# Patient Record
Sex: FEMALE | Race: WHITE | Employment: OTHER | URBAN - METROPOLITAN AREA
[De-identification: names, ages, dates, MRNs, and addresses within clinical notes are randomized per-mention and may not be internally consistent; named-entity substitution may affect disease eponyms.]

---

## 2017-01-24 ENCOUNTER — TELEPHONE (OUTPATIENT)
Dept: INTERNAL MEDICINE | Age: 77
End: 2017-01-24

## 2017-01-24 RX ORDER — GUAIFENESIN AND CODEINE PHOSPHATE 100; 10 MG/5ML; MG/5ML
5 SOLUTION ORAL 4 TIMES DAILY PRN
Qty: 240 ML | Refills: 1 | OUTPATIENT
Start: 2017-01-24 | End: 2017-01-31

## 2017-01-30 DIAGNOSIS — I10 ESSENTIAL HYPERTENSION, BENIGN: ICD-10-CM

## 2017-01-30 RX ORDER — IRBESARTAN 300 MG/1
300 TABLET ORAL DAILY
Qty: 90 TABLET | Refills: 3 | Status: SHIPPED | OUTPATIENT
Start: 2017-01-30 | End: 2017-02-21 | Stop reason: SDUPTHER

## 2017-01-30 RX ORDER — GLIPIZIDE 10 MG/1
TABLET ORAL
Qty: 90 TABLET | Refills: 3 | Status: SHIPPED | OUTPATIENT
Start: 2017-01-30 | End: 2018-01-04 | Stop reason: SDUPTHER

## 2017-01-30 RX ORDER — AMLODIPINE BESYLATE 10 MG/1
10 TABLET ORAL DAILY
Qty: 90 TABLET | Refills: 3 | Status: SHIPPED | OUTPATIENT
Start: 2017-01-30 | End: 2018-01-04 | Stop reason: SDUPTHER

## 2017-01-31 ENCOUNTER — OFFICE VISIT (OUTPATIENT)
Dept: INTERNAL MEDICINE | Age: 77
End: 2017-01-31

## 2017-01-31 VITALS
TEMPERATURE: 97.3 F | DIASTOLIC BLOOD PRESSURE: 58 MMHG | WEIGHT: 192 LBS | SYSTOLIC BLOOD PRESSURE: 150 MMHG | BODY MASS INDEX: 35.33 KG/M2 | RESPIRATION RATE: 16 BRPM | HEART RATE: 84 BPM

## 2017-01-31 DIAGNOSIS — J20.9 ACUTE BRONCHITIS WITH BRONCHOSPASM: Primary | ICD-10-CM

## 2017-01-31 PROCEDURE — 99214 OFFICE O/P EST MOD 30 MIN: CPT | Performed by: INTERNAL MEDICINE

## 2017-01-31 RX ORDER — LEVOFLOXACIN 500 MG/1
500 TABLET, FILM COATED ORAL DAILY
Qty: 10 TABLET | Refills: 0 | Status: SHIPPED | OUTPATIENT
Start: 2017-01-31 | End: 2017-02-01

## 2017-01-31 ASSESSMENT — ENCOUNTER SYMPTOMS
ANAL BLEEDING: 0
VOMITING: 0
COUGH: 1
SINUS PRESSURE: 1
DIARRHEA: 1
ABDOMINAL PAIN: 0
CONSTIPATION: 0
BLOOD IN STOOL: 0
ABDOMINAL DISTENTION: 0
NAUSEA: 0

## 2017-02-01 ENCOUNTER — OFFICE VISIT (OUTPATIENT)
Dept: INTERNAL MEDICINE | Age: 77
End: 2017-02-01

## 2017-02-01 ENCOUNTER — TELEPHONE (OUTPATIENT)
Dept: INTERNAL MEDICINE | Age: 77
End: 2017-02-01

## 2017-02-01 VITALS
HEART RATE: 80 BPM | SYSTOLIC BLOOD PRESSURE: 138 MMHG | WEIGHT: 193 LBS | BODY MASS INDEX: 35.52 KG/M2 | DIASTOLIC BLOOD PRESSURE: 60 MMHG | TEMPERATURE: 97.2 F | RESPIRATION RATE: 16 BRPM

## 2017-02-01 DIAGNOSIS — B02.9 HERPES ZOSTER WITHOUT COMPLICATION: Primary | ICD-10-CM

## 2017-02-01 PROCEDURE — 99214 OFFICE O/P EST MOD 30 MIN: CPT | Performed by: INTERNAL MEDICINE

## 2017-02-01 RX ORDER — VALACYCLOVIR HYDROCHLORIDE 1 G/1
1000 TABLET, FILM COATED ORAL 3 TIMES DAILY
Qty: 21 TABLET | Refills: 0 | Status: SHIPPED | OUTPATIENT
Start: 2017-02-01 | End: 2017-02-08

## 2017-02-01 RX ORDER — OMEGA-3/DHA/EPA/FISH OIL 1000 MG
1 CAPSULE ORAL DAILY
COMMUNITY
End: 2021-02-23 | Stop reason: SDUPTHER

## 2017-02-01 ASSESSMENT — ENCOUNTER SYMPTOMS
BLOOD IN STOOL: 0
NAUSEA: 0
VOMITING: 0
CONSTIPATION: 0
ABDOMINAL DISTENTION: 0
ABDOMINAL PAIN: 0
DIARRHEA: 1
ANAL BLEEDING: 0
COUGH: 1

## 2017-02-03 ENCOUNTER — TELEPHONE (OUTPATIENT)
Dept: INTERNAL MEDICINE | Age: 77
End: 2017-02-03

## 2017-02-09 ENCOUNTER — OFFICE VISIT (OUTPATIENT)
Dept: INTERNAL MEDICINE | Age: 77
End: 2017-02-09

## 2017-02-09 VITALS
HEART RATE: 76 BPM | RESPIRATION RATE: 16 BRPM | SYSTOLIC BLOOD PRESSURE: 138 MMHG | DIASTOLIC BLOOD PRESSURE: 66 MMHG | TEMPERATURE: 97 F | BODY MASS INDEX: 34.96 KG/M2 | WEIGHT: 190 LBS

## 2017-02-09 DIAGNOSIS — H91.92 HEARING LOSS, LEFT: ICD-10-CM

## 2017-02-09 DIAGNOSIS — B02.30 HERPES ZOSTER WITH OPHTHALMIC COMPLICATION, UNSPECIFIED HERPES ZOSTER EYE DISEASE: Primary | ICD-10-CM

## 2017-02-09 PROCEDURE — 99214 OFFICE O/P EST MOD 30 MIN: CPT | Performed by: INTERNAL MEDICINE

## 2017-02-09 RX ORDER — PREDNISONE 20 MG/1
20 TABLET ORAL DAILY
Qty: 30 TABLET | Refills: 0 | Status: SHIPPED | OUTPATIENT
Start: 2017-02-09 | End: 2017-03-15 | Stop reason: ALTCHOICE

## 2017-02-09 ASSESSMENT — ENCOUNTER SYMPTOMS
VOMITING: 0
ANAL BLEEDING: 0
BLOOD IN STOOL: 0
DIARRHEA: 1
ABDOMINAL PAIN: 0
NAUSEA: 0
ABDOMINAL DISTENTION: 0
CONSTIPATION: 0
COUGH: 1

## 2017-02-10 ENCOUNTER — TELEPHONE (OUTPATIENT)
Dept: INTERNAL MEDICINE | Age: 77
End: 2017-02-10

## 2017-02-21 ENCOUNTER — OFFICE VISIT (OUTPATIENT)
Dept: INTERNAL MEDICINE | Age: 77
End: 2017-02-21

## 2017-02-21 VITALS
DIASTOLIC BLOOD PRESSURE: 62 MMHG | SYSTOLIC BLOOD PRESSURE: 144 MMHG | BODY MASS INDEX: 35.33 KG/M2 | WEIGHT: 192 LBS | RESPIRATION RATE: 16 BRPM | HEART RATE: 64 BPM

## 2017-02-21 DIAGNOSIS — B02.30 HERPES ZOSTER WITH OPHTHALMIC COMPLICATION, UNSPECIFIED HERPES ZOSTER EYE DISEASE: Primary | ICD-10-CM

## 2017-02-21 DIAGNOSIS — I10 ESSENTIAL HYPERTENSION, BENIGN: ICD-10-CM

## 2017-02-21 DIAGNOSIS — H91.92 HEARING LOSS, LEFT: ICD-10-CM

## 2017-02-21 PROBLEM — H91.90 HEARING LOSS: Status: ACTIVE | Noted: 2017-02-21

## 2017-02-21 PROCEDURE — 99214 OFFICE O/P EST MOD 30 MIN: CPT | Performed by: INTERNAL MEDICINE

## 2017-02-21 RX ORDER — IRBESARTAN 300 MG/1
300 TABLET ORAL DAILY
Qty: 90 TABLET | Refills: 3 | Status: SHIPPED | OUTPATIENT
Start: 2017-02-21 | End: 2018-04-23 | Stop reason: SDUPTHER

## 2017-02-21 ASSESSMENT — ENCOUNTER SYMPTOMS
BLOOD IN STOOL: 0
ANAL BLEEDING: 0
ABDOMINAL DISTENTION: 0
COUGH: 1

## 2017-02-22 ENCOUNTER — OFFICE VISIT (OUTPATIENT)
Dept: ENT CLINIC | Age: 77
End: 2017-02-22

## 2017-02-22 VITALS
TEMPERATURE: 97.4 F | SYSTOLIC BLOOD PRESSURE: 138 MMHG | WEIGHT: 192 LBS | BODY MASS INDEX: 35.33 KG/M2 | HEIGHT: 62 IN | DIASTOLIC BLOOD PRESSURE: 78 MMHG

## 2017-02-22 DIAGNOSIS — H91.92 HEARING LOSS IN LEFT EAR: Primary | ICD-10-CM

## 2017-02-22 PROCEDURE — 99203 OFFICE O/P NEW LOW 30 MIN: CPT | Performed by: OTOLARYNGOLOGY

## 2017-03-15 ENCOUNTER — OFFICE VISIT (OUTPATIENT)
Dept: INTERNAL MEDICINE | Age: 77
End: 2017-03-15

## 2017-03-15 VITALS
BODY MASS INDEX: 34.93 KG/M2 | DIASTOLIC BLOOD PRESSURE: 60 MMHG | RESPIRATION RATE: 16 BRPM | HEART RATE: 76 BPM | WEIGHT: 191 LBS | SYSTOLIC BLOOD PRESSURE: 140 MMHG

## 2017-03-15 DIAGNOSIS — B02.30 HERPES ZOSTER WITH OPHTHALMIC COMPLICATION, UNSPECIFIED HERPES ZOSTER EYE DISEASE: Primary | ICD-10-CM

## 2017-03-15 DIAGNOSIS — I10 ESSENTIAL HYPERTENSION, BENIGN: ICD-10-CM

## 2017-03-15 DIAGNOSIS — E11.9 TYPE 2 DIABETES MELLITUS WITHOUT COMPLICATION, WITHOUT LONG-TERM CURRENT USE OF INSULIN (HCC): ICD-10-CM

## 2017-03-15 PROCEDURE — 99214 OFFICE O/P EST MOD 30 MIN: CPT | Performed by: INTERNAL MEDICINE

## 2017-03-15 ASSESSMENT — ENCOUNTER SYMPTOMS
ABDOMINAL DISTENTION: 0
ANAL BLEEDING: 0
BLOOD IN STOOL: 0
EYE PAIN: 1

## 2017-04-10 ENCOUNTER — OFFICE VISIT (OUTPATIENT)
Dept: INTERNAL MEDICINE | Age: 77
End: 2017-04-10

## 2017-04-10 VITALS
BODY MASS INDEX: 35.12 KG/M2 | RESPIRATION RATE: 16 BRPM | DIASTOLIC BLOOD PRESSURE: 70 MMHG | HEART RATE: 76 BPM | WEIGHT: 192 LBS | SYSTOLIC BLOOD PRESSURE: 138 MMHG

## 2017-04-10 DIAGNOSIS — L24.9 IRRITANT CONTACT DERMATITIS, UNSPECIFIED TRIGGER: Primary | ICD-10-CM

## 2017-04-10 DIAGNOSIS — S39.012A BACK STRAIN, INITIAL ENCOUNTER: ICD-10-CM

## 2017-04-10 PROCEDURE — 99213 OFFICE O/P EST LOW 20 MIN: CPT | Performed by: INTERNAL MEDICINE

## 2017-04-10 RX ORDER — MELOXICAM 15 MG/1
15 TABLET ORAL DAILY
Qty: 30 TABLET | Refills: 3 | Status: SHIPPED | OUTPATIENT
Start: 2017-04-10 | End: 2017-11-29 | Stop reason: SDUPTHER

## 2017-04-11 ASSESSMENT — ENCOUNTER SYMPTOMS
ABDOMINAL DISTENTION: 0
COUGH: 1
BACK PAIN: 1
EYE PAIN: 1
BLOOD IN STOOL: 0
ANAL BLEEDING: 0

## 2017-04-17 RX ORDER — ATORVASTATIN CALCIUM 80 MG/1
TABLET, FILM COATED ORAL
Qty: 30 TABLET | Refills: 0 | Status: SHIPPED | OUTPATIENT
Start: 2017-04-17 | End: 2017-05-10 | Stop reason: SDUPTHER

## 2017-06-14 ENCOUNTER — OFFICE VISIT (OUTPATIENT)
Dept: INTERNAL MEDICINE | Age: 77
End: 2017-06-14

## 2017-06-14 VITALS
HEART RATE: 72 BPM | SYSTOLIC BLOOD PRESSURE: 128 MMHG | DIASTOLIC BLOOD PRESSURE: 60 MMHG | WEIGHT: 188 LBS | BODY MASS INDEX: 34.39 KG/M2 | RESPIRATION RATE: 16 BRPM

## 2017-06-14 DIAGNOSIS — E11.9 TYPE 2 DIABETES MELLITUS WITHOUT COMPLICATION, WITHOUT LONG-TERM CURRENT USE OF INSULIN (HCC): Primary | ICD-10-CM

## 2017-06-14 DIAGNOSIS — I10 ESSENTIAL HYPERTENSION, BENIGN: ICD-10-CM

## 2017-06-14 DIAGNOSIS — E78.00 PURE HYPERCHOLESTEROLEMIA: ICD-10-CM

## 2017-06-14 LAB
A/G RATIO: 1.7 (ref 1.1–2.2)
ALBUMIN SERPL-MCNC: 4.7 G/DL (ref 3.4–5)
ALP BLD-CCNC: 85 U/L (ref 40–129)
ALT SERPL-CCNC: 17 U/L (ref 10–40)
ANION GAP SERPL CALCULATED.3IONS-SCNC: 19 MMOL/L (ref 3–16)
AST SERPL-CCNC: 18 U/L (ref 15–37)
BILIRUB SERPL-MCNC: 0.6 MG/DL (ref 0–1)
BUN BLDV-MCNC: 18 MG/DL (ref 7–20)
CALCIUM SERPL-MCNC: 10.6 MG/DL (ref 8.3–10.6)
CHLORIDE BLD-SCNC: 105 MMOL/L (ref 99–110)
CHOLESTEROL, TOTAL: 193 MG/DL (ref 0–199)
CO2: 19 MMOL/L (ref 21–32)
CREAT SERPL-MCNC: 0.7 MG/DL (ref 0.6–1.2)
GFR AFRICAN AMERICAN: >60
GFR NON-AFRICAN AMERICAN: >60
GLOBULIN: 2.7 G/DL
GLUCOSE BLD-MCNC: 71 MG/DL (ref 70–99)
HDLC SERPL-MCNC: 64 MG/DL (ref 40–60)
LDL CHOLESTEROL CALCULATED: 91 MG/DL
POTASSIUM SERPL-SCNC: 4.4 MMOL/L (ref 3.5–5.1)
SODIUM BLD-SCNC: 143 MMOL/L (ref 136–145)
TOTAL PROTEIN: 7.4 G/DL (ref 6.4–8.2)
TRIGL SERPL-MCNC: 189 MG/DL (ref 0–150)
VLDLC SERPL CALC-MCNC: 38 MG/DL

## 2017-06-14 PROCEDURE — 36415 COLL VENOUS BLD VENIPUNCTURE: CPT | Performed by: INTERNAL MEDICINE

## 2017-06-14 PROCEDURE — 99214 OFFICE O/P EST MOD 30 MIN: CPT | Performed by: INTERNAL MEDICINE

## 2017-06-14 ASSESSMENT — ENCOUNTER SYMPTOMS
ABDOMINAL DISTENTION: 0
ANAL BLEEDING: 0
GASTROINTESTINAL NEGATIVE: 1
RESPIRATORY NEGATIVE: 1
BLOOD IN STOOL: 0
COUGH: 1

## 2017-06-15 LAB
ESTIMATED AVERAGE GLUCOSE: 128.4 MG/DL
HBA1C MFR BLD: 6.1 %

## 2017-06-30 ENCOUNTER — TELEPHONE (OUTPATIENT)
Dept: INTERNAL MEDICINE | Age: 77
End: 2017-06-30

## 2017-07-21 RX ORDER — ATORVASTATIN CALCIUM 80 MG/1
TABLET, FILM COATED ORAL
Qty: 90 TABLET | Refills: 0 | Status: SHIPPED | OUTPATIENT
Start: 2017-07-21 | End: 2018-04-26 | Stop reason: SDUPTHER

## 2017-08-01 ENCOUNTER — TELEPHONE (OUTPATIENT)
Dept: INTERNAL MEDICINE | Age: 77
End: 2017-08-01

## 2017-08-01 RX ORDER — NITROFURANTOIN 25; 75 MG/1; MG/1
100 CAPSULE ORAL 2 TIMES DAILY
Qty: 14 CAPSULE | Refills: 0 | Status: SHIPPED | OUTPATIENT
Start: 2017-08-01 | End: 2017-08-08

## 2017-09-28 ENCOUNTER — OFFICE VISIT (OUTPATIENT)
Dept: INTERNAL MEDICINE | Age: 77
End: 2017-09-28

## 2017-09-28 VITALS
OXYGEN SATURATION: 97 % | HEART RATE: 70 BPM | BODY MASS INDEX: 35.7 KG/M2 | WEIGHT: 194 LBS | HEIGHT: 62 IN | DIASTOLIC BLOOD PRESSURE: 78 MMHG | TEMPERATURE: 98.6 F | SYSTOLIC BLOOD PRESSURE: 152 MMHG

## 2017-09-28 DIAGNOSIS — N30.00 ACUTE CYSTITIS WITHOUT HEMATURIA: Primary | ICD-10-CM

## 2017-09-28 LAB
BILIRUBIN, POC: NORMAL
BLOOD URINE, POC: NORMAL
CLARITY, POC: CLEAR
COLOR, POC: YELLOW
GLUCOSE URINE, POC: NORMAL
KETONES, POC: NORMAL
LEUKOCYTE EST, POC: NORMAL
NITRITE, POC: NORMAL
PH, POC: 5
PROTEIN, POC: NORMAL
SPECIFIC GRAVITY, POC: 1.02
UROBILINOGEN, POC: NORMAL

## 2017-09-28 PROCEDURE — 81002 URINALYSIS NONAUTO W/O SCOPE: CPT | Performed by: NURSE PRACTITIONER

## 2017-09-28 PROCEDURE — 99213 OFFICE O/P EST LOW 20 MIN: CPT | Performed by: NURSE PRACTITIONER

## 2017-09-28 RX ORDER — SULFAMETHOXAZOLE AND TRIMETHOPRIM 800; 160 MG/1; MG/1
1 TABLET ORAL 2 TIMES DAILY
Qty: 20 TABLET | Refills: 0 | Status: SHIPPED | OUTPATIENT
Start: 2017-09-28 | End: 2017-10-08

## 2017-09-28 ASSESSMENT — ENCOUNTER SYMPTOMS
VOMITING: 0
COUGH: 0
SHORTNESS OF BREATH: 0
ABDOMINAL DISTENTION: 0
CONSTIPATION: 0
ABDOMINAL PAIN: 1
WHEEZING: 0
DIARRHEA: 0
NAUSEA: 0

## 2017-10-05 ENCOUNTER — OFFICE VISIT (OUTPATIENT)
Dept: INTERNAL MEDICINE | Age: 77
End: 2017-10-05

## 2017-10-05 VITALS
SYSTOLIC BLOOD PRESSURE: 130 MMHG | HEIGHT: 62 IN | HEART RATE: 78 BPM | DIASTOLIC BLOOD PRESSURE: 72 MMHG | WEIGHT: 194 LBS | TEMPERATURE: 98.5 F | OXYGEN SATURATION: 96 % | BODY MASS INDEX: 35.7 KG/M2

## 2017-10-05 DIAGNOSIS — Z00.00 ROUTINE GENERAL MEDICAL EXAMINATION AT A HEALTH CARE FACILITY: Primary | ICD-10-CM

## 2017-10-05 DIAGNOSIS — R53.83 FATIGUE, UNSPECIFIED TYPE: ICD-10-CM

## 2017-10-05 DIAGNOSIS — E66.09 NON MORBID OBESITY DUE TO EXCESS CALORIES: ICD-10-CM

## 2017-10-05 DIAGNOSIS — I10 ESSENTIAL HYPERTENSION, BENIGN: ICD-10-CM

## 2017-10-05 DIAGNOSIS — E78.00 PURE HYPERCHOLESTEROLEMIA: ICD-10-CM

## 2017-10-05 DIAGNOSIS — E11.9 TYPE 2 DIABETES MELLITUS WITHOUT COMPLICATION, WITHOUT LONG-TERM CURRENT USE OF INSULIN (HCC): ICD-10-CM

## 2017-10-05 LAB
FOLATE: 11.29 NG/ML (ref 4.78–24.2)
HCT VFR BLD CALC: 34.8 % (ref 36–48)
HEMOGLOBIN: 11.7 G/DL (ref 12–16)
MCH RBC QN AUTO: 30.7 PG (ref 26–34)
MCHC RBC AUTO-ENTMCNC: 33.5 G/DL (ref 31–36)
MCV RBC AUTO: 91.7 FL (ref 80–100)
PDW BLD-RTO: 14.2 % (ref 12.4–15.4)
PLATELET # BLD: 185 K/UL (ref 135–450)
PMV BLD AUTO: 8.6 FL (ref 5–10.5)
RBC # BLD: 3.79 M/UL (ref 4–5.2)
T4 FREE: 1 NG/DL (ref 0.9–1.8)
TSH REFLEX: 1.82 UIU/ML (ref 0.27–4.2)
VITAMIN B-12: 487 PG/ML (ref 211–911)
WBC # BLD: 6.4 K/UL (ref 4–11)

## 2017-10-05 PROCEDURE — G0439 PPPS, SUBSEQ VISIT: HCPCS | Performed by: NURSE PRACTITIONER

## 2017-10-05 ASSESSMENT — ANXIETY QUESTIONNAIRES: GAD7 TOTAL SCORE: 0

## 2017-10-05 ASSESSMENT — ENCOUNTER SYMPTOMS
COLOR CHANGE: 0
BLOOD IN STOOL: 0
SINUS PRESSURE: 0
BACK PAIN: 0
VOMITING: 0
SORE THROAT: 0
EYE ITCHING: 0
EYE REDNESS: 0
DIARRHEA: 0
SHORTNESS OF BREATH: 0
NAUSEA: 0
RHINORRHEA: 0
ABDOMINAL PAIN: 0
CONSTIPATION: 0
COUGH: 0
WHEEZING: 0
CHEST TIGHTNESS: 0

## 2017-10-05 ASSESSMENT — LIFESTYLE VARIABLES: HOW OFTEN DO YOU HAVE A DRINK CONTAINING ALCOHOL: 0

## 2017-10-05 ASSESSMENT — PATIENT HEALTH QUESTIONNAIRE - PHQ9: SUM OF ALL RESPONSES TO PHQ QUESTIONS 1-9: 0

## 2017-10-05 NOTE — PATIENT INSTRUCTIONS
Personalized Preventive Plan for Shayy Mathews - 10/5/2017  Medicare offers a range of preventive health benefits. Some of the tests and screenings are paid in full while other may be subject to a deductible, co-insurance, and/or copay. Some of these benefits include a comprehensive review of your medical history including lifestyle, illnesses that may run in your family, and various assessments and screenings as appropriate. After reviewing your medical record and screening and assessments performed today your provider may have ordered immunizations, labs, imaging, and/or referrals for you. A list of these orders (if applicable) as well as your Preventive Care list are included within your After Visit Summary for your review. Other Preventive Recommendations:    · A preventive eye exam performed by an eye specialist is recommended every 1-2 years to screen for glaucoma; cataracts, macular degeneration, and other eye disorders. · A preventive dental visit is recommended every 6 months. · Try to get at least 150 minutes of exercise per week or 10,000 steps per day on a pedometer . · Order or download the FREE \"Exercise & Physical Activity: Your Everyday Guide\" from [employer]. Call 5-747.746.6194 or search [employer] online. · You need 6307-0196 mg of calcium and 9970-4910 IU of vitamin D per day. It is possible to meet your calcium requirement with diet alone, but a vitamin D supplement is usually necessary to meet this goal.  · When exposed to the sun, use a sunscreen that protects against both UVA and UVB radiation with an SPF of 30 or greater. Reapply every 2 to 3 hours or after sweating, drying off with a towel, or swimming. · Always wear a seat belt when traveling in a car. Always wear a helmet when riding a bicycle or motorcycle. Heart-Healthy Diet   Sodium, Fat, and Cholesterol Controlled Diet       What Is a Heart Healthy Diet?    A heart-healthy cholesterol, this type of cholesterol actually carries cholesterol away from your arteries and may, therefore, help lower your risk of having a heart attack. You want this level to be high (ideally greater than 60). It is a risk to have a level less than 40. You can raise this good cholesterol by eating olive oil, canola oil, avocados, or nuts. Exercise raises this level, too. Fat    Fat is calorie dense and packs a lot of calories into a small amount of food. Even though fats should be limited due to their high calorie content, not all fats are bad. In fact, some fats are quite healthful. Fat can be broken down into four main types. The good-for-you fats are:   Monounsaturated fat  found in oils such as olive and canola, avocados, and nuts and natural nut butters; can decrease cholesterol levels, while keeping levels of HDL cholesterol high   Polyunsaturated fat  found in oils such as safflower, sunflower, soybean, corn, and sesame; can decrease total cholesterol and LDL cholesterol   Omega-3 fatty acids  particularly those found in fatty fish (such as salmon, trout, tuna, mackerel, herring, and sardines); can decrease risk of arrhythmias, decrease triglyceride levels, and slightly lower blood pressure   The fats that you want to limit are:   Saturated fat  found in animal products, many fast foods, and a few vegetables; increases total blood cholesterol, including LDL levels   Animal fats that are saturated include: butter, lard, whole-milk dairy products, meat fat, and poultry skin   Vegetable fats that are saturated include: hydrogenated shortening, palm oil, coconut oil, cocoa butter   Hydrogenated or trans fat  found in margarine and vegetable shortening, most shelf stable snack foods, and fried foods; increases LDL and decreases HDL     It is generally recommended that you limit your total fat for the day to less than 30% of your total calories.  If you follow an 1800-calorie heart healthy diet, for example, this would mean 60 grams of fat or less per day. Saturated fat and trans fat in your diet raises your blood cholesterol the most, much more than dietary cholesterol does. For this reason, on a heart-healthy diet, less than 7% of your calories should come from saturated fat and ideally 0% from trans fat. On an 1800-calorie diet, this translates into less than 14 grams of saturated fat per day, leaving 46 grams of fat to come from mono- and polyunsaturated fats.    Food Choices on a Heart Healthy Diet   Food Category   Foods Recommended   Foods to Avoid   Grains   Breads and rolls without salted tops Most dry and cooked cereals Unsalted crackers and breadsticks Low-sodium or homemade breadcrumbs or stuffing All rice and pastas   Breads, rolls, and crackers with salted tops High-fat baked goods (eg, muffins, donuts, pastries) Quick breads, self-rising flour, and biscuit mixes Regular bread crumbs Instant hot cereals Commercially prepared rice, pasta, or stuffing mixes   Vegetables   Most fresh, frozen, and low-sodium canned vegetables Low-sodium and salt-free vegetable juices Canned vegetables if unsalted or rinsed   Regular canned vegetables and juices, including sauerkraut and pickled vegetables Frozen vegetables with sauces Commercially prepared potato and vegetable mixes   Fruits   Most fresh, frozen, and canned fruits All fruit juices   Fruits processed with salt or sodium   Milk   Nonfat or low-fat (1%) milk Nonfat or low-fat yogurt Cottage cheese, low-fat ricotta, cheeses labeled as low-fat and low-sodium   Whole milk Reduced-fat (2%) milk Malted and chocolate milk Full fat yogurt Most cheeses (unless low-fat and low salt) Buttermilk (no more than 1 cup per week)   Meats and Beans   Lean cuts of fresh or frozen beef, veal, lamb, or pork (look for the word loin) Fresh or frozen poultry without the skin Fresh or frozen fish and some shellfish Egg whites and egg substitutes (Limit whole eggs to three per week) Tofu Nuts or seeds (unsalted, dry-roasted), low-sodium peanut butter Dried peas, beans, and lentils   Any smoked, cured, salted, or canned meat, fish, or poultry (including reyna, chipped beef, cold cuts, hot dogs, sausages, sardines, and anchovies) Poultry skins Breaded and/or fried fish or meats Canned peas, beans, and lentils Salted nuts   Fats and Oils   Olive oil and canola oil Low-sodium, low-fat salad dressings and mayonnaise   Butter, margarine, coconut and palm oils, reyna fat   Snacks, Sweets, and Condiments   Low-sodium or unsalted versions of broths, soups, soy sauce, and condiments Pepper, herbs, and spices; vinegar, lemon, or lime juice Low-fat frozen desserts (yogurt, sherbet, fruit bars) Sugar, cocoa powder, honey, syrup, jam, and preserves Low-fat, trans-fat free cookies, cakes, and pies Fran and animal crackers, fig bars, nita snaps   High-fat desserts Broth, soups, gravies, and sauces, made from instant mixes or other high-sodium ingredients Salted snack foods Canned olives Meat tenderizers, seasoning salt, and most flavored vinegars   Beverages   Low-sodium carbonated beverages Tea and coffee in moderation Soy milk   Commercially softened water   Suggestions   Make whole grains, fruits, and vegetables the base of your diet. Choose heart-healthy fats such as canola, olive, and flaxseed oil, and foods high in heart-healthy fats, such as nuts, seeds, soybeans, tofu, and fish. Eat fish at least twice per week; the fish highest in omega-3 fatty acids and lowest in mercury include salmon, herring, mackerel, sardines, and canned chunk light tuna. If you eat fish less than twice per week or have high triglycerides, talk to your doctor about taking fish oil supplements. Read food labels.    For products low in fat and cholesterol, look for fat free, low-fat, cholesterol free, saturated fat free, and trans fat freeAlso scan the Nutrition Facts Label, which lists saturated fat, trans fat, and cholesterol amounts. For products low in sodium, look for sodium free, very low sodium, low sodium, no added salt, and unsalted   Skip the salt when cooking or at the table; if food needs more flavor, get creative and try out different herbs and spices. Garlic and onion also add substantial flavor to foods. Trim any visible fat off meat and poultry before cooking, and drain the fat off after arciniega. Use cooking methods that require little or no added fat, such as grilling, boiling, baking, poaching, broiling, roasting, steaming, stir-frying, and sauting. Avoid fast food and convenience food. They tend to be high in saturated and trans fat and have a lot of added salt. Talk to a registered dietitian for individualized diet advice. Last Reviewed: March 2011 Javier Lemos MS, MPH, RD   Updated: 3/29/2011   ·   Patient information: Weight loss treatments    INTRODUCTION  Obesity is a major international problem, and Americans are among the heaviest people in the world. The percentage of obese people in the [place] has risen steadily. Many people find that although they initially lose weight by dieting, they quickly regain the weight after the diet ends. Because it so hard to keep weight off over time, it is important to have as much information and support as possible before starting a diet. You are most likely to be successful in losing weight and keeping it off when you believe that your body weight can be controlled. STARTING A WEIGHT LOSS PROGRAM  Some people like to talk to their doctor or nurse to get help choosing the best plan, monitoring progress, and getting advice and support along the way. To know what treatment (or combination of treatments) will work best, determine your body mass index (BMI) and waist circumference (measurement). The BMI is calculated from your height and weight.   A person with a BMI between 25 and 29.9 is considered overweight   A person with a BMI of 30 or greater is considered to be obese  A waist circumference greater than 35 inches (88 cm) in women and 40 inches (102 cm) in men increases the risk of obesity-related complications, such as heart disease and diabetes. People who are obese and who have a larger waist size may need more aggressive weight loss treatment than others. Talk to your doctor or nurse for advice. Types of treatment  Based on your measurements and your medical history, your doctor or nurse can determine what combination of weight loss treatments would work best for you. Treatments may include changes in lifestyle, exercise, dieting, and, in some cases, weight loss medicines or weight loss surgery. Weight loss surgery, also called bariatric surgery, is reserved for people with severe obesity who have not responded to other weight loss treatments. SETTING A WEIGHT LOSS GOAL  It is important to set a realistic weight loss goal. Your first goal should be to avoid gaining more weight and staying at your current weight (or within 5 percent). Many people have a \"dream\" weight that is difficult or impossible to achieve. People at high risk of developing diabetes who are able to lose 5 percent of their body weight and maintain this weight will reduce their risk of developing diabetes by about 50 percent and reduce their blood pressure. This is a success. Losing more than 15 percent of your body weight and staying at this weight is an extremely good result, even if you never reach your \"dream\" or \"ideal\" weight. LIFESTYLE CHANGES  Programs that help you to change your lifestyle are usually run by psychologists or other professionals. The goals of lifestyle changes are to help you change your eating habits, become more active, and be more aware of how much you eat and exercise, helping you to make healthier choices. This type of treatment can be broken down into three steps:   The triggers that make you want to eat   Eating   What happens after you eat  Triggers to eat  Determining what triggers you to eat involves figuring out what foods you eat and where and when you eat. To figure out what triggers you to eat, keep a record for a few days of everything you eat, the places where you eat, how often you eat, and the emotions you were feeling when you ate. For some people, the trigger is related to a certain time of day or night. For others, the trigger is related to a certain place, like sitting at a desk working. Eating  You can change your eating habits by breaking the chain of events between the trigger for eating and eating itself. There are many ways to do this. For instance, you can:  Limit where you eat to a few places (eg, dining room)   Restrict the number of utensils (eg, only a fork) used for eating   Drink a sip of water between each bite   Chew your food a certain number of times   Get up and stop eating every few minutes  What happens after you eat  Rewarding yourself for good eating behaviors can help you to develop better habits. This is not a reward for weight loss; instead, it is a reward for changing unhealthy behaviors. Do not use food as a reward. Some people find money, clothing, or personal care (eg, a hair cut, manicure, or massage) to be effective rewards. Treat yourself immediately after making better eating choices to reinforce the value of the good behavior. You need to have clear behavior goals, and you must have a time frame for reaching your goals. Reward small changes along the way to your final goal.  Other factors that contribute to successful weight loss  Changing your behavior involves more than just changing unhealthy eating habits; it also involves finding people around you to support your weight loss, reducing stress, and learning to be strong when tempted by food. Establish a \"krystin\" system  Having a friend or family member available to provide support and reinforce good behavior is very helpful.  The vegetables, and grains (including breads, rice, pasta, and cereal), alcoholic beverages, and in dairy products. Meat and fish do not contain carbohydrates. Side effects of very-low-carbohydrate diets can include constipation, headache, bad breath, muscle cramps, diarrhea, and weakness. Mediterranean diet  The term \"Mediterranean diet\" refers to a way of eating that is common in [place]-growing regions around the [facility]. Although there is some variation in Mediterranean diets, there are some similarities. Most Mediterranean diets include:  A high level of monounsaturated fats (from olive or canola oil, walnuts, pecans, almonds) and a low level of saturated fats (from butter)   A high amount of vegetables, fruits, legumes, and grains (7 to 10 servings of fruits and vegetables per day)   A moderate amount of milk and dairy products, mostly in the form of cheese. Use low-fat dairy products (skim milk, fat-free yogurt, low-fat cheese). A relatively low amount of red meat and meat products. Substitute fish or poultry for red meat. For those who drink alcohol, a modest amount (mainly as red wine) may help to protect against cardiovascular disease. A modest amount is up to one (4 ounce) glass per day for women and up to two glasses per day for men. Which diet is best?  Studies have compared different diets, including:  Very-low-carbohydrate (Atkins)   Macronutrient balance controlling glycemic load (Zone®)   Reduced-calorie (Weight Watchers®)   Very-low-fat (Ornish)  No one diet is \"best\" for weight loss. Any diet will help you to lose weight if you stick with the diet. Therefore, it is important to choose a diet that includes foods you like. Fad diets  Fad diets often promise quick weight loss (more than 1 to 2 pounds per week) and may claim that you do not need to exercise or give up favorite foods. Some fad diets cost a lot of money, because you have to pay for seminars or pills.  Fad diets generally lack any scientific evidence that they are safe and effective, but instead rely on \"before\" and \"after\" photos or testimonials. Diets that sound too good to be true usually are. These plans are a waste of time and money and are not recommended. A doctor, nurse, or nutritionist can help you find a safe and effective way to lose weight and keep it off. WEIGHT LOSS [place] a weight loss medicine may be helpful when used in combination with diet, exercise, and lifestyle changes. However, it is important to understand the risks and benefits of these medicines. It is also important to be realistic about your goal weight using a weight loss medicine; you may not reach your \"dream\" weight, but you may be able to reduce your risk of diabetes or heart disease. Weight loss medicines may be recommended for people who have not been able to lose weight with diet and exercise who have a:  BMI of 30 or more    BMI between 27 and 29.9 and have other medical problems, such as diabetes, high cholesterol, or high blood pressure  Two weight loss medicines are approved in the [place] for long-term use. These are sibutramine and orlistat. Other weight loss medicines (phentermine, diethylpropion) are available but are only approved for short-term use (up to 12 weeks). Sibutramine  Sibutramine (Meridia®, Reductil®) is a medicine that reduces your appetite. In people who take the medicine for one year, the average weight loss is 10 percent of the initial body weight (5 percent more than those who took a placebo treatment). Side effects of sibutramine include insomnia, dry mouth, and constipation. Increases in blood pressure can occur. Therefore, blood pressure is usually monitored during treatment. There is no evidence that sibutramine causes heart or lung problems (like dexfenfluramine and fenfluramine (Phen/Fen)).  However, experts agree that sibutramine should not used by people with coronary heart disease, heart Although some studies have shown that ephedra helps with weight loss, there can be serious side effects (psychiatric symptoms, palpitations, and stomach upset), including death. There are not enough data about the safety and efficacy of chromium, ginseng, glucomannan, green tea, hydroxycitric acid, L carnitine, psyllium, pyruvate supplements, Love wort, and conjugated linoleic acid. Two supplements from [facility], [address] [place] (also known as the Ryanjuan Uriostegui 15 pill) and Herbathin dietary supplement, have been shown to contain prescription drugs. Hoodia gordonii is a dietary supplement derived from a plant in [place]. It is not recommended because there is no proof that it is safe or effective. Bitter orange (Citrus aurantium) can increase your heart rate and blood pressure and is not recommended. SHOULD I HAVE SURGERY TO LOSE WEIGHT?  Weight loss surgery is recommended ONLY for people with one of the following:  Severe obesity (body mass index above 40) (calculator 1 and calculator 2) who have not responded to diet, exercise, or weight loss medicines   Body mass index between 35 and 40, along with a serious medical problem (including diabetes, severe joint pain, or sleep apnea) that would improve with weight loss  You should be sure that you understand the potential risks and benefits of weight loss surgery. You must be motivated and willing to make lifelong changes in how you eat to reach and maintain a healthier weight after surgery. You must also be realistic about weight loss after surgery (see 'Effectiveness of weight loss surgery' below). PREPARING FOR WEIGHT LOSS SURGERY  Most people who have weight loss surgery will meet with several specialists before surgery is scheduled. This often includes a dietitian, mental health counselor, a doctor who specializes in care of obese people, and a surgeon who performs weight loss surgery (bariatric surgeon).  You may need to work with these providers for several weeks or months before surgery. The nutritionist will explain what and how much you will be able to eat after surgery. You may also need to lose a small amount of weight before surgery. The mental health specialist will help you to cope with stress and other factors that can make it harder to lose weight or trigger you to eat   The medical doctor will determine whether you need other tests, counseling, or treatment before surgery. He or she might also help you begin a medical weight loss program so that you can lose some weight before surgery. The bariatric surgeon will meet with you to discuss the surgeries available to treat obesity. He or she will also make sure you are a good candidate for surgery. TYPES OF WEIGHT LOSS SURGERY  There are several types of weight loss surgeries, the most common being lap banding, gastric bypass, and gastric sleeve. Lap banding  Laparoscopic adjustable gastric banding (LAGB), or lap banding, is a surgery that uses an adjustable band around the opening to the stomach (figure 1). This reduces the amount of food that you can eat at one time. Lap banding is done through small incisions, with a laparoscope. The band can be adjusted after surgery, allowing you to eat more or less food. Adjustments to the size and tightness of the band are made by using a needle to add or remove fluid from a port (a small container under the skin that is connected to the band). Adding fluid to the band makes it tighter which restricts the amount of food you can eat and may help you to lose more weight. Lap banding is a popular choice because it is relatively simple to perform, can be adjusted or removed, and has a low risk of serious complications immediately after surgery. However, weight loss with the lap band depends on your ability to follow the program closely. You will need to prepare nutritious meals that [facility] with\" the band, not against it.  For example, the lap band will not work well if you eat or drink a large amount of liquid calories (like ice cream). The band will not help you to feel full when you eat/drink liquid calories. Weight loss ranges from 45 to 75 percent after two years. As an example, a person who is 120 pounds overweight could expect to lose approximately 54 to 90 pounds in the two years after lap banding. Gastric bypass  Audrey-en-Y gastric bypass, also called gastric bypass, helps you to lose weight by reducing the amount of food you can eat and reducing the number of calories and nutrients you absorb from the food you eat. To perform gastric bypass, a surgeon creates a small stomach pouch by dividing the stomach and attaching it to the small intestine. This helps you to lose weight in two ways: The smaller stomach can hold less food than before surgery. This causes you to feel full after eating a very small amount of food or liquid. Over time, the pouch might stretch, allowing you to eat more food. The body absorbs fewer calories, since food bypasses most of the stomach as well as the upper small intestine. This new arrangement seems to decrease your appetite and change how you break down foods by changing the release of various hormones. Gastric bypass can be performed as open surgery (through an incision on the abdomen) or laparoscopically, which uses smaller incisions and smaller instruments. Both the laparoscopic and open techniques have risks and benefits. You and your surgeon should work together to decide which surgery, if any, is right for you. Gastric bypass has a high success rate, and people lose an average of 62 to 68 percent of their excess body weight in the first year. Weight loss typically levels off after one to two years, with an overall excess weight loss between 50 and 75 percent. For a person who is 120 pounds overweight, an average of 60 to 90 pounds of weight loss would be expected.   Gastric sleeve  Gastric sleeve, also known as sleeve gastrectomy, is a surgery that reduces the size of the stomach and makes it into a narrow tube (figure 3). The new stomach is much smaller and produces less of the hormone (ghrelin) that causes hunger, helping you feel satisfied with less food. Sleeve gastrectomy is safer than gastric bypass because the intestines are not rearranged, and there is less chance of malnutrition. It also appears to control hunger better than lap banding. It might be safer than the lap banding because no foreign materials are used. The gastric sleeve has a good success rate, and people lose an average of 33 percent of their excess body weight in the first year. For a person who is 120 pounds overweight, this would mean losing about 40 pounds in the first year. WEIGHT LOSS SURGERY COMPLICATIONS  A variety of complications can occur with weight loss surgery. The risks of surgery depend upon which surgery you have and any medical problems you had before surgery. Some of the more common early surgical complications (one to six weeks after surgery) include:  Bleeding   Infection   Blockage or tear in the bowels   Need for further surgery  Important medical complications after surgery can include blood clots in the legs or lungs, heart attack, pneumonia, and urinary tract infection. Complications are less likely when surgery is performed in centers that are experienced in weight loss surgery. In general, centers with experience in weight loss surgery have:  Board-certified doctors and surgeons   A team of support staff (dietitians, counselors, nurses)   Long-term follow-up after surgery   Hospital staff experienced with the care of weight loss patients. This includes nurses who are trained in the care of patients immediately after surgery and anesthesiologists who are experienced in caring for the morbidly obese.   EFFECTIVENESS OF WEIGHT LOSS SURGERY  The goal of weight loss surgery is to reduce the risk of illness or death associated with obesity. Weight loss surgery can also help you to feel and look better, reduce the amount of money you spend on medicines, and cut down on sick days. As an example, weight loss surgery can improve health problems related to obesity (diabetes, high blood pressure, high cholesterol, sleep apnea) to the point that you need less or no medicine. Finally, weight loss surgery might reduce your risk of developing heart disease, cancer, and certain infections. AFTER WEIGHT LOSS SURGERY  You will need to stay in the hospital until your team feels that it is safe for you to leave (on average, one to three days). Do not drive if you are taking prescription pain medicine. Begin exercising as soon as possible once you have healed; most weight loss centers will design an exercise program for you. Once you are home, it is important to eat and drink exactly what your doctor and dietitian recommend. You will see your doctor, nurse, and dietitian on a regular basis after surgery to monitor your health, diet, and weight loss. You will be able to slowly increase how much you eat over time, although it will always be important to:  Eat small, frequent meals and not skip meals   Chew your food slowly and completely   Avoid eating while \"distracted\" (such as eating while watching TV)   Stop eating when you feel full   Drink liquids at least 30 minutes before or after eating   Avoid foods high in fat or sugar   Take vitamin supplements, as recommended  It can take several months to learn to listen to your body so that you know when you are hungry and when you are full. You may dislike foods you previously loved, and you may begin to prefer new foods. This can be a frustrating process for some people, so talk to your dietitian if you are having trouble. It usually takes between one and two years to lose weight after surgery.  After reaching their goal weight, some people have plastic surgery (called \"body contouring\")

## 2017-10-05 NOTE — MR AVS SNAPSHOT
[employer] online. · You need 8032-6763 mg of calcium and 5915-2348 IU of vitamin D per day. It is possible to meet your calcium requirement with diet alone, but a vitamin D supplement is usually necessary to meet this goal.  · When exposed to the sun, use a sunscreen that protects against both UVA and UVB radiation with an SPF of 30 or greater. Reapply every 2 to 3 hours or after sweating, drying off with a towel, or swimming. · Always wear a seat belt when traveling in a car. Always wear a helmet when riding a bicycle or motorcycle. Heart-Healthy Diet   Sodium, Fat, and Cholesterol Controlled Diet       What Is a Heart Healthy Diet? A heart-healthy diet is one that limits sodium , certain types of fat , and cholesterol . This type of diet is recommended for:   People with any form of cardiovascular disease (eg, coronary heart disease , peripheral vascular disease , previous heart attack , previous stroke )   People with risk factors for cardiovascular disease, such as high blood pressure , high cholesterol , or diabetes   Anyone who wants to lower their risk of developing cardiovascular disease   Sodium    Sodium is a mineral found in many foods. In general, most people consume much more sodium than they need. Diets high in sodium can increase blood pressure and lead to edema (water retention). On a heart-healthy diet, you should consume no more than 2,300 mg (milligrams) of sodium per dayabout the amount in one teaspoon of table salt. The foods highest in sodium include table salt (about 50% sodium), processed foods, convenience foods, and preserved foods. Cholesterol    Cholesterol is a fat-like, waxy substance in your blood. Our bodies make some cholesterol. It is also found in animal products, with the highest amounts in fatty meat, egg yolks, whole milk, cheese, shellfish, and organ meats.  On a heart-healthy diet, you should limit your cholesterol intake The fats that you want to limit are:   Saturated fat  found in animal products, many fast foods, and a few vegetables; increases total blood cholesterol, including LDL levels   Animal fats that are saturated include: butter, lard, whole-milk dairy products, meat fat, and poultry skin   Vegetable fats that are saturated include: hydrogenated shortening, palm oil, coconut oil, cocoa butter   Hydrogenated or trans fat  found in margarine and vegetable shortening, most shelf stable snack foods, and fried foods; increases LDL and decreases HDL     It is generally recommended that you limit your total fat for the day to less than 30% of your total calories. If you follow an 1800-calorie heart healthy diet, for example, this would mean 60 grams of fat or less per day. Saturated fat and trans fat in your diet raises your blood cholesterol the most, much more than dietary cholesterol does. For this reason, on a heart-healthy diet, less than 7% of your calories should come from saturated fat and ideally 0% from trans fat. On an 1800-calorie diet, this translates into less than 14 grams of saturated fat per day, leaving 46 grams of fat to come from mono- and polyunsaturated fats.    Food Choices on a Heart Healthy Diet   Food Category   Foods Recommended   Foods to Avoid   Grains   Breads and rolls without salted tops Most dry and cooked cereals Unsalted crackers and breadsticks Low-sodium or homemade breadcrumbs or stuffing All rice and pastas   Breads, rolls, and crackers with salted tops High-fat baked goods (eg, muffins, donuts, pastries) Quick breads, self-rising flour, and biscuit mixes Regular bread crumbs Instant hot cereals Commercially prepared rice, pasta, or stuffing mixes   Vegetables   Most fresh, frozen, and low-sodium canned vegetables Low-sodium and salt-free vegetable juices Canned vegetables if unsalted or rinsed   Regular canned vegetables and juices, including sauerkraut and pickled quickly regain the weight after the diet ends. Because it so hard to keep weight off over time, it is important to have as much information and support as possible before starting a diet. You are most likely to be successful in losing weight and keeping it off when you believe that your body weight can be controlled. STARTING A WEIGHT LOSS PROGRAM  Some people like to talk to their doctor or nurse to get help choosing the best plan, monitoring progress, and getting advice and support along the way. To know what treatment (or combination of treatments) will work best, determine your body mass index (BMI) and waist circumference (measurement). The BMI is calculated from your height and weight. A person with a BMI between 25 and 29.9 is considered overweight   A person with a BMI of 30 or greater is considered to be obese  A waist circumference greater than 35 inches (88 cm) in women and 40 inches (102 cm) in men increases the risk of obesity-related complications, such as heart disease and diabetes. People who are obese and who have a larger waist size may need more aggressive weight loss treatment than others. Talk to your doctor or nurse for advice. Types of treatment  Based on your measurements and your medical history, your doctor or nurse can determine what combination of weight loss treatments would work best for you. Treatments may include changes in lifestyle, exercise, dieting, and, in some cases, weight loss medicines or weight loss surgery. Weight loss surgery, also called bariatric surgery, is reserved for people with severe obesity who have not responded to other weight loss treatments. SETTING A WEIGHT LOSS GOAL  It is important to set a realistic weight loss goal. Your first goal should be to avoid gaining more weight and staying at your current weight (or within 5 percent). Many people have a \"dream\" weight that is difficult or impossible to achieve. People at high risk of developing diabetes who are able to lose 5 percent of their body weight and maintain this weight will reduce their risk of developing diabetes by about 50 percent and reduce their blood pressure. This is a success. Losing more than 15 percent of your body weight and staying at this weight is an extremely good result, even if you never reach your \"dream\" or \"ideal\" weight. LIFESTYLE CHANGES  Programs that help you to change your lifestyle are usually run by psychologists or other professionals. The goals of lifestyle changes are to help you change your eating habits, become more active, and be more aware of how much you eat and exercise, helping you to make healthier choices. This type of treatment can be broken down into three steps: The triggers that make you want to eat   Eating   What happens after you eat  Triggers to eat  Determining what triggers you to eat involves figuring out what foods you eat and where and when you eat. To figure out what triggers you to eat, keep a record for a few days of everything you eat, the places where you eat, how often you eat, and the emotions you were feeling when you ate. For some people, the trigger is related to a certain time of day or night. For others, the trigger is related to a certain place, like sitting at a desk working. Eating  You can change your eating habits by breaking the chain of events between the trigger for eating and eating itself. There are many ways to do this. For instance, you can:  Limit where you eat to a few places (eg, dining room)   Restrict the number of utensils (eg, only a fork) used for eating   Drink a sip of water between each bite   Chew your food a certain number of times   Get up and stop eating every few minutes  What happens after you eat  Rewarding yourself for good eating behaviors can help you to develop better habits.  This is not a reward for weight loss; instead, it is a reward for changing unhealthy behaviors. Do not use food as a reward. Some people find money, clothing, or personal care (eg, a hair cut, manicure, or massage) to be effective rewards. Treat yourself immediately after making better eating choices to reinforce the value of the good behavior. You need to have clear behavior goals, and you must have a time frame for reaching your goals. Reward small changes along the way to your final goal.  Other factors that contribute to successful weight loss  Changing your behavior involves more than just changing unhealthy eating habits; it also involves finding people around you to support your weight loss, reducing stress, and learning to be strong when tempted by food. Establish a \"krystin\" system  Having a friend or family member available to provide support and reinforce good behavior is very helpful. The support person needs to understand your goals. Learn to be strong  Learning to be strong when tempted by food is an important part of losing weight. As an example, you will need to learn how to say \"no\" and continue to say no when urged to eat at parties and social gatherings. Develop strategies for events before you go, such as eating before you go or taking low-calorie snacks and drinks with you. Develop a support system  Having a support system is helpful when losing weight. This is why many commercial groups are successful. Family support is also essential; if your family does not support your efforts to lose weight, this can slow your progress or even keep you from losing weight. Positive thinking  People often have conversations with themselves in their head; these conversations can be positive or negative. If you eat a piece of cake that was not planned, you may respond by thinking, \"Oh, you stupid idiot, you've blown your diet! \" and as a result, you may eat more cake. A positive thought for the same event could be, \"Well, I ate cake when it was not on my plan. Now I should do something to get back on track. \" A positive approach is much more likely to be successful than a negative one. Reduce stress  Although stress is a part of everyday life, it can trigger uncontrolled eating in some people. It is important to find a way to get through these difficult times without eating or by eating low-calorie food, like raw vegetables. It may be helpful to imagine a relaxing place that allows you to temporarily escape from stress. With deep breaths and closed eyes, you can imagine this relaxing place for a few minutes. Self-help programs  Self-help programs like [employer] Watchers®, Overeaters Anonymous®, and Take Off All (.com)© work for some people. As with all weight loss programs, you are most likely to be successful with these plans if you make long-term changes in how you eat. CHOOSING A DIET  A calorie is a unit of energy found in food. Your body needs calories to function. The goal of any diet is to burn up more calories than you eat. How quickly you lose weight depends upon several factors, such as your age, gender, and starting weight. Older people have a slower metabolism than young people, so they lose weight more slowly. Men lose more weight than women of similar height and weight when dieting because they use more energy. People who are extremely overweight lose weight more quickly than those who are only mildly overweight. Try not to drink alcohol or drinks with added sugar, and most sweets (candy, cakes, cookies), since they rarely contain important nutrients. Portion-controlled diets  One simple way to diet is to buy packaged foods, like frozen low-calorie meals or meal-replacement canned drinks.  A typical meal plan for one day may include:  A meal-replacement drink or breakfast bar for breakfast A meal-replacement drink or a frozen low-calorie (250 to 350 calories) meal for lunch   A frozen low-calorie meal or other prepackaged, calorie-controlled meal, along with extra vegetables for dinner  This would give you 1000 to 1500 calories per day. Low-fat diet  To reduce the amount of fat in your diet, you can:  Eat low-fat foods. Low-fat foods are those that contain less than 30 percent of calories from fat. Fat is listed on the food facts label (figure 1). Count fat grams. For a 1500 calorie diet, this would mean about 45 g or fewer of fat per day. Low-carbohydrate diet  Low- and very-low-carbohydrate diets (eg, Atkins diet, [facility]) have become popular ways to lose weight quickly. With a very-low-carbohydrate diet, you eat between 0 and 60 grams of carbohydrates per day (a standard diet contains 200 to 300 grams of carbohydrates)   With a low-carbohydrate diet, you eat between 60 and 130 grams of carbohydrates per day  Carbohydrates are found in fruits, vegetables, and grains (including breads, rice, pasta, and cereal), alcoholic beverages, and in dairy products. Meat and fish do not contain carbohydrates. Side effects of very-low-carbohydrate diets can include constipation, headache, bad breath, muscle cramps, diarrhea, and weakness. Mediterranean diet  The term \"Mediterranean diet\" refers to a way of eating that is common in [place]growing regions around the [facility]. Although there is some variation in Mediterranean diets, there are some similarities. Most Mediterranean diets include:  A high level of monounsaturated fats (from olive or canola oil, walnuts, pecans, almonds) and a low level of saturated fats (from butter)   A high amount of vegetables, fruits, legumes, and grains (7 to 10 servings of fruits and vegetables per day)   A moderate amount of milk and dairy products, mostly in the form of cheese. Use low-fat dairy products (skim milk, fat-free yogurt, low-fat cheese). BMI between 27 and 29.9 and have other medical problems, such as diabetes, high cholesterol, or high blood pressure  Two weight loss medicines are approved in the [place] for long-term use. These are sibutramine and orlistat. Other weight loss medicines (phentermine, diethylpropion) are available but are only approved for short-term use (up to 12 weeks). Sibutramine  Sibutramine (Meridia®, Reductil®) is a medicine that reduces your appetite. In people who take the medicine for one year, the average weight loss is 10 percent of the initial body weight (5 percent more than those who took a placebo treatment). Side effects of sibutramine include insomnia, dry mouth, and constipation. Increases in blood pressure can occur. Therefore, blood pressure is usually monitored during treatment. There is no evidence that sibutramine causes heart or lung problems (like dexfenfluramine and fenfluramine (Phen/Fen)). However, experts agree that sibutramine should not used by people with coronary heart disease, heart failure, uncontrolled hypertension, stroke, irregular heart rhythms, or peripheral vascular disease (poor circulation in the legs). Orlistat  Orlistat (Xenical® 120 mg capsules) is a medicine that reduces the amount of fat your body absorbs from the foods you eat. A lower-dose version is now available without a prescription (Flo® 60 mg capsules) in many countries, including the [place]. The medicine is recommended three times per day, taken with a meal; you can skip a dose if you skip a meal or if the meal contains no fat. After one year of treatment with orlistat, the average weight loss is approximately 8 to 10 percent of initial body weight (4 percent more than in those who took a placebo). Cholesterol levels often improve, and blood pressure sometimes falls. In people with diabetes, orlistat may help control blood sugar levels. SHOULD I HAVE SURGERY TO LOSE WEIGHT?  Weight loss surgery is recommended ONLY for people with one of the following:  Severe obesity (body mass index above 40) (calculator 1 and calculator 2) who have not responded to diet, exercise, or weight loss medicines   Body mass index between 35 and 40, along with a serious medical problem (including diabetes, severe joint pain, or sleep apnea) that would improve with weight loss  You should be sure that you understand the potential risks and benefits of weight loss surgery. You must be motivated and willing to make lifelong changes in how you eat to reach and maintain a healthier weight after surgery. You must also be realistic about weight loss after surgery (see 'Effectiveness of weight loss surgery' below). PREPARING FOR WEIGHT LOSS SURGERY  Most people who have weight loss surgery will meet with several specialists before surgery is scheduled. This often includes a dietitian, mental health counselor, a doctor who specializes in care of obese people, and a surgeon who performs weight loss surgery (bariatric surgeon). You may need to work with these providers for several weeks or months before surgery. The nutritionist will explain what and how much you will be able to eat after surgery. You may also need to lose a small amount of weight before surgery. The mental health specialist will help you to cope with stress and other factors that can make it harder to lose weight or trigger you to eat   The medical doctor will determine whether you need other tests, counseling, or treatment before surgery. He or she might also help you begin a medical weight loss program so that you can lose some weight before surgery. The bariatric surgeon will meet with you to discuss the surgeries available to treat obesity. He or she will also make sure you are a good candidate for surgery.    TYPES OF WEIGHT LOSS SURGERY  There are several types of weight loss surgeries, the most common being lap banding, gastric bypass, and gastric sleeve. Lap banding  Laparoscopic adjustable gastric banding (LAGB), or lap banding, is a surgery that uses an adjustable band around the opening to the stomach (figure 1). This reduces the amount of food that you can eat at one time. Lap banding is done through small incisions, with a laparoscope. The band can be adjusted after surgery, allowing you to eat more or less food. Adjustments to the size and tightness of the band are made by using a needle to add or remove fluid from a port (a small container under the skin that is connected to the band). Adding fluid to the band makes it tighter which restricts the amount of food you can eat and may help you to lose more weight. Lap banding is a popular choice because it is relatively simple to perform, can be adjusted or removed, and has a low risk of serious complications immediately after surgery. However, weight loss with the lap band depends on your ability to follow the program closely. You will need to prepare nutritious meals that [facility] with\" the band, not against it. For example, the lap band will not work well if you eat or drink a large amount of liquid calories (like ice cream). The band will not help you to feel full when you eat/drink liquid calories. Weight loss ranges from 45 to 75 percent after two years. As an example, a person who is 120 pounds overweight could expect to lose approximately 54 to 90 pounds in the two years after lap banding. Gastric bypass  Audrey-en-Y gastric bypass, also called gastric bypass, helps you to lose weight by reducing the amount of food you can eat and reducing the number of calories and nutrients you absorb from the food you eat. To perform gastric bypass, a surgeon creates a small stomach pouch by dividing the stomach and attaching it to the small intestine.  This helps you to lose weight in two ways: WEIGHT LOSS SURGERY COMPLICATIONS  A variety of complications can occur with weight loss surgery. The risks of surgery depend upon which surgery you have and any medical problems you had before surgery. Some of the more common early surgical complications (one to six weeks after surgery) include:  Bleeding   Infection   Blockage or tear in the bowels   Need for further surgery  Important medical complications after surgery can include blood clots in the legs or lungs, heart attack, pneumonia, and urinary tract infection. Complications are less likely when surgery is performed in centers that are experienced in weight loss surgery. In general, centers with experience in weight loss surgery have:  Board-certified doctors and surgeons   A team of support staff (dietitians, counselors, nurses)   Long-term follow-up after surgery   Hospital staff experienced with the care of weight loss patients. This includes nurses who are trained in the care of patients immediately after surgery and anesthesiologists who are experienced in caring for the morbidly obese. EFFECTIVENESS OF WEIGHT LOSS SURGERY  The goal of weight loss surgery is to reduce the risk of illness or death associated with obesity. Weight loss surgery can also help you to feel and look better, reduce the amount of money you spend on medicines, and cut down on sick days. As an example, weight loss surgery can improve health problems related to obesity (diabetes, high blood pressure, high cholesterol, sleep apnea) to the point that you need less or no medicine. Finally, weight loss surgery might reduce your risk of developing heart disease, cancer, and certain infections. AFTER WEIGHT LOSS SURGERY  You will need to stay in the hospital until your team feels that it is safe for you to leave (on average, one to three days). Do not drive if you are taking prescription pain medicine.  Begin exercising as soon as possible once you have healed; most weight loss source of information for questions and concerns related to your medical problem. This article will be updated as needed every four months on our Web site ([email or web address])  ·             Medications and Orders      Your Current Medications Are              Lactobacillus (PROBIOTIC ACIDOPHILUS PO) Take 1 tablet by mouth daily    sulfamethoxazole-trimethoprim (BACTRIM DS) 800-160 MG per tablet Take 1 tablet by mouth 2 times daily for 10 days    metFORMIN (GLUCOPHAGE) 500 MG tablet TAKE 1 TABLET BY MOUTH IN THE MORNING and then take 2 tablets nightly at dinner    atorvastatin (LIPITOR) 80 MG tablet TAKE 1 TABLET BY MOUTH ONE TIME A DAY    meloxicam (MOBIC) 15 MG tablet Take 1 tablet by mouth daily    ONETOUCH DELICA LANCETS 92V MISC USE ONE LANCET TO TEST TWICE A DAY    ONE TOUCH ULTRA TEST strip TEST TWO TIMES A DAY AS DIRECTED    SITagliptin (JANUVIA) 100 MG tablet Take 1 tablet by mouth daily    irbesartan (AVAPRO) 300 MG tablet Take 1 tablet by mouth daily    Probiotic Product (PROBIOTIC ACIDOPHILUS) CAPS Take 1 capsule by mouth daily    amLODIPine (NORVASC) 10 MG tablet Take 1 tablet by mouth daily    glipiZIDE (GLUCOTROL) 10 MG tablet TAKE 1 TABLET ORAL DAILY    VITAMIN D, CHOLECALCIFEROL, PO Take 1 tablet by mouth daily    Glucose Blood (MELINDA BREEZE 2 TEST) DISK TEST TWICE DAILY AS DIRECTED. Blood Glucose Monitoring Suppl (MELINDA BREEZE 2 SYSTEM) W/DEVICE KIT One machine one time    Omeprazole 20 MG TBEC Take 1 tablet by mouth daily as needed. vitamin E 1000 UNITS capsule Take 1,000 Units by mouth daily. Blood Glucose Monitoring Suppl ([employer] MONITOR) KIT by Does not apply route See Admin Instructions. Test strips. Test twice daily.       Allergies              Percocet [Oxycodone-acetaminophen] Other (See Comments)    ELEVATED BLOOD SUGAR      We Ordered/Performed the following           Diabetic Education-[facility]     Scheduling Instructions: Classes and individual appointments available at various locations in the [facility], and [facility]. Scheduling Phone Number: [phone]  Scheduling Fax Number: [phone]    Comments:    I attest this patient requires the ordered diabetes education in addition to what has been provided in the office. The patient can be scheduled with any member of the group, including the provider with the first available appointments.  DIABETES FOOT EXAM     [facility] [address] [facility]     Scheduling Instructions:    Please call the number below to schedule and appointment. [address] [facility]   [address] [place] [address] 231  [phone]    Comments: The patient can be scheduled with any member of the group, including the provider with the first available appointments. Additional Information        Basic Information     Date Of Birth Sex Race Ethnicity Preferred Language    1940 Female White Non-/Non  English      Problem List as of 10/5/2017  Date Reviewed: 6/14/2017                Localized osteoarthrosis, lower leg    Vitamin D deficiency    Hearing loss    Herpes zoster with ophthalmic complication    Fecal incontinence    Primary insomnia    Situational anxiety    SOB (shortness of breath) on exertion      Your Goals as of 10/5/2017 at 2:31 PM                 Lifestyle    Control Diabetes     Notes    Patient Self-Management Goal for Chronic Condition  Goal: I will exercise for 30 minutes, 3-5 days per week. Barriers to success: lack of motivation  Plan for overcoming my barriers: being more motivated  Confidence: 7  Date goal set: 08/12/2014  Date goal attained:   Patient given educational materials on Exercise    I have instructed Jacki to complete a self tracking handout on Daily Exercise and to bring it with them to her next appointment.

## 2017-10-05 NOTE — PROGRESS NOTES
Left Ear: External ear normal.   Eyes: Conjunctivae and EOM are normal. Pupils are equal, round, and reactive to light. Neck: Normal range of motion. Neck supple. No JVD present. Cardiovascular: Normal rate and regular rhythm. Exam reveals no gallop and no friction rub. No murmur heard. Pulmonary/Chest: Effort normal and breath sounds normal. No respiratory distress. She has no wheezes. Abdominal: Soft. Bowel sounds are normal. She exhibits no distension and no mass. There is no tenderness. There is no rebound and no guarding. Musculoskeletal: Normal range of motion. She exhibits no tenderness. Neurological: She is alert and oriented to person, place, and time. She has normal reflexes. No calluses, sores to bilateral feet  No decreased sensation to monofilament      Skin: Skin is warm and dry. Psychiatric: She has a normal mood and affect. Her behavior is normal.       Assessment:     1. Routine general medical examination at a health care facility    2. Type 2 diabetes mellitus without complication, without long-term current use of insulin ([address]    3. Essential hypertension, benign    4. Pure hypercholesterolemia    5. Non morbid obesity due to excess calories    6. Fatigue, unspecified type      Plan:   1. Routine general medical examination at a health care facility  - blood work reviewed  - health maintenance updated    2. Type 2 diabetes mellitus without complication, without long-term current use of insulin ([address]  - blood work reviewed  - stable, controlled  -  DIABETES FOOT EXAM  - Diabetic Education-[facility]    3. Essential hypertension, benign  - stable, controlled  - continue treatment plan     4. Pure hypercholesterolemia  - stable controlled  - blood work reviwed    5. Non morbid obesity due to excess calories  - [facility]    6.  Fatigue, unspecified type  - check blood work      Discussed use, benefit, and side effects Jean Pierre Sullivan - with mesh       Family History   Problem Relation Age of Onset    Heart Disease Mother     Arthritis Mother     Heart Disease Father        CareTeam (Including outside providers/suppliers regularly involved in providing care):   Patient Care Team:  Gee Wright MD as PCP - [facility] Attributed Provider  Zelalem Angel MD as Consulting Physician (Otolaryngology)    Wt Readings from Last 3 Encounters:   10/05/17 194 lb (88 kg)   09/28/17 194 lb (88 kg)   06/14/17 188 lb (85.3 kg)     Vitals:    10/05/17 1409 10/05/17 1429   BP: (!) 146/72 130/72   Site: Left Arm    Position: Sitting    Cuff Size: Medium Adult    Pulse: 78    Temp: 98.5 °F (36.9 °C)    SpO2: 96%    Weight: 194 lb (88 kg)    Height: 5' 2\" (1.575 m)            The following problems were reviewed today and where indicated follow up appointments were made and/or referrals ordered. Risk Factor Screenings with Interventions:   Fall Risk:  Timed Up and Go Test > 12 seconds?: no  2 or more falls in past year?: no  Fall with injury in past year?: no  Fall Risk Interventions:    · None indicated    Depression:  PHQ-2 Score: 0  Depression Interventions:  · None indicated    Anxiety:  Anxiety Score: 0  Anxiety Interventions:  · None indicated    Cognitive:   Words recalled: 3  Cognitive Impairment Interventions:  · None indicated    Substance Abuse:  Social History     Social History Main Topics    Smoking status: Never Smoker    Smokeless tobacco: Never Used    Alcohol use 0.0 oz/week     0 Standard drinks or equivalent per week      Comment: two drink per day    Drug use: No    Sexual activity: No     Audit Questionnaire: Screen for Alcohol Misuse  How often do you have a drink containing alcohol?: Never  Substance Abuse Interventions:  · None indicated    Health Risk Assessment:   General  In general, how would you say your health is?: Good  In the past 7 days, have you experienced any of the following?: (!) New or Increased Fatigue  Do you get the social and emotional support that you need?: Yes  Do you have a Living Will?: Yes  General Health Risk Interventions:  · Fatigue: labs ordered- cbc, b12, tsh    Health Habits/Nutrition  Do you exercise for at least 20 minutes 2-3 times per week?: (!) No  Have you lost any weight without trying in the past 3 months?: No  Do you eat fewer than 2 meals per day?: No  Have you seen a dentist within the past year?: Yes  Body mass index is 35.48 kg/(m^2).   Health Habits/Nutrition Interventions:  · None indicated    Hearing/Vision  Do you or your family notice any trouble with your hearing?: (!) Yes  Do you have difficulty driving, watching TV, or doing any of your daily activities because of your eyesight?: No  Have you had an eye exam within the past year?: Yes  Hearing/Vision Interventions:  · None indicated    Safety  Do you have working smoke detectors?: Yes  Have all throw rugs been removed or fastened?: Yes  Do you have non-slip mats in all bathtubs?: Yes  Do all of your stairways have a railing or banister?: Yes  Are your doorways, halls and stairs free of clutter?: Yes  Do you always fasten your seatbelt when you are in a car?: Yes  Safety Interventions:  · None indicated    ADLs  In the past 7 days, did you need help from others to perform any of the following everyday activities?: None  In the past 7 days, did you need help from others to take care of any of the following?: None  ADL Interventions:  · None indicated    Personalized Preventive Plan   Current Health Maintenance Status  Immunization History   Administered Date(s) Administered    Influenza Vaccine, unspecified formulation 10/15/2016    Influenza Virus Vaccine 10/02/2009, 09/28/2010, 11/03/2011, 10/05/2012, 10/04/2013, 09/30/2014, 10/05/2015    Pneumococcal 13-valent Conjugate ([id number]) 05/20/2015    Pneumococcal Polysaccharide ([id number]) 01/01/2002, 02/28/2012    Td 11/10/2009    Tdap (Boostrix, Adacel) 12/31/2016        Health Maintenance   Topic Date Due    Zostavax vaccine  05/27/2002    Diabetic foot exam  10/08/2014    Flu vaccine (1) 10/05/2018 (Originally 9/1/2017)    Diabetic retinal exam  11/15/2017    Diabetic hemoglobin A1C test  06/14/2018    Lipid screen  06/14/2018    Colon cancer screen colonoscopy  11/28/2026    DTaP/Tdap/Td vaccine (2 - Td) 12/31/2026    DEXA (modify frequency per FRAX score)  Addressed    Pneumococcal low/med risk  Completed     Recommendations for Preventive Services Due: see orders.   Recommended screening schedule for the next 5-10 years is provided to the patient in written form: see Patient Instructions/AVS.

## 2017-10-06 ENCOUNTER — TELEPHONE (OUTPATIENT)
Dept: BARIATRICS/WEIGHT MGMT | Age: 77
End: 2017-10-06

## 2017-10-17 ENCOUNTER — TELEPHONE (OUTPATIENT)
Dept: INTERNAL MEDICINE | Age: 77
End: 2017-10-17

## 2017-10-17 ENCOUNTER — OFFICE VISIT (OUTPATIENT)
Dept: INTERNAL MEDICINE | Age: 77
End: 2017-10-17

## 2017-10-17 VITALS
HEART RATE: 76 BPM | SYSTOLIC BLOOD PRESSURE: 126 MMHG | WEIGHT: 196 LBS | DIASTOLIC BLOOD PRESSURE: 76 MMHG | OXYGEN SATURATION: 95 % | BODY MASS INDEX: 35.85 KG/M2

## 2017-10-17 DIAGNOSIS — R35.0 FREQUENCY OF URINATION: Primary | ICD-10-CM

## 2017-10-17 DIAGNOSIS — N30.00 ACUTE CYSTITIS WITHOUT HEMATURIA: ICD-10-CM

## 2017-10-17 LAB
BILIRUBIN, POC: NORMAL
BLOOD URINE, POC: NORMAL
CLARITY, POC: NORMAL
COLOR, POC: NORMAL
GLUCOSE URINE, POC: NORMAL
KETONES, POC: NORMAL
LEUKOCYTE EST, POC: NORMAL
NITRITE, POC: NORMAL
PH, POC: 5
PROTEIN, POC: NORMAL
SPECIFIC GRAVITY, POC: 1
UROBILINOGEN, POC: 0.2

## 2017-10-17 PROCEDURE — 99213 OFFICE O/P EST LOW 20 MIN: CPT | Performed by: NURSE PRACTITIONER

## 2017-10-17 PROCEDURE — 81002 URINALYSIS NONAUTO W/O SCOPE: CPT | Performed by: NURSE PRACTITIONER

## 2017-10-17 RX ORDER — NITROFURANTOIN 25; 75 MG/1; MG/1
100 CAPSULE ORAL 2 TIMES DAILY
Qty: 20 CAPSULE | Refills: 0 | Status: SHIPPED | OUTPATIENT
Start: 2017-10-17 | End: 2017-10-27

## 2017-10-17 NOTE — PROGRESS NOTES
No     Other Topics Concern    Not on file     Social History Narrative    No narrative on file       Review of Systems   Constitutional: Negative for fever. Genitourinary: Positive for difficulty urinating, dysuria, frequency and urgency. Negative for vaginal bleeding and vaginal discharge. Objective:     Vitals:    10/17/17 1411   BP: 126/76   Site: Left Arm   Position: Sitting   Cuff Size: Large Adult   Pulse: 76   SpO2: 95%   Weight: 196 lb (88.9 kg)     Physical Exam   Constitutional: She appears well-developed and well-nourished. Cardiovascular: Normal rate, regular rhythm and normal heart sounds. Pulmonary/Chest: Effort normal and breath sounds normal.   Abdominal: Soft.  Bowel sounds are normal.     Current Outpatient Prescriptions   Medication Sig Dispense Refill    nitrofurantoin, macrocrystal-monohydrate, (MACROBID) 100 MG capsule Take 1 capsule by mouth 2 times daily for 10 days 20 capsule 0    Lactobacillus (PROBIOTIC ACIDOPHILUS PO) Take 1 tablet by mouth daily      metFORMIN (GLUCOPHAGE) 500 MG tablet TAKE 1 TABLET BY MOUTH IN THE MORNING and then take 2 tablets nightly at dinner 90 tablet 0    atorvastatin (LIPITOR) 80 MG tablet TAKE 1 TABLET BY MOUTH ONE TIME A DAY 90 tablet 0    meloxicam (MOBIC) 15 MG tablet Take 1 tablet by mouth daily 30 tablet 3    ONETOUCH DELICA LANCETS 42D MISC USE ONE LANCET TO TEST TWICE A  each 5    ONE TOUCH ULTRA TEST strip TEST TWO TIMES A DAY AS DIRECTED 100 strip 5    SITagliptin (JANUVIA) 100 MG tablet Take 1 tablet by mouth daily 90 tablet 3    irbesartan (AVAPRO) 300 MG tablet Take 1 tablet by mouth daily 90 tablet 3    Probiotic Product (PROBIOTIC ACIDOPHILUS) CAPS Take 1 capsule by mouth daily      amLODIPine (NORVASC) 10 MG tablet Take 1 tablet by mouth daily 90 tablet 3    glipiZIDE (GLUCOTROL) 10 MG tablet TAKE 1 TABLET ORAL DAILY 90 tablet 3    VITAMIN D, CHOLECALCIFEROL, PO Take 1 tablet by mouth daily      Glucose Blood (MELINDA BREEZE 2 TEST) DISK TEST TWICE DAILY AS DIRECTED. 100 each 0    Blood Glucose Monitoring Suppl (MELINDA BREEZE 2 SYSTEM) W/DEVICE KIT One machine one time 1 kit 0    Omeprazole 20 MG TBEC Take 1 tablet by mouth daily as needed.  vitamin E 1000 UNITS capsule Take 1,000 Units by mouth daily.  Blood Glucose Monitoring Suppl (Blend SystemsEZE MONITOR) KIT by Does not apply route See Admin Instructions. Test strips. Test twice daily. 60 kit 6     No current facility-administered medications for this visit. Assessment:     1. Frequency of urination    2. Acute cystitis without hematuria      Plan:   1. Frequency of urination    - POCT Urinalysis no Micro    2. Acute cystitis without hematuria    - nitrofurantoin, macrocrystal-monohydrate, (MACROBID) 100 MG capsule; Take 1 capsule by mouth 2 times daily for 10 days  Dispense: 20 capsule; Refill: 0  - Urine Culture        Discussed use, benefit, and side effects of all prescribed medications. Barriers to medication compliance addressed. All patient questions answered. Pt voiced understanding. All patient questions answered. Pt voiced understanding.

## 2017-10-17 NOTE — TELEPHONE ENCOUNTER
Patient was treated for A UTI   Patient feels she still has a infection   Patient feels its not cleared all the way up

## 2017-10-19 LAB
ORGANISM: ABNORMAL
URINE CULTURE, ROUTINE: ABNORMAL
URINE CULTURE, ROUTINE: ABNORMAL

## 2017-10-23 ENCOUNTER — TELEPHONE (OUTPATIENT)
Dept: INTERNAL MEDICINE | Age: 77
End: 2017-10-23

## 2017-10-23 NOTE — TELEPHONE ENCOUNTER
Patient wants to know how long she is going to have to take Iron supplements. She says it is messing with her stomach.

## 2017-10-30 ENCOUNTER — HOSPITAL ENCOUNTER (OUTPATIENT)
Dept: OTHER | Age: 77
Discharge: HOME | End: 2017-10-31
Attending: NURSE PRACTITIONER | Admitting: NURSE PRACTITIONER

## 2017-10-30 ENCOUNTER — HOSPITAL ENCOUNTER (OUTPATIENT)
Dept: DIABETES SERVICES | Age: 77
Discharge: HOME | End: 2017-10-30

## 2017-10-30 DIAGNOSIS — E11.9 TYPE 2 DIABETES MELLITUS WITHOUT COMPLICATIONS (HCC): ICD-10-CM

## 2017-10-30 ASSESSMENT — PATIENT HEALTH QUESTIONNAIRE - PHQ9
2. FEELING DOWN, DEPRESSED OR HOPELESS: 0
1. LITTLE INTEREST OR PLEASURE IN DOING THINGS: 0
SUM OF ALL RESPONSES TO PHQ9 QUESTIONS 1 & 2: 0
SUM OF ALL RESPONSES TO PHQ QUESTIONS 1-9: 0

## 2017-10-30 NOTE — PROGRESS NOTES
Individual Comprehensive DSMT Assessment:  Health Literacy:   [x] adequate   [] limited  Pre-Test Score: 6/8  Sleep Screen: Negative for symptoms of Obstructive Sleep Apnea  PHQ9: 0      Initial instruction included:  [x] carb counting  [x] activity/exercise  [x] label reading  [x] monitoring   [] medications  [] hypoglycemia prevention/treatment  [] insulin management  [] other:    Education and Support Plan: Return as needed      Referring Provider: Lashaun Anthony

## 2017-10-30 NOTE — LETTER
Diabetes Education  [facility])    TO: Andrez Gonzalez    RE: Makayla Krause YOB: 1940    An initial assessment to determine diabetes education needs was completed on 10/30/2017. Education included:      carb counting    label reading     plate guide for consistent carb intake    meter start      monitoring frequency      hypoglycemia prevention/treatment    activity/exercise      PHQ2 Depression Screen; patient scored 0. Recommend further evaluation if       score >3   other: An ongoing plan was created to include:    group classes                    follow up:   As needed     declined further education    Thank you for the opportunity to provide Diabetes Self Management Education to your patient.     Yannick Laird RDN, LD, CDE  Diabetes Educator

## 2017-11-01 ENCOUNTER — HOSPITAL ENCOUNTER (OUTPATIENT)
Dept: OTHER | Age: 77
Discharge: HOME | End: 2017-11-30
Attending: NURSE PRACTITIONER | Admitting: NURSE PRACTITIONER

## 2017-11-06 ENCOUNTER — TELEPHONE (OUTPATIENT)
Dept: INTERNAL MEDICINE | Age: 77
End: 2017-11-06

## 2017-11-06 NOTE — TELEPHONE ENCOUNTER
Pt needs refill on: Metformin 500mg  3 tablets a day    [facility]  [phone] This note was copied from the mother's chart.  Patient resting at this time; requested she call lactation for assistance with breastfeeding;

## 2017-11-09 ENCOUNTER — OFFICE VISIT (OUTPATIENT)
Dept: BARIATRICS/WEIGHT MGMT | Age: 77
End: 2017-11-09

## 2017-11-09 VITALS
RESPIRATION RATE: 16 BRPM | BODY MASS INDEX: 36.63 KG/M2 | WEIGHT: 194 LBS | SYSTOLIC BLOOD PRESSURE: 120 MMHG | DIASTOLIC BLOOD PRESSURE: 74 MMHG | HEART RATE: 76 BPM | HEIGHT: 61 IN

## 2017-11-09 DIAGNOSIS — E11.69 DIABETES MELLITUS TYPE 2 IN OBESE (HCC): ICD-10-CM

## 2017-11-09 DIAGNOSIS — G47.9 SLEEP DIFFICULTIES: ICD-10-CM

## 2017-11-09 DIAGNOSIS — E66.9 CLASS 2 OBESITY: Primary | ICD-10-CM

## 2017-11-09 DIAGNOSIS — Z71.3 DIETARY COUNSELING AND SURVEILLANCE: ICD-10-CM

## 2017-11-09 DIAGNOSIS — E66.9 DIABETES MELLITUS TYPE 2 IN OBESE (HCC): ICD-10-CM

## 2017-11-09 DIAGNOSIS — I10 ESSENTIAL HYPERTENSION: ICD-10-CM

## 2017-11-09 PROCEDURE — 99204 OFFICE O/P NEW MOD 45 MIN: CPT | Performed by: FAMILY MEDICINE

## 2017-11-09 ASSESSMENT — ENCOUNTER SYMPTOMS
EYES NEGATIVE: 1
GASTROINTESTINAL NEGATIVE: 1
RESPIRATORY NEGATIVE: 1

## 2017-11-09 NOTE — PROGRESS NOTES
Arnav Galarza is a 78 y.o. female with a date of birth of 1940. Vitals:    11/09/17 1230   BP: 120/74   Pulse: 76   Resp: 16   Weight: 194 lb (88 kg)   Height: 5' 1\" (1.549 m)    BMI: Body mass index is 36.66 kg/m². Obesity Classification: Class II    Weight History: Wt Readings from Last 3 Encounters:   11/09/17 194 lb (88 kg)   10/17/17 196 lb (88.9 kg)   10/05/17 194 lb (88 kg)       Patient's lowest adult weight was 125 lb in her 21's. Patient's highest adult weight was 193 lb at age 78. Patient has participated in the following weight loss programs: [employer], [employer], calorie restriction and grapefruit. Patient has not participated in meal replacement/liquid diets. Patient has not participated in weight loss medications. Patient is not lactose intolerant. Patient does not have Sabianist/cultural food concerns. Patient does not have food allergies. Patient dines out to a sit down restaurant 2 times per month. Patient dines out to a fast food restaurant 2 times per week. 24 hour recall/food frequency chart:  Breakfast: yes. 730-830 - cereal OR yogurt & pc of bread & coffee w/ a little cream OR 1 egg, pc of reyna & 1 pc toast  Snack: no.   Lunch: yes. [employer] small cheeseburger w/ fries OR 1/2 turkey OR ham sandwich w/ handful of chips (flavored rain drink - no calories)  Snack: fun size snickers OR mixed nuts OR pc of fruit (occasional)  Dinner: yes. 4 oz Meat, veggie & 3/4 cup or 1 med potato starch (potato, mac n' cheese or noodles)  Snack: no.     Pt reports she doesn't sleep well. Drinks throughout the day: water & zero rain at lunch sometimes - coffee w/ hazelnut creamer    Do you drink alcohol? Yes. How often/how much alcohol do you drink: Martini over ice per day. Patient does not meet the criteria for binge eating disorder. Patient does not have grazing. Patient does not have night eating.  Patient does have a history of emotional eating or eating out of

## 2017-11-09 NOTE — PATIENT INSTRUCTIONS
Patient Education        Learning About Obesity  What is obesity? Obesity means having so much body fat that your health is in danger. Having too much body fat can lead to type 2 diabetes, heart disease, high blood pressure, arthritis, sleep apnea, and stroke. Even if you don't feel bad now, think about these health risks. Do they seem like a good reason to start on a new path toward a healthier weight? Or do you have another personal, powerful reason for wanting to lose weight? Whatever it is, keep it in mind. It can be hard to change eating habits and exercise habits. But with your own reason and plan, you can do it. How do you know if your weight is in the obesity range? To know if your weight is in the obesity range, your doctor looks at your body mass index (BMI) and waist size. Your BMI is a number that is calculated from your weight and your height. To figure your BMI for yourself, get a BMI table from your doctor or use an online tool, such as http://www.wilkes.com/ on the [employer]. What causes obesity? When you take in more calories than you burn off, you gain weight. How you eat, how active you are, and other things affect how your body uses calories and whether you gain weight. If you have family members who have too much body fat, you may have inherited a tendency to gain weight. And your family also helps form your eating and lifestyle habits, which can lead to obesity. Also, our busy lives make it harder to plan and cook healthy meals. For many of us, it's easier to reach for prepared foods, go out to eat, or go to the drive-through. But these foods are often high in saturated fat and calories. Portions are often too large. What can you do to reach a healthy weight? Focus on health, not diets. Diets are hard to stay on and don't work in the long run.  It is very hard to stay with a diet that includes lots of big changes in your eating habits. Instead of a diet, focus on lifestyle changes that will improve your health and achieve the right balance of energy and calories. To lose weight, you need to burn more calories than you take in. You can do it by eating healthy foods in reasonable amounts and becoming more active, even a little bit every day. Making small changes over time can add up to a lot. Make a plan for change. Many people have found that naming their reasons for change and staying focused on their plan can make a big difference. Work with your doctor to create a plan that is right for you. · Ask yourself: Justyna Delaney are my personal, most powerful reasons for wanting this change? What will my life look like when I've made the change? \"  · Set your long-term goal. Make it specific, such as \"I will lose x pounds. \"  · Break your long-term goal into smaller, short-term goals. Make these small steps specific and within your reach, things you know you can do. These steps are what keep you going from day to day. How can you stay on your plan for change? Be ready. Choose to start during a time when there are few events that might trigger slip-ups, like holidays, social events, and high-stress periods. Decide on your first few steps. Most people have more success when they make small changes, one step at a time. For example, you might switch a daily candy bar to a piece of fruit, walk 10 minutes more, or add more vegetables to a meal.  Line up your support people. Make sure you're not going to be alone as you make this change. Connect with people who understand how important it is to you. Ask family members and friends for help in keeping with your plan. And think about who could make it harder for you, and how to handle them. Try tracking. People who keep track of what they eat, feel, and do are better at losing weight. Try writing down things like:  · What and how much you eat.   · How you feel before and after each meal.  · Details about each meal (like eating out or at home, eating alone, or with friends or family). · What you do to be active. Look and plan. As you track, look for patterns that you may want to change. Take note of:  · When you eat and whether you skip meals. · How often you eat out. · How many fruits and vegetables you eat. · When you eat beyond feeling full. · When and why you eat for reasons other than being hungry. When you stray from your plan, don't get upset. Figure out what made you slip up and how you can fix it. Can you take medicines or have surgery to lose weight? Before your doctor will prescribe medicines or surgery, he or she will probably want you to be more active and follow your healthy eating plan for a period of time. These habits are key lifelong changes for managing your weight, with or without other medical treatment. And these changes can help you avoid weight-related health problems. Follow-up care is a key part of your treatment and safety. Be sure to make and go to all appointments, and call your doctor if you are having problems. It's also a good idea to know your test results and keep a list of the medicines you take. Where can you learn more? Go to https://[employer]. org and sign in to your [employer] account. Enter N111 in the Herzio box to learn more about \"Learning About Obesity. \"     If you do not have an account, please click on the \"Sign Up Now\" link. Current as of: October 13, 2016  Content Version: 11.3  © 5408-1731 [employer]. Care instructions adapted under license by [facility] ([facility]). If you have questions about a medical condition or this instruction, always ask your healthcare professional. [address] any warranty or liability for your use of this information.

## 2017-11-09 NOTE — PROGRESS NOTES
Patient: Melvin Ch                      Encounter Date: 11/9/2017    YOB: 1940               Age: 78 y.o.    /74   Pulse 76   Resp 16   Ht 5' 1\" (1.549 m)   Wt 194 lb (88 kg)   BMI 36.66 kg/m²                                          Body mass index is 36.66 kg/m². Chief Complaint   Patient presents with    Bariatric, Initial Visit     [facility]       HPI:    78 y.o. female presents to establish care and join our medical weight loss program. The patient's medical history is significant for multiple medical conditions including obesity, type 2 diabetes, hypertension, hyperlipidemia, GERD, and osteoarthritis. The patient has a long-standing history of obesity which started gradually. The problem is moderate. The patient has been gaining weight. Risk factors include annual weight gain of >2 lbs (1 kg)/ year and sedentary lifestyle. Aggravating factors include poor diet and lack of physical activity. The patient has tried various diet/exercise plans which have been ineffective in the long-run. she gets about 4-5 hours of sleep each night. She complains of ongoing sleeping difficulties. When did you become overweight? [] Childhood   [] Teens   [x] Adulthood   [] Pregnancy   [] Menopause    Highest adult weight: ~200 pounds at current age     Weight history:     Vitals in Chronological Order 4/10/2017 6/14/2017 9/28/2017 9/28/2017   Weight 192 lb 188 lb 194 lb      Vitals in Chronological Order 10/5/2017 10/5/2017 10/17/2017 11/9/2017   Weight 194 lb  196 lb 194 lb       Triggers for weight gain? No specific triggers     Food triggers:   [] Stress   [x] Boredom   [x] Fast food   [x] Eating out   [] Seeking reward   [x] Social     Have you ever taken medications to help you lose weight? [] Yes  [x] No    The patient denies a history thyroid disease. Dietitian's assessment reviewed and addressed with patient.        Reviewed:  [x] Nutrition and the importance of regular

## 2017-11-29 RX ORDER — MELOXICAM 15 MG/1
15 TABLET ORAL DAILY
Qty: 30 TABLET | Refills: 1 | Status: SHIPPED | OUTPATIENT
Start: 2017-11-29 | End: 2018-02-26 | Stop reason: SDUPTHER

## 2017-12-01 ENCOUNTER — HOSPITAL ENCOUNTER (OUTPATIENT)
Dept: OTHER | Age: 77
Discharge: HOME | End: 2017-12-31
Attending: NURSE PRACTITIONER | Admitting: NURSE PRACTITIONER

## 2018-01-04 DIAGNOSIS — I10 ESSENTIAL HYPERTENSION, BENIGN: ICD-10-CM

## 2018-01-05 DIAGNOSIS — I10 ESSENTIAL HYPERTENSION, BENIGN: ICD-10-CM

## 2018-01-05 RX ORDER — AMLODIPINE BESYLATE 10 MG/1
10 TABLET ORAL DAILY
Qty: 90 TABLET | Refills: 3 | Status: SHIPPED | OUTPATIENT
Start: 2018-01-05 | End: 2019-01-24 | Stop reason: SDUPTHER

## 2018-01-05 RX ORDER — AMLODIPINE BESYLATE 10 MG/1
10 TABLET ORAL DAILY
Qty: 90 TABLET | Refills: 3 | Status: SHIPPED | OUTPATIENT
Start: 2018-01-05 | End: 2018-01-05 | Stop reason: SDUPTHER

## 2018-01-05 RX ORDER — GLIPIZIDE 10 MG/1
TABLET ORAL
Qty: 90 TABLET | Refills: 3 | Status: SHIPPED | OUTPATIENT
Start: 2018-01-05 | End: 2018-06-29 | Stop reason: SDUPTHER

## 2018-01-05 RX ORDER — GLIPIZIDE 10 MG/1
TABLET ORAL
Qty: 90 TABLET | Refills: 3 | Status: SHIPPED | OUTPATIENT
Start: 2018-01-05 | End: 2018-01-05 | Stop reason: SDUPTHER

## 2018-02-08 ENCOUNTER — OFFICE VISIT (OUTPATIENT)
Dept: INTERNAL MEDICINE | Age: 78
End: 2018-02-08

## 2018-02-08 VITALS
HEART RATE: 94 BPM | WEIGHT: 190 LBS | OXYGEN SATURATION: 96 % | BODY MASS INDEX: 35.9 KG/M2 | SYSTOLIC BLOOD PRESSURE: 132 MMHG | DIASTOLIC BLOOD PRESSURE: 72 MMHG

## 2018-02-08 DIAGNOSIS — E11.9 TYPE 2 DIABETES MELLITUS WITHOUT COMPLICATION, WITHOUT LONG-TERM CURRENT USE OF INSULIN (HCC): Primary | ICD-10-CM

## 2018-02-08 DIAGNOSIS — J01.00 ACUTE NON-RECURRENT MAXILLARY SINUSITIS: ICD-10-CM

## 2018-02-08 DIAGNOSIS — D64.9 ANEMIA, UNSPECIFIED TYPE: ICD-10-CM

## 2018-02-08 DIAGNOSIS — I10 ESSENTIAL HYPERTENSION, BENIGN: ICD-10-CM

## 2018-02-08 DIAGNOSIS — F51.01 PRIMARY INSOMNIA: ICD-10-CM

## 2018-02-08 DIAGNOSIS — D22.9 NEVUS: ICD-10-CM

## 2018-02-08 DIAGNOSIS — E78.00 PURE HYPERCHOLESTEROLEMIA: ICD-10-CM

## 2018-02-08 PROBLEM — H91.90 HEARING LOSS: Status: RESOLVED | Noted: 2017-02-21 | Resolved: 2018-02-08

## 2018-02-08 PROBLEM — B02.30 HERPES ZOSTER WITH OPHTHALMIC COMPLICATION: Status: RESOLVED | Noted: 2017-02-09 | Resolved: 2018-02-08

## 2018-02-08 PROCEDURE — 99214 OFFICE O/P EST MOD 30 MIN: CPT | Performed by: INTERNAL MEDICINE

## 2018-02-08 RX ORDER — AMOXICILLIN 875 MG/1
875 TABLET, COATED ORAL 2 TIMES DAILY
Qty: 20 TABLET | Refills: 0 | Status: SHIPPED | OUTPATIENT
Start: 2018-02-08 | End: 2018-02-18

## 2018-02-08 ASSESSMENT — ENCOUNTER SYMPTOMS
SINUS PRESSURE: 1
SHORTNESS OF BREATH: 0
CHEST TIGHTNESS: 0
SINUS PAIN: 1
WHEEZING: 0
GASTROINTESTINAL NEGATIVE: 1
SORE THROAT: 0
RESPIRATORY NEGATIVE: 1

## 2018-02-08 NOTE — PROGRESS NOTES
Subjective:      Patient ID: Yves Benitez is a 78 y.o. y.o. female. HPI    Patient is here for a new patient visit    Past Medical History:   Diagnosis Date    Diabetes mellitus ([address]     Essential hypertension, benign     Hyperlipidemia     Osteoarthrosis, unspecified whether generalized or localized, unspecified site     Shingles        Past Surgical History:   Procedure Laterality Date    ANKLE SURGERY Right 4/1/13    COLONOSCOPY  10/11/2010    Dr. Juliet Laureano , polyps and diverticulosis    COLONOSCOPY  11/11/2002    Dr. Renuka Herron, Diverticulosis    COLONOSCOPY  04/20/2015    Dr. Juliet Laureano- diverticulosis, sessile polyp, 5 years     COLONOSCOPY  4/13/16    Dr Kingsley White; Diverticulosis    COLONOSCOPY  11/28/2016    Dr Juliet Laureano,    [facility] SHOULDER SURGERY      UPPER GASTROINTESTINAL ENDOSCOPY  8/17/2011    Dr. Bruno Benitez - moderate gastritis , hiatal hernia     UPPER GASTROINTESTINAL ENDOSCOPY  4/20/15    Dr. Bruno Benitez - polyps removed, diverticulosis, follow up in 5 years   [address] [facility]  8/16/2012    DR. Ramos - with mesh       Social History     Social History    Marital status:      Spouse name: Chriss Cooper Number of children: 3    Years of education: 15     Occupational History    Retired      Social History Main Topics    Smoking status: Never Smoker    Smokeless tobacco: Never Used    Alcohol use 0.0 oz/week      Comment: 1 drink daily    Drug use: No    Sexual activity: No     Other Topics Concern    Not on file     Social History Narrative    No narrative on file       Family History   Problem Relation Age of Onset    Heart Disease Mother     Arthritis Mother     Heart Disease Father        Vitals:    02/08/18 0905   BP: 132/72   Pulse: 94   SpO2: 96%       Wt Readings from Last 3 Encounters:   02/08/18 190 lb (86.2 kg)   11/09/17 194 lb (88 kg)   10/17/17 196 lb (88.9 kg)     She is intermittently checking her blood sugar.   She is not sure how her sugar is present treatment  Check labs      Pure hypercholesterolemia  Stable with present treatment  Check fasting lipids

## 2018-02-21 RX ORDER — LANCETS 33 GAUGE
EACH MISCELLANEOUS
Qty: 100 EACH | Refills: 5 | Status: SHIPPED | OUTPATIENT
Start: 2018-02-21 | End: 2018-03-05 | Stop reason: SDUPTHER

## 2018-02-22 DIAGNOSIS — R82.90 ABNORMAL URINE: Primary | ICD-10-CM

## 2018-02-22 DIAGNOSIS — I10 ESSENTIAL HYPERTENSION, BENIGN: ICD-10-CM

## 2018-02-22 DIAGNOSIS — D64.9 ANEMIA, UNSPECIFIED TYPE: ICD-10-CM

## 2018-02-22 DIAGNOSIS — E11.9 TYPE 2 DIABETES MELLITUS WITHOUT COMPLICATION, WITHOUT LONG-TERM CURRENT USE OF INSULIN (HCC): ICD-10-CM

## 2018-02-22 DIAGNOSIS — E78.00 PURE HYPERCHOLESTEROLEMIA: ICD-10-CM

## 2018-02-22 LAB
BACTERIA: ABNORMAL /HPF
BASOPHILS ABSOLUTE: 0 K/UL (ref 0–0.2)
BASOPHILS RELATIVE PERCENT: 0.9 %
BILIRUBIN URINE: NEGATIVE
BLOOD, URINE: NEGATIVE
CHOLESTEROL, TOTAL: 173 MG/DL (ref 0–199)
CLARITY: CLEAR
COLOR: YELLOW
CREATININE URINE: 177.9 MG/DL (ref 28–259)
EOSINOPHILS ABSOLUTE: 0.2 K/UL (ref 0–0.6)
EOSINOPHILS RELATIVE PERCENT: 5.2 %
EPITHELIAL CELLS, UA: 5 /HPF (ref 0–5)
FERRITIN: 19.4 NG/ML (ref 15–150)
FOLATE: >20 NG/ML (ref 4.78–24.2)
GLUCOSE URINE: NEGATIVE MG/DL
HCT VFR BLD CALC: 38.7 % (ref 36–48)
HDLC SERPL-MCNC: 63 MG/DL (ref 40–60)
HEMOGLOBIN: 13.1 G/DL (ref 12–16)
HYALINE CASTS: 5 /LPF (ref 0–8)
IRON SATURATION: 16 % (ref 15–50)
IRON: 57 UG/DL (ref 37–145)
KETONES, URINE: NEGATIVE MG/DL
LDL CHOLESTEROL CALCULATED: 71 MG/DL
LEUKOCYTE ESTERASE, URINE: ABNORMAL
LYMPHOCYTES ABSOLUTE: 0.9 K/UL (ref 1–5.1)
LYMPHOCYTES RELATIVE PERCENT: 18.7 %
MCH RBC QN AUTO: 31.7 PG (ref 26–34)
MCHC RBC AUTO-ENTMCNC: 33.8 G/DL (ref 31–36)
MCV RBC AUTO: 93.8 FL (ref 80–100)
MICROALBUMIN UR-MCNC: 6.8 MG/DL
MICROALBUMIN/CREAT UR-RTO: 38.2 MG/G (ref 0–30)
MICROSCOPIC EXAMINATION: YES
MONOCYTES ABSOLUTE: 0.3 K/UL (ref 0–1.3)
MONOCYTES RELATIVE PERCENT: 7.2 %
NEUTROPHILS ABSOLUTE: 3.2 K/UL (ref 1.7–7.7)
NEUTROPHILS RELATIVE PERCENT: 68 %
NITRITE, URINE: NEGATIVE
PDW BLD-RTO: 13.6 % (ref 12.4–15.4)
PH UA: 5.5
PLATELET # BLD: 167 K/UL (ref 135–450)
PMV BLD AUTO: 8.5 FL (ref 5–10.5)
PROTEIN UA: ABNORMAL MG/DL
RBC # BLD: 4.13 M/UL (ref 4–5.2)
RBC UA: 3 /HPF (ref 0–4)
SPECIFIC GRAVITY UA: 1.02
TOTAL IRON BINDING CAPACITY: 364 UG/DL (ref 260–445)
TRIGL SERPL-MCNC: 197 MG/DL (ref 0–150)
TSH SERPL DL<=0.05 MIU/L-ACNC: 2.07 UIU/ML (ref 0.27–4.2)
UROBILINOGEN, URINE: 0.2 E.U./DL
VITAMIN B-12: 1684 PG/ML (ref 211–911)
VLDLC SERPL CALC-MCNC: 39 MG/DL
WBC # BLD: 4.7 K/UL (ref 4–11)
WBC UA: 23 /HPF (ref 0–5)

## 2018-02-23 LAB
ESTIMATED AVERAGE GLUCOSE: 139.9 MG/DL
HBA1C MFR BLD: 6.5 %

## 2018-02-24 LAB — VITAMIN D 1,25-DIHYDROXY: 62.8 PG/ML (ref 19.9–79.3)

## 2018-02-26 ENCOUNTER — TELEPHONE (OUTPATIENT)
Dept: INTERNAL MEDICINE | Age: 78
End: 2018-02-26

## 2018-02-26 LAB
BACTERIA: ABNORMAL /HPF
BILIRUBIN URINE: NEGATIVE
BLOOD, URINE: NEGATIVE
CLARITY: ABNORMAL
COLOR: YELLOW
GLUCOSE URINE: NEGATIVE MG/DL
HEMOCCULT SP2 STL QL: NORMAL
HEMOCCULT SP3 STL QL: NORMAL
KETONES, URINE: NEGATIVE MG/DL
LEUKOCYTE ESTERASE, URINE: ABNORMAL
MICROSCOPIC EXAMINATION: YES
NITRITE, URINE: NEGATIVE
OCCULT BLOOD SCREENING: NORMAL
PH UA: 6
PROTEIN UA: NEGATIVE MG/DL
RBC UA: ABNORMAL /HPF (ref 0–2)
RENAL EPITHELIAL, UA: ABNORMAL /HPF
SPECIFIC GRAVITY UA: <=1.005
URINE TYPE: ABNORMAL
UROBILINOGEN, URINE: 0.2 E.U./DL
WBC UA: ABNORMAL /HPF (ref 0–5)

## 2018-02-26 RX ORDER — MELOXICAM 15 MG/1
15 TABLET ORAL DAILY
Qty: 30 TABLET | Refills: 1 | Status: SHIPPED | OUTPATIENT
Start: 2018-02-26 | End: 2018-09-11 | Stop reason: SDUPTHER

## 2018-02-28 LAB
ORGANISM: ABNORMAL
URINE CULTURE, ROUTINE: ABNORMAL
URINE CULTURE, ROUTINE: ABNORMAL

## 2018-03-01 ENCOUNTER — OFFICE VISIT (OUTPATIENT)
Dept: DERMATOLOGY | Age: 78
End: 2018-03-01

## 2018-03-01 DIAGNOSIS — Z78.9 NON-TOBACCO USER: ICD-10-CM

## 2018-03-01 DIAGNOSIS — L98.8 RHYTIDES: ICD-10-CM

## 2018-03-01 DIAGNOSIS — N39.0 URINARY TRACT INFECTION WITHOUT HEMATURIA, SITE UNSPECIFIED: Primary | ICD-10-CM

## 2018-03-01 DIAGNOSIS — L73.8 SEBACEOUS HYPERPLASIA: ICD-10-CM

## 2018-03-01 DIAGNOSIS — L70.0 OPEN COMEDONE: ICD-10-CM

## 2018-03-01 DIAGNOSIS — L57.8 PHOTOAGING OF SKIN: ICD-10-CM

## 2018-03-01 DIAGNOSIS — N30.00 ACUTE CYSTITIS WITHOUT HEMATURIA: Primary | ICD-10-CM

## 2018-03-01 DIAGNOSIS — L82.1 SEBORRHEIC KERATOSIS: Primary | ICD-10-CM

## 2018-03-01 PROCEDURE — 99202 OFFICE O/P NEW SF 15 MIN: CPT | Performed by: DERMATOLOGY

## 2018-03-01 RX ORDER — CIPROFLOXACIN 250 MG/1
250 TABLET, FILM COATED ORAL 2 TIMES DAILY
Qty: 14 TABLET | Refills: 0 | Status: SHIPPED | OUTPATIENT
Start: 2018-03-01 | End: 2018-03-08

## 2018-03-01 NOTE — PROGRESS NOTES
[address] [place] [address]       Aleksandra Tejada  1940    78 y.o. female     Date of Visit: 3/1/2018    Chief Complaint:   Chief Complaint   Patient presents with    New Patient     white spots on both cheeks, and spots under eyes. No hx or fam hx of skin cancer        I was asked to see this patient by Dr. Denia Winslow. History of Present Illness:  Aleksandra Tejada is a 78 y.o. female who presents with the chief complaint of establish care and for the followin. Complains of multiple asymptomatic white bumps located to her cheeks, forehead that have gradually appeared over several years. She cosmetically does not like the appearance of these bumps and wants to discuss removal. She is not bothered by white spots underneath eyes. 2. Complains of a small rough brown spot to her right upper eyelid present for several years. she denies associated burning, bleeding, pain, or itch. Denies changes in size, shape, or color. 3. States over many years she has experienced increased wrinkles to face diffusely and cosmetically she would like to decrease the wrinkles. Has noticed rough bumpy texture to her cheeks over many years as well as many dark spots. Admits to sun exposure in youth and adulthood without wearing sunscreen, hats, or protective clothing. Currently she does not spend much time outdoors. 4. Multiple \"blackheads\" to nose and cheeks, does not like cosmetic appearance. She does see an  for treatment intermittently. Review of Systems:  Constitutional: Reports general sense of well-being   Skin: No new or changing moles, no history of keloids or hypertrophic scars. Heme: No abnormal bruising or bleeding. Past Medical History, Family History, Surgical History, Medications and Allergies reviewed. Past Skin Hx:   Patient denies past history of melanoma, NMSC, dysplastic nevi, or chronic skin rashes.     PFHx: Denies hx of MM or morning and 2 tablets nightly with dinner 90 tablet 0    SITagliptin (JANUVIA) 100 MG tablet Take 1 tablet by mouth daily 90 tablet 1    glipiZIDE (GLUCOTROL) 10 MG tablet TAKE 1 TABLET ORAL DAILY 90 tablet 3    amLODIPine (NORVASC) 10 MG tablet Take 1 tablet by mouth daily 90 tablet 3    atorvastatin (LIPITOR) 80 MG tablet TAKE 1 TABLET BY MOUTH ONE TIME A DAY 90 tablet 0    irbesartan (AVAPRO) 300 MG tablet Take 1 tablet by mouth daily 90 tablet 3    Probiotic Product (PROBIOTIC ACIDOPHILUS) CAPS Take 1 capsule by mouth daily      VITAMIN D, CHOLECALCIFEROL, PO Take 1 tablet by mouth daily      Blood Glucose Monitoring Suppl ([employer] BREEZE 2 SYSTEM) W/DEVICE KIT One machine one time 1 kit 0    Omeprazole 20 MG TBEC Take 1 tablet by mouth daily as needed.  vitamin E 1000 UNITS capsule Take 1,000 Units by mouth daily.  Blood Glucose Monitoring Suppl (R[employer]10EZE MONITOR) KIT by Does not apply route See Admin Instructions. Test strips. Test twice daily. 60 kit 6       Social History:   Social History     Social History    Marital status:      Spouse name: Manuel Hunter Number of children: 3    Years of education: 15     Occupational History    Retired      Social History Main Topics    Smoking status: Never Smoker    Smokeless tobacco: Never Used    Alcohol use 0.0 oz/week      Comment: 1 drink daily    Drug use: No    Sexual activity: No     Other Topics Concern    Not on file     Social History Narrative    No narrative on file       Physical Examination     The following were examined and determined to be normal: Psych/Neuro, Conjunctivae/eyelids, Gums/teeth/lips and Neck. The following were examined and determined to be abnormal: Head/face. -General: NAD, well-nourished, well-developed. Areas of skin examined as listed above:   1. stuck-on appearing tan-brown verrucous papule located to right upper eyelid  2.  Face- mutiple discrete well circumscribed yellowish to skin colored 2-3mm papules with central depression  3. Several scattered lentigines and vascular changes and cobblestoning of skin/solar elastosis related to years of chronic sun exposure  4. Moderate to severe static and dynamic rhytides to forehead, glabella, periocular and perioral regions, cheeks, chin   5. Few scattered keratin filled open pores on nose and medial cheeks    Assessment and Plan     1. Seborrheic keratosis    2. Sebaceous hyperplasia    3. Photoaging of skin    4. Rhytides    5. Open comedone    6. Non-tobacco user        1. Seborrheic keratosis  Patient educated that seborrheic keratoses have no malignant potential.    -Reassurance provided to the patient regarding their chronic and benign nature. No treatment performed  -recommend patient seek cosmetic removal by her ophthamologist    2. Sebaceous hyperplasia  -Reassurance, re: benign nature. No treatment is necessary  -Discussed options for cosmetic removal via electrodessication and associated out of pocket fee. Patient is to call office and schedule cosmetic visit if desired prn.    -$150 for up to 14, then $50 for up to 10 at same time of visit    3.  Photoaging of skin  -Patient's skin showing evidence of chronic sun exposure leading to diffuse photodamage and photo-aging  -Educated patient that chronic sun exposure leads to increased risk for skin cancers and to have at least annual skin exams (earlier if indicated) in the office.   -The patient was counseled regarding sun protective practices such as sunscreen use (water resistant, broad spectrum sunscreen with SPF rating of at least 30) and need for reapplication at least every 2 hours, sun protective clothing (including hat and sunglasses), avoidance of sun during the peak hours of the day, and avoidance of tanning beds.    -topical tretinoin cream can help to reduce dyspigmentation and soften rhytides when used consistently over many years. -Discussed Common side effects include:

## 2018-03-05 ENCOUNTER — TELEPHONE (OUTPATIENT)
Dept: INTERNAL MEDICINE | Age: 78
End: 2018-03-05

## 2018-03-05 RX ORDER — LANCETS 33 GAUGE
EACH MISCELLANEOUS
Qty: 100 EACH | Refills: 5 | Status: SHIPPED | OUTPATIENT
Start: 2018-03-05 | End: 2021-02-23

## 2018-03-05 NOTE — TELEPHONE ENCOUNTER
Pt saw Dr Mason Arriola, dermatologist last week and on the AVS is a notation that 2 medication are the same as other prescribed medications.   Pt said she is taking 3 blood pressure medications: Amlodipine and Atorvastatin and Irbesartan 300mg   Pt needs clarification on what medications she is to take

## 2018-04-23 RX ORDER — IRBESARTAN 300 MG/1
300 TABLET ORAL DAILY
Qty: 90 TABLET | Refills: 3 | Status: SHIPPED | OUTPATIENT
Start: 2018-04-23 | End: 2019-04-29 | Stop reason: SDUPTHER

## 2018-04-26 ENCOUNTER — TELEPHONE (OUTPATIENT)
Dept: INTERNAL MEDICINE | Age: 78
End: 2018-04-26

## 2018-04-26 RX ORDER — ATORVASTATIN CALCIUM 80 MG/1
TABLET, FILM COATED ORAL
Qty: 90 TABLET | Refills: 0 | Status: SHIPPED | OUTPATIENT
Start: 2018-04-26 | End: 2018-07-02 | Stop reason: SDUPTHER

## 2018-05-09 ENCOUNTER — TELEPHONE (OUTPATIENT)
Dept: INTERNAL MEDICINE | Age: 78
End: 2018-05-09

## 2018-06-08 ENCOUNTER — OFFICE VISIT (OUTPATIENT)
Dept: INTERNAL MEDICINE | Age: 78
End: 2018-06-08

## 2018-06-08 VITALS
BODY MASS INDEX: 35.33 KG/M2 | HEART RATE: 74 BPM | SYSTOLIC BLOOD PRESSURE: 126 MMHG | WEIGHT: 187 LBS | DIASTOLIC BLOOD PRESSURE: 76 MMHG | OXYGEN SATURATION: 96 %

## 2018-06-08 DIAGNOSIS — E08.21 DIABETES MELLITUS DUE TO UNDERLYING CONDITION WITH DIABETIC NEPHROPATHY, WITHOUT LONG-TERM CURRENT USE OF INSULIN (HCC): Primary | ICD-10-CM

## 2018-06-08 DIAGNOSIS — K21.9 GASTROESOPHAGEAL REFLUX DISEASE WITHOUT ESOPHAGITIS: ICD-10-CM

## 2018-06-08 DIAGNOSIS — E78.00 PURE HYPERCHOLESTEROLEMIA: ICD-10-CM

## 2018-06-08 DIAGNOSIS — I10 ESSENTIAL HYPERTENSION, BENIGN: ICD-10-CM

## 2018-06-08 DIAGNOSIS — R06.09 DOE (DYSPNEA ON EXERTION): ICD-10-CM

## 2018-06-08 PROBLEM — D22.9 NEVUS: Status: RESOLVED | Noted: 2018-02-08 | Resolved: 2018-06-08

## 2018-06-08 PROBLEM — J01.00 ACUTE NON-RECURRENT MAXILLARY SINUSITIS: Status: RESOLVED | Noted: 2018-02-08 | Resolved: 2018-06-08

## 2018-06-08 LAB — HBA1C MFR BLD: 6.5 %

## 2018-06-08 PROCEDURE — 83036 HEMOGLOBIN GLYCOSYLATED A1C: CPT | Performed by: INTERNAL MEDICINE

## 2018-06-08 PROCEDURE — 93000 ELECTROCARDIOGRAM COMPLETE: CPT | Performed by: INTERNAL MEDICINE

## 2018-06-08 PROCEDURE — 99214 OFFICE O/P EST MOD 30 MIN: CPT | Performed by: INTERNAL MEDICINE

## 2018-06-08 ASSESSMENT — ENCOUNTER SYMPTOMS
GASTROINTESTINAL NEGATIVE: 1
CHEST TIGHTNESS: 0
SHORTNESS OF BREATH: 1
WHEEZING: 0

## 2018-06-29 RX ORDER — GLIPIZIDE 10 MG/1
TABLET ORAL
Qty: 90 TABLET | Refills: 3 | Status: SHIPPED | OUTPATIENT
Start: 2018-06-29 | End: 2019-01-30 | Stop reason: SDUPTHER

## 2018-07-02 RX ORDER — ATORVASTATIN CALCIUM 80 MG/1
TABLET, FILM COATED ORAL
Qty: 90 TABLET | Refills: 0 | Status: SHIPPED | OUTPATIENT
Start: 2018-07-02 | End: 2018-07-13 | Stop reason: SDUPTHER

## 2018-07-13 RX ORDER — ATORVASTATIN CALCIUM 80 MG/1
TABLET, FILM COATED ORAL
Qty: 90 TABLET | Refills: 0 | Status: SHIPPED | OUTPATIENT
Start: 2018-07-13 | End: 2018-12-28 | Stop reason: SDUPTHER

## 2018-08-27 ENCOUNTER — TELEPHONE (OUTPATIENT)
Dept: INTERNAL MEDICINE | Age: 78
End: 2018-08-27

## 2018-08-27 NOTE — TELEPHONE ENCOUNTER
Pt called and advised   Patient was planning on having a small little meal before hand and taking her medications

## 2018-08-28 ENCOUNTER — HOSPITAL ENCOUNTER (OUTPATIENT)
Dept: NUCLEAR MEDICINE | Age: 78
Discharge: HOME | End: 2018-08-28
Payer: MEDICARE

## 2018-08-28 ENCOUNTER — HOSPITAL ENCOUNTER (OUTPATIENT)
Dept: NON INVASIVE DIAGNOSTICS | Age: 78
Discharge: HOME | End: 2018-08-28
Payer: MEDICARE

## 2018-08-28 DIAGNOSIS — R06.09 DOE (DYSPNEA ON EXERTION): ICD-10-CM

## 2018-08-28 DIAGNOSIS — E08.21 DIABETES MELLITUS DUE TO UNDERLYING CONDITION WITH DIABETIC NEPHROPATHY, WITHOUT LONG-TERM CURRENT USE OF INSULIN (HCC): ICD-10-CM

## 2018-08-28 LAB
LV EF: 70 %
LVEF MODALITY: NORMAL

## 2018-08-28 PROCEDURE — 3430000000 HC RX DIAGNOSTIC RADIOPHARMACEUTICAL: Performed by: NURSE PRACTITIONER

## 2018-08-28 PROCEDURE — 2580000003 HC RX 258: Performed by: INTERNAL MEDICINE

## 2018-08-28 PROCEDURE — 78452 HT MUSCLE IMAGE SPECT MULT: CPT

## 2018-08-28 PROCEDURE — 93017 CV STRESS TEST TRACING ONLY: CPT

## 2018-08-28 PROCEDURE — A9502 TC99M TETROFOSMIN: HCPCS | Performed by: INTERNAL MEDICINE

## 2018-08-28 PROCEDURE — A9502 TC99M TETROFOSMIN: HCPCS | Performed by: NURSE PRACTITIONER

## 2018-08-28 PROCEDURE — 3430000000 HC RX DIAGNOSTIC RADIOPHARMACEUTICAL: Performed by: INTERNAL MEDICINE

## 2018-08-28 RX ORDER — 0.9 % SODIUM CHLORIDE 0.9 %
10 VIAL (ML) INJECTION PRN
Status: DISCONTINUED | OUTPATIENT
Start: 2018-08-28 | End: 2018-08-29 | Stop reason: HOSPADM

## 2018-08-28 RX ADMIN — TETROFOSMIN 30 MILLICURIE: 1.38 INJECTION, POWDER, LYOPHILIZED, FOR SOLUTION INTRAVENOUS at 10:29

## 2018-08-28 RX ADMIN — SODIUM CHLORIDE 10 ML: 9 INJECTION, SOLUTION INTRAMUSCULAR; INTRAVENOUS; SUBCUTANEOUS at 10:29

## 2018-08-28 RX ADMIN — TETROFOSMIN 10 MILLICURIE: 1.38 INJECTION, POWDER, LYOPHILIZED, FOR SOLUTION INTRAVENOUS at 09:17

## 2018-08-28 RX ADMIN — SODIUM CHLORIDE 10 ML: 9 INJECTION, SOLUTION INTRAMUSCULAR; INTRAVENOUS; SUBCUTANEOUS at 09:17

## 2018-09-05 ENCOUNTER — TELEPHONE (OUTPATIENT)
Dept: INTERNAL MEDICINE | Age: 78
End: 2018-09-05

## 2018-09-07 ENCOUNTER — OFFICE VISIT (OUTPATIENT)
Dept: DERMATOLOGY | Age: 78
End: 2018-09-07

## 2018-09-07 DIAGNOSIS — L57.8 SOLAR ELASTOSIS: ICD-10-CM

## 2018-09-07 DIAGNOSIS — L72.0 MILIA: ICD-10-CM

## 2018-09-07 DIAGNOSIS — L08.9 PUSTULE: ICD-10-CM

## 2018-09-07 DIAGNOSIS — L70.0 OPEN COMEDONE: ICD-10-CM

## 2018-09-07 DIAGNOSIS — D48.9 NEOPLASM OF UNCERTAIN BEHAVIOR: Primary | ICD-10-CM

## 2018-09-07 PROCEDURE — 11101 PR BIOPSY, EACH ADDED LESION: CPT | Performed by: DERMATOLOGY

## 2018-09-07 PROCEDURE — 11100 PR BIOPSY OF SKIN LESION: CPT | Performed by: DERMATOLOGY

## 2018-09-07 PROCEDURE — 99213 OFFICE O/P EST LOW 20 MIN: CPT | Performed by: DERMATOLOGY

## 2018-09-07 NOTE — PROGRESS NOTES
[facility]) Dermatology  [place], , [address]       Yuli Hemphill  1940    70 y.o. female     Date of Visit: 2018    Chief Complaint:   Chief Complaint   Patient presents with    Follow-up    Other     pt states they are somewhat improved - using retin a        I was asked to see this patient by Dr. Sevilla ref. provider found. History of Present Illness:  Yuli Hemphill is a 70 y.o. female who presents with the chief complaint of follow up for the followin. 6 month f/u for open comedones located to nose and medial cheeks and rhytides and dyspigmentation related to photoaging. Patient has been applying tretinoin 0.025% cream 3 days per week on alternating days and tolerating well with minimal dryness/irritation. She moisturizes regularly. She continues to notice texture changes in her skin especially to her cheeks and sees \"whitish thickened bumps\" to cheeks. Does admit to history of keloids of chronic sun exposure in her youth. Proper sun protection. 2.  Has a few \"blackheads\" to her right cheek that has been present for several months. She is unsure if the tretinoin cream is helping with these blackheads. Patient has an upcoming facial and will like to see if the facial improves the blackheads. 3.  Has a whitish bump to her left cheek that she attempts to squeeze with her fingernails but it does not resolve the lesion. 4.  Complains of a irritated feeling bump to her right lower back that has been present for at least a few months. Unsure if it has changed in size/shape/color due to location and unable to visualize area  5. Has a brown bump to her left upper back that has become irritated and pruritic. Unsure if it has changed in size, shape, color due to location.     Review of Systems:  Constitutional: Reports general sense of well-being   Skin: No new or changing moles, no history of keloids or hypertrophic scars, no interval of severe sunburns  Heme: No abnormal -General: NAD, well-nourished, well-developed. Areas of skin examined as listed above:  1a) Left lateral mid back-0.9 x 0.5 cm erythematous asymmetrical pink to light brown papule      1b) right medial inferior back-0.7 x 0.6 cm erythematous pearly pink papule  with subtle whitish/yellow hue       2. Left malar cheek- yellowish-white 2mm well circumscribed papule  3. Right malar cheek and right inferior cheek near jawline-3 skin colored papules with central dilated pore and keratin plugs  4. Several scattered lentigines and vascular changes and cobblestoning of skin/solar elastosis and rhytides related to years of chronic sun exposure  5. Right temple- 0.1cm erythematous follicular pustule  Assessment and Plan     1. Neoplasm of uncertain behavior    2. Milia    3. Open comedone    4. Solar elastosis    5. Pustule        1. Neoplasm of uncertain behavior  DDx:  1a) Inflamed cyst v. Inflamed dermal nevus rule out BCC  1b) inflamed SK v. Inflamed compound nevus rule out atypia    -Discussed possible diagnosis. Patient agreeable to biopsy. Verbal consent obtained after risks (infection, bleeding, scar), benefits and alternatives explained. -Area(s) to be biopsied were marked with a surgical pen. Site(s) were cleansed with alcohol. Local anesthesia achieved with 1% lidocaine with epinephrine/sodium bicarbonate. Shave biopsy performed with a razor blade. Hemostasis was achieved with aluminum chloride and electrodessication. The wound(s) were dressed with petrolatum and covered with a bandage. Specimen(s) sent to pathology. Pt educated re: risk of bleeding, infection, scar and wound care instructions.    - NH BIOPSY OF SKIN LESION  - NH BIOPSY, EACH ADDED LESION    2. Milia  Patient prefers to undergo a facial outside this office to see if that will improve/resolve her milia. If not, patient informed to call the office for milia extraction.   $50 for the first milia/open comedone and $50 for each additional

## 2018-09-07 NOTE — PATIENT INSTRUCTIONS
Try to apply tretinoin cream more than 3 nights per week if you can tolerate it    Biopsy Wound Care Instructions   Cleanse the wound with mild soapy water daily.  Gently dry the area.  Apply Vaseline or petroleum jelly to the wound using a cotton tipped applicator or Qtip.  Cover with a clean bandage.  Repeat this process until the biopsy site is healed.  If you had stitches placed, continue treating the site until the stitches are removed. Remember to make an appointment to return to have your stitches removed by our staff.  You may shower and bathe as usual.   ** Biopsy results generally take around 7 business days to come back. If you have not heard from us by then, please call the office at [phone] between 8AM and 4PM Monday through Friday. Sun Protection Tips    · Apply broad spectrum water resistant sunscreen with an SPF of at least 30 to exposed areas of the skin. Dont forget the ears and lips! Remember to reapply sunscreen about every 2 hours and after swimming or sweating. · Wear sun protective clothing. Swim shirts (aka. rash guards) are a great idea and negates the need to reapply sunscreen in those areas.      · Seek the shade whenever possible especially between the hours of 10am and 4pm when the suns rays are the strongest.     · Avoid tanning beds

## 2018-09-11 ENCOUNTER — TELEPHONE (OUTPATIENT)
Dept: INTERNAL MEDICINE | Age: 78
End: 2018-09-11

## 2018-09-11 LAB — DERMATOLOGY PATHOLOGY REPORT: NORMAL

## 2018-09-11 RX ORDER — MELOXICAM 15 MG/1
15 TABLET ORAL DAILY
Qty: 30 TABLET | Refills: 1 | Status: SHIPPED | OUTPATIENT
Start: 2018-09-11 | End: 2018-10-02

## 2018-09-11 NOTE — TELEPHONE ENCOUNTER
Patient requesting a medication refill.   Medication meloxicam (MOBIC)  Dosage 15 MG tablet   FrequencyTake 1 tablet by mouth daily  Last filled on 02/26/18  [facility] [address] [place] [id number] [facility]

## 2018-10-02 ENCOUNTER — HOSPITAL ENCOUNTER (OUTPATIENT)
Dept: GENERAL RADIOLOGY | Age: 78
Discharge: HOME | End: 2018-10-02
Payer: MEDICARE

## 2018-10-02 ENCOUNTER — OFFICE VISIT (OUTPATIENT)
Dept: INTERNAL MEDICINE CLINIC | Age: 78
End: 2018-10-02
Payer: MEDICARE

## 2018-10-02 ENCOUNTER — HOSPITAL ENCOUNTER (OUTPATIENT)
Age: 78
End: 2018-10-02
Payer: MEDICARE

## 2018-10-02 VITALS
SYSTOLIC BLOOD PRESSURE: 132 MMHG | OXYGEN SATURATION: 94 % | BODY MASS INDEX: 35.3 KG/M2 | HEART RATE: 76 BPM | WEIGHT: 187 LBS | DIASTOLIC BLOOD PRESSURE: 70 MMHG | HEIGHT: 61 IN

## 2018-10-02 DIAGNOSIS — S39.012A STRAIN OF LUMBAR REGION, INITIAL ENCOUNTER: ICD-10-CM

## 2018-10-02 DIAGNOSIS — S39.012A STRAIN OF LUMBAR REGION, INITIAL ENCOUNTER: Primary | ICD-10-CM

## 2018-10-02 DIAGNOSIS — M48.062 SPINAL STENOSIS OF LUMBAR REGION WITH NEUROGENIC CLAUDICATION: ICD-10-CM

## 2018-10-02 PROCEDURE — 99213 OFFICE O/P EST LOW 20 MIN: CPT | Performed by: INTERNAL MEDICINE

## 2018-10-02 PROCEDURE — G8510 SCR DEP NEG, NO PLAN REQD: HCPCS | Performed by: INTERNAL MEDICINE

## 2018-10-02 PROCEDURE — 3288F FALL RISK ASSESSMENT DOCD: CPT | Performed by: INTERNAL MEDICINE

## 2018-10-02 PROCEDURE — 72100 X-RAY EXAM L-S SPINE 2/3 VWS: CPT

## 2018-10-02 RX ORDER — METHYLPREDNISOLONE 4 MG/1
TABLET ORAL
Qty: 1 KIT | Refills: 0 | Status: SHIPPED | OUTPATIENT
Start: 2018-10-02 | End: 2018-10-08

## 2018-10-02 RX ORDER — METHYLPREDNISOLONE 4 MG/1
TABLET ORAL
Qty: 1 KIT | Refills: 0 | Status: SHIPPED | OUTPATIENT
Start: 2018-10-02 | End: 2018-10-02 | Stop reason: SDUPTHER

## 2018-10-02 ASSESSMENT — PATIENT HEALTH QUESTIONNAIRE - PHQ9
SUM OF ALL RESPONSES TO PHQ QUESTIONS 1-9: 0
SUM OF ALL RESPONSES TO PHQ QUESTIONS 1-9: 0
SUM OF ALL RESPONSES TO PHQ9 QUESTIONS 1 & 2: 0
2. FEELING DOWN, DEPRESSED OR HOPELESS: 0
1. LITTLE INTEREST OR PLEASURE IN DOING THINGS: 0

## 2018-10-02 ASSESSMENT — ENCOUNTER SYMPTOMS: BACK PAIN: 1

## 2018-10-15 RX ORDER — SITAGLIPTIN 100 MG/1
TABLET, FILM COATED ORAL
Qty: 90 TABLET | Refills: 0 | Status: SHIPPED | OUTPATIENT
Start: 2018-10-15 | End: 2019-01-30 | Stop reason: SDUPTHER

## 2018-10-17 ENCOUNTER — TELEPHONE (OUTPATIENT)
Dept: INTERNAL MEDICINE CLINIC | Age: 78
End: 2018-10-17

## 2018-10-17 NOTE — TELEPHONE ENCOUNTER
Pt wants to know if there is something that her Januvia can be replaced with   Cost 460.31 for 90 day supply  Cost 153.36 for 30 day supply  Medication too pricey for pt  Pt only has 5 pills left  Please call pt to advise

## 2018-10-22 ENCOUNTER — TELEPHONE (OUTPATIENT)
Dept: DERMATOLOGY | Age: 78
End: 2018-10-22

## 2018-11-19 LAB — DIABETIC RETINOPATHY: NEGATIVE

## 2018-12-11 ENCOUNTER — OFFICE VISIT (OUTPATIENT)
Dept: INTERNAL MEDICINE CLINIC | Age: 78
End: 2018-12-11
Payer: MEDICARE

## 2018-12-11 VITALS
BODY MASS INDEX: 34.74 KG/M2 | OXYGEN SATURATION: 92 % | DIASTOLIC BLOOD PRESSURE: 78 MMHG | WEIGHT: 184 LBS | HEIGHT: 61 IN | HEART RATE: 88 BPM | SYSTOLIC BLOOD PRESSURE: 138 MMHG

## 2018-12-11 DIAGNOSIS — E78.00 PURE HYPERCHOLESTEROLEMIA: ICD-10-CM

## 2018-12-11 DIAGNOSIS — I10 ESSENTIAL HYPERTENSION, BENIGN: ICD-10-CM

## 2018-12-11 DIAGNOSIS — E08.21 DIABETES MELLITUS DUE TO UNDERLYING CONDITION WITH DIABETIC NEPHROPATHY, WITHOUT LONG-TERM CURRENT USE OF INSULIN (HCC): Primary | ICD-10-CM

## 2018-12-11 DIAGNOSIS — F41.9 ANXIETY: ICD-10-CM

## 2018-12-11 LAB — HBA1C MFR BLD: 6.7 %

## 2018-12-11 PROCEDURE — 99214 OFFICE O/P EST MOD 30 MIN: CPT | Performed by: INTERNAL MEDICINE

## 2018-12-11 PROCEDURE — G8510 SCR DEP NEG, NO PLAN REQD: HCPCS | Performed by: INTERNAL MEDICINE

## 2018-12-11 PROCEDURE — 3288F FALL RISK ASSESSMENT DOCD: CPT | Performed by: INTERNAL MEDICINE

## 2018-12-11 PROCEDURE — 83036 HEMOGLOBIN GLYCOSYLATED A1C: CPT | Performed by: INTERNAL MEDICINE

## 2018-12-11 RX ORDER — ESCITALOPRAM OXALATE 10 MG/1
10 TABLET ORAL DAILY
Qty: 30 TABLET | Refills: 1 | Status: SHIPPED | OUTPATIENT
Start: 2018-12-11 | End: 2019-01-30 | Stop reason: SDUPTHER

## 2018-12-11 RX ORDER — HYDROXYZINE HYDROCHLORIDE 25 MG/1
25 TABLET, FILM COATED ORAL EVERY 8 HOURS PRN
Qty: 50 TABLET | Refills: 0 | Status: SHIPPED | OUTPATIENT
Start: 2018-12-11 | End: 2019-01-10

## 2018-12-11 ASSESSMENT — PATIENT HEALTH QUESTIONNAIRE - PHQ9
2. FEELING DOWN, DEPRESSED OR HOPELESS: 0
SUM OF ALL RESPONSES TO PHQ QUESTIONS 1-9: 0
SUM OF ALL RESPONSES TO PHQ QUESTIONS 1-9: 0
1. LITTLE INTEREST OR PLEASURE IN DOING THINGS: 0
SUM OF ALL RESPONSES TO PHQ9 QUESTIONS 1 & 2: 0

## 2018-12-11 ASSESSMENT — ENCOUNTER SYMPTOMS
SHORTNESS OF BREATH: 0
GASTROINTESTINAL NEGATIVE: 1
CHEST TIGHTNESS: 0
RESPIRATORY NEGATIVE: 1
WHEEZING: 0

## 2018-12-11 NOTE — PROGRESS NOTES
Assessment:      See Problem List assessment and plan       Plan:     DM (diabetes mellitus) ([address]  Sugar great  Continue present treatment  Check labs      Anxiety  Discussed issues at length  She agrees to see Dr Hasmukh Clinton 10 mg daily  Atarax tid prn anxiety  Recheck 3 weeks    Essential hypertension, benign  BP stable  Continue present treatment      Pure hypercholesterolemia  Cholesterol stable   Continue treatment      Patient engaged in shared decision making. Information given to evaluateoptions of treatment, understand what is needed and discuss importance of following plan.

## 2018-12-21 ENCOUNTER — TELEPHONE (OUTPATIENT)
Dept: INTERNAL MEDICINE CLINIC | Age: 78
End: 2018-12-21

## 2018-12-21 RX ORDER — HYDROXYZINE HYDROCHLORIDE 25 MG/1
25 TABLET, FILM COATED ORAL EVERY 8 HOURS PRN
Qty: 50 TABLET | Refills: 0 | Status: CANCELLED | OUTPATIENT
Start: 2018-12-21 | End: 2019-01-20

## 2018-12-21 RX ORDER — ESCITALOPRAM OXALATE 10 MG/1
10 TABLET ORAL DAILY
Qty: 30 TABLET | Refills: 1 | Status: CANCELLED | OUTPATIENT
Start: 2018-12-21

## 2018-12-27 DIAGNOSIS — E78.00 PURE HYPERCHOLESTEROLEMIA: ICD-10-CM

## 2018-12-27 DIAGNOSIS — I10 ESSENTIAL HYPERTENSION, BENIGN: ICD-10-CM

## 2018-12-27 DIAGNOSIS — E08.21 DIABETES MELLITUS DUE TO UNDERLYING CONDITION WITH DIABETIC NEPHROPATHY, WITHOUT LONG-TERM CURRENT USE OF INSULIN (HCC): ICD-10-CM

## 2018-12-27 LAB
A/G RATIO: 1.8 (ref 1.1–2.2)
ALBUMIN SERPL-MCNC: 4.5 G/DL (ref 3.4–5)
ALP BLD-CCNC: 84 U/L (ref 40–129)
ALT SERPL-CCNC: 17 U/L (ref 10–40)
ANION GAP SERPL CALCULATED.3IONS-SCNC: 12 MMOL/L (ref 3–16)
AST SERPL-CCNC: 17 U/L (ref 15–37)
BACTERIA: ABNORMAL /HPF
BASOPHILS ABSOLUTE: 0.1 K/UL (ref 0–0.2)
BASOPHILS RELATIVE PERCENT: 0.9 %
BILIRUB SERPL-MCNC: 0.5 MG/DL (ref 0–1)
BILIRUBIN URINE: NEGATIVE
BLOOD, URINE: NEGATIVE
BUN BLDV-MCNC: 18 MG/DL (ref 7–20)
CALCIUM SERPL-MCNC: 10.5 MG/DL (ref 8.3–10.6)
CHLORIDE BLD-SCNC: 107 MMOL/L (ref 99–110)
CHOLESTEROL, TOTAL: 234 MG/DL (ref 0–199)
CLARITY: ABNORMAL
CO2: 23 MMOL/L (ref 21–32)
COLOR: YELLOW
CREAT SERPL-MCNC: 0.7 MG/DL (ref 0.6–1.2)
CREATININE URINE: 99.2 MG/DL (ref 28–259)
EOSINOPHILS ABSOLUTE: 0.3 K/UL (ref 0–0.6)
EOSINOPHILS RELATIVE PERCENT: 4.7 %
EPITHELIAL CELLS, UA: 1 /HPF (ref 0–5)
GFR AFRICAN AMERICAN: >60
GFR NON-AFRICAN AMERICAN: >60
GLOBULIN: 2.5 G/DL
GLUCOSE BLD-MCNC: 160 MG/DL (ref 70–99)
GLUCOSE URINE: NEGATIVE MG/DL
HCT VFR BLD CALC: 38.4 % (ref 36–48)
HDLC SERPL-MCNC: 62 MG/DL (ref 40–60)
HEMOGLOBIN: 13 G/DL (ref 12–16)
HYALINE CASTS: 0 /LPF (ref 0–8)
KETONES, URINE: NEGATIVE MG/DL
LDL CHOLESTEROL CALCULATED: 143 MG/DL
LEUKOCYTE ESTERASE, URINE: ABNORMAL
LYMPHOCYTES ABSOLUTE: 1.1 K/UL (ref 1–5.1)
LYMPHOCYTES RELATIVE PERCENT: 19.2 %
MCH RBC QN AUTO: 31.6 PG (ref 26–34)
MCHC RBC AUTO-ENTMCNC: 33.8 G/DL (ref 31–36)
MCV RBC AUTO: 93.6 FL (ref 80–100)
MICROALBUMIN UR-MCNC: 1.8 MG/DL
MICROALBUMIN/CREAT UR-RTO: 18.1 MG/G (ref 0–30)
MICROSCOPIC EXAMINATION: YES
MONOCYTES ABSOLUTE: 0.3 K/UL (ref 0–1.3)
MONOCYTES RELATIVE PERCENT: 6.2 %
NEUTROPHILS ABSOLUTE: 3.9 K/UL (ref 1.7–7.7)
NEUTROPHILS RELATIVE PERCENT: 69 %
NITRITE, URINE: NEGATIVE
PDW BLD-RTO: 13.6 % (ref 12.4–15.4)
PH UA: 6
PLATELET # BLD: 184 K/UL (ref 135–450)
PMV BLD AUTO: 8.3 FL (ref 5–10.5)
POTASSIUM SERPL-SCNC: 4.7 MMOL/L (ref 3.5–5.1)
PROTEIN UA: NEGATIVE MG/DL
RBC # BLD: 4.1 M/UL (ref 4–5.2)
RBC UA: 3 /HPF (ref 0–4)
SODIUM BLD-SCNC: 142 MMOL/L (ref 136–145)
SPECIFIC GRAVITY UA: 1.02
TOTAL PROTEIN: 7 G/DL (ref 6.4–8.2)
TRIGL SERPL-MCNC: 145 MG/DL (ref 0–150)
UROBILINOGEN, URINE: 0.2 E.U./DL
VLDLC SERPL CALC-MCNC: 29 MG/DL
WBC # BLD: 5.6 K/UL (ref 4–11)
WBC UA: 5 /HPF (ref 0–5)

## 2018-12-28 RX ORDER — ATORVASTATIN CALCIUM 80 MG/1
TABLET, FILM COATED ORAL
Qty: 90 TABLET | Refills: 0 | Status: SHIPPED | OUTPATIENT
Start: 2018-12-28 | End: 2019-01-15

## 2019-01-04 ENCOUNTER — OFFICE VISIT (OUTPATIENT)
Dept: PSYCHOLOGY | Age: 79
End: 2019-01-04
Payer: MEDICARE

## 2019-01-04 DIAGNOSIS — F41.1 GENERALIZED ANXIETY DISORDER: Primary | ICD-10-CM

## 2019-01-04 PROCEDURE — 90791 PSYCH DIAGNOSTIC EVALUATION: CPT | Performed by: PSYCHOLOGIST

## 2019-01-04 ASSESSMENT — PATIENT HEALTH QUESTIONNAIRE - PHQ9
7. TROUBLE CONCENTRATING ON THINGS, SUCH AS READING THE NEWSPAPER OR WATCHING TELEVISION: 0
SUM OF ALL RESPONSES TO PHQ QUESTIONS 1-9: 5
4. FEELING TIRED OR HAVING LITTLE ENERGY: 1
1. LITTLE INTEREST OR PLEASURE IN DOING THINGS: 0
2. FEELING DOWN, DEPRESSED OR HOPELESS: 0
8. MOVING OR SPEAKING SO SLOWLY THAT OTHER PEOPLE COULD HAVE NOTICED. OR THE OPPOSITE, BEING SO FIGETY OR RESTLESS THAT YOU HAVE BEEN MOVING AROUND A LOT MORE THAN USUAL: 0
SUM OF ALL RESPONSES TO PHQ QUESTIONS 1-9: 5
3. TROUBLE FALLING OR STAYING ASLEEP: 3
5. POOR APPETITE OR OVEREATING: 1
9. THOUGHTS THAT YOU WOULD BE BETTER OFF DEAD, OR OF HURTING YOURSELF: 0
6. FEELING BAD ABOUT YOURSELF - OR THAT YOU ARE A FAILURE OR HAVE LET YOURSELF OR YOUR FAMILY DOWN: 0
SUM OF ALL RESPONSES TO PHQ9 QUESTIONS 1 & 2: 0

## 2019-01-04 ASSESSMENT — ANXIETY QUESTIONNAIRES
4. TROUBLE RELAXING: 1-SEVERAL DAYS
2. NOT BEING ABLE TO STOP OR CONTROL WORRYING: 1-SEVERAL DAYS
5. BEING SO RESTLESS THAT IT IS HARD TO SIT STILL: 0-NOT AT ALL SURE
1. FEELING NERVOUS, ANXIOUS, OR ON EDGE: 1-SEVERAL DAYS
6. BECOMING EASILY ANNOYED OR IRRITABLE: 0-NOT AT ALL SURE
7. FEELING AFRAID AS IF SOMETHING AWFUL MIGHT HAPPEN: 0-NOT AT ALL SURE
3. WORRYING TOO MUCH ABOUT DIFFERENT THINGS: 1-SEVERAL DAYS
GAD7 TOTAL SCORE: 4

## 2019-01-07 ENCOUNTER — TELEPHONE (OUTPATIENT)
Dept: INTERNAL MEDICINE CLINIC | Age: 79
End: 2019-01-07

## 2019-01-15 ENCOUNTER — TELEPHONE (OUTPATIENT)
Dept: INTERNAL MEDICINE CLINIC | Age: 79
End: 2019-01-15

## 2019-01-15 DIAGNOSIS — E78.00 PURE HYPERCHOLESTEROLEMIA: Primary | ICD-10-CM

## 2019-01-15 RX ORDER — ROSUVASTATIN CALCIUM 40 MG/1
40 TABLET, COATED ORAL DAILY
Qty: 90 TABLET | Refills: 0 | Status: SHIPPED | OUTPATIENT
Start: 2019-01-15 | End: 2019-04-12 | Stop reason: SDUPTHER

## 2019-01-24 ENCOUNTER — TELEPHONE (OUTPATIENT)
Dept: INTERNAL MEDICINE CLINIC | Age: 79
End: 2019-01-24

## 2019-01-24 DIAGNOSIS — I10 ESSENTIAL HYPERTENSION, BENIGN: ICD-10-CM

## 2019-01-24 RX ORDER — AMLODIPINE BESYLATE 10 MG/1
10 TABLET ORAL DAILY
Qty: 90 TABLET | Refills: 1 | Status: SHIPPED | OUTPATIENT
Start: 2019-01-24 | End: 2019-01-30 | Stop reason: SDUPTHER

## 2019-01-28 ENCOUNTER — TELEPHONE (OUTPATIENT)
Dept: INTERNAL MEDICINE CLINIC | Age: 79
End: 2019-01-28

## 2019-01-30 ENCOUNTER — OFFICE VISIT (OUTPATIENT)
Dept: INTERNAL MEDICINE CLINIC | Age: 79
End: 2019-01-30
Payer: MEDICARE

## 2019-01-30 VITALS
HEART RATE: 61 BPM | BODY MASS INDEX: 34.74 KG/M2 | DIASTOLIC BLOOD PRESSURE: 72 MMHG | HEIGHT: 61 IN | SYSTOLIC BLOOD PRESSURE: 132 MMHG | OXYGEN SATURATION: 96 % | WEIGHT: 184 LBS

## 2019-01-30 DIAGNOSIS — K52.9 COLITIS: ICD-10-CM

## 2019-01-30 DIAGNOSIS — I10 ESSENTIAL HYPERTENSION, BENIGN: ICD-10-CM

## 2019-01-30 DIAGNOSIS — R91.1 RIGHT LOWER LOBE PULMONARY NODULE: Primary | ICD-10-CM

## 2019-01-30 PROCEDURE — 99214 OFFICE O/P EST MOD 30 MIN: CPT | Performed by: NURSE PRACTITIONER

## 2019-01-30 PROCEDURE — G8510 SCR DEP NEG, NO PLAN REQD: HCPCS | Performed by: NURSE PRACTITIONER

## 2019-01-30 RX ORDER — ESCITALOPRAM OXALATE 10 MG/1
10 TABLET ORAL DAILY
Qty: 90 TABLET | Refills: 0 | Status: SHIPPED | OUTPATIENT
Start: 2019-01-30 | End: 2019-09-03 | Stop reason: SDUPTHER

## 2019-01-30 RX ORDER — TRAZODONE HYDROCHLORIDE 100 MG/1
100 TABLET ORAL NIGHTLY PRN
Qty: 90 TABLET | Refills: 1 | Status: SHIPPED | OUTPATIENT
Start: 2019-01-30 | End: 2020-03-13 | Stop reason: SDUPTHER

## 2019-01-30 RX ORDER — GLIPIZIDE 10 MG/1
TABLET ORAL
Qty: 90 TABLET | Refills: 0 | Status: SHIPPED | OUTPATIENT
Start: 2019-01-30 | End: 2019-04-29 | Stop reason: SDUPTHER

## 2019-01-30 RX ORDER — CIPROFLOXACIN 500 MG/1
500 TABLET, FILM COATED ORAL 2 TIMES DAILY
COMMUNITY
End: 2019-04-16

## 2019-01-30 RX ORDER — METRONIDAZOLE 500 MG/1
500 TABLET ORAL 3 TIMES DAILY
COMMUNITY
End: 2019-04-16

## 2019-01-30 RX ORDER — AMLODIPINE BESYLATE 10 MG/1
10 TABLET ORAL DAILY
Qty: 90 TABLET | Refills: 1 | Status: SHIPPED | OUTPATIENT
Start: 2019-01-30 | End: 2020-02-13 | Stop reason: SDUPTHER

## 2019-01-30 ASSESSMENT — ENCOUNTER SYMPTOMS
CHEST TIGHTNESS: 0
SORE THROAT: 0
VOMITING: 0
RHINORRHEA: 0
COUGH: 0
CONSTIPATION: 0
WHEEZING: 0
COLOR CHANGE: 0
SHORTNESS OF BREATH: 0
EYE REDNESS: 0
ABDOMINAL PAIN: 0
BLOOD IN STOOL: 0
EYE ITCHING: 0
DIARRHEA: 0
BACK PAIN: 0
NAUSEA: 0
SINUS PRESSURE: 0

## 2019-01-30 ASSESSMENT — PATIENT HEALTH QUESTIONNAIRE - PHQ9
SUM OF ALL RESPONSES TO PHQ9 QUESTIONS 1 & 2: 0
1. LITTLE INTEREST OR PLEASURE IN DOING THINGS: 0
SUM OF ALL RESPONSES TO PHQ QUESTIONS 1-9: 0
SUM OF ALL RESPONSES TO PHQ QUESTIONS 1-9: 0
2. FEELING DOWN, DEPRESSED OR HOPELESS: 0

## 2019-02-04 DIAGNOSIS — E78.00 PURE HYPERCHOLESTEROLEMIA: ICD-10-CM

## 2019-02-04 LAB
A/G RATIO: 1.8 (ref 1.1–2.2)
ALBUMIN SERPL-MCNC: 4.2 G/DL (ref 3.4–5)
ALP BLD-CCNC: 78 U/L (ref 40–129)
ALT SERPL-CCNC: 41 U/L (ref 10–40)
ANION GAP SERPL CALCULATED.3IONS-SCNC: 12 MMOL/L (ref 3–16)
AST SERPL-CCNC: 33 U/L (ref 15–37)
BILIRUB SERPL-MCNC: 0.4 MG/DL (ref 0–1)
BUN BLDV-MCNC: 12 MG/DL (ref 7–20)
CALCIUM SERPL-MCNC: 10.5 MG/DL (ref 8.3–10.6)
CHLORIDE BLD-SCNC: 111 MMOL/L (ref 99–110)
CHOLESTEROL, FASTING: 101 MG/DL (ref 0–199)
CO2: 22 MMOL/L (ref 21–32)
CREAT SERPL-MCNC: 0.8 MG/DL (ref 0.6–1.2)
GFR AFRICAN AMERICAN: >60
GFR NON-AFRICAN AMERICAN: >60
GLOBULIN: 2.3 G/DL
GLUCOSE FASTING: 167 MG/DL (ref 70–99)
HDLC SERPL-MCNC: 44 MG/DL (ref 40–60)
LDL CHOLESTEROL CALCULATED: 32 MG/DL
POTASSIUM SERPL-SCNC: 4 MMOL/L (ref 3.5–5.1)
SODIUM BLD-SCNC: 145 MMOL/L (ref 136–145)
TOTAL PROTEIN: 6.5 G/DL (ref 6.4–8.2)
TRIGLYCERIDE, FASTING: 125 MG/DL (ref 0–150)
VLDLC SERPL CALC-MCNC: 25 MG/DL

## 2019-02-07 ENCOUNTER — HOSPITAL ENCOUNTER (OUTPATIENT)
Dept: CT IMAGING | Age: 79
Discharge: HOME | End: 2019-02-07
Payer: MEDICARE

## 2019-02-07 DIAGNOSIS — R91.8 PULMONARY NODULES: Primary | ICD-10-CM

## 2019-02-07 DIAGNOSIS — R91.1 RIGHT LOWER LOBE PULMONARY NODULE: ICD-10-CM

## 2019-02-07 PROCEDURE — 71260 CT THORAX DX C+: CPT

## 2019-02-07 PROCEDURE — 6360000004 HC RX CONTRAST MEDICATION: Performed by: NURSE PRACTITIONER

## 2019-02-07 RX ADMIN — IOPAMIDOL 80 ML: 755 INJECTION, SOLUTION INTRAVENOUS at 13:54

## 2019-02-11 ENCOUNTER — TELEPHONE (OUTPATIENT)
Dept: INTERNAL MEDICINE CLINIC | Age: 79
End: 2019-02-11

## 2019-02-26 ENCOUNTER — OFFICE VISIT (OUTPATIENT)
Dept: INTERNAL MEDICINE CLINIC | Age: 79
End: 2019-02-26
Payer: MEDICARE

## 2019-02-26 VITALS
SYSTOLIC BLOOD PRESSURE: 128 MMHG | HEIGHT: 61 IN | OXYGEN SATURATION: 97 % | WEIGHT: 180 LBS | BODY MASS INDEX: 33.99 KG/M2 | DIASTOLIC BLOOD PRESSURE: 62 MMHG | HEART RATE: 75 BPM

## 2019-02-26 DIAGNOSIS — J06.9 VIRAL UPPER RESPIRATORY TRACT INFECTION: ICD-10-CM

## 2019-02-26 PROCEDURE — G8510 SCR DEP NEG, NO PLAN REQD: HCPCS | Performed by: NURSE PRACTITIONER

## 2019-02-26 PROCEDURE — 99213 OFFICE O/P EST LOW 20 MIN: CPT | Performed by: NURSE PRACTITIONER

## 2019-02-26 RX ORDER — GUAIFENESIN 600 MG/1
600 TABLET, EXTENDED RELEASE ORAL 2 TIMES DAILY
Qty: 30 TABLET | Refills: 0 | Status: SHIPPED | OUTPATIENT
Start: 2019-02-26 | End: 2019-03-13

## 2019-02-26 RX ORDER — BENZONATATE 100 MG/1
100 CAPSULE ORAL 3 TIMES DAILY PRN
Qty: 30 CAPSULE | Refills: 0 | Status: SHIPPED | OUTPATIENT
Start: 2019-02-26 | End: 2019-03-05

## 2019-02-26 ASSESSMENT — PATIENT HEALTH QUESTIONNAIRE - PHQ9
2. FEELING DOWN, DEPRESSED OR HOPELESS: 0
SUM OF ALL RESPONSES TO PHQ QUESTIONS 1-9: 0
SUM OF ALL RESPONSES TO PHQ9 QUESTIONS 1 & 2: 0
SUM OF ALL RESPONSES TO PHQ QUESTIONS 1-9: 0
1. LITTLE INTEREST OR PLEASURE IN DOING THINGS: 0

## 2019-02-26 ASSESSMENT — ENCOUNTER SYMPTOMS
COUGH: 1
NAUSEA: 0
WHEEZING: 0
SINUS PRESSURE: 1
SINUS PAIN: 0
CONSTIPATION: 0
SHORTNESS OF BREATH: 0
RHINORRHEA: 0
DIARRHEA: 0
SORE THROAT: 0

## 2019-02-27 ENCOUNTER — TELEPHONE (OUTPATIENT)
Dept: CASE MANAGEMENT | Age: 79
End: 2019-02-27

## 2019-03-15 ENCOUNTER — OFFICE VISIT (OUTPATIENT)
Dept: DERMATOLOGY | Age: 79
End: 2019-03-15
Payer: MEDICARE

## 2019-03-15 ENCOUNTER — TELEPHONE (OUTPATIENT)
Dept: INTERNAL MEDICINE CLINIC | Age: 79
End: 2019-03-15

## 2019-03-15 ENCOUNTER — OFFICE VISIT (OUTPATIENT)
Dept: INTERNAL MEDICINE CLINIC | Age: 79
End: 2019-03-15
Payer: MEDICARE

## 2019-03-15 VITALS
BODY MASS INDEX: 34.36 KG/M2 | SYSTOLIC BLOOD PRESSURE: 120 MMHG | WEIGHT: 182 LBS | HEIGHT: 61 IN | HEART RATE: 76 BPM | OXYGEN SATURATION: 93 % | DIASTOLIC BLOOD PRESSURE: 80 MMHG

## 2019-03-15 DIAGNOSIS — L57.0 ACTINIC KERATOSIS: Primary | ICD-10-CM

## 2019-03-15 DIAGNOSIS — L57.8 PHOTOAGING OF SKIN: ICD-10-CM

## 2019-03-15 DIAGNOSIS — Z86.018 HISTORY OF DYSPLASTIC NEVUS: ICD-10-CM

## 2019-03-15 DIAGNOSIS — D22.9 MULTIPLE BENIGN MELANOCYTIC NEVI: ICD-10-CM

## 2019-03-15 DIAGNOSIS — L30.9 ECZEMA, UNSPECIFIED TYPE: ICD-10-CM

## 2019-03-15 DIAGNOSIS — M48.062 SPINAL STENOSIS OF LUMBAR REGION WITH NEUROGENIC CLAUDICATION: ICD-10-CM

## 2019-03-15 DIAGNOSIS — D23.39: ICD-10-CM

## 2019-03-15 PROCEDURE — 17000 DESTRUCT PREMALG LESION: CPT | Performed by: DERMATOLOGY

## 2019-03-15 PROCEDURE — 96372 THER/PROPH/DIAG INJ SC/IM: CPT | Performed by: INTERNAL MEDICINE

## 2019-03-15 PROCEDURE — 99213 OFFICE O/P EST LOW 20 MIN: CPT | Performed by: INTERNAL MEDICINE

## 2019-03-15 PROCEDURE — 99214 OFFICE O/P EST MOD 30 MIN: CPT | Performed by: DERMATOLOGY

## 2019-03-15 RX ORDER — METHYLPREDNISOLONE ACETATE 80 MG/ML
80 INJECTION, SUSPENSION INTRA-ARTICULAR; INTRALESIONAL; INTRAMUSCULAR; SOFT TISSUE ONCE
Status: COMPLETED | OUTPATIENT
Start: 2019-03-15 | End: 2019-03-15

## 2019-03-15 RX ORDER — PREDNISONE 10 MG/1
TABLET ORAL
Qty: 30 TABLET | Refills: 0 | Status: SHIPPED | OUTPATIENT
Start: 2019-03-16 | End: 2019-03-25

## 2019-03-15 RX ADMIN — METHYLPREDNISOLONE ACETATE 80 MG: 80 INJECTION, SUSPENSION INTRA-ARTICULAR; INTRALESIONAL; INTRAMUSCULAR; SOFT TISSUE at 12:51

## 2019-03-15 ASSESSMENT — ENCOUNTER SYMPTOMS
ABDOMINAL PAIN: 0
BACK PAIN: 1
RHINORRHEA: 0
COUGH: 1
SORE THROAT: 0
VOMITING: 0
NAUSEA: 0
SINUS PRESSURE: 0
SHORTNESS OF BREATH: 0
DIARRHEA: 0
CONSTIPATION: 0
BLOOD IN STOOL: 0
WHEEZING: 0

## 2019-03-18 ENCOUNTER — TELEPHONE (OUTPATIENT)
Dept: INTERNAL MEDICINE CLINIC | Age: 79
End: 2019-03-18

## 2019-04-15 RX ORDER — ROSUVASTATIN CALCIUM 40 MG/1
TABLET, COATED ORAL
Qty: 90 TABLET | Refills: 0 | Status: SHIPPED | OUTPATIENT
Start: 2019-04-15 | End: 2019-07-15 | Stop reason: SDUPTHER

## 2019-04-16 ENCOUNTER — OFFICE VISIT (OUTPATIENT)
Dept: INTERNAL MEDICINE CLINIC | Age: 79
End: 2019-04-16
Payer: MEDICARE

## 2019-04-16 VITALS
DIASTOLIC BLOOD PRESSURE: 82 MMHG | SYSTOLIC BLOOD PRESSURE: 134 MMHG | BODY MASS INDEX: 33.64 KG/M2 | HEART RATE: 86 BPM | OXYGEN SATURATION: 91 % | HEIGHT: 61 IN | WEIGHT: 178.2 LBS

## 2019-04-16 DIAGNOSIS — F41.9 ANXIETY: ICD-10-CM

## 2019-04-16 DIAGNOSIS — R91.1 RIGHT LOWER LOBE PULMONARY NODULE: ICD-10-CM

## 2019-04-16 DIAGNOSIS — I10 ESSENTIAL HYPERTENSION, BENIGN: ICD-10-CM

## 2019-04-16 DIAGNOSIS — E08.21 DIABETES MELLITUS DUE TO UNDERLYING CONDITION WITH DIABETIC NEPHROPATHY, WITHOUT LONG-TERM CURRENT USE OF INSULIN (HCC): ICD-10-CM

## 2019-04-16 DIAGNOSIS — M48.062 SPINAL STENOSIS OF LUMBAR REGION WITH NEUROGENIC CLAUDICATION: ICD-10-CM

## 2019-04-16 DIAGNOSIS — E78.00 PURE HYPERCHOLESTEROLEMIA: ICD-10-CM

## 2019-04-16 PROBLEM — J06.9 VIRAL UPPER RESPIRATORY TRACT INFECTION: Status: RESOLVED | Noted: 2019-02-26 | Resolved: 2019-04-16

## 2019-04-16 PROCEDURE — 99214 OFFICE O/P EST MOD 30 MIN: CPT | Performed by: INTERNAL MEDICINE

## 2019-04-16 PROCEDURE — 3288F FALL RISK ASSESSMENT DOCD: CPT | Performed by: INTERNAL MEDICINE

## 2019-04-16 PROCEDURE — G8510 SCR DEP NEG, NO PLAN REQD: HCPCS | Performed by: INTERNAL MEDICINE

## 2019-04-16 RX ORDER — MELOXICAM 15 MG/1
15 TABLET ORAL
Qty: 30 TABLET | Refills: 3 | Status: ON HOLD | OUTPATIENT
Start: 2019-04-16 | End: 2019-10-16 | Stop reason: HOSPADM

## 2019-04-16 ASSESSMENT — ENCOUNTER SYMPTOMS
CHEST TIGHTNESS: 0
RESPIRATORY NEGATIVE: 1
SHORTNESS OF BREATH: 0
GASTROINTESTINAL NEGATIVE: 1
WHEEZING: 0

## 2019-04-16 ASSESSMENT — PATIENT HEALTH QUESTIONNAIRE - PHQ9
SUM OF ALL RESPONSES TO PHQ QUESTIONS 1-9: 0
1. LITTLE INTEREST OR PLEASURE IN DOING THINGS: 0
2. FEELING DOWN, DEPRESSED OR HOPELESS: 0
SUM OF ALL RESPONSES TO PHQ QUESTIONS 1-9: 0
SUM OF ALL RESPONSES TO PHQ9 QUESTIONS 1 & 2: 0

## 2019-04-16 NOTE — PATIENT INSTRUCTIONS
Patient Self-Management Goal for Chronic Condition  Goal: I will take all medications as prescribed by my doctor, and I will call the office if I am having any medication problems. Barriers to success: none  Plan for overcoming my barriers: N/A     Confidence: 10/10  Date goal set: 04/16/2019  Date goal attained:     Patient Self-Management Goal for Chronic Condition  Goal: I will take all medications as prescribed by my doctor, and I will call the office if I am having any medication problems. Barriers to success: none  Plan for overcoming my barriers: N/A     Confidence: 10/10  Date goal set: 04/16/2019  Date goal attained:     BP Readings from Last 2 Encounters:   10/02/18 132/70   06/08/18 126/76     Hemoglobin A1C (%)   Date Value   06/08/2018 6.5     Microscopic Examination (no units)   Date Value   02/26/2018 YES     LDL Calculated (mg/dL)   Date Value   02/22/2018 71              Tobacco use:  Patient  reports that she has never smoked. She has never used smokeless tobacco.    If Smoker - Cessation materials given? No    Is Daily aspirin on medication list?:  Yes    Diabetic retinal exam done? Yes   If yes, document in Health Maintenance. Monofilament placed on counter? Yes    Shoes and socks removed? Yes    BP taken with correct size cuff? Yes   Repeated if > 130/80 Yes     Microalbumin performed if applicable?   Yes

## 2019-04-16 NOTE — PROGRESS NOTES
Subjective:      Patient ID: Jacquie Pearson is a 70 y.o. y.o. female. Chief Complaint   Patient presents with    Hyperlipidemia    Hypertension    Diabetes       HPI    Patient is here for a recheck    Vitals:    04/16/19 0911   BP: 134/82   Pulse: 86   SpO2: 91%       Wt Readings from Last 3 Encounters:   04/16/19 178 lb 3.2 oz (80.8 kg)   03/15/19 182 lb (82.6 kg)   02/26/19 180 lb (81.6 kg)     Her sugar has been good. She is watching her diet. She is taking Januvia and Metformin. Patient is taking blood pressure medication without problem  Patient is taking their cholesterol medication and watching diet without problem. Discussed use, benefit, and side effects of prescribed medications. Barriers to medication compliance addressed. All patient questions answered. Pt voiced understanding. Review of Systems   Constitutional: Negative. HENT: Negative. Respiratory: Negative. Negative for chest tightness, shortness of breath and wheezing. Cardiovascular: Negative. Negative for chest pain, palpitations and leg swelling. Gastrointestinal: Negative. Genitourinary: Negative. Musculoskeletal: Negative for arthralgias and myalgias. Neurological: Negative. Objective:   Physical Exam   Constitutional: She appears well-developed and well-nourished. HENT:   Head: Normocephalic and atraumatic. Neck: Carotid bruit is not present. No thyromegaly present. Cardiovascular: Normal rate, regular rhythm, S1 normal, S2 normal and normal heart sounds. Pulmonary/Chest: Effort normal and breath sounds normal. She has no wheezes. She has no rhonchi. She has no rales. Musculoskeletal: She exhibits no edema.        Assessment:      See Problem List assessment and plan       Plan:     Right lower lobe pulmonary nodule  Repeat CT next month  Order placed    DM (diabetes mellitus) ([facility] [address]  Sugar stable  Continue medication  Lab Results   Component Value Date    LABA1C 6.7 12/11/2018 Lab Results   Component Value Date    .9 02/22/2018         Essential hypertension, benign  BP stable  Continue present treatment      Anxiety  Mood stable  Continue present treatment      Pure hypercholesterolemia  Continue treatment  Doing well    Spinal stenosis of lumbar region with neurogenic claudication  Trial Mobic  Take with food  Continue home exercises      Patient engaged in shared decision making. Information given to evaluateoptions of treatment, understand what is needed and discuss importance of following plan.

## 2019-04-17 ENCOUNTER — TELEPHONE (OUTPATIENT)
Dept: INTERNAL MEDICINE CLINIC | Age: 79
End: 2019-04-17

## 2019-04-17 DIAGNOSIS — I10 ESSENTIAL HYPERTENSION, BENIGN: Primary | ICD-10-CM

## 2019-04-22 ENCOUNTER — TELEPHONE (OUTPATIENT)
Dept: INTERNAL MEDICINE CLINIC | Age: 79
End: 2019-04-22

## 2019-04-22 NOTE — TELEPHONE ENCOUNTER
Patient is calling requesting a refill of glucose blood VI test strips (ONE TOUCH ULTRA TEST) strips  Please advise  Jacki [phone] (home)

## 2019-04-25 ENCOUNTER — TELEPHONE (OUTPATIENT)
Dept: INTERNAL MEDICINE CLINIC | Age: 79
End: 2019-04-25

## 2019-04-25 RX ORDER — LANCETS 30 GAUGE
EACH MISCELLANEOUS
Qty: 200 EACH | Refills: 0 | Status: SHIPPED | OUTPATIENT
Start: 2019-04-25 | End: 2019-07-24

## 2019-04-29 DIAGNOSIS — I10 ESSENTIAL HYPERTENSION, BENIGN: ICD-10-CM

## 2019-04-29 LAB
A/G RATIO: 1.6 (ref 1.1–2.2)
ALBUMIN SERPL-MCNC: 4.1 G/DL (ref 3.4–5)
ALP BLD-CCNC: 105 U/L (ref 40–129)
ALT SERPL-CCNC: 15 U/L (ref 10–40)
ANION GAP SERPL CALCULATED.3IONS-SCNC: 13 MMOL/L (ref 3–16)
AST SERPL-CCNC: 15 U/L (ref 15–37)
BILIRUB SERPL-MCNC: 0.6 MG/DL (ref 0–1)
BUN BLDV-MCNC: 19 MG/DL (ref 7–20)
CALCIUM SERPL-MCNC: 10.4 MG/DL (ref 8.3–10.6)
CHLORIDE BLD-SCNC: 108 MMOL/L (ref 99–110)
CO2: 23 MMOL/L (ref 21–32)
CREAT SERPL-MCNC: 0.9 MG/DL (ref 0.6–1.2)
GFR AFRICAN AMERICAN: >60
GFR NON-AFRICAN AMERICAN: >60
GLOBULIN: 2.5 G/DL
GLUCOSE BLD-MCNC: 158 MG/DL (ref 70–99)
POTASSIUM SERPL-SCNC: 4.2 MMOL/L (ref 3.5–5.1)
SODIUM BLD-SCNC: 144 MMOL/L (ref 136–145)
TOTAL PROTEIN: 6.6 G/DL (ref 6.4–8.2)

## 2019-04-29 RX ORDER — GLIPIZIDE 10 MG/1
TABLET ORAL
Qty: 90 TABLET | Refills: 0 | Status: SHIPPED | OUTPATIENT
Start: 2019-04-29 | End: 2019-07-27 | Stop reason: SDUPTHER

## 2019-04-29 RX ORDER — IRBESARTAN 300 MG/1
TABLET ORAL
Qty: 90 TABLET | Refills: 2 | Status: ON HOLD | OUTPATIENT
Start: 2019-04-29 | End: 2020-07-07

## 2019-04-30 ENCOUNTER — TELEPHONE (OUTPATIENT)
Dept: INTERNAL MEDICINE CLINIC | Age: 79
End: 2019-04-30

## 2019-04-30 NOTE — TELEPHONE ENCOUNTER
John Garcia, APRN - CNP  [id number]             Please let Osiel Clark know that her blood work came back and was stable, her glucose was elevated to 158   Monitor sugar and carbs.

## 2019-05-02 ENCOUNTER — HOSPITAL ENCOUNTER (OUTPATIENT)
Dept: CT IMAGING | Age: 79
Discharge: HOME | End: 2019-05-02
Payer: MEDICARE

## 2019-05-02 DIAGNOSIS — R91.8 PULMONARY NODULES: ICD-10-CM

## 2019-05-02 PROCEDURE — 71260 CT THORAX DX C+: CPT

## 2019-05-02 PROCEDURE — 6360000004 HC RX CONTRAST MEDICATION: Performed by: NURSE PRACTITIONER

## 2019-05-02 RX ADMIN — IOPAMIDOL 80 ML: 755 INJECTION, SOLUTION INTRAVENOUS at 10:43

## 2019-05-03 ENCOUNTER — OFFICE VISIT (OUTPATIENT)
Dept: INTERNAL MEDICINE CLINIC | Age: 79
End: 2019-05-03
Payer: MEDICARE

## 2019-05-03 VITALS
SYSTOLIC BLOOD PRESSURE: 136 MMHG | WEIGHT: 176 LBS | DIASTOLIC BLOOD PRESSURE: 64 MMHG | OXYGEN SATURATION: 96 % | HEART RATE: 64 BPM | BODY MASS INDEX: 33.25 KG/M2

## 2019-05-03 DIAGNOSIS — R91.1 RIGHT LOWER LOBE PULMONARY NODULE: ICD-10-CM

## 2019-05-03 DIAGNOSIS — J18.9 PNEUMONIA OF BOTH LUNGS DUE TO INFECTIOUS ORGANISM, UNSPECIFIED PART OF LUNG: Primary | ICD-10-CM

## 2019-05-03 PROCEDURE — 99214 OFFICE O/P EST MOD 30 MIN: CPT | Performed by: NURSE PRACTITIONER

## 2019-05-03 RX ORDER — AZITHROMYCIN 250 MG/1
250 TABLET, FILM COATED ORAL SEE ADMIN INSTRUCTIONS
Qty: 6 TABLET | Refills: 0 | Status: SHIPPED | OUTPATIENT
Start: 2019-05-03 | End: 2019-05-08

## 2019-05-06 PROBLEM — J18.9 PNEUMONIA OF BOTH LUNGS DUE TO INFECTIOUS ORGANISM: Status: ACTIVE | Noted: 2019-05-06

## 2019-05-06 ASSESSMENT — ENCOUNTER SYMPTOMS
VOMITING: 0
BLOOD IN STOOL: 0
EYE ITCHING: 0
CONSTIPATION: 0
NAUSEA: 0
DIARRHEA: 0
SORE THROAT: 0
WHEEZING: 1
BACK PAIN: 0
COUGH: 1
EYE REDNESS: 0
RHINORRHEA: 0
CHEST TIGHTNESS: 0
SINUS PRESSURE: 0
ABDOMINAL PAIN: 0
SHORTNESS OF BREATH: 0
COLOR CHANGE: 0

## 2019-05-06 NOTE — PROGRESS NOTES
Subjective:      Patient ID: Ghanshyam Patel is a 70 y.o. female. Chief Complaint   Patient presents with    Pneumonia     found yesterday on a CT scan       HPI  Jose Morris is in the office to follow up on PNA found on CT screening  She states that she has been coughing, feeling more short of breath, more winded while exerting herself. She feels that she is wheezing. She has not been taking anything for her symptoms. Review of Systems   Constitutional: Positive for fatigue. Negative for chills and fever. HENT: Negative for congestion, ear pain, postnasal drip, rhinorrhea, sinus pressure, sneezing and sore throat. Eyes: Negative for redness and itching. Respiratory: Positive for cough and wheezing. Negative for chest tightness and shortness of breath. Cardiovascular: Negative for chest pain and palpitations. Gastrointestinal: Negative for abdominal pain, blood in stool, constipation, diarrhea, nausea and vomiting. Endocrine: Negative for cold intolerance and heat intolerance. Genitourinary: Negative for difficulty urinating, dysuria, flank pain, frequency, hematuria and urgency. Musculoskeletal: Negative for arthralgias, back pain, joint swelling and myalgias. Skin: Negative for color change, pallor, rash and wound. Allergic/Immunologic: Negative for environmental allergies and food allergies. Neurological: Negative for dizziness, seizures, syncope, weakness, light-headedness, numbness and headaches. Hematological: Negative for adenopathy. Does not bruise/bleed easily. Psychiatric/Behavioral: Negative for confusion, sleep disturbance and suicidal ideas. The patient is not nervous/anxious and is not hyperactive. Objective:   Physical Exam   Constitutional: She is oriented to person, place, and time. HENT:   Head: Normocephalic and atraumatic.    Right Ear: External ear normal.   Left Ear: External ear normal.   Nose: Nose normal.   Mouth/Throat: Oropharynx is clear and moist.   Neck: Normal range of motion. Neck supple. No JVD present. Cardiovascular: Normal rate, regular rhythm and normal heart sounds. Exam reveals no gallop and no friction rub. No murmur heard. Pulmonary/Chest: Effort normal. No respiratory distress. She has decreased breath sounds in the right middle field, the left middle field and the left lower field. She has wheezes in the right middle field, the right lower field and the left middle field. She exhibits no tenderness. Abdominal: Soft. Bowel sounds are normal. She exhibits no distension and no mass. There is no tenderness. There is no rebound and no guarding. Musculoskeletal: Normal range of motion. She exhibits no edema, tenderness or deformity. Neurological: She is alert and oriented to person, place, and time. Skin: Skin is warm and dry. No rash noted. No erythema. Psychiatric: Judgment normal.       Assessment:      See Problem List assessment and plan       Plan:       Right lower lobe pulmonary nodule  CT reviewed this is stable. Pneumonia of both lungs due to infectious organism  Treat for pna given symptoms and CT   Will repeat xray in one week to follow up. Call if no better               Patient engaged in shared decision making. Information given to evaluate options of treatment, understand what is needed and discuss importance of following plan.

## 2019-05-08 ENCOUNTER — TELEPHONE (OUTPATIENT)
Dept: INTERNAL MEDICINE CLINIC | Age: 79
End: 2019-05-08

## 2019-05-08 DIAGNOSIS — I10 ESSENTIAL HYPERTENSION, BENIGN: ICD-10-CM

## 2019-05-08 RX ORDER — AZITHROMYCIN 250 MG/1
250 TABLET, FILM COATED ORAL SEE ADMIN INSTRUCTIONS
Qty: 6 TABLET | Refills: 0 | Status: CANCELLED | OUTPATIENT
Start: 2019-05-08 | End: 2019-05-13

## 2019-05-08 NOTE — TELEPHONE ENCOUNTER
Cannot refill abx, would recommend different abx  If she is not breathing well, would like to add blood work  Is she wheezing?

## 2019-05-08 NOTE — TELEPHONE ENCOUNTER
Patient called. No answer. LMOM for the patient and advised. She should call back and let us know about the Wheezing.

## 2019-05-13 ENCOUNTER — HOSPITAL ENCOUNTER (OUTPATIENT)
Age: 79
Discharge: HOME | End: 2019-05-13
Payer: MEDICARE

## 2019-05-13 ENCOUNTER — HOSPITAL ENCOUNTER (OUTPATIENT)
Dept: GENERAL RADIOLOGY | Age: 79
Discharge: HOME | End: 2019-05-13
Payer: MEDICARE

## 2019-05-13 DIAGNOSIS — J18.9 PNEUMONIA OF BOTH LUNGS DUE TO INFECTIOUS ORGANISM, UNSPECIFIED PART OF LUNG: ICD-10-CM

## 2019-05-13 PROCEDURE — 71046 X-RAY EXAM CHEST 2 VIEWS: CPT

## 2019-05-16 ENCOUNTER — TELEPHONE (OUTPATIENT)
Dept: INTERNAL MEDICINE CLINIC | Age: 79
End: 2019-05-16

## 2019-05-16 DIAGNOSIS — R06.02 SOB (SHORTNESS OF BREATH): Primary | ICD-10-CM

## 2019-05-16 NOTE — TELEPHONE ENCOUNTER
I believe Jennifer Holcomb was ordering an ECHO  I would also like her to have PFTs with brochodilator and and ABG

## 2019-05-16 NOTE — TELEPHONE ENCOUNTER
Pt called. She is willing to do whatever is needed for testing. She said she has a heavy cough right now, not sure if that has anything to do with it.

## 2019-05-30 ENCOUNTER — TELEPHONE (OUTPATIENT)
Dept: INTERNAL MEDICINE CLINIC | Age: 79
End: 2019-05-30

## 2019-05-30 NOTE — TELEPHONE ENCOUNTER
The pt scheduled her 2 cardiac tests for 6/11  Should she wait until closer to that date to get her blood tests done?

## 2019-06-04 ENCOUNTER — HOSPITAL ENCOUNTER (OUTPATIENT)
Age: 79
Discharge: HOME | End: 2019-06-04
Payer: MEDICARE

## 2019-06-04 VITALS — OXYGEN SATURATION: 92 % | RESPIRATION RATE: 19 BRPM

## 2019-06-04 LAB
BASE EXCESS ARTERIAL: -3.7 MMOL/L (ref -3–3)
CARBOXYHEMOGLOBIN ARTERIAL: 1.2 % (ref 0–1.5)
HCO3 ARTERIAL: 20 MMOL/L (ref 21–29)
HEMOGLOBIN, ART, EXTENDED: 11.2 G/DL
METHEMOGLOBIN ARTERIAL: 0.8 % (ref 0–1.4)
O2 SAT, ARTERIAL: 95 % (ref 93–100)
PCO2 ARTERIAL: 34 MMHG (ref 35–45)
PH ARTERIAL: 7.39 (ref 7.35–7.45)
PO2 ARTERIAL: 84 MMHG (ref 75–108)
TCO2 ARTERIAL: 21 MMOL/L

## 2019-06-04 PROCEDURE — 82803 BLOOD GASES ANY COMBINATION: CPT

## 2019-06-04 PROCEDURE — 36600 WITHDRAWAL OF ARTERIAL BLOOD: CPT

## 2019-06-04 PROCEDURE — 94761 N-INVAS EAR/PLS OXIMETRY MLT: CPT

## 2019-06-04 PROCEDURE — 36415 COLL VENOUS BLD VENIPUNCTURE: CPT

## 2019-06-11 ENCOUNTER — HOSPITAL ENCOUNTER (OUTPATIENT)
Dept: NON INVASIVE DIAGNOSTICS | Age: 79
Discharge: HOME | End: 2019-06-11
Payer: MEDICARE

## 2019-06-11 ENCOUNTER — HOSPITAL ENCOUNTER (OUTPATIENT)
Dept: PULMONOLOGY | Age: 79
Discharge: HOME | End: 2019-06-11
Payer: MEDICARE

## 2019-06-11 DIAGNOSIS — R06.02 SOB (SHORTNESS OF BREATH): ICD-10-CM

## 2019-06-11 LAB
LV EF: 55 %
LVEF MODALITY: NORMAL

## 2019-06-11 PROCEDURE — 6360000002 HC RX W HCPCS: Performed by: INTERNAL MEDICINE

## 2019-06-11 PROCEDURE — 94760 N-INVAS EAR/PLS OXIMETRY 1: CPT

## 2019-06-11 PROCEDURE — 94060 EVALUATION OF WHEEZING: CPT

## 2019-06-11 PROCEDURE — 94664 DEMO&/EVAL PT USE INHALER: CPT

## 2019-06-11 PROCEDURE — 94726 PLETHYSMOGRAPHY LUNG VOLUMES: CPT

## 2019-06-11 PROCEDURE — 94640 AIRWAY INHALATION TREATMENT: CPT

## 2019-06-11 PROCEDURE — 93306 TTE W/DOPPLER COMPLETE: CPT

## 2019-06-11 PROCEDURE — 94729 DIFFUSING CAPACITY: CPT

## 2019-06-11 RX ORDER — ALBUTEROL SULFATE 2.5 MG/3ML
2.5 SOLUTION RESPIRATORY (INHALATION) ONCE
Status: COMPLETED | OUTPATIENT
Start: 2019-06-11 | End: 2019-06-11

## 2019-06-11 RX ADMIN — ALBUTEROL SULFATE 2.5 MG: 2.5 SOLUTION RESPIRATORY (INHALATION) at 14:37

## 2019-06-14 NOTE — PROCEDURES
[address], [address]                               PULMONARY FUNCTION    PATIENT NAME: Ebony Schulte                      :        1940  MED REC NO:   [id number]                          ROOM:  ACCOUNT NO:   [de-identified]                           ADMIT DATE: 2019  PROVIDER:     Yoandy Boateng MD    DATE OF PROCEDURE:  2019    INDICATION:  Shortness of breath. BMI:  31.6. TOBACCO HISTORY:  Never smoked. Spirometry data is acceptable and reproducible. Lung volumes performed via plethysmography. Room air oxygen saturation is 92%. SPIROMETRY:  FEV1 to FVC ratio is 80%. Prebronchodilator FEV1 is 1.12,  which is 58% of predicted. Postbronchodilator FEV1 is 1.36 which is 71%  of predicted for a 21% increase. Prebronchodilator FVC is 1.4, which is  54% of predicted. Postbronchodilator FVC is 1.84 which is 71% of  predicted for 31% increase. Lung volumes showed total lung capacity of 3.04 which is 62% of  predicted. Residual volume is 1.46 which is 64% of predicted. Diffusion capacity is 9.61, which is 45% of predicted. This is not  adjusted for hemoglobin. IMPRESSION:  1. No obstructive defect. 2.  There is a significant response to bronchodilators in small and  large airways. 3.  Moderate restrictive defect is present. 4.  Moderate decrease in diffusion capacity.         Chantelle Szymanski MD    D: 2019 13:30:02       T: 2019 17:28:42     [email or web address]  Job#: [id number]     Doc#: [id number]    CC:

## 2019-06-17 ENCOUNTER — TELEPHONE (OUTPATIENT)
Dept: INTERNAL MEDICINE CLINIC | Age: 79
End: 2019-06-17

## 2019-06-17 NOTE — TELEPHONE ENCOUNTER
Pt had cardiac and pulmonary testing done last week at [facility]..... Can you please call her with results?

## 2019-06-19 ENCOUNTER — TELEPHONE (OUTPATIENT)
Dept: INTERNAL MEDICINE CLINIC | Age: 79
End: 2019-06-19

## 2019-06-19 RX ORDER — ALBUTEROL SULFATE 90 UG/1
2 AEROSOL, METERED RESPIRATORY (INHALATION) 4 TIMES DAILY PRN
Qty: 1 INHALER | Refills: 5 | Status: SHIPPED | OUTPATIENT
Start: 2019-06-19 | End: 2020-07-09

## 2019-06-19 NOTE — TELEPHONE ENCOUNTER
Pt tried to schedule appt with pulmonary specialist, Dr Arsalan Perez. .. Cannot get in until 7/29? Would you prefer she see someone else sooner or wait for Dr Katrina Bernabe?

## 2019-06-19 NOTE — TELEPHONE ENCOUNTER
She can see someone sooner if she would like or can use the prescribed inhaler and see how that helps prior to her appointment

## 2019-06-19 NOTE — TELEPHONE ENCOUNTER
Pt had cardiac/pulmonary testing at [facility] please call her with results when they are in.

## 2019-06-25 ENCOUNTER — TELEPHONE (OUTPATIENT)
Dept: INTERNAL MEDICINE CLINIC | Age: 79
End: 2019-06-25

## 2019-07-01 ENCOUNTER — OFFICE VISIT (OUTPATIENT)
Dept: PULMONOLOGY | Age: 79
End: 2019-07-01
Payer: MEDICARE

## 2019-07-01 VITALS
WEIGHT: 177 LBS | HEIGHT: 62 IN | HEART RATE: 64 BPM | SYSTOLIC BLOOD PRESSURE: 102 MMHG | RESPIRATION RATE: 16 BRPM | OXYGEN SATURATION: 94 % | DIASTOLIC BLOOD PRESSURE: 60 MMHG | BODY MASS INDEX: 32.57 KG/M2

## 2019-07-01 DIAGNOSIS — T78.40XA ALLERGIC STATE, INITIAL ENCOUNTER: ICD-10-CM

## 2019-07-01 DIAGNOSIS — J84.9 ILD (INTERSTITIAL LUNG DISEASE) (HCC): Primary | ICD-10-CM

## 2019-07-01 PROCEDURE — 99205 OFFICE O/P NEW HI 60 MIN: CPT | Performed by: INTERNAL MEDICINE

## 2019-07-01 ASSESSMENT — ENCOUNTER SYMPTOMS
COUGH: 1
SHORTNESS OF BREATH: 1
CHEST TIGHTNESS: 0
WHEEZING: 0
BACK PAIN: 1
EYE REDNESS: 0

## 2019-07-01 NOTE — PROGRESS NOTES
[facility] Pulmonary and Critical Care    Outpatient Note    Subjective:   CHIEF COMPLAINT:   Chief Complaint   Patient presents with    New Patient     ref by      Shortness of Breath     some sob with exertion        HPI:  On 7/1/19   The patient is 70 y.o. female who presents today for a new patient visit for SOB. This is not entirely new, but apparently it is slowly getting worse. In the mornings feels out of breath while doing her usual ADL but this gets better as she sit to have breakfast.    She gets SOB on brisk walking and going up hills. She was diagnosed with walking pneumonia in May. At that time she was tired and out of breath. She has chronic cough with occasional sputum production, which is white in color. There is some wt loss ~ 10 lbsafter getting pneumonia but gained back two lbs     No nasal congestion but has throat congestion. No post nasal drip. Has GERD for about two years. On omeprazole once daily   She has dry eyes, no dryness in the mouth  No hx of joint pain, warmth or stiffness. No hx of rash.       Past Medical History:    Past Medical History:   Diagnosis Date    Diabetes mellitus ([address]     Essential hypertension, benign     Hyperlipidemia     Osteoarthrosis, unspecified whether generalized or localized, unspecified site     Shingles        Social History:    Social History     Tobacco Use   Smoking Status Never Smoker   Smokeless Tobacco Never Used       Family History:  Family History   Problem Relation Age of Onset    Heart Disease Mother     Arthritis Mother     Heart Disease Father        Current Medications:  Current Outpatient Medications on File Prior to Visit   Medication Sig Dispense Refill    metFORMIN (GLUCOPHAGE) 500 MG tablet TAKE 1 TABLET BY MOUTH IN THE MORNING AND 2 TABS NIGHTLY WITH DINNER 270 tablet 0    albuterol sulfate  (90 Base) MCG/ACT inhaler Inhale 2 puffs into the lungs 4 times daily as needed for Shortness of Breath 1 Inhaler 5    SITagliptin (JANUVIA) 100 MG tablet TAKE ONE TABLET BY MOUTH DAILY 90 tablet 0    irbesartan (AVAPRO) 300 MG tablet TAKE ONE TABLET BY MOUTH DAILY 90 tablet 2    glipiZIDE (GLUCOTROL) 10 MG tablet TAKE ONE TABLET BY MOUTH DAILY 90 tablet 0    Lancets MISC Ultra 2 One Touch 200 each 0    blood glucose test strips (ONE TOUCH ULTRA TEST) strip TEST TWO TIMES A DAY AS DIRECTED per E11.9 100 strip 5    meloxicam (MOBIC) 15 MG tablet Take 1 tablet by mouth daily (with breakfast) 30 tablet 3    rosuvastatin (CRESTOR) 40 MG tablet TAKE ONE TABLET BY MOUTH DAILY 90 tablet 0    hydrocortisone 2.5 % cream Apply thin layer to affected area once daily to forehead for up to one week then stop for one week, may use sparingly prn flares 20 g 0    amLODIPine (NORVASC) 10 MG tablet Take 1 tablet by mouth daily 90 tablet 1    traZODone (DESYREL) 100 MG tablet Take 1 tablet by mouth nightly as needed for Sleep 90 tablet 1    ONETOUCH DELICA LANCETS 71Q MISC USE ONE LANCET TO TEST TWICE A DAY per E11.9 100 each 5    Cyanocobalamin (B-12 PO) Take by mouth      IRON PO Take by mouth      tretinoin (RETIN-A) 0.025 % cream Apply pea sized amount to face every third night, then increase to every night as tolerated. 45 g 2    Probiotic Product (PROBIOTIC ACIDOPHILUS) CAPS Take 1 capsule by mouth daily      VITAMIN D, CHOLECALCIFEROL, PO Take 1 tablet by mouth daily      Blood Glucose Monitoring Suppl (MELINDA BREEZE 2 SYSTEM) W/DEVICE KIT One machine one time 1 kit 0    Omeprazole 20 MG TBEC Take 1 tablet by mouth daily as needed.  vitamin E 1000 UNITS capsule Take 1,000 Units by mouth daily.  Blood Glucose Monitoring Suppl (SignalDemandEZE MONITOR) KIT by Does not apply route See Admin Instructions. Test strips. Test twice daily.  60 kit 6    escitalopram (LEXAPRO) 10 MG tablet Take 1 tablet by mouth daily 90 tablet 0     No current facility-administered medications on file prior to visit. REVIEW OF SYSTEMS:    Review of Systems   Constitutional: Negative for chills and fever. HENT: Negative for postnasal drip. Eyes: Negative for redness. Dry eyes   Respiratory: Positive for cough and shortness of breath (on exertion). Negative for chest tightness and wheezing. Cardiovascular: Negative for chest pain, palpitations and leg swelling. Gastrointestinal:        GERD   Musculoskeletal: Positive for back pain. Negative for arthralgias and joint swelling. Skin: Negative for rash. Neurological: Negative for weakness. Psychiatric/Behavioral: The patient is not nervous/anxious. All other systems reviewed and are negative. Objective:   PHYSICAL EXAM:        VITALS:  /60 (Site: Right Upper Arm, Position: Sitting, Cuff Size: Large Adult)   Pulse 64   Resp 16   Ht 5' 2\" (1.575 m)   Wt 177 lb (80.3 kg)   SpO2 94%   Breastfeeding? No   BMI 32.37 kg/m²     Physical Exam   Constitutional: She is oriented to person, place, and time. She appears well-developed and well-nourished. HENT:   Head: Normocephalic and atraumatic. Eyes: Pupils are equal, round, and reactive to light. EOM are normal.   Neck: Neck supple. No JVD present. Cardiovascular: Normal rate and regular rhythm. Exam reveals no gallop and no friction rub. No murmur heard. Pulmonary/Chest: Effort normal. No stridor. No respiratory distress. She has no wheezes. She has rales (bilateral). Abdominal: Soft. Bowel sounds are normal.   Musculoskeletal: She exhibits no edema or deformity. Neurological: She is alert and oriented to person, place, and time. Skin: Skin is warm and dry. Psychiatric: She has a normal mood and affect. Her behavior is normal.   Vitals reviewed.       DATA:    CT chest with contrast       HISTORY: Pulmonary nodules.       COMPARISON: February 7, 2019.       Individualized dose optimization technique was used in order to meet ALARA standards for radiation 31.6.     TOBACCO HISTORY:  Never smoked.     Spirometry data is acceptable and reproducible.     Lung volumes performed via plethysmography.     Room air oxygen saturation is 92%.     SPIROMETRY:  FEV1 to FVC ratio is 80%. Prebronchodilator FEV1 is 1.12,  which is 58% of predicted. Postbronchodilator FEV1 is 1.36 which is 71%  of predicted for a 21% increase. Prebronchodilator FVC is 1.4, which is  54% of predicted. Postbronchodilator FVC is 1.84 which is 71% of  predicted for 31% increase.     Lung volumes showed total lung capacity of 3.04 which is 62% of  predicted. Residual volume is 1.46 which is 64% of predicted.     Diffusion capacity is 9.61, which is 45% of predicted. This is not  adjusted for hemoglobin.     IMPRESSION:  1. No obstructive defect. 2.  There is a significant response to bronchodilators in small and  large airways. 3.  Moderate restrictive defect is present. 4.  Moderate decrease in diffusion capacity       Assessment:      Diagnosis Orders   1. ILD (interstitial lung disease) (HCC)  HYPERSENSITIVITY PNEUMONITIS EXTENDED PANEL    GIUSEPPE    Rheumatoid Factor    Sedimentation Rate    External Referral To Rheumatology    Anais 2 - anti SSA & anti SSB   2. Allergic state, initial encounter   HYPERSENSITIVITY PNEUMONITIS EXTENDED PANEL       Plan:   70years old female is here for evaluation of shortness of breath on exertion, apparently slowly getting worse associated with chronic cough. Pulse ox at rest was 97%, however while walking to drop down to 87% on minute 3 and recovered back with 2 L of oxygen  CT scans of chest on 2/7 and 5/2/2019 reviewed. There is significant significant ILD, however it was stable over 3 months. This is consistent with the PFT findings of moderate restriction and moderate reduction in diffusion capacity  CT scan abdomen on 3/16/16 was also reviewed, there was mild interstitial changes at that time.       There is no specific exposure that she or her family can remember  She is not on chronic medication that is known to cause ILD  She does not have pets  Has not been raised or lived in a farm, and no hx suggestive of HP  She is retired. Used to do office work  There is no hx of joints pain or stiffness. No rash or red eyes. However she has hx of dry eyes. 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   Consider prednisone if BAL is negative   Discussed with the Son Dr Nereyda Khan

## 2019-07-02 PROBLEM — J84.9 ILD (INTERSTITIAL LUNG DISEASE) (HCC): Status: ACTIVE | Noted: 2019-07-02

## 2019-07-05 DIAGNOSIS — T78.40XA ALLERGIC STATE, INITIAL ENCOUNTER: ICD-10-CM

## 2019-07-05 DIAGNOSIS — J84.9 ILD (INTERSTITIAL LUNG DISEASE) (HCC): ICD-10-CM

## 2019-07-05 LAB
RHEUMATOID FACTOR: <10 IU/ML
SEDIMENTATION RATE, ERYTHROCYTE: 31 MM/HR (ref 0–30)

## 2019-07-06 LAB — ANTI-NUCLEAR ANTIBODY (ANA): NEGATIVE

## 2019-07-08 ENCOUNTER — TELEPHONE (OUTPATIENT)
Dept: PULMONOLOGY | Age: 79
End: 2019-07-08

## 2019-07-08 NOTE — TELEPHONE ENCOUNTER
Chacha Zhu states she was just here to see you on July 1, she c/o  a deep cough since then and can not get phlegm up. She denies any other symptoms such as chest pain, wheezing, fever, vomiting, or diarrhea. States she had always had SOB. She has not taken any of the counters. She uses [facility] at [employer].   Please Advise

## 2019-07-10 LAB
ALLERGEN BEEF: <0.1 KU/L
ALLERGEN PHOMA BETAE: <0.1 KU/L
ALLERGEN PORK: <0.1 KU/L
ALLERGEN SEE NOTE: NORMAL
ALLERGEN, FEATHER MIX: NEGATIVE KU/L
ASPERGILLUS FLAVUS ANTIBODIES: NORMAL
ASPERGILLUS FUMIGATUS #1: NORMAL
ASPERGILLUS FUMIGATUS #2: NORMAL
ASPERGILLUS FUMIGATUS #3: NORMAL
ASPERGILLUS FUMIGATUS #6: NORMAL
AUREOBASIDIUM PULLULANS: NORMAL
MICROPOLYSPORA FAENI: NORMAL
PIGEON SERUM ABS: NORMAL
SACCHAROMONOSPORA VIRIDIS: NORMAL
THERMOACTINOMYCES CANDIDUS AB: NORMAL
THERMOACTINOMYCES VULGARIS #1: NORMAL

## 2019-07-11 ENCOUNTER — TELEPHONE (OUTPATIENT)
Dept: INTERNAL MEDICINE CLINIC | Age: 79
End: 2019-07-11

## 2019-07-11 ENCOUNTER — OFFICE VISIT (OUTPATIENT)
Dept: INTERNAL MEDICINE CLINIC | Age: 79
End: 2019-07-11
Payer: MEDICARE

## 2019-07-11 VITALS
OXYGEN SATURATION: 95 % | HEART RATE: 67 BPM | SYSTOLIC BLOOD PRESSURE: 122 MMHG | TEMPERATURE: 98.6 F | DIASTOLIC BLOOD PRESSURE: 60 MMHG

## 2019-07-11 DIAGNOSIS — J40 BRONCHITIS: ICD-10-CM

## 2019-07-11 PROCEDURE — 99213 OFFICE O/P EST LOW 20 MIN: CPT | Performed by: NURSE PRACTITIONER

## 2019-07-11 RX ORDER — METHYLPREDNISOLONE 4 MG/1
TABLET ORAL
Qty: 1 KIT | Refills: 0 | Status: SHIPPED | OUTPATIENT
Start: 2019-07-11 | End: 2019-07-17

## 2019-07-11 RX ORDER — BENZONATATE 200 MG/1
200 CAPSULE ORAL 3 TIMES DAILY PRN
Qty: 30 CAPSULE | Refills: 0 | Status: SHIPPED | OUTPATIENT
Start: 2019-07-11 | End: 2019-07-18

## 2019-07-11 RX ORDER — DEXTROMETHORPHAN HYDROBROMIDE AND PROMETHAZINE HYDROCHLORIDE 15; 6.25 MG/5ML; MG/5ML
5 SYRUP ORAL 4 TIMES DAILY PRN
Qty: 180 ML | Refills: 0 | Status: SHIPPED | OUTPATIENT
Start: 2019-07-11 | End: 2019-07-18

## 2019-07-11 ASSESSMENT — ENCOUNTER SYMPTOMS
ABDOMINAL PAIN: 0
CONSTIPATION: 0
DIARRHEA: 0
WHEEZING: 1
RHINORRHEA: 0
SINUS PAIN: 0
COUGH: 1
COLOR CHANGE: 0
SHORTNESS OF BREATH: 1
BACK PAIN: 0
SINUS PRESSURE: 0

## 2019-07-15 RX ORDER — ROSUVASTATIN CALCIUM 40 MG/1
TABLET, COATED ORAL
Qty: 90 TABLET | Refills: 0 | Status: SHIPPED | OUTPATIENT
Start: 2019-07-15 | End: 2019-10-06 | Stop reason: SDUPTHER

## 2019-07-24 ENCOUNTER — TELEPHONE (OUTPATIENT)
Dept: INTERNAL MEDICINE CLINIC | Age: 79
End: 2019-07-24

## 2019-07-24 ENCOUNTER — OFFICE VISIT (OUTPATIENT)
Dept: RHEUMATOLOGY | Age: 79
End: 2019-07-24
Payer: MEDICARE

## 2019-07-24 VITALS
BODY MASS INDEX: 32.02 KG/M2 | HEIGHT: 62 IN | DIASTOLIC BLOOD PRESSURE: 76 MMHG | WEIGHT: 174 LBS | SYSTOLIC BLOOD PRESSURE: 120 MMHG

## 2019-07-24 DIAGNOSIS — Z13.89 SCREENING FOR RHEUMATIC DISORDER: Primary | ICD-10-CM

## 2019-07-24 DIAGNOSIS — H04.123 DRY EYES: ICD-10-CM

## 2019-07-24 DIAGNOSIS — J84.9 ILD (INTERSTITIAL LUNG DISEASE) (HCC): ICD-10-CM

## 2019-07-24 PROCEDURE — 99205 OFFICE O/P NEW HI 60 MIN: CPT | Performed by: INTERNAL MEDICINE

## 2019-07-24 RX ORDER — LANCETS 30 GAUGE
1 EACH MISCELLANEOUS 2 TIMES DAILY
Qty: 100 EACH | Refills: 5 | Status: SHIPPED | OUTPATIENT
Start: 2019-07-24 | End: 2021-02-23

## 2019-07-24 RX ORDER — GLUCOSAMINE HCL/CHONDROITIN SU 500-400 MG
CAPSULE ORAL
Qty: 100 STRIP | Refills: 2 | Status: SHIPPED | OUTPATIENT
Start: 2019-07-24 | End: 2021-11-30 | Stop reason: ALTCHOICE

## 2019-07-24 NOTE — TELEPHONE ENCOUNTER
Pt got a new One Touch Verio instead of the One Touch Ultra  She needs test strips and lancets for it

## 2019-07-24 NOTE — PROGRESS NOTES
understanding and agrees with the management plan. I reviewed patient's history, referral documents and electronic medical records. Copy of consult note is being routed electronically/faxed to referring physician. #######################################################################    REASON FOR CONSULTATION:  Patient is being seen at the request of  Rohit Rust MD / Leroy Andino MD for evaluation of rheumatic disease-patient has interstitial lung disease. HISTORY OF PRESENT ILLNESS:  70 y.o. female has been experiencing long-standing exertional shortness of breath-began 5 or 6 years ago, progressively getting worse over time, associated with dry cough, work-up showed interstitial lung disease-fibrotic ILD, developed groundglass opacities when she had lower respiratory tract infection and worsening respiratory symptoms in May 2019. She is also experiencing dry eyes for last 6 months, controlled with artificial teardrops. She is referred here for evaluation of autoimmune diseases. other than intercurrent mild arthralgias in the lower back, hips and knees, she does not have daily joint symptoms. No pain, swelling or stiffness. Denies any rashes. No focal muscle weakness. Denies weight loss, objective fever, skin rashes or skin thickening, photosensitivity Raynauds, focal alopecia, recurrent ocular congestion, dry mouth, or mucosal ulcers, tinnitus. Denies chest pain, palpitations, cough, pleurisy, dysphagia, or features of inflammatory bowel diseases. No h/o blood clots or bleeding disorders. No renal or genitourinary problems. No focal weakness or persistent paresthesia. No exposures. All other ROS are negative.     Past Medical History:   Diagnosis Date    [address] mellitus [address] [address]     Essential hypertension, benign     Hyperlipidemia     Osteoarthrosis, unspecified whether generalized or localized, unspecified site     Shingles      Past Surgical History:   Procedure

## 2019-07-25 ENCOUNTER — OFFICE VISIT (OUTPATIENT)
Dept: PULMONOLOGY | Age: 79
End: 2019-07-25
Payer: MEDICARE

## 2019-07-25 VITALS
HEIGHT: 62 IN | SYSTOLIC BLOOD PRESSURE: 112 MMHG | HEART RATE: 71 BPM | WEIGHT: 176 LBS | BODY MASS INDEX: 32.39 KG/M2 | OXYGEN SATURATION: 97 % | RESPIRATION RATE: 16 BRPM | DIASTOLIC BLOOD PRESSURE: 60 MMHG

## 2019-07-25 DIAGNOSIS — J84.9 ILD (INTERSTITIAL LUNG DISEASE) (HCC): Primary | ICD-10-CM

## 2019-07-25 PROCEDURE — 99214 OFFICE O/P EST MOD 30 MIN: CPT | Performed by: INTERNAL MEDICINE

## 2019-07-25 ASSESSMENT — ENCOUNTER SYMPTOMS
BACK PAIN: 1
WHEEZING: 0
COUGH: 1
EYE REDNESS: 0
CHEST TIGHTNESS: 0
SHORTNESS OF BREATH: 1

## 2019-07-25 NOTE — PROGRESS NOTES
Dispense Refill    blood glucose monitor strips Test 2 times a day & as needed for symptoms of irregular blood glucose. One Touch Verio strips 100 strip 2    Lancets MISC 1 each by Does not apply route 2 times daily 100 each 5    rosuvastatin (CRESTOR) 40 MG tablet TAKE ONE TABLET BY MOUTH DAILY 90 tablet 0    metFORMIN (GLUCOPHAGE) 500 MG tablet TAKE 1 TABLET BY MOUTH IN THE MORNING AND 2 TABS NIGHTLY WITH DINNER 270 tablet 0    albuterol sulfate  (90 Base) MCG/ACT inhaler Inhale 2 puffs into the lungs 4 times daily as needed for Shortness of Breath 1 Inhaler 5    SITagliptin (JANUVIA) 100 MG tablet TAKE ONE TABLET BY MOUTH DAILY 90 tablet 0    irbesartan (AVAPRO) 300 MG tablet TAKE ONE TABLET BY MOUTH DAILY 90 tablet 2    glipiZIDE (GLUCOTROL) 10 MG tablet TAKE ONE TABLET BY MOUTH DAILY 90 tablet 0    meloxicam (MOBIC) 15 MG tablet Take 1 tablet by mouth daily (with breakfast) 30 tablet 3    hydrocortisone 2.5 % cream Apply thin layer to affected area once daily to forehead for up to one week then stop for one week, may use sparingly prn flares 20 g 0    amLODIPine (NORVASC) 10 MG tablet Take 1 tablet by mouth daily 90 tablet 1    traZODone (DESYREL) 100 MG tablet Take 1 tablet by mouth nightly as needed for Sleep 90 tablet 1    ONETOUCH DELICA LANCETS 23U MISC USE ONE LANCET TO TEST TWICE A DAY per E11.9 100 each 5    Cyanocobalamin (B-12 PO) Take by mouth      IRON PO Take by mouth      tretinoin (RETIN-A) 0.025 % cream Apply pea sized amount to face every third night, then increase to every night as tolerated. 45 g 2    Probiotic Product (PROBIOTIC ACIDOPHILUS) CAPS Take 1 capsule by mouth daily      VITAMIN D, CHOLECALCIFEROL, PO Take 1 tablet by mouth daily      Blood Glucose Monitoring Suppl (Soulstice EndeavorsE 2 SYSTEM) W/DEVICE KIT One machine one time 1 kit 0    Omeprazole 20 MG TBEC Take 1 tablet by mouth daily as needed.         vitamin E 1000 UNITS capsule Take 1,000 Units by mouth rate are negative. Appreciate rheumatology input. There is no evidence of active rheumatic disease. She does have dry mouth. Anti SSA, anti SSB and CK are pending. 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   If BAL is negative I will start her on prednisone and closely monitor spirometry and 6MWT  May need O2 with exertion

## 2019-07-26 DIAGNOSIS — J84.9 ILD (INTERSTITIAL LUNG DISEASE) (HCC): ICD-10-CM

## 2019-07-26 DIAGNOSIS — H04.123 DRY EYES: ICD-10-CM

## 2019-07-26 LAB — TOTAL CK: 79 U/L (ref 26–192)

## 2019-07-27 LAB
ANTI-SS-A IGG: <0.2 AI (ref 0–0.9)
ANTI-SS-B IGG: <0.2 AI (ref 0–0.9)

## 2019-07-29 RX ORDER — GLIPIZIDE 10 MG/1
TABLET ORAL
Qty: 90 TABLET | Refills: 0 | Status: SHIPPED | OUTPATIENT
Start: 2019-07-29 | End: 2019-07-31

## 2019-07-31 RX ORDER — GLIPIZIDE 10 MG/1
TABLET ORAL
Qty: 90 TABLET | Refills: 0 | Status: SHIPPED | OUTPATIENT
Start: 2019-07-31 | End: 2020-02-13 | Stop reason: SDUPTHER

## 2019-08-01 ENCOUNTER — APPOINTMENT (OUTPATIENT)
Dept: GENERAL RADIOLOGY | Age: 79
End: 2019-08-01
Attending: INTERNAL MEDICINE
Payer: MEDICARE

## 2019-08-01 ENCOUNTER — HOSPITAL ENCOUNTER (OUTPATIENT)
Age: 79
Setting detail: OUTPATIENT SURGERY
Discharge: HOME | End: 2019-08-01
Attending: INTERNAL MEDICINE | Admitting: INTERNAL MEDICINE
Payer: MEDICARE

## 2019-08-01 VITALS
RESPIRATION RATE: 20 BRPM | OXYGEN SATURATION: 94 % | BODY MASS INDEX: 31.28 KG/M2 | HEART RATE: 65 BPM | DIASTOLIC BLOOD PRESSURE: 51 MMHG | TEMPERATURE: 98.5 F | SYSTOLIC BLOOD PRESSURE: 127 MMHG | WEIGHT: 170 LBS | HEIGHT: 62 IN

## 2019-08-01 LAB
APPEARANCE BAL (LAVAGE): CLEAR
CLOT EVALUATION BAL: ABNORMAL
COLOR LAVAGE: COLORLESS
LYMPHOCYTES, BAL: 2 % (ref 5–10)
MACROPHAGES, BAL: 36 % (ref 90–95)
MONOCYTES, BAL: 7 %
NUMBER OF CELLS COUNTED BAL (LAVAGE): 200
RBC, BAL: 239 /CUMM
SEGMENTED NEUTROPHILS, BAL: 55 % (ref 5–10)
WBC/EPI CELLS BAL: 33 /CUMM

## 2019-08-01 PROCEDURE — 87116 MYCOBACTERIA CULTURE: CPT

## 2019-08-01 PROCEDURE — 87015 SPECIMEN INFECT AGNT CONCNTJ: CPT

## 2019-08-01 PROCEDURE — 87305 ASPERGILLUS AG IA: CPT

## 2019-08-01 PROCEDURE — 71045 X-RAY EXAM CHEST 1 VIEW: CPT

## 2019-08-01 PROCEDURE — 88305 TISSUE EXAM BY PATHOLOGIST: CPT

## 2019-08-01 PROCEDURE — 7100000010 HC PHASE II RECOVERY - FIRST 15 MIN: Performed by: INTERNAL MEDICINE

## 2019-08-01 PROCEDURE — 7100000011 HC PHASE II RECOVERY - ADDTL 15 MIN: Performed by: INTERNAL MEDICINE

## 2019-08-01 PROCEDURE — 87206 SMEAR FLUORESCENT/ACID STAI: CPT

## 2019-08-01 PROCEDURE — 87633 RESP VIRUS 12-25 TARGETS: CPT

## 2019-08-01 PROCEDURE — 87252 VIRUS INOCULATION TISSUE: CPT

## 2019-08-01 PROCEDURE — 87641 MR-STAPH DNA AMP PROBE: CPT

## 2019-08-01 PROCEDURE — 87102 FUNGUS ISOLATION CULTURE: CPT

## 2019-08-01 PROCEDURE — 87486 CHLMYD PNEUM DNA AMP PROBE: CPT

## 2019-08-01 PROCEDURE — 87205 SMEAR GRAM STAIN: CPT

## 2019-08-01 PROCEDURE — 6360000002 HC RX W HCPCS: Performed by: INTERNAL MEDICINE

## 2019-08-01 PROCEDURE — 87541 LEGION PNEUMO DNA AMP PROB: CPT

## 2019-08-01 PROCEDURE — 31624 DX BRONCHOSCOPE/LAVAGE: CPT | Performed by: INTERNAL MEDICINE

## 2019-08-01 PROCEDURE — 87581 M.PNEUMON DNA AMP PROBE: CPT

## 2019-08-01 PROCEDURE — 89051 BODY FLUID CELL COUNT: CPT

## 2019-08-01 PROCEDURE — 99153 MOD SED SAME PHYS/QHP EA: CPT | Performed by: INTERNAL MEDICINE

## 2019-08-01 PROCEDURE — 87070 CULTURE OTHR SPECIMN AEROBIC: CPT

## 2019-08-01 PROCEDURE — 87798 DETECT AGENT NOS DNA AMP: CPT

## 2019-08-01 PROCEDURE — 87254 VIRUS INOCULATION SHELL VIA: CPT

## 2019-08-01 PROCEDURE — 88112 CYTOPATH CELL ENHANCE TECH: CPT

## 2019-08-01 PROCEDURE — 3209999900 FLUORO FOR SURGICAL PROCEDURES

## 2019-08-01 PROCEDURE — 31628 BRONCHOSCOPY/LUNG BX EACH: CPT | Performed by: INTERNAL MEDICINE

## 2019-08-01 PROCEDURE — 87651 STREP A DNA AMP PROBE: CPT

## 2019-08-01 PROCEDURE — 87253 VIRUS INOCULATE TISSUE ADDL: CPT

## 2019-08-01 PROCEDURE — 2709999900 HC NON-CHARGEABLE SUPPLY: Performed by: INTERNAL MEDICINE

## 2019-08-01 PROCEDURE — 3609010800 HC BRONCHOSCOPY ALVEOLAR LAVAGE: Performed by: INTERNAL MEDICINE

## 2019-08-01 PROCEDURE — 99152 MOD SED SAME PHYS/QHP 5/>YRS: CPT | Performed by: INTERNAL MEDICINE

## 2019-08-01 RX ORDER — FENTANYL CITRATE 50 UG/ML
INJECTION, SOLUTION INTRAMUSCULAR; INTRAVENOUS PRN
Status: DISCONTINUED | OUTPATIENT
Start: 2019-08-01 | End: 2019-08-01 | Stop reason: ALTCHOICE

## 2019-08-01 RX ORDER — MIDAZOLAM HYDROCHLORIDE 1 MG/ML
INJECTION INTRAMUSCULAR; INTRAVENOUS PRN
Status: DISCONTINUED | OUTPATIENT
Start: 2019-08-01 | End: 2019-08-01 | Stop reason: ALTCHOICE

## 2019-08-01 ASSESSMENT — PAIN - FUNCTIONAL ASSESSMENT
PAIN_FUNCTIONAL_ASSESSMENT: 0-10
PAIN_FUNCTIONAL_ASSESSMENT: FACES
PAIN_FUNCTIONAL_ASSESSMENT: 0-10
PAIN_FUNCTIONAL_ASSESSMENT: FACES

## 2019-08-01 ASSESSMENT — PAIN SCALES - GENERAL
PAINLEVEL_OUTOF10: 0

## 2019-08-01 ASSESSMENT — ENCOUNTER SYMPTOMS
COUGH: 1
SHORTNESS OF BREATH: 1

## 2019-08-03 LAB
CULTURE, RESPIRATORY: NORMAL
GRAM STAIN RESULT: NORMAL

## 2019-08-04 LAB
ASPERGILLUS GALACTO AG: NEGATIVE
ASPERGILLUS GALACTO INDEX: 0.1
FINAL REPORT: NORMAL
PRELIMINARY: NORMAL

## 2019-08-05 ENCOUNTER — TELEPHONE (OUTPATIENT)
Dept: PULMONOLOGY | Age: 79
End: 2019-08-05

## 2019-08-05 LAB
FINAL REPORT: NORMAL
PRELIMINARY: NORMAL

## 2019-08-05 RX ORDER — PREDNISONE 20 MG/1
20 TABLET ORAL DAILY
Qty: 30 TABLET | Refills: 1 | Status: SHIPPED | OUTPATIENT
Start: 2019-08-05 | End: 2019-09-10 | Stop reason: SDUPTHER

## 2019-08-06 LAB — MISCELLANEOUS LAB TEST ORDER: NORMAL

## 2019-08-07 LAB — REPORT: NORMAL

## 2019-08-13 ENCOUNTER — TELEPHONE (OUTPATIENT)
Dept: INTERNAL MEDICINE CLINIC | Age: 79
End: 2019-08-13

## 2019-08-13 RX ORDER — SITAGLIPTIN 100 MG/1
TABLET, FILM COATED ORAL
Qty: 30 TABLET | Refills: 0 | Status: SHIPPED | OUTPATIENT
Start: 2019-08-13 | End: 2019-11-07 | Stop reason: SDUPTHER

## 2019-08-13 NOTE — TELEPHONE ENCOUNTER
Natasha with [employer] calling to ask if you can please change Januvia to a 90 day supply instead of 30. [employer] phone is [phone].

## 2019-08-14 LAB
FINAL REPORT: NORMAL
PRELIMINARY: NORMAL

## 2019-09-02 LAB
FUNGUS (MYCOLOGY) CULTURE: NORMAL
FUNGUS STAIN: NORMAL

## 2019-09-03 ENCOUNTER — OFFICE VISIT (OUTPATIENT)
Dept: INTERNAL MEDICINE CLINIC | Age: 79
End: 2019-09-03
Payer: MEDICARE

## 2019-09-03 VITALS
DIASTOLIC BLOOD PRESSURE: 74 MMHG | HEART RATE: 80 BPM | BODY MASS INDEX: 29.49 KG/M2 | HEIGHT: 65 IN | WEIGHT: 177 LBS | SYSTOLIC BLOOD PRESSURE: 130 MMHG | OXYGEN SATURATION: 96 %

## 2019-09-03 DIAGNOSIS — I10 ESSENTIAL HYPERTENSION, BENIGN: ICD-10-CM

## 2019-09-03 DIAGNOSIS — E08.21 DIABETES MELLITUS DUE TO UNDERLYING CONDITION WITH DIABETIC NEPHROPATHY, WITHOUT LONG-TERM CURRENT USE OF INSULIN (HCC): ICD-10-CM

## 2019-09-03 DIAGNOSIS — Z00.00 ROUTINE GENERAL MEDICAL EXAMINATION AT A HEALTH CARE FACILITY: Primary | ICD-10-CM

## 2019-09-03 DIAGNOSIS — J84.9 ILD (INTERSTITIAL LUNG DISEASE) (HCC): ICD-10-CM

## 2019-09-03 PROBLEM — J40 BRONCHITIS: Status: RESOLVED | Noted: 2019-07-11 | Resolved: 2019-09-03

## 2019-09-03 PROBLEM — J18.9 PNEUMONIA OF BOTH LUNGS DUE TO INFECTIOUS ORGANISM: Status: RESOLVED | Noted: 2019-05-06 | Resolved: 2019-09-03

## 2019-09-03 PROCEDURE — G0439 PPPS, SUBSEQ VISIT: HCPCS | Performed by: INTERNAL MEDICINE

## 2019-09-03 PROCEDURE — 93000 ELECTROCARDIOGRAM COMPLETE: CPT | Performed by: INTERNAL MEDICINE

## 2019-09-03 RX ORDER — ESCITALOPRAM OXALATE 10 MG/1
10 TABLET ORAL DAILY
Qty: 90 TABLET | Refills: 0 | Status: SHIPPED | OUTPATIENT
Start: 2019-09-03 | End: 2020-02-24 | Stop reason: SDUPTHER

## 2019-09-03 ASSESSMENT — ENCOUNTER SYMPTOMS
DIARRHEA: 0
RECTAL PAIN: 0
CONSTIPATION: 0
BLOOD IN STOOL: 0
SORE THROAT: 0
VOMITING: 0
ANAL BLEEDING: 0
WHEEZING: 0
PHOTOPHOBIA: 0
ABDOMINAL PAIN: 0
COUGH: 0
STRIDOR: 0
EYE PAIN: 0
CHOKING: 0
EYE REDNESS: 0
EYE ITCHING: 0
NAUSEA: 0
VOICE CHANGE: 0
SINUS PRESSURE: 0
CHEST TIGHTNESS: 0
BACK PAIN: 0
ABDOMINAL DISTENTION: 0
RHINORRHEA: 0
SHORTNESS OF BREATH: 0
FACIAL SWELLING: 0
TROUBLE SWALLOWING: 0
APNEA: 0
COLOR CHANGE: 0
EYE DISCHARGE: 0

## 2019-09-03 ASSESSMENT — LIFESTYLE VARIABLES
AUDIT-C TOTAL SCORE: 1
HOW OFTEN DO YOU HAVE A DRINK CONTAINING ALCOHOL: 1
HOW MANY STANDARD DRINKS CONTAINING ALCOHOL DO YOU HAVE ON A TYPICAL DAY: 0
AUDIT TOTAL SCORE: 1
HOW OFTEN DURING THE LAST YEAR HAVE YOU BEEN UNABLE TO REMEMBER WHAT HAPPENED THE NIGHT BEFORE BECAUSE YOU HAD BEEN DRINKING: 0
HOW OFTEN DO YOU HAVE SIX OR MORE DRINKS ON ONE OCCASION: 0
HOW OFTEN DURING THE LAST YEAR HAVE YOU NEEDED AN ALCOHOLIC DRINK FIRST THING IN THE MORNING TO GET YOURSELF GOING AFTER A NIGHT OF HEAVY DRINKING: 0
HAVE YOU OR SOMEONE ELSE BEEN INJURED AS A RESULT OF YOUR DRINKING: 0
HOW OFTEN DURING THE LAST YEAR HAVE YOU FAILED TO DO WHAT WAS NORMALLY EXPECTED FROM YOU BECAUSE OF DRINKING: 0
HOW OFTEN DURING THE LAST YEAR HAVE YOU HAD A FEELING OF GUILT OR REMORSE AFTER DRINKING: 0
HAS A RELATIVE, FRIEND, DOCTOR, OR ANOTHER HEALTH PROFESSIONAL EXPRESSED CONCERN ABOUT YOUR DRINKING OR SUGGESTED YOU CUT DOWN: 0
HOW OFTEN DURING THE LAST YEAR HAVE YOU FOUND THAT YOU WERE NOT ABLE TO STOP DRINKING ONCE YOU HAD STARTED: 0

## 2019-09-03 ASSESSMENT — PATIENT HEALTH QUESTIONNAIRE - PHQ9
SUM OF ALL RESPONSES TO PHQ QUESTIONS 1-9: 0
SUM OF ALL RESPONSES TO PHQ QUESTIONS 1-9: 0

## 2019-09-03 NOTE — PROGRESS NOTES
Subjective:      Patient ID: Marilou Pickard is a 70 y.o. female. Chief Complaint   Patient presents with    Medicare AWV       HPI  Patient is here for AWV. Review of Systems   Constitutional: Negative for activity change, appetite change, chills, diaphoresis, fatigue, fever and unexpected weight change. HENT: Negative for congestion, dental problem, drooling, ear discharge, ear pain, facial swelling, hearing loss, mouth sores, nosebleeds, postnasal drip, rhinorrhea, sinus pressure, sneezing, sore throat, tinnitus, trouble swallowing and voice change. Eyes: Negative for photophobia, pain, discharge, redness, itching and visual disturbance. Respiratory: Negative for apnea, cough, choking, chest tightness, shortness of breath, wheezing and stridor. Cardiovascular: Negative for chest pain, palpitations and leg swelling. Gastrointestinal: Negative for abdominal distention, abdominal pain, anal bleeding, blood in stool, constipation, diarrhea, nausea, rectal pain and vomiting. Genitourinary: Negative for decreased urine volume, difficulty urinating, dyspareunia, dysuria, enuresis, flank pain, frequency, genital sores, hematuria, menstrual problem, pelvic pain, urgency, vaginal bleeding, vaginal discharge and vaginal pain. Musculoskeletal: Negative for arthralgias, back pain, gait problem, joint swelling, myalgias, neck pain and neck stiffness. Skin: Negative for color change, pallor, rash and wound. Neurological: Negative for dizziness, tremors, seizures, syncope, facial asymmetry, speech difficulty, weakness, light-headedness, numbness and headaches. Hematological: Negative for adenopathy. Does not bruise/bleed easily. Objective:   Physical Exam   Constitutional: She is oriented to person, place, and time. She appears well-developed and well-nourished. HENT:   Head: Normocephalic and atraumatic.    Right Ear: Tympanic membrane and ear canal normal.   Left Ear: Tympanic membrane and Non-/Non    : 1940 Race: Vern Jacinto is here for Medicare AWV    Screenings for behavioral, psychosocial and functional/safety risks, and cognitive dysfunction are all negative except as indicated below. These results, as well as other patient data from the [address] form, are documented in Flowsheets linked to this Encounter. Allergies   Allergen Reactions    Percocet [Oxycodone-Acetaminophen] Other (See Comments)     ELEVATED BLOOD SUGAR     Prior to Visit Medications    Medication Sig Taking? Authorizing Provider   JANUVIA 100 MG tablet TAKE ONE TABLET BY MOUTH DAILY Yes Luis Daniel Rodriguez MD   predniSONE (DELTASONE) 20 MG tablet Take 1 tablet by mouth daily Yes Sharmaine Calero MD   glipiZIDE (GLUCOTROL) 10 MG tablet TAKE ONE TABLET BY MOUTH DAILY Yes Luis Daniel Rodriguez MD   blood glucose monitor strips Test 2 times a day & as needed for symptoms of irregular blood glucose.  One Touch Verio strips Yes Luis Daniel Rodriguez MD   Lancets MISC 1 each by Does not apply route 2 times daily Yes Luis Daniel Rodriguez MD   rosuvastatin (CRESTOR) 40 MG tablet TAKE ONE TABLET BY MOUTH DAILY Yes Luis Daniel Rodriguez MD   metFORMIN (GLUCOPHAGE) 500 MG tablet TAKE 1 TABLET BY MOUTH IN THE MORNING AND 2 TABS NIGHTLY WITH DINNER Yes Luis Daniel Rodriguez MD   albuterol sulfate  (90 Base) MCG/ACT inhaler Inhale 2 puffs into the lungs 4 times daily as needed for Shortness of Breath Yes Luis Daniel Rodriguez MD   irbesartan (AVAPRO) 300 MG tablet TAKE ONE TABLET BY MOUTH DAILY Yes Luis Daniel Rodriguez MD   meloxicam (MOBIC) 15 MG tablet Take 1 tablet by mouth daily (with breakfast) Yes Luis Daniel Rodriguez MD   hydrocortisone 2.5 % cream Apply thin layer to affected area once daily to forehead for up to one week then stop for one week, may use sparingly prn flares Yes Karen Escobedo DO   amLODIPine (NORVASC) 10 MG tablet Take 1 tablet by mouth daily Yes Alexandre Julio

## 2019-09-03 NOTE — PATIENT INSTRUCTIONS
yourself for good eating behaviors can help you to develop better habits. This is not a reward for weight loss; instead, it is a reward for changing unhealthy behaviors. Do not use food as a reward. Some people find money, clothing, or personal care (eg, a hair cut, manicure, or massage) to be effective rewards. Treat yourself immediately after making better eating choices to reinforce the value of the good behavior. You need to have clear behavior goals, and you must have a time frame for reaching your goals. Reward small changes along the way to your final goal.  Other factors that contribute to successful weight loss -- Changing your behavior involves more than just changing unhealthy eating habits; it also involves finding people around you to support your weight loss, reducing stress, and learning to be strong when tempted by food. Establish a \"krystin\" system -- Having a friend or family member available to provide support and reinforce good behavior is very helpful. The support person needs to understand your goals. Learn to be strong -- Learning to be strong when tempted by food is an important part of losing weight. As an example, you will need to learn how to say \"no\" and continue to say no when urged to eat at parties and social gatherings. Develop strategies for events before you go, such as eating before you go or taking low-calorie snacks and drinks with you. Develop a support system -- Having a support system is helpful when losing weight. This is why many [employer] groups are successful. Family support is also essential; if your family does not support your efforts to lose weight, this can slow your progress or even keep you from losing weight. Positive thinking -- People often have conversations with themselves in their head; these conversations can be positive or negative.  If you eat a piece of cake that was not planned, you may respond by thinking, \"Oh, you stupid idiot, you've blown your diet!\" and as a result, you may eat more cake. A positive thought for the same event could be, \"Well, I ate cake when it was not on my plan. Now I should do something to get back on track. \" A positive approach is much more likely to be successful than a negative one. Reduce stress -- Although stress is a part of everyday life, it can trigger uncontrolled eating in some people. It is important to find a way to get through these difficult times without eating or by eating low-calorie food, like raw vegetables. It may be helpful to imagine a relaxing place that allows you to temporarily escape from stress. With deep breaths and closed eyes, you can imagine this relaxing place for a few minutes. Self-help programs -- Self-help programs like [employer] Watchers®, Overeaters Anonymous®, and Take Off [place] (TOPS)© work for some people. As with all weight loss programs, you are most likely to be successful with these plans if you make long-term changes in how you eat. CHOOSING A DIET -- A calorie is a unit of energy found in food. Your body needs calories to function. The goal of any diet is to burn up more calories than you eat. How quickly you lose weight depends upon several factors, such as your age, gender, and starting weight. Older people have a slower metabolism than young people, so they lose weight more slowly. Men lose more weight than women of similar height and weight when dieting because they use more energy. People who are extremely overweight lose weight more quickly than those who are only mildly overweight. Try not to drink alcohol or drinks with added sugar, and most sweets (candy, cakes, cookies), since they rarely contain important nutrients. Portion-controlled diets -- One simple way to diet is to buy packaged foods, like frozen low-calorie meals or meal-replacement canned drinks.  A typical meal plan for one day may include:  A meal-replacement drink or breakfast bar for breakfast cheese). A relatively low amount of red meat and meat products. Substitute fish or poultry for red meat. For those who drink alcohol, a modest amount (mainly as red wine) may help to protect against cardiovascular disease. A modest amount is up to one (4 ounce) glass per day for women and up to two glasses per day for men. Which diet is best? -- Studies have compared different diets, including:  Very-low-carbohydrate (Atkins)   Macronutrient balance controlling glycemic load (Zone®)   Reduced-calorie (Weight Watchers®)   Very-low-fat (Ornish)  No one diet is \"best\" for weight loss. Any diet will help you to lose weight if you stick with the diet. Therefore, it is important to choose a diet that includes foods you like. Fad diets -- Fad diets often promise quick weight loss (more than 1 to 2 pounds per week) and may claim that you do not need to exercise or give up favorite foods. Some fad diets cost a lot of money, because you have to pay for seminars or pills. Fad diets generally lack any scientific evidence that they are safe and effective, but instead rely on \"before\" and \"after\" photos or testimonials. Diets that sound too good to be true usually are. These plans are a waste of time and money and are not recommended. A doctor, nurse, or nutritionist can help you find a safe and effective way to lose weight and keep it off. WEIGHT LOSS MEDICINES -- Taking a weight loss medicine may be helpful when used in combination with diet, exercise, and lifestyle changes. However, it is important to understand the risks and benefits of these medicines. It is also important to be realistic about your goal weight using a weight loss medicine; you may not reach your \"dream\" weight, but you may be able to reduce your risk of diabetes or heart disease.    Weight loss medicines may be recommended for people who have not been able to lose weight with diet and exercise who have a:  BMI of 30 or more    BMI between 27 and stretch, allowing you to eat more food. The body absorbs fewer calories, since food bypasses most of the stomach as well as the upper small intestine. This new arrangement seems to decrease your appetite and change how you break down foods by changing the release of various hormones. Gastric bypass can be performed as open surgery (through an incision on the abdomen) or laparoscopically, which uses smaller incisions and smaller instruments. Both the laparoscopic and open techniques have risks and benefits. You and your surgeon should work together to decide which surgery, if any, is right for you. Gastric bypass has a high success rate, and people lose an average of 62 to 68 percent of their excess body weight in the first year. Weight loss typically levels off after one to two years, with an overall excess weight loss between 50 and 75 percent. For a person who is 120 pounds overweight, an average of 60 to 90 pounds of weight loss would be expected. Gastric sleeve -- Gastric sleeve, also known as sleeve gastrectomy, is a surgery that reduces the size of the stomach and makes it into a narrow tube (figure 3). The new stomach is much smaller and produces less of the hormone (ghrelin) that causes hunger, helping you feel satisfied with less food. Sleeve gastrectomy is safer than gastric bypass because the intestines are not rearranged, and there is less chance of malnutrition. It also appears to control hunger better than lap banding. It might be safer than the lap banding because no foreign materials are used. The gastric sleeve has a good success rate, and people lose an average of 33 percent of their excess body weight in the first year. For a person who is 120 pounds overweight, this would mean losing about 40 pounds in the first year. WEIGHT LOSS SURGERY COMPLICATIONS -- A variety of complications can occur with weight loss surgery.  The risks of surgery depend upon which surgery you have and any medical

## 2019-09-05 DIAGNOSIS — I10 ESSENTIAL HYPERTENSION, BENIGN: ICD-10-CM

## 2019-09-05 DIAGNOSIS — J84.9 ILD (INTERSTITIAL LUNG DISEASE) (HCC): ICD-10-CM

## 2019-09-05 DIAGNOSIS — E08.21 DIABETES MELLITUS DUE TO UNDERLYING CONDITION WITH DIABETIC NEPHROPATHY, WITHOUT LONG-TERM CURRENT USE OF INSULIN (HCC): ICD-10-CM

## 2019-09-05 LAB
BACTERIA: ABNORMAL /HPF
BASOPHILS ABSOLUTE: 0 K/UL (ref 0–0.2)
BASOPHILS RELATIVE PERCENT: 0.6 %
BILIRUBIN URINE: NEGATIVE
BLOOD, URINE: ABNORMAL
CHOLESTEROL, TOTAL: 189 MG/DL (ref 0–199)
CLARITY: ABNORMAL
COLOR: YELLOW
CREATININE URINE: 78.8 MG/DL (ref 28–259)
EOSINOPHILS ABSOLUTE: 0.2 K/UL (ref 0–0.6)
EOSINOPHILS RELATIVE PERCENT: 2.7 %
EPITHELIAL CELLS, UA: 4 /HPF (ref 0–5)
GLUCOSE URINE: NEGATIVE MG/DL
HCT VFR BLD CALC: 33.6 % (ref 36–48)
HDLC SERPL-MCNC: 107 MG/DL (ref 40–60)
HEMOGLOBIN: 10.9 G/DL (ref 12–16)
HYALINE CASTS: 1 /LPF (ref 0–8)
KETONES, URINE: NEGATIVE MG/DL
LDL CHOLESTEROL CALCULATED: 61 MG/DL
LEUKOCYTE ESTERASE, URINE: ABNORMAL
LYMPHOCYTES ABSOLUTE: 1.9 K/UL (ref 1–5.1)
LYMPHOCYTES RELATIVE PERCENT: 30.2 %
MCH RBC QN AUTO: 28.7 PG (ref 26–34)
MCHC RBC AUTO-ENTMCNC: 32.3 G/DL (ref 31–36)
MCV RBC AUTO: 89 FL (ref 80–100)
MICROALBUMIN UR-MCNC: 14.4 MG/DL
MICROALBUMIN/CREAT UR-RTO: 182.7 MG/G (ref 0–30)
MICROSCOPIC EXAMINATION: YES
MONOCYTES ABSOLUTE: 0.5 K/UL (ref 0–1.3)
MONOCYTES RELATIVE PERCENT: 7.5 %
NEUTROPHILS ABSOLUTE: 3.7 K/UL (ref 1.7–7.7)
NEUTROPHILS RELATIVE PERCENT: 59 %
NITRITE, URINE: POSITIVE
PDW BLD-RTO: 15.8 % (ref 12.4–15.4)
PH UA: 6 (ref 5–8)
PLATELET # BLD: 182 K/UL (ref 135–450)
PMV BLD AUTO: 7.9 FL (ref 5–10.5)
PROTEIN UA: 100 MG/DL
RBC # BLD: 3.78 M/UL (ref 4–5.2)
RBC UA: 1 /HPF (ref 0–4)
SPECIFIC GRAVITY UA: 1.02 (ref 1–1.03)
TRIGL SERPL-MCNC: 107 MG/DL (ref 0–150)
TSH SERPL DL<=0.05 MIU/L-ACNC: 2.55 UIU/ML (ref 0.27–4.2)
UROBILINOGEN, URINE: 0.2 E.U./DL
VLDLC SERPL CALC-MCNC: 21 MG/DL
WBC # BLD: 6.3 K/UL (ref 4–11)
WBC UA: 18 /HPF (ref 0–5)

## 2019-09-06 LAB
ESTIMATED AVERAGE GLUCOSE: 168.6 MG/DL
HBA1C MFR BLD: 7.5 %

## 2019-09-10 ENCOUNTER — OFFICE VISIT (OUTPATIENT)
Dept: PULMONOLOGY | Age: 79
End: 2019-09-10
Payer: MEDICARE

## 2019-09-10 VITALS
DIASTOLIC BLOOD PRESSURE: 68 MMHG | SYSTOLIC BLOOD PRESSURE: 116 MMHG | HEIGHT: 65 IN | BODY MASS INDEX: 29.82 KG/M2 | OXYGEN SATURATION: 96 % | WEIGHT: 179 LBS | HEART RATE: 64 BPM

## 2019-09-10 DIAGNOSIS — J84.89 NSIP (NONSPECIFIC INTERSTITIAL PNEUMONIA) (HCC): Primary | ICD-10-CM

## 2019-09-10 PROCEDURE — 99214 OFFICE O/P EST MOD 30 MIN: CPT | Performed by: INTERNAL MEDICINE

## 2019-09-10 RX ORDER — PREDNISONE 20 MG/1
20 TABLET ORAL DAILY
Qty: 30 TABLET | Refills: 1 | Status: SHIPPED | OUTPATIENT
Start: 2019-09-10 | End: 2019-10-10

## 2019-09-11 ASSESSMENT — ENCOUNTER SYMPTOMS
EYE REDNESS: 0
BACK PAIN: 1
SHORTNESS OF BREATH: 1
CHEST TIGHTNESS: 0
COUGH: 1
WHEEZING: 0

## 2019-09-11 NOTE — PROGRESS NOTES
rhythm. Exam reveals no gallop and no friction rub. No murmur heard. Pulmonary/Chest: Effort normal. No stridor. No respiratory distress. She has no wheezes. She has rales (bilateral). Abdominal: Soft. Bowel sounds are normal.   Musculoskeletal: She exhibits no edema or deformity. Neurological: She is alert and oriented to person, place, and time. Skin: Skin is warm and dry. Psychiatric: She has a normal mood and affect. Her behavior is normal.   Vitals reviewed. DATA:    CT chest with contrast       HISTORY: Pulmonary nodules.       COMPARISON: February 7, 2019.       Individualized dose optimization technique was used in order to meet ALARA standards for radiation dose reduction.  In addition to vendor specific dose reduction algorithms, the dose reduction techniques vary based on the specific scanner utilized but    frequently include automated exposure control, adjustment of the mA and/or kV according to patient size, and use of iterative reconstruction technique.           FINDINGS:       Basilar predominant groundglass opacity with areas of traction bronchiectasis are similar to the prior examination. There is no definite honeycombing identified.       7 mm nodule identified in the right upper lobe (image 38, series 200) is unchanged since prior exam.       5 mm nodule in the left upper lobe (image 27, series 2) unchanged. No new or enlarging pulmonary nodules.       A small pericardial effusion is identified. Mildly enlarged mediastinal lymph nodes are stable since the prior examination.       Hiatal hernia is present.       There is no destructive bone lesion.           Impression   1. Stable pulmonary nodules. Continued follow-up is indicated. 2. Stable appearance of interstitial lung disease as described above. There is no definite honeycombing. The favored differential diagnosis is nonspecific interstitial pneumonia. This would be an atypical appearance for usual interstitial pneumonia. 3. Mediastinal adenopathy, stable. 4. Hiatal hernia. Echo on 6/11/19  Normal left ventricle size. There is mild concentric left ventricular   hypertrophy. Overall left ventricular systolic function appears normal with   an ejection fraction visually estimated at 55%. No regional wall motion   abnormalities are noted. Diastolic filling parameters suggest grade II   diastolic dysfunction.   Trace mitral regurgitation is present.   Trivial tricuspid regurgitation. Estimated pulmonary artery systolic   pressure is elevated at 44 mmHg assuming a right atrial pressure of 3 mmHg. PFT  DATE OF PROCEDURE:  06/11/2019     INDICATION:  Shortness of breath.     BMI:  31.6.     TOBACCO HISTORY:  Never smoked.     Spirometry data is acceptable and reproducible.     Lung volumes performed via plethysmography.     Room air oxygen saturation is 92%.     SPIROMETRY:  FEV1 to FVC ratio is 80%. Prebronchodilator FEV1 is 1.12,  which is 58% of predicted. Postbronchodilator FEV1 is 1.36 which is 71%  of predicted for a 21% increase. Prebronchodilator FVC is 1.4, which is  54% of predicted. Postbronchodilator FVC is 1.84 which is 71% of  predicted for 31% increase.     Lung volumes showed total lung capacity of 3.04 which is 62% of  predicted. Residual volume is 1.46 which is 64% of predicted.     Diffusion capacity is 9.61, which is 45% of predicted. This is not  adjusted for hemoglobin.     IMPRESSION:  1. No obstructive defect. 2.  There is a significant response to bronchodilators in small and  large airways. 3.  Moderate restrictive defect is present. 4.  Moderate decrease in diffusion capacity       Assessment:      Diagnosis Orders   1. NSIP (nonspecific interstitial pneumonia) (HCC)  CT Chest High Resolution    Full PFT Study Without Bronchdilator    Carbon Monoxide Diffusing Capacity       Plan:   70years old female with ILD, pattern on CT scan is consistent with NSIP.     Main complain is cough, which is

## 2019-09-17 LAB
AFB CULTURE (MYCOBACTERIA): NORMAL
AFB SMEAR: NORMAL

## 2019-09-27 DIAGNOSIS — D64.9 ANEMIA, UNSPECIFIED TYPE: Primary | ICD-10-CM

## 2019-10-03 DIAGNOSIS — D64.9 ANEMIA, UNSPECIFIED TYPE: ICD-10-CM

## 2019-10-03 LAB
FERRITIN: 23.9 NG/ML (ref 15–150)
FOLATE: 17.33 NG/ML (ref 4.78–24.2)
IRON SATURATION: 27 % (ref 15–50)
IRON: 98 UG/DL (ref 37–145)
TOTAL IRON BINDING CAPACITY: 359 UG/DL (ref 260–445)
VITAMIN B-12: 510 PG/ML (ref 211–911)

## 2019-10-04 ENCOUNTER — TELEPHONE (OUTPATIENT)
Dept: INTERNAL MEDICINE CLINIC | Age: 79
End: 2019-10-04

## 2019-10-07 RX ORDER — ROSUVASTATIN CALCIUM 40 MG/1
TABLET, COATED ORAL
Qty: 90 TABLET | Refills: 0 | Status: SHIPPED | OUTPATIENT
Start: 2019-10-07 | End: 2020-01-14 | Stop reason: SDUPTHER

## 2019-10-08 ENCOUNTER — HOSPITAL ENCOUNTER (OUTPATIENT)
Dept: CT IMAGING | Age: 79
Discharge: HOME | End: 2019-10-08
Payer: MEDICARE

## 2019-10-08 ENCOUNTER — HOSPITAL ENCOUNTER (OUTPATIENT)
Dept: PULMONOLOGY | Age: 79
Discharge: HOME | End: 2019-10-08
Payer: MEDICARE

## 2019-10-08 DIAGNOSIS — J84.89 NSIP (NONSPECIFIC INTERSTITIAL PNEUMONIA) (HCC): ICD-10-CM

## 2019-10-08 DIAGNOSIS — D64.9 ANEMIA, UNSPECIFIED TYPE: ICD-10-CM

## 2019-10-08 LAB
DISTANCE WALKED: NORMAL FT
HEMOCCULT SP2 STL QL: NORMAL
HEMOCCULT SP3 STL QL: NORMAL
OCCULT BLOOD SCREENING: NORMAL
SPO2: NORMAL %

## 2019-10-08 PROCEDURE — 94618 PULMONARY STRESS TESTING: CPT

## 2019-10-08 PROCEDURE — 94640 AIRWAY INHALATION TREATMENT: CPT

## 2019-10-08 PROCEDURE — 94760 N-INVAS EAR/PLS OXIMETRY 1: CPT

## 2019-10-08 PROCEDURE — 71250 CT THORAX DX C-: CPT

## 2019-10-08 PROCEDURE — 94726 PLETHYSMOGRAPHY LUNG VOLUMES: CPT

## 2019-10-08 PROCEDURE — 94060 EVALUATION OF WHEEZING: CPT

## 2019-10-08 PROCEDURE — 6360000002 HC RX W HCPCS: Performed by: INTERNAL MEDICINE

## 2019-10-08 PROCEDURE — 94729 DIFFUSING CAPACITY: CPT

## 2019-10-08 PROCEDURE — 94375 RESPIRATORY FLOW VOLUME LOOP: CPT

## 2019-10-08 PROCEDURE — 94664 DEMO&/EVAL PT USE INHALER: CPT

## 2019-10-08 RX ORDER — ALBUTEROL SULFATE 2.5 MG/3ML
2.5 SOLUTION RESPIRATORY (INHALATION) ONCE
Status: COMPLETED | OUTPATIENT
Start: 2019-10-08 | End: 2019-10-08

## 2019-10-08 RX ADMIN — ALBUTEROL SULFATE 2.5 MG: 2.5 SOLUTION RESPIRATORY (INHALATION) at 10:22

## 2019-10-10 ENCOUNTER — TELEPHONE (OUTPATIENT)
Dept: PULMONOLOGY | Age: 79
End: 2019-10-10

## 2019-10-11 DIAGNOSIS — I10 ESSENTIAL HYPERTENSION, BENIGN: Primary | ICD-10-CM

## 2019-10-13 ENCOUNTER — APPOINTMENT (OUTPATIENT)
Dept: GENERAL RADIOLOGY | Age: 79
DRG: 193 | End: 2019-10-13
Payer: MEDICARE

## 2019-10-13 ENCOUNTER — HOSPITAL ENCOUNTER (INPATIENT)
Age: 79
LOS: 3 days | Discharge: HOME HEALTH | DRG: 193 | End: 2019-10-16
Attending: EMERGENCY MEDICINE | Admitting: INTERNAL MEDICINE
Payer: MEDICARE

## 2019-10-13 DIAGNOSIS — R09.02 HYPOXIA: Primary | ICD-10-CM

## 2019-10-13 DIAGNOSIS — J18.9 PNEUMONIA OF RIGHT MIDDLE LOBE DUE TO INFECTIOUS ORGANISM: ICD-10-CM

## 2019-10-13 PROBLEM — J96.01 ACUTE RESPIRATORY FAILURE WITH HYPOXIA (HCC): Status: ACTIVE | Noted: 2019-10-13

## 2019-10-13 PROBLEM — N17.9 AKI (ACUTE KIDNEY INJURY) (HCC): Status: ACTIVE | Noted: 2019-10-13

## 2019-10-13 LAB
ANION GAP SERPL CALCULATED.3IONS-SCNC: 11 MMOL/L (ref 3–16)
BASE EXCESS VENOUS: -5 (ref -3–3)
BASOPHILS ABSOLUTE: 0 K/UL (ref 0–0.2)
BASOPHILS RELATIVE PERCENT: 0.7 %
BUN BLDV-MCNC: 18 MG/DL (ref 7–20)
CALCIUM SERPL-MCNC: 10.3 MG/DL (ref 8.3–10.6)
CHLORIDE BLD-SCNC: 101 MMOL/L (ref 99–110)
CO2: 20 MMOL/L (ref 21–32)
CREAT SERPL-MCNC: 1.3 MG/DL (ref 0.6–1.2)
D DIMER: 744 NG/ML DDU (ref 0–229)
EOSINOPHILS ABSOLUTE: 0.1 K/UL (ref 0–0.6)
EOSINOPHILS RELATIVE PERCENT: 2.1 %
GFR AFRICAN AMERICAN: 48
GFR NON-AFRICAN AMERICAN: 40
GLUCOSE BLD-MCNC: 144 MG/DL (ref 70–99)
GLUCOSE BLD-MCNC: 192 MG/DL (ref 70–99)
GLUCOSE BLD-MCNC: 246 MG/DL (ref 70–99)
HCO3 VENOUS: 18.7 MMOL/L (ref 23–29)
HCT VFR BLD CALC: 29 % (ref 36–48)
HEMOGLOBIN: 9.7 G/DL (ref 12–16)
LACTATE: 2.73 MMOL/L (ref 0.4–2)
LACTIC ACID, SEPSIS: 1.4 MMOL/L (ref 0.4–1.9)
LACTIC ACID, SEPSIS: 1.5 MMOL/L (ref 0.4–1.9)
LYMPHOCYTES ABSOLUTE: 0.4 K/UL (ref 1–5.1)
LYMPHOCYTES RELATIVE PERCENT: 6.9 %
MCH RBC QN AUTO: 30 PG (ref 26–34)
MCHC RBC AUTO-ENTMCNC: 33.5 G/DL (ref 31–36)
MCV RBC AUTO: 89.8 FL (ref 80–100)
MONOCYTES ABSOLUTE: 0.6 K/UL (ref 0–1.3)
MONOCYTES RELATIVE PERCENT: 9.4 %
NEUTROPHILS ABSOLUTE: 5 K/UL (ref 1.7–7.7)
NEUTROPHILS RELATIVE PERCENT: 80.9 %
O2 SAT, VEN: 95 %
PCO2, VEN: 26.7 MM HG (ref 40–50)
PDW BLD-RTO: 15.8 % (ref 12.4–15.4)
PERFORMED ON: ABNORMAL
PH VENOUS: 7.45 (ref 7.35–7.45)
PLATELET # BLD: 154 K/UL (ref 135–450)
PMV BLD AUTO: 8 FL (ref 5–10.5)
PO2, VEN: 69 MM HG
POC SAMPLE TYPE: ABNORMAL
POTASSIUM SERPL-SCNC: 4.5 MMOL/L (ref 3.5–5.1)
PRO-BNP: 175 PG/ML (ref 0–449)
PROCALCITONIN: 0.21 NG/ML (ref 0–0.15)
RAPID INFLUENZA  B AGN: NEGATIVE
RAPID INFLUENZA A AGN: NEGATIVE
RBC # BLD: 3.23 M/UL (ref 4–5.2)
SODIUM BLD-SCNC: 132 MMOL/L (ref 136–145)
TCO2 CALC VENOUS: 20 MMOL/L
TROPONIN: <0.01 NG/ML
WBC # BLD: 6.2 K/UL (ref 4–11)

## 2019-10-13 PROCEDURE — 84484 ASSAY OF TROPONIN QUANT: CPT

## 2019-10-13 PROCEDURE — 83605 ASSAY OF LACTIC ACID: CPT

## 2019-10-13 PROCEDURE — 94799 UNLISTED PULMONARY SVC/PX: CPT

## 2019-10-13 PROCEDURE — 93005 ELECTROCARDIOGRAM TRACING: CPT | Performed by: EMERGENCY MEDICINE

## 2019-10-13 PROCEDURE — 1200000000 HC SEMI PRIVATE

## 2019-10-13 PROCEDURE — 96365 THER/PROPH/DIAG IV INF INIT: CPT

## 2019-10-13 PROCEDURE — 85025 COMPLETE CBC W/AUTO DIFF WBC: CPT

## 2019-10-13 PROCEDURE — 80048 BASIC METABOLIC PNL TOTAL CA: CPT

## 2019-10-13 PROCEDURE — 87804 INFLUENZA ASSAY W/OPTIC: CPT

## 2019-10-13 PROCEDURE — 6370000000 HC RX 637 (ALT 250 FOR IP): Performed by: INTERNAL MEDICINE

## 2019-10-13 PROCEDURE — 2580000003 HC RX 258: Performed by: INTERNAL MEDICINE

## 2019-10-13 PROCEDURE — 71046 X-RAY EXAM CHEST 2 VIEWS: CPT

## 2019-10-13 PROCEDURE — 94761 N-INVAS EAR/PLS OXIMETRY MLT: CPT

## 2019-10-13 PROCEDURE — 85379 FIBRIN DEGRADATION QUANT: CPT

## 2019-10-13 PROCEDURE — 2700000000 HC OXYGEN THERAPY PER DAY

## 2019-10-13 PROCEDURE — 6360000002 HC RX W HCPCS: Performed by: INTERNAL MEDICINE

## 2019-10-13 PROCEDURE — 2580000003 HC RX 258: Performed by: EMERGENCY MEDICINE

## 2019-10-13 PROCEDURE — 99285 EMERGENCY DEPT VISIT HI MDM: CPT

## 2019-10-13 PROCEDURE — 83880 ASSAY OF NATRIURETIC PEPTIDE: CPT

## 2019-10-13 PROCEDURE — 82803 BLOOD GASES ANY COMBINATION: CPT

## 2019-10-13 PROCEDURE — 87040 BLOOD CULTURE FOR BACTERIA: CPT

## 2019-10-13 PROCEDURE — 87449 NOS EACH ORGANISM AG IA: CPT

## 2019-10-13 PROCEDURE — 6360000002 HC RX W HCPCS: Performed by: EMERGENCY MEDICINE

## 2019-10-13 PROCEDURE — 84145 PROCALCITONIN (PCT): CPT

## 2019-10-13 PROCEDURE — 36415 COLL VENOUS BLD VENIPUNCTURE: CPT

## 2019-10-13 RX ORDER — SODIUM CHLORIDE 0.9 % (FLUSH) 0.9 %
10 SYRINGE (ML) INJECTION PRN
Status: DISCONTINUED | OUTPATIENT
Start: 2019-10-13 | End: 2019-10-16 | Stop reason: HOSPADM

## 2019-10-13 RX ORDER — PREDNISONE 20 MG/1
40 TABLET ORAL DAILY
Status: DISCONTINUED | OUTPATIENT
Start: 2019-10-13 | End: 2019-10-13 | Stop reason: SDUPTHER

## 2019-10-13 RX ORDER — DEXTROSE MONOHYDRATE 50 MG/ML
100 INJECTION, SOLUTION INTRAVENOUS PRN
Status: DISCONTINUED | OUTPATIENT
Start: 2019-10-13 | End: 2019-10-16 | Stop reason: HOSPADM

## 2019-10-13 RX ORDER — ROSUVASTATIN CALCIUM 20 MG/1
40 TABLET, COATED ORAL DAILY
Status: DISCONTINUED | OUTPATIENT
Start: 2019-10-14 | End: 2019-10-16 | Stop reason: HOSPADM

## 2019-10-13 RX ORDER — ESCITALOPRAM OXALATE 10 MG/1
10 TABLET ORAL DAILY
Status: DISCONTINUED | OUTPATIENT
Start: 2019-10-14 | End: 2019-10-16 | Stop reason: HOSPADM

## 2019-10-13 RX ORDER — ONDANSETRON 2 MG/ML
4 INJECTION INTRAMUSCULAR; INTRAVENOUS EVERY 6 HOURS PRN
Status: DISCONTINUED | OUTPATIENT
Start: 2019-10-13 | End: 2019-10-16 | Stop reason: HOSPADM

## 2019-10-13 RX ORDER — PREDNISONE 20 MG/1
40 TABLET ORAL DAILY
Status: DISCONTINUED | OUTPATIENT
Start: 2019-10-13 | End: 2019-10-15

## 2019-10-13 RX ORDER — AMLODIPINE BESYLATE 10 MG/1
10 TABLET ORAL DAILY
Status: DISCONTINUED | OUTPATIENT
Start: 2019-10-14 | End: 2019-10-16 | Stop reason: HOSPADM

## 2019-10-13 RX ORDER — DEXTROSE MONOHYDRATE 25 G/50ML
12.5 INJECTION, SOLUTION INTRAVENOUS PRN
Status: DISCONTINUED | OUTPATIENT
Start: 2019-10-13 | End: 2019-10-16 | Stop reason: HOSPADM

## 2019-10-13 RX ORDER — SODIUM CHLORIDE 0.9 % (FLUSH) 0.9 %
10 SYRINGE (ML) INJECTION EVERY 12 HOURS SCHEDULED
Status: DISCONTINUED | OUTPATIENT
Start: 2019-10-13 | End: 2019-10-16 | Stop reason: HOSPADM

## 2019-10-13 RX ORDER — 0.9 % SODIUM CHLORIDE 0.9 %
1000 INTRAVENOUS SOLUTION INTRAVENOUS ONCE
Status: COMPLETED | OUTPATIENT
Start: 2019-10-13 | End: 2019-10-13

## 2019-10-13 RX ORDER — ALBUTEROL SULFATE 2.5 MG/3ML
2.5 SOLUTION RESPIRATORY (INHALATION) EVERY 4 HOURS PRN
Status: DISCONTINUED | OUTPATIENT
Start: 2019-10-13 | End: 2019-10-16 | Stop reason: HOSPADM

## 2019-10-13 RX ORDER — NICOTINE POLACRILEX 4 MG
15 LOZENGE BUCCAL PRN
Status: DISCONTINUED | OUTPATIENT
Start: 2019-10-13 | End: 2019-10-16 | Stop reason: HOSPADM

## 2019-10-13 RX ORDER — LEVOFLOXACIN 5 MG/ML
750 INJECTION, SOLUTION INTRAVENOUS
Status: DISCONTINUED | OUTPATIENT
Start: 2019-10-13 | End: 2019-10-16

## 2019-10-13 RX ORDER — TRAZODONE HYDROCHLORIDE 100 MG/1
100 TABLET ORAL NIGHTLY PRN
Status: DISCONTINUED | OUTPATIENT
Start: 2019-10-13 | End: 2019-10-16 | Stop reason: HOSPADM

## 2019-10-13 RX ADMIN — SODIUM CHLORIDE 1000 ML: 9 INJECTION, SOLUTION INTRAVENOUS at 14:19

## 2019-10-13 RX ADMIN — INSULIN GLARGINE 5 UNITS: 100 INJECTION, SOLUTION SUBCUTANEOUS at 20:44

## 2019-10-13 RX ADMIN — TRAZODONE HYDROCHLORIDE 100 MG: 100 TABLET ORAL at 20:43

## 2019-10-13 RX ADMIN — PREDNISONE 40 MG: 20 TABLET ORAL at 16:02

## 2019-10-13 RX ADMIN — LEVOFLOXACIN 750 MG: 5 INJECTION, SOLUTION INTRAVENOUS at 18:05

## 2019-10-13 RX ADMIN — AZITHROMYCIN MONOHYDRATE 500 MG: 500 INJECTION, POWDER, LYOPHILIZED, FOR SOLUTION INTRAVENOUS at 15:01

## 2019-10-13 RX ADMIN — ENOXAPARIN SODIUM 40 MG: 40 INJECTION SUBCUTANEOUS at 20:43

## 2019-10-13 RX ADMIN — INSULIN LISPRO 1 UNITS: 100 INJECTION, SOLUTION INTRAVENOUS; SUBCUTANEOUS at 18:08

## 2019-10-13 RX ADMIN — CEFTRIAXONE 1 G: 1 INJECTION, POWDER, FOR SOLUTION INTRAMUSCULAR; INTRAVENOUS at 14:19

## 2019-10-13 RX ADMIN — Medication 10 ML: at 20:44

## 2019-10-13 RX ADMIN — INSULIN LISPRO 1 UNITS: 100 INJECTION, SOLUTION INTRAVENOUS; SUBCUTANEOUS at 20:43

## 2019-10-13 ASSESSMENT — PAIN SCALES - GENERAL
PAINLEVEL_OUTOF10: 0
PAINLEVEL_OUTOF10: 0

## 2019-10-14 LAB
ANION GAP SERPL CALCULATED.3IONS-SCNC: 14 MMOL/L (ref 3–16)
BACTERIA: ABNORMAL /HPF
BASOPHILS ABSOLUTE: 0 K/UL (ref 0–0.2)
BASOPHILS RELATIVE PERCENT: 0.2 %
BILIRUBIN URINE: NEGATIVE
BLOOD, URINE: ABNORMAL
BUN BLDV-MCNC: 19 MG/DL (ref 7–20)
CALCIUM SERPL-MCNC: 10.4 MG/DL (ref 8.3–10.6)
CHLORIDE BLD-SCNC: 104 MMOL/L (ref 99–110)
CLARITY: CLEAR
CO2: 20 MMOL/L (ref 21–32)
COLOR: YELLOW
CREAT SERPL-MCNC: 1.1 MG/DL (ref 0.6–1.2)
EKG ATRIAL RATE: 70 BPM
EKG DIAGNOSIS: NORMAL
EKG P AXIS: 27 DEGREES
EKG P-R INTERVAL: 118 MS
EKG Q-T INTERVAL: 368 MS
EKG QRS DURATION: 82 MS
EKG QTC CALCULATION (BAZETT): 397 MS
EKG R AXIS: -14 DEGREES
EKG T AXIS: 6 DEGREES
EKG VENTRICULAR RATE: 70 BPM
EOSINOPHILS ABSOLUTE: 0 K/UL (ref 0–0.6)
EOSINOPHILS RELATIVE PERCENT: 0.1 %
EPITHELIAL CELLS, UA: ABNORMAL /HPF
GFR AFRICAN AMERICAN: 58
GFR NON-AFRICAN AMERICAN: 48
GLUCOSE BLD-MCNC: 249 MG/DL (ref 70–99)
GLUCOSE BLD-MCNC: 256 MG/DL (ref 70–99)
GLUCOSE BLD-MCNC: 261 MG/DL (ref 70–99)
GLUCOSE BLD-MCNC: 348 MG/DL (ref 70–99)
GLUCOSE BLD-MCNC: 356 MG/DL (ref 70–99)
GLUCOSE URINE: 250 MG/DL
HCT VFR BLD CALC: 28.1 % (ref 36–48)
HEMOGLOBIN: 9.7 G/DL (ref 12–16)
KETONES, URINE: NEGATIVE MG/DL
L. PNEUMOPHILA SEROGP 1 UR AG: NORMAL
LEUKOCYTE ESTERASE, URINE: NEGATIVE
LYMPHOCYTES ABSOLUTE: 0.4 K/UL (ref 1–5.1)
LYMPHOCYTES RELATIVE PERCENT: 8.4 %
MCH RBC QN AUTO: 30.6 PG (ref 26–34)
MCHC RBC AUTO-ENTMCNC: 34.7 G/DL (ref 31–36)
MCV RBC AUTO: 88.4 FL (ref 80–100)
MICROSCOPIC EXAMINATION: YES
MONOCYTES ABSOLUTE: 0.4 K/UL (ref 0–1.3)
MONOCYTES RELATIVE PERCENT: 6.9 %
NEUTROPHILS ABSOLUTE: 4.5 K/UL (ref 1.7–7.7)
NEUTROPHILS RELATIVE PERCENT: 84.4 %
NITRITE, URINE: NEGATIVE
PDW BLD-RTO: 15.3 % (ref 12.4–15.4)
PERFORMED ON: ABNORMAL
PH UA: 6 (ref 5–8)
PLATELET # BLD: 164 K/UL (ref 135–450)
PMV BLD AUTO: 7.7 FL (ref 5–10.5)
POTASSIUM REFLEX MAGNESIUM: 4.2 MMOL/L (ref 3.5–5.1)
PROTEIN UA: 30 MG/DL
RBC # BLD: 3.17 M/UL (ref 4–5.2)
RBC UA: ABNORMAL /HPF (ref 0–2)
REPORT: NORMAL
RESPIRATORY PANEL PCR: NORMAL
SODIUM BLD-SCNC: 138 MMOL/L (ref 136–145)
SODIUM URINE: 46 MMOL/L
SPECIFIC GRAVITY UA: 1.02 (ref 1–1.03)
STREP PNEUMONIAE ANTIGEN, URINE: NORMAL
URINE TYPE: ABNORMAL
UROBILINOGEN, URINE: 0.2 E.U./DL
WBC # BLD: 5.3 K/UL (ref 4–11)
WBC UA: ABNORMAL /HPF (ref 0–5)

## 2019-10-14 PROCEDURE — 81001 URINALYSIS AUTO W/SCOPE: CPT

## 2019-10-14 PROCEDURE — 6370000000 HC RX 637 (ALT 250 FOR IP): Performed by: INTERNAL MEDICINE

## 2019-10-14 PROCEDURE — 94761 N-INVAS EAR/PLS OXIMETRY MLT: CPT

## 2019-10-14 PROCEDURE — 87633 RESP VIRUS 12-25 TARGETS: CPT

## 2019-10-14 PROCEDURE — 1200000000 HC SEMI PRIVATE

## 2019-10-14 PROCEDURE — 87798 DETECT AGENT NOS DNA AMP: CPT

## 2019-10-14 PROCEDURE — 89220 SPUTUM SPECIMEN COLLECTION: CPT

## 2019-10-14 PROCEDURE — 87486 CHLMYD PNEUM DNA AMP PROBE: CPT

## 2019-10-14 PROCEDURE — 2700000000 HC OXYGEN THERAPY PER DAY

## 2019-10-14 PROCEDURE — 87581 M.PNEUMON DNA AMP PROBE: CPT

## 2019-10-14 PROCEDURE — 36415 COLL VENOUS BLD VENIPUNCTURE: CPT

## 2019-10-14 PROCEDURE — 99223 1ST HOSP IP/OBS HIGH 75: CPT | Performed by: INTERNAL MEDICINE

## 2019-10-14 PROCEDURE — 6360000002 HC RX W HCPCS: Performed by: INTERNAL MEDICINE

## 2019-10-14 PROCEDURE — 2580000003 HC RX 258: Performed by: INTERNAL MEDICINE

## 2019-10-14 PROCEDURE — 84300 ASSAY OF URINE SODIUM: CPT

## 2019-10-14 PROCEDURE — 80048 BASIC METABOLIC PNL TOTAL CA: CPT

## 2019-10-14 PROCEDURE — 85025 COMPLETE CBC W/AUTO DIFF WBC: CPT

## 2019-10-14 PROCEDURE — 83036 HEMOGLOBIN GLYCOSYLATED A1C: CPT

## 2019-10-14 RX ADMIN — INSULIN LISPRO 3 UNITS: 100 INJECTION, SOLUTION INTRAVENOUS; SUBCUTANEOUS at 12:08

## 2019-10-14 RX ADMIN — ENOXAPARIN SODIUM 40 MG: 40 INJECTION SUBCUTANEOUS at 20:48

## 2019-10-14 RX ADMIN — TRAZODONE HYDROCHLORIDE 100 MG: 100 TABLET ORAL at 20:52

## 2019-10-14 RX ADMIN — AMLODIPINE BESYLATE 10 MG: 10 TABLET ORAL at 08:13

## 2019-10-14 RX ADMIN — ROSUVASTATIN CALCIUM 40 MG: 20 TABLET, FILM COATED ORAL at 08:13

## 2019-10-14 RX ADMIN — Medication 10 ML: at 08:13

## 2019-10-14 RX ADMIN — ESCITALOPRAM OXALATE 10 MG: 10 TABLET ORAL at 08:13

## 2019-10-14 RX ADMIN — PREDNISONE 40 MG: 20 TABLET ORAL at 08:13

## 2019-10-14 RX ADMIN — Medication 10 ML: at 20:57

## 2019-10-14 RX ADMIN — INSULIN LISPRO 4 UNITS: 100 INJECTION, SOLUTION INTRAVENOUS; SUBCUTANEOUS at 20:47

## 2019-10-14 RX ADMIN — INSULIN LISPRO 10 UNITS: 100 INJECTION, SOLUTION INTRAVENOUS; SUBCUTANEOUS at 17:34

## 2019-10-14 RX ADMIN — LINAGLIPTIN 5 MG: 5 TABLET, FILM COATED ORAL at 08:13

## 2019-10-14 RX ADMIN — INSULIN LISPRO 2 UNITS: 100 INJECTION, SOLUTION INTRAVENOUS; SUBCUTANEOUS at 08:14

## 2019-10-14 ASSESSMENT — PAIN SCALES - GENERAL
PAINLEVEL_OUTOF10: 0
PAINLEVEL_OUTOF10: 0

## 2019-10-15 LAB
ANION GAP SERPL CALCULATED.3IONS-SCNC: 10 MMOL/L (ref 3–16)
BASOPHILS ABSOLUTE: 0 K/UL (ref 0–0.2)
BASOPHILS RELATIVE PERCENT: 0.2 %
BUN BLDV-MCNC: 22 MG/DL (ref 7–20)
CALCIUM SERPL-MCNC: 10.8 MG/DL (ref 8.3–10.6)
CHLORIDE BLD-SCNC: 109 MMOL/L (ref 99–110)
CO2: 22 MMOL/L (ref 21–32)
CREAT SERPL-MCNC: 1.3 MG/DL (ref 0.6–1.2)
EOSINOPHILS ABSOLUTE: 0 K/UL (ref 0–0.6)
EOSINOPHILS RELATIVE PERCENT: 0.4 %
ESTIMATED AVERAGE GLUCOSE: 174.3 MG/DL
GFR AFRICAN AMERICAN: 48
GFR NON-AFRICAN AMERICAN: 40
GLUCOSE BLD-MCNC: 155 MG/DL (ref 70–99)
GLUCOSE BLD-MCNC: 168 MG/DL (ref 70–99)
GLUCOSE BLD-MCNC: 294 MG/DL (ref 70–99)
GLUCOSE BLD-MCNC: 400 MG/DL (ref 70–99)
GLUCOSE BLD-MCNC: 423 MG/DL (ref 70–99)
GLUCOSE BLD-MCNC: 433 MG/DL (ref 70–99)
HBA1C MFR BLD: 7.7 %
HCT VFR BLD CALC: 27.6 % (ref 36–48)
HEMOGLOBIN: 9.3 G/DL (ref 12–16)
LYMPHOCYTES ABSOLUTE: 0.7 K/UL (ref 1–5.1)
LYMPHOCYTES RELATIVE PERCENT: 12.3 %
MAGNESIUM: 1.9 MG/DL (ref 1.8–2.4)
MCH RBC QN AUTO: 30.5 PG (ref 26–34)
MCHC RBC AUTO-ENTMCNC: 33.8 G/DL (ref 31–36)
MCV RBC AUTO: 90.4 FL (ref 80–100)
MONOCYTES ABSOLUTE: 0.4 K/UL (ref 0–1.3)
MONOCYTES RELATIVE PERCENT: 7.6 %
NEUTROPHILS ABSOLUTE: 4.3 K/UL (ref 1.7–7.7)
NEUTROPHILS RELATIVE PERCENT: 79.5 %
PDW BLD-RTO: 14.9 % (ref 12.4–15.4)
PERFORMED ON: ABNORMAL
PLATELET # BLD: 161 K/UL (ref 135–450)
PMV BLD AUTO: 7.3 FL (ref 5–10.5)
POTASSIUM SERPL-SCNC: 4.1 MMOL/L (ref 3.5–5.1)
RBC # BLD: 3.05 M/UL (ref 4–5.2)
SODIUM BLD-SCNC: 141 MMOL/L (ref 136–145)
WBC # BLD: 5.4 K/UL (ref 4–11)

## 2019-10-15 PROCEDURE — 6360000002 HC RX W HCPCS: Performed by: INTERNAL MEDICINE

## 2019-10-15 PROCEDURE — 97530 THERAPEUTIC ACTIVITIES: CPT

## 2019-10-15 PROCEDURE — 87205 SMEAR GRAM STAIN: CPT

## 2019-10-15 PROCEDURE — 1200000000 HC SEMI PRIVATE

## 2019-10-15 PROCEDURE — 2580000003 HC RX 258: Performed by: INTERNAL MEDICINE

## 2019-10-15 PROCEDURE — 97535 SELF CARE MNGMENT TRAINING: CPT

## 2019-10-15 PROCEDURE — 83735 ASSAY OF MAGNESIUM: CPT

## 2019-10-15 PROCEDURE — 87252 VIRUS INOCULATION TISSUE: CPT

## 2019-10-15 PROCEDURE — 6370000000 HC RX 637 (ALT 250 FOR IP): Performed by: INTERNAL MEDICINE

## 2019-10-15 PROCEDURE — 97162 PT EVAL MOD COMPLEX 30 MIN: CPT

## 2019-10-15 PROCEDURE — 87070 CULTURE OTHR SPECIMN AEROBIC: CPT

## 2019-10-15 PROCEDURE — 99233 SBSQ HOSP IP/OBS HIGH 50: CPT | Performed by: INTERNAL MEDICINE

## 2019-10-15 PROCEDURE — 85025 COMPLETE CBC W/AUTO DIFF WBC: CPT

## 2019-10-15 PROCEDURE — 97166 OT EVAL MOD COMPLEX 45 MIN: CPT

## 2019-10-15 PROCEDURE — 36415 COLL VENOUS BLD VENIPUNCTURE: CPT

## 2019-10-15 PROCEDURE — 80048 BASIC METABOLIC PNL TOTAL CA: CPT

## 2019-10-15 PROCEDURE — 97116 GAIT TRAINING THERAPY: CPT

## 2019-10-15 RX ORDER — ACETAMINOPHEN 325 MG/1
650 TABLET ORAL EVERY 4 HOURS PRN
Status: DISCONTINUED | OUTPATIENT
Start: 2019-10-15 | End: 2019-10-16 | Stop reason: HOSPADM

## 2019-10-15 RX ORDER — PREDNISONE 20 MG/1
20 TABLET ORAL DAILY
Status: DISCONTINUED | OUTPATIENT
Start: 2019-10-16 | End: 2019-10-16 | Stop reason: HOSPADM

## 2019-10-15 RX ADMIN — Medication 10 ML: at 08:09

## 2019-10-15 RX ADMIN — ENOXAPARIN SODIUM 40 MG: 40 INJECTION SUBCUTANEOUS at 20:50

## 2019-10-15 RX ADMIN — INSULIN LISPRO 2 UNITS: 100 INJECTION, SOLUTION INTRAVENOUS; SUBCUTANEOUS at 08:11

## 2019-10-15 RX ADMIN — INSULIN LISPRO 12 UNITS: 100 INJECTION, SOLUTION INTRAVENOUS; SUBCUTANEOUS at 17:03

## 2019-10-15 RX ADMIN — ESCITALOPRAM OXALATE 10 MG: 10 TABLET ORAL at 08:09

## 2019-10-15 RX ADMIN — ACETAMINOPHEN 650 MG: 325 TABLET ORAL at 17:56

## 2019-10-15 RX ADMIN — Medication 10 ML: at 20:58

## 2019-10-15 RX ADMIN — ROSUVASTATIN CALCIUM 40 MG: 20 TABLET, FILM COATED ORAL at 08:09

## 2019-10-15 RX ADMIN — INSULIN LISPRO 4 UNITS: 100 INJECTION, SOLUTION INTRAVENOUS; SUBCUTANEOUS at 11:57

## 2019-10-15 RX ADMIN — PREDNISONE 40 MG: 20 TABLET ORAL at 08:09

## 2019-10-15 RX ADMIN — AMLODIPINE BESYLATE 10 MG: 10 TABLET ORAL at 08:09

## 2019-10-15 RX ADMIN — Medication 10 ML: at 08:11

## 2019-10-15 RX ADMIN — LINAGLIPTIN 5 MG: 5 TABLET, FILM COATED ORAL at 08:09

## 2019-10-15 RX ADMIN — INSULIN LISPRO 6 UNITS: 100 INJECTION, SOLUTION INTRAVENOUS; SUBCUTANEOUS at 20:49

## 2019-10-15 RX ADMIN — LEVOFLOXACIN 750 MG: 5 INJECTION, SOLUTION INTRAVENOUS at 17:56

## 2019-10-15 ASSESSMENT — PAIN DESCRIPTION - LOCATION: LOCATION: BACK

## 2019-10-15 ASSESSMENT — PAIN SCALES - GENERAL
PAINLEVEL_OUTOF10: 2
PAINLEVEL_OUTOF10: 5
PAINLEVEL_OUTOF10: 0

## 2019-10-15 ASSESSMENT — PAIN DESCRIPTION - ORIENTATION: ORIENTATION: RIGHT;LOWER

## 2019-10-15 ASSESSMENT — PAIN DESCRIPTION - PAIN TYPE: TYPE: ACUTE PAIN

## 2019-10-15 ASSESSMENT — PAIN DESCRIPTION - DESCRIPTORS: DESCRIPTORS: ACHING

## 2019-10-16 VITALS
SYSTOLIC BLOOD PRESSURE: 145 MMHG | HEIGHT: 62 IN | OXYGEN SATURATION: 97 % | DIASTOLIC BLOOD PRESSURE: 66 MMHG | BODY MASS INDEX: 32.5 KG/M2 | WEIGHT: 176.59 LBS | TEMPERATURE: 96.7 F | RESPIRATION RATE: 16 BRPM | HEART RATE: 60 BPM

## 2019-10-16 LAB
ANION GAP SERPL CALCULATED.3IONS-SCNC: 11 MMOL/L (ref 3–16)
BUN BLDV-MCNC: 26 MG/DL (ref 7–20)
CALCIUM SERPL-MCNC: 10.7 MG/DL (ref 8.3–10.6)
CHLORIDE BLD-SCNC: 106 MMOL/L (ref 99–110)
CO2: 24 MMOL/L (ref 21–32)
CREAT SERPL-MCNC: 1.2 MG/DL (ref 0.6–1.2)
GFR AFRICAN AMERICAN: 53
GFR NON-AFRICAN AMERICAN: 44
GLUCOSE BLD-MCNC: 129 MG/DL (ref 70–99)
GLUCOSE BLD-MCNC: 141 MG/DL (ref 70–99)
GLUCOSE BLD-MCNC: 254 MG/DL (ref 70–99)
HCT VFR BLD CALC: 26 % (ref 36–48)
HEMOGLOBIN: 8.9 G/DL (ref 12–16)
MCH RBC QN AUTO: 30.4 PG (ref 26–34)
MCHC RBC AUTO-ENTMCNC: 34.4 G/DL (ref 31–36)
MCV RBC AUTO: 88.5 FL (ref 80–100)
PDW BLD-RTO: 15.3 % (ref 12.4–15.4)
PERFORMED ON: ABNORMAL
PERFORMED ON: ABNORMAL
PLATELET # BLD: 168 K/UL (ref 135–450)
PMV BLD AUTO: 7.3 FL (ref 5–10.5)
POTASSIUM SERPL-SCNC: 4.4 MMOL/L (ref 3.5–5.1)
RBC # BLD: 2.93 M/UL (ref 4–5.2)
SODIUM BLD-SCNC: 141 MMOL/L (ref 136–145)
WBC # BLD: 5 K/UL (ref 4–11)

## 2019-10-16 PROCEDURE — 99233 SBSQ HOSP IP/OBS HIGH 50: CPT | Performed by: INTERNAL MEDICINE

## 2019-10-16 PROCEDURE — 97116 GAIT TRAINING THERAPY: CPT

## 2019-10-16 PROCEDURE — 6370000000 HC RX 637 (ALT 250 FOR IP): Performed by: INTERNAL MEDICINE

## 2019-10-16 PROCEDURE — 80048 BASIC METABOLIC PNL TOTAL CA: CPT

## 2019-10-16 PROCEDURE — 36415 COLL VENOUS BLD VENIPUNCTURE: CPT

## 2019-10-16 PROCEDURE — 94680 O2 UPTK RST&XERS DIR SIMPLE: CPT

## 2019-10-16 PROCEDURE — 97530 THERAPEUTIC ACTIVITIES: CPT

## 2019-10-16 PROCEDURE — 85027 COMPLETE CBC AUTOMATED: CPT

## 2019-10-16 PROCEDURE — 6370000000 HC RX 637 (ALT 250 FOR IP): Performed by: HOSPITALIST

## 2019-10-16 PROCEDURE — 2580000003 HC RX 258: Performed by: INTERNAL MEDICINE

## 2019-10-16 RX ORDER — LEVOFLOXACIN 250 MG/1
250 TABLET ORAL DAILY
Qty: 7 TABLET | Refills: 0 | Status: SHIPPED | OUTPATIENT
Start: 2019-10-17 | End: 2019-10-24

## 2019-10-16 RX ORDER — LEVOFLOXACIN 500 MG/1
250 TABLET, FILM COATED ORAL DAILY
Status: DISCONTINUED | OUTPATIENT
Start: 2019-10-16 | End: 2019-10-16 | Stop reason: HOSPADM

## 2019-10-16 RX ORDER — PREDNISONE 20 MG/1
20 TABLET ORAL DAILY
Qty: 10 TABLET | Refills: 0 | Status: SHIPPED | OUTPATIENT
Start: 2019-10-17 | End: 2019-10-27

## 2019-10-16 RX ORDER — DOCUSATE SODIUM 100 MG/1
100 CAPSULE, LIQUID FILLED ORAL 2 TIMES DAILY
Status: DISCONTINUED | OUTPATIENT
Start: 2019-10-16 | End: 2019-10-16 | Stop reason: HOSPADM

## 2019-10-16 RX ADMIN — ACETAMINOPHEN 650 MG: 325 TABLET ORAL at 13:37

## 2019-10-16 RX ADMIN — ESCITALOPRAM OXALATE 10 MG: 10 TABLET ORAL at 08:40

## 2019-10-16 RX ADMIN — INSULIN LISPRO 6 UNITS: 100 INJECTION, SOLUTION INTRAVENOUS; SUBCUTANEOUS at 13:10

## 2019-10-16 RX ADMIN — LEVOFLOXACIN 250 MG: 500 TABLET, FILM COATED ORAL at 13:37

## 2019-10-16 RX ADMIN — BISACODYL 10 MG: 5 TABLET, COATED ORAL at 13:37

## 2019-10-16 RX ADMIN — AMLODIPINE BESYLATE 10 MG: 10 TABLET ORAL at 08:40

## 2019-10-16 RX ADMIN — LINAGLIPTIN 5 MG: 5 TABLET, FILM COATED ORAL at 08:40

## 2019-10-16 RX ADMIN — Medication 10 ML: at 08:43

## 2019-10-16 RX ADMIN — PREDNISONE 20 MG: 20 TABLET ORAL at 08:40

## 2019-10-16 RX ADMIN — ACETAMINOPHEN 650 MG: 325 TABLET ORAL at 08:39

## 2019-10-16 RX ADMIN — ROSUVASTATIN CALCIUM 40 MG: 20 TABLET, FILM COATED ORAL at 08:40

## 2019-10-16 RX ADMIN — DOCUSATE SODIUM 100 MG: 100 CAPSULE, LIQUID FILLED ORAL at 13:37

## 2019-10-16 ASSESSMENT — PAIN SCALES - GENERAL
PAINLEVEL_OUTOF10: 0
PAINLEVEL_OUTOF10: 4
PAINLEVEL_OUTOF10: 3

## 2019-10-16 ASSESSMENT — ENCOUNTER SYMPTOMS
ABDOMINAL PAIN: 0
SHORTNESS OF BREATH: 1
CHEST TIGHTNESS: 0
COUGH: 0
NAUSEA: 0
VOMITING: 0

## 2019-10-16 ASSESSMENT — PAIN DESCRIPTION - PAIN TYPE: TYPE: ACUTE PAIN

## 2019-10-16 ASSESSMENT — PAIN DESCRIPTION - FREQUENCY: FREQUENCY: CONTINUOUS

## 2019-10-16 ASSESSMENT — PAIN DESCRIPTION - ORIENTATION: ORIENTATION: LOWER

## 2019-10-16 ASSESSMENT — PAIN DESCRIPTION - LOCATION: LOCATION: BACK

## 2019-10-16 ASSESSMENT — PAIN - FUNCTIONAL ASSESSMENT: PAIN_FUNCTIONAL_ASSESSMENT: ACTIVITIES ARE NOT PREVENTED

## 2019-10-16 ASSESSMENT — PAIN DESCRIPTION - ONSET: ONSET: ON-GOING

## 2019-10-16 ASSESSMENT — PAIN DESCRIPTION - DESCRIPTORS: DESCRIPTORS: ACHING

## 2019-10-17 ENCOUNTER — TELEPHONE (OUTPATIENT)
Dept: INTERNAL MEDICINE CLINIC | Age: 79
End: 2019-10-17

## 2019-10-18 ENCOUNTER — CARE COORDINATION (OUTPATIENT)
Dept: CASE MANAGEMENT | Age: 79
End: 2019-10-18

## 2019-10-18 ENCOUNTER — TELEPHONE (OUTPATIENT)
Dept: PHARMACY | Facility: CLINIC | Age: 79
End: 2019-10-18

## 2019-10-18 ENCOUNTER — TELEPHONE (OUTPATIENT)
Dept: INTERNAL MEDICINE CLINIC | Age: 79
End: 2019-10-18

## 2019-10-18 LAB
BLOOD CULTURE, ROUTINE: NORMAL
CULTURE, BLOOD 2: NORMAL
CULTURE, RESPIRATORY: NORMAL
GRAM STAIN RESULT: NORMAL

## 2019-10-22 ENCOUNTER — TELEPHONE (OUTPATIENT)
Dept: INTERNAL MEDICINE CLINIC | Age: 79
End: 2019-10-22

## 2019-10-24 ENCOUNTER — CARE COORDINATION (OUTPATIENT)
Dept: CASE MANAGEMENT | Age: 79
End: 2019-10-24

## 2019-10-24 ENCOUNTER — TELEPHONE (OUTPATIENT)
Dept: INTERNAL MEDICINE CLINIC | Age: 79
End: 2019-10-24

## 2019-10-24 DIAGNOSIS — R19.7 DIARRHEA OF PRESUMED INFECTIOUS ORIGIN: Primary | ICD-10-CM

## 2019-10-25 ENCOUNTER — TELEPHONE (OUTPATIENT)
Dept: INTERNAL MEDICINE CLINIC | Age: 79
End: 2019-10-25

## 2019-10-28 LAB
FINAL REPORT: NORMAL
PRELIMINARY: NORMAL

## 2019-10-29 ENCOUNTER — OFFICE VISIT (OUTPATIENT)
Dept: INTERNAL MEDICINE CLINIC | Age: 79
End: 2019-10-29
Payer: MEDICARE

## 2019-10-29 VITALS
HEART RATE: 80 BPM | HEIGHT: 66 IN | WEIGHT: 169 LBS | BODY MASS INDEX: 27.16 KG/M2 | OXYGEN SATURATION: 94 % | DIASTOLIC BLOOD PRESSURE: 74 MMHG | SYSTOLIC BLOOD PRESSURE: 124 MMHG

## 2019-10-29 DIAGNOSIS — J84.9 ILD (INTERSTITIAL LUNG DISEASE) (HCC): ICD-10-CM

## 2019-10-29 DIAGNOSIS — E08.21 DIABETES MELLITUS DUE TO UNDERLYING CONDITION WITH DIABETIC NEPHROPATHY, WITHOUT LONG-TERM CURRENT USE OF INSULIN (HCC): ICD-10-CM

## 2019-10-29 PROBLEM — J96.01 ACUTE RESPIRATORY FAILURE WITH HYPOXIA (HCC): Status: RESOLVED | Noted: 2019-10-13 | Resolved: 2019-10-29

## 2019-10-29 PROBLEM — N17.9 AKI (ACUTE KIDNEY INJURY) (HCC): Status: RESOLVED | Noted: 2019-10-13 | Resolved: 2019-10-29

## 2019-10-29 PROCEDURE — G8510 SCR DEP NEG, NO PLAN REQD: HCPCS | Performed by: INTERNAL MEDICINE

## 2019-10-29 PROCEDURE — 3288F FALL RISK ASSESSMENT DOCD: CPT | Performed by: INTERNAL MEDICINE

## 2019-10-29 PROCEDURE — 99213 OFFICE O/P EST LOW 20 MIN: CPT | Performed by: INTERNAL MEDICINE

## 2019-10-29 RX ORDER — PREDNISONE 1 MG/1
TABLET ORAL
Qty: 18 TABLET | Refills: 0 | Status: SHIPPED | OUTPATIENT
Start: 2019-10-29 | End: 2019-11-05

## 2019-10-29 ASSESSMENT — ENCOUNTER SYMPTOMS
GASTROINTESTINAL NEGATIVE: 1
WHEEZING: 0
RESPIRATORY NEGATIVE: 1
SHORTNESS OF BREATH: 0
CHEST TIGHTNESS: 0

## 2019-10-29 ASSESSMENT — PATIENT HEALTH QUESTIONNAIRE - PHQ9
1. LITTLE INTEREST OR PLEASURE IN DOING THINGS: 0
2. FEELING DOWN, DEPRESSED OR HOPELESS: 0
SUM OF ALL RESPONSES TO PHQ QUESTIONS 1-9: 0
DEPRESSION UNABLE TO ASSESS: FUNCTIONAL CAPACITY MOTIVATION LIMITS ACCURACY
SUM OF ALL RESPONSES TO PHQ9 QUESTIONS 1 & 2: 0
SUM OF ALL RESPONSES TO PHQ QUESTIONS 1-9: 0

## 2019-10-30 ENCOUNTER — TELEPHONE (OUTPATIENT)
Dept: INTERNAL MEDICINE CLINIC | Age: 79
End: 2019-10-30

## 2019-10-30 ENCOUNTER — CARE COORDINATION (OUTPATIENT)
Dept: CASE MANAGEMENT | Age: 79
End: 2019-10-30

## 2019-11-05 ENCOUNTER — OFFICE VISIT (OUTPATIENT)
Dept: PULMONOLOGY | Age: 79
End: 2019-11-05
Payer: MEDICARE

## 2019-11-05 VITALS
WEIGHT: 168 LBS | HEIGHT: 62 IN | DIASTOLIC BLOOD PRESSURE: 66 MMHG | BODY MASS INDEX: 30.91 KG/M2 | SYSTOLIC BLOOD PRESSURE: 116 MMHG | OXYGEN SATURATION: 95 % | HEART RATE: 75 BPM | RESPIRATION RATE: 16 BRPM

## 2019-11-05 DIAGNOSIS — J84.89 NSIP (NONSPECIFIC INTERSTITIAL PNEUMONIA) (HCC): Primary | ICD-10-CM

## 2019-11-05 DIAGNOSIS — Z23 NEED FOR 23-POLYVALENT PNEUMOCOCCAL POLYSACCHARIDE VACCINE: ICD-10-CM

## 2019-11-05 DIAGNOSIS — Z92.89 HISTORY OF RECENT HOSPITALIZATION: ICD-10-CM

## 2019-11-05 PROCEDURE — G0009 ADMIN PNEUMOCOCCAL VACCINE: HCPCS | Performed by: INTERNAL MEDICINE

## 2019-11-05 PROCEDURE — 99214 OFFICE O/P EST MOD 30 MIN: CPT | Performed by: INTERNAL MEDICINE

## 2019-11-05 PROCEDURE — 90732 PPSV23 VACC 2 YRS+ SUBQ/IM: CPT | Performed by: INTERNAL MEDICINE

## 2019-11-05 RX ORDER — PREDNISONE 20 MG/1
20 TABLET ORAL DAILY
Qty: 30 TABLET | Refills: 1 | Status: SHIPPED | OUTPATIENT
Start: 2019-11-05 | End: 2019-12-05

## 2019-11-06 ASSESSMENT — ENCOUNTER SYMPTOMS
SHORTNESS OF BREATH: 1
WHEEZING: 0
COUGH: 0
CHEST TIGHTNESS: 0
EYE REDNESS: 0

## 2019-11-07 RX ORDER — SITAGLIPTIN 100 MG/1
TABLET, FILM COATED ORAL
Qty: 90 TABLET | Refills: 0 | Status: SHIPPED
Start: 2019-11-07 | End: 2020-02-24

## 2019-11-26 ENCOUNTER — OFFICE VISIT (OUTPATIENT)
Dept: INTERNAL MEDICINE CLINIC | Age: 79
End: 2019-11-26
Payer: MEDICARE

## 2019-11-26 VITALS
WEIGHT: 164 LBS | SYSTOLIC BLOOD PRESSURE: 136 MMHG | HEART RATE: 68 BPM | BODY MASS INDEX: 30 KG/M2 | DIASTOLIC BLOOD PRESSURE: 52 MMHG | OXYGEN SATURATION: 96 %

## 2019-11-26 DIAGNOSIS — J06.9 VIRAL URI: ICD-10-CM

## 2019-11-26 DIAGNOSIS — E08.21 DIABETES MELLITUS DUE TO UNDERLYING CONDITION WITH DIABETIC NEPHROPATHY, WITHOUT LONG-TERM CURRENT USE OF INSULIN (HCC): Primary | ICD-10-CM

## 2019-11-26 DIAGNOSIS — J84.9 ILD (INTERSTITIAL LUNG DISEASE) (HCC): ICD-10-CM

## 2019-11-26 LAB — HBA1C MFR BLD: 7.3 %

## 2019-11-26 PROCEDURE — 83036 HEMOGLOBIN GLYCOSYLATED A1C: CPT | Performed by: NURSE PRACTITIONER

## 2019-11-26 PROCEDURE — 99214 OFFICE O/P EST MOD 30 MIN: CPT | Performed by: NURSE PRACTITIONER

## 2019-11-26 RX ORDER — BLOOD-GLUCOSE METER
1 KIT MISCELLANEOUS DAILY
Qty: 1 KIT | Refills: 0 | Status: SHIPPED | OUTPATIENT
Start: 2019-11-26 | End: 2020-07-27

## 2019-11-26 RX ORDER — LANCETS 28 GAUGE
1 EACH MISCELLANEOUS DAILY
Qty: 100 EACH | Refills: 3 | Status: SHIPPED | OUTPATIENT
Start: 2019-11-26 | End: 2021-02-23

## 2019-11-26 ASSESSMENT — ENCOUNTER SYMPTOMS
SHORTNESS OF BREATH: 0
CHEST TIGHTNESS: 0
COLOR CHANGE: 0
VOMITING: 0
WHEEZING: 0
BLOOD IN STOOL: 0
EYE REDNESS: 0
CONSTIPATION: 0
ABDOMINAL PAIN: 0
SHORTNESS OF BREATH: 1
ALLERGIC/IMMUNOLOGIC NEGATIVE: 1
COUGH: 0
RHINORRHEA: 0
SORE THROAT: 1
EYE ITCHING: 0
BACK PAIN: 0
DIARRHEA: 0
NAUSEA: 0
EYES NEGATIVE: 1
SINUS PRESSURE: 0

## 2019-12-09 ENCOUNTER — TELEPHONE (OUTPATIENT)
Dept: INTERNAL MEDICINE CLINIC | Age: 79
End: 2019-12-09

## 2019-12-10 ENCOUNTER — OFFICE VISIT (OUTPATIENT)
Dept: PULMONOLOGY | Age: 79
End: 2019-12-10
Payer: MEDICARE

## 2019-12-10 ENCOUNTER — OFFICE VISIT (OUTPATIENT)
Dept: INTERNAL MEDICINE CLINIC | Age: 79
End: 2019-12-10
Payer: MEDICARE

## 2019-12-10 VITALS
DIASTOLIC BLOOD PRESSURE: 70 MMHG | HEART RATE: 76 BPM | HEIGHT: 62 IN | SYSTOLIC BLOOD PRESSURE: 134 MMHG | BODY MASS INDEX: 30.18 KG/M2 | OXYGEN SATURATION: 96 % | WEIGHT: 164 LBS | RESPIRATION RATE: 18 BRPM

## 2019-12-10 VITALS
BODY MASS INDEX: 30 KG/M2 | HEART RATE: 78 BPM | OXYGEN SATURATION: 96 % | SYSTOLIC BLOOD PRESSURE: 122 MMHG | WEIGHT: 164 LBS | RESPIRATION RATE: 22 BRPM | DIASTOLIC BLOOD PRESSURE: 60 MMHG

## 2019-12-10 DIAGNOSIS — J84.9 ILD (INTERSTITIAL LUNG DISEASE) (HCC): ICD-10-CM

## 2019-12-10 DIAGNOSIS — J84.89 NSIP (NONSPECIFIC INTERSTITIAL PNEUMONIA) (HCC): Primary | ICD-10-CM

## 2019-12-10 DIAGNOSIS — F41.9 ANXIETY: ICD-10-CM

## 2019-12-10 DIAGNOSIS — Z71.89 ENCOUNTER FOR COUNSELING REGARDING ADVANCE DIRECTIVES: ICD-10-CM

## 2019-12-10 PROCEDURE — 99214 OFFICE O/P EST MOD 30 MIN: CPT | Performed by: INTERNAL MEDICINE

## 2019-12-10 PROCEDURE — 99214 OFFICE O/P EST MOD 30 MIN: CPT | Performed by: NURSE PRACTITIONER

## 2019-12-10 RX ORDER — PREDNISONE 1 MG/1
15 TABLET ORAL DAILY
Qty: 90 TABLET | Refills: 2 | Status: SHIPPED | OUTPATIENT
Start: 2019-12-10 | End: 2020-01-07 | Stop reason: SDUPTHER

## 2019-12-10 ASSESSMENT — ENCOUNTER SYMPTOMS
SINUS PRESSURE: 0
VOMITING: 0
EYE REDNESS: 0
WHEEZING: 0
SHORTNESS OF BREATH: 1
RHINORRHEA: 0
ABDOMINAL PAIN: 0
BLOOD IN STOOL: 0
DIARRHEA: 0
CONSTIPATION: 0
CHEST TIGHTNESS: 0
EYE ITCHING: 0
COUGH: 0
BACK PAIN: 0
SHORTNESS OF BREATH: 0
CHEST TIGHTNESS: 0
WHEEZING: 0
EYE REDNESS: 0
SORE THROAT: 0
COUGH: 0
COLOR CHANGE: 0
NAUSEA: 0

## 2019-12-10 ASSESSMENT — PATIENT HEALTH QUESTIONNAIRE - PHQ9
2. FEELING DOWN, DEPRESSED OR HOPELESS: 0
SUM OF ALL RESPONSES TO PHQ9 QUESTIONS 1 & 2: 0
SUM OF ALL RESPONSES TO PHQ QUESTIONS 1-9: 0
SUM OF ALL RESPONSES TO PHQ QUESTIONS 1-9: 0
1. LITTLE INTEREST OR PLEASURE IN DOING THINGS: 0

## 2019-12-13 ENCOUNTER — TELEPHONE (OUTPATIENT)
Dept: INTERNAL MEDICINE CLINIC | Age: 79
End: 2019-12-13

## 2019-12-16 ENCOUNTER — TELEPHONE (OUTPATIENT)
Dept: CASE MANAGEMENT | Age: 79
End: 2019-12-16

## 2020-01-02 ENCOUNTER — OFFICE VISIT (OUTPATIENT)
Dept: INTERNAL MEDICINE CLINIC | Age: 80
End: 2020-01-02
Payer: MEDICARE

## 2020-01-02 VITALS — SYSTOLIC BLOOD PRESSURE: 138 MMHG | BODY MASS INDEX: 30.25 KG/M2 | WEIGHT: 165.4 LBS | DIASTOLIC BLOOD PRESSURE: 82 MMHG

## 2020-01-02 PROBLEM — H00.015 HORDEOLUM EXTERNUM OF LEFT LOWER EYELID: Status: ACTIVE | Noted: 2020-01-02

## 2020-01-02 PROCEDURE — 99213 OFFICE O/P EST LOW 20 MIN: CPT | Performed by: NURSE PRACTITIONER

## 2020-01-02 RX ORDER — BACITRACIN 500 [USP'U]/G
OINTMENT OPHTHALMIC 3 TIMES DAILY
Qty: 3.5 G | Refills: 0 | Status: SHIPPED | OUTPATIENT
Start: 2020-01-02 | End: 2020-01-12

## 2020-01-02 ASSESSMENT — ENCOUNTER SYMPTOMS
EYE ITCHING: 0
SORE THROAT: 0
NAUSEA: 0
BACK PAIN: 0
ABDOMINAL PAIN: 0
RHINORRHEA: 0
BLOOD IN STOOL: 0
COLOR CHANGE: 0
CONSTIPATION: 0
CHEST TIGHTNESS: 0
VOMITING: 0
DIARRHEA: 0
SINUS PRESSURE: 0
SHORTNESS OF BREATH: 0
WHEEZING: 0
COUGH: 0
EYE REDNESS: 1

## 2020-01-02 NOTE — PATIENT INSTRUCTIONS
Baby shampoo washes three times daily   Antibiotics 3 times daily in the left lower lid  Warm compresses

## 2020-01-02 NOTE — PROGRESS NOTES
or frontal sinus tenderness. Mouth/Throat:      Pharynx: No oropharyngeal exudate or posterior oropharyngeal erythema. Tonsils: No tonsillar abscesses. Eyes:      General:         Left eye: Hordeolum present. Cardiovascular:      Rate and Rhythm: Normal rate and regular rhythm. Heart sounds: Normal heart sounds. Pulmonary:      Effort: Pulmonary effort is normal.      Breath sounds: Normal breath sounds. Lymphadenopathy:      Head:      Right side of head: No submental, submandibular, tonsillar, preauricular, posterior auricular or occipital adenopathy. Left side of head: No submental, submandibular, tonsillar, preauricular, posterior auricular or occipital adenopathy. Cervical: No cervical adenopathy. Skin:     General: Skin is warm and dry. Assessment:      See Problem List assessment and plan       Plan:       Hordeolum externum of left lower eyelid  Warm compresses  Bacitracin  Baby shampoo  Call if no better               Patient engaged in shared decision making. Information given to evaluate options of treatment, understand what is needed and discuss importance of following plan.

## 2020-01-07 ENCOUNTER — OFFICE VISIT (OUTPATIENT)
Dept: PULMONOLOGY | Age: 80
End: 2020-01-07
Payer: MEDICARE

## 2020-01-07 VITALS
RESPIRATION RATE: 16 BRPM | OXYGEN SATURATION: 92 % | WEIGHT: 164 LBS | HEART RATE: 87 BPM | SYSTOLIC BLOOD PRESSURE: 122 MMHG | DIASTOLIC BLOOD PRESSURE: 68 MMHG | HEIGHT: 62 IN | BODY MASS INDEX: 30.18 KG/M2

## 2020-01-07 PROCEDURE — 99214 OFFICE O/P EST MOD 30 MIN: CPT | Performed by: INTERNAL MEDICINE

## 2020-01-07 RX ORDER — PREDNISONE 1 MG/1
15 TABLET ORAL DAILY
Qty: 90 TABLET | Refills: 2 | Status: SHIPPED | OUTPATIENT
Start: 2020-01-07 | End: 2020-02-03 | Stop reason: SDUPTHER

## 2020-01-07 ASSESSMENT — ENCOUNTER SYMPTOMS
WHEEZING: 0
CHEST TIGHTNESS: 0
EYE REDNESS: 0
COUGH: 0
SHORTNESS OF BREATH: 1

## 2020-01-07 NOTE — PROGRESS NOTES
[facility] Pulmonary and Critical Care    Outpatient Note    Subjective:   CHIEF COMPLAINT:   Chief Complaint   Patient presents with    1 Month Follow-Up     Interval history on 1/7/20  Patient is here today for regular f/u. Overall she feels she is getting stronger,. She is not using O2 supplement when getting outside as the portable concentrator bothers her due to its weight. She remains on prednisone 15mg daily     Interval history on 12/10/19  She feels better and denies have SOB at rest however she does have SOB on exertion for which she has to stop. She doesn't like using O2. Pulse ox at rest is 97%, however it goes down within few minutes of exertion    Interval history 11/5/2019  Patient is here today for post hospital follow-up. She was admitted to the hospital on 10/13/2019 and discharged on 10/16/2019. She was admitted with acute hypoxic respiratory failure and pneumonia. She was treated with IV antibiotics and steroids and was discharged on tapering dose prednisone. She was discharged on oxygen. At the time of evaluation she was on room air and she was feeling better. She denied increasing cough or sputum production. There is no fever or chills    Interval history on 9/10/19  Overall she feels better. Coughing is less, and she is able to walk longer distances. Pulse ox is in the mid 90s. There is no fever or chills    Interval history on 7/26/19  Continue to have cough, though she was given taper dose steroids and felt better on it. There is no fever or chills. Sputum is clear. She was seen by rheumatology, there is no evidence of active rheumatological illness. Anti SSA, SSB, CK was ordered. HPI:  On 7/1/19   The patient is 70 y.o. female who presents today for a new patient visit for SOB. This is not entirely new, but apparently it is slowly getting worse.       In the mornings feels out of breath while doing her usual ADL but this gets better as she sit to have breakfast.    She gets SOB on brisk walking and going up hills. She was diagnosed with walking pneumonia in May. At that time she was tired and out of breath. She has chronic cough with occasional sputum production, which is white in color. There is some wt loss ~ 10 lbs after getting pneumonia but gained back two lbs     No nasal congestion but has throat congestion. No post nasal drip. Has GERD for about two years. On omeprazole once daily   She has dry eyes, no dryness in the mouth  No hx of joint pain, warmth or stiffness. No hx of rash.       Past Medical History:    Past Medical History:   Diagnosis Date    Diabetes mellitus ([facility] [address]     Essential hypertension, benign     GERD (gastroesophageal reflux disease)     Hyperlipidemia     Interstitial lung disease (HCC)     Osteoarthrosis, unspecified whether generalized or localized, unspecified site     osteoarthritis lower leg    Osteopenia     Shingles        Social History:    Social History     Tobacco Use   Smoking Status Never Smoker   Smokeless Tobacco Never Used       Family History:  Family History   Problem Relation Age of Onset    Heart Disease Mother     Arthritis Mother     Heart Disease Father        Current Medications:  Current Outpatient Medications on File Prior to Visit   Medication Sig Dispense Refill    bacitracin 500 UNIT/GM ophthalmic ointment Place into the left eye 3 times daily for 10 days 3.5 g 0    Blood Glucose Monitoring Suppl (FREESTYLE FREEDOM LITE) w/Device KIT 1 kit by Does not apply route daily 1 kit 0    FREESTYLE LANCETS MISC 1 each by Does not apply route daily 100 each 3    blood glucose test strips (FREESTYLE LITE) strip 1 each by In Vitro route daily Testing 1-2 times daily per e11.9 100 each 3    JANUVIA 100 MG tablet TAKE ONE TABLET BY MOUTH DAILY 90 tablet 0    rosuvastatin (CRESTOR) 40 MG tablet TAKE ONE TABLET BY MOUTH DAILY 90 tablet 0    metFORMIN (GLUCOPHAGE) 500 MG Negative for chest pain, palpitations and leg swelling. Gastrointestinal:        GERD   Musculoskeletal: Negative for arthralgias and joint swelling. Skin: Negative for rash. Neurological: Negative for weakness. Psychiatric/Behavioral: The patient is not nervous/anxious. All other systems reviewed and are negative. Objective:   PHYSICAL EXAM:        VITALS:  /68 (Site: Right Upper Arm, Position: Sitting, Cuff Size: Large Adult)   Pulse 87   Resp 16   Ht 5' 2\" (1.575 m)   Wt 164 lb (74.4 kg)   SpO2 92%   BMI 30.00 kg/m²     Physical Exam  Vitals signs reviewed. Constitutional:       Appearance: She is well-developed. HENT:      Head: Normocephalic and atraumatic. Eyes:      Pupils: Pupils are equal, round, and reactive to light. Neck:      Musculoskeletal: Neck supple. Vascular: No JVD. Cardiovascular:      Rate and Rhythm: Normal rate and regular rhythm. Heart sounds: No murmur. No friction rub. No gallop. Pulmonary:      Effort: Pulmonary effort is normal. No respiratory distress. Breath sounds: No stridor. Rales (bilateral) present. No wheezing. Abdominal:      General: Bowel sounds are normal.      Palpations: Abdomen is soft. Musculoskeletal:         General: No deformity. Skin:     General: Skin is warm and dry. Neurological:      Mental Status: She is alert and oriented to person, place, and time.    Psychiatric:         Behavior: Behavior normal.         DATA:    CT chest with contrast       HISTORY: Pulmonary nodules.       COMPARISON: February 7, 2019.       Individualized dose optimization technique was used in order to meet ALARA standards for radiation dose reduction.  In addition to vendor specific dose reduction algorithms, the dose reduction techniques vary based on the specific scanner utilized but    frequently include automated exposure control, adjustment of the mA and/or kV according to patient size, and use of iterative Postbronchodilator FEV1 is 1.36 which is 71%  of predicted for a 21% increase. Prebronchodilator FVC is 1.4, which is  54% of predicted. Postbronchodilator FVC is 1.84 which is 71% of  predicted for 31% increase.     Lung volumes showed total lung capacity of 3.04 which is 62% of  predicted. Residual volume is 1.46 which is 64% of predicted.     Diffusion capacity is 9.61, which is 45% of predicted. This is not  adjusted for hemoglobin.     IMPRESSION:  1. No obstructive defect. 2.  There is a significant response to bronchodilators in small and  large airways. 3.  Moderate restrictive defect is present. 4.  Moderate decrease in diffusion capacity    CT scan on 10/8/19  1. Stable findings compared prior study. Nonspecific interstitial pneumonia is favored in the absence of lower lobe predominance and lack of honeycomb changes. 2. Stable right upper lobe pulmonary nodule. 3. Annual surveillance with high-resolution CT is recommended for. Large hiatal hernia. 5. Coronary artery calcification        Assessment:      Diagnosis Orders   1. ILD (interstitial lung disease) ([facility] [address]  Full PFT Study Without Bronchdilator    Carbon Monoxide Diffusing Capacity    6 Minute Walk Test       Plan:   70years old female with NSIP. Diagnosis is based on clinical presentation, CT findings and respond to steroids. She was hospitalized in Oct and treated for pneumonia  CT scan from this hospitalization does not show new airspace disease. Oxygen desaturation testing in the office show drip of pulse ox to 88% on minute 2. Spirometry show FEV1/FVC of 79.8 with FEV1 of 1.46 and FVC of 1.83  FEV1/FVC ratio is better than last month however FEV1 is slightly lower down from 1.46    (There is no specific exposure that she or her family can remember  She is not on chronic medication that is known to cause ILD  She does not have pets  Hypersensitivity panel is negative   Rheumatoid factor, GIUSEPPE and sed rate are negative. SSA and SSB are negative  She was seen by rheumatology. There is no evidence of active rheumatic disease. BAL is negative for infectious etiology)     Had bronch on 8/1/19  Started on prednisone @ 20mg on 8/5/19/2019     Plan  Prednisone decrease to 15 mg in Dec.    Will continue current dose of prednisone for at least additional two months. Given FEV1 ratio is better but FEV1 is slightly worse I will get full PFT with DLCO before next visit in 4 weeks. Will also get standard 6MWT   If she relapse steroid sparing agent can be considered.

## 2020-01-14 ENCOUNTER — OFFICE VISIT (OUTPATIENT)
Dept: INTERNAL MEDICINE CLINIC | Age: 80
End: 2020-01-14
Payer: MEDICARE

## 2020-01-14 VITALS
OXYGEN SATURATION: 91 % | BODY MASS INDEX: 30.4 KG/M2 | SYSTOLIC BLOOD PRESSURE: 132 MMHG | DIASTOLIC BLOOD PRESSURE: 72 MMHG | HEIGHT: 62 IN | HEART RATE: 80 BPM | WEIGHT: 165.2 LBS

## 2020-01-14 PROBLEM — J84.89 NSIP (NONSPECIFIC INTERSTITIAL PNEUMONIA) (HCC): Status: RESOLVED | Noted: 2019-12-10 | Resolved: 2020-01-14

## 2020-01-14 PROBLEM — J06.9 VIRAL URI: Status: RESOLVED | Noted: 2019-02-26 | Resolved: 2020-01-14

## 2020-01-14 PROBLEM — J18.9 PNEUMONIA OF RIGHT MIDDLE LOBE DUE TO INFECTIOUS ORGANISM: Status: RESOLVED | Noted: 2019-05-06 | Resolved: 2020-01-14

## 2020-01-14 PROBLEM — R06.09 DOE (DYSPNEA ON EXERTION): Status: RESOLVED | Noted: 2018-06-08 | Resolved: 2020-01-14

## 2020-01-14 PROCEDURE — 99214 OFFICE O/P EST MOD 30 MIN: CPT | Performed by: INTERNAL MEDICINE

## 2020-01-14 PROCEDURE — 3288F FALL RISK ASSESSMENT DOCD: CPT | Performed by: INTERNAL MEDICINE

## 2020-01-14 PROCEDURE — G8510 SCR DEP NEG, NO PLAN REQD: HCPCS | Performed by: INTERNAL MEDICINE

## 2020-01-14 RX ORDER — ROSUVASTATIN CALCIUM 40 MG/1
TABLET, COATED ORAL
Qty: 90 TABLET | Refills: 1 | Status: SHIPPED | OUTPATIENT
Start: 2020-01-14 | End: 2020-03-13 | Stop reason: SDUPTHER

## 2020-01-14 ASSESSMENT — ENCOUNTER SYMPTOMS
CHEST TIGHTNESS: 0
RESPIRATORY NEGATIVE: 1
WHEEZING: 0
SHORTNESS OF BREATH: 0
GASTROINTESTINAL NEGATIVE: 1

## 2020-01-14 ASSESSMENT — PATIENT HEALTH QUESTIONNAIRE - PHQ9
1. LITTLE INTEREST OR PLEASURE IN DOING THINGS: 0
SUM OF ALL RESPONSES TO PHQ9 QUESTIONS 1 & 2: 0
2. FEELING DOWN, DEPRESSED OR HOPELESS: 0
DEPRESSION UNABLE TO ASSESS: FUNCTIONAL CAPACITY MOTIVATION LIMITS ACCURACY
SUM OF ALL RESPONSES TO PHQ QUESTIONS 1-9: 0
SUM OF ALL RESPONSES TO PHQ QUESTIONS 1-9: 0

## 2020-01-14 NOTE — PROGRESS NOTES
Subjective:      Patient ID: Trey Simon is a 70 y.o. y.o. female. Chief Complaint   Patient presents with    Hyperlipidemia    Hypertension    Diabetes       HPI    Patient is here for a recheck    Her sugars have been good. She is trying to watch her diet. It is mostly running in the low 100s. Patient is taking blood pressure medication without problem  Patient is taking their cholesterol medication and watching diet without problem. She is breathing well. Her oxygen level is staying good. She is not needing Albuterol. She is on 15 mg of Prednisone daily. She states she is breathing better. Vitals:    01/14/20 1302   BP: 132/72   Pulse: 80   SpO2: 91%       Wt Readings from Last 3 Encounters:   01/14/20 165 lb 3.2 oz (74.9 kg)   01/07/20 164 lb (74.4 kg)   01/02/20 165 lb 6.4 oz (75 kg)         Discussed use, benefit, and side effects of prescribed medications. Barriers to medication compliance addressed. All patient questions answered. Pt voiced understanding. Review of Systems   Constitutional: Negative. HENT: Negative. Respiratory: Negative. Negative for chest tightness, shortness of breath and wheezing. Cardiovascular: Negative. Negative for chest pain, palpitations and leg swelling. Gastrointestinal: Negative. Genitourinary: Negative. Musculoskeletal: Negative for arthralgias and myalgias. Neurological: Negative. Objective:   Physical Exam  Constitutional:       Appearance: She is well-developed. Eyes:      Conjunctiva/sclera: Conjunctivae normal.   Neck:      Thyroid: No thyroid mass or thyromegaly. Vascular: No carotid bruit or JVD. Cardiovascular:      Rate and Rhythm: Normal rate and regular rhythm. Heart sounds: Normal heart sounds. No murmur. Pulmonary:      Effort: Pulmonary effort is normal.      Breath sounds: Normal breath sounds. No wheezing.          Assessment:      See Problem List assessment and plan       Plan:
adl's walks daily 5 blocks

## 2020-01-20 ENCOUNTER — TELEPHONE (OUTPATIENT)
Dept: INTERNAL MEDICINE CLINIC | Age: 80
End: 2020-01-20

## 2020-01-20 ENCOUNTER — HOSPITAL ENCOUNTER (OUTPATIENT)
Dept: PULMONOLOGY | Age: 80
Discharge: HOME | End: 2020-01-20
Payer: MEDICARE

## 2020-01-20 VITALS — OXYGEN SATURATION: 97 %

## 2020-01-20 PROCEDURE — 94618 PULMONARY STRESS TESTING: CPT

## 2020-01-20 PROCEDURE — 94729 DIFFUSING CAPACITY: CPT

## 2020-01-20 PROCEDURE — 94726 PLETHYSMOGRAPHY LUNG VOLUMES: CPT

## 2020-01-20 PROCEDURE — 94010 BREATHING CAPACITY TEST: CPT

## 2020-01-20 PROCEDURE — 94664 DEMO&/EVAL PT USE INHALER: CPT

## 2020-01-20 PROCEDURE — 94760 N-INVAS EAR/PLS OXIMETRY 1: CPT

## 2020-01-22 NOTE — PROCEDURES
oxygen  flow rate is deemed to be 2 liters.         Luci Razo MD    D: 01/22/2020 8:09:14       T: 01/22/2020 9:44:40     [email or web address]  Job#: [id number]     Doc#: [id number]    CC:

## 2020-02-03 ENCOUNTER — OFFICE VISIT (OUTPATIENT)
Dept: PULMONOLOGY | Age: 80
End: 2020-02-03
Payer: MEDICARE

## 2020-02-03 VITALS
HEART RATE: 64 BPM | OXYGEN SATURATION: 96 % | WEIGHT: 163 LBS | BODY MASS INDEX: 29.81 KG/M2 | RESPIRATION RATE: 16 BRPM | SYSTOLIC BLOOD PRESSURE: 136 MMHG | DIASTOLIC BLOOD PRESSURE: 82 MMHG

## 2020-02-03 PROBLEM — J84.9 ILD (INTERSTITIAL LUNG DISEASE) (HCC): Status: RESOLVED | Noted: 2019-07-02 | Resolved: 2020-02-03

## 2020-02-03 PROCEDURE — 99214 OFFICE O/P EST MOD 30 MIN: CPT | Performed by: INTERNAL MEDICINE

## 2020-02-03 RX ORDER — PREDNISONE 1 MG/1
15 TABLET ORAL DAILY
Qty: 90 TABLET | Refills: 2 | Status: SHIPPED | OUTPATIENT
Start: 2020-02-03 | End: 2020-05-03

## 2020-02-03 ASSESSMENT — ENCOUNTER SYMPTOMS
SHORTNESS OF BREATH: 1
WHEEZING: 0
COUGH: 0
CHEST TIGHTNESS: 0
EYE REDNESS: 0

## 2020-02-03 NOTE — PROGRESS NOTES
tablet TAKE ONE TABLET BY MOUTH DAILY 90 tablet 0    metFORMIN (GLUCOPHAGE) 500 MG tablet take 1 tablet by mouth in the morning, and take 2 tablets nightly with dinner  270 tablet 0    escitalopram (LEXAPRO) 10 MG tablet Take 1 tablet by mouth daily 90 tablet 0    glipiZIDE (GLUCOTROL) 10 MG tablet TAKE ONE TABLET BY MOUTH DAILY 90 tablet 0    blood glucose monitor strips Test 2 times a day & as needed for symptoms of irregular blood glucose. One Touch Verio strips 100 strip 2    Lancets MISC 1 each by Does not apply route 2 times daily 100 each 5    albuterol sulfate  (90 Base) MCG/ACT inhaler Inhale 2 puffs into the lungs 4 times daily as needed for Shortness of Breath 1 Inhaler 5    irbesartan (AVAPRO) 300 MG tablet TAKE ONE TABLET BY MOUTH DAILY 90 tablet 2    amLODIPine (NORVASC) 10 MG tablet Take 1 tablet by mouth daily 90 tablet 1    traZODone (DESYREL) 100 MG tablet Take 1 tablet by mouth nightly as needed for Sleep 90 tablet 1    ONETOUCH DELICA LANCETS 15R MISC USE ONE LANCET TO TEST TWICE A DAY per E11.9 100 each 5    Cyanocobalamin (B-12 PO) Take by mouth      Probiotic Product (PROBIOTIC ACIDOPHILUS) CAPS Take 1 capsule by mouth daily      VITAMIN D, CHOLECALCIFEROL, PO Take 1 tablet by mouth daily      Blood Glucose Monitoring Suppl (DatadecisionEZE 2 SYSTEM) W/DEVICE KIT One machine one time 1 kit 0    Omeprazole 20 MG TBEC Take 1 tablet by mouth daily as needed.  vitamin E 1000 UNITS capsule Take 1,000 Units by mouth daily.  Blood Glucose Monitoring Suppl (ChicisimoEZE MONITOR) KIT by Does not apply route See Admin Instructions. Test strips. Test twice daily. 60 kit 6     No current facility-administered medications on file prior to visit. REVIEW OF SYSTEMS:    Review of Systems   Constitutional: Negative for chills and fever. HENT: Negative for postnasal drip. Eyes: Negative for redness.         Dry eyes   Respiratory: Positive for shortness of breath adjustment of the mA and/or kV according to patient size, and use of iterative reconstruction technique.           FINDINGS:       Basilar predominant groundglass opacity with areas of traction bronchiectasis are similar to the prior examination. There is no definite honeycombing identified.       7 mm nodule identified in the right upper lobe (image 38, series 200) is unchanged since prior exam.       5 mm nodule in the left upper lobe (image 27, series 2) unchanged. No new or enlarging pulmonary nodules.       A small pericardial effusion is identified. Mildly enlarged mediastinal lymph nodes are stable since the prior examination.       Hiatal hernia is present.       There is no destructive bone lesion.           Impression   1. Stable pulmonary nodules. Continued follow-up is indicated. 2. Stable appearance of interstitial lung disease as described above. There is no definite honeycombing. The favored differential diagnosis is nonspecific interstitial pneumonia. This would be an atypical appearance for usual interstitial pneumonia. 3. Mediastinal adenopathy, stable. 4. Hiatal hernia. Echo on 6/11/19  Normal left ventricle size. There is mild concentric left ventricular   hypertrophy. Overall left ventricular systolic function appears normal with   an ejection fraction visually estimated at 55%. No regional wall motion   abnormalities are noted. Diastolic filling parameters suggest grade II   diastolic dysfunction.   Trace mitral regurgitation is present.   Trivial tricuspid regurgitation. Estimated pulmonary artery systolic   pressure is elevated at 44 mmHg assuming a right atrial pressure of 3 mmHg.     PFT  DATE OF PROCEDURE:  06/11/2019     INDICATION:  Shortness of breath.     BMI:  31.6.     TOBACCO HISTORY:  Never smoked.     Spirometry data is acceptable and reproducible.     Lung volumes performed via plethysmography.     Room air oxygen saturation is 92%.     SPIROMETRY:  FEV1 to FVC ratio is FVC, FEV1, and total lung capacity. Diffusion capacity is substantially higher, almost double making me  wonder his diffusion capacity in 10/2019 was real.     Six-minute walk was initially performed on room air. Of note, the  patient is on home oxygen 2 liters. At rest on room air, the patient's  oxygen saturation was 98%. Within 2 minutes, it dropped to 85%  necessitating pause and walk to place 2 liters of oxygen on. With 2  liters of oxygen, her oxygen saturation maintained 93 to 97%. The  patient walked a total of 660 feet.     The patient has significant oxygen desaturation with submaximal  exercise. She does qualify for home oxygen with exertion and oxygen  flow rate is deemed to be 2 liters. Assessment:      Diagnosis Orders   1. NSIP (nonspecific interstitial pneumonia) ([facility] [address]         Plan:   70years old female with NSIP. Diagnosis is based on clinical presentation, CT findings and response to steroids. She was hospitalized in Oct and treated for pneumonia  CT scan from this hospitalization does not show new airspace disease. 6MWT on 1/20/20 show significant desaturation within 2 minutes - down to 85%, requiring 2 liters of O2. However she does not use O2 unless she feels she needs needs it. Repeat PFT on 1/20/20 show FEV1 similar to prior office spirometry tests, however it is slightly lower than PFT in October 2019. There is significant improvement in DLCO. (There is no specific exposure that she or her family can remember  She is not on chronic medication that is known to cause ILD  She does not have pets  Hypersensitivity panel is negative   Rheumatoid factor, GIUSEPPE and sed rate are negative. SSA and SSB are negative  She was seen by rheumatology. There is no evidence of active rheumatic disease.    BAL is negative for infectious etiology)     Had bronch on 8/1/19  Started on prednisone @ 20mg on 8/5/19    Plan  Prednisone decrease to 15 mg in Dec - plan to continue on 15mg

## 2020-02-05 ENCOUNTER — TELEPHONE (OUTPATIENT)
Dept: INTERNAL MEDICINE CLINIC | Age: 80
End: 2020-02-05

## 2020-02-05 ENCOUNTER — OFFICE VISIT (OUTPATIENT)
Dept: INTERNAL MEDICINE CLINIC | Age: 80
End: 2020-02-05
Payer: MEDICARE

## 2020-02-05 VITALS
BODY MASS INDEX: 30 KG/M2 | HEART RATE: 70 BPM | OXYGEN SATURATION: 94 % | DIASTOLIC BLOOD PRESSURE: 78 MMHG | SYSTOLIC BLOOD PRESSURE: 134 MMHG | WEIGHT: 164 LBS

## 2020-02-05 PROBLEM — N39.0 ACUTE UTI: Status: ACTIVE | Noted: 2020-02-05

## 2020-02-05 LAB
BILIRUBIN, POC: NORMAL
BLOOD URINE, POC: NORMAL
CLARITY, POC: CLEAR
COLOR, POC: YELLOW
GLUCOSE URINE, POC: NORMAL
KETONES, POC: NORMAL
LEUKOCYTE EST, POC: NORMAL
NITRITE, POC: NORMAL
PH, POC: 5
PROTEIN, POC: NORMAL
SPECIFIC GRAVITY, POC: 1.01
UROBILINOGEN, POC: 0.2

## 2020-02-05 PROCEDURE — 99213 OFFICE O/P EST LOW 20 MIN: CPT | Performed by: NURSE PRACTITIONER

## 2020-02-05 PROCEDURE — 81002 URINALYSIS NONAUTO W/O SCOPE: CPT | Performed by: NURSE PRACTITIONER

## 2020-02-05 RX ORDER — MELOXICAM 15 MG/1
15 TABLET ORAL DAILY
Qty: 30 TABLET | Refills: 3 | Status: SHIPPED | OUTPATIENT
Start: 2020-02-05 | End: 2020-02-24 | Stop reason: SDUPTHER

## 2020-02-05 RX ORDER — NITROFURANTOIN 25; 75 MG/1; MG/1
100 CAPSULE ORAL 2 TIMES DAILY
Qty: 14 CAPSULE | Refills: 0 | Status: SHIPPED | OUTPATIENT
Start: 2020-02-05 | End: 2020-02-12

## 2020-02-05 ASSESSMENT — ENCOUNTER SYMPTOMS
COUGH: 0
ABDOMINAL PAIN: 0
SHORTNESS OF BREATH: 0
EYE REDNESS: 0
RHINORRHEA: 0
BACK PAIN: 1
CHEST TIGHTNESS: 0
NAUSEA: 0
WHEEZING: 0
SINUS PRESSURE: 0
DIARRHEA: 0
COLOR CHANGE: 0
CONSTIPATION: 0
BLOOD IN STOOL: 0
SORE THROAT: 0
EYE ITCHING: 0
VOMITING: 0

## 2020-02-05 NOTE — PROGRESS NOTES
Subjective:      Patient ID: Conni Baumgarten is a 70 y.o. female. Chief Complaint   Patient presents with    Back Pain     lower x 5 days       HPI  Allen Ospina is in the office today with lower back pain for the past 5 days that is wrapping around to her flank. She has not been taking anything for her symptoms. She states that she is not having any urinary symptoms. Review of Systems   Constitutional: Negative for chills, fatigue and fever. HENT: Negative for congestion, ear pain, postnasal drip, rhinorrhea, sinus pressure, sneezing and sore throat. Eyes: Negative for redness and itching. Respiratory: Negative for cough, chest tightness, shortness of breath and wheezing. Cardiovascular: Negative for chest pain and palpitations. Gastrointestinal: Negative for abdominal pain, blood in stool, constipation, diarrhea, nausea and vomiting. Endocrine: Negative for cold intolerance and heat intolerance. Genitourinary: Negative for difficulty urinating, dysuria, flank pain, frequency, hematuria and urgency. Musculoskeletal: Positive for back pain (lower ). Negative for arthralgias, joint swelling and myalgias. Skin: Negative for color change, pallor, rash and wound. Allergic/Immunologic: Negative for environmental allergies and food allergies. Neurological: Negative for dizziness, seizures, syncope, weakness, light-headedness, numbness and headaches. Hematological: Negative for adenopathy. Does not bruise/bleed easily. Psychiatric/Behavioral: Negative for confusion, sleep disturbance and suicidal ideas. The patient is not nervous/anxious and is not hyperactive. Objective:   Physical Exam  Constitutional:       Appearance: She is well-developed. HENT:      Right Ear: Hearing, tympanic membrane, ear canal and external ear normal.      Left Ear: Hearing, tympanic membrane, ear canal and external ear normal.      Nose: No mucosal edema or rhinorrhea.       Right Sinus: No maxillary sinus tenderness or frontal sinus tenderness. Left Sinus: No maxillary sinus tenderness or frontal sinus tenderness. Mouth/Throat:      Pharynx: No oropharyngeal exudate or posterior oropharyngeal erythema. Tonsils: No tonsillar abscesses. Cardiovascular:      Rate and Rhythm: Normal rate and regular rhythm. Heart sounds: Normal heart sounds. Pulmonary:      Effort: Pulmonary effort is normal.      Breath sounds: Normal breath sounds. Lymphadenopathy:      Head:      Right side of head: No submental, submandibular, tonsillar, preauricular, posterior auricular or occipital adenopathy. Left side of head: No submental, submandibular, tonsillar, preauricular, posterior auricular or occipital adenopathy. Cervical: No cervical adenopathy. Skin:     General: Skin is warm and dry. Assessment:      See Problem List assessment and plan       Plan:       Acute UTI  Send culture  macrobid   Call if no better                 Patient engaged in shared decision making. Information given to evaluate options of treatment, understand what is needed and discuss importance of following plan.

## 2020-02-07 LAB
ORGANISM: ABNORMAL
URINE CULTURE, ROUTINE: ABNORMAL

## 2020-02-11 ENCOUNTER — TELEPHONE (OUTPATIENT)
Dept: INTERNAL MEDICINE CLINIC | Age: 80
End: 2020-02-11

## 2020-02-11 LAB
BILIRUBIN URINE: NEGATIVE
BLOOD, URINE: NEGATIVE
CLARITY: CLEAR
COLOR: YELLOW
EPITHELIAL CELLS, UA: 3 /HPF (ref 0–5)
GLUCOSE URINE: NEGATIVE MG/DL
HYALINE CASTS: 1 /LPF (ref 0–8)
KETONES, URINE: NEGATIVE MG/DL
LEUKOCYTE ESTERASE, URINE: ABNORMAL
MICROSCOPIC EXAMINATION: YES
NITRITE, URINE: NEGATIVE
PH UA: 6 (ref 5–8)
PROTEIN UA: ABNORMAL MG/DL
RBC UA: 2 /HPF (ref 0–4)
SPECIFIC GRAVITY UA: 1.01 (ref 1–1.03)
URINE TYPE: ABNORMAL
UROBILINOGEN, URINE: 0.2 E.U./DL
WBC UA: 5 /HPF (ref 0–5)

## 2020-02-12 LAB — URINE CULTURE, ROUTINE: NORMAL

## 2020-02-13 ENCOUNTER — TELEPHONE (OUTPATIENT)
Dept: INTERNAL MEDICINE CLINIC | Age: 80
End: 2020-02-13

## 2020-02-13 RX ORDER — AMLODIPINE BESYLATE 10 MG/1
10 TABLET ORAL DAILY
Qty: 90 TABLET | Refills: 1 | Status: SHIPPED | OUTPATIENT
Start: 2020-02-13 | End: 2020-03-13 | Stop reason: SDUPTHER

## 2020-02-13 RX ORDER — GLIPIZIDE 10 MG/1
10 TABLET ORAL DAILY
Qty: 90 TABLET | Refills: 0 | Status: SHIPPED | OUTPATIENT
Start: 2020-02-13 | End: 2020-03-13 | Stop reason: SDUPTHER

## 2020-02-13 NOTE — TELEPHONE ENCOUNTER
Pt's side pain hasn't gotten any better  She has tried Advil but nothing has helped  Fariba Bustillos tried to send a cream but it needed a PA  Has anything happened with that? What else can she do for pain?

## 2020-02-13 NOTE — TELEPHONE ENCOUNTER
Patient requesting a medication refill. Medication glipiZIDE (GLUCOTROL)  Dosage  10 MG tablet   FrequencyTAKE ONE TABLET BY MOUTH DAILY  Last [facility] on 7/31/19  [facility] [place] [address] [place], [place] [address] [phone]     Patient requesting a medication refill.   Medication amLODIPine (NORVASC)  Dosage 10 MG tablet   FrequencyTake 1 tablet by mouth daily  Last filled on 1/30/19  Pharmacy  [address] [place] [address], [address] [place] [address] -  [phone]

## 2020-02-14 ENCOUNTER — HOSPITAL ENCOUNTER (OUTPATIENT)
Dept: ULTRASOUND IMAGING | Age: 80
Discharge: HOME | End: 2020-02-14
Payer: MEDICARE

## 2020-02-14 PROCEDURE — 76770 US EXAM ABDO BACK WALL COMP: CPT

## 2020-02-18 ENCOUNTER — TELEPHONE (OUTPATIENT)
Dept: INTERNAL MEDICINE CLINIC | Age: 80
End: 2020-02-18

## 2020-02-24 ENCOUNTER — HOSPITAL ENCOUNTER (OUTPATIENT)
Dept: GENERAL RADIOLOGY | Age: 80
Discharge: HOME | End: 2020-02-24
Payer: MEDICARE

## 2020-02-24 ENCOUNTER — HOSPITAL ENCOUNTER (OUTPATIENT)
Age: 80
Discharge: HOME | End: 2020-02-24
Payer: MEDICARE

## 2020-02-24 ENCOUNTER — OFFICE VISIT (OUTPATIENT)
Dept: INTERNAL MEDICINE CLINIC | Age: 80
End: 2020-02-24
Payer: MEDICARE

## 2020-02-24 VITALS
DIASTOLIC BLOOD PRESSURE: 60 MMHG | OXYGEN SATURATION: 91 % | WEIGHT: 157 LBS | SYSTOLIC BLOOD PRESSURE: 150 MMHG | BODY MASS INDEX: 28.72 KG/M2 | HEART RATE: 80 BPM

## 2020-02-24 PROBLEM — N39.0 ACUTE UTI: Status: RESOLVED | Noted: 2020-02-05 | Resolved: 2020-02-24

## 2020-02-24 PROBLEM — M25.551 RIGHT HIP PAIN: Status: ACTIVE | Noted: 2020-02-24

## 2020-02-24 PROBLEM — M25.552 LEFT HIP PAIN: Status: ACTIVE | Noted: 2020-02-24

## 2020-02-24 PROBLEM — H00.015 HORDEOLUM EXTERNUM OF LEFT LOWER EYELID: Status: RESOLVED | Noted: 2020-01-02 | Resolved: 2020-02-24

## 2020-02-24 PROBLEM — R91.1 RIGHT LOWER LOBE PULMONARY NODULE: Status: RESOLVED | Noted: 2019-01-30 | Resolved: 2020-02-24

## 2020-02-24 LAB — HBA1C MFR BLD: 7.4 %

## 2020-02-24 PROCEDURE — 96372 THER/PROPH/DIAG INJ SC/IM: CPT | Performed by: NURSE PRACTITIONER

## 2020-02-24 PROCEDURE — 73502 X-RAY EXAM HIP UNI 2-3 VIEWS: CPT

## 2020-02-24 PROCEDURE — 99214 OFFICE O/P EST MOD 30 MIN: CPT | Performed by: NURSE PRACTITIONER

## 2020-02-24 PROCEDURE — 83036 HEMOGLOBIN GLYCOSYLATED A1C: CPT | Performed by: NURSE PRACTITIONER

## 2020-02-24 PROCEDURE — 72220 X-RAY EXAM SACRUM TAILBONE: CPT

## 2020-02-24 RX ORDER — ESCITALOPRAM OXALATE 20 MG/1
20 TABLET ORAL DAILY
Qty: 90 TABLET | Refills: 0 | Status: SHIPPED | OUTPATIENT
Start: 2020-02-24 | End: 2020-03-13 | Stop reason: SDUPTHER

## 2020-02-24 RX ORDER — MELOXICAM 15 MG/1
15 TABLET ORAL DAILY
Qty: 30 TABLET | Refills: 3 | Status: SHIPPED | OUTPATIENT
Start: 2020-02-24 | End: 2020-12-07

## 2020-02-24 RX ORDER — HYDROXYZINE HYDROCHLORIDE 25 MG/1
25 TABLET, FILM COATED ORAL 4 TIMES DAILY PRN
Qty: 120 TABLET | Refills: 0 | Status: SHIPPED | OUTPATIENT
Start: 2020-02-24 | End: 2020-03-05

## 2020-02-24 RX ORDER — METHYLPREDNISOLONE ACETATE 80 MG/ML
80 INJECTION, SUSPENSION INTRA-ARTICULAR; INTRALESIONAL; INTRAMUSCULAR; SOFT TISSUE ONCE
Status: COMPLETED | OUTPATIENT
Start: 2020-02-24 | End: 2020-02-24

## 2020-02-24 RX ADMIN — METHYLPREDNISOLONE ACETATE 80 MG: 80 INJECTION, SUSPENSION INTRA-ARTICULAR; INTRALESIONAL; INTRAMUSCULAR; SOFT TISSUE at 10:36

## 2020-02-24 ASSESSMENT — ENCOUNTER SYMPTOMS
EYE ITCHING: 0
COUGH: 0
COLOR CHANGE: 0
RHINORRHEA: 0
ABDOMINAL PAIN: 0
NAUSEA: 0
EYE REDNESS: 0
VOMITING: 0
SORE THROAT: 0
SINUS PRESSURE: 0
BLOOD IN STOOL: 0
CONSTIPATION: 0
SHORTNESS OF BREATH: 0
DIARRHEA: 0
CHEST TIGHTNESS: 0
BACK PAIN: 0
WHEEZING: 0

## 2020-02-24 ASSESSMENT — PATIENT HEALTH QUESTIONNAIRE - PHQ9
1. LITTLE INTEREST OR PLEASURE IN DOING THINGS: 1
2. FEELING DOWN, DEPRESSED OR HOPELESS: 1
SUM OF ALL RESPONSES TO PHQ QUESTIONS 1-9: 2
SUM OF ALL RESPONSES TO PHQ9 QUESTIONS 1 & 2: 2
SUM OF ALL RESPONSES TO PHQ QUESTIONS 1-9: 2

## 2020-02-24 NOTE — ASSESSMENT & PLAN NOTE
Pain increased with both abduction and adduction  Pain with palpation to left hip and lower back   Proceed with imaging at this time   Follow up per imaging   Depo given in office.

## 2020-02-24 NOTE — PROGRESS NOTES
Subjective:      Patient ID: John Jimenez is a 70 y.o. female. Chief Complaint   Patient presents with    Hip Pain     Left       HPI  Hina Santamaria is in the office today with left hip pain that is getting progressively worse. She states that nothing is helping the pain  Pain starts in the morning and gets worse as the day progresses. She states that advil and mobic are not helping  She denies any trauma  She has never had imaging done. She also has been more anxious due to the passing of her . She is taking lexapro daily at 10 mg and has been taking that dose for a while now. She states that there are moments when she needs help calming down. She also is asking for new option for [facility] as it is too expensive  a1c if 7.4 in office today. Review of Systems   Constitutional: Negative for chills, fatigue and fever. HENT: Negative for congestion, ear pain, postnasal drip, rhinorrhea, sinus pressure, sneezing and sore throat. Eyes: Negative for redness and itching. Respiratory: Negative for cough, chest tightness, shortness of breath and wheezing. Cardiovascular: Negative for chest pain and palpitations. Gastrointestinal: Negative for abdominal pain, blood in stool, constipation, diarrhea, nausea and vomiting. Endocrine: Negative for cold intolerance and heat intolerance. Genitourinary: Negative for difficulty urinating, dysuria, flank pain, frequency, hematuria and urgency. Musculoskeletal: Negative for arthralgias, back pain, joint swelling and myalgias. Skin: Negative for color change, pallor, rash and wound. Allergic/Immunologic: Negative for environmental allergies and food allergies. Neurological: Negative for dizziness, seizures, syncope, weakness, light-headedness, numbness and headaches. Hematological: Negative for adenopathy. Does not bruise/bleed easily. Psychiatric/Behavioral: Negative for confusion, sleep disturbance and suicidal ideas.  The patient is not nervous/anxious and is not hyperactive. Objective:   Physical Exam  Constitutional:       Appearance: She is well-developed. HENT:      Head: Normocephalic. Right Ear: External ear normal.      Left Ear: External ear normal.   Eyes:      Conjunctiva/sclera: Conjunctivae normal.      Pupils: Pupils are equal, round, and reactive to light. Neck:      Musculoskeletal: Normal range of motion and neck supple. Vascular: No JVD. Cardiovascular:      Rate and Rhythm: Normal rate and regular rhythm. Heart sounds: No murmur. No friction rub. No gallop. Pulmonary:      Effort: Pulmonary effort is normal. No respiratory distress. Breath sounds: Normal breath sounds. No wheezing. Abdominal:      General: Bowel sounds are normal. There is no distension. Palpations: Abdomen is soft. There is no mass. Tenderness: There is no abdominal tenderness. There is no guarding or rebound. Musculoskeletal: Normal range of motion. General: No tenderness. Skin:     General: Skin is warm and dry. Neurological:      Mental Status: She is alert and oriented to person, place, and time. Deep Tendon Reflexes: Reflexes are normal and symmetric. Psychiatric:         Behavior: Behavior normal.         Assessment:      See Problem List assessment and plan       Plan:       Anxiety  At this time, will increase her to 20 mg daily   Will also add atarax as needed   Follow up in 1 month       DM (diabetes mellitus) ([address]  At this time continue what is left of januvia, then switch to tradjenta due to cost  Follow up with how doing on medication   a1c controlled at this time     Left hip pain  Pain increased with both abduction and adduction  Pain with palpation to left hip and lower back   Proceed with imaging at this time   Follow up per imaging   Depo given in office. Patient engaged in shared decision making.  Information given to evaluate options of treatment, understand what is needed and discuss importance of following plan.

## 2020-03-04 ENCOUNTER — OFFICE VISIT (OUTPATIENT)
Dept: ORTHOPEDIC SURGERY | Age: 80
End: 2020-03-04
Payer: MEDICARE

## 2020-03-04 VITALS
SYSTOLIC BLOOD PRESSURE: 156 MMHG | HEART RATE: 62 BPM | WEIGHT: 155 LBS | BODY MASS INDEX: 28.52 KG/M2 | DIASTOLIC BLOOD PRESSURE: 74 MMHG | HEIGHT: 62 IN

## 2020-03-04 PROCEDURE — 99204 OFFICE O/P NEW MOD 45 MIN: CPT | Performed by: ORTHOPAEDIC SURGERY

## 2020-03-04 NOTE — PROGRESS NOTES
Date:  3/4/2020    Name:  Cindy Carr  Address:  [place] [address]    :  1940      Age:   70 y.o.    SSN:  [id number]      Medical Record Number:  [id number]    Reason for Visit:    Chief Complaint    Hip Pain (left hip) and Lower Back Pain (left side)      DOS:      HPI: Gavin Fuller is a 70 y.o. female here today for low back pain . Bilateral lumbar pain. Hip xrays with mild osteoarthritis. Pain over the past several months coinciding with recent passing of . She does not remmebr any obvious trauma or fall. Very limited with emotional descriptions of 's passing. Does feel that hte pain started at hte time of his demise. No bowel bladder issues. No gross radiation of pain . No groin pain. Review of Systems:  Review of Systems   Constitutional: Negative for chills and fever. HENT: Negative for nosebleeds. Eyes: Negative for double vision. Cardiovascular: Negative for chest pain. Gastrointestinal: Negative for abdominal pain. Musculoskeletal: Positive for joint pain and myalgias. Skin: Negative for rash. Neurological: Negative for seizures. Psychiatric/Behavioral: Negative for hallucinations.         Past History:  Past Medical History:   Diagnosis Date    Diabetes mellitus ([address]     Essential hypertension, benign     GERD (gastroesophageal reflux disease)     Hyperlipidemia     Interstitial lung disease (HCC)     Osteoarthrosis, unspecified whether generalized or localized, unspecified site     osteoarthritis lower leg    Osteopenia     Shingles      Past Surgical History:   Procedure Laterality Date    ANKLE SURGERY Right 13    BRONCHOSCOPY N/A 2019    BRONCHOSCOPY WITH BAL AND TRANSBRONCHIAL BIOPSIES performed by Salud Santoyo MD at [facility]  10/11/2010    Dr. Soren Parks , polyps and diverticulosis    COLONOSCOPY  2002    Dr. Watts Primes polyps, Diverticulosis    Vitro route daily Testing 1-2 times daily per e11.9 100 each 3    metFORMIN (GLUCOPHAGE) 500 MG tablet take 1 tablet by mouth in the morning, and take 2 tablets nightly with dinner  270 tablet 0    blood glucose monitor strips Test 2 times a day & as needed for symptoms of irregular blood glucose. One Touch Verio strips 100 strip 2    Lancets MISC 1 each by Does not apply route 2 times daily 100 each 5    albuterol sulfate  (90 Base) MCG/ACT inhaler Inhale 2 puffs into the lungs 4 times daily as needed for Shortness of Breath 1 Inhaler 5    irbesartan (AVAPRO) 300 MG tablet TAKE ONE TABLET BY MOUTH DAILY 90 tablet 2    traZODone (DESYREL) 100 MG tablet Take 1 tablet by mouth nightly as needed for Sleep 90 tablet 1    ONETOUCH DELICA LANCETS 24G MISC USE ONE LANCET TO TEST TWICE A DAY per E11.9 100 each 5    Cyanocobalamin (B-12 PO) Take by mouth      Probiotic Product (PROBIOTIC ACIDOPHILUS) CAPS Take 1 capsule by mouth daily      VITAMIN D, CHOLECALCIFEROL, PO Take 1 tablet by mouth daily      Blood Glucose Monitoring Suppl (Reachpod - Inovaktif BilisimEZE 2 SYSTEM) W/DEVICE KIT One machine one time 1 kit 0    Omeprazole 20 MG TBEC Take 1 tablet by mouth daily as needed.  vitamin E 1000 UNITS capsule Take 1,000 Units by mouth daily.  Blood Glucose Monitoring Suppl (Audience PartnersEZE MONITOR) KIT by Does not apply route See Admin Instructions. Test strips. Test twice daily. 60 kit 6     No current facility-administered medications for this visit. Allergies: Allergies   Allergen Reactions    Percocet [Oxycodone-Acetaminophen] Other (See Comments)     ELEVATED BLOOD SUGAR       Physical Exam:  Vitals:    03/04/20 1503   BP: (!) 156/74   Pulse: 62       Physical Exam   Constitutional: Patient is oriented to person, place, and time and well-developed, well-nourished, and in no distress. HENT:   Head: Normocephalic and atraumatic. Eyes: Pupils are equal, round, and reactive to light.    Neck: No tracheal deviation present. No thyromegaly present. Pulmonary/Chest: Effort normal.   Abdominal: Soft. There is no guarding. Musculoskeletal: Patient exhibits tenderness and pain. Neurological: Patient is alert and oriented to person, place, and time. Skin: Skin is warm. Psychiatric: Affect normal.     General: Mylene Wakefield is a healthy and well appearing 70y.o. year old female who is sitting comfortably in our office in acute distress. Alert and oriented. Physical Exam:  RUE:    No gross deformities noted. Sensation is intact to light touch throughout the median, ulnar and radial nerve distribution. Able to wiggle fingers, gives thumbs up, A-okay and cross index and middle fingers. Full range of motion of the hand, wrist, elbow and shoulder. LUE:   No gross deformities noted. Sensation is intact to light touch throughout the median, ulnar and radial nerve distribution. Able to wiggle fingers, gives thumbs up, A-okay and cross index and middle fingers. Full range of motion of the hand wrist elbow and shoulder. RLE:   No gross deformities noted. Sensation is intact to light touch throughout the lower extremity. Able to wiggle toes and plantar and dorsiflex foot. Full range of motion at the ankle, knee and hip    LLE:   No gross deformities noted. Sensation is intact to light touch throughout the lower extremity. Able to wiggle toes and plantar and dorsiflex foot. Full range of motion at the ankle, knee and hip    Back with localized pain in the upper sacrum and paraspinal pain with restricted rom of the lumbar spine. No scoliosis. decrase in lumbar lordosis. Neuro stable to testing for gross motor sensaory and reflex examination. Facet maneuvers with pain bilaterally   Lateral tilt with noted irritation but not severe pain. Pain marked in the sacroliliac zones and the iliolumbar fascia. Lateral trochanteric pain.          Diagnostics:  Xray   Have reviewed the xrays above from 03/04/20   and my impression is: lumbar xrays 4 view with ap /lateral and flexion and extension with noted sacralization of the L5 and large transverse processes extending to the iliac crest.     1. Chronic bilateral low back pain without sciatica     - XR LUMBAR SPINE (MIN 4 VIEWS)      Assessment:  Pain in the sacrum and degenerative changes with sacralization of the L5 vertebra noted on xrays and considtent with exxmination. Possible sacral fracture that is improving or lumbar osteoarthritis flare. Limited activites at this point. Plan:  Generalized exercise plan suggested with continued waiting for improvement. No obvious areas of fracture noted not any additional pathologic areas of the spine noted. Have discussed MRI as well but would wait to see if pains resolved because of potential conversion disorder, sacral fracture healing or resolutation of flare up of lumbar degenerative changes. If pain worsens could consider MRI. Physical therapy at this point was something that she did nto want ot pursue nor did she want ot consider additional pain medications other then Tylenol. Have discussed that she is taking prednisone for lung issues as well as meloxicam and occasional ibuprofen or aleve. Have discussed discontinuation of the Nsaids for ulcer and kidney issues. Follow up in 2 months or earlier as needed.        [unfilled]     [facility]    Date:    3/4/2020

## 2020-03-05 ENCOUNTER — OFFICE VISIT (OUTPATIENT)
Dept: PULMONOLOGY | Age: 80
End: 2020-03-05
Payer: MEDICARE

## 2020-03-05 VITALS
HEIGHT: 62 IN | BODY MASS INDEX: 28.71 KG/M2 | WEIGHT: 156 LBS | RESPIRATION RATE: 16 BRPM | OXYGEN SATURATION: 94 % | DIASTOLIC BLOOD PRESSURE: 78 MMHG | HEART RATE: 70 BPM | SYSTOLIC BLOOD PRESSURE: 124 MMHG

## 2020-03-05 PROCEDURE — 99214 OFFICE O/P EST MOD 30 MIN: CPT | Performed by: INTERNAL MEDICINE

## 2020-03-05 ASSESSMENT — ENCOUNTER SYMPTOMS
CHEST TIGHTNESS: 0
COUGH: 0
SHORTNESS OF BREATH: 1
EYE REDNESS: 0
WHEEZING: 0

## 2020-03-05 NOTE — PROGRESS NOTES
[facility] Pulmonary and Critical Care    Outpatient Note    Subjective:   CHIEF COMPLAINT:   No chief complaint on file. Interval history on 3/5/20  Lost her  last month. She came accompanied by a family member who is staying with her these days. There is no SOB at rest.  She is using O2 at home with exertion. Overall she feels her breathing is better. She is still on prednisone 15mg daily. Interval history on 2/3/20  No major events since I saw her last.  She doesn't use O2 unless she feels she needs it. She feels better. Main complain today is left hip pain. Interval history on 1/7/20  Patient is here today for regular f/u. Overall she feels she is getting stronger,. She is not using O2 supplement when getting outside as the portable concentrator bothers her due to its weight. She remains on prednisone 15mg daily     Interval history on 12/10/19  She feels better and denies have SOB at rest however she does have SOB on exertion for which she has to stop. She doesn't like using O2. Pulse ox at rest is 97%, however it goes down within few minutes of exertion    Interval history 11/5/2019  Patient is here today for post hospital follow-up. She was admitted to the hospital on 10/13/2019 and discharged on 10/16/2019. She was admitted with acute hypoxic respiratory failure and pneumonia. She was treated with IV antibiotics and steroids and was discharged on tapering dose prednisone. She was discharged on oxygen. At the time of evaluation she was on room air and she was feeling better. She denied increasing cough or sputum production. There is no fever or chills    Interval history on 9/10/19  Overall she feels better. Coughing is less, and she is able to walk longer distances. Pulse ox is in the mid 90s. There is no fever or chills    Interval history on 7/26/19  Continue to have cough, though she was given taper dose steroids and felt better on it.     There by mouth daily as needed.  vitamin E 1000 UNITS capsule Take 1,000 Units by mouth daily.  Blood Glucose Monitoring Suppl (ASCENSIA BREEZE MONITOR) KIT by Does not apply route See Admin Instructions. Test strips. Test twice daily. 60 kit 6    meloxicam (MOBIC) 15 MG tablet Take 1 tablet by mouth daily (Patient not taking: Reported on 3/5/2020) 30 tablet 3     No current facility-administered medications on file prior to visit. REVIEW OF SYSTEMS:    Review of Systems   Constitutional: Negative for chills and fever. HENT: Negative for postnasal drip. Eyes: Negative for redness. Dry eyes   Respiratory: Positive for shortness of breath (on exertion). Negative for cough, chest tightness and wheezing. Cardiovascular: Negative for chest pain, palpitations and leg swelling. Gastrointestinal:        GERD   Musculoskeletal: Negative for arthralgias and joint swelling. Skin: Negative for rash. Neurological: Negative for weakness. Psychiatric/Behavioral: The patient is not nervous/anxious. All other systems reviewed and are negative. Objective:   PHYSICAL EXAM:        VITALS:  /78 (Site: Right Upper Arm, Position: Sitting, Cuff Size: Medium Adult)   Pulse 70   Resp 16   Ht 5' 2\" (1.575 m)   Wt 156 lb (70.8 kg)   SpO2 94%   Breastfeeding No   BMI 28.53 kg/m²     Physical Exam  Vitals signs reviewed. Constitutional:       Appearance: She is well-developed. HENT:      Head: Normocephalic and atraumatic. Eyes:      Pupils: Pupils are equal, round, and reactive to light. Neck:      Musculoskeletal: Neck supple. Vascular: No JVD. Cardiovascular:      Rate and Rhythm: Normal rate and regular rhythm. Heart sounds: No murmur. No friction rub. No gallop. Pulmonary:      Effort: Pulmonary effort is normal. No respiratory distress. Breath sounds: No stridor. Rales (bilateral) present. No wheezing.    Abdominal:      General: Bowel sounds are function appears normal with   an ejection fraction visually estimated at 55%. No regional wall motion   abnormalities are noted. Diastolic filling parameters suggest grade II   diastolic dysfunction.   Trace mitral regurgitation is present.   Trivial tricuspid regurgitation. Estimated pulmonary artery systolic   pressure is elevated at 44 mmHg assuming a right atrial pressure of 3 mmHg. PFT  DATE OF PROCEDURE:  06/11/2019     INDICATION:  Shortness of breath.     BMI:  31.6.     TOBACCO HISTORY:  Never smoked.     Spirometry data is acceptable and reproducible.     Lung volumes performed via plethysmography.     Room air oxygen saturation is 92%.     SPIROMETRY:  FEV1 to FVC ratio is 80%. Prebronchodilator FEV1 is 1.12,  which is 58% of predicted. Postbronchodilator FEV1 is 1.36 which is 71%  of predicted for a 21% increase. Prebronchodilator FVC is 1.4, which is  54% of predicted. Postbronchodilator FVC is 1.84 which is 71% of  predicted for 31% increase.     Lung volumes showed total lung capacity of 3.04 which is 62% of  predicted. Residual volume is 1.46 which is 64% of predicted.     Diffusion capacity is 9.61, which is 45% of predicted. This is not  adjusted for hemoglobin.     IMPRESSION:  1. No obstructive defect. 2.  There is a significant response to bronchodilators in small and  large airways. 3.  Moderate restrictive defect is present. 4.  Moderate decrease in diffusion capacity    CT scan on 10/8/19  1. Stable findings compared prior study. Nonspecific interstitial pneumonia is favored in the absence of lower lobe predominance and lack of honeycomb changes. 2. Stable right upper lobe pulmonary nodule. 3. Annual surveillance with high-resolution CT is recommended for. Large hiatal hernia.    5. Coronary artery calcification     01/20/2020   PULMONARY FUNCTION TEST     INDICATIONS:  Interstitial lung disease.     BMI:  31.5.     TOBACCO HISTORY:  Never smoked.     Spirometry data is acceptable and reproducible.     Lung volumes performed via plethysmography.     Room air oxygen saturation is 97%.     SPIROMETRY:  FEV1 to FVC ratio is 86%. Prebronchodilator FEV1 is 1.57,  which is 91% of predicted. Prebronchodilator FVC is 1.83, which is 78%  of predicted.     Lung volumes show total lung capacity of 2.88, which is 63% of  predicted. Residual volume is 0.92, which is 42% of predicted.     Diffusion capacity is 16.02, which is 80% of predicted.     IMPRESSION:  1. No obstructive defect. 2.  There is a mild restrictive defect. 3.  Diffusion capacity is normal.  4.  When compared to previous pulmonary function tests dated 10/08/2019,  there is a mild-to-moderate drop in FVC, FEV1, and total lung capacity. Diffusion capacity is substantially higher, almost double making me  wonder his diffusion capacity in 10/2019 was real.     Six-minute walk was initially performed on room air. Of note, the  patient is on home oxygen 2 liters. At rest on room air, the patient's  oxygen saturation was 98%. Within 2 minutes, it dropped to 85%  necessitating pause and walk to place 2 liters of oxygen on. With 2  liters of oxygen, her oxygen saturation maintained 93 to 97%. The  patient walked a total of 660 feet.     The patient has significant oxygen desaturation with submaximal  exercise. She does qualify for home oxygen with exertion and oxygen  flow rate is deemed to be 2 liters. Assessment:      Diagnosis Orders   1. NSIP (nonspecific interstitial pneumonia) ([address]  Full PFT Study Without Bronchdilator    Carbon Monoxide Diffusing Capacity    6 Minute Walk Test       Plan:   70years old female with NSIP. Diagnosis is based on clinical presentation, CT findings and response to steroids. 6MWT on 1/20/20 show significant desaturation within 2 minutes - down to 85%, requiring 2 liters of O2.         She use O2 with exertion    Spirometry today with FEV1 of 1.27, this is lower than her baseline

## 2020-03-13 ENCOUNTER — OFFICE VISIT (OUTPATIENT)
Dept: INTERNAL MEDICINE CLINIC | Age: 80
End: 2020-03-13
Payer: MEDICARE

## 2020-03-13 VITALS
HEIGHT: 62 IN | SYSTOLIC BLOOD PRESSURE: 138 MMHG | DIASTOLIC BLOOD PRESSURE: 78 MMHG | WEIGHT: 151 LBS | OXYGEN SATURATION: 96 % | BODY MASS INDEX: 27.79 KG/M2 | HEART RATE: 71 BPM

## 2020-03-13 PROCEDURE — 93000 ELECTROCARDIOGRAM COMPLETE: CPT | Performed by: NURSE PRACTITIONER

## 2020-03-13 PROCEDURE — 99214 OFFICE O/P EST MOD 30 MIN: CPT | Performed by: NURSE PRACTITIONER

## 2020-03-13 RX ORDER — AMLODIPINE BESYLATE 10 MG/1
10 TABLET ORAL DAILY
Qty: 90 TABLET | Refills: 1 | Status: SHIPPED | OUTPATIENT
Start: 2020-03-13 | End: 2021-03-22

## 2020-03-13 RX ORDER — GLIPIZIDE 10 MG/1
10 TABLET ORAL DAILY
Qty: 90 TABLET | Refills: 0 | Status: SHIPPED | OUTPATIENT
Start: 2020-03-13 | End: 2020-07-09

## 2020-03-13 RX ORDER — PREDNISONE 1 MG/1
5 TABLET ORAL DAILY
Qty: 10 TABLET | Refills: 0 | Status: SHIPPED | OUTPATIENT
Start: 2020-03-13 | End: 2020-03-23

## 2020-03-13 RX ORDER — TRAZODONE HYDROCHLORIDE 100 MG/1
100 TABLET ORAL NIGHTLY PRN
Qty: 90 TABLET | Refills: 1 | Status: SHIPPED | OUTPATIENT
Start: 2020-03-13 | End: 2020-10-23 | Stop reason: SDUPTHER

## 2020-03-13 RX ORDER — ESCITALOPRAM OXALATE 20 MG/1
20 TABLET ORAL DAILY
Qty: 90 TABLET | Refills: 0 | Status: SHIPPED | OUTPATIENT
Start: 2020-03-13 | End: 2020-05-26 | Stop reason: SDUPTHER

## 2020-03-13 RX ORDER — ROSUVASTATIN CALCIUM 40 MG/1
TABLET, COATED ORAL
Qty: 90 TABLET | Refills: 1 | Status: SHIPPED | OUTPATIENT
Start: 2020-03-13 | End: 2020-05-06 | Stop reason: SDUPTHER

## 2020-03-13 ASSESSMENT — ENCOUNTER SYMPTOMS
EYE ITCHING: 0
RHINORRHEA: 0
COUGH: 0
BLOOD IN STOOL: 0
ABDOMINAL PAIN: 0
COLOR CHANGE: 0
WHEEZING: 0
SINUS PRESSURE: 0
VOMITING: 0
NAUSEA: 0
DIARRHEA: 0
CONSTIPATION: 0
SORE THROAT: 0
CHEST TIGHTNESS: 0
SHORTNESS OF BREATH: 0
BACK PAIN: 0
EYE REDNESS: 0

## 2020-03-13 ASSESSMENT — PATIENT HEALTH QUESTIONNAIRE - PHQ9
SUM OF ALL RESPONSES TO PHQ QUESTIONS 1-9: 0
SUM OF ALL RESPONSES TO PHQ QUESTIONS 1-9: 0

## 2020-03-13 ASSESSMENT — LIFESTYLE VARIABLES: HOW OFTEN DO YOU HAVE A DRINK CONTAINING ALCOHOL: 0

## 2020-03-13 NOTE — PROGRESS NOTES
Subjective:      Patient ID: Ashia Montero is a 70 y.o. y.o. female. Chief Complaint   Patient presents with    Medicare AWV    Forms       HPI    Patient is here for a recheck  She does not need physical today   She is doing well  She needs an appointment to have paperwork filled out for benefits through the [facility]  Vitals:    03/13/20 1200   BP: 138/78   Pulse:    SpO2:        Wt Readings from Last 3 Encounters:   03/13/20 151 lb (68.5 kg)   03/05/20 156 lb (70.8 kg)   03/04/20 155 lb (70.3 kg)       [unfilled]    Review of Systems   Constitutional: Negative for chills, fatigue and fever. HENT: Negative for congestion, ear pain, postnasal drip, rhinorrhea, sinus pressure, sneezing and sore throat. Eyes: Negative for redness and itching. Respiratory: Negative for cough, chest tightness, shortness of breath and wheezing. Cardiovascular: Negative for chest pain and palpitations. Gastrointestinal: Negative for abdominal pain, blood in stool, constipation, diarrhea, nausea and vomiting. Endocrine: Negative for cold intolerance and heat intolerance. Genitourinary: Negative for difficulty urinating, dysuria, flank pain, frequency, hematuria and urgency. Musculoskeletal: Negative for arthralgias, back pain, joint swelling and myalgias. Skin: Negative for color change, pallor, rash and wound. Allergic/Immunologic: Negative for environmental allergies and food allergies. Neurological: Negative for dizziness, seizures, syncope, weakness, light-headedness, numbness and headaches. Hematological: Negative for adenopathy. Does not bruise/bleed easily. Psychiatric/Behavioral: Negative for confusion, sleep disturbance and suicidal ideas. The patient is not nervous/anxious and is not hyperactive. Objective:   Physical Exam  Constitutional:       Appearance: She is well-developed. HENT:      Head: Normocephalic.       Right Ear: External ear normal.      Left Ear: External ear normal.   Eyes: Conjunctiva/sclera: Conjunctivae normal.      Pupils: Pupils are equal, round, and reactive to light. Neck:      Musculoskeletal: Normal range of motion and neck supple. Vascular: No JVD. Cardiovascular:      Rate and Rhythm: Normal rate and regular rhythm. Heart sounds: No murmur. No friction rub. No gallop. Pulmonary:      Effort: Pulmonary effort is normal. No respiratory distress. Breath sounds: Normal breath sounds. No wheezing. Abdominal:      General: Bowel sounds are normal. There is no distension. Palpations: Abdomen is soft. There is no mass. Tenderness: There is no abdominal tenderness. There is no guarding or rebound. Musculoskeletal: Normal range of motion. General: No tenderness. Skin:     General: Skin is warm and dry. Neurological:      Mental Status: She is alert and oriented to person, place, and time. Deep Tendon Reflexes: Reflexes are normal and symmetric. Psychiatric:         Behavior: Behavior normal.         Assessment:      See Problem List assessment and plan       Plan:     DM (diabetes mellitus) ([address]  Stable, controlled  No changes  Continue current treatment plan        Essential hypertension, benign  Stable, controlled  No changes  Continue current treatment plan       Pure hypercholesterolemia  Stable, controlled  No changes  Continue current treatment plan         Patient engaged in shared decision making. Information given to evaluateoptions of treatment, understand what is needed and discuss importance of following plan.

## 2020-03-31 ENCOUNTER — TELEPHONE (OUTPATIENT)
Dept: INTERNAL MEDICINE CLINIC | Age: 80
End: 2020-03-31

## 2020-03-31 PROBLEM — K57.92 DIVERTICULITIS: Status: ACTIVE | Noted: 2020-03-31

## 2020-03-31 NOTE — TELEPHONE ENCOUNTER
Pt has pain in her lower left abdomen   Diarrhea last night   Low grade fever  She thinks it may be from her diverticulitis

## 2020-04-13 ENCOUNTER — TELEPHONE (OUTPATIENT)
Dept: PULMONOLOGY | Age: 80
End: 2020-04-13

## 2020-04-13 NOTE — TELEPHONE ENCOUNTER
Pt of Dr. Franko Pulido called today to ask if she can cut prednisone from 15mg to 10 mg.   Looks like Dr Brittney Danielson wanted her to have PFT & 6min walk before deciding to do this. These test  Were cancelled due to Covid precautions. She states she is doing well but prednisone makes her nervous, shaky.

## 2020-04-24 ENCOUNTER — TELEPHONE (OUTPATIENT)
Dept: ORTHOPEDIC SURGERY | Age: 80
End: 2020-04-24

## 2020-04-24 ENCOUNTER — TELEPHONE (OUTPATIENT)
Dept: INTERNAL MEDICINE CLINIC | Age: 80
End: 2020-04-24

## 2020-04-24 DIAGNOSIS — G89.29 CHRONIC BILATERAL LOW BACK PAIN WITHOUT SCIATICA: Primary | ICD-10-CM

## 2020-04-24 DIAGNOSIS — M54.50 CHRONIC BILATERAL LOW BACK PAIN WITHOUT SCIATICA: Primary | ICD-10-CM

## 2020-04-24 NOTE — TELEPHONE ENCOUNTER
Pt fell two days ago and still has pain on L side - L hip and behind L knee. She has taken Tylenol and Advil but they give almost no relief. What else can she try to help pain? [facility], phone is [phone].

## 2020-04-25 RX ORDER — TRAMADOL HYDROCHLORIDE 50 MG/1
50 TABLET ORAL EVERY 6 HOURS PRN
Qty: 28 TABLET | Refills: 0 | Status: SHIPPED | OUTPATIENT
Start: 2020-04-25 | End: 2020-05-02

## 2020-04-28 RX ORDER — PREDNISONE 10 MG/1
10 TABLET ORAL DAILY
Qty: 10 TABLET | Refills: 0 | Status: SHIPPED | OUTPATIENT
Start: 2020-04-28 | End: 2020-05-08

## 2020-04-30 ENCOUNTER — TELEPHONE (OUTPATIENT)
Dept: ORTHOPEDIC SURGERY | Age: 80
End: 2020-04-30

## 2020-04-30 ENCOUNTER — TELEPHONE (OUTPATIENT)
Dept: INTERNAL MEDICINE CLINIC | Age: 80
End: 2020-04-30

## 2020-04-30 DIAGNOSIS — M54.50 CHRONIC BILATERAL LOW BACK PAIN WITHOUT SCIATICA: Primary | ICD-10-CM

## 2020-04-30 DIAGNOSIS — G89.29 CHRONIC BILATERAL LOW BACK PAIN WITHOUT SCIATICA: Primary | ICD-10-CM

## 2020-04-30 RX ORDER — HYDROCODONE BITARTRATE AND ACETAMINOPHEN 5; 325 MG/1; MG/1
1 TABLET ORAL EVERY 6 HOURS PRN
Qty: 28 TABLET | Refills: 0 | Status: SHIPPED | OUTPATIENT
Start: 2020-04-30 | End: 2020-05-07

## 2020-04-30 NOTE — TELEPHONE ENCOUNTER
Angelica Fuentes           4/30/20 10:50 AM   Note      I spoke with the patient and made her an appt for next week. She knows that we are sending in a different pain medication for her.

## 2020-04-30 NOTE — TELEPHONE ENCOUNTER
There is a message in ortho chart that they are giving her different medication and scheduling her for an injection

## 2020-05-05 ENCOUNTER — OFFICE VISIT (OUTPATIENT)
Dept: ORTHOPEDIC SURGERY | Age: 80
End: 2020-05-05
Payer: MEDICARE

## 2020-05-05 VITALS
SYSTOLIC BLOOD PRESSURE: 134 MMHG | BODY MASS INDEX: 27.79 KG/M2 | TEMPERATURE: 98.4 F | HEART RATE: 68 BPM | WEIGHT: 151.01 LBS | DIASTOLIC BLOOD PRESSURE: 58 MMHG | HEIGHT: 62 IN

## 2020-05-05 PROBLEM — M54.50 CHRONIC BILATERAL LOW BACK PAIN WITHOUT SCIATICA: Status: ACTIVE | Noted: 2020-05-05

## 2020-05-05 PROBLEM — G89.29 CHRONIC BILATERAL LOW BACK PAIN WITHOUT SCIATICA: Status: ACTIVE | Noted: 2020-05-05

## 2020-05-05 PROCEDURE — 20610 DRAIN/INJ JOINT/BURSA W/O US: CPT | Performed by: ORTHOPAEDIC SURGERY

## 2020-05-05 PROCEDURE — 99214 OFFICE O/P EST MOD 30 MIN: CPT | Performed by: ORTHOPAEDIC SURGERY

## 2020-05-05 RX ORDER — TIZANIDINE 4 MG/1
4 TABLET ORAL 4 TIMES DAILY PRN
Qty: 40 TABLET | Refills: 0 | Status: SHIPPED
Start: 2020-05-05 | End: 2020-05-26 | Stop reason: SINTOL

## 2020-05-05 RX ORDER — LIDOCAINE 50 MG/G
1 PATCH TOPICAL EVERY 12 HOURS
Qty: 30 PATCH | Refills: 0 | Status: SHIPPED | OUTPATIENT
Start: 2020-05-05 | End: 2021-04-22 | Stop reason: SDUPTHER

## 2020-05-05 RX ORDER — LIDOCAINE HYDROCHLORIDE 10 MG/ML
20 INJECTION, SOLUTION INFILTRATION; PERINEURAL ONCE
Status: COMPLETED | OUTPATIENT
Start: 2020-05-05 | End: 2020-05-05

## 2020-05-05 RX ORDER — ROPIVACAINE HYDROCHLORIDE 5 MG/ML
30 INJECTION, SOLUTION EPIDURAL; INFILTRATION; PERINEURAL ONCE
Status: COMPLETED | OUTPATIENT
Start: 2020-05-05 | End: 2020-05-05

## 2020-05-05 RX ORDER — TRIAMCINOLONE ACETONIDE 40 MG/ML
40 INJECTION, SUSPENSION INTRA-ARTICULAR; INTRAMUSCULAR ONCE
Status: COMPLETED | OUTPATIENT
Start: 2020-05-05 | End: 2020-05-05

## 2020-05-05 RX ADMIN — ROPIVACAINE HYDROCHLORIDE 30 ML: 5 INJECTION, SOLUTION EPIDURAL; INFILTRATION; PERINEURAL at 15:39

## 2020-05-05 RX ADMIN — TRIAMCINOLONE ACETONIDE 40 MG: 40 INJECTION, SUSPENSION INTRA-ARTICULAR; INTRAMUSCULAR at 15:39

## 2020-05-05 RX ADMIN — LIDOCAINE HYDROCHLORIDE 20 ML: 10 INJECTION, SOLUTION INFILTRATION; PERINEURAL at 15:40

## 2020-05-05 NOTE — PROGRESS NOTES
Date:  2020    Name:  Valdemar Chambers  Address:  [facility] [address]    :  1940      Age:   70 y.o.    SSN:  [id number]      Medical Record Number:  [id number]    Reason for Visit:    Chief Complaint    Back Pain (LUMBAR SPINE)    Polo Heys a week ago  with two falls first in the garage and second in the grass. More pain in the left side with radaition down to the knee. Apparently both falls are related to a new puppy at home which she is now had in the care of her other family    She is not having any radiating pains down the right leg but some now on the left side that extend just past the knee    She additionally states that the left knee is starting to become more swollen after the most recent fall with irritability anteriorly and laterally  DOS:      HPI: Jane Love is a 70 y.o. female here today for low back pain . Bilateral lumbar pain. Hip xrays with mild osteoarthritis. Pain over the past several months coinciding with recent passing of . She does not remmebr any obvious trauma or fall. Very limited with emotional descriptions of 's passing. Does feel that hte pain started at hte time of his demise. No bowel bladder issues. No gross radiation of pain . No groin pain. Review of Systems:  Review of Systems   Constitutional: Negative for chills and fever. HENT: Negative for nosebleeds. Eyes: Negative for double vision. Cardiovascular: Negative for chest pain. Gastrointestinal: Negative for abdominal pain. Musculoskeletal: Positive for joint pain and myalgias. Skin: Negative for rash. Neurological: Negative for seizures. Psychiatric/Behavioral: Negative for hallucinations.         Past History:  Past Medical History:   Diagnosis Date    Diabetes mellitus [address]     Essential hypertension, benign     GERD (gastroesophageal reflux disease)     Hyperlipidemia     Interstitial lung disease (HCC)     Osteoarthrosis, 1000 UNITS capsule Take 1,000 Units by mouth daily.  Blood Glucose Monitoring Suppl (ASCENSIA BREEZE MONITOR) KIT by Does not apply route See Admin Instructions. Test strips. Test twice daily. 60 kit 6     No current facility-administered medications for this visit. Allergies: Allergies   Allergen Reactions    Percocet [Oxycodone-Acetaminophen] Other (See Comments)     ELEVATED BLOOD SUGAR       Physical Exam:  Vitals:    05/05/20 1041   BP: (!) 134/58   Pulse: 68   Temp: 98.4 °F (36.9 °C)       Physical Exam   Constitutional: Patient is oriented to person, place, and time and well-developed, well-nourished, and in no distress. HENT:   Head: Normocephalic and atraumatic. Eyes: Pupils are equal, round, and reactive to light. Neck: No tracheal deviation present. No thyromegaly present. Pulmonary/Chest: Effort normal.   Abdominal: Soft. There is no guarding. Musculoskeletal: Patient exhibits tenderness and pain. Neurological: Patient is alert and oriented to person, place, and time. Skin: Skin is warm. Psychiatric: Affect normal.     General: Mylene Wakefield is a healthy and well appearing 70y.o. year old female who is sitting comfortably in our office in acute distress. Alert and oriented. Physical Exam:  RUE:    No gross deformities noted. Sensation is intact to light touch throughout the median, ulnar and radial nerve distribution. Able to wiggle fingers, gives thumbs up, A-okay and cross index and middle fingers. Full range of motion of the hand, wrist, elbow and shoulder. LUE:   No gross deformities noted. Sensation is intact to light touch throughout the median, ulnar and radial nerve distribution. Able to wiggle fingers, gives thumbs up, A-okay and cross index and middle fingers. Full range of motion of the hand wrist elbow and shoulder. RLE:   No gross deformities noted. Sensation is intact to light touch throughout the lower extremity.   Able to wiggle toes and Limited activites at this point. Plan:  Generalized exercise plan suggested with continued waiting for improvement. No obvious areas of fracture noted not any additional pathologic areas of the spine noted. Have discussed MRI as well but would wait to see if pains resolved because of potential     fracture healing or resolutation of flare up of lumbar degenerative changes. If pain worsens could consider MRI. Physical therapy at this point was something that she did nto want ot pursue nor did she want ot consider additional pain medications other then Tylenol. Have discussed that she is taking prednisone which she may start to wean down she feels that this may be related to some of her weakness. Left knee demonstrates mild osteoarthritis she has medial osteoarthritis in the right knee from 2015 films no obvious lesions in the left knee from that point. New x-rays were not obtained. No obvious meniscal signs knee effusion with a tendency towards falling. We recommended proceeding with left knee injection and use of a brace which she had brought in from home      Injection Procedure Note  Procedure Details     Verbal consent was obtained for the procedure. The left knee(s) was prepped with iodine and the skin was anesthetized. Using a 22 gauge needle the left knee(s) joint is injected with 2 ml 1% lidocaine and 2 ml of triamcinolone (KENALOG) 40mg/ml under the lateral aspect of the knee. The injection site was cleansed with topical isopropyl alcohol and a dressing was applied. Complications:  None; patient tolerated the procedure well. Follow up in 2 months or earlier as needed. Have seen and examined the patient. Approxiaately 30 minutes minutes for discussion and counseling.          [unfilled]     [place] Sukhjinder    Date:    5/5/2020

## 2020-05-06 ENCOUNTER — TELEPHONE (OUTPATIENT)
Dept: INTERNAL MEDICINE CLINIC | Age: 80
End: 2020-05-06

## 2020-05-06 ENCOUNTER — TELEPHONE (OUTPATIENT)
Dept: ORTHOPEDIC SURGERY | Age: 80
End: 2020-05-06

## 2020-05-06 RX ORDER — ROSUVASTATIN CALCIUM 40 MG/1
TABLET, COATED ORAL
Qty: 90 TABLET | Refills: 1 | Status: SHIPPED | OUTPATIENT
Start: 2020-05-06 | End: 2020-11-23 | Stop reason: CLARIF

## 2020-05-08 ENCOUNTER — TELEPHONE (OUTPATIENT)
Dept: ORTHOPEDIC SURGERY | Age: 80
End: 2020-05-08

## 2020-05-18 ENCOUNTER — TELEPHONE (OUTPATIENT)
Dept: ORTHOPEDIC SURGERY | Age: 80
End: 2020-05-18

## 2020-05-20 ENCOUNTER — TELEPHONE (OUTPATIENT)
Dept: INTERNAL MEDICINE CLINIC | Age: 80
End: 2020-05-20

## 2020-05-22 ENCOUNTER — HOSPITAL ENCOUNTER (OUTPATIENT)
Dept: MRI IMAGING | Age: 80
Discharge: HOME | End: 2020-05-22
Payer: MEDICARE

## 2020-05-22 ENCOUNTER — TELEPHONE (OUTPATIENT)
Dept: ORTHOPEDIC SURGERY | Age: 80
End: 2020-05-22

## 2020-05-22 DIAGNOSIS — M54.50 CHRONIC BILATERAL LOW BACK PAIN WITHOUT SCIATICA: Primary | ICD-10-CM

## 2020-05-22 DIAGNOSIS — G89.29 CHRONIC BILATERAL LOW BACK PAIN WITHOUT SCIATICA: Primary | ICD-10-CM

## 2020-05-22 PROCEDURE — 72148 MRI LUMBAR SPINE W/O DYE: CPT

## 2020-05-26 ENCOUNTER — TELEPHONE (OUTPATIENT)
Dept: INTERNAL MEDICINE CLINIC | Age: 80
End: 2020-05-26

## 2020-05-26 ENCOUNTER — OFFICE VISIT (OUTPATIENT)
Dept: PRIMARY CARE CLINIC | Age: 80
End: 2020-05-26

## 2020-05-26 ENCOUNTER — TELEPHONE (OUTPATIENT)
Dept: ORTHOPEDIC SURGERY | Age: 80
End: 2020-05-26

## 2020-05-26 ENCOUNTER — VIRTUAL VISIT (OUTPATIENT)
Dept: INTERNAL MEDICINE CLINIC | Age: 80
End: 2020-05-26
Payer: MEDICARE

## 2020-05-26 VITALS — BODY MASS INDEX: 26.15 KG/M2 | WEIGHT: 143 LBS

## 2020-05-26 PROBLEM — K57.92 DIVERTICULITIS: Status: RESOLVED | Noted: 2020-03-31 | Resolved: 2020-05-26

## 2020-05-26 PROCEDURE — 99443 PR PHYS/QHP TELEPHONE EVALUATION 21-30 MIN: CPT | Performed by: INTERNAL MEDICINE

## 2020-05-26 RX ORDER — ESCITALOPRAM OXALATE 20 MG/1
20 TABLET ORAL DAILY
Qty: 90 TABLET | Refills: 0 | Status: SHIPPED | OUTPATIENT
Start: 2020-05-26 | End: 2020-09-14

## 2020-05-26 RX ORDER — HYDROCODONE BITARTRATE AND ACETAMINOPHEN 5; 325 MG/1; MG/1
1 TABLET ORAL EVERY 6 HOURS PRN
Qty: 28 TABLET | Refills: 0 | Status: SHIPPED | OUTPATIENT
Start: 2020-05-26 | End: 2020-06-02

## 2020-05-26 ASSESSMENT — PATIENT HEALTH QUESTIONNAIRE - PHQ9
SUM OF ALL RESPONSES TO PHQ QUESTIONS 1-9: 0
SUM OF ALL RESPONSES TO PHQ9 QUESTIONS 1 & 2: 0
SUM OF ALL RESPONSES TO PHQ QUESTIONS 1-9: 0
DEPRESSION UNABLE TO ASSESS: FUNCTIONAL CAPACITY MOTIVATION LIMITS ACCURACY
2. FEELING DOWN, DEPRESSED OR HOPELESS: 0
1. LITTLE INTEREST OR PLEASURE IN DOING THINGS: 0

## 2020-05-26 NOTE — TELEPHONE ENCOUNTER
PT CALLED IN TO MAKE AN APPT WITH DR LAI REF BY DR Viktoriya Rothman. AFTER COVID 19 QUESTIONS SHE STATES SHE DID RUN A FEVER OF 99 AND WAS TESTED TODAY FOR COVID. I ADVISED PT TO CALL BACK AFTER HER RESULTS.

## 2020-05-26 NOTE — PROGRESS NOTES
Kylie Torres is a 70 y.o. female evaluated via telephone on 5/26/2020. Consent:  She and/or health care decision maker is aware that that she may receive a bill for this telephone service, depending on her insurance coverage, and has provided verbal consent to proceed: Yes    Prior to Visit Medications    Medication Sig Taking? Authorizing Provider   escitalopram (LEXAPRO) 20 MG tablet Take 1 tablet by mouth daily Yes Adam Henley MD   rosuvastatin (CRESTOR) 40 MG tablet TAKE ONE TABLET BY MOUTH DAILY Yes Adam Henley MD   lidocaine (LIDODERM) 5 % Place 1 patch onto the skin every 12 hours 12 hours on, 12 hours off. Yes Kaur Castillo MD   metFORMIN (GLUCOPHAGE) 500 MG tablet TAKE 1 TABLET BY MOUTH IN THE MORNING, AND THEN TAKE 2 TABLETS AT NIGHT WITH DINNER.   Yes Adam Henley MD   glipiZIDE (GLUCOTROL) 10 MG tablet Take 1 tablet by mouth daily Yes AMEE Stout CNP   traZODone (DESYREL) 100 MG tablet Take 1 tablet by mouth nightly as needed for Sleep Yes AMEE Stout CNP   amLODIPine (NORVASC) 10 MG tablet Take 1 tablet by mouth daily Yes AMEE Stout CNP   meloxicam (MOBIC) 15 MG tablet Take 1 tablet by mouth daily Yes AMEE Caldera CNP   linagliptin (TRADJENTA) 5 MG tablet Take 1 tablet by mouth daily Yes AMEE Caldera CNP   diclofenac sodium 1 % GEL Apply 2 g topically 4 times daily as needed for Pain (for lower back pain) Yes AMEE Caldera CNP   Blood Glucose Monitoring Suppl (FREESTYLE FREEDOM LITE) w/Device KIT 1 kit by Does not apply route daily Yes AMEE Stout CNP   FREESTYLE LANCETS MISC 1 each by Does not apply route daily Yes AMEE Caldera CNP   blood glucose test strips (FREESTYLE LITE) strip 1 each by In Vitro route daily Testing 1-2 times daily per e11.9 Yes AMEE Stout CNP   blood glucose monitor strips Test 2 times a day

## 2020-05-26 NOTE — TELEPHONE ENCOUNTER
Pt had Covid19 test done today. She was told to quarantine until at least June 2. She would like to know Dr Charlene Gunn' opinion of this - she has plans for her birthday tomorrow.   Also needs refill - Glipizide, pharmacy is [facility], phone [phone]

## 2020-05-28 LAB
SARS-COV-2: NOT DETECTED
SOURCE: NORMAL

## 2020-06-02 ENCOUNTER — HOSPITAL ENCOUNTER (OUTPATIENT)
Dept: PULMONOLOGY | Age: 80
Discharge: HOME | End: 2020-06-02
Payer: MEDICARE

## 2020-06-02 VITALS — OXYGEN SATURATION: 95 %

## 2020-06-02 PROCEDURE — 94729 DIFFUSING CAPACITY: CPT

## 2020-06-02 PROCEDURE — 94010 BREATHING CAPACITY TEST: CPT

## 2020-06-02 PROCEDURE — 94760 N-INVAS EAR/PLS OXIMETRY 1: CPT

## 2020-06-02 PROCEDURE — 94618 PULMONARY STRESS TESTING: CPT

## 2020-06-02 PROCEDURE — 94726 PLETHYSMOGRAPHY LUNG VOLUMES: CPT

## 2020-06-02 PROCEDURE — 94664 DEMO&/EVAL PT USE INHALER: CPT

## 2020-06-03 ENCOUNTER — APPOINTMENT (OUTPATIENT)
Dept: GENERAL RADIOLOGY | Age: 80
End: 2020-06-03
Payer: MEDICARE

## 2020-06-03 ENCOUNTER — APPOINTMENT (OUTPATIENT)
Dept: CT IMAGING | Age: 80
End: 2020-06-03
Payer: MEDICARE

## 2020-06-03 ENCOUNTER — HOSPITAL ENCOUNTER (EMERGENCY)
Age: 80
Discharge: HOME | End: 2020-06-03
Attending: EMERGENCY MEDICINE
Payer: MEDICARE

## 2020-06-03 VITALS
DIASTOLIC BLOOD PRESSURE: 67 MMHG | SYSTOLIC BLOOD PRESSURE: 135 MMHG | HEIGHT: 62 IN | TEMPERATURE: 98.1 F | HEART RATE: 59 BPM | WEIGHT: 163 LBS | RESPIRATION RATE: 16 BRPM | BODY MASS INDEX: 30 KG/M2 | OXYGEN SATURATION: 96 %

## 2020-06-03 LAB
A/G RATIO: 1.9 (ref 1.1–2.2)
ALBUMIN SERPL-MCNC: 3.7 G/DL (ref 3.4–5)
ALP BLD-CCNC: 70 U/L (ref 40–129)
ALT SERPL-CCNC: 12 U/L (ref 10–40)
ANION GAP SERPL CALCULATED.3IONS-SCNC: 13 MMOL/L (ref 3–16)
AST SERPL-CCNC: 16 U/L (ref 15–37)
BACTERIA: ABNORMAL /HPF
BASOPHILS ABSOLUTE: 0 K/UL (ref 0–0.2)
BASOPHILS RELATIVE PERCENT: 1 %
BILIRUB SERPL-MCNC: 0.3 MG/DL (ref 0–1)
BILIRUBIN URINE: NEGATIVE
BLOOD, URINE: ABNORMAL
BUN BLDV-MCNC: 16 MG/DL (ref 7–20)
CALCIUM SERPL-MCNC: 10.4 MG/DL (ref 8.3–10.6)
CHLORIDE BLD-SCNC: 110 MMOL/L (ref 99–110)
CLARITY: CLEAR
CO2: 19 MMOL/L (ref 21–32)
COLOR: YELLOW
CREAT SERPL-MCNC: 1.2 MG/DL (ref 0.6–1.2)
EOSINOPHILS ABSOLUTE: 0.3 K/UL (ref 0–0.6)
EOSINOPHILS RELATIVE PERCENT: 6.3 %
GFR AFRICAN AMERICAN: 53
GFR NON-AFRICAN AMERICAN: 43
GLOBULIN: 1.9 G/DL
GLUCOSE BLD-MCNC: 109 MG/DL (ref 70–99)
GLUCOSE URINE: NEGATIVE MG/DL
HCT VFR BLD CALC: 27 % (ref 36–48)
HEMOGLOBIN: 9 G/DL (ref 12–16)
KETONES, URINE: NEGATIVE MG/DL
LEUKOCYTE ESTERASE, URINE: ABNORMAL
LYMPHOCYTES ABSOLUTE: 1.5 K/UL (ref 1–5.1)
LYMPHOCYTES RELATIVE PERCENT: 30.4 %
MCH RBC QN AUTO: 29.3 PG (ref 26–34)
MCHC RBC AUTO-ENTMCNC: 33.3 G/DL (ref 31–36)
MCV RBC AUTO: 87.8 FL (ref 80–100)
MICROSCOPIC EXAMINATION: YES
MONOCYTES ABSOLUTE: 0.6 K/UL (ref 0–1.3)
MONOCYTES RELATIVE PERCENT: 11.7 %
NEUTROPHILS ABSOLUTE: 2.4 K/UL (ref 1.7–7.7)
NEUTROPHILS RELATIVE PERCENT: 50.6 %
NITRITE, URINE: POSITIVE
PDW BLD-RTO: 17.4 % (ref 12.4–15.4)
PH UA: 6 (ref 5–8)
PLATELET # BLD: 214 K/UL (ref 135–450)
PMV BLD AUTO: 7.1 FL (ref 5–10.5)
POTASSIUM REFLEX MAGNESIUM: 3.7 MMOL/L (ref 3.5–5.1)
PROTEIN UA: NEGATIVE MG/DL
RBC # BLD: 3.08 M/UL (ref 4–5.2)
RBC UA: ABNORMAL /HPF (ref 0–4)
SODIUM BLD-SCNC: 142 MMOL/L (ref 136–145)
SPECIFIC GRAVITY UA: 1.01 (ref 1–1.03)
TOTAL PROTEIN: 5.6 G/DL (ref 6.4–8.2)
TROPONIN: <0.01 NG/ML
URINE REFLEX TO CULTURE: ABNORMAL
URINE TYPE: ABNORMAL
UROBILINOGEN, URINE: 0.2 E.U./DL
WBC # BLD: 4.8 K/UL (ref 4–11)
WBC UA: ABNORMAL /HPF (ref 0–5)

## 2020-06-03 PROCEDURE — 85025 COMPLETE CBC W/AUTO DIFF WBC: CPT

## 2020-06-03 PROCEDURE — 80053 COMPREHEN METABOLIC PANEL: CPT

## 2020-06-03 PROCEDURE — 93005 ELECTROCARDIOGRAM TRACING: CPT | Performed by: EMERGENCY MEDICINE

## 2020-06-03 PROCEDURE — 6360000002 HC RX W HCPCS: Performed by: EMERGENCY MEDICINE

## 2020-06-03 PROCEDURE — 99285 EMERGENCY DEPT VISIT HI MDM: CPT

## 2020-06-03 PROCEDURE — 71046 X-RAY EXAM CHEST 2 VIEWS: CPT

## 2020-06-03 PROCEDURE — 96374 THER/PROPH/DIAG INJ IV PUSH: CPT

## 2020-06-03 PROCEDURE — 84484 ASSAY OF TROPONIN QUANT: CPT

## 2020-06-03 PROCEDURE — 2580000003 HC RX 258: Performed by: EMERGENCY MEDICINE

## 2020-06-03 PROCEDURE — 6370000000 HC RX 637 (ALT 250 FOR IP): Performed by: EMERGENCY MEDICINE

## 2020-06-03 PROCEDURE — 36415 COLL VENOUS BLD VENIPUNCTURE: CPT

## 2020-06-03 PROCEDURE — 81001 URINALYSIS AUTO W/SCOPE: CPT

## 2020-06-03 PROCEDURE — 70450 CT HEAD/BRAIN W/O DYE: CPT

## 2020-06-03 RX ORDER — ACETAMINOPHEN 325 MG/1
650 TABLET ORAL ONCE
Status: COMPLETED | OUTPATIENT
Start: 2020-06-03 | End: 2020-06-03

## 2020-06-03 RX ORDER — ONDANSETRON 2 MG/ML
4 INJECTION INTRAMUSCULAR; INTRAVENOUS ONCE
Status: COMPLETED | OUTPATIENT
Start: 2020-06-03 | End: 2020-06-03

## 2020-06-03 RX ORDER — 0.9 % SODIUM CHLORIDE 0.9 %
500 INTRAVENOUS SOLUTION INTRAVENOUS ONCE
Status: COMPLETED | OUTPATIENT
Start: 2020-06-03 | End: 2020-06-03

## 2020-06-03 RX ORDER — CEFUROXIME AXETIL 250 MG/1
250 TABLET ORAL 2 TIMES DAILY
Qty: 14 TABLET | Refills: 0 | Status: SHIPPED | OUTPATIENT
Start: 2020-06-03 | End: 2020-06-10

## 2020-06-03 RX ADMIN — ONDANSETRON 4 MG: 2 INJECTION INTRAMUSCULAR; INTRAVENOUS at 21:01

## 2020-06-03 RX ADMIN — SODIUM CHLORIDE 500 ML: 9 INJECTION, SOLUTION INTRAVENOUS at 21:01

## 2020-06-03 RX ADMIN — ACETAMINOPHEN 650 MG: 325 TABLET ORAL at 21:26

## 2020-06-03 ASSESSMENT — PAIN DESCRIPTION - LOCATION: LOCATION: BACK

## 2020-06-03 ASSESSMENT — ENCOUNTER SYMPTOMS
SHORTNESS OF BREATH: 0
ABDOMINAL PAIN: 0
VOMITING: 0
NAUSEA: 0
DIARRHEA: 0
COUGH: 0

## 2020-06-03 ASSESSMENT — PAIN DESCRIPTION - PROGRESSION: CLINICAL_PROGRESSION: NOT CHANGED

## 2020-06-03 ASSESSMENT — PAIN DESCRIPTION - DESCRIPTORS: DESCRIPTORS: CONSTANT

## 2020-06-03 ASSESSMENT — PAIN SCALES - GENERAL: PAINLEVEL_OUTOF10: 7

## 2020-06-03 ASSESSMENT — PAIN DESCRIPTION - PAIN TYPE: TYPE: CHRONIC PAIN

## 2020-06-03 ASSESSMENT — PAIN DESCRIPTION - FREQUENCY: FREQUENCY: CONTINUOUS

## 2020-06-03 ASSESSMENT — PAIN DESCRIPTION - ONSET: ONSET: ON-GOING

## 2020-06-03 NOTE — PROCEDURES
Pulmonary Function Test:     Indication: NSIP    Test comment:     Spirometry data is acceptable and reproducible. Maximum effort given during the test.    Pulse ox is 95% on room air    Estimated body mass index is 26.15 kg/m² as calculated from the following:    Height as of 5/5/20: 5' 2.01\" (1.575 m). Weight as of 5/26/20: 143 lb (64.9 kg). Spirometry data:    FEV1/FVC: 87. Predicted ratio 74    FEV1 1.59L, which is 87% predicted    FVC is 1.82L, which is 74% predicted    Lung Volumes:    TLC (by Plethysmography) is 2.63L, which is 55% predicted    RV is 0.67L which is 29% predicted    Diffusion Capacity:    DLCO is 5.43 which is 26% predicted    Impression:    1. There is no obstruction present    2. There is restriction. Based on FEV1 it is considered mild however based on TLC it is considered severe    3. There is severe reduction in diffusion capacity    Comment:   PFT was compared to the one on 1/20/20 - FEV1 and FVC are stable. SIX-MINUTE WALK:    A six-minute walk was started on room air. Patient was placed on 2 liters of O2 at minute 4   The patientwalked a total of 510 feet. She did stop or pause during the walk to place O2 and due to back pain. Baseline  oxygen saturation 95%. The lowest oxygen saturation during the walk was 82% on minute 3 for which she was placed on 2 liters. O2 recovered to 95-98% during the remaining time of testing.     6MWT was compared to the one we have on 10/8/19:  Carmencita Gonsalves walked decreased from 660ft to 510ft  At that time O2 sats dropped to 86% on minute 4 and placed on O2 on minute 5    Comment: 6MWT is slightly worse than how it was last year on 10/8/19

## 2020-06-04 ENCOUNTER — CARE COORDINATION (OUTPATIENT)
Dept: CARE COORDINATION | Age: 80
End: 2020-06-04

## 2020-06-04 LAB
EKG ATRIAL RATE: 60 BPM
EKG DIAGNOSIS: NORMAL
EKG P AXIS: 49 DEGREES
EKG P-R INTERVAL: 196 MS
EKG Q-T INTERVAL: 422 MS
EKG QRS DURATION: 88 MS
EKG QTC CALCULATION (BAZETT): 422 MS
EKG R AXIS: -3 DEGREES
EKG T AXIS: 29 DEGREES
EKG VENTRICULAR RATE: 60 BPM

## 2020-06-04 NOTE — CARE COORDINATION
Out reach to patient, stated her son was on his way to pick her up and that she could only talk for a few minutes. Refused numbers for COVID 19 hotline and [address] . Refused [facility]. Patient contacted regarding Kanika Rebolledo. Discussed COVID-19 related testing which was available at this time. Test results were negative. Patient informed of results, if available? Yes    Care Transition Nurse/ Ambulatory Care Manager contacted the patient by telephone to perform post discharge assessment. Verified name and  with patient as identifiers. Provided introduction to self, and explanation of the CTN/ACM role, and reason for call due to risk factors for infection and/or exposure to COVID-19. Symptoms reviewed with patient who verbalized the following symptoms: fatigue and pain or aching joints. Due to no new or worsening symptoms encounter was not routed to provider for escalation. Discussed follow-up appointments. If no appointment was previously scheduled, appointment scheduling offered: N/A  [facility] follow up appointment(s):   Future Appointments   Date Time Provider Romina Shipman   2020  1:30 PM Anny Vega MD [facility]   2020 11:00 AM Sharmaine Helton MD [facility]   2020 12:00 PM Luis Shah MD [address] follow up appointment(s): With optometrist      Patient has following risk factors of: diabetes. CTN/ACM reviewed discharge instructions, medical action plan and red flags such as increased shortness of breath, increasing fever and signs of decompensation with patient who verbalized understanding. Discussed exposure protocols and quarantine with CDC Guidelines What to do if you are sick with coronavirus disease .  Patient was given an opportunity for questions and concerns.  The patient agrees to contact the [facility] exposure line [phone], local health department patient refused numbers and PCP office for questions related to their

## 2020-06-04 NOTE — ED NOTES
Bed: B19-19  Expected date:   Expected time:   Means of arrival:   Comments:  OVIDIO Barnes  06/03/20 2008

## 2020-06-04 NOTE — ED PROVIDER NOTES
[address] [facility] [address] ENCOUNTER          ATTENDING PHYSICIAN NOTE       Date of evaluation: 6/3/2020    Chief Complaint     Fatigue      History of Present Illness     Jacki Vu is a 68 y.o. female with a history of diabetes and hypertension, and hyperlipidemia who presents with complaints of weakness and fatigue. The patient states that she was in her kitchen tonight and felt weakness and fatigue come over her as she try to get back to the living room but could not make it back. She did not fall or hurt herself. She had a pushed her life call button and EMS responded and brought her to the hospital.  She denies any pain currently. She has no headache, nausea, vomiting, diarrhea, chest pain, shortness of breath, abdominal pain or dysuria. She denies any fever or other infectious symptoms. She states that she has been weak for a while and that different medications including a muscle relaxant and prednisone of been thought to contribute to the weakness and those have been changed or stopped. Review of Systems     Review of Systems   Constitutional: Positive for fatigue. Negative for chills and fever. Respiratory: Negative for cough and shortness of breath. Cardiovascular: Negative for chest pain. Gastrointestinal: Negative for abdominal pain, diarrhea, nausea and vomiting. Genitourinary: Negative for difficulty urinating and dysuria. Neurological: Positive for weakness. Negative for numbness and headaches. All other systems reviewed and are negative. Past Medical, Surgical, Family, and Social History     She has a past medical history of Diabetes mellitus ([facility] [address] Essential hypertension, benign, GERD (gastroesophageal reflux disease), Hyperlipidemia, Interstitial lung disease ([address] Osteoarthrosis, unspecified whether generalized or localized, unspecified site, Osteopenia, and Shingles. She has a past surgical history that includes Colonoscopy (10/11/2010);  Upper

## 2020-06-09 ENCOUNTER — OFFICE VISIT (OUTPATIENT)
Dept: ORTHOPEDIC SURGERY | Age: 80
End: 2020-06-09
Payer: MEDICARE

## 2020-06-09 VITALS — RESPIRATION RATE: 14 BRPM | HEIGHT: 62 IN | BODY MASS INDEX: 29.98 KG/M2 | TEMPERATURE: 97.3 F | WEIGHT: 162.92 LBS

## 2020-06-09 PROCEDURE — 20611 DRAIN/INJ JOINT/BURSA W/US: CPT | Performed by: PHYSICAL MEDICINE & REHABILITATION

## 2020-06-09 PROCEDURE — 99204 OFFICE O/P NEW MOD 45 MIN: CPT | Performed by: PHYSICAL MEDICINE & REHABILITATION

## 2020-06-09 RX ORDER — TRIAMCINOLONE ACETONIDE 40 MG/ML
40 INJECTION, SUSPENSION INTRA-ARTICULAR; INTRAMUSCULAR ONCE
Status: COMPLETED | OUTPATIENT
Start: 2020-06-09 | End: 2020-06-09

## 2020-06-09 RX ADMIN — TRIAMCINOLONE ACETONIDE 40 MG: 40 INJECTION, SUSPENSION INTRA-ARTICULAR; INTRAMUSCULAR at 12:20

## 2020-06-09 NOTE — PROGRESS NOTES
images of the AP and lateral of the lumbar spine from 5/22/2020 showing 5 type lumbar vetebrae with advanced DDD    MRI or CT:  MR Lumbar spine which I personally reviewed from 5/22/2020 shows   T12-L1: Sagittal images demonstrate no spinal stenosis.       L1-2: Diffuse disc bulge and facet hypertrophy resulting in mild-moderate central canal stenosis and moderate right, mild left foraminal stenosis.       L2-3: Diffuse disc bulge and facet/ligamentum flavum hypertrophy resulting in moderate central canal stenosis and moderate bilateral foraminal stenosis.       L3-4: Disc osteophyte complex and facet/ligament flavum hypertrophy resulting in severe central canal stenosis and moderate-severe right, severe left foraminal stenosis.       L4-5: Diffuse disc bulge and facet/ligamentum flavum hypertrophy resulting in severe central canal stenosis and moderate right, severe left foraminal stenosis.       L5-S1: Facet hypertrophy without significant central canal or foraminal stenosis.           EMG:  None  Results for orders placed or performed during the hospital encounter of 06/03/20   Comprehensive Metabolic Panel w/ Reflex to MG   Result Value Ref Range    Sodium 142 136 - 145 mmol/L    Potassium reflex Magnesium 3.7 3.5 - 5.1 mmol/L    Chloride 110 99 - 110 mmol/L    CO2 19 (L) 21 - 32 mmol/L    Anion Gap 13 3 - 16    Glucose 109 (H) 70 - 99 mg/dL    BUN 16 7 - 20 mg/dL    CREATININE 1.2 0.6 - 1.2 mg/dL    GFR Non- 43 (A) >60    GFR  53 (A) >60    Calcium 10.4 8.3 - 10.6 mg/dL    Total Protein 5.6 (L) 6.4 - 8.2 g/dL    Alb 3.7 3.4 - 5.0 g/dL    Albumin/Globulin Ratio 1.9 1.1 - 2.2    Total Bilirubin 0.3 0.0 - 1.0 mg/dL    Alkaline Phosphatase 70 40 - 129 U/L    ALT 12 10 - 40 U/L    AST 16 15 - 37 U/L    Globulin 1.9 g/dL   CBC Auto Differential   Result Value Ref Range    WBC 4.8 4.0 - 11.0 K/uL    RBC 3.08 (L) 4.00 - 5.20 M/uL    Hemoglobin 9.0 (L) 12.0 - 16.0 g/dL    Hematocrit 27.0 (L) 36.0 - 48.0 %    MCV 87.8 80.0 - 100.0 fL    MCH 29.3 26.0 - 34.0 pg    MCHC 33.3 31.0 - 36.0 g/dL    RDW 17.4 (H) 12.4 - 15.4 %    Platelets 503 173 - 691 K/uL    MPV 7.1 5.0 - 10.5 fL    Neutrophils % 50.6 %    Lymphocytes % 30.4 %    Monocytes % 11.7 %    Eosinophils % 6.3 %    Basophils % 1.0 %    Neutrophils Absolute 2.4 1.7 - 7.7 K/uL    Lymphocytes Absolute 1.5 1.0 - 5.1 K/uL    Monocytes Absolute 0.6 0.0 - 1.3 K/uL    Eosinophils Absolute 0.3 0.0 - 0.6 K/uL    Basophils Absolute 0.0 0.0 - 0.2 K/uL   Urinalysis Reflex to Culture   Result Value Ref Range    Color, UA Yellow Straw/Yellow    Clarity, UA Clear Clear    Glucose, Ur Negative Negative mg/dL    Bilirubin Urine Negative Negative    Ketones, Urine Negative Negative mg/dL    Specific Gravity, UA 1.010 1.005 - 1.030    Blood, Urine TRACE-INTACT (A) Negative    pH, UA 6.0 5.0 - 8.0    Protein, UA Negative Negative mg/dL    Urobilinogen, Urine 0.2 <2.0 E.U./dL    Nitrite, Urine POSITIVE (A) Negative    Leukocyte Esterase, Urine TRACE (A) Negative    Microscopic Examination YES     Urine Type NotGiven     Urine Reflex to Culture Not Indicated    Troponin (lab)   Result Value Ref Range    Troponin <0.01 <0.01 ng/mL   Microscopic Urinalysis   Result Value Ref Range    WBC, UA 3-5 0 - 5 /HPF    RBC, UA 0-2 0 - 4 /HPF    Bacteria, UA 3+ (A) None Seen /HPF   EKG 12 Lead   Result Value Ref Range    Ventricular Rate 60 BPM    Atrial Rate 60 BPM    P-R Interval 196 ms    QRS Duration 88 ms    Q-T Interval 422 ms    QTc Calculation (Bazett) 422 ms    P Axis 49 degrees    R Axis -3 degrees    T Axis 29 degrees    Diagnosis       EKG performed in ER and to be interpreted by ER physician. Confirmed by MD, ER ([id number]),  Tamie Staton ([id number]) on 6/4/2020 6:10:00 AM       Impression (Medical Decision Making):       1. Lumbar radiculopathy    2. Lumbar stenosis with neurogenic claudication    3. Spondylosis without myelopathy or radiculopathy, lumbar region    4. Greater trochanteric bursitis of both hips        Plan (Medical Decision Making):    I discussed the diagnosis and the treatment options with Abby Rizo today. In Summary:  The various treatment options were outlined and discussed with Jacki Cifuentes including:  Conservative care options: physical therapy, ice, medications, bracing, and activity modification. The indications for therapeutic injections. The indications for additional imaging/laboratory studies. The indications for (possible future) interventions. After considering the various options discussed, Abby Rizo elected to pursue a course of treatment that includes the followin. Medications: No further recommendations for new medications. 2. PT:  I will start the patient on a trial of PT to work on a lumbar stabilization program to focus on core strengthening, core stabilizing, lumbar stretches, hamstring flexibility, modalities as indicated for 6-8 visits over the next 4-6 weeks. 3. Further studies: No further studies. 4. Interventional:  We discussed pursuing a bilateral L4 Transforaminal  epidural steroid injection to address the pain. Radiologic imaging and symptoms confirm the pain etiology. Risks, benefits and alternatives of interventional options were discussed. These include and are not limited to bleeding, infection, spinal headache, nerve injury, increased pain and lack of pain relief. The patient verbalized understanding and would like to proceed. The patient will be scheduled accordingly. Series of 2 LESIs    After risks benefits alternatives discussed a right greater trochanter bursa injection was undertaken the bedside. The skin overlying the right hip was prepped with ChloraPrep ×3. Ultrasound guidance was utilized with a curvilinear array transducer and in-plane technique.  A mixture of 40 mg of Kenalog with 3 ml of 1% lidocaine was drawn up and instilled over the most tender point of the right greater trochanter with a 22-gauge needle using a Sonosite curvilinear transducer using an in plane technique. The needle was removed after the mixture was instilled and a Band-Aid was applied. The identical procedure was repeated on the left side. 5. Healthy Lifestyle Measures:  Patient education material reviewing the following was distributed to [facility]  Anatomic drawings  Healthy lifestyle education  Osteoporosis prevention,   Back and neck pain educational information   Advanced imaging preparedness    Posture education   Proper lifting and carrying techniques,   Weight management  Quitting smoking and   Minor ways to treat back pain  For further information regarding the spine conditions and to review interventional treatments the patient was directed to [employer].    6.  Follow up:  4-6 weeks    David Pickens was instructed to call the office if her symptoms worsen or if new symptoms appear prior to the next scheduled visit. She is specifically instructed to contact the office between now & her scheduled appointment if she has concerns related to her condition or if she needs assistance in scheduling the above tests. She is welcome to call for an appointment sooner if she has any additional concerns or questions. Nicole Mcclain ATC, am scribing for and in the presence of Dr. Ryan Centeno. 06/09/20 3:34 PM Jonna Grigsby ATC    The physical examination was performed between the patient and Dr. Ryan Centeno. All counseling during the appointment was performed between the patient and provider. I, Dr. Yvette Valles. Betty, personally performed the services described in this documentation as scribed by CHUY Quinones in my presence and it is both accurate and complete. Thea Rosa MD, VLAD, [facility]  Board Certified in [address] [place] [address] Dr Certified and Fellowship Trained in [facility])

## 2020-06-09 NOTE — LETTER
Please schedule the following with:     Date:   1. 20 at 830     2. 20 at 730    Account: [de-identified]  Patient: Anila Ferraro    : 1940  [address]  [address] [facility] [place] [address]    Phone (H):  [phone] (home)      ----------------------------------------------------------------------------------------------  Diagnosis:     ICD-10-CM    1. Lumbar radiculopathy M54.16    2. Lumbar stenosis with neurogenic claudication M48.062    3. Spondylosis without myelopathy or radiculopathy, lumbar region M47.816          Levels:bilateral L4 Tranforaminal RAMÓN x2  CPT Codes 35031    ----------------------------------------------------------------------------------------------  Injection # 1 + 2    [email or web address]    Attending Physician       Odalis Becker.  Tootie Lanier MD.    ----------------------------------------------------------------------------------------------  Injection Scheduled For:    At:    1st Insurance [employer] MCR ADVANTAGE  Pre-Cert#    2nd Insurance      Pre-Cert#    Comments or Special instructions:    · Infection control  · Tested positive for MRSA in past 12 months:  no  · Tested positive for MSSA \"staph infection\" in past 12 months: no  · Tested positive for VRE (Vancomycin Resistant Enterococci) in past 12 months:   no  · Currently on any antibiotics for an infection: yes - ceftin for 2 more days  · Anticoagulants:  · On a blood thinner:  no   · Any history of bleeding disorder: no   · Advanced Liver disease: no   · Advanced Renal disease: no   · Glaucoma: no   · Diabetes: yes     Sedation:  No  -----------------------------------------------------------------------------------------------  Allergies   Allergen Reactions    Percocet [Oxycodone-Acetaminophen] Other (See Comments)     ELEVATED BLOOD SUGAR

## 2020-06-11 RX ORDER — LINAGLIPTIN 5 MG/1
TABLET, FILM COATED ORAL
Qty: 90 TABLET | Refills: 0 | Status: SHIPPED | OUTPATIENT
Start: 2020-06-11 | End: 2020-11-23 | Stop reason: CLARIF

## 2020-06-15 ENCOUNTER — TELEPHONE (OUTPATIENT)
Dept: INTERNAL MEDICINE CLINIC | Age: 80
End: 2020-06-15

## 2020-06-16 ENCOUNTER — TELEPHONE (OUTPATIENT)
Dept: ORTHOPEDIC SURGERY | Age: 80
End: 2020-06-16

## 2020-06-17 ENCOUNTER — OFFICE VISIT (OUTPATIENT)
Dept: PRIMARY CARE CLINIC | Age: 80
End: 2020-06-17
Payer: MEDICARE

## 2020-06-17 PROCEDURE — 99213 OFFICE O/P EST LOW 20 MIN: CPT | Performed by: INTERNAL MEDICINE

## 2020-06-17 NOTE — PROGRESS NOTES
Other    PLAN:    [x] Discharge home with written instructions for:  [] Flu management  [] Strep throat management  [] Possible COVID-19 exposure with self-quarantine   [x] Possible COVID-19 with isolation instructions and management of symptoms  [] Follow-up with primary care physician or emergency department if worsens  [] Referred to emergency department for evaluation    [] Evaluation per physician/nurse practitioner in clinic      An  electronic signature was used to authenticate this note.      --Abena Farfan MA on 6/17/2020 at 10:24 AM

## 2020-06-17 NOTE — PATIENT INSTRUCTIONS
Advance Care Planning  People with COVID-19 may have no symptoms, mild symptoms, such as fever, cough, and shortness of breath or they may have more severe illness, developing severe and fatal pneumonia. As a result, Advance Care Planning with attention to naming a health care decision maker (someone you trust to make healthcare decisions for you if you could not speak for yourself) and sharing other health care preferences is important BEFORE a possible health crisis. Please contact your Primary Care Provider to discuss Advance Care Planning. Preventing the Spread of Coronavirus Disease 2019 in Homes and Residential Communities  For the most recent information go to [email or web address]    Prevention steps for People with confirmed or suspected COVID-19 (including persons under investigation) who do not need to be hospitalized  and   People with confirmed COVID-19 who were hospitalized and determined to be medically stable to go home    Your healthcare provider and public health staff will evaluate whether you can be cared for at home. If it is determined that you do not need to be hospitalized and can be isolated at home, you will be monitored by staff from your local or state health department. You should follow the prevention steps below until a healthcare provider or local or state health department says you can return to your normal activities. Stay home except to get medical care  People who are mildly ill with COVID-19 are able to isolate at home during their illness. You should restrict activities outside your home, except for getting medical care. Do not go to work, school, or public areas. Avoid using public transportation, ride-sharing, or taxis. Separate yourself from other people and animals in your home  People: As much as possible, you should stay in a specific room and away from other people in your home.  Also, you should use a separate conducted a detailed discussion... I had a detailed discussion with the patient and/or guardian regarding the historical points, exam findings, and any diagnostic results supporting the admit diagnosis.

## 2020-06-18 ENCOUNTER — TELEPHONE (OUTPATIENT)
Dept: ORTHOPEDIC SURGERY | Age: 80
End: 2020-06-18

## 2020-06-19 ENCOUNTER — TELEPHONE (OUTPATIENT)
Dept: ORTHOPEDIC SURGERY | Age: 80
End: 2020-06-19

## 2020-06-19 LAB
SARS-COV-2: NOT DETECTED
SOURCE: NORMAL

## 2020-06-22 ENCOUNTER — TELEPHONE (OUTPATIENT)
Dept: INTERNAL MEDICINE CLINIC | Age: 80
End: 2020-06-22

## 2020-06-22 ENCOUNTER — CARE COORDINATION (OUTPATIENT)
Dept: CARE COORDINATION | Age: 80
End: 2020-06-22

## 2020-06-23 ENCOUNTER — HOSPITAL ENCOUNTER (OUTPATIENT)
Age: 80
Setting detail: OUTPATIENT SURGERY
Discharge: HOME | End: 2020-06-23
Attending: PHYSICAL MEDICINE & REHABILITATION | Admitting: PHYSICAL MEDICINE & REHABILITATION
Payer: MEDICARE

## 2020-06-23 VITALS
BODY MASS INDEX: 26.68 KG/M2 | DIASTOLIC BLOOD PRESSURE: 70 MMHG | WEIGHT: 145 LBS | RESPIRATION RATE: 16 BRPM | TEMPERATURE: 97.7 F | SYSTOLIC BLOOD PRESSURE: 159 MMHG | HEIGHT: 62 IN | OXYGEN SATURATION: 98 % | HEART RATE: 61 BPM

## 2020-06-23 PROCEDURE — 6360000004 HC RX CONTRAST MEDICATION: Performed by: PHYSICAL MEDICINE & REHABILITATION

## 2020-06-23 PROCEDURE — 2709999900 HC NON-CHARGEABLE SUPPLY: Performed by: PHYSICAL MEDICINE & REHABILITATION

## 2020-06-23 PROCEDURE — 7100000010 HC PHASE II RECOVERY - FIRST 15 MIN: Performed by: PHYSICAL MEDICINE & REHABILITATION

## 2020-06-23 PROCEDURE — 6360000002 HC RX W HCPCS: Performed by: PHYSICAL MEDICINE & REHABILITATION

## 2020-06-23 PROCEDURE — 2500000003 HC RX 250 WO HCPCS: Performed by: PHYSICAL MEDICINE & REHABILITATION

## 2020-06-23 PROCEDURE — 3600000002 HC SURGERY LEVEL 2 BASE: Performed by: PHYSICAL MEDICINE & REHABILITATION

## 2020-06-23 RX ORDER — LIDOCAINE HYDROCHLORIDE 10 MG/ML
INJECTION, SOLUTION EPIDURAL; INFILTRATION; INTRACAUDAL; PERINEURAL PRN
Status: DISCONTINUED | OUTPATIENT
Start: 2020-06-23 | End: 2020-06-23 | Stop reason: ALTCHOICE

## 2020-06-23 RX ORDER — BETAMETHASONE SODIUM PHOSPHATE AND BETAMETHASONE ACETATE 3; 3 MG/ML; MG/ML
INJECTION, SUSPENSION INTRA-ARTICULAR; INTRALESIONAL; INTRAMUSCULAR; SOFT TISSUE
Status: DISCONTINUED
Start: 2020-06-23 | End: 2020-06-23 | Stop reason: HOSPADM

## 2020-06-23 RX ORDER — BUPIVACAINE HYDROCHLORIDE 5 MG/ML
INJECTION, SOLUTION EPIDURAL; INTRACAUDAL
Status: DISCONTINUED
Start: 2020-06-23 | End: 2020-06-23 | Stop reason: HOSPADM

## 2020-06-23 ASSESSMENT — PAIN - FUNCTIONAL ASSESSMENT
PAIN_FUNCTIONAL_ASSESSMENT: PREVENTS OR INTERFERES WITH MANY ACTIVE NOT PASSIVE ACTIVITIES
PAIN_FUNCTIONAL_ASSESSMENT: 0-10

## 2020-06-23 ASSESSMENT — PAIN DESCRIPTION - DESCRIPTORS: DESCRIPTORS: SHOOTING;CONSTANT

## 2020-06-23 NOTE — H&P
Touch Verio strips 7/24/19   Katlyn Saravia MD   Lancets MISC 1 each by Does not apply route 2 times daily 7/24/19   Katlyn Saravia MD   albuterol sulfate  (90 Base) MCG/ACT inhaler Inhale 2 puffs into the lungs 4 times daily as needed for Shortness of Breath 6/19/19   Katlyn Saravia MD   irbesartan (AVAPRO) 300 MG tablet TAKE ONE TABLET BY MOUTH DAILY 4/29/19   MD Jaylin Escobedo Mayen LANCETS 59F MISC USE ONE LANCET TO TEST TWICE A DAY per E11.9 3/5/18   Katlyn Saravia MD   Cyanocobalamin (B-12 PO) Take by mouth    Historical Provider, MD   Probiotic Product (PROBIOTIC ACIDOPHILUS) CAPS Take 1 capsule by mouth daily    Historical Provider, MD   Blood Glucose Monitoring Suppl ([employer]ZE 2 SYSTEM) W/DEVICE KIT One machine one time 1/19/15   Randy Delong MD   vitamin E 1000 UNITS capsule Take 1,000 Units by mouth daily. Historical Provider, MD   Blood Glucose Monitoring Suppl ([employer] MONITOR) KIT by Does not apply route See Admin Instructions. Test strips. Test twice daily. 8/16/10   Randy Delong MD     Allergies:  Percocet [oxycodone-acetaminophen]  Social History:    reports that she has never smoked. She has never used smokeless tobacco. She reports current alcohol use of about 1.0 standard drinks of alcohol per week. She reports that she does not use drugs. Family History:   Family History   Problem Relation Age of Onset    Heart Disease Mother     Arthritis Mother     Heart Disease Father        Vitals: Blood pressure (!) 154/58, pulse 69, temperature 97.7 °F (36.5 °C), temperature source Temporal, resp. rate 16, height 5' 2\" (1.575 m), weight 145 lb (65.8 kg), SpO2 97 %, not currently breastfeeding. PHYSICAL EXAM:  HENT: Airway patent and reviewed  Cardiovascular: Normal rate, regular rhythm, normal heart sounds. Pulmonary/Chest: No wheezes. No rhonchi. No rales. Abdominal: Soft. Bowel sounds are normal. No distension.   Extremities: Moves all extremities equally  Lumbar Spine: Painful range of motion, no midline tenderness       Diagnosis:Lumbar radiculopathy    Plan: Proceed with planned procedure    The patient was counseled at length about the risks of claudia Covid-19 in the talita-operative and post-operative states including the recovery window of their procedure. The patient was made aware that claudia Covid-19 after a surgical procedure may worsen their prognosis for recovering from the virus and lend to a higher morbidity and or mortality risk. The patient was given the options of postponing their procedure. All of the risks, benefits, and alternatives were discussed. The patient does wish to proceed with the procedure. ASA CLASS:         []   I. Normal, healthy adult           [x]   II.  Mild systemic disease            []   III. Severe systemic disease      Mallampati: Mallampati Class II - (soft palate, fauces & uvula are visible)      Sedation plan:   [x]  Local              []  Minimal                  []  General anesthesia    Patient's condition acceptable for planned procedure/sedation. Post Procedure Plan   Return to same level of care   ______________________     The risks and benefits as well as alternatives to the procedure have been discussed with the patient and or family. The patient and or next of kin understands and agrees to proceed.     Markell Demarco M.D.

## 2020-06-24 ENCOUNTER — TELEPHONE (OUTPATIENT)
Dept: ORTHOPEDIC SURGERY | Age: 80
End: 2020-06-24

## 2020-06-25 ENCOUNTER — VIRTUAL VISIT (OUTPATIENT)
Dept: PULMONOLOGY | Age: 80
End: 2020-06-25
Payer: MEDICARE

## 2020-06-25 PROCEDURE — 99212 OFFICE O/P EST SF 10 MIN: CPT | Performed by: INTERNAL MEDICINE

## 2020-06-25 ASSESSMENT — ENCOUNTER SYMPTOMS
WHEEZING: 0
COUGH: 0
CHEST TIGHTNESS: 0
SHORTNESS OF BREATH: 1
EYE REDNESS: 0

## 2020-06-25 NOTE — PROGRESS NOTES
Pulmonology Telephone Visit    Pursuant to the emergency declaration under the [facility], [address]5 waiver authority and the eshtery and [employer] General Act this Telephone Visit was insisted, with patient's consent, to reduce the patient's risk of exposure to COVID-19 and provide continuity of care for an established patient. The patient was at home, while the provider was at the clinic. Services were provided through a synchronous discussion through a Telephone Call to substitute for in-person clinic visit, and coded as such. Total time spent with patient was 8 minutes. [facility]    Outpatient Note    Subjective:   CHIEF COMPLAINT:   Chief Complaint   Patient presents with    Shortness of Breath     Interval history on 6/25/20  Overall she feels fine. She is on 2 liters of O2.  sats at rest are 99%. She is following social distancing   She stopped using prednisone 3 weeks ago. However she is getting epidural steroids due to weakness. It was tapered off. Interval history on 3/5/20  Lost her  last month. She came accompanied by a family member who is staying with her these days. There is no SOB at rest.  She is using O2 at home with exertion. Overall she feels her breathing is better. She is still on prednisone 15mg daily. Interval history on 2/3/20  No major events since I saw her last.  She doesn't use O2 unless she feels she needs it. She feels better. Main complain today is left hip pain. Interval history on 1/7/20  Patient is here today for regular f/u. Overall she feels she is getting stronger,. She is not using O2 supplement when getting outside as the portable concentrator bothers her due to its weight.     She remains on prednisone 15mg daily     Interval history on 12/10/19  She feels better and denies have SOB at rest however she does have SOB on warmth or stiffness. No hx of rash. Past Medical History:    Past Medical History:   Diagnosis Date    [facility] mellitus ([facility] [address]     Essential hypertension, benign     GERD (gastroesophageal reflux disease)     Hyperlipidemia     Interstitial lung disease ([facility] [address]     Medical history reviewed with no changes     Osteoarthrosis, unspecified whether generalized or localized, unspecified site     osteoarthritis lower leg    Osteopenia     Shingles        Social History:    Social History     Tobacco Use   Smoking Status Never Smoker   Smokeless Tobacco Never Used       Family History:  Family History   Problem Relation Age of Onset    Heart Disease Mother     Arthritis Mother     Heart Disease Father        Current Medications:  Current Outpatient Medications on File Prior to Visit   Medication Sig Dispense Refill    metFORMIN (GLUCOPHAGE) 500 MG tablet TAKE 1 TABLET BY MOUTH IN THE MORNING, AND THEN TAKE 2 TABLETS AT NIGHT WITH DINNER. 270 tablet 0    TRADJENTA 5 MG tablet TAKE ONE TABLET BY MOUTH DAILY 90 tablet 0    escitalopram (LEXAPRO) 20 MG tablet Take 1 tablet by mouth daily 90 tablet 0    rosuvastatin (CRESTOR) 40 MG tablet TAKE ONE TABLET BY MOUTH DAILY 90 tablet 1    lidocaine (LIDODERM) 5 % Place 1 patch onto the skin every 12 hours 12 hours on, 12 hours off.  30 patch 0    glipiZIDE (GLUCOTROL) 10 MG tablet Take 1 tablet by mouth daily 90 tablet 0    traZODone (DESYREL) 100 MG tablet Take 1 tablet by mouth nightly as needed for Sleep 90 tablet 1    amLODIPine (NORVASC) 10 MG tablet Take 1 tablet by mouth daily 90 tablet 1    meloxicam (MOBIC) 15 MG tablet Take 1 tablet by mouth daily 30 tablet 3    diclofenac sodium 1 % GEL Apply 2 g topically 4 times daily as needed for Pain (for lower back pain) 2 Tube 1    Blood Glucose Monitoring Suppl (FREESTYLE FREEDOM LITE) w/Device KIT 1 kit by Does not apply route daily 1 kit 0    FREESTYLE LANCETS MISC 1 each by Does not apply route daily 100 each 3    blood glucose test strips (FREESTYLE LITE) strip 1 each by In Vitro route daily Testing 1-2 times daily per e11.9 100 each 3    blood glucose monitor strips Test 2 times a day & as needed for symptoms of irregular blood glucose. One Touch Verio strips 100 strip 2    Lancets MISC 1 each by Does not apply route 2 times daily 100 each 5    albuterol sulfate  (90 Base) MCG/ACT inhaler Inhale 2 puffs into the lungs 4 times daily as needed for Shortness of Breath 1 Inhaler 5    irbesartan (AVAPRO) 300 MG tablet TAKE ONE TABLET BY MOUTH DAILY 90 tablet 2    ONETOUCH DELICA LANCETS 45T MISC USE ONE LANCET TO TEST TWICE A DAY per E11.9 100 each 5    Cyanocobalamin (B-12 PO) Take by mouth      Probiotic Product (PROBIOTIC ACIDOPHILUS) CAPS Take 1 capsule by mouth daily      VITAMIN D, CHOLECALCIFEROL, PO Take 1 tablet by mouth daily      Blood Glucose Monitoring Suppl ([employer] BREEZE 2 SYSTEM) W/DEVICE KIT One machine one time 1 kit 0    Omeprazole 20 MG TBEC Take 1 tablet by mouth daily as needed.  vitamin E 1000 UNITS capsule Take 1,000 Units by mouth daily.  Blood Glucose Monitoring Suppl ([employer] BREEZE MONITOR) KIT by Does not apply route See Admin Instructions. Test strips. Test twice daily. 60 kit 6     No current facility-administered medications on file prior to visit. REVIEW OF SYSTEMS:    Review of Systems   Constitutional: Negative for chills and fever. HENT: Negative for postnasal drip. Eyes: Negative for redness. Dry eyes   Respiratory: Positive for shortness of breath (on exertion). Negative for cough, chest tightness and wheezing. Cardiovascular: Negative for chest pain, palpitations and leg swelling. Gastrointestinal:        GERD   Musculoskeletal: Negative for arthralgias and joint swelling. Skin: Negative for rash. Neurological: Negative for weakness. Psychiatric/Behavioral: The patient is not nervous/anxious.     All other systems predicted    Diffusion Capacity:    DLCO is 5.43 which is 26% predicted    Impression:    1. There is no obstruction present    2. There is restriction.  Based on FEV1 it is considered mild   however based on TLC it is considered severe    3. There is severe reduction in diffusion capacity    Comment:   PFT was compared to the one on 1/20/20 - FEV1 and FVC are stable.      SIX-MINUTE WALK:    A six-minute walk was started on room air.  Patient was placed on   2 liters of O2 at minute 4   The patientwalked a total of 510 feet.  She did stop or pause   during the walk to place O2 and due to back pain.  Baseline  oxygen saturation 95%.  The lowest oxygen saturation during the   walk was 82% on minute 3 for which she was placed on 2 liters.    O2 recovered to 95-98% during the remaining time of testing. 6MWT was compared to the one we have on 10/8/19:  Moises Lands walked decreased from 660ft to 510ft  At that time O2 sats dropped to 86% on minute 4 and placed on O2   on minute 5    Comment: 6MWT is slightly worse than how it was last year on   10/8/19     Assessment:      Diagnosis Orders   1. NSIP (nonspecific interstitial pneumonia) ([facility] [address]         Plan:   70years old female with NSIP. Diagnosis is based on clinical presentation, CT findings and response to steroids. She also has large hiatal hernia      PFT and 6MWT on 6/2/20 are essentially stable. Prednisone was tapered off ~ 3 weeks ago due to weakness and back issues. She is currently taking epidural injections. Continue to use O2 at 2 liters. (There is no specific exposure that she or her family can remember  She is not on chronic medication that is known to cause ILD  She does not have pets  Hypersensitivity panel is negative   Rheumatoid factor, GIUSEPPE and sed rate are negative. SSA and SSB are negative  She was seen by rheumatology. There is no evidence of active rheumatic disease.    BAL is negative for infectious etiology)     Had bronch

## 2020-06-26 LAB
A/G RATIO: 1.9 (ref 1.1–2.2)
ALBUMIN SERPL-MCNC: 4.2 G/DL (ref 3.4–5)
ALP BLD-CCNC: 71 U/L (ref 40–129)
ALT SERPL-CCNC: 20 U/L (ref 10–40)
ANION GAP SERPL CALCULATED.3IONS-SCNC: 16 MMOL/L (ref 3–16)
AST SERPL-CCNC: 16 U/L (ref 15–37)
BACTERIA: ABNORMAL /HPF
BASOPHILS ABSOLUTE: 0 K/UL (ref 0–0.2)
BASOPHILS RELATIVE PERCENT: 0.3 %
BILIRUB SERPL-MCNC: 0.4 MG/DL (ref 0–1)
BILIRUBIN URINE: NEGATIVE
BLOOD, URINE: NEGATIVE
BUN BLDV-MCNC: 27 MG/DL (ref 7–20)
CALCIUM SERPL-MCNC: 10.8 MG/DL (ref 8.3–10.6)
CHLORIDE BLD-SCNC: 104 MMOL/L (ref 99–110)
CHOLESTEROL, TOTAL: 125 MG/DL (ref 0–199)
CLARITY: ABNORMAL
CO2: 20 MMOL/L (ref 21–32)
COLOR: YELLOW
CREAT SERPL-MCNC: 0.8 MG/DL (ref 0.6–1.2)
CREATININE URINE: 94 MG/DL (ref 28–259)
CRYSTALS, UA: ABNORMAL /HPF
EOSINOPHILS ABSOLUTE: 0 K/UL (ref 0–0.6)
EOSINOPHILS RELATIVE PERCENT: 0.2 %
EPITHELIAL CELLS, UA: 7 /HPF (ref 0–5)
GFR AFRICAN AMERICAN: >60
GFR NON-AFRICAN AMERICAN: >60
GLOBULIN: 2.2 G/DL
GLUCOSE BLD-MCNC: 58 MG/DL (ref 70–99)
GLUCOSE URINE: NEGATIVE MG/DL
HCT VFR BLD CALC: 32.7 % (ref 36–48)
HDLC SERPL-MCNC: 76 MG/DL (ref 40–60)
HEMOGLOBIN: 10.5 G/DL (ref 12–16)
HYALINE CASTS: 1 /LPF (ref 0–8)
KETONES, URINE: NEGATIVE MG/DL
LDL CHOLESTEROL CALCULATED: 23 MG/DL
LEUKOCYTE ESTERASE, URINE: ABNORMAL
LYMPHOCYTES ABSOLUTE: 1.4 K/UL (ref 1–5.1)
LYMPHOCYTES RELATIVE PERCENT: 14.8 %
MCH RBC QN AUTO: 29.2 PG (ref 26–34)
MCHC RBC AUTO-ENTMCNC: 32 G/DL (ref 31–36)
MCV RBC AUTO: 91.4 FL (ref 80–100)
MICROALBUMIN UR-MCNC: 2.7 MG/DL
MICROALBUMIN/CREAT UR-RTO: 28.7 MG/G (ref 0–30)
MICROSCOPIC EXAMINATION: YES
MONOCYTES ABSOLUTE: 0.8 K/UL (ref 0–1.3)
MONOCYTES RELATIVE PERCENT: 8.2 %
NEUTROPHILS ABSOLUTE: 7.2 K/UL (ref 1.7–7.7)
NEUTROPHILS RELATIVE PERCENT: 76.5 %
NITRITE, URINE: POSITIVE
PDW BLD-RTO: 16.8 % (ref 12.4–15.4)
PH UA: 6 (ref 5–8)
PLATELET # BLD: 241 K/UL (ref 135–450)
PMV BLD AUTO: 8.2 FL (ref 5–10.5)
POTASSIUM SERPL-SCNC: 3.9 MMOL/L (ref 3.5–5.1)
PROTEIN UA: ABNORMAL MG/DL
RBC # BLD: 3.58 M/UL (ref 4–5.2)
RBC UA: ABNORMAL /HPF (ref 0–4)
SODIUM BLD-SCNC: 140 MMOL/L (ref 136–145)
SPECIFIC GRAVITY UA: 1.02 (ref 1–1.03)
TOTAL PROTEIN: 6.4 G/DL (ref 6.4–8.2)
TRIGL SERPL-MCNC: 129 MG/DL (ref 0–150)
TSH SERPL DL<=0.05 MIU/L-ACNC: 1.74 UIU/ML (ref 0.27–4.2)
URINE TYPE: ABNORMAL
UROBILINOGEN, URINE: 0.2 E.U./DL
VITAMIN D 25-HYDROXY: 70.5 NG/ML
VLDLC SERPL CALC-MCNC: 26 MG/DL
WBC # BLD: 9.5 K/UL (ref 4–11)
WBC UA: 16 /HPF (ref 0–5)

## 2020-06-27 LAB
ESTIMATED AVERAGE GLUCOSE: 134.1 MG/DL
HBA1C MFR BLD: 6.3 %

## 2020-06-29 ENCOUNTER — TELEPHONE (OUTPATIENT)
Dept: ORTHOPEDIC SURGERY | Age: 80
End: 2020-06-29

## 2020-06-29 DIAGNOSIS — M54.16 LUMBAR RADICULOPATHY: Primary | ICD-10-CM

## 2020-06-30 ENCOUNTER — TELEPHONE (OUTPATIENT)
Dept: ORTHOPEDIC SURGERY | Age: 80
End: 2020-06-30

## 2020-06-30 RX ORDER — HYDROCODONE BITARTRATE AND ACETAMINOPHEN 5; 325 MG/1; MG/1
1 TABLET ORAL EVERY 6 HOURS PRN
Qty: 28 TABLET | Refills: 0 | Status: SHIPPED | OUTPATIENT
Start: 2020-06-30 | End: 2020-07-07

## 2020-07-01 ENCOUNTER — OFFICE VISIT (OUTPATIENT)
Dept: PRIMARY CARE CLINIC | Age: 80
End: 2020-07-01
Payer: MEDICARE

## 2020-07-01 PROCEDURE — 99211 OFF/OP EST MAY X REQ PHY/QHP: CPT | Performed by: INTERNAL MEDICINE

## 2020-07-01 NOTE — PROGRESS NOTES
Jacki Cifuentes received a viral test for COVID-19. They were educated on isolation and quarantine as appropriate. For any symptoms, they were directed to seek care from their PCP, given contact information to establish with a doctor, directed to an urgent care or the emergency room.

## 2020-07-01 NOTE — PATIENT INSTRUCTIONS
bathroom, if available. Animals: You should restrict contact with pets and other animals while you are sick with COVID-19, just like you would around other people. Although there have not been reports of pets or other animals becoming sick with COVID-19, it is still recommended that people sick with COVID-19 limit contact with animals until more information is known about the virus. When possible, have another member of your household care for your animals while you are sick. If you are sick with COVID-19, avoid contact with your pet, including petting, snuggling, being kissed or licked, and sharing food. If you must care for your pet or be around animals while you are sick, wash your hands before and after you interact with pets and wear a facemask. Call ahead before visiting your doctor  If you have a medical appointment, call the healthcare provider and tell them that you have or may have COVID-19. This will help the healthcare providers office take steps to keep other people from getting infected or exposed. Wear a facemask  You should wear a facemask when you are around other people (e.g., sharing a room or vehicle) or pets and before you enter a healthcare providers office. If you are not able to wear a facemask (for example, because it causes trouble breathing), then people who live with you should not stay in the same room with you, or they should wear a facemask if they enter your room. Cover your coughs and sneezes  Cover your mouth and nose with a tissue when you cough or sneeze. Throw used tissues in a lined trash can. Immediately wash your hands with soap and water for at least 20 seconds or, if soap and water are not available, clean your hands with an alcohol-based hand  that contains at least 60% alcohol.   Clean your hands often  Wash your hands often with soap and water for at least 20 seconds, especially after blowing your nose, coughing, or sneezing; going to the bathroom; and before eating or preparing food. If soap and water are not readily available, use an alcohol-based hand  with at least 60% alcohol, covering all surfaces of your hands and rubbing them together until they feel dry. Soap and water are the best option if hands are visibly dirty. Avoid touching your eyes, nose, and mouth with unwashed hands. Avoid sharing personal household items  You should not share dishes, drinking glasses, cups, eating utensils, towels, or bedding with other people or pets in your home. After using these items, they should be washed thoroughly with soap and water. Clean all high-touch surfaces everyday  High touch surfaces include counters, tabletops, doorknobs, bathroom fixtures, toilets, phones, keyboards, tablets, and bedside tables. Also, clean any surfaces that may have blood, stool, or body fluids on them. Use a household cleaning spray or wipe, according to the label instructions. Labels contain instructions for safe and effective use of the cleaning product including precautions you should take when applying the product, such as wearing gloves and making sure you have good ventilation during use of the product. Monitor your symptoms  Seek prompt medical attention if your illness is worsening (e.g., difficulty breathing). Before seeking care, call your healthcare provider and tell them that you have, or are being evaluated for, COVID-19. Put on a facemask before you enter the facility. These steps will help the healthcare providers office to keep other people in the office or waiting room from getting infected or exposed. Ask your healthcare provider to call the local or state health department. Persons who are placed under active monitoring or facilitated self-monitoring should follow instructions provided by their local health department or occupational health professionals, as appropriate. When working with your local health department check their available hours.   If you have a medical emergency and need to call 911, notify the dispatch personnel that you have, or are being evaluated for COVID-19. If possible, put on a facemask before emergency medical services arrive. Discontinuing home isolation  Patients with confirmed COVID-19 should remain under home isolation precautions until the risk of secondary transmission to others is thought to be low. The decision to discontinue home isolation precautions should be made on a case-by-case basis, in consultation with healthcare providers and state and local health departments.

## 2020-07-05 LAB
SARS-COV-2: NOT DETECTED
SOURCE: NORMAL

## 2020-07-07 ENCOUNTER — HOSPITAL ENCOUNTER (OUTPATIENT)
Age: 80
Setting detail: OUTPATIENT SURGERY
Discharge: HOME | End: 2020-07-07
Attending: PHYSICAL MEDICINE & REHABILITATION | Admitting: PHYSICAL MEDICINE & REHABILITATION
Payer: MEDICARE

## 2020-07-07 VITALS
DIASTOLIC BLOOD PRESSURE: 67 MMHG | BODY MASS INDEX: 26.68 KG/M2 | WEIGHT: 145 LBS | SYSTOLIC BLOOD PRESSURE: 150 MMHG | HEART RATE: 67 BPM | OXYGEN SATURATION: 98 % | HEIGHT: 62 IN | RESPIRATION RATE: 16 BRPM | TEMPERATURE: 97 F

## 2020-07-07 LAB
GLUCOSE BLD-MCNC: 142 MG/DL (ref 70–99)
PERFORMED ON: ABNORMAL

## 2020-07-07 PROCEDURE — 2709999900 HC NON-CHARGEABLE SUPPLY: Performed by: PHYSICAL MEDICINE & REHABILITATION

## 2020-07-07 PROCEDURE — 6360000002 HC RX W HCPCS: Performed by: PHYSICAL MEDICINE & REHABILITATION

## 2020-07-07 PROCEDURE — 3600000002 HC SURGERY LEVEL 2 BASE: Performed by: PHYSICAL MEDICINE & REHABILITATION

## 2020-07-07 PROCEDURE — 6360000004 HC RX CONTRAST MEDICATION: Performed by: PHYSICAL MEDICINE & REHABILITATION

## 2020-07-07 PROCEDURE — 7100000011 HC PHASE II RECOVERY - ADDTL 15 MIN: Performed by: PHYSICAL MEDICINE & REHABILITATION

## 2020-07-07 PROCEDURE — 2500000003 HC RX 250 WO HCPCS: Performed by: PHYSICAL MEDICINE & REHABILITATION

## 2020-07-07 PROCEDURE — 7100000010 HC PHASE II RECOVERY - FIRST 15 MIN: Performed by: PHYSICAL MEDICINE & REHABILITATION

## 2020-07-07 RX ORDER — LIDOCAINE HYDROCHLORIDE 10 MG/ML
INJECTION, SOLUTION EPIDURAL; INFILTRATION; INTRACAUDAL; PERINEURAL PRN
Status: DISCONTINUED | OUTPATIENT
Start: 2020-07-07 | End: 2020-07-07 | Stop reason: ALTCHOICE

## 2020-07-07 RX ORDER — BUPIVACAINE HYDROCHLORIDE 5 MG/ML
INJECTION, SOLUTION EPIDURAL; INTRACAUDAL
Status: DISCONTINUED
Start: 2020-07-07 | End: 2020-07-07 | Stop reason: HOSPADM

## 2020-07-07 RX ORDER — BETAMETHASONE SODIUM PHOSPHATE AND BETAMETHASONE ACETATE 3; 3 MG/ML; MG/ML
INJECTION, SUSPENSION INTRA-ARTICULAR; INTRALESIONAL; INTRAMUSCULAR; SOFT TISSUE
Status: DISCONTINUED
Start: 2020-07-07 | End: 2020-07-07 | Stop reason: HOSPADM

## 2020-07-07 ASSESSMENT — PAIN - FUNCTIONAL ASSESSMENT
PAIN_FUNCTIONAL_ASSESSMENT: 0-10
PAIN_FUNCTIONAL_ASSESSMENT: PREVENTS OR INTERFERES WITH MANY ACTIVE NOT PASSIVE ACTIVITIES

## 2020-07-07 ASSESSMENT — PAIN DESCRIPTION - DESCRIPTORS: DESCRIPTORS: SHARP;ACHING;CONSTANT

## 2020-07-07 NOTE — OP NOTE
Patient:  Anais Munoz  YOB: 1940  Medical Record #:  [id number]   Place: [address], [place]  Date:  7/7/2020   Physician:  Sandy Moulton MD, VLAD    Procedure: 1. Transforaminal Lumbar Epidural Steroid Injection -  right L4  CPT 48532          2. Transforaminal Lumbar Epidural Steroid Injection -  left L4  CPT Mod 50    Pre-Procedure Diagnosis: Lumbar radiculopathy      Post-Procedure Diagnosis: Same    Sedation: Local with 1% Lidocaine 3 ml and no IV sedation     EBL: None    Complications: None    Procedure Summary:        The patient was brought to the procedure suite and placed in the prone position. The skin overlying the lumbar spine was prepped and draped in the usual sterile fashion. Using fluoroscopic guidance, the right L4 foramen was identified. Through anesthetized skin, a 22 gauge 3.5 inch curved tip spinal needle was advanced into the foramen. Isovue M 300 was instilled showing an epidurogram/nerve root outline pattern without evidence of vascular or intrathecal spread. Following which, 7.5 mg of Celestone mixed with 1 ml of 0.5% Marcaine was instilled. The needle was removed. Using fluoroscopic guidance, the left L4 foramen was identified. Through anesthetized skin, a 22 gauge 3.5 inch curved tip spinal needle was advanced into the foramen. Isovue M 300 was instilled showing an epidurogram/nerve root outline pattern without evidence of vascular or intrathecal spread. Following which, 7.5 mg of Celestone mixed with 1 ml of 0.5% Marcaine was instilled. The needle was removed and band-aids were applied. The patient was transferred to the post-operative area in stable condition.

## 2020-07-07 NOTE — H&P
diverticulosis, follow up in 5 years   [address]  8/16/2012    DR. Ramos - with mesh     Current Medications:   Prior to Admission medications    Medication Sig Start Date End Date Taking? Authorizing Provider   HYDROcodone-acetaminophen (LORCET) 5-325 MG per tablet Take 1 tablet by mouth every 6 hours as needed for Pain for up to 7 days. Intended supply: 7 days. Take lowest dose possible to manage pain 6/30/20 7/7/20 Yes NATALIE Hargrove   metFORMIN (GLUCOPHAGE) 500 MG tablet TAKE 1 TABLET BY MOUTH IN THE MORNING, AND THEN TAKE 2 TABLETS AT NIGHT WITH DINNER. 6/15/20  Yes Rj Sevilla MD   TRADJENTA 5 MG tablet TAKE ONE TABLET BY MOUTH DAILY 6/11/20  Yes Rj Sevilla MD   escitalopram (LEXAPRO) 20 MG tablet Take 1 tablet by mouth daily 5/26/20 11/17/20 Yes Rj Sevilla MD   rosuvastatin (CRESTOR) 40 MG tablet TAKE ONE TABLET BY MOUTH DAILY 5/6/20  Yes Rj Sevilla MD   lidocaine (LIDODERM) 5 % Place 1 patch onto the skin every 12 hours 12 hours on, 12 hours off. 5/5/20  Yes Daniela Berry MD   glipiZIDE (GLUCOTROL) 10 MG tablet Take 1 tablet by mouth daily 3/13/20  Yes AMEE Hill CNP   traZODone (DESYREL) 100 MG tablet Take 1 tablet by mouth nightly as needed for Sleep 3/13/20  Yes AMEE Hill CNP   amLODIPine (NORVASC) 10 MG tablet Take 1 tablet by mouth daily 3/13/20  Yes AMEE Hill CNP   meloxicam (MOBIC) 15 MG tablet Take 1 tablet by mouth daily 2/24/20  Yes AMEE Hill CNP   Cyanocobalamin (B-12 PO) Take by mouth   Yes Historical Provider, MD   Probiotic Product (PROBIOTIC ACIDOPHILUS) CAPS Take 1 capsule by mouth daily   Yes Historical Provider, MD   VITAMIN D, CHOLECALCIFEROL, PO Take 1 tablet by mouth daily   Yes Historical Provider, MD   Omeprazole 20 MG TBEC Take 1 tablet by mouth daily as needed.      Yes Historical Provider, MD   vitamin E 1000 UNITS capsule Take 1,000 Units by resp. rate 18, height 5' 2\" (1.575 m), weight 145 lb (65.8 kg), SpO2 98 %, not currently breastfeeding. PHYSICAL EXAM:  HENT: Airway patent and reviewed  Cardiovascular: Normal rate, regular rhythm, normal heart sounds. Pulmonary/Chest: No wheezes. No rhonchi. No rales. Abdominal: Soft. Bowel sounds are normal. No distension. Extremities: Moves all extremities equally  Lumbar Spine: Painful range of motion, no midline tenderness       Diagnosis:Lumbar radiculopathy    Plan: Proceed with planned procedure    The patient was counseled at length about the risks of claudia Covid-19 in the talita-operative and post-operative states including the recovery window of their procedure. The patient was made aware that claudia Covid-19 after a surgical procedure may worsen their prognosis for recovering from the virus and lend to a higher morbidity and or mortality risk. The patient was given the options of postponing their procedure. All of the risks, benefits, and alternatives were discussed. The patient does wish to proceed with the procedure. ASA CLASS:         []   I. Normal, healthy adult           [x]   II.  Mild systemic disease            []   III. Severe systemic disease      Mallampati: Mallampati Class II - (soft palate, fauces & uvula are visible)      Sedation plan:   [x]  Local              []  Minimal                  []  General anesthesia    Patient's condition acceptable for planned procedure/sedation. Post Procedure Plan   Return to same level of care   ______________________     The risks and benefits as well as alternatives to the procedure have been discussed with the patient and or family. The patient and or next of kin understands and agrees to proceed.     Dick Brandon M.D.

## 2020-07-07 NOTE — PROGRESS NOTES
Pt ready for d/c home, instructions given and reviewed with pt, she denied questions, pt taken by wheelchair to private auto for d/c home in stable condition.

## 2020-07-07 NOTE — PROGRESS NOTES
Kory Grew    Age 68 y.o.    female    1940    N [id number]    7/15/2020  Arrival Time_____________  OR Time____________[facility]     Procedure(s):  PHACOEMULSIFICATION OF CATARACT RIGHT EYE WITH INTRAOCULAR LENS IMPLANT                      Monitor Anesthesia Care    Surgeon(s):  Elvia Morgan, MD       Phone [phone] ([place])     [address] [facility] [address]  Cell Work  _________________________________________________________________  _________________________________________________________________  _________________________________________________________________  _________________________________________________________________  _________________________________________________________________      PCP _____________________________ Phone_________________       H&P__________________Bringing    Chart            Epic  DOS     Called_______  EKG__________________Bringing    Chart            Epic  DOS     Called_______  LAB__________________ Bringing    Chart            Epic  DOS     Called_______  Cardiac Clearance_______Bringing    Chart            Epic      DOS       Called_______    Cardiologist________________________ Phone___________________________      ? Jewish concerns / Waiver on Chart            PAT Communications_____________  ? Pre-op Instructions Given [place]          ______________________________  ? Directions to Surgery Center                          ______________________________  ? Transportation Home_______________      _______________________________  ?  Crutches/Walker__________________        _______________________________      ________Pre-op Orders   _______Transcribed    _______Wt.  ________Pharmacy          _______SCD  ______VTE     ______Beta Blocker  ________Consent             ________TED Nati Lory

## 2020-07-09 ENCOUNTER — OFFICE VISIT (OUTPATIENT)
Dept: PRIMARY CARE CLINIC | Age: 80
End: 2020-07-09
Payer: MEDICARE

## 2020-07-09 ENCOUNTER — OFFICE VISIT (OUTPATIENT)
Dept: INTERNAL MEDICINE CLINIC | Age: 80
End: 2020-07-09
Payer: MEDICARE

## 2020-07-09 VITALS
HEART RATE: 74 BPM | OXYGEN SATURATION: 97 % | SYSTOLIC BLOOD PRESSURE: 122 MMHG | BODY MASS INDEX: 26.89 KG/M2 | DIASTOLIC BLOOD PRESSURE: 66 MMHG | WEIGHT: 147 LBS | TEMPERATURE: 98.1 F

## 2020-07-09 PROBLEM — H25.89 OTHER AGE-RELATED CATARACT: Status: ACTIVE | Noted: 2020-07-09

## 2020-07-09 LAB — HBA1C MFR BLD: 6.2 %

## 2020-07-09 PROCEDURE — 83036 HEMOGLOBIN GLYCOSYLATED A1C: CPT | Performed by: INTERNAL MEDICINE

## 2020-07-09 PROCEDURE — 99211 OFF/OP EST MAY X REQ PHY/QHP: CPT | Performed by: FAMILY MEDICINE

## 2020-07-09 PROCEDURE — 99214 OFFICE O/P EST MOD 30 MIN: CPT | Performed by: INTERNAL MEDICINE

## 2020-07-09 ASSESSMENT — ENCOUNTER SYMPTOMS
RESPIRATORY NEGATIVE: 1
WHEEZING: 0
CHEST TIGHTNESS: 0
SHORTNESS OF BREATH: 0
GASTROINTESTINAL NEGATIVE: 1

## 2020-07-09 NOTE — ASSESSMENT & PLAN NOTE
Patient will do well with planned procedure  Patient is advised no ASA, NSAID 7 days prior to procedure.   Hold diabetes medications until she gets home from procedure as she will be NPO  Monitor blood sugar perioperatively  Take BP medications as she normally does  Further orders per surgeon  EKG looks good

## 2020-07-09 NOTE — PROGRESS NOTES
Obstructive Sleep Apnea (MALVIN) Screening     Patient:  Paul Marquis    YOB: 1940      Medical Record #:  [id number]                     Date:  7/9/2020     1. Are you a loud and/or regular snorer? []  Yes       [x] No    2. Have you been observed to gasp or stop breathing during sleep? []  Yes       [x] No    3. Do you feel tired or groggy upon awakening or do you awaken with a headache?           []  Yes       [] No    4. Are you often tired or fatigued during the wake time hours? []  Yes       [] No    5. Do you fall asleep sitting, reading, watching TV or driving? []  Yes       [] No    6. Do you often have problems with memory or concentration? []  Yes       [] No    **If patient's score is ? 3 they are considered high risk for MALVIN. An Anesthesia provider will evaluate the patient and develop a plan of care the day of surgery. Note:  If the patient's BMI is more than 35 kg m¯² , has neck circumference > 40 cm, and/or high blood pressure the risk is greater (© American [employer]nea [employer], 2006).

## 2020-07-09 NOTE — PROGRESS NOTES
Subjective:      Patient ID: Ike Dunn is a 68 y.o. female. Chief Complaint   Patient presents with    Cataract       HPI   Chief Complaint:     Ike Dunn is a 68 y.o. female who presents for a preoperative physical examination. She is scheduled to have cataract surgery done by Dr. Cari Farley on July 15, 2020. History of Present Illness:      Patient has cataracts. She is scheduled for right cataract surgery. She is feeling well. She has no chest pain. She is no SOB. She has no cough or phlegm. She has no fever or chills.     Past Medical History:   Diagnosis Date    Diabetes mellitus ([facility] [address]     Essential hypertension, benign     GERD (gastroesophageal reflux disease)     Hyperlipidemia     Interstitial lung disease (HCC)     Osteoarthrosis, unspecified whether generalized or localized, unspecified site     osteoarthritis lower leg    Osteopenia     Shingles         Review of patient's past surgical history indicates:     Past Surgical History:   Procedure Laterality Date    ANKLE SURGERY Right 4/1/13    BRONCHOSCOPY N/A 8/1/2019    BRONCHOSCOPY WITH BAL AND TRANSBRONCHIAL BIOPSIES performed by Susy Prince MD at [address] [facility]  10/11/2010    Dr. Lili Babb , polyps and diverticulosis    COLONOSCOPY  11/11/2002    Dr. Ann Medeiros, Diverticulosis    COLONOSCOPY  04/20/2015    Dr. Lili Babb- diverticulosis, sessile polyp, 5 years     COLONOSCOPY  4/13/16    Dr Colten Billy; Diverticulosis    COLONOSCOPY  11/28/2016    Dr Lili Babb,     PAIN MANAGEMENT PROCEDURE Bilateral 6/23/2020    BILATERAL LUMBAR FOUR TRANSFORAMINAL EPIDURAL STEROID INJECTION SITE CONFIRMED BY FLUOROSCOPY performed by Molly Peñaloza MD at [address] [facility] Bilateral 7/7/2020    BILATERAL LUMBAR FOUR TRANSFORAMINAL EPIDURAL STEROID INJECTION SITE CONFIRMED BY FLUOROSCOPY performed by Molly Peñaloza MD at [address] [facility] CHOLECALCIFEROL, PO Take 1 tablet by mouth daily      Blood Glucose Monitoring Suppl (MELINDA BREEZE 2 SYSTEM) W/DEVICE KIT One machine one time 1 kit 0    vitamin E 1000 UNITS capsule Take 1,000 Units by mouth daily.  Blood Glucose Monitoring Suppl ([employer]EZE MONITOR) KIT by Does not apply route See Admin Instructions. Test strips. Test twice daily. 60 kit 6    escitalopram (LEXAPRO) 20 MG tablet Take 1 tablet by mouth daily 90 tablet 0     No current facility-administered medications for this visit. Allergies   Allergen Reactions    Percocet [Oxycodone-Acetaminophen] Other (See Comments)     Dizzy       Social History     Tobacco Use    Smoking status: Never Smoker    Smokeless tobacco: Never Used   Substance Use Topics    Alcohol use: Yes     Alcohol/week: 1.0 standard drinks     Types: 1 Glasses of wine per week     Comment: one vodka tonic nightly    Drug use: No        Family History   Problem Relation Age of Onset    Heart Disease Mother     Rheum Arthritis Mother     Heart Disease Father             Review of Systems   Constitutional: Negative. HENT: Negative. Respiratory: Negative. Negative for chest tightness, shortness of breath and wheezing. Cardiovascular: Negative. Negative for chest pain, palpitations and leg swelling. Gastrointestinal: Negative. Genitourinary: Negative. Musculoskeletal: Negative for arthralgias and myalgias. Neurological: Negative. Objective:   Physical Exam  Constitutional:       Appearance: She is well-developed. HENT:      Head: Normocephalic and atraumatic. Neck:      Thyroid: No thyromegaly. Vascular: No carotid bruit. Cardiovascular:      Rate and Rhythm: Normal rate and regular rhythm. Heart sounds: Normal heart sounds, S1 normal and S2 normal.   Pulmonary:      Effort: Pulmonary effort is normal.      Breath sounds: Normal breath sounds. No wheezing, rhonchi or rales.    Abdominal:      General: Abdomen is flat. Bowel sounds are normal.      Palpations: Abdomen is soft. Neurological:      General: No focal deficit present. Mental Status: She is alert and oriented to person, place, and time. Cranial Nerves: Cranial nerves are intact. Sensory: Sensation is intact. Motor: Motor function is intact. Deep Tendon Reflexes: Reflexes are normal and symmetric. Assessment:      See Problem List assessment and plan       Plan:       Other age-related cataract  Patient will do well with planned procedure  Patient is advised no ASA, NSAID 7 days prior to procedure. Hold diabetes medications until she gets home from procedure as she will be NPO  Monitor blood sugar perioperatively  Take BP medications as she normally does  Further orders per surgeon  EKG looks good      Essential hypertension, benign  BP stable  Continue present treatment      DM (diabetes mellitus) ([facility] [address]  Sugar good  Hold diabetes medications morning of procedure  Check HgbA1c                Patient engaged in shared decision making. Information given to evaluate options of treatment, understand what is needed and discuss importance of following plan.

## 2020-07-11 LAB
BILIRUBIN URINE: NEGATIVE
BLOOD, URINE: ABNORMAL
CLARITY: CLEAR
COLOR: YELLOW
EPITHELIAL CELLS, UA: 2 /HPF (ref 0–5)
GLUCOSE URINE: NEGATIVE MG/DL
HYALINE CASTS: 1 /LPF (ref 0–8)
KETONES, URINE: NEGATIVE MG/DL
LEUKOCYTE ESTERASE, URINE: ABNORMAL
MICROSCOPIC EXAMINATION: YES
NITRITE, URINE: NEGATIVE
PH UA: 6.5 (ref 5–8)
PROTEIN UA: ABNORMAL MG/DL
RBC UA: 1 /HPF (ref 0–4)
SPECIFIC GRAVITY UA: 1 (ref 1–1.03)
URINE TYPE: ABNORMAL
UROBILINOGEN, URINE: 0.2 E.U./DL
WBC UA: 8 /HPF (ref 0–5)

## 2020-07-13 ENCOUNTER — TELEPHONE (OUTPATIENT)
Dept: INTERNAL MEDICINE CLINIC | Age: 80
End: 2020-07-13

## 2020-07-13 LAB
ORGANISM: ABNORMAL
SARS-COV-2: NOT DETECTED
SOURCE: NORMAL
URINE CULTURE, ROUTINE: ABNORMAL

## 2020-07-15 ENCOUNTER — ANESTHESIA EVENT (OUTPATIENT)
Dept: OPERATING ROOM | Age: 80
End: 2020-07-15
Payer: MEDICARE

## 2020-07-15 ENCOUNTER — HOSPITAL ENCOUNTER (OUTPATIENT)
Age: 80
Setting detail: OUTPATIENT SURGERY
Discharge: HOME | End: 2020-07-15
Attending: OPHTHALMOLOGY | Admitting: OPHTHALMOLOGY
Payer: MEDICARE

## 2020-07-15 ENCOUNTER — ANESTHESIA (OUTPATIENT)
Dept: OPERATING ROOM | Age: 80
End: 2020-07-15
Payer: MEDICARE

## 2020-07-15 VITALS
BODY MASS INDEX: 27.05 KG/M2 | WEIGHT: 147 LBS | HEIGHT: 62 IN | TEMPERATURE: 97.3 F | SYSTOLIC BLOOD PRESSURE: 154 MMHG | RESPIRATION RATE: 16 BRPM | HEART RATE: 60 BPM | OXYGEN SATURATION: 96 % | DIASTOLIC BLOOD PRESSURE: 58 MMHG

## 2020-07-15 VITALS — DIASTOLIC BLOOD PRESSURE: 74 MMHG | SYSTOLIC BLOOD PRESSURE: 148 MMHG | OXYGEN SATURATION: 100 %

## 2020-07-15 LAB
GLUCOSE BLD-MCNC: 135 MG/DL (ref 70–99)
PERFORMED ON: ABNORMAL

## 2020-07-15 PROCEDURE — 6370000000 HC RX 637 (ALT 250 FOR IP): Performed by: OPHTHALMOLOGY

## 2020-07-15 PROCEDURE — 6360000002 HC RX W HCPCS: Performed by: OPHTHALMOLOGY

## 2020-07-15 PROCEDURE — 3700000000 HC ANESTHESIA ATTENDED CARE: Performed by: OPHTHALMOLOGY

## 2020-07-15 PROCEDURE — 7100000010 HC PHASE II RECOVERY - FIRST 15 MIN: Performed by: OPHTHALMOLOGY

## 2020-07-15 PROCEDURE — V2632 POST CHMBR INTRAOCULAR LENS: HCPCS | Performed by: OPHTHALMOLOGY

## 2020-07-15 PROCEDURE — 2580000003 HC RX 258: Performed by: ANESTHESIOLOGY

## 2020-07-15 PROCEDURE — 3600000003 HC SURGERY LEVEL 3 BASE: Performed by: OPHTHALMOLOGY

## 2020-07-15 PROCEDURE — 2500000003 HC RX 250 WO HCPCS: Performed by: NURSE ANESTHETIST, CERTIFIED REGISTERED

## 2020-07-15 PROCEDURE — 3700000001 HC ADD 15 MINUTES (ANESTHESIA): Performed by: OPHTHALMOLOGY

## 2020-07-15 PROCEDURE — 6360000002 HC RX W HCPCS: Performed by: NURSE ANESTHETIST, CERTIFIED REGISTERED

## 2020-07-15 PROCEDURE — 2709999900 HC NON-CHARGEABLE SUPPLY: Performed by: OPHTHALMOLOGY

## 2020-07-15 PROCEDURE — 2580000003 HC RX 258: Performed by: OPHTHALMOLOGY

## 2020-07-15 PROCEDURE — 7100000011 HC PHASE II RECOVERY - ADDTL 15 MIN: Performed by: OPHTHALMOLOGY

## 2020-07-15 PROCEDURE — 3600000013 HC SURGERY LEVEL 3 ADDTL 15MIN: Performed by: OPHTHALMOLOGY

## 2020-07-15 PROCEDURE — 2500000003 HC RX 250 WO HCPCS: Performed by: OPHTHALMOLOGY

## 2020-07-15 DEVICE — ACRYSOF(R) IQ ASPHERIC IOL SP ACRYLIC FOLDABLELENS WULTRASERT(TM) DELIVERY SYSTEM UV WBLUE LIGHT FILTER. 13.0MM LENGTH 6.0MM ANTERIORASYMMETRIC BICONVEX OPTIC PLANAR HAPTICS.
Type: IMPLANTABLE DEVICE | Site: EYE | Status: FUNCTIONAL
Brand: ACRYSOF ULTRASERT

## 2020-07-15 RX ORDER — MOXIFLOXACIN 5 MG/ML
SOLUTION/ DROPS OPHTHALMIC PRN
Status: DISCONTINUED | OUTPATIENT
Start: 2020-07-15 | End: 2020-07-15 | Stop reason: ALTCHOICE

## 2020-07-15 RX ORDER — PROPOFOL 10 MG/ML
INJECTION, EMULSION INTRAVENOUS PRN
Status: DISCONTINUED | OUTPATIENT
Start: 2020-07-15 | End: 2020-07-15 | Stop reason: SDUPTHER

## 2020-07-15 RX ORDER — SODIUM CHLORIDE 0.9 % (FLUSH) 0.9 %
10 SYRINGE (ML) INJECTION EVERY 12 HOURS SCHEDULED
Status: DISCONTINUED | OUTPATIENT
Start: 2020-07-15 | End: 2020-07-15 | Stop reason: HOSPADM

## 2020-07-15 RX ORDER — TIMOLOL MALEATE 5 MG/ML
SOLUTION/ DROPS OPHTHALMIC PRN
Status: DISCONTINUED | OUTPATIENT
Start: 2020-07-15 | End: 2020-07-15 | Stop reason: ALTCHOICE

## 2020-07-15 RX ORDER — LIDOCAINE HYDROCHLORIDE 20 MG/ML
INJECTION, SOLUTION INFILTRATION; PERINEURAL PRN
Status: DISCONTINUED | OUTPATIENT
Start: 2020-07-15 | End: 2020-07-15 | Stop reason: SDUPTHER

## 2020-07-15 RX ORDER — TETRACAINE HYDROCHLORIDE 5 MG/ML
SOLUTION OPHTHALMIC PRN
Status: DISCONTINUED | OUTPATIENT
Start: 2020-07-15 | End: 2020-07-15 | Stop reason: ALTCHOICE

## 2020-07-15 RX ORDER — SODIUM CHLORIDE, SODIUM LACTATE, POTASSIUM CHLORIDE, CALCIUM CHLORIDE 600; 310; 30; 20 MG/100ML; MG/100ML; MG/100ML; MG/100ML
INJECTION, SOLUTION INTRAVENOUS CONTINUOUS
Status: DISCONTINUED | OUTPATIENT
Start: 2020-07-15 | End: 2020-07-15 | Stop reason: HOSPADM

## 2020-07-15 RX ORDER — SODIUM CHLORIDE 0.9 % (FLUSH) 0.9 %
10 SYRINGE (ML) INJECTION PRN
Status: DISCONTINUED | OUTPATIENT
Start: 2020-07-15 | End: 2020-07-15 | Stop reason: HOSPADM

## 2020-07-15 RX ORDER — MAGNESIUM HYDROXIDE 1200 MG/15ML
LIQUID ORAL PRN
Status: DISCONTINUED | OUTPATIENT
Start: 2020-07-15 | End: 2020-07-15 | Stop reason: ALTCHOICE

## 2020-07-15 RX ORDER — LIDOCAINE HYDROCHLORIDE 10 MG/ML
0.3 INJECTION, SOLUTION EPIDURAL; INFILTRATION; INTRACAUDAL; PERINEURAL
Status: DISCONTINUED | OUTPATIENT
Start: 2020-07-15 | End: 2020-07-15 | Stop reason: HOSPADM

## 2020-07-15 RX ADMIN — SODIUM CHLORIDE, POTASSIUM CHLORIDE, SODIUM LACTATE AND CALCIUM CHLORIDE: 600; 310; 30; 20 INJECTION, SOLUTION INTRAVENOUS at 09:35

## 2020-07-15 RX ADMIN — Medication 0.3 ML: at 09:40

## 2020-07-15 RX ADMIN — Medication 0.3 ML: at 09:20

## 2020-07-15 RX ADMIN — PROPOFOL 80 MG: 10 INJECTION, EMULSION INTRAVENOUS at 09:54

## 2020-07-15 RX ADMIN — Medication 0.3 ML: at 09:30

## 2020-07-15 RX ADMIN — LIDOCAINE HYDROCHLORIDE 40 MG: 20 INJECTION, SOLUTION INFILTRATION; PERINEURAL at 09:54

## 2020-07-15 ASSESSMENT — PULMONARY FUNCTION TESTS
PIF_VALUE: 0

## 2020-07-15 ASSESSMENT — LIFESTYLE VARIABLES: SMOKING_STATUS: 0

## 2020-07-15 ASSESSMENT — PAIN SCALES - GENERAL: PAINLEVEL_OUTOF10: 0

## 2020-07-15 NOTE — ANESTHESIA POSTPROCEDURE EVALUATION
Department of Anesthesiology  Postprocedure Note    Patient: Clark Muse  MRN: [id number]  YOB: 1940  Date of evaluation: 7/15/2020  Time:  11:19 AM     Procedure Summary     Date:  07/15/20 Room / Location:  [address] [place] [address]    Anesthesia Start:  0349 Anesthesia Stop:  8020    Procedure:  PHACOEMULSIFICATION OF CATARACT RIGHT EYE WITH INTRAOCULAR LENS IMPLANT (Right Eye) Diagnosis:       Age-related nuclear cataract of right eye      (Age-related nuclear cataract of right eye [H25.11])    Surgeon:  Tony Cox MD Responsible Provider:  Sierra Diane DO    Anesthesia Type:  MAC ASA Status:  2          Anesthesia Type: MAC    Virgie Phase I: Virgie Score: 10    Virgie Phase II: Virgie Score: 10    Last vitals: Reviewed and per EMR flowsheets.        Anesthesia Post Evaluation    Patient location during evaluation: PACU  Patient participation: complete - patient participated  Level of consciousness: awake and alert  Pain score: 0  Airway patency: patent  Nausea & Vomiting: no nausea and no vomiting  Complications: no  Cardiovascular status: hemodynamically stable  Respiratory status: acceptable  Hydration status: stable

## 2020-07-15 NOTE — BRIEF OP NOTE
Brief Postoperative Note      Patient: Grier Meigs  YOB: 1940  MRN: [id number]    Date of Procedure: 7/15/2020    Pre-Op Diagnosis: Age-related nuclear cataract of right eye [H25.11]    Post-Op Diagnosis: Same       Procedure(s):  PHACOEMULSIFICATION OF CATARACT RIGHT EYE WITH INTRAOCULAR LENS IMPLANT    Surgeon(s):  Jose Alfredo Finnegan MD    Assistant:  * No surgical staff found *    Anesthesia: Monitor Anesthesia Care    Estimated Blood Loss (mL): Minimal    Complications: None    Specimens:   * No specimens in log *    Implants:  Implant Name Type Inv.  Item Serial No.  Lot No. LRB No. Used Action   LENS IOL Halina Bureau - [address] [place] [id number]  Right 1 Implanted         Drains: * No LDAs found *    Findings: none    Electronically signed by Kelby Oconnell MD on 7/15/2020 at 10:32 AM

## 2020-07-15 NOTE — H&P
I have examined the patient and reviewed the history and physical and find no relevant changes. I have reviewed with the patient and/or family the risks, benefits, and alternatives to the procedure and they have agreed to proceed.     Guido Potrillo.

## 2020-07-15 NOTE — ANESTHESIA PRE PROCEDURE
Department of Anesthesiology  Preprocedure Note       Name:  Madisyn Haney   Age:  68 y.o.  :  1940                                          MRN:  [id number]         Date:  7/15/2020      Surgeon: Arianna Priest):  Mario Alberto Agosot MD    Procedure: Procedure(s):  PHACOEMULSIFICATION OF CATARACT RIGHT EYE WITH INTRAOCULAR LENS IMPLANT    Medications prior to admission:   Prior to Admission medications    Medication Sig Start Date End Date Taking? Authorizing Provider   metFORMIN (GLUCOPHAGE) 500 MG tablet TAKE 1 TABLET BY MOUTH IN THE MORNING, AND THEN TAKE 2 TABLETS AT NIGHT WITH DINNER. 6/15/20  Yes Dolly Rouse MD   TRADJENTA 5 MG tablet TAKE ONE TABLET BY MOUTH DAILY 20  Yes Dolly Rouse MD   escitalopram (LEXAPRO) 20 MG tablet Take 1 tablet by mouth daily 20 Yes Dolly Rouse MD   rosuvastatin (CRESTOR) 40 MG tablet TAKE ONE TABLET BY MOUTH DAILY 20  Yes Dolly Rouse MD   lidocaine (LIDODERM) 5 % Place 1 patch onto the skin every 12 hours 12 hours on, 12 hours off. 20  Yes Nils Emerson MD   traZODone (DESYREL) 100 MG tablet Take 1 tablet by mouth nightly as needed for Sleep 3/13/20  Yes AMEE Martinez CNP   amLODIPine (NORVASC) 10 MG tablet Take 1 tablet by mouth daily 3/13/20  Yes AMEE Martinez CNP   meloxicam (MOBIC) 15 MG tablet Take 1 tablet by mouth daily 20  Yes AMEE Martinez CNP   Cyanocobalamin (B-12 PO) Take by mouth daily    Yes Historical Provider, MD   Probiotic Product (PROBIOTIC ACIDOPHILUS) CAPS Take 1 capsule by mouth daily   Yes Historical Provider, MD   VITAMIN D, CHOLECALCIFEROL, PO Take 1 tablet by mouth daily   Yes Historical Provider, MD   vitamin E 1000 UNITS capsule Take 1,000 Units by mouth daily.      Yes Historical Provider, MD   Blood Glucose Monitoring Suppl (FREESTYLE FREEDOM LITE) w/Device KIT 1 kit by Does not apply route daily 19   AMEE Martinez CNP   FREESTYLE LANCETS MISC 1 each by Does not apply route daily 11/26/19   Patrici Memory, APRN - CNP   blood glucose test strips (FREESTYLE LITE) strip 1 each by In Vitro route daily Testing 1-2 times daily per e11.9 11/26/19   Patrici Memory, APRN - CNP   blood glucose monitor strips Test 2 times a day & as needed for symptoms of irregular blood glucose. One Touch Verio strips 7/24/19   Tanner Marie MD   Lancets MISC 1 each by Does not apply route 2 times daily 7/24/19   Tanner Marie MD   [facility] LANCETS 54P MISC USE ONE LANCET TO TEST TWICE A DAY per E11.9 3/5/18   Tanner Marie MD   Blood Glucose Monitoring Suppl (MELINDA BREEZE 2 SYSTEM) W/DEVICE KIT One machine one time 1/19/15   Erica Sotelo MD   Blood Glucose Monitoring Suppl ([employer] BREEZE MONITOR) KIT by Does not apply route See Admin Instructions. Test strips. Test twice daily. 8/16/10   Erica Sotelo MD       Current medications:    Current Facility-Administered Medications   Medication Dose Route Frequency Provider Last Rate Last Dose    bupivacaine 0.75%, phenylephrine 10%, tropicamide 1%, cyclopentolate 1%, moxifloxacin 0.5%, ketorolac 0.5% in lidocaine 2% jelly ophthalmic solution  0.3 mL Right Eye Q10 Min PRN Mary Thayer MD   0.3 mL at 07/15/20 0930    lactated ringers infusion   Intravenous Continuous Lisa Finney  mL/hr at 07/15/20 0935      sodium chloride flush 0.9 % injection 10 mL  10 mL Intravenous 2 times per day Lisa Finney MD        sodium chloride flush 0.9 % injection 10 mL  10 mL Intravenous PRN Lisa Finney MD        lidocaine PF 1 % injection 0.3 mL  0.3 mL Intradermal Once PRN Lisa Finney MD        bupivacaine 0.75% and lidocaine 2% in hylenex injection (10.5 mL)   Intraocular Once Mary Thayer MD        bupivacaine 0.75% and lidocaine 2% in hylenex injection (4.5 mL)   Intraocular Once Mary Thayer MD           Allergies:     Allergies   Allergen Reactions    Percocet [Oxycodone-Acetaminophen] Other (See Comments)     Dizzy       Problem List:    Patient Active Problem List   Diagnosis Code    DM (diabetes mellitus) ([facility] [address] E11.9    Essential hypertension, benign I10    Pure hypercholesterolemia E78.00    Esophageal reflux K21.9    Spinal stenosis of lumbar region with neurogenic claudication M48.062    Fatty liver K76.0    Osteopenia M85.80    Localized osteoarthrosis, lower leg M17.10    Primary insomnia F51.01    Lumbar strain S39.012A    Anxiety F41.9    Colitis K52.9    NSIP (nonspecific interstitial pneumonia) ([facility] [address] J84.89    Left hip pain M25.552    Chronic bilateral low back pain without sciatica M54.5, G89.29    Other age-related cataract H25.89       Past Medical History:        Diagnosis Date    Diabetes mellitus ([facility] [address]     Essential hypertension, benign     GERD (gastroesophageal reflux disease)     Hyperlipidemia     Interstitial lung disease ([facility] [address]     Osteoarthrosis, unspecified whether generalized or localized, unspecified site     osteoarthritis lower leg    Osteopenia     Shingles        Past Surgical History:        Procedure Laterality Date    ANKLE SURGERY Right 04/01/2013    torn tendon    BRONCHOSCOPY N/A 8/1/2019    BRONCHOSCOPY WITH BAL AND TRANSBRONCHIAL BIOPSIES performed by Cynthia Diallo MD at [address] [facility]  10/11/2010    Dr. Irina Cuadra , polyps and diverticulosis    COLONOSCOPY  11/11/2002    Dr. Johnathan Cedeño, Diverticulosis    COLONOSCOPY  04/20/2015    Dr. Irina Cuadra- diverticulosis, sessile polyp, 5 years     COLONOSCOPY  4/13/16    Dr Ayala Conroy; Diverticulosis    COLONOSCOPY  11/28/2016    Dr Irina Cuadra,     PAIN MANAGEMENT PROCEDURE Bilateral 6/23/2020    BILATERAL LUMBAR FOUR TRANSFORAMINAL EPIDURAL STEROID INJECTION SITE CONFIRMED BY FLUOROSCOPY performed by Abhi Anguiano MD at [facility] Bilateral 7/7/2020    [address] [place] [address] TRANSFORAMINAL EPIDURAL STEROID INJECTION SITE CONFIRMED BY FLUOROSCOPY performed by Randi Williamson MD at [address] [facility] Right     UPPER GASTROINTESTINAL ENDOSCOPY  8/17/2011    Dr. Trae Castano - moderate gastritis , hiatal hernia     UPPER GASTROINTESTINAL ENDOSCOPY  4/20/15    Dr. Trae Castano - polyps removed, diverticulosis, follow up in 5 years   [address]   8/16/2012    DR. Ramos - with mesh       Social History:    Social History     Tobacco Use    Smoking status: Never Smoker    Smokeless tobacco: Never Used   Substance Use Topics    Alcohol use: Yes     Alcohol/week: 0.0 standard drinks     Comment: one vodka tonic nightly                                Counseling given: Not Answered      Vital Signs (Current):   Vitals:    07/09/20 1329   Weight: 147 lb (66.7 kg)   Height: 5' 2\" (1.575 m)                                              BP Readings from Last 3 Encounters:   07/09/20 122/66   07/07/20 (!) 150/67   06/23/20 (!) 159/70       NPO Status: Time of last liquid consumption: 2000                        Time of last solid consumption: 1830                        Date of last liquid consumption: 07/14/20                        Date of last solid food consumption: 07/14/20    BMI:   Wt Readings from Last 3 Encounters:   07/09/20 147 lb (66.7 kg)   07/09/20 147 lb (66.7 kg)   07/07/20 145 lb (65.8 kg)     Body mass index is 26.89 kg/m².     CBC:   Lab Results   Component Value Date    WBC 9.5 06/26/2020    RBC 3.58 06/26/2020    HGB 10.5 06/26/2020    HCT 32.7 06/26/2020    MCV 91.4 06/26/2020    RDW 16.8 06/26/2020     06/26/2020       CMP:   Lab Results   Component Value Date     06/26/2020    K 3.9 06/26/2020    K 3.7 06/03/2020     06/26/2020    CO2 20 06/26/2020    BUN 27 06/26/2020    CREATININE 0.8 06/26/2020    GFRAA >60 06/26/2020    GFRAA >60 02/25/2013    AGRATIO 1.9 06/26/2020    LABGLOM >60 06/26/2020    GLUCOSE 58 06/26/2020    PROT 6.4 06/26/2020    PROT 6.6 02/25/2013    CALCIUM 10.8 06/26/2020    BILITOT 0.4 06/26/2020    ALKPHOS 71 06/26/2020    AST 16 06/26/2020    ALT 20 06/26/2020       POC Tests: No results for input(s): POCGLU, POCNA, POCK, POCCL, POCBUN, POCHEMO, POCHCT in the last 72 hours. Coags: No results found for: PROTIME, INR, APTT    HCG (If Applicable): No results found for: PREGTESTUR, PREGSERUM, HCG, HCGQUANT     ABGs:   Lab Results   Component Value Date    PHART 7.390 06/04/2019    PO2ART 84.0 06/04/2019    ONR1FPP 34.0 06/04/2019    RKK7XHI 20 06/04/2019    BEART -3.7 06/04/2019    Y5INDJWM 95 06/04/2019        Type & Screen (If Applicable):  No results found for: LABABO, LABRH    Drug/Infectious Status (If Applicable):  No results found for: HIV, HEPCAB    COVID-19 Screening (If Applicable):   Lab Results   Component Value Date    COVID19 Not Detected 07/09/2020         Anesthesia Evaluation  Patient summary reviewed and Nursing notes reviewed no history of anesthetic complications:   Airway: Mallampati: II  TM distance: >3 FB   Neck ROM: full  Mouth opening: > = 3 FB Dental: normal exam         Pulmonary: breath sounds clear to auscultation      (-) not a current smoker (never)                           Cardiovascular:  Exercise tolerance: good (>4 METS),   (+) hypertension: moderate,     (-) past MI    NYHA Classification: II  ECG reviewed  Rhythm: regular  Rate: normal           Beta Blocker:  Not on Beta Blocker         Neuro/Psych:                ROS comment: Chronic back pain  On Lorcet GI/Hepatic/Renal:   (+) GERD: well controlled,           Endo/Other:    (+) Diabetes ()Type II DM, well controlled, , .                 Abdominal:           Vascular:                                        Anesthesia Plan      MAC     ASA 2       Induction: intravenous. MIPS: Prophylactic antiemetics administered. Anesthetic plan and risks discussed with patient. Plan discussed with CRNA.     Attending anesthesiologist reviewed and agrees with Pre Eval content              Guido Parker DO   7/15/2020

## 2020-07-15 NOTE — PROGRESS NOTES
This RN spoke with daughter, discharge instruction given, understanding verbalized. Daughter on her way to pick pt up.

## 2020-07-21 ENCOUNTER — OFFICE VISIT (OUTPATIENT)
Dept: ORTHOPEDIC SURGERY | Age: 80
End: 2020-07-21
Payer: MEDICARE

## 2020-07-21 VITALS — HEIGHT: 62 IN | TEMPERATURE: 97.1 F | RESPIRATION RATE: 12 BRPM | WEIGHT: 147 LBS | BODY MASS INDEX: 27.05 KG/M2

## 2020-07-21 PROCEDURE — 99213 OFFICE O/P EST LOW 20 MIN: CPT | Performed by: PHYSICIAN ASSISTANT

## 2020-07-21 NOTE — PROGRESS NOTES
Follow up: [address] [place] [address]  1940  [id number]         Chief Complaint   Patient presents with    Lower Back Pain     F/U B/L L4 TF 7/7         HISTORY OF PRESENT ILLNESS:  Ms. Lesley Cheatham is a 68 y.o. female returns for a follow up visit for multiple medical problems. Her current presenting problems are   1. Spondylosis without myelopathy or radiculopathy, lumbar region    2. Lumbar radiculopathy    3. Lumbar stenosis with neurogenic claudication    . As per information/history obtained from the PADT(patient assessment and documentation tool) - She complains of pain in the lower back with radiation to the upper leg Left She rates the pain 6/10 and describes it as dull, aching. Pain is made worse by: morning. She denies side effects from the current pain regimen. Patient reports that since the last follow up visit the physical functioning is better, family/social relationships are better, mood is better and sleep patterns are better, and that the overall functioning is better. Patient denies neurological bowel or bladder. The patient presents today in the office after bilateral L4 TF RAMÓN's were completed on 6/23/2020 and again on 7/7/2020. The patient describes that at this time following the epidural she is 50% improved with the bilateral leg pain. The patient is not having any side effects and she does not have any new symptoms. The patient describes the majority of her pain is in her lower back and is worse first thing in the morning. She describes that that time her pain is an 8/10 in severity. She will walk around her house and the pain will slowly improve which brings the pain down to a 5-6/10 in severity. The patient describes that by the later afternoon and into evening her symptoms will begin to worsen again. The patient describes that she did have relief following the bilateral greater trochanteric bursa injections completed on 6/9/2020 in the bilateral hips specifically.   The patient has had 1 cataract surgery completed and is having another surgery next week for the other eye. Associated signs and symptoms:   Neurogenic bowel or bladder symptoms:  no   Perceived weakness:  yes   Difficulty walking:  no            Past medical, surgical, social and family history reviewed with the patient.      Past Medical History:   Past Medical History:   Diagnosis Date    Diabetes mellitus ([address]     Essential hypertension, benign     GERD (gastroesophageal reflux disease)     Hyperlipidemia     Interstitial lung disease (HCC)     Osteoarthrosis, unspecified whether generalized or localized, unspecified site     osteoarthritis lower leg    Osteopenia     Shingles       Past Surgical History:     Past Surgical History:   Procedure Laterality Date    ANKLE SURGERY Right 04/01/2013    torn tendon    BRONCHOSCOPY N/A 8/1/2019    BRONCHOSCOPY WITH BAL AND TRANSBRONCHIAL BIOPSIES performed by Gary De La Fuente MD at [address] [facility]  10/11/2010    Dr. Jamilah Gregory , polyps and diverticulosis    COLONOSCOPY  11/11/2002    Dr. Kiera Velarde, Diverticulosis    COLONOSCOPY  04/20/2015    Dr. Jamilah Gregory- diverticulosis, sessile polyp, 5 years     COLONOSCOPY  4/13/16    Dr Dahiana Cabello; Diverticulosis    COLONOSCOPY  11/28/2016    Dr Jamilah Gregory,     INTRACAPSULAR CATARACT EXTRACTION Right 7/15/2020    PHACOEMULSIFICATION OF CATARACT RIGHT EYE WITH INTRAOCULAR LENS IMPLANT performed by Ritchie Forbes MD at [facility] Bilateral 6/23/2020    BILATERAL LUMBAR FOUR TRANSFORAMINAL EPIDURAL STEROID INJECTION SITE CONFIRMED BY FLUOROSCOPY performed by Douglas Graves MD at [facility] Bilateral 7/7/2020    BILATERAL LUMBAR FOUR TRANSFORAMINAL EPIDURAL STEROID INJECTION SITE CONFIRMED BY FLUOROSCOPY performed by Douglas Graves MD at [facility] Right     UPPER GASTROINTESTINAL ENDOSCOPY  8/17/2011    Dr. Riana Austin ACIDOPHILUS) CAPS, Take 1 capsule by mouth daily, Disp: , Rfl:     VITAMIN D, CHOLECALCIFEROL, PO, Take 1 tablet by mouth daily, Disp: , Rfl:     Blood Glucose Monitoring Suppl ([employer] BREEZE 2 SYSTEM) W/DEVICE KIT, One machine one time, Disp: 1 kit, Rfl: 0    vitamin E 1000 UNITS capsule, Take 1,000 Units by mouth daily. , Disp: , Rfl:     Blood Glucose Monitoring Suppl ([employer] BREEZE MONITOR) KIT, by Does not apply route See Admin Instructions. Test strips. Test twice daily. , Disp: 60 kit, Rfl: 6  Allergies:  Percocet [oxycodone-acetaminophen]  Social History:    reports that she has never smoked. She has never used smokeless tobacco. She reports current alcohol use. She reports that she does not use drugs. Family History:   Family History   Problem Relation Age of Onset    Heart Disease Mother     Rheum Arthritis Mother     Heart Disease Father        REVIEW OF SYSTEMS:   CONSTITUTIONAL: Denies unexplained weight loss, fevers, chills or fatigue  NEUROLOGICAL: Denies unsteady gait or progressive weakness  MUSCULOSKELETAL: Denies joint swelling or redness  GI: Denies nausea, vomiting, diarrhea   : Denies bowel or bladder issues       PHYSICAL EXAM:    Vitals: Temperature 97.1 °F (36.2 °C), resp. rate 12, height 5' 2\" (1.575 m), weight 147 lb (66.7 kg), not currently breastfeeding. GENERAL EXAM:  · General Apparence: Patient is adequately groomed with no evidence of malnutrition. · Psychiatric: Orientation: The patient is oriented to time, place and person. The patient's mood and affect are appropriate   · Vascular: Examination reveals no swelling and palpation reveals no tenderness in upper or lower extremities. Good capillary refill.    · The lymphatic examination of the neck, axillae and groin reveals all areas to be without enlargement or induration  · Sensation is intact without deficit in the upper and lower extremities to light touch and pinprick  · Coordination of the upper and lower extremities are normal.  · RIGHT UPPER EXTREMITY:  Inspection/examination of the right upper extremity does not show any tenderness, deformity or injury. Range of motion is unremarkable and pain-free. There is no gross instability. There are no rashes, ulcerations or lesions. Strength and tone are normal. No atrophy or abnormal movements are noted. · LEFT UPPER EXTREMITY: Inspection/examination of the left upper extremity does not show any tenderness, deformity or injury. Range of motion is unremarkable and pain-free. There is no gross instability. There are no rashes, ulcerations or lesions. Strength and tone are normal. No atrophy or abnormal movements are noted. LUMBAR/SACRAL EXAMINATION:  · Inspection: Local inspection shows no step-off or bruising. Lumbar alignment is normal. No instability is noted. · Palpation:   No evidence of tenderness at the midline. Lumbar paraspinal tenderness: Mild L4/5 and L5/S1 tenderness  Bursal tenderness No tenderness bilaterally  There is no paraspinal spasm. · Range of Motion: limited by 50% in all planes due to pain specifically with extension and facet loading bilaterally. · Strength:   Strength testing is 5/5 in all muscle groups tested. · Special Tests:   Straight leg raise and crossed SLR negative. Reginaldo's testing is negative bilaterally. FADIR's testing is negative bilaterally. Bowstring test negative. Slump test negative. · Skin: There are no rashes, ulcerations or lesions. · Reflexes: Reflexes are symmetrically 2+ at the patellar and ankle tendons. Clonus absent bilaterally at the feet. · Gait & station: uses a single point cane to ambulate and no ataxia  · Additional Examinations:  · RIGHT LOWER EXTREMITY: Inspection/examination of the right lower extremity does not show any tenderness, deformity or injury. Range of motion is normal and pain-free. There is no gross instability. There are no rashes, ulcerations or lesions.  Strength and tone are myelopathy or radiculopathy, lumbar region    2. Lumbar radiculopathy    3. Lumbar stenosis with neurogenic claudication        Plan:  Clinical Course: Above diagnoses are improving 2-3; diagnosis 1 is worsening. I discussed the diagnosis and the treatment options with Ora Javier today. In Summary:  The various treatment options were outlined and discussed with Jacki Cifuentes including:  Conservative care options: physical therapy, ice, medications, bracing, and activity modification. The indications for therapeutic injections. The indications for additional imaging/laboratory studies. The indications for (possible future) interventions. After considering the various options discussed, Ora Herrera elected to pursue a course of treatment that includes the followin. Medications:  No further recommendations for new medications. 2. PT:  Encouraged to continue with Home exercise program.    3. Further studies: No further studies. 4. Interventional:  We discussed pursuing lumbar medial branch blocks for diagnostic purposes. Based on physical exam findings of increased pain with facet loading and radiologic imaging, we will pursue lumbar medial branch blocks at the Bilateral L3, L4, L5 DR levels. Risks, benefits and alternatives were discussed. These include but are not limited to bleeding, infection, increased pain, lack of pain relief and nerve injury. The patient verbalized understanding and would like to proceed. If there is significant pain relief and functional improvement, the patient may be a good candidate for Radiofrequency ablation. MBB #1. As such, I have confirmed the patient has met the general requirements including failure of conservative management including prescription strength analgesics, adjunctive medications were utilized , therapeutic exercise program, and no underlying addiction or behavioral disorders were identified to the ability of the provider.    Symptoms are

## 2020-07-21 NOTE — LETTER
Please schedule the following with:     Date:   20 @ 8:45    Account: [de-identified]  Patient: Clark Muse    : 1940  Address:  [place]     Phone (H):  [phone] (home)      ----------------------------------------------------------------------------------------------  Diagnosis:     ICD-10-CM    1. Spondylosis without myelopathy or radiculopathy, lumbar region  M47.816    2. Lumbar radiculopathy  M54.16    3. Lumbar stenosis with neurogenic claudication  M48.062      Procedure: Medial Branch Block Lumbar     Levels: L3, L4, L5 DR   Side: Bilateral   CPT Codes [id number], [id number]    ----------------------------------------------------------------------------------------------  Injection # 1  [email or web address]    Attending Physician       Mireya Stinson. Hood Mclaughlin MD.      ----------------------------------------------------------------------------------------------  Injection Scheduled For:    At:    1st Insurance [employer] MCR ADVANTAGE  Pre-Cert#    2nd Insurance     Pre-Cert#    Comments or Special instructions:     · Infection control  ? Tested positive for MRSA in past 12 months:  no  ? Tested positive for MSSA \"staph infection\" in past 12 months: no  ? Tested positive for VRE (Vancomycin Resistant Enterococci) in past 12 months:   no  ? Currently on any antibiotics for an infection:   · Anticoagulants:  ? On a blood thinner:  no   ?  Any history of bleeding disorder: no   · Advanced Liver disease: no   · Advanced Renal disease: no   · Glaucoma: no   · Diabetes: yes      Sedation:         No  -----------------------------------------------------------------------------------------------  Allergies   Allergen Reactions    Percocet [Oxycodone-Acetaminophen] Other (See Comments)     Dizzy

## 2020-07-22 RX ORDER — LEVOFLOXACIN 750 MG/1
750 TABLET ORAL DAILY
Qty: 5 TABLET | Refills: 0 | Status: SHIPPED | OUTPATIENT
Start: 2020-07-22 | End: 2020-07-27

## 2020-07-23 ENCOUNTER — TELEPHONE (OUTPATIENT)
Dept: INTERNAL MEDICINE CLINIC | Age: 80
End: 2020-07-23

## 2020-07-23 ENCOUNTER — OFFICE VISIT (OUTPATIENT)
Dept: PRIMARY CARE CLINIC | Age: 80
End: 2020-07-23
Payer: MEDICARE

## 2020-07-23 LAB — SARS-COV-2, PCR: NOT DETECTED

## 2020-07-23 PROCEDURE — 99211 OFF/OP EST MAY X REQ PHY/QHP: CPT | Performed by: NURSE PRACTITIONER

## 2020-07-23 NOTE — TELEPHONE ENCOUNTER
Pt calling because she was prescribed Levofloxacin and she is apprehensive about taking it because she is having eye surgery next Wednesday. .. she picked up the Levofloxacin yesterday and has not taken any yet. .. she would like to know Dr Clarence Araiza' thoughts on this. ..

## 2020-07-23 NOTE — PATIENT INSTRUCTIONS

## 2020-07-24 NOTE — RESULT ENCOUNTER NOTE
Your test for COVID-19, also known as novel coronavirus, came back negative. No virus was detected from the sample collected. Testing is not 100%. Until your symptoms are fully resolved, you may still be contagious. We recommend that you remain isolated for 7 days minimum or 72 hours after your symptoms have completely resolved, whichever is longer. If you were exposed to a known positive COVID-19 patient, then you must remain isolated for 14 days. If you were tested for a pre-op, then you remain in isolated until your procedure. Continually monitor symptoms. Contact a medical provider if symptoms are worsening. If you have any additional questions, contact your PCP.     For additional information, please visit the Centers for Disease Control and Prevention [email or web address]cy

## 2020-07-24 NOTE — OP NOTE
[facility]                                OPERATIVE REPORT    PATIENT NAME: Nano Powers                      :        1940  MED REC NO:   [id number]                          ROOM:  ACCOUNT NO:   [de-identified]                           ADMIT DATE: 07/15/2020  PROVIDER:     Demetrio Nicholson MD    DATE OF PROCEDURE:  07/15/20    PREOPERATIVE DIAGNOSIS:  Cataract, right eye. POSTOPERATIVE DIAGNOSIS:  Cataract, right eye. OPERATION:  Phacoemulsification of the cataract of the right eye with a  posterior chamber lens implant. ANESTHESIA:  General / Monitored Anesthesia Care / Retrobulbar. A 2 mL  retrobulbar block and 10 mL modified Grimes block using a 1:1 mixture  of 0.75% Marcaine, 4% Xylocaine with epinephrine, and hyaluronidase. SURGICAL INDICATIONS:  The patient has had a painless progressive loss  of vision due to the cataractous degeneration of the lens and for that  reason, surgery is indicated. The patient is having visual problems  with current lifestyle. A new eyeglasses prescription was unable to  adequately improve functional vision. DETAILS OF PROCEDURE:  The patient was taken to the operating room and  was prepped and draped in the usual manner after obtaining satisfactory  local anesthesia. Attention was turned towards the operative eye and a lid speculum was  inserted. A 2.5 mm keratome was used to make a limbal incision at 180  degrees. Viscoat was then placed in the eye to fill the anterior  chamber and a side port incision was made with a Superblade through the  clear cornea. The incision was located about 2 o'clock to the left of  the original incision. A bent needle was then used to make a cut in the  anterior capsule and start a capsulorrhexis tear. This was then grasped  with Utrata forceps and a 360 degree tear was completed.   Dent  dissection was then done with BSS to separate the capsule and the  cataract. The cataract was seen to be spinning easily within the  capsular bag and at this point, the phacoemulsification hand piece was  called for. Using a divide and conquer technique, the phacoemulsifier cut the lens  into four pieces approximately following the pattern of a cross. The  grooves were cut deeply and then the lens was cracked along these axes. The lens quarters were then rotated into the mid pupillary space and  phacoemulsified. The IA hand piece removed all the cortical material.   A Roxene Fragmin was used to polish the posterior capsule. Viscoat and  Provisc were used to fill the capsular bag. The incision was opened  slightly with a crescent knife and an Mumtaz acrylic foldable lens of the  appropriate power was called for. The lens was loaded into the injector  and injected into the eye. The lead haptic was injected into the  capsular bag with the trailing haptic left in the pupillary space. Using a Sinskey hook, the lens was gently nudged into the capsular bag  and rotated into position. After noting that the alignment and  centration was adequate, the IA hand piece was called for and used to  remove all the viscoelastic material.  Miostat was then injected through  the side port incision to bring the pupil down. The wound was checked  to make sure it was water tight. At this time, 2-3 drops of timolol and  2-3 drops of Vigamox were placed on the eye. The eye was taped closed,  patched and shielded. The patient went to the recovery room in good  condition. ADDENDUM:  The surgery was complex. 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 Trypan blue was used to stain the capsule. The phaco time was 18.28 CDE  and the near-clear, no-stitch surgery, axis 180. The patient tolerated  the procedure well, went to the recovery room in good condition.         Alan Crawford MD    D: 07/23/2020 13:16:25       T: 07/23/2020 18:09:03     [email or web address]  Job#: [id number]     Doc#: [id number]    CC:

## 2020-07-27 ENCOUNTER — TELEPHONE (OUTPATIENT)
Dept: INTERNAL MEDICINE CLINIC | Age: 80
End: 2020-07-27

## 2020-07-27 ENCOUNTER — NURSE ONLY (OUTPATIENT)
Dept: INTERNAL MEDICINE CLINIC | Age: 80
End: 2020-07-27

## 2020-07-27 LAB
CHP ED QC CHECK: NORMAL
GLUCOSE BLD-MCNC: 133 MG/DL

## 2020-07-27 PROCEDURE — 99999 POCT GLUCOSE: CPT | Performed by: NURSE PRACTITIONER

## 2020-07-27 NOTE — TELEPHONE ENCOUNTER
Pt calling because she states that since she started taking Levofloxacin, she has not had an appetite. Also, she cannot get a blood sugar reading - she has tried in 4 different fingers and cannot get it to work. She also mentions she is feeling shaky since starting this med. What should she do?

## 2020-07-27 NOTE — TELEPHONE ENCOUNTER
Call to the patient. Advised. She is going to see if her Son and get her and bring her in today for a Check.

## 2020-07-27 NOTE — TELEPHONE ENCOUNTER
Pt checking on her One Touch Ultra test strips  She thought they were ordered while she was in the office today

## 2020-07-28 ENCOUNTER — APPOINTMENT (OUTPATIENT)
Dept: CT IMAGING | Age: 80
End: 2020-07-28
Payer: MEDICARE

## 2020-07-28 ENCOUNTER — HOSPITAL ENCOUNTER (EMERGENCY)
Age: 80
Discharge: HOME | End: 2020-07-28
Attending: EMERGENCY MEDICINE
Payer: MEDICARE

## 2020-07-28 ENCOUNTER — TELEPHONE (OUTPATIENT)
Dept: INTERNAL MEDICINE CLINIC | Age: 80
End: 2020-07-28

## 2020-07-28 VITALS
HEIGHT: 62 IN | SYSTOLIC BLOOD PRESSURE: 143 MMHG | HEART RATE: 62 BPM | DIASTOLIC BLOOD PRESSURE: 66 MMHG | WEIGHT: 144 LBS | TEMPERATURE: 97.9 F | RESPIRATION RATE: 18 BRPM | OXYGEN SATURATION: 97 % | BODY MASS INDEX: 26.5 KG/M2

## 2020-07-28 LAB
A/G RATIO: 1.8 (ref 1.1–2.2)
ALBUMIN SERPL-MCNC: 4.3 G/DL (ref 3.4–5)
ALP BLD-CCNC: 56 U/L (ref 40–129)
ALT SERPL-CCNC: 9 U/L (ref 10–40)
ANION GAP SERPL CALCULATED.3IONS-SCNC: 11 MMOL/L (ref 3–16)
AST SERPL-CCNC: 17 U/L (ref 15–37)
BASOPHILS ABSOLUTE: 0 K/UL (ref 0–0.2)
BASOPHILS RELATIVE PERCENT: 0.8 %
BILIRUB SERPL-MCNC: 0.3 MG/DL (ref 0–1)
BILIRUBIN URINE: NEGATIVE
BLOOD, URINE: ABNORMAL
BUN BLDV-MCNC: 25 MG/DL (ref 7–20)
CALCIUM SERPL-MCNC: 10.7 MG/DL (ref 8.3–10.6)
CHLORIDE BLD-SCNC: 103 MMOL/L (ref 99–110)
CLARITY: CLEAR
CO2: 21 MMOL/L (ref 21–32)
COLOR: YELLOW
CREAT SERPL-MCNC: 1.3 MG/DL (ref 0.6–1.2)
EOSINOPHILS ABSOLUTE: 0.1 K/UL (ref 0–0.6)
EOSINOPHILS RELATIVE PERCENT: 2 %
EPITHELIAL CELLS, UA: NORMAL /HPF (ref 0–5)
GFR AFRICAN AMERICAN: 48
GFR NON-AFRICAN AMERICAN: 40
GLOBULIN: 2.4 G/DL
GLUCOSE BLD-MCNC: 117 MG/DL (ref 70–99)
GLUCOSE URINE: NEGATIVE MG/DL
HCT VFR BLD CALC: 29.1 % (ref 36–48)
HEMOGLOBIN: 9.8 G/DL (ref 12–16)
KETONES, URINE: NEGATIVE MG/DL
LEUKOCYTE ESTERASE, URINE: NEGATIVE
LIPASE: 22 U/L (ref 13–60)
LYMPHOCYTES ABSOLUTE: 0.9 K/UL (ref 1–5.1)
LYMPHOCYTES RELATIVE PERCENT: 17.3 %
MCH RBC QN AUTO: 29.5 PG (ref 26–34)
MCHC RBC AUTO-ENTMCNC: 33.7 G/DL (ref 31–36)
MCV RBC AUTO: 87.8 FL (ref 80–100)
MICROSCOPIC EXAMINATION: YES
MONOCYTES ABSOLUTE: 0.5 K/UL (ref 0–1.3)
MONOCYTES RELATIVE PERCENT: 9.1 %
NEUTROPHILS ABSOLUTE: 3.8 K/UL (ref 1.7–7.7)
NEUTROPHILS RELATIVE PERCENT: 70.8 %
NITRITE, URINE: NEGATIVE
PDW BLD-RTO: 13.9 % (ref 12.4–15.4)
PH UA: 6 (ref 5–8)
PLATELET # BLD: 171 K/UL (ref 135–450)
PMV BLD AUTO: 7.4 FL (ref 5–10.5)
POTASSIUM REFLEX MAGNESIUM: 4.2 MMOL/L (ref 3.5–5.1)
PROTEIN UA: 30 MG/DL
RBC # BLD: 3.32 M/UL (ref 4–5.2)
RBC UA: NORMAL /HPF (ref 0–4)
SODIUM BLD-SCNC: 135 MMOL/L (ref 136–145)
SPECIFIC GRAVITY UA: 1.02 (ref 1–1.03)
TOTAL PROTEIN: 6.7 G/DL (ref 6.4–8.2)
URINE REFLEX TO CULTURE: ABNORMAL
URINE TYPE: ABNORMAL
UROBILINOGEN, URINE: 0.2 E.U./DL
WBC # BLD: 5.4 K/UL (ref 4–11)
WBC UA: NORMAL /HPF (ref 0–5)

## 2020-07-28 PROCEDURE — 83690 ASSAY OF LIPASE: CPT

## 2020-07-28 PROCEDURE — 6360000004 HC RX CONTRAST MEDICATION: Performed by: PHYSICIAN ASSISTANT

## 2020-07-28 PROCEDURE — 2580000003 HC RX 258: Performed by: PHYSICIAN ASSISTANT

## 2020-07-28 PROCEDURE — 96374 THER/PROPH/DIAG INJ IV PUSH: CPT

## 2020-07-28 PROCEDURE — 81001 URINALYSIS AUTO W/SCOPE: CPT

## 2020-07-28 PROCEDURE — 6360000002 HC RX W HCPCS: Performed by: PHYSICIAN ASSISTANT

## 2020-07-28 PROCEDURE — 74177 CT ABD & PELVIS W/CONTRAST: CPT

## 2020-07-28 PROCEDURE — C9113 INJ PANTOPRAZOLE SODIUM, VIA: HCPCS | Performed by: PHYSICIAN ASSISTANT

## 2020-07-28 PROCEDURE — 96361 HYDRATE IV INFUSION ADD-ON: CPT

## 2020-07-28 PROCEDURE — 96375 TX/PRO/DX INJ NEW DRUG ADDON: CPT

## 2020-07-28 PROCEDURE — 85025 COMPLETE CBC W/AUTO DIFF WBC: CPT

## 2020-07-28 PROCEDURE — 99284 EMERGENCY DEPT VISIT MOD MDM: CPT

## 2020-07-28 PROCEDURE — 80053 COMPREHEN METABOLIC PANEL: CPT

## 2020-07-28 RX ORDER — SODIUM CHLORIDE, SODIUM LACTATE, POTASSIUM CHLORIDE, CALCIUM CHLORIDE 600; 310; 30; 20 MG/100ML; MG/100ML; MG/100ML; MG/100ML
1000 INJECTION, SOLUTION INTRAVENOUS ONCE
Status: COMPLETED | OUTPATIENT
Start: 2020-07-28 | End: 2020-07-28

## 2020-07-28 RX ORDER — PANTOPRAZOLE SODIUM 40 MG/10ML
40 INJECTION, POWDER, LYOPHILIZED, FOR SOLUTION INTRAVENOUS ONCE
Status: COMPLETED | OUTPATIENT
Start: 2020-07-28 | End: 2020-07-28

## 2020-07-28 RX ORDER — MORPHINE SULFATE 2 MG/ML
2 INJECTION, SOLUTION INTRAMUSCULAR; INTRAVENOUS ONCE
Status: COMPLETED | OUTPATIENT
Start: 2020-07-28 | End: 2020-07-28

## 2020-07-28 RX ORDER — ONDANSETRON 4 MG/1
4 TABLET, FILM COATED ORAL EVERY 8 HOURS PRN
Qty: 10 TABLET | Refills: 0 | Status: SHIPPED | OUTPATIENT
Start: 2020-07-28 | End: 2021-07-26 | Stop reason: ALTCHOICE

## 2020-07-28 RX ORDER — ONDANSETRON 2 MG/ML
4 INJECTION INTRAMUSCULAR; INTRAVENOUS EVERY 6 HOURS PRN
Status: DISCONTINUED | OUTPATIENT
Start: 2020-07-28 | End: 2020-07-28 | Stop reason: HOSPADM

## 2020-07-28 RX ADMIN — PANTOPRAZOLE SODIUM 40 MG: 40 INJECTION, POWDER, FOR SOLUTION INTRAVENOUS at 15:00

## 2020-07-28 RX ADMIN — MORPHINE SULFATE 2 MG: 2 INJECTION, SOLUTION INTRAMUSCULAR; INTRAVENOUS at 13:51

## 2020-07-28 RX ADMIN — IOPAMIDOL 80 ML: 755 INJECTION, SOLUTION INTRAVENOUS at 14:41

## 2020-07-28 RX ADMIN — ONDANSETRON 4 MG: 2 INJECTION INTRAMUSCULAR; INTRAVENOUS at 13:51

## 2020-07-28 RX ADMIN — SODIUM CHLORIDE, POTASSIUM CHLORIDE, SODIUM LACTATE AND CALCIUM CHLORIDE 1000 ML: 600; 310; 30; 20 INJECTION, SOLUTION INTRAVENOUS at 13:51

## 2020-07-28 ASSESSMENT — PAIN DESCRIPTION - LOCATION: LOCATION: ABDOMEN

## 2020-07-28 ASSESSMENT — PAIN DESCRIPTION - PAIN TYPE: TYPE: ACUTE PAIN

## 2020-07-28 ASSESSMENT — ENCOUNTER SYMPTOMS
VOMITING: 0
ABDOMINAL PAIN: 1
DIARRHEA: 0
SHORTNESS OF BREATH: 0
NAUSEA: 1
CONSTIPATION: 1

## 2020-07-28 ASSESSMENT — PAIN SCALES - GENERAL
PAINLEVEL_OUTOF10: 5
PAINLEVEL_OUTOF10: 6

## 2020-07-28 NOTE — ED TRIAGE NOTES
Dx with uti last week finished antibiotic yesterday, states she is still having abd pain and is now vomiting.  Pt states her appetite is decreasing and she feels weaker

## 2020-07-28 NOTE — TELEPHONE ENCOUNTER
Pt calling because she is concerned she is having reaction to med she just finished - Levofloxacin - she finished it yesterday and woke up in the night and threw up brown foam/phlegm. She still has nausea and fatigue this morning, no pain, no fever. She asks if this could be symptoms to a reaction to the above mentioned med? [place] [facility] ..and what she should do? Pt had lab visit here yesterday to check her blood sugar.

## 2020-07-28 NOTE — ED PROVIDER NOTES
[address] [facility] [address] ENCOUNTER          PHYSICIAN ASSISTANT NOTE       Date of evaluation: 7/28/2020    Chief Complaint     Abdominal Pain      History of Present Illness     HPI: Clark Muse is a 68 y.o. female with history of diabetes, hypertension, hyperlipidemia who presents to the emergency department with bilateral lower abdominal pain, fatigue and lack of appetite. This has progressively worsened since last week. She saw her primary care doctor last week and was placed on an antibiotic on Thursday, states that her lack of appetite started primarily over the weekend. Her pain is located in the lower abdomen and is associated with lack of bowel movements. She notes that she feels that she is not having a bowel movement since she has not been eating. She denies any dysuria or hematuria. With the exception of the above, there are no aggravating or alleviating factors. Review of Systems     Review of Systems   Constitutional: Positive for activity change and appetite change. Negative for chills and fever. Respiratory: Negative for shortness of breath. Cardiovascular: Negative for chest pain. Gastrointestinal: Positive for abdominal pain, constipation and nausea. Negative for diarrhea and vomiting. Genitourinary: Negative for dysuria and frequency. Past Medical, Surgical, Family, and Social History     She has a past medical history of Diabetes mellitus ([address] Essential hypertension, benign, GERD (gastroesophageal reflux disease), Hyperlipidemia, Interstitial lung disease ([address] Osteoarthrosis, unspecified whether generalized or localized, unspecified site, Osteopenia, and Shingles. She has a past surgical history that includes Colonoscopy (10/11/2010); Upper gastrointestinal endoscopy (8/17/2011); shoulder surgery (Right); Colonoscopy (11/11/2002); ventral hernia repair (8/16/2012); Ankle surgery (Right, 04/01/2013);  Upper gastrointestinal endoscopy (4/20/15); Colonoscopy (04/20/2015); Colonoscopy (4/13/16); Colonoscopy (11/28/2016); bronchoscopy (N/A, 8/1/2019); Pain management procedure (Bilateral, 6/23/2020); Pain management procedure (Bilateral, 7/7/2020); and Intracapsular cataract extraction (Right, 7/15/2020). Her family history includes Heart Disease in her father and mother; Rheum Arthritis in her mother. She reports that she has never smoked. She has never used smokeless tobacco. She reports current alcohol use. She reports that she does not use drugs. Medications     Previous Medications    AMLODIPINE (NORVASC) 10 MG TABLET    Take 1 tablet by mouth daily    BLOOD GLUCOSE MONITOR STRIPS    Test 2 times a day & as needed for symptoms of irregular blood glucose. One Touch Verio strips    BLOOD GLUCOSE TEST STRIPS (ASCENSIA AUTODISC VI;ONE TOUCH ULTRA TEST VI) STRIP    One Touch Ultra test Strips testing daily per E11.9    BLOOD GLUCOSE TEST STRIPS (FREESTYLE LITE) STRIP    1 each by In Vitro route daily Testing 1-2 times daily per e11.9    CYANOCOBALAMIN (B-12 PO)    Take by mouth daily     ESCITALOPRAM (LEXAPRO) 20 MG TABLET    Take 1 tablet by mouth daily    FREESTYLE LANCETS MISC    1 each by Does not apply route daily    LANCETS MISC    1 each by Does not apply route 2 times daily    LIDOCAINE (LIDODERM) 5 %    Place 1 patch onto the skin every 12 hours 12 hours on, 12 hours off. MELOXICAM (MOBIC) 15 MG TABLET    Take 1 tablet by mouth daily    METFORMIN (GLUCOPHAGE) 500 MG TABLET    TAKE 1 TABLET BY MOUTH IN THE MORNING, AND THEN TAKE 2 TABLETS AT NIGHT WITH DINNER.     ONETOUCH DELICA LANCETS 41U MISC    USE ONE LANCET TO TEST TWICE A DAY per E11.9    PROBIOTIC PRODUCT (PROBIOTIC ACIDOPHILUS) CAPS    Take 1 capsule by mouth daily    ROSUVASTATIN (CRESTOR) 40 MG TABLET    TAKE ONE TABLET BY MOUTH DAILY    TRADJENTA 5 MG TABLET    TAKE ONE TABLET BY MOUTH DAILY    TRAZODONE (DESYREL) 100 MG TABLET    Take 1 tablet by mouth nightly as needed for Sleep VITAMIN D, CHOLECALCIFEROL, PO    Take 1 tablet by mouth daily    VITAMIN E 1000 UNITS CAPSULE    Take 1,000 Units by mouth daily. Allergies     She is allergic to percocet [oxycodone-acetaminophen]. Physical Exam     INITIAL VITALS: BP: (!) 146/76, Temp: 97.9 °F (36.6 °C), Pulse: 72, Resp: 17, SpO2: 95 %  Physical Exam  Vitals signs reviewed. Constitutional:       General: She is not in acute distress. Appearance: Normal appearance. She is not ill-appearing, toxic-appearing or diaphoretic. HENT:      Head: Normocephalic and atraumatic. Nose: Nose normal.      Mouth/Throat:      Mouth: Mucous membranes are moist.      Pharynx: Oropharynx is clear. Eyes:      Extraocular Movements: Extraocular movements intact. Neck:      Musculoskeletal: Normal range of motion. Cardiovascular:      Rate and Rhythm: Normal rate. Pulses: Normal pulses. Pulmonary:      Effort: Pulmonary effort is normal. No respiratory distress. Abdominal:      General: Abdomen is flat. Palpations: Abdomen is soft. Comments: Tenderness to bilateral lower quadrants with voluntary guarding but no rebound. Abdomen overall soft without any distention. Musculoskeletal: Normal range of motion. Skin:     General: Skin is warm and dry. Neurological:      General: No focal deficit present. Mental Status: She is alert and oriented to person, place, and time. Psychiatric:         Mood and Affect: Mood normal.         Behavior: Behavior normal.       Diagnostic Results     RADIOLOGY:  CT ABDOMEN PELVIS W IV CONTRAST Additional Contrast? None   Final Result      Mild intra and extrahepatic ductal dilatation as well as pancreatic ductal dilatation without obvious etiology on this exam. MRCP may be helpful for evaluation.       Large hiatal hernia      Colonic diverticulosis        LABS:   Results for orders placed or performed during the hospital encounter of 07/28/20   CBC Auto Differential   Result Value Ref Range    WBC 5.4 4.0 - 11.0 K/uL    RBC 3.32 (L) 4.00 - 5.20 M/uL    Hemoglobin 9.8 (L) 12.0 - 16.0 g/dL    Hematocrit 29.1 (L) 36.0 - 48.0 %    MCV 87.8 80.0 - 100.0 fL    MCH 29.5 26.0 - 34.0 pg    MCHC 33.7 31.0 - 36.0 g/dL    RDW 13.9 12.4 - 15.4 %    Platelets 773 379 - 774 K/uL    MPV 7.4 5.0 - 10.5 fL    Neutrophils % 70.8 %    Lymphocytes % 17.3 %    Monocytes % 9.1 %    Eosinophils % 2.0 %    Basophils % 0.8 %    Neutrophils Absolute 3.8 1.7 - 7.7 K/uL    Lymphocytes Absolute 0.9 (L) 1.0 - 5.1 K/uL    Monocytes Absolute 0.5 0.0 - 1.3 K/uL    Eosinophils Absolute 0.1 0.0 - 0.6 K/uL    Basophils Absolute 0.0 0.0 - 0.2 K/uL   Comprehensive Metabolic Panel w/ Reflex to MG   Result Value Ref Range    Sodium 135 (L) 136 - 145 mmol/L    Potassium reflex Magnesium 4.2 3.5 - 5.1 mmol/L    Chloride 103 99 - 110 mmol/L    CO2 21 21 - 32 mmol/L    Anion Gap 11 3 - 16    Glucose 117 (H) 70 - 99 mg/dL    BUN 25 (H) 7 - 20 mg/dL    CREATININE 1.3 (H) 0.6 - 1.2 mg/dL    GFR Non- 40 (A) >60    GFR  48 (A) >60    Calcium 10.7 (H) 8.3 - 10.6 mg/dL    Total Protein 6.7 6.4 - 8.2 g/dL    Alb 4.3 3.4 - 5.0 g/dL    Albumin/Globulin Ratio 1.8 1.1 - 2.2    Total Bilirubin 0.3 0.0 - 1.0 mg/dL    Alkaline Phosphatase 56 40 - 129 U/L    ALT 9 (L) 10 - 40 U/L    AST 17 15 - 37 U/L    Globulin 2.4 g/dL   Lipase   Result Value Ref Range    Lipase 22.0 13.0 - 60.0 U/L   Urinalysis Reflex to Culture    Specimen: Urine, clean catch   Result Value Ref Range    Color, UA Yellow Straw/Yellow    Clarity, UA Clear Clear    Glucose, Ur Negative Negative mg/dL    Bilirubin Urine Negative Negative    Ketones, Urine Negative Negative mg/dL    Specific Gravity, UA 1.020 1.005 - 1.030    Blood, Urine SMALL (A) Negative    pH, UA 6.0 5.0 - 8.0    Protein, UA 30 (A) Negative mg/dL    Urobilinogen, Urine 0.2 <2.0 E.U./dL    Nitrite, Urine Negative Negative    Leukocyte Esterase, Urine Negative Negative Microscopic Examination YES     Urine Type NotGiven     Urine Reflex to Culture Not Indicated    Microscopic Urinalysis   Result Value Ref Range    WBC, UA 0-2 0 - 5 /HPF    RBC, UA 0-2 0 - 4 /HPF    Epithelial Cells, UA 0-1 0 - 5 /HPF       RECENT VITALS:  BP: (!) 143/66, Temp: 97.9 °F (36.6 °C), Pulse: 62, Resp: 18, SpO2: 97 %     Procedures     n/a    ED Course     Nursing Notes, Past Medical Hx,Past Surgical Hx, Social Hx, Allergies, and Family Hx were reviewed. The patient was given the following medications:  Orders Placed This Encounter   Medications    lactated ringers infusion 1,000 mL    morphine (PF) injection 2 mg    ondansetron (ZOFRAN) injection 4 mg    pantoprazole (PROTONIX) injection 40 mg    iopamidol (ISOVUE-370) 76 % injection 80 mL    ondansetron (ZOFRAN) 4 MG tablet     Sig: Take 1 tablet by mouth every 8 hours as needed for Nausea or Vomiting     Dispense:  10 tablet     Refill:  0       CONSULTS:  IP CONSULT TO GENERAL SURGERY  IP CONSULT TO PRIMARY CARE PROVIDER    MEDICAL DECISION MAKING / ASSESSMENT / PLAN       Vitals:    07/28/20 1400 07/28/20 1500 07/28/20 1530 07/28/20 1600   BP: (!) 141/54 (!) 130/59 (!) 137/59 (!) 143/66   Pulse:       Resp:       Temp:       TempSrc:       SpO2: 95% 97% 96% 97%   Weight:       Height:           Erich Wasserman is a 68 y.o. female who presents to the emergency department with bilateral lower abdominal pain associated with lack of appetite and nausea. This acutely worsened over the weekend. She has been treated for urinary tract infection by her primary care doctor which she started on Thursday the patient has remained hemodynamically stable throughout their stay in the emergency department, and appears well overall. Patient's abdomen is soft with focal tenderness to the right and left lower quadrants with voluntary guarding but no rebound, distention or rigidity.     Patient's lab work largely unremarkable aside from mild SADA with creatinine of 1.3 and BUN of 25 which is likely related to the patient's dehydration. Her other lab work is otherwise unremarkable with a chronically low hemoglobin of 9.8. CT scan was obtained as well that shows intra-and extrahepatic dilatation and pancreatic dilatation without acute cause. I discussed this with general surgery who says that it is been present since 2016 that they did come to evaluate the patient and did not feel that there is an acute surgical intervention at this time. They did recommend outpatient follow-up of this finding. Patient's urinalysis without any further signs of infection though she did complete her antibiotic course. I called the patient's primary care doctor to ensure that she has close follow-up later this week to ensure that she starts to feel better. COVID testing was withheld since she had a negative COVID test less than 1 week ago after since she is supposed to have cataract surgery tomorrow. I discussed the plan for discharge with the patient and her son who are both agreeable to this. She was given strict return precautions, and encouraged to follow-up with her primary care doctor and call them tomorrow to facilitate an appointment. I do not believe that this patient requires any further work-up or inpatient management for their complaints, and are appropriate for outpatient follow-up. I discussed the findings of my physical exam and work-up with the patient, and they were given the opportunity to ask questions. The patient is agreeable with the plan and is ready to be discharged home. The patient was discharged home with prescriptions for Zofran. Patient instructed to follow-up with PCP and general surgery as soon as possible, and given strict return precautions. The patient was evaluated by myself and the ED Attending Physician, Dr. Albin Deutsch. All management and disposition plans were discussed and agreed upon. Clinical Impression     1.  Abdominal pain, epigastric        Disposition     PATIENT REFERRED TO:  Ninfa Greenwood MD  [address] [facility] [address]  [address] [facility], [address]  [address]  [address] [facility] [place]  [phone]    Schedule an appointment as soon as possible for a visit         DISCHARGE MEDICATIONS:  New Prescriptions    ONDANSETRON (ZOFRAN) 4 MG TABLET    Take 1 tablet by mouth every 8 hours as needed for Nausea or Vomiting       DISPOSITION Decision To Discharge 07/28/2020 04:46:55 PM     Claudia Palumbo, [address]  07/28/20 3317

## 2020-07-28 NOTE — CONSULTS
General Surgery   Resident Consult Note    Reason for Consult: Abdominal pain    History of Present Illness:   Christiano James is a 68 y.o. female with Hx of urinary tract infection recently treated with levoquin, diabetes, HTN, and diverticulosis presenting to [facility] ED for bilateral upper quadrant abdominal pain. Patient states she started having this pain 5 days ago after starting her levoquin for her UTI. She endorses nausea, vomiting, anorexia, and fatigue. She states the pain does not radiate and describes it as dull. The patient denies changes in bowels and had a colonoscopy 5 years ago with no acute findings. She has no history of cancer in the family or in herself. The patient denies ever having an EGD. In the ED the patients lab work was unremarkable and a Ct scan of the abdomen shower dilated intrahepatic ducts, extra hepatic ducts, and pancreatic ducts. There were no cholelithiasis or choledocholithiasis.       Past Medical History:        Diagnosis Date    Diabetes mellitus ([address]     Essential hypertension, benign     GERD (gastroesophageal reflux disease)     Hyperlipidemia     Interstitial lung disease (HCC)     Osteoarthrosis, unspecified whether generalized or localized, unspecified site     osteoarthritis lower leg    Osteopenia     Shingles        Past Surgical History:        Procedure Laterality Date    ANKLE SURGERY Right 04/01/2013    torn tendon    BRONCHOSCOPY N/A 8/1/2019    BRONCHOSCOPY WITH BAL AND TRANSBRONCHIAL BIOPSIES performed by Hermann Ferrera MD at [facility]  10/11/2010    Dr. Court Diaz , polyps and diverticulosis    COLONOSCOPY  11/11/2002    Dr. Clint Landin, Diverticulosis    COLONOSCOPY  04/20/2015    Dr. Court Diaz- diverticulosis, sessile polyp, 5 years     COLONOSCOPY  4/13/16    Dr Sanjana Robles; Diverticulosis    COLONOSCOPY  11/28/2016    Dr Court Diaz,     INTRACAPSULAR CATARACT EXTRACTION Right 7/15/2020    PHACOEMULSIFICATION OF CATARACT RIGHT EYE WITH INTRAOCULAR LENS IMPLANT [facility] Humble [facility]erman [facility]  Bilateral 6/23/2020    BILATERAL LUMBAR FOUR TRANSFORAMINAL EPIDURAL STEROID INJECTION SITE CONFIRMED BY FLUOROSCOPY performed by Doroteo Orr MD at [address] [facility] Bilateral 7/7/2020    BILATERAL LUMBAR FOUR TRANSFORAMINAL EPIDURAL STEROID INJECTION SITE CONFIRMED BY FLUOROSCOPY performed by Doroteo Orr MD at [address] Right     UPPER GASTROINTESTINAL ENDOSCOPY  8/17/2011    Dr. Jones Spine - moderate gastritis , hiatal hernia     UPPER GASTROINTESTINAL ENDOSCOPY  4/20/15    Dr. Jones Spine - polyps removed, diverticulosis, follow up in 5 years   [address] [facility]  8/16/2012    DR. Ramos - with mesh       Allergies:  Percocet [oxycodone-acetaminophen]    Medications:   Home Meds  No current facility-administered medications on file prior to encounter. Current Outpatient Medications on File Prior to Encounter   Medication Sig Dispense Refill    blood glucose test strips (ASCENSIA AUTODISC VI;ONE TOUCH ULTRA TEST VI) strip One Touch Ultra test Strips testing daily per E11.9 100 each 3    metFORMIN (GLUCOPHAGE) 500 MG tablet TAKE 1 TABLET BY MOUTH IN THE MORNING, AND THEN TAKE 2 TABLETS AT NIGHT WITH DINNER. 270 tablet 0    TRADJENTA 5 MG tablet TAKE ONE TABLET BY MOUTH DAILY 90 tablet 0    escitalopram (LEXAPRO) 20 MG tablet Take 1 tablet by mouth daily 90 tablet 0    rosuvastatin (CRESTOR) 40 MG tablet TAKE ONE TABLET BY MOUTH DAILY 90 tablet 1    lidocaine (LIDODERM) 5 % Place 1 patch onto the skin every 12 hours 12 hours on, 12 hours off.  30 patch 0    traZODone (DESYREL) 100 MG tablet Take 1 tablet by mouth nightly as needed for Sleep 90 tablet 1    amLODIPine (NORVASC) 10 MG tablet Take 1 tablet by mouth daily 90 tablet 1    meloxicam (MOBIC) 15 MG tablet Take 1 tablet by mouth daily 30 tablet 3    FREESTYLE LANCETS MISC 1 each by Does not apply route daily 100 each 3    blood glucose test strips (FREESTYLE LITE) strip 1 each by In Vitro route daily Testing 1-2 times daily per e11.9 100 each 3    blood glucose monitor strips Test 2 times a day & as needed for symptoms of irregular blood glucose. One Touch Verio strips 100 strip 2    Lancets MISC 1 each by Does not apply route 2 times daily 100 each 5    ONETOUCH DELICA LANCETS 91R MISC USE ONE LANCET TO TEST TWICE A DAY per E11.9 100 each 5    Cyanocobalamin (B-12 PO) Take by mouth daily       Probiotic Product (PROBIOTIC ACIDOPHILUS) CAPS Take 1 capsule by mouth daily      VITAMIN D, CHOLECALCIFEROL, PO Take 1 tablet by mouth daily      vitamin E 1000 UNITS capsule Take 1,000 Units by mouth daily. Current Meds  ondansetron (ZOFRAN) injection 4 mg, Q6H PRN        Family History:   Family History   Problem Relation Age of Onset    Heart Disease Mother     Rheum Arthritis Mother     Heart Disease Father        Social History:   TOBACCO:   reports that she has never smoked. She has never used smokeless tobacco.  ETOH:   reports current alcohol use. DRUGS:   reports no history of drug use. Review of Systems:     Constitutional: Negative except for fatigue  HENT: Negative. Eyes: Negative. Respiratory: Negative. Cardiovascular: Negative. Gastrointestinal: Negative except for bilateral upper abdominal pain  Genitourinary: Negative. Musculoskeletal: Negative. Skin: Negative. Endocrine: Negative. Allergic/Immunologic: Negative. Neurological: Negative. Hematological: Negative. Psychiatric/Behavioral: Negative.     Physical exam:    Vitals:    07/28/20 1247 07/28/20 1340 07/28/20 1400 07/28/20 1500   BP: (!) 146/76 (!) 138/55 (!) 141/54 (!) 130/59   Pulse: 72 62     Resp: 17 18     Temp: 97.9 °F (36.6 °C)      TempSrc: Oral      SpO2: 95% 97% 95% 97%   Weight: 144 lb (65.3 kg)      Height: 5' 2\" (1.575 m)          General appearance: alert, elderly female, no acute distress, grooming appropriate  Eyes: PERRL, no scleral icterus  Neck: trachea midline, no JVD  Chest/Lungs: symmetrical chest rise, normal effort  Cardiovascular: RRR  Abdomen: soft, mildly tender in RUQ, non-distended, no guarding/rigidity  Skin: warm and dry, no rashes  Extremities: no edema, no cyanosis  Neuro: A&Ox3, no focal deficits, sensation intact    Labs:    CBC:   Recent Labs     07/28/20  1341   WBC 5.4   HGB 9.8*   HCT 29.1*   MCV 87.8        BMP:   Recent Labs     07/28/20  1341   *   K 4.2      CO2 21   BUN 25*   CREATININE 1.3*     PT/INR: No results for input(s): PROTIME, INR in the last 72 hours. APTT: No results for input(s): APTT in the last 72 hours. Liver Profile:   Lab Results   Component Value Date    AST 17 07/28/2020    ALT 9 07/28/2020    BILITOT 0.3 07/28/2020    ALKPHOS 56 07/28/2020     Lab Results   Component Value Date    CHOL 125 06/26/2020    HDL 76 06/26/2020    HDL 58 05/15/2012    TRIG 129 06/26/2020     UA:   Lab Results   Component Value Date    NITRITE + 02/05/2020    COLORU Yellow 07/28/2020    PHUR 6.0 07/28/2020    WBCUA 8 07/11/2020    RBCUA 1 07/11/2020    BACTERIA 4+ 06/26/2020    CLARITYU Clear 07/28/2020    SPECGRAV 1.020 07/28/2020    LEUKOCYTESUR Negative 07/28/2020    UROBILINOGEN 0.2 07/28/2020    BILIRUBINUR Negative 07/28/2020    BILIRUBINUR - 02/05/2020    BLOODU SMALL 07/28/2020    GLUCOSEU Negative 07/28/2020       Imaging:   CT ABDOMEN PELVIS W IV CONTRAST Additional Contrast? None   Final Result      Mild intra and extrahepatic ductal dilatation as well as pancreatic ductal dilatation without obvious etiology on this exam. MRCP may be helpful for evaluation. Large hiatal hernia      Colonic diverticulosis            Assessment/Plan:   This is a 68 y.o. female with Hx of f urinary tract infection recently treated with levoquin, diabetes, HTN, and diverticulosis presenting to [facility] ED for bilateral upper quadrant abdominal pain.     - Ductal dilation described on CT scan is unchanged from previous CT scan in 2016  - Normal LFTs and WBC  - Patient states this pain could be from using her walker more since her UTI  - No surgical intervention at this time  - Can follow up outpatient for MRCP or further evaluation    Patient was seen and examined with the senior resident and plan was discussed with Dr. Lundy Romberg, DO  07/28/20  3:43 PM

## 2020-07-28 NOTE — ED PROVIDER NOTES
ED Attending Attestation Note     Date of evaluation: 7/28/2020    This patient was seen by the advance practice provider. I have seen and examined the patient, agree with the workup, evaluation, management and diagnosis. The care plan has been discussed. My assessment reveals patient who presents with abdominal pain. In January 2019 she had a CT scan which showed colitis at outside hospital.  She has had some dark-colored emesis. Also with UTIs on antibiotic currently. 2 months ago had another UTI. On exam abdomen is mildly tender without any peritoneal signs. No rebound or guarding. No diarrhea. Given her history of colitis pain vomiting and dark emesis will obtain CT scan abdomen pelvis and obtain labs.      Yevgeniy Simon MD  07/28/20 [id number]

## 2020-07-29 ENCOUNTER — TELEPHONE (OUTPATIENT)
Dept: INTERNAL MEDICINE CLINIC | Age: 80
End: 2020-07-29

## 2020-07-29 NOTE — TELEPHONE ENCOUNTER
Pt went to the ER yesterday for abdominal pain and weakness  They want her to be seen Friday for an HFU  Dr Alyssa Jones doesn't have an opening

## 2020-07-31 ENCOUNTER — OFFICE VISIT (OUTPATIENT)
Dept: INTERNAL MEDICINE CLINIC | Age: 80
End: 2020-07-31
Payer: MEDICARE

## 2020-07-31 VITALS
WEIGHT: 139.9 LBS | SYSTOLIC BLOOD PRESSURE: 126 MMHG | BODY MASS INDEX: 25.59 KG/M2 | HEART RATE: 70 BPM | OXYGEN SATURATION: 98 % | TEMPERATURE: 97.7 F | DIASTOLIC BLOOD PRESSURE: 66 MMHG

## 2020-07-31 PROBLEM — B37.31 VAGINAL YEAST INFECTION: Status: ACTIVE | Noted: 2020-07-31

## 2020-07-31 PROBLEM — R10.32 LEFT LOWER QUADRANT ABDOMINAL PAIN: Status: ACTIVE | Noted: 2020-07-31

## 2020-07-31 PROCEDURE — 3288F FALL RISK ASSESSMENT DOCD: CPT | Performed by: NURSE PRACTITIONER

## 2020-07-31 PROCEDURE — 99214 OFFICE O/P EST MOD 30 MIN: CPT | Performed by: NURSE PRACTITIONER

## 2020-07-31 PROCEDURE — G8510 SCR DEP NEG, NO PLAN REQD: HCPCS | Performed by: NURSE PRACTITIONER

## 2020-07-31 RX ORDER — FLUCONAZOLE 150 MG/1
150 TABLET ORAL ONCE
Qty: 1 TABLET | Refills: 0 | Status: SHIPPED | OUTPATIENT
Start: 2020-07-31 | End: 2020-07-31

## 2020-07-31 ASSESSMENT — ENCOUNTER SYMPTOMS
ABDOMINAL PAIN: 0
SINUS PAIN: 0
DIARRHEA: 0
COUGH: 0
SHORTNESS OF BREATH: 0
CONSTIPATION: 0
NAUSEA: 0
WHEEZING: 0
COLOR CHANGE: 0
SINUS PRESSURE: 0
VOMITING: 0
BACK PAIN: 1

## 2020-07-31 ASSESSMENT — PATIENT HEALTH QUESTIONNAIRE - PHQ9
2. FEELING DOWN, DEPRESSED OR HOPELESS: 0
SUM OF ALL RESPONSES TO PHQ QUESTIONS 1-9: 0
SUM OF ALL RESPONSES TO PHQ9 QUESTIONS 1 & 2: 0
DEPRESSION UNABLE TO ASSESS: FUNCTIONAL CAPACITY MOTIVATION LIMITS ACCURACY
1. LITTLE INTEREST OR PLEASURE IN DOING THINGS: 0
SUM OF ALL RESPONSES TO PHQ QUESTIONS 1-9: 0

## 2020-07-31 NOTE — ASSESSMENT & PLAN NOTE
Stable, improving   Tolerating PO   Denies RUQ pain  Regular bowel movements   If pain returns, will follow up with surgery and possible MRCP   Call if symptoms worsen or fail to improve

## 2020-07-31 NOTE — PROGRESS NOTES
Subjective:      Patient ID: Jass Barton is a 68 y.o. female. Chief Complaint   Patient presents with    Follow-Up from [place] is here for ER follow up. She presented to ER a few days ago for nausea, vomiting, light headedness, and abdominal pain. She had been taking levaquin for a UTI and symptoms started after beginning that medication. She underwent CT of the abdomen which showed gallbladder duct dilation. This was seen previously on a CT from 2016. General surgery was consulted and did not feel it required urgent intervention. She was discharged with zofran. She has been feeling much better since discharge. Her appetite has improved and she is no longer feeling light headed. She denies nausea, did not require any zofran. Denies abdominal pain. Having regular bowel movements. Dysuria and urgency have resolved. She is having some slight vaginal itching and irritation. Review of Systems   Constitutional: Negative for chills, fatigue and fever. HENT: Negative for congestion, sinus pressure and sinus pain. Respiratory: Negative for cough, shortness of breath and wheezing. Cardiovascular: Negative for chest pain and palpitations. Gastrointestinal: Negative for abdominal pain, constipation, diarrhea, nausea and vomiting. Genitourinary: Negative for dysuria, urgency, vaginal discharge (itching) and vaginal pain. Musculoskeletal: Positive for back pain. Negative for arthralgias and myalgias. Skin: Negative for color change, pallor and rash. Neurological: Negative for dizziness, syncope, weakness, light-headedness and headaches. Psychiatric/Behavioral: Negative for behavioral problems, confusion and sleep disturbance. The patient is not nervous/anxious. Objective:   Physical Exam  Constitutional:       Appearance: She is well-developed. HENT:      Head: Normocephalic and atraumatic.    Eyes:      Conjunctiva/sclera: Conjunctivae normal.      Pupils: Pupils are

## 2020-08-03 ENCOUNTER — TELEPHONE (OUTPATIENT)
Dept: ORTHOPEDIC SURGERY | Age: 80
End: 2020-08-03

## 2020-08-04 ENCOUNTER — HOSPITAL ENCOUNTER (OUTPATIENT)
Age: 80
Setting detail: OUTPATIENT SURGERY
Discharge: HOME | End: 2020-08-04
Attending: PHYSICAL MEDICINE & REHABILITATION | Admitting: PHYSICAL MEDICINE & REHABILITATION
Payer: MEDICARE

## 2020-08-04 ENCOUNTER — TELEPHONE (OUTPATIENT)
Dept: ORTHOPEDIC SURGERY | Age: 80
End: 2020-08-04

## 2020-08-04 VITALS
RESPIRATION RATE: 16 BRPM | WEIGHT: 144 LBS | TEMPERATURE: 97 F | HEIGHT: 62 IN | SYSTOLIC BLOOD PRESSURE: 166 MMHG | BODY MASS INDEX: 26.5 KG/M2 | DIASTOLIC BLOOD PRESSURE: 77 MMHG | OXYGEN SATURATION: 97 % | HEART RATE: 71 BPM

## 2020-08-04 DIAGNOSIS — M47.816 SPONDYLOSIS WITHOUT MYELOPATHY OR RADICULOPATHY, LUMBAR REGION: Primary | ICD-10-CM

## 2020-08-04 LAB
GLUCOSE BLD-MCNC: 149 MG/DL (ref 70–99)
PERFORMED ON: ABNORMAL

## 2020-08-04 PROCEDURE — 3600000002 HC SURGERY LEVEL 2 BASE: Performed by: PHYSICAL MEDICINE & REHABILITATION

## 2020-08-04 PROCEDURE — 6360000004 HC RX CONTRAST MEDICATION: Performed by: PHYSICAL MEDICINE & REHABILITATION

## 2020-08-04 PROCEDURE — 7100000010 HC PHASE II RECOVERY - FIRST 15 MIN: Performed by: PHYSICAL MEDICINE & REHABILITATION

## 2020-08-04 PROCEDURE — 2709999900 HC NON-CHARGEABLE SUPPLY: Performed by: PHYSICAL MEDICINE & REHABILITATION

## 2020-08-04 PROCEDURE — 7100000011 HC PHASE II RECOVERY - ADDTL 15 MIN: Performed by: PHYSICAL MEDICINE & REHABILITATION

## 2020-08-04 PROCEDURE — 2500000003 HC RX 250 WO HCPCS: Performed by: PHYSICAL MEDICINE & REHABILITATION

## 2020-08-04 RX ORDER — IRBESARTAN 300 MG/1
300 TABLET ORAL NIGHTLY
COMMUNITY
End: 2021-02-23

## 2020-08-04 RX ORDER — BUPIVACAINE HYDROCHLORIDE 5 MG/ML
INJECTION, SOLUTION EPIDURAL; INTRACAUDAL
Status: DISCONTINUED
Start: 2020-08-04 | End: 2020-08-04 | Stop reason: HOSPADM

## 2020-08-04 RX ORDER — BUPIVACAINE HYDROCHLORIDE 5 MG/ML
INJECTION, SOLUTION EPIDURAL; INTRACAUDAL PRN
Status: DISCONTINUED | OUTPATIENT
Start: 2020-08-04 | End: 2020-08-04 | Stop reason: ALTCHOICE

## 2020-08-04 RX ORDER — LIDOCAINE HYDROCHLORIDE 10 MG/ML
INJECTION, SOLUTION EPIDURAL; INFILTRATION; INTRACAUDAL; PERINEURAL PRN
Status: DISCONTINUED | OUTPATIENT
Start: 2020-08-04 | End: 2020-08-04 | Stop reason: ALTCHOICE

## 2020-08-04 RX ORDER — OMEPRAZOLE 20 MG/1
20 CAPSULE, DELAYED RELEASE ORAL DAILY
COMMUNITY
End: 2021-02-23

## 2020-08-04 ASSESSMENT — PAIN DESCRIPTION - DESCRIPTORS: DESCRIPTORS: ACHING

## 2020-08-04 ASSESSMENT — PAIN - FUNCTIONAL ASSESSMENT
PAIN_FUNCTIONAL_ASSESSMENT: 0-10
PAIN_FUNCTIONAL_ASSESSMENT: PREVENTS OR INTERFERES SOME ACTIVE ACTIVITIES AND ADLS

## 2020-08-04 NOTE — H&P
HISTORY AND PHYSICAL/PRE-SEDATION ASSESSMENT    Patient:  Spring Velarde   :  1940  Medical Record No.:  [id number]   Date:  2020  Physician:  Claudia Salazar M.D. Facility: [facility]    HISTORY OF PRESENT ILLNESS:                 The patient is a 68 y.o. female whom presents with lower back pain. Review of the imaging and physical exam of the patient confirmed the pre-procedure diagnosis. After a thorough discussion of risks, benefits and alternatives informed consent was obtained.                 Past Medical History:   Past Medical History:   Diagnosis Date    Diabetes mellitus ([address]     Essential hypertension, benign     GERD (gastroesophageal reflux disease)     Hyperlipidemia     Interstitial lung disease (HCC)     Osteoarthrosis, unspecified whether generalized or localized, unspecified site     osteoarthritis lower leg    Osteopenia     Shingles       Past Surgical History:     Past Surgical History:   Procedure Laterality Date    ANKLE SURGERY Right 2013    torn tendon    BRONCHOSCOPY N/A 2019    BRONCHOSCOPY WITH BAL AND TRANSBRONCHIAL BIOPSIES performed by Alan Marr MD at [facility]  10/11/2010    Dr. Miguel Cronin , polyps and diverticulosis    COLONOSCOPY  2002    Dr. Aníbal Pineda, Diverticulosis    COLONOSCOPY  2015    Dr. Miguel Cronin- diverticulosis, sessile polyp, 5 years     COLONOSCOPY  16    Dr Ma Diss; Diverticulosis    COLONOSCOPY  2016    Dr Miguel Cronin,     INTRACAPSULAR CATARACT EXTRACTION Right 7/15/2020    PHACOEMULSIFICATION OF CATARACT RIGHT EYE WITH INTRAOCULAR LENS IMPLANT performed by Remi Alves MD at [facility] Bilateral 2020    BILATERAL LUMBAR FOUR TRANSFORAMINAL EPIDURAL STEROID INJECTION SITE CONFIRMED BY FLUOROSCOPY performed by Claudia Salazar MD at [address] [facility] Bilateral 2020    BILATERAL LUMBAR

## 2020-08-04 NOTE — OP NOTE
Patient:  Opal Sahni  YOB: 1940  Medical Record #:  [id number]   Place:   [address] [place], [place]  Date:  8/4/2020   Physician:  Jeremy Bowen MD, VLAD    Procedure: Lumbar Medial Branch Blocks - Bilateral L3, L4 and L5 Dorsal ramus    MBB #1    CPT (06735 Modifier 50 [id number] Modifier 50)      Pre-Procedure Diagnosis: Lumbar spondylosis without radiculopathy      Post-Procedure Diagnosis: Same      Sedation: Local with 1% Lidocaine 5 ml      EBL: None      Complications: None      Procedure Summary:      The patient was seen in the office for complaints of low back pain. Review of the imaging and physical exam of the patient confirmed the pre-procedure diagnosis. After a thorough discussion of risks, benefits and alternatives informed consent was obtained. The patient was brought to the procedure suite and placed in the prone position. The skin overlying the lumbar spine was prepped with chloraprep and draped in the usual sterile fashion. Using fluoroscopic guidance, the right L4, L5 and sacral ala levels were identified. Through anesthetized skin a 22 gauge 3.5 inch curved tip spinal needle was advanced to the juncture of the superior articular process and the transverse process at the right L4 level where the right L3 medial branch resides. .3 ml of Isovue M 300 was instilled showing a nerve root outline pattern, without evidence of vascular spread. A total of 0.5 ml of 0.5 % Marcaine was instilled at the right L-4 level corresponding to the right L3 medial branch. This was repeated for the right L5 and sacral ala levels corresponding to the right L4 medial branch and L5 Dorsal ramus. The identical procedure was repeated on the left side. The needles were removed and a band-aid applied. The patient was transferred to the post-operative area in stable condition.

## 2020-08-05 RX ORDER — HYDROCODONE BITARTRATE AND ACETAMINOPHEN 5; 325 MG/1; MG/1
1 TABLET ORAL EVERY 6 HOURS PRN
Qty: 28 TABLET | Refills: 0 | Status: SHIPPED | OUTPATIENT
Start: 2020-08-05 | End: 2020-08-12

## 2020-08-11 ENCOUNTER — OFFICE VISIT (OUTPATIENT)
Dept: ORTHOPEDIC SURGERY | Age: 80
End: 2020-08-11
Payer: MEDICARE

## 2020-08-11 VITALS — BODY MASS INDEX: 26.49 KG/M2 | TEMPERATURE: 97.4 F | HEIGHT: 62 IN | WEIGHT: 143.96 LBS | RESPIRATION RATE: 14 BRPM

## 2020-08-11 PROCEDURE — 99214 OFFICE O/P EST MOD 30 MIN: CPT | Performed by: PHYSICIAN ASSISTANT

## 2020-08-11 NOTE — LETTER
Please schedule the following with:     Date:   20 @  9:00    Account: [de-identified]  Patient: Landry Anne    : 1940  Address:  [place] [address]    Phone (H):  [phone] (home)      ----------------------------------------------------------------------------------------------  Diagnosis:     ICD-10-CM    1. Spondylosis without myelopathy or radiculopathy, lumbar region  M47.816    2. Lumbar stenosis with neurogenic claudication  M48.062    3. Greater trochanteric bursitis of both hips  M70.61     M70.62      Procedure: Lumbar Medial Branch Block     Levels: L3, L4, L5 DR  Side: Bilateral   CPT Codes [id number], [id number]    ----------------------------------------------------------------------------------------------  Injection # 2  [email or web address]    Attending Physician       Bre Rhodes. Alicia Hall MD.      ----------------------------------------------------------------------------------------------  Injection Scheduled For:    At:    [employer] [facility]   Pre-Cert#    [employer]     Pre-Cert#    Comments or Special instructions:     · Infection control  ? Tested positive for MRSA in past 12 months:  no  ? Tested positive for MSSA \"staph infection\" in past 12 months: no  ? Tested positive for VRE (Vancomycin Resistant Enterococci) in past 12 months:   no  ? Currently on any antibiotics for an infection:   · Anticoagulants:  ? On a blood thinner:  no   ?  Any history of bleeding disorder: no   · Advanced Liver disease: no   · Advanced Renal disease: no   · Glaucoma: no   · Diabetes: yes      Sedation:         No  -----------------------------------------------------------------------------------------------  Allergies   Allergen Reactions    Percocet [Oxycodone-Acetaminophen] Other (See Comments)     Dizzy

## 2020-08-11 NOTE — PROGRESS NOTES
Follow up: [address] [place] [address]  1940  [id number]         Chief Complaint   Patient presents with    Lower Back Pain     8/4: LMBB #1 B/L L3, L4 L5          HISTORY OF PRESENT ILLNESS:  Ms. Darshan Montes is a 68 y.o. female returns for a follow up visit for multiple medical problems. Her current presenting problems are   1. Spondylosis without myelopathy or radiculopathy, lumbar region    2. Lumbar stenosis with neurogenic claudication    3. Greater trochanteric bursitis of both hips    . As per information/history obtained from the PADT(patient assessment and documentation tool) - She complains of pain in the lower back with radiation to the lower back She rates the pain 4/10 and describes it as aching. Pain is made worse by: movement, in the morning. She denies side effects from the current pain regimen. Patient reports that since the last follow up visit the physical functioning is worse, family/social relationships are worse, mood is worse and sleep patterns are worse, and that the overall functioning is worse. Patient denies neurological bowel or bladder. The patient presents today in follow-up after a bilateral L3, L4, L5 DR MBB #1 completed on 8/4/2020. The patient describes that for the first 4 hours after procedure she was up to 40% relief, by 6 hours she was up to 60% relief, and by 8 hours she was up to 85% relief the patient had continued relief through into the second day at 90%. The patient describes that her pain is slowly worsening now at this time and describes her pain as an aching pain across the entirety of the lower back. Her pain is worse first thing in the morning and when she is getting in bed in the evening. She describes that ice and medication to help with her pain. Patient describes that she can sit for 1 hour, stand for 10 minutes in 1 place, and walk for about a half an hour with a cane.   The patient does ambulate today in the office with a cane and she is present today in the office with her son. She would like to proceed to the second medial branch block as her pain is starting to worsen. Associated signs and symptoms:   Neurogenic bowel or bladder symptoms:  no   Perceived weakness:  no   Difficulty walking:  yes            Past medical, surgical, social and family history reviewed with the patient.      Past Medical History:   Past Medical History:   Diagnosis Date    Diabetes mellitus ([address]     Essential hypertension, benign     GERD (gastroesophageal reflux disease)     Hyperlipidemia     Interstitial lung disease (HCC)     Osteoarthrosis, unspecified whether generalized or localized, unspecified site     osteoarthritis lower leg    Osteopenia     Shingles       Past Surgical History:     Past Surgical History:   Procedure Laterality Date    ANKLE SURGERY Right 04/01/2013    torn tendon    BRONCHOSCOPY N/A 8/1/2019    BRONCHOSCOPY WITH BAL AND TRANSBRONCHIAL BIOPSIES performed by Jolene Hampton MD at [facility]  10/11/2010    Dr. Charolotte Spurling , polyps and diverticulosis    COLONOSCOPY  11/11/2002    Dr. Mis Jane, Diverticulosis    COLONOSCOPY  04/20/2015    Dr. Charolotte Spurling- diverticulosis, sessile polyp, 5 years     COLONOSCOPY  4/13/16    Dr Philly Callahan; Diverticulosis    COLONOSCOPY  11/28/2016    Dr Charolotte Spurling,     INTRACAPSULAR CATARACT EXTRACTION Right 7/15/2020    PHACOEMULSIFICATION OF CATARACT RIGHT EYE WITH INTRAOCULAR LENS IMPLANT performed by Jj Hayden MD at [facility]  Bilateral 6/23/2020    BILATERAL LUMBAR FOUR TRANSFORAMINAL EPIDURAL STEROID INJECTION SITE CONFIRMED BY FLUOROSCOPY performed by Marianne Mendez MD at [facility] [facility] Bilateral 7/7/2020    BILATERAL LUMBAR FOUR TRANSFORAMINAL EPIDURAL STEROID INJECTION SITE CONFIRMED BY FLUOROSCOPY performed by Marianne Mendez MD at [facility] [facility] Bilateral 8/4/2020 BILATERAL LUMBAR THREE, LUMBAR FOUR, LUMBAR FIVE DORSAL RAMUS MEDIAL BRANCH BLOCK SITE CONFIRMED BY FLUOROSCOPY performed by Douglas Graves MD at [facility] Right     UPPER GASTROINTESTINAL ENDOSCOPY  8/17/2011    Dr. Bharat Perea - moderate gastritis , hiatal hernia     UPPER GASTROINTESTINAL ENDOSCOPY  4/20/15    Dr. Bharat Perea - polyps removed, diverticulosis, follow up in 5 years   [facility]  8/16/2012    DR. Ramos - with mesh     Current Medications:     Current Outpatient Medications:     HYDROcodone-acetaminophen (LORCET) 5-325 MG per tablet, Take 1 tablet by mouth every 6 hours as needed for Pain for up to 7 days. Intended supply: 7 days.  Take lowest dose possible to manage pain, Disp: 28 tablet, Rfl: 0    omeprazole (PRILOSEC) 20 MG delayed release capsule, Take 20 mg by mouth daily, Disp: , Rfl:     irbesartan (AVAPRO) 300 MG tablet, Take 300 mg by mouth nightly, Disp: , Rfl:     ondansetron (ZOFRAN) 4 MG tablet, Take 1 tablet by mouth every 8 hours as needed for Nausea or Vomiting, Disp: 10 tablet, Rfl: 0    blood glucose test strips (ASCENSIA AUTODISC VI;ONE TOUCH ULTRA TEST VI) strip, One Touch Ultra test Strips testing daily per E11.9, Disp: 100 each, Rfl: 3    metFORMIN (GLUCOPHAGE) 500 MG tablet, TAKE 1 TABLET BY MOUTH IN THE MORNING, AND THEN TAKE 2 TABLETS AT NIGHT WITH DINNER., Disp: 270 tablet, Rfl: 0    TRADJENTA 5 MG tablet, TAKE ONE TABLET BY MOUTH DAILY, Disp: 90 tablet, Rfl: 0    escitalopram (LEXAPRO) 20 MG tablet, Take 1 tablet by mouth daily, Disp: 90 tablet, Rfl: 0    rosuvastatin (CRESTOR) 40 MG tablet, TAKE ONE TABLET BY MOUTH DAILY, Disp: 90 tablet, Rfl: 1    lidocaine (LIDODERM) 5 %, Place 1 patch onto the skin every 12 hours 12 hours on, 12 hours off., Disp: 30 patch, Rfl: 0    traZODone (DESYREL) 100 MG tablet, Take 1 tablet by mouth nightly as needed for Sleep, Disp: 90 tablet, Rfl: 1    amLODIPine (NORVASC) 10 MG tablet, Take 1 tablet by mouth daily, Disp: 90 tablet, Rfl: 1    meloxicam (MOBIC) 15 MG tablet, Take 1 tablet by mouth daily, Disp: 30 tablet, Rfl: 3    FREESTYLE LANCETS MISC, 1 each by Does not apply route daily, Disp: 100 each, Rfl: 3    blood glucose test strips (FREESTYLE LITE) strip, 1 each by In Vitro route daily Testing 1-2 times daily per e11.9, Disp: 100 each, Rfl: 3    blood glucose monitor strips, Test 2 times a day & as needed for symptoms of irregular blood glucose. One Touch Verio strips, Disp: 100 strip, Rfl: 2    Lancets MISC, 1 each by Does not apply route 2 times daily, Disp: 100 each, Rfl: 5    ONETOUCH DELICA LANCETS 39F MISC, USE ONE LANCET TO TEST TWICE A DAY per E11.9, Disp: 100 each, Rfl: 5    Cyanocobalamin (B-12 PO), Take by mouth daily , Disp: , Rfl:     Probiotic Product (PROBIOTIC ACIDOPHILUS) CAPS, Take 1 capsule by mouth daily, Disp: , Rfl:     VITAMIN D, CHOLECALCIFEROL, PO, Take 1 tablet by mouth daily, Disp: , Rfl:     vitamin E 1000 UNITS capsule, Take 1,000 Units by mouth daily. , Disp: , Rfl:   Allergies:  Percocet [oxycodone-acetaminophen]  Social History:    reports that she has never smoked. She has never used smokeless tobacco. She reports current alcohol use. She reports that she does not use drugs. Family History:   Family History   Problem Relation Age of Onset    Heart Disease Mother     Rheum Arthritis Mother     Heart Disease Father        REVIEW OF SYSTEMS:   CONSTITUTIONAL: Denies unexplained weight loss, fevers, chills or fatigue  NEUROLOGICAL: Denies unsteady gait or progressive weakness  MUSCULOSKELETAL: Denies joint swelling or redness  GI: Denies nausea, vomiting, diarrhea   : Denies bowel or bladder issues       PHYSICAL EXAM:    Vitals: Temperature 97.4 °F (36.3 °C), resp. rate 14, height 5' 2.01\" (1.575 m), weight 143 lb 15.4 oz (65.3 kg), not currently breastfeeding.     GENERAL EXAM:  · General Apparence: Patient is adequately groomed with no evidence of malnutrition. · Psychiatric: Orientation: The patient is oriented to time, place and person. The patient's mood and affect are appropriate   · Vascular: Examination reveals no swelling and palpation reveals no tenderness in upper or lower extremities. Good capillary refill. · The lymphatic examination of the neck, axillae and groin reveals all areas to be without enlargement or induration  · Sensation is intact without deficit in the upper and lower extremities to light touch and pinprick  · Coordination of the upper and lower extremities are normal.  · RIGHT UPPER EXTREMITY:  Inspection/examination of the right upper extremity does not show any tenderness, deformity or injury. Range of motion is unremarkable and pain-free. There is no gross instability. There are no rashes, ulcerations or lesions. Strength and tone are normal. No atrophy or abnormal movements are noted. · LEFT UPPER EXTREMITY: Inspection/examination of the left upper extremity does not show any tenderness, deformity or injury. Range of motion is unremarkable and pain-free. There is no gross instability. There are no rashes, ulcerations or lesions. Strength and tone are normal. No atrophy or abnormal movements are noted. LUMBAR/SACRAL EXAMINATION:  · Inspection: Local inspection shows no step-off or bruising. Lumbar alignment is normal. No instability is noted. · Palpation:   No evidence of tenderness at the midline. Lumbar paraspinal tenderness: Mild L4/5 and L5/S1 tenderness  Bursal tenderness No tenderness bilaterally  There is no paraspinal spasm. · Range of Motion: limited by 50% in all planes due to pain specifically with extension and facet loading bilaterally. · Strength:   Strength testing is 5/5 in all muscle groups tested. · Special Tests:   Straight leg raise and crossed SLR negative. Reginaldo's testing is negative bilaterally. FADIR's testing is negative bilaterally. Bowstring test negative.  Slump test negative. · Skin: There are no rashes, ulcerations or lesions. · Reflexes: Reflexes are symmetrically 2+ at the patellar and ankle tendons. Clonus absent bilaterally at the feet. · Gait & station: uses a single point cane to ambulate and no ataxia  · Additional Examinations:  · RIGHT LOWER EXTREMITY: Inspection/examination of the right lower extremity does not show any tenderness, deformity or injury. Range of motion is normal and pain-free. There is no gross instability. There are no rashes, ulcerations or lesions. Strength and tone are normal. No atrophy or abnormal movements are noted. · LEFT LOWER EXTREMITY:  Inspection/examination of the left lower extremity does show left knee tenderness, with no deformity or injury. Range of motion is diminished with increased pain with flexion and extension of the knee. There is no gross instability. There are no rashes, ulcerations or lesions. Strength and tone are normal. No atrophy or abnormal movements are noted. Diagnostic Testing:    MR Lumbar spine shows from 5/22/20:  MRI without contrast is obtained. There is trace retrolisthesis of L5. There is mild scoliosis. There is diffuse disc desiccation at all levels. There is loss of height at all levels except L4-5.  Conus terminates normally at T12-L1.         T11-12: Diffuse disc bulge and small right paracentral disc herniation resulting in mild central canal stenosis.         T12-L1: Sagittal images demonstrate no spinal stenosis.         L1-2: Diffuse disc bulge and facet hypertrophy resulting in mild-moderate central canal stenosis and moderate right, mild left foraminal stenosis.         L2-3: Diffuse disc bulge and facet/ligamentum flavum hypertrophy resulting in moderate central canal stenosis and moderate bilateral foraminal stenosis.         L3-4: Disc osteophyte complex and facet/ligament flavum hypertrophy resulting in severe central canal stenosis and moderate-severe right, severe left foraminal stenosis.         L4-5: Diffuse disc bulge and facet/ligamentum flavum hypertrophy resulting in severe central canal stenosis and moderate right, severe left foraminal stenosis.         L5-S1: Facet hypertrophy without significant central canal or foraminal stenosis.           Impression         Extensive multilevel spinal stenosis most significant at L3-4 and L4-5. Results for orders placed or performed during the hospital encounter of 20   POCT Glucose   Result Value Ref Range    POC Glucose 149 (H) 70 - 99 mg/dl    Performed on ACCU-CHEK      Impression:       1. Spondylosis without myelopathy or radiculopathy, lumbar region    2. Lumbar stenosis with neurogenic claudication    3. Greater trochanteric bursitis of both hips        Plan:  Clinical Course: Above diagnoses are worsening    I discussed the diagnosis and the treatment options with Madisyn Haney today. In Summary:  The various treatment options were outlined and discussed with Jacki Cifuentes including:  Conservative care options: physical therapy, ice, medications, bracing, and activity modification. The indications for therapeutic injections. The indications for additional imaging/laboratory studies. The indications for (possible future) interventions. After considering the various options discussed, Madisyn Hnaey elected to pursue a course of treatment that includes the followin. Medications:  No further recommendations for new medications. 2. PT:  Encouraged to continue with Home exercise program.    3. Further studies: No further studies. 4. Interventional:  We discussed pursuing lumbar medial branch blocks for diagnostic purposes. Based on physical exam findings of increased pain with facet loading and radiologic imaging, we will pursue lumbar medial branch blocks at the Bilateral L3, L4, L5 DR levels. Risks, benefits and alternatives were discussed.  These include but are not limited to bleeding, infection, increased pain, lack of pain relief and nerve injury. The patient verbalized understanding and would like to proceed. If there is significant pain relief and functional improvement, the patient may be a good candidate for Radiofrequency ablation. MBB #2. As such, I have confirmed the patient has met the general requirements including failure of conservative management including prescription strength analgesics, adjunctive medications were utilized , therapeutic exercise program, and no underlying addiction or behavioral disorders were identified to the ability of the provider. Symptoms are impacting their ADLs or iADLs such as walking and significant pain was noted in the HPI. Imaging studies as noted in the diagnostic imaging section of the consultation were reviewed and correlated with clinical findings. Fluoroscopy is utilized for interventional procedures. For second Diagnostic MBB  A confirmatory injection is being requested due to a positive response of 80% relief of the primary index pain with onset and duration of relief being consistent with local anesthetic utilized. The patient will also contact Dr. Lashonda Billings office for the left knee pain, she has been seen by his office in the past.     5. Follow up:  2-3 weeks. Jose Garibay was instructed to call the office if her symptoms worsen or if new symptoms appear prior to the next scheduled visit. She is specifically instructed to contact the office between now & her scheduled appointment if she has concerns related to her condition or if she needs assistance in scheduling the above tests. She is welcome to call for an appointment sooner if she has any additional concerns or questions.            DEXTER Leahy PA-C  Board Certified by the [employer] on Certification of Physician Assistants  Maria Guadalupe Gupta 60  Partner of [facility]        This dictation was performed with a verbal recognition program [facility] [employer]) and it was checked for errors. It is possible that there are still dictated errors within this office note. If so, please bring any errors to my attention for an addendum. All efforts were made to ensure that this office note is accurate.

## 2020-08-13 ENCOUNTER — TELEPHONE (OUTPATIENT)
Dept: ORTHOPEDIC SURGERY | Age: 80
End: 2020-08-13

## 2020-08-18 ENCOUNTER — HOSPITAL ENCOUNTER (OUTPATIENT)
Age: 80
Setting detail: OUTPATIENT SURGERY
Discharge: HOME | End: 2020-08-18
Attending: PHYSICAL MEDICINE & REHABILITATION | Admitting: PHYSICAL MEDICINE & REHABILITATION
Payer: MEDICARE

## 2020-08-18 VITALS
BODY MASS INDEX: 26.5 KG/M2 | RESPIRATION RATE: 16 BRPM | HEART RATE: 78 BPM | HEIGHT: 62 IN | DIASTOLIC BLOOD PRESSURE: 72 MMHG | WEIGHT: 144 LBS | OXYGEN SATURATION: 98 % | TEMPERATURE: 97.3 F | SYSTOLIC BLOOD PRESSURE: 158 MMHG

## 2020-08-18 LAB
GLUCOSE BLD-MCNC: 170 MG/DL (ref 70–99)
PERFORMED ON: ABNORMAL

## 2020-08-18 PROCEDURE — 3600000002 HC SURGERY LEVEL 2 BASE: Performed by: PHYSICAL MEDICINE & REHABILITATION

## 2020-08-18 PROCEDURE — 2500000003 HC RX 250 WO HCPCS: Performed by: PHYSICAL MEDICINE & REHABILITATION

## 2020-08-18 PROCEDURE — 7100000010 HC PHASE II RECOVERY - FIRST 15 MIN: Performed by: PHYSICAL MEDICINE & REHABILITATION

## 2020-08-18 PROCEDURE — 6360000004 HC RX CONTRAST MEDICATION: Performed by: PHYSICAL MEDICINE & REHABILITATION

## 2020-08-18 PROCEDURE — 2709999900 HC NON-CHARGEABLE SUPPLY: Performed by: PHYSICAL MEDICINE & REHABILITATION

## 2020-08-18 RX ORDER — LIDOCAINE HYDROCHLORIDE 20 MG/ML
INJECTION, SOLUTION EPIDURAL; INFILTRATION; INTRACAUDAL; PERINEURAL
Status: DISCONTINUED
Start: 2020-08-18 | End: 2020-08-18 | Stop reason: HOSPADM

## 2020-08-18 RX ORDER — LIDOCAINE HYDROCHLORIDE 20 MG/ML
INJECTION, SOLUTION EPIDURAL; INFILTRATION; INTRACAUDAL; PERINEURAL PRN
Status: DISCONTINUED | OUTPATIENT
Start: 2020-08-18 | End: 2020-08-18 | Stop reason: ALTCHOICE

## 2020-08-18 RX ORDER — LIDOCAINE HYDROCHLORIDE 10 MG/ML
INJECTION, SOLUTION EPIDURAL; INFILTRATION; INTRACAUDAL; PERINEURAL PRN
Status: DISCONTINUED | OUTPATIENT
Start: 2020-08-18 | End: 2020-08-18 | Stop reason: ALTCHOICE

## 2020-08-18 ASSESSMENT — PAIN DESCRIPTION - DESCRIPTORS: DESCRIPTORS: ACHING

## 2020-08-18 NOTE — OP NOTE
Patient:  Spring Velarde  YOB: 1940  Medical Record #:  [id number]   Place:   [address] [place], [place]  Date:  8/18/2020   Physician:  Sammy Avendaño MD, VLAD    Procedure: Lumbar Medial Branch Blocks - Bilateral L3, L4 and L5 Dorsal ramus    MBB #2    CPT (22355 Modifier 50 [id number] Modifier 50)      Pre-Procedure Diagnosis: Lumbar spondylosis without radiculopathy      Post-Procedure Diagnosis: Same      Sedation: Local with 1% Lidocaine 5 ml      EBL: None      Complications: None      Procedure Summary:      The patient was seen in the office for complaints of low back pain. Review of the imaging and physical exam of the patient confirmed the pre-procedure diagnosis. After a thorough discussion of risks, benefits and alternatives informed consent was obtained. The patient was brought to the procedure suite and placed in the prone position. The skin overlying the lumbar spine was prepped with chloraprep and draped in the usual sterile fashion. Using fluoroscopic guidance, the right L4, L5 and sacral ala levels were identified. Through anesthetized skin a 22 gauge 3.5 inch curved tip spinal needle was advanced to the juncture of the superior articular process and the transverse process at the right L4 level where the right L3 medial branch resides. .3 ml of Isovue M 300 was instilled showing a nerve root outline pattern, without evidence of vascular spread. A total of 0.5 ml of 2% Lidocaine was instilled at the right L-4 level corresponding to the right L3 medial branch. This was repeated for the right L5 and sacral ala levels corresponding to the right L4 medial branch and L5 Dorsal ramus. The identical procedure was repeated on the left side. The needles were removed and a band-aid applied. The patient was transferred to the post-operative area in stable condition.

## 2020-08-18 NOTE — H&P
HISTORY AND PHYSICAL/PRE-SEDATION ASSESSMENT    Patient:  Saturnino Spencer   :  1940  Medical Record No.:  [id number]   Date:  2020  Physician:  Keaton Jorge M.D. Facility: [facility]    HISTORY OF PRESENT ILLNESS:                 The patient is a 68 y.o. female whom presents with lower back pain. Review of the imaging and physical exam of the patient confirmed the pre-procedure diagnosis. After a thorough discussion of risks, benefits and alternatives informed consent was obtained.                 Past Medical History:   Past Medical History:   Diagnosis Date    Diabetes mellitus ([address]     Essential hypertension, benign     GERD (gastroesophageal reflux disease)     Hyperlipidemia     Interstitial lung disease (HCC)     Osteoarthrosis, unspecified whether generalized or localized, unspecified site     osteoarthritis lower leg    Osteopenia     Shingles       Past Surgical History:     Past Surgical History:   Procedure Laterality Date    ANKLE SURGERY Right 2013    torn tendon    BRONCHOSCOPY N/A 2019    BRONCHOSCOPY WITH BAL AND TRANSBRONCHIAL BIOPSIES [facility]lla [facility]  10/11/2010    Dr. Karthikeyan Esteban , polyps and diverticulosis    COLONOSCOPY  2002    Dr. Lynne Rashid, Diverticulosis    COLONOSCOPY  2015    Dr. Karthikeyan Esteban- diverticulosis, sessile polyp, 5 years     COLONOSCOPY  16    Dr Rose Matthews; Diverticulosis    COLONOSCOPY  2016    Dr Karthikeyan Esteban,     INTRACAPSULAR CATARACT EXTRACTION Right 7/15/2020    PHACOEMULSIFICATION OF CATARACT RIGHT EYE WITH INTRAOCULAR LENS IMPLANT performed by Messi Romero MD at [facility]  Bilateral 2020    BILATERAL LUMBAR FOUR TRANSFORAMINAL EPIDURAL STEROID INJECTION SITE CONFIRMED BY FLUOROSCOPY performed by Keaton Jorge MD at [facility] [facility] Bilateral 2020    BILATERAL LUMBAR FOUR TRANSFORAMINAL EPIDURAL STEROID INJECTION SITE CONFIRMED BY FLUOROSCOPY performed by Adela Palmer MD at [address] Bilateral 8/4/2020    BILATERAL LUMBAR THREE, LUMBAR FOUR, LUMBAR FIVE DORSAL RAMUS MEDIAL BRANCH BLOCK SITE CONFIRMED BY FLUOROSCOPY performed by Adela Palmer MD at [facility] Right     UPPER GASTROINTESTINAL ENDOSCOPY  8/17/2011    Dr. Lindsey Ramirez - moderate gastritis , hiatal hernia     UPPER GASTROINTESTINAL ENDOSCOPY  4/20/15    Dr. Lindsey Ramirez - polyps removed, diverticulosis, follow up in 5 years   [address] [place] [address]  8/16/2012    DR. Ramos - with mesh     Current Medications:   Prior to Admission medications    Medication Sig Start Date End Date Taking?  Authorizing Provider   omeprazole (PRILOSEC) 20 MG delayed release capsule Take 20 mg by mouth daily   Yes Historical Provider, MD   irbesartan (AVAPRO) 300 MG tablet Take 300 mg by mouth nightly   Yes Historical Provider, MD   ondansetron (ZOFRAN) 4 MG tablet Take 1 tablet by mouth every 8 hours as needed for Nausea or Vomiting 7/28/20 7/28/21 Yes NATALIE Edwards   blood glucose test strips (ASCENSIA AUTODISC VI;ONE TOUCH ULTRA TEST VI) strip One Touch Ultra test Strips testing daily per E11.9 7/27/20  Yes AMEE Nichole - CNP   metFORMIN (GLUCOPHAGE) 500 MG tablet TAKE 1 TABLET BY MOUTH IN THE MORNING, AND THEN TAKE 2 TABLETS AT NIGHT WITH DINNER. 6/15/20  Yes Ignacio Pizarro MD   TRADJENTA 5 MG tablet TAKE ONE TABLET BY MOUTH DAILY 6/11/20  Yes Ignacio Pizarro MD   escitalopram (LEXAPRO) 20 MG tablet Take 1 tablet by mouth daily 5/26/20 11/17/20 Yes Ignacio Pizarro MD   rosuvastatin (CRESTOR) 40 MG tablet TAKE ONE TABLET BY MOUTH DAILY 5/6/20  Yes Ignacio Pizarro MD   lidocaine (LIDODERM) 5 % Place 1 patch onto the skin every 12 hours 12 hours on, 12 hours off. 5/5/20  Yes Alex Parmar MD   traZODone (DESYREL) 100 MG tablet Take 1 tablet by mouth nightly as needed for Sleep 3/13/20  Yes AMEE Pedraza CNP   amLODIPine (NORVASC) 10 MG tablet Take 1 tablet by mouth daily 3/13/20  Yes AMEE Dimas CNP   meloxicam (MOBIC) 15 MG tablet Take 1 tablet by mouth daily 2/24/20  Yes [facility] AMEE Urias CNP   blood glucose test strips (FREESTYLE LITE) strip 1 each by In Vitro route daily Testing 1-2 times daily per e11.9 11/26/19  Yes AMEE Pedraza CNP   blood glucose monitor strips Test 2 times a day & as needed for symptoms of irregular blood glucose. One Touch Verio strips 7/24/19  Yes Liza Esquivel MD   Probiotic Product (PROBIOTIC ACIDOPHILUS) CAPS Take 1 capsule by mouth daily   Yes Historical Provider, MD   VITAMIN D, CHOLECALCIFEROL, PO Take 1 tablet by mouth daily   Yes Historical Provider, MD   vitamin E 1000 UNITS capsule Take 1,000 Units by mouth daily. Yes Historical Provider, MD   FREESTYLE LANCETS MISC 1 each by Does not apply route daily 11/26/19   AMEE Pedraza CNP   Lancets MISC 1 each by Does not apply route 2 times daily 7/24/19   Liza Esquivel MD   [facility] LANCETS 41F MISC USE ONE LANCET TO TEST TWICE A DAY per E11.9 3/5/18   Liza Esquivel MD   Cyanocobalamin (B-12 PO) Take by mouth daily     Historical Provider, MD     Allergies:  Percocet [oxycodone-acetaminophen]  Social History:    reports that she has never smoked. She has never used smokeless tobacco. She reports current alcohol use. She reports that she does not use drugs. Family History:   Family History   Problem Relation Age of Onset    Heart Disease Mother     Rheum Arthritis Mother     Heart Disease Father        Vitals: Blood pressure (!) 166/70, pulse 63, temperature 97.3 °F (36.3 °C), temperature source Temporal, resp. rate 16, height 5' 2\" (1.575 m), weight 144 lb (65.3 kg), SpO2 99 %, not currently breastfeeding.       PHYSICAL EXAM:  HENT: Airway patent and reviewed  Cardiovascular: Normal rate, regular rhythm, normal heart sounds. Pulmonary/Chest: No wheezes. No rhonchi. No rales. Abdominal: Soft. Bowel sounds are normal. No distension. Extremities: Moves all extremities equally  Lumbar Spine: Painful range of motion, no midline tenderness       Diagnosis:Lumbar spondylosis without radiculopathy    Plan: Proceed with planned procedure    The patient was counseled at length about the risks of claudia Covid-19 in the talita-operative and post-operative states including the recovery window of their procedure. The patient was made aware that claudia Covid-19 after a surgical procedure may worsen their prognosis for recovering from the virus and lend to a higher morbidity and or mortality risk. The patient was given the options of postponing their procedure. All of the risks, benefits, and alternatives were discussed. The patient does wish to proceed with the procedure. ASA CLASS:         []   I. Normal, healthy adult           [x]   II.  Mild systemic disease            []   III. Severe systemic disease      Mallampati: Mallampati Class II - (soft palate, fauces & uvula are visible)      Sedation plan:   [x]  Local              []  Minimal                  []  General anesthesia    Patient's condition acceptable for planned procedure/sedation. Post Procedure Plan   Return to same level of care   ______________________     The risks and benefits as well as alternatives to the procedure have been discussed with the patient and or family. The patient and or next of kin understands and agrees to proceed.     Alberto Carey M.D.

## 2020-08-25 ENCOUNTER — TELEPHONE (OUTPATIENT)
Dept: ORTHOPEDIC SURGERY | Age: 80
End: 2020-08-25

## 2020-08-25 ENCOUNTER — OFFICE VISIT (OUTPATIENT)
Dept: ORTHOPEDIC SURGERY | Age: 80
End: 2020-08-25
Payer: MEDICARE

## 2020-08-25 VITALS — HEIGHT: 62 IN | RESPIRATION RATE: 12 BRPM | WEIGHT: 144 LBS | BODY MASS INDEX: 26.5 KG/M2 | TEMPERATURE: 96.3 F

## 2020-08-25 PROCEDURE — 99214 OFFICE O/P EST MOD 30 MIN: CPT | Performed by: PHYSICIAN ASSISTANT

## 2020-08-25 NOTE — TELEPHONE ENCOUNTER
Auth: # [id number]    Date: 8/25/2020 Thru 2/21/2021  Type of SX:  Outpatient Neurotomy  Location: [facility]  CPT: 21875, 83504   DX Code: M47.816, M48.062, M70.61, M70.62  SX area: Lumbar spine  Insurance: t Medicare    CPT: [address] [facility], [id number], [id number]

## 2020-08-25 NOTE — PROGRESS NOTES
Follow up: [address] [place] [address]  1940  [id number]         Chief Complaint   Patient presents with    Lower Back Pain     F/u AURELIO Bilateral L3, L4, L5  8/18 #2         HISTORY OF PRESENT ILLNESS:  Ms. Neville Scherer is a 68 y.o. female returns for a follow up visit for multiple medical problems. Her current presenting problems are   1. Spondylosis without myelopathy or radiculopathy, lumbar region    2. Lumbar stenosis with neurogenic claudication    3. Greater trochanteric bursitis of both hips    . As per information/history obtained from the PADT(patient assessment and documentation tool) - She complains of pain in the lower back with radiation to the lower back She rates the pain 6/10 and describes it as sharp. Pain is made worse by: getting in and out of bed. She denies side effects from the current pain regimen. Patient reports that since the last follow up visit the physical functioning is worse, family/social relationships are worse, mood is worse and sleep patterns are worse, and that the overall functioning is worse. Patient denies neurological bowel or bladder. The patient presents today in follow-up after a bilateral L3, L4, L5 DR MBB #2 was completed on 8/18/2020. The patient describes that by our 8 following the procedure the patient was 90% improved. She describes that by the next day she did not feel as much improvement but did receive great relief the day of the procedure. The patient describes that now her pain is worsening specifically on the right side but she still has pain bilaterally. Her pain can be described as a sharp pain. She describes that getting into and out of bed are the worst time specifically in the morning. She uses an ice pack and muscle relaxers at night to help with her pain.   She describes that she can sit for 1 hour but she has to use a pillow for her back, she can stand for 5 minutes, and walk for 10 to 15 minutes without a considerable amount of pain.      Associated signs and symptoms:   Neurogenic bowel or bladder symptoms:  no   Perceived weakness:  no   Difficulty walking:  no            Past medical, surgical, social and family history reviewed with the patient.      Past Medical History:   Past Medical History:   Diagnosis Date    Diabetes mellitus ([address]     Essential hypertension, benign     GERD (gastroesophageal reflux disease)     Hyperlipidemia     Interstitial lung disease (HCC)     Osteoarthrosis, unspecified whether generalized or localized, unspecified site     osteoarthritis lower leg    Osteopenia     Shingles       Past Surgical History:     Past Surgical History:   Procedure Laterality Date    ANKLE SURGERY Right 04/01/2013    torn tendon    BRONCHOSCOPY N/A 8/1/2019    BRONCHOSCOPY WITH BAL AND TRANSBRONCHIAL BIOPSIES performed by Lizette Sandra MD at [address]  10/11/2010    Dr. Cynthia Rowe , polyps and diverticulosis    COLONOSCOPY  11/11/2002    Dr. Morales Goins, Diverticulosis    COLONOSCOPY  04/20/2015    Dr. Cynthia Rowe- diverticulosis, sessile polyp, 5 years     COLONOSCOPY  4/13/16    Dr Lashawn Oconnor; Diverticulosis    COLONOSCOPY  11/28/2016    Dr Cynthia Rowe,     INTRACAPSULAR CATARACT EXTRACTION Right 7/15/2020    PHACOEMULSIFICATION OF CATARACT RIGHT EYE WITH INTRAOCULAR LENS IMPLANT performed by Ghulam Martínez MD at [facility]  Bilateral 6/23/2020    BILATERAL LUMBAR FOUR TRANSFORAMINAL EPIDURAL STEROID INJECTION SITE CONFIRMED BY FLUOROSCOPY performed by Dick Brandon MD at [address] Bilateral 7/7/2020    BILATERAL LUMBAR FOUR TRANSFORAMINAL EPIDURAL STEROID INJECTION SITE CONFIRMED BY FLUOROSCOPY performed by Dick Brandon MD at [facility] [facility] Bilateral 8/4/2020    BILATERAL LUMBAR THREE, LUMBAR FOUR, LUMBAR FIVE DORSAL RAMUS MEDIAL BRANCH BLOCK SITE CONFIRMED BY FLUOROSCOPY performed by Huntley Cranker, MD at [address] [facility] Bilateral 8/18/2020    BILATERAL LUMBAR THREE, LUMBAR FOUR, LUMBAR FIVE DORSAL RAMUS MEDIAL BRANCH BLOCK SITE CONFIR,ED BY FLUOROSCOPY performed by Huntley Cranker, MD at [facility] Right     UPPER GASTROINTESTINAL ENDOSCOPY  8/17/2011    Dr. Cara Allen - moderate gastritis , hiatal hernia     UPPER GASTROINTESTINAL ENDOSCOPY  4/20/15    Dr. Cara Allen - polyps removed, diverticulosis, follow up in 5 years   [address] [place] [address]  8/16/2012    DR. Ramos - with mesh     Current Medications:     Current Outpatient Medications:     omeprazole (PRILOSEC) 20 MG delayed release capsule, Take 20 mg by mouth daily, Disp: , Rfl:     irbesartan (AVAPRO) 300 MG tablet, Take 300 mg by mouth nightly, Disp: , Rfl:     ondansetron (ZOFRAN) 4 MG tablet, Take 1 tablet by mouth every 8 hours as needed for Nausea or Vomiting, Disp: 10 tablet, Rfl: 0    blood glucose test strips (ASCENSIA AUTODISC VI;ONE TOUCH ULTRA TEST VI) strip, One Touch Ultra test Strips testing daily per E11.9, Disp: 100 each, Rfl: 3    metFORMIN (GLUCOPHAGE) 500 MG tablet, TAKE 1 TABLET BY MOUTH IN THE MORNING, AND THEN TAKE 2 TABLETS AT NIGHT WITH DINNER., Disp: 270 tablet, Rfl: 0    TRADJENTA 5 MG tablet, TAKE ONE TABLET BY MOUTH DAILY, Disp: 90 tablet, Rfl: 0    escitalopram (LEXAPRO) 20 MG tablet, Take 1 tablet by mouth daily, Disp: 90 tablet, Rfl: 0    rosuvastatin (CRESTOR) 40 MG tablet, TAKE ONE TABLET BY MOUTH DAILY, Disp: 90 tablet, Rfl: 1    lidocaine (LIDODERM) 5 %, Place 1 patch onto the skin every 12 hours 12 hours on, 12 hours off., Disp: 30 patch, Rfl: 0    traZODone (DESYREL) 100 MG tablet, Take 1 tablet by mouth nightly as needed for Sleep, Disp: 90 tablet, Rfl: 1    amLODIPine (NORVASC) 10 MG tablet, Take 1 tablet by mouth daily, Disp: 90 tablet, Rfl: 1    meloxicam (MOBIC) 15 MG tablet, Take 1 tablet by mouth daily, Disp: 30 tablet, Rfl: 3    FREESTYLE LANCETS MISC, 1 each by Does not apply route daily, Disp: 100 each, Rfl: 3    blood glucose test strips (FREESTYLE LITE) strip, 1 each by In Vitro route daily Testing 1-2 times daily per e11.9, Disp: 100 each, Rfl: 3    blood glucose monitor strips, Test 2 times a day & as needed for symptoms of irregular blood glucose. One Touch Verio strips, Disp: 100 strip, Rfl: 2    Lancets MISC, 1 each by Does not apply route 2 times daily, Disp: 100 each, Rfl: 5    ONETOUCH DELICA LANCETS 70N MISC, USE ONE LANCET TO TEST TWICE A DAY per E11.9, Disp: 100 each, Rfl: 5    Cyanocobalamin (B-12 PO), Take by mouth daily , Disp: , Rfl:     Probiotic Product (PROBIOTIC ACIDOPHILUS) CAPS, Take 1 capsule by mouth daily, Disp: , Rfl:     VITAMIN D, CHOLECALCIFEROL, PO, Take 1 tablet by mouth daily, Disp: , Rfl:     vitamin E 1000 UNITS capsule, Take 1,000 Units by mouth daily. , Disp: , Rfl:   Allergies:  Percocet [oxycodone-acetaminophen]  Social History:    reports that she has never smoked. She has never used smokeless tobacco. She reports current alcohol use. She reports that she does not use drugs. Family History:   Family History   Problem Relation Age of Onset    Heart Disease Mother     Rheum Arthritis Mother     Heart Disease Father        REVIEW OF SYSTEMS:   CONSTITUTIONAL: Denies unexplained weight loss, fevers, chills or fatigue  NEUROLOGICAL: Denies unsteady gait or progressive weakness  MUSCULOSKELETAL: Denies joint swelling or redness  GI: Denies nausea, vomiting, diarrhea   : Denies bowel or bladder issues       PHYSICAL EXAM:    Vitals: Temperature 96.3 °F (35.7 °C), resp. rate 12, height 5' 2\" (1.575 m), weight 144 lb (65.3 kg), not currently breastfeeding. GENERAL EXAM:  · General Apparence: Patient is adequately groomed with no evidence of malnutrition. · Psychiatric: Orientation: The patient is oriented to time, place and person.  The patient's mood and affect are appropriate   · Vascular: Examination reveals no swelling and palpation reveals no tenderness in upper or lower extremities. Good capillary refill. · The lymphatic examination of the neck, axillae and groin reveals all areas to be without enlargement or induration  · Sensation is intact without deficit in the upper and lower extremities to light touch and pinprick  · Coordination of the upper and lower extremities are normal.  · RIGHT UPPER EXTREMITY:  Inspection/examination of the right upper extremity does not show any tenderness, deformity or injury. Range of motion is unremarkable and pain-free. There is no gross instability. There are no rashes, ulcerations or lesions. Strength and tone are normal. No atrophy or abnormal movements are noted. · LEFT UPPER EXTREMITY: Inspection/examination of the left upper extremity does not show any tenderness, deformity or injury. Range of motion is unremarkable and pain-free. There is no gross instability. There are no rashes, ulcerations or lesions. Strength and tone are normal. No atrophy or abnormal movements are noted. LUMBAR/SACRAL EXAMINATION:  · Inspection: Local inspection shows no step-off or bruising. Lumbar alignment is normal. No instability is noted. · Palpation:   No evidence of tenderness at the midline. Lumbar paraspinal tenderness: Mild L4/5 and L5/S1 tenderness  Bursal tenderness No tenderness bilaterally  There is no paraspinal spasm. · Range of Motion: limited by 50% in all planes due to pain specifically with extension and facet loading bilaterally. · Strength:   Strength testing is 5/5 in all muscle groups tested. · Special Tests:   Straight leg raise and crossed SLR negative. Reginaldo's testing is negative bilaterally. FADIR's testing is negative bilaterally. Bowstring test negative. Slump test negative. · Skin: There are no rashes, ulcerations or lesions. · Reflexes: Reflexes are symmetrically 2+ at the patellar and ankle tendons. hypertrophy without significant central canal or foraminal stenosis.           Impression         Extensive multilevel spinal stenosis most significant at L3-4 and L4-5. Results for orders placed or performed during the hospital encounter of 20   POCT Glucose   Result Value Ref Range    POC Glucose 170 (H) 70 - 99 mg/dl    Performed on ACCU-CHEK      Impression:       1. Spondylosis without myelopathy or radiculopathy, lumbar region    2. Lumbar stenosis with neurogenic claudication    3. Greater trochanteric bursitis of both hips        Plan:  Clinical Course: Above diagnoses are worsening    I discussed the diagnosis and the treatment options with Lucero Ayon today. In Summary:  The various treatment options were outlined and discussed with Jacki Cifuentes including:  Conservative care options: physical therapy, ice, medications, bracing, and activity modification. The indications for therapeutic injections. The indications for additional imaging/laboratory studies. The indications for (possible future) interventions. After considering the various options discussed, Rogershama Waltonselena elected to pursue a course of treatment that includes the followin. Medications:  No further recommendations for new medications. The patient will continue to take muscle relaxer's at night as needed. 2. PT:  Encouraged to continue with Home exercise program.    3. Further studies: No further studies. 4. Interventional:  We have discussed options such as radiofrequency ablation after undergoing 2 successful lumbar medial branch blocks. Risks include but limited to bleeding, infection, neuritis, increased pain, lack of pain relief, motor nerve injury. This does not include all possible risks. The patient verbalized understanding would like to proceed. Side effects and benefits were also discussed.      As such, I have confirmed the patient has met the general requirements including failure of conservative management including prescription strength analgesics, adjunctive medications were utilized , therapeutic exercise program, and no underlying addiction or behavioral disorders were identified to the ability of the provider. Symptoms are impacting their ADLs or iADLs such as walking, in the morning, getting into and out of bed and significant pain was noted in the HPI. Imaging studies as noted in the diagnostic imaging section of the consultation were reviewed and correlated with clinical findings. Fluoroscopy is utilized for interventional procedures. For first RFN  Dual diagnostic MBB injections produced 80% or greater pain relief of the primary index pain and the onset and minimum duration of relief consistent with the local anesthetic administered. 5. Follow up:  4-6 weeks      Ora Javier was instructed to call the office if her symptoms worsen or if new symptoms appear prior to the next scheduled visit. She is specifically instructed to contact the office between now & her scheduled appointment if she has concerns related to her condition or if she needs assistance in scheduling the above tests. She is welcome to call for an appointment sooner if she has any additional concerns or questions. DEXTER Mullen, PA-C  Board Certified by the [employer] on Certification of Physician Assistants  Maria Guadalupe Gupta 60  Partner of [facility] ([facility])           This dictation was performed with a verbal recognition program [facility]) and it was checked for errors. It is possible that there are still dictated errors within this office note. If so, please bring any errors to my attention for an addendum. All efforts were made to ensure that this office note is accurate.

## 2020-08-25 NOTE — LETTER
Please schedule the following with:     Date:   20 @ 7:30    Account: [de-identified]  Patient: Lucero Ayon    : 1940  Address:  [place] [address]    Phone (H):  [phone] (home)      ----------------------------------------------------------------------------------------------  Diagnosis:     ICD-10-CM    1. Spondylosis without myelopathy or radiculopathy, lumbar region  M47.816    2. Lumbar stenosis with neurogenic claudication  M48.062    3. Greater trochanteric bursitis of both hips  M70.61     M70.62      Procedure: Radiofrequency Ablation Lumbar     Levels: L3, L4, L5 DR  Side: Bilateral   CPT Codes [id number], [id number]    ----------------------------------------------------------------------------------------------  Injection # 1  [email or web address]    Attending Physician       Malu Mota. Jose Alfredo Muir MD.      ----------------------------------------------------------------------------------------------  Injection Scheduled For:    At:    1st Insurance [employer] Pre-Cert#    2nd Insurance     Pre-Cert#    Comments or Special instructions:     · Infection control  ? Tested positive for MRSA in past 12 months:  no  ? Tested positive for MSSA \"staph infection\" in past 12 months: no  ? Tested positive for VRE (Vancomycin Resistant Enterococci) in past 12 months:   no  ? Currently on any antibiotics for an infection:   · Anticoagulants:  ? On a blood thinner:  no   ?  Any history of bleeding disorder: no   · Advanced Liver disease: no   · Advanced Renal disease: no   · Glaucoma: no   · Diabetes: yes      Sedation:         Yes  -----------------------------------------------------------------------------------------------  Allergies   Allergen Reactions    Percocet [Oxycodone-Acetaminophen] Other (See Comments)     Dizzy

## 2020-08-26 ENCOUNTER — OFFICE VISIT (OUTPATIENT)
Dept: ORTHOPEDIC SURGERY | Age: 80
End: 2020-08-26
Payer: MEDICARE

## 2020-08-26 VITALS
BODY MASS INDEX: 26.5 KG/M2 | TEMPERATURE: 97.5 F | WEIGHT: 144 LBS | HEART RATE: 63 BPM | SYSTOLIC BLOOD PRESSURE: 140 MMHG | DIASTOLIC BLOOD PRESSURE: 63 MMHG | HEIGHT: 62 IN

## 2020-08-26 PROBLEM — M25.562 LEFT KNEE PAIN: Status: ACTIVE | Noted: 2020-08-26

## 2020-08-26 PROCEDURE — 99214 OFFICE O/P EST MOD 30 MIN: CPT | Performed by: PHYSICIAN ASSISTANT

## 2020-08-26 PROCEDURE — 20610 DRAIN/INJ JOINT/BURSA W/O US: CPT | Performed by: PHYSICIAN ASSISTANT

## 2020-08-26 RX ORDER — TRAMADOL HYDROCHLORIDE 50 MG/1
50 TABLET ORAL EVERY 4 HOURS PRN
Qty: 42 TABLET | Refills: 0 | Status: SHIPPED | OUTPATIENT
Start: 2020-08-26 | End: 2020-09-22

## 2020-08-26 RX ORDER — TRIAMCINOLONE ACETONIDE 40 MG/ML
40 INJECTION, SUSPENSION INTRA-ARTICULAR; INTRAMUSCULAR ONCE
Status: COMPLETED | OUTPATIENT
Start: 2020-08-26 | End: 2020-08-26

## 2020-08-26 RX ORDER — BUPIVACAINE HYDROCHLORIDE 2.5 MG/ML
30 INJECTION, SOLUTION INFILTRATION; PERINEURAL ONCE
Status: COMPLETED | OUTPATIENT
Start: 2020-08-26 | End: 2020-08-26

## 2020-08-26 RX ORDER — LIDOCAINE HYDROCHLORIDE 10 MG/ML
20 INJECTION, SOLUTION INFILTRATION; PERINEURAL ONCE
Status: COMPLETED | OUTPATIENT
Start: 2020-08-26 | End: 2020-08-26

## 2020-08-26 RX ADMIN — TRIAMCINOLONE ACETONIDE 40 MG: 40 INJECTION, SUSPENSION INTRA-ARTICULAR; INTRAMUSCULAR at 13:13

## 2020-08-26 RX ADMIN — BUPIVACAINE HYDROCHLORIDE 75 MG: 2.5 INJECTION, SOLUTION INFILTRATION; PERINEURAL at 13:12

## 2020-08-26 RX ADMIN — LIDOCAINE HYDROCHLORIDE 20 ML: 10 INJECTION, SOLUTION INFILTRATION; PERINEURAL at 13:13

## 2020-08-26 NOTE — PROGRESS NOTES
Date:  2020    Name:  Wagner Singleton  Address:  [place] [address]    :  1940      Age:   68 y.o.    SSN:  [id number]      Medical Record Number:  [id number]    Reason for Visit:    Chief Complaint    Knee Pain (Left knee)    Currently: 2020  Patient is here for follow-up regarding left knee pain and osteoarthritis. She notes that she is having increasing pain around the left knee mostly medial and posterior. She notes that she does have occasional buckling but denies any fall or full give way of the knee she notes that she does utilize a walker to help stabilize herself as well as to help manage and reduce the overall stress across her low back. She rates her overall pain in the knee as a 7 out of 10 she is not sure if it is fully related to just the knee itself or it is related to the back as well. However she notes that when she did get the epidural diagnostic injections the pain in the knee did not settle very much. The belief is that the pain is more originating from the knee versus the low back. She does have radicular pains that do travel down both legs. She has been utilizing the meloxicam as well as intermittent utilization of hydrocodone. She denies pursuance of the biomed SpeedGel due to cost issues and she notes that she was not offered a cash pay option. She does note that when she had a cortisone injection on the right knee it did provide significant relief and is still providing relief even months later. She is willing to pursue cortisone injection on that knee if possible. She is scheduled to have lumbar ablation performed next week with Dr. Marilyn Dahl as she had 2 very successful epidural diagnostic injections which relieved upwards of 80% of her overall pain in her low back and radiating to bilateral lower extremity pain. Previously: 2020  Bonifacio Darden a week ago  with two falls first in the garage and second in the grass.    More pain in the left side with radaition down to the knee. Apparently both falls are related to a new puppy at home which she is now had in the care of her other family    She is not having any radiating pains down the right leg but some now on the left side that extend just past the knee    She additionally states that the left knee is starting to become more swollen after the most recent fall with irritability anteriorly and laterally  DOS:      HPI: Lashaun Mohamud is a 68 y.o. female here today for low back pain . Bilateral lumbar pain. Hip xrays with mild osteoarthritis. Pain over the past several months coinciding with recent passing of . She does not remmebr any obvious trauma or fall. Very limited with emotional descriptions of 's passing. Does feel that hte pain started at hte time of his demise. No bowel bladder issues. No gross radiation of pain . No groin pain. Review of Systems:  Review of Systems   Constitutional: Negative for chills and fever. HENT: Negative for nosebleeds. Eyes: Negative for double vision. Cardiovascular: Negative for chest pain. Gastrointestinal: Negative for abdominal pain. Musculoskeletal: Positive for joint pain and myalgias. Skin: Negative for rash. Neurological: Negative for seizures. Psychiatric/Behavioral: Negative for hallucinations.         Past History:  Past Medical History:   Diagnosis Date    Diabetes mellitus ([address]     Essential hypertension, benign     GERD (gastroesophageal reflux disease)     Hyperlipidemia     Interstitial lung disease (HCC)     Osteoarthrosis, unspecified whether generalized or localized, unspecified site     osteoarthritis lower leg    Osteopenia     Shingles      Past Surgical History:   Procedure Laterality Date    ANKLE SURGERY Right 04/01/2013    torn tendon    BRONCHOSCOPY N/A 8/1/2019    BRONCHOSCOPY WITH BAL AND TRANSBRONCHIAL BIOPSIES performed by Mago Holliday [facility] at [facility] every 8 hours as needed for Nausea or Vomiting 10 tablet 0    blood glucose test strips (ASCENSIA AUTODISC VI;ONE TOUCH ULTRA TEST VI) strip One Touch Ultra test Strips testing daily per E11.9 100 each 3    metFORMIN (GLUCOPHAGE) 500 MG tablet TAKE 1 TABLET BY MOUTH IN THE MORNING, AND THEN TAKE 2 TABLETS AT NIGHT WITH DINNER. 270 tablet 0    TRADJENTA 5 MG tablet TAKE ONE TABLET BY MOUTH DAILY 90 tablet 0    escitalopram (LEXAPRO) 20 MG tablet Take 1 tablet by mouth daily 90 tablet 0    rosuvastatin (CRESTOR) 40 MG tablet TAKE ONE TABLET BY MOUTH DAILY 90 tablet 1    lidocaine (LIDODERM) 5 % Place 1 patch onto the skin every 12 hours 12 hours on, 12 hours off. 30 patch 0    traZODone (DESYREL) 100 MG tablet Take 1 tablet by mouth nightly as needed for Sleep 90 tablet 1    amLODIPine (NORVASC) 10 MG tablet Take 1 tablet by mouth daily 90 tablet 1    meloxicam (MOBIC) 15 MG tablet Take 1 tablet by mouth daily 30 tablet 3    FREESTYLE LANCETS MISC 1 each by Does not apply route daily 100 each 3    blood glucose test strips (FREESTYLE LITE) strip 1 each by In Vitro route daily Testing 1-2 times daily per e11.9 100 each 3    blood glucose monitor strips Test 2 times a day & as needed for symptoms of irregular blood glucose. One Touch Verio strips 100 strip 2    Lancets MISC 1 each by Does not apply route 2 times daily 100 each 5    ONETOUCH DELICA LANCETS 92V MISC USE ONE LANCET TO TEST TWICE A DAY per E11.9 100 each 5    Cyanocobalamin (B-12 PO) Take by mouth daily       Probiotic Product (PROBIOTIC ACIDOPHILUS) CAPS Take 1 capsule by mouth daily      VITAMIN D, CHOLECALCIFEROL, PO Take 1 tablet by mouth daily      vitamin E 1000 UNITS capsule Take 1,000 Units by mouth daily. No current facility-administered medications on file prior to visit. Social History     Socioeconomic History    Marital status:       Spouse name: Cari Richard Number of children: 3    Years of education: 15  Highest education level: Not on file   Occupational History    Occupation: Retired   Social Needs    Financial resource strain: Not on file    Food insecurity     Worry: Not on file     Inability: Not on file   [employer] needs     Medical: Not on file     Non-medical: Not on file   Tobacco Use    Smoking status: Never Smoker    Smokeless tobacco: Never Used   Substance and Sexual Activity    Alcohol use: Yes     Alcohol/week: 0.0 standard drinks     Comment: one vodka tonic nightly    Drug use: No    Sexual activity: Never     Partners: Male     Comment:     Lifestyle    Physical activity     Days per week: Not on file     Minutes per session: Not on file    Stress: Not on file   Relationships    Social connections     Talks on phone: Not on file     Gets together: Not on file     Attends Yazidi service: Not on file     Active member of club or organization: Not on file     Attends meetings of clubs or organizations: Not on file     Relationship status: Not on file    Intimate partner violence     Fear of current or ex partner: Not on file     Emotionally abused: Not on file     Physically abused: Not on file     Forced sexual activity: Not on file   Other Topics Concern    Not on file   Social History Narrative    Not on file     Family History   Problem Relation Age of Onset    Heart Disease Mother     Rheum Arthritis Mother     Heart Disease Father        Current Medications:    Current Outpatient Medications   Medication Sig Dispense Refill    traMADol (ULTRAM) 50 MG tablet Take 1 tablet by mouth every 4 hours as needed for Pain for up to 7 days. Intended supply: 7 days.  Take lowest dose possible to manage pain 42 tablet 0    Diclofenac Sodium POWD 2 g by Does not apply route 4 times daily Formula #8E Baclo 2% Diclo 3% DMSO 5% Evans 6% Lido 2% Prilo 2% 240 g 11    omeprazole (PRILOSEC) 20 MG delayed release capsule Take 20 mg by mouth daily      irbesartan (AVAPRO) 300 MG tablet Take 300 mg by mouth nightly      ondansetron (ZOFRAN) 4 MG tablet Take 1 tablet by mouth every 8 hours as needed for Nausea or Vomiting 10 tablet 0    blood glucose test strips (ASCENSIA AUTODISC VI;ONE TOUCH ULTRA TEST VI) strip One Touch Ultra test Strips testing daily per E11.9 100 each 3    metFORMIN (GLUCOPHAGE) 500 MG tablet TAKE 1 TABLET BY MOUTH IN THE MORNING, AND THEN TAKE 2 TABLETS AT NIGHT WITH DINNER. 270 tablet 0    TRADJENTA 5 MG tablet TAKE ONE TABLET BY MOUTH DAILY 90 tablet 0    escitalopram (LEXAPRO) 20 MG tablet Take 1 tablet by mouth daily 90 tablet 0    rosuvastatin (CRESTOR) 40 MG tablet TAKE ONE TABLET BY MOUTH DAILY 90 tablet 1    lidocaine (LIDODERM) 5 % Place 1 patch onto the skin every 12 hours 12 hours on, 12 hours off. 30 patch 0    traZODone (DESYREL) 100 MG tablet Take 1 tablet by mouth nightly as needed for Sleep 90 tablet 1    amLODIPine (NORVASC) 10 MG tablet Take 1 tablet by mouth daily 90 tablet 1    meloxicam (MOBIC) 15 MG tablet Take 1 tablet by mouth daily 30 tablet 3    FREESTYLE LANCETS MISC 1 each by Does not apply route daily 100 each 3    blood glucose test strips (FREESTYLE LITE) strip 1 each by In Vitro route daily Testing 1-2 times daily per e11.9 100 each 3    blood glucose monitor strips Test 2 times a day & as needed for symptoms of irregular blood glucose. One Touch Verio strips 100 strip 2    Lancets MISC 1 each by Does not apply route 2 times daily 100 each 5    ONETOUCH DELICA LANCETS 85Z MISC USE ONE LANCET TO TEST TWICE A DAY per E11.9 100 each 5    Cyanocobalamin (B-12 PO) Take by mouth daily       Probiotic Product (PROBIOTIC ACIDOPHILUS) CAPS Take 1 capsule by mouth daily      VITAMIN D, CHOLECALCIFEROL, PO Take 1 tablet by mouth daily      vitamin E 1000 UNITS capsule Take 1,000 Units by mouth daily.          Current Facility-Administered Medications   Medication Dose Route Frequency Provider Last Rate Last Dose    triamcinolone acetonide (KENALOG-40) injection 40 mg  40 mg Intramuscular Once Lequita Poplin, [place]        lidocaine 1 % injection 20 mL  20 mL Intradermal Once Lequita Poplin, [place]        bupivacaine (MARCAINE) 0.25 % injection 75 mg  30 mL Intradermal Once Lequita Poplin, [place]           Allergies: Allergies   Allergen Reactions    Percocet [Oxycodone-Acetaminophen] Other (See Comments)     Dizzy       Physical Exam:  Vitals:    08/26/20 1025   BP: (!) 140/63   Pulse: 63   Temp:        Physical Exam   Constitutional: Patient is oriented to person, place, and time and well-developed, well-nourished, and in no distress. HENT:   Head: Normocephalic and atraumatic. Eyes: Pupils are equal, round, and reactive to light. Neck: No tracheal deviation present. No thyromegaly present. Pulmonary/Chest: Effort normal.   Abdominal: Soft. There is no guarding. Musculoskeletal: Patient exhibits tenderness and pain. Neurological: Patient is alert and oriented to person, place, and time. Skin: Skin is warm. Psychiatric: Affect normal.     General: Mylene Wakefield is a healthy and well appearing 68y.o. year old female who is sitting comfortably in our office in acute distress. Alert and oriented. Physical Exam:  RUE:    No gross deformities noted. Sensation is intact to light touch throughout the median, ulnar and radial nerve distribution. Able to wiggle fingers, gives thumbs up, A-okay and cross index and middle fingers. Full range of motion of the hand, wrist, elbow and shoulder. LUE:   No gross deformities noted. Sensation is intact to light touch throughout the median, ulnar and radial nerve distribution. Able to wiggle fingers, gives thumbs up, A-okay and cross index and middle fingers. Full range of motion of the hand wrist elbow and shoulder. RLE:   No gross deformities noted. Sensation is intact to light touch throughout the lower extremity. Able to wiggle toes and plantar and dorsiflex foot.   Full range of motion at the ankle, knee and hip    LLE:   No gross deformities noted. Sensation is intact to light touch throughout the lower extremity. Able to wiggle toes and plantar and dorsiflex foot. Full range of motion at the ankle, knee and hip  Localized pain is noted at the medial and posterior joint of the knee with patellofemoral pain and positive crepitus. Moderate effusion is noted compared to mild to minimal effusion on the right. No obvious fracture is noted extensor mechanism is intact fairly diffuse medial irritation to palpation along musculature, bony tenderness and joint line/MCL tenderness noted as well. Tenderness noted to Baker's cyst and gastrocs musculature mostly medial    Back with localized pain in the upper sacrum and paraspinal pain with restricted rom of the lumbar spine. No scoliosis. decrase in lumbar lordosis. Neuro stable to testing for gross motor sensaory and reflex examination. Facet maneuvers with pain bilaterally   Lateral tilt with noted irritation but not severe pain. Pain marked in the sacroliliac zones and the iliolumbar fascia. Lateral trochanteric pain. Some irritability of the sciatic nerve on the left side. Increased pain and irritability on the left although right side appears to be more stable and appears to be the more dominant extremity. Localized pain in the lumbar spine approximately and the mid lumbar area of the spine left side greater than right with paraspinal spasm        Diagnostics:  Xray   Have reviewed the xrays above from 5/5/2020  and my impression is: lumbar xrays 4 view with ap /lateral and flexion and extension with noted sacralization of the L5 and large transverse processes extending to the iliac crest.  Additionally there appear to be some physical changes to the L3 and L2 vertebrae        Assessment:      1. Chronic bilateral low back pain without sciatica     2.   Left knee osteoarthritis and pain    Plan:   -Generalized exercise plan suggested with continued waiting for improvement. No obvious areas of fracture noted not any additional pathologic areas of the spine noted. -Advised patient to proceed with lumbar ablation procedure scheduled for next week to hopefully help reduce overall low back and radicular irritation and improve overall function. -Gave patient corticosteroid injection at today's visit to the left knee with no complaints or concerns arisen. -Gave patient prescription for tramadol 50 mg 1 every 4-6 hours as needed for pain quantity 42 0 refills  -Gave patient refill prescription for biomed SpeedGel for topical application as needed 3-4 times a day for utilization of the low back as well as knee and advised patient to contact the company directly and ask about the cash pay price. Left knee demonstrates mild osteoarthritis she has medial osteoarthritis in the right knee from 2015 films no obvious lesions in the left knee from that point. New x-rays were not obtained. No obvious meniscal signs knee effusion with a tendency towards falling. We recommended proceeding with left knee injection and use of a brace which she had brought in from home      Injection Procedure Note  Procedure Details     Verbal consent was obtained for the procedure. The left knee(s) was prepped with iodine and the skin was anesthetized. Using a 22 gauge needle the left knee(s) joint is injected with 2 ml 1% lidocaine and 2 ml of triamcinolone (KENALOG) 40mg/ml under the lateral aspect of the knee. The injection site was cleansed with topical isopropyl alcohol and a dressing was applied. Complications:  None; patient tolerated the procedure well. Follow up in 3 months or earlier as needed. Have seen and examined the patient. Approxiaately 30 minutes minutes for discussion and counseling.          [unfilled]     [facility]    Date:    8/26/2020

## 2020-08-28 ENCOUNTER — HOSPITAL ENCOUNTER (OUTPATIENT)
Dept: OPERATING ROOM | Age: 80
Setting detail: OUTPATIENT SURGERY
Discharge: HOME | End: 2020-08-28
Payer: MEDICARE

## 2020-08-28 VITALS — SYSTOLIC BLOOD PRESSURE: 147 MMHG | DIASTOLIC BLOOD PRESSURE: 53 MMHG | HEART RATE: 69 BPM

## 2020-08-28 PROCEDURE — 6370000000 HC RX 637 (ALT 250 FOR IP): Performed by: OPHTHALMOLOGY

## 2020-08-28 PROCEDURE — 2500000003 HC RX 250 WO HCPCS: Performed by: OPHTHALMOLOGY

## 2020-08-28 PROCEDURE — 67228 TREATMENT X10SV RETINOPATHY: CPT

## 2020-08-28 PROCEDURE — 6370000000 HC RX 637 (ALT 250 FOR IP)

## 2020-08-28 RX ORDER — PREDNISOLONE ACETATE 10 MG/ML
1 SUSPENSION/ DROPS OPHTHALMIC ONCE
Status: COMPLETED | OUTPATIENT
Start: 2020-08-28 | End: 2020-08-28

## 2020-08-28 RX ORDER — PROPARACAINE HYDROCHLORIDE 5 MG/ML
1 SOLUTION/ DROPS OPHTHALMIC ONCE
Status: COMPLETED | OUTPATIENT
Start: 2020-08-28 | End: 2020-08-28

## 2020-08-28 RX ORDER — PHENYLEPHRINE HYDROCHLORIDE 100 MG/ML
1 SOLUTION/ DROPS OPHTHALMIC
Status: COMPLETED | OUTPATIENT
Start: 2020-08-28 | End: 2020-08-28

## 2020-08-28 RX ORDER — IBUPROFEN 200 MG
600 TABLET ORAL ONCE
Status: DISCONTINUED | OUTPATIENT
Start: 2020-08-28 | End: 2020-08-29 | Stop reason: HOSPADM

## 2020-08-28 RX ORDER — CYCLOPENTOLATE HYDROCHLORIDE 10 MG/ML
1 SOLUTION/ DROPS OPHTHALMIC
Status: COMPLETED | OUTPATIENT
Start: 2020-08-28 | End: 2020-08-28

## 2020-08-28 RX ORDER — IBUPROFEN 200 MG
TABLET ORAL
Status: COMPLETED
Start: 2020-08-28 | End: 2020-08-28

## 2020-08-28 RX ADMIN — CYCLOPENTOLATE HYDROCHLORIDE 1 DROP: 10 SOLUTION/ DROPS OPHTHALMIC at 14:21

## 2020-08-28 RX ADMIN — CYCLOPENTOLATE HYDROCHLORIDE 1 DROP: 10 SOLUTION/ DROPS OPHTHALMIC at 14:09

## 2020-08-28 RX ADMIN — PHENYLEPHRINE HYDROCHLORIDE 1 DROP: 100 SOLUTION/ DROPS OPHTHALMIC at 14:22

## 2020-08-28 RX ADMIN — PREDNISOLONE ACETATE 1 DROP: 10 SUSPENSION/ DROPS OPHTHALMIC at 15:21

## 2020-08-28 RX ADMIN — PROPARACAINE HYDROCHLORIDE 1 DROP: 5 SOLUTION/ DROPS OPHTHALMIC at 15:21

## 2020-08-28 RX ADMIN — PHENYLEPHRINE HYDROCHLORIDE 1 DROP: 100 SOLUTION/ DROPS OPHTHALMIC at 14:16

## 2020-08-28 RX ADMIN — IBUPROFEN 200 MG: 200 TABLET, FILM COATED ORAL at 14:18

## 2020-08-28 RX ADMIN — PHENYLEPHRINE HYDROCHLORIDE 1 DROP: 100 SOLUTION/ DROPS OPHTHALMIC at 14:10

## 2020-08-28 RX ADMIN — CYCLOPENTOLATE HYDROCHLORIDE 1 DROP: 10 SOLUTION/ DROPS OPHTHALMIC at 14:16

## 2020-08-28 ASSESSMENT — PAIN SCALES - GENERAL: PAINLEVEL_OUTOF10: 0

## 2020-08-31 NOTE — OP NOTE
OPERATIVE NOTE      Surgery Date:   08/28/2020       Pre-operative Diagnosis:   Retinal Break    Procedure:   Argon Retinal Tear Repair  right eye    Anesthesia:  Topical    Complications:  None    The patient tolerated the procedure well and will follow up as an outpatient in my office as scheduled. Josefina Orantes M.D.   Operative Note      Patient: Nivia Miguel  YOB: 1940  MRN: [id number]    Date of Procedure: 8/28/2020    Pre-Op Diagnosis: * No pre-op diagnosis entered *    Post-Op Diagnosis: Same       * No procedures listed *    * No surgeons listed *    Assistant:   * No surgical staff found *    Anesthesia: * No anesthesia type entered *    Estimated Blood Loss (mL): less than 50     Complications: None    Specimens:   * No specimens in log *    Implants:  * No implants in log *      Drains: * No LDAs found *    Findings: na    Detailed Description of Procedure:   Argon laser retinal tear right eye    Electronically signed by Caren Castro MD on 8/31/2020 at 1:26 PM

## 2020-09-01 ENCOUNTER — HOSPITAL ENCOUNTER (OUTPATIENT)
Age: 80
Setting detail: OUTPATIENT SURGERY
Discharge: HOME | End: 2020-09-01
Attending: PHYSICAL MEDICINE & REHABILITATION | Admitting: PHYSICAL MEDICINE & REHABILITATION
Payer: MEDICARE

## 2020-09-01 VITALS
HEART RATE: 76 BPM | TEMPERATURE: 96.7 F | SYSTOLIC BLOOD PRESSURE: 172 MMHG | RESPIRATION RATE: 16 BRPM | BODY MASS INDEX: 26.5 KG/M2 | WEIGHT: 144 LBS | DIASTOLIC BLOOD PRESSURE: 78 MMHG | HEIGHT: 62 IN | OXYGEN SATURATION: 100 %

## 2020-09-01 LAB
GLUCOSE BLD-MCNC: 139 MG/DL (ref 70–99)
PERFORMED ON: ABNORMAL

## 2020-09-01 PROCEDURE — 2709999900 HC NON-CHARGEABLE SUPPLY: Performed by: PHYSICAL MEDICINE & REHABILITATION

## 2020-09-01 PROCEDURE — 7100000011 HC PHASE II RECOVERY - ADDTL 15 MIN: Performed by: PHYSICAL MEDICINE & REHABILITATION

## 2020-09-01 PROCEDURE — 3600000012 HC SURGERY LEVEL 2 ADDTL 15MIN: Performed by: PHYSICAL MEDICINE & REHABILITATION

## 2020-09-01 PROCEDURE — 3600000002 HC SURGERY LEVEL 2 BASE: Performed by: PHYSICAL MEDICINE & REHABILITATION

## 2020-09-01 PROCEDURE — 99152 MOD SED SAME PHYS/QHP 5/>YRS: CPT | Performed by: PHYSICAL MEDICINE & REHABILITATION

## 2020-09-01 PROCEDURE — 6360000002 HC RX W HCPCS: Performed by: PHYSICAL MEDICINE & REHABILITATION

## 2020-09-01 PROCEDURE — 2500000003 HC RX 250 WO HCPCS: Performed by: PHYSICAL MEDICINE & REHABILITATION

## 2020-09-01 PROCEDURE — 7100000010 HC PHASE II RECOVERY - FIRST 15 MIN: Performed by: PHYSICAL MEDICINE & REHABILITATION

## 2020-09-01 PROCEDURE — 2580000003 HC RX 258: Performed by: PHYSICAL MEDICINE & REHABILITATION

## 2020-09-01 RX ORDER — LIDOCAINE HYDROCHLORIDE 20 MG/ML
INJECTION, SOLUTION EPIDURAL; INFILTRATION; INTRACAUDAL; PERINEURAL
Status: DISCONTINUED
Start: 2020-09-01 | End: 2020-09-01 | Stop reason: HOSPADM

## 2020-09-01 RX ORDER — FENTANYL CITRATE 50 UG/ML
INJECTION, SOLUTION INTRAMUSCULAR; INTRAVENOUS PRN
Status: DISCONTINUED | OUTPATIENT
Start: 2020-09-01 | End: 2020-09-01 | Stop reason: ALTCHOICE

## 2020-09-01 RX ORDER — FENTANYL CITRATE 50 UG/ML
INJECTION, SOLUTION INTRAMUSCULAR; INTRAVENOUS
Status: DISCONTINUED
Start: 2020-09-01 | End: 2020-09-01 | Stop reason: HOSPADM

## 2020-09-01 RX ORDER — DEXAMETHASONE SODIUM PHOSPHATE 10 MG/ML
INJECTION, SOLUTION INTRAMUSCULAR; INTRAVENOUS
Status: DISCONTINUED
Start: 2020-09-01 | End: 2020-09-01 | Stop reason: HOSPADM

## 2020-09-01 RX ORDER — BUPIVACAINE HYDROCHLORIDE 5 MG/ML
INJECTION, SOLUTION EPIDURAL; INTRACAUDAL
Status: DISCONTINUED
Start: 2020-09-01 | End: 2020-09-01 | Stop reason: HOSPADM

## 2020-09-01 RX ORDER — MIDAZOLAM HYDROCHLORIDE 1 MG/ML
INJECTION INTRAMUSCULAR; INTRAVENOUS PRN
Status: DISCONTINUED | OUTPATIENT
Start: 2020-09-01 | End: 2020-09-01 | Stop reason: ALTCHOICE

## 2020-09-01 RX ORDER — SODIUM CHLORIDE, SODIUM LACTATE, POTASSIUM CHLORIDE, CALCIUM CHLORIDE 600; 310; 30; 20 MG/100ML; MG/100ML; MG/100ML; MG/100ML
INJECTION, SOLUTION INTRAVENOUS CONTINUOUS
Status: DISCONTINUED | OUTPATIENT
Start: 2020-09-01 | End: 2020-09-01 | Stop reason: HOSPADM

## 2020-09-01 RX ORDER — LIDOCAINE HYDROCHLORIDE 20 MG/ML
INJECTION, SOLUTION EPIDURAL; INFILTRATION; INTRACAUDAL; PERINEURAL PRN
Status: DISCONTINUED | OUTPATIENT
Start: 2020-09-01 | End: 2020-09-01 | Stop reason: ALTCHOICE

## 2020-09-01 RX ORDER — LIDOCAINE HYDROCHLORIDE 10 MG/ML
INJECTION, SOLUTION INFILTRATION; PERINEURAL PRN
Status: DISCONTINUED | OUTPATIENT
Start: 2020-09-01 | End: 2020-09-01 | Stop reason: ALTCHOICE

## 2020-09-01 RX ORDER — MIDAZOLAM HYDROCHLORIDE 1 MG/ML
INJECTION INTRAMUSCULAR; INTRAVENOUS
Status: DISCONTINUED
Start: 2020-09-01 | End: 2020-09-01 | Stop reason: HOSPADM

## 2020-09-01 RX ADMIN — SODIUM CHLORIDE, POTASSIUM CHLORIDE, SODIUM LACTATE AND CALCIUM CHLORIDE: 600; 310; 30; 20 INJECTION, SOLUTION INTRAVENOUS at 07:01

## 2020-09-01 ASSESSMENT — PAIN DESCRIPTION - DESCRIPTORS: DESCRIPTORS: ACHING

## 2020-09-01 NOTE — POST SEDATION
Sedation Post Procedure Note    Patient Name: Yevgeniy Pimentel   YOB: 1940  Room/Bed: EG OR/NONE  Medical Record Number: [id number]  Date: 9/1/2020   Time: 9:24 AM         Physicians/Assistants:  Pedro Luis Sepulveda MD    Procedure Performed:  Lumbar RFA    Post-Sedation Vital Signs:  Vitals:    09/01/20 0816   BP: (!) 172/78   Pulse: 76   Resp: 16   Temp:    SpO2: 100%      Vital signs were reviewed and were stable after the procedure (see flow sheet for vitals)            Post-Sedation Exam: Lungs: clear           Complications: none    Electronically signed by Pedro Luis Sepulveda MD on 9/1/2020 at 9:24 AM

## 2020-09-01 NOTE — OP NOTE
Patient:  Opal Sahni  YOB: 1940  Medical Record #:  [id number]   Place:   [address] [place], [place]  Date:  9/1/2020   Physician:  Jeremy Bowen MD, VLAD    Procedure: Radiofrequency ablation of the Bilateral L3, L4 Medial branches and L5 Dorsal ramus    CPT 37847 Modifier 50 and 50172 Modifier 50     Pre-Procedure Diagnosis: Lumbar spondylosis without radiculopathy     Post-Procedure Diagnosis: Same     Sedation: Local with 1% Lidocaine 5 ml and 1 mg of IV Versed and 25 mcg of IV Fentanyl     EBL: None     Complications: None     Procedure Summary:     The patient was seen in the office for complaints of low back pain. Review of the imaging and physical exam of the patient confirmed the pre-procedure diagnosis. After a thorough discussion of risks, benefits and alternatives informed consent was obtained. The patient was brought to the procedure suite and placed in the prone position. The skin overlying the lumbar spine was prepped with chloraprep and draped in the usual sterile fashion. Using fluoroscopic guidance, the right L4, L5 and sacral ala levels were identified. Through anesthetized skin, a 20 gauge 100 mm RFL needle with a 10 mm active tip was advanced to the juncture of the superior articular process and the transverse process at the right L4 level where the right L3 medial branch resides. After the probe was instilled, motor testing took place up to 2 V without any paresthesia into the right leg. Then ablation took place at 80 C for 90 seconds. This was repeated for the right L5 and sacral ala levels corresponding to the right L4 medial branches and L5 Dorsal ramus. The identical procedure was repeated on the left side. Following the ablation, 10 mg of dexamethasone mixed with 0.5% Marcaine was instilled in 1/6th aliquots at each level. The needles were removed and a band-aid was applied at each level.      The patient was transferred to the post-operative area in stable

## 2020-09-01 NOTE — H&P
HISTORY AND PHYSICAL/PRE-SEDATION ASSESSMENT    Patient:  Aspen Taylor   :  1940  Medical Record No.:  [id number]   Date:  2020  Physician:  iWn Harris M.D. Facility: [facility]    HISTORY OF PRESENT ILLNESS:                 The patient is a 68 y.o. female whom presents with lower back pain. Review of the imaging and physical exam of the patient confirmed the pre-procedure diagnosis. After a thorough discussion of risks, benefits and alternatives informed consent was obtained.                 Past Medical History:   Past Medical History:   Diagnosis Date    Diabetes mellitus ([address]     Essential hypertension, benign     GERD (gastroesophageal reflux disease)     Hyperlipidemia     Interstitial lung disease (HCC)     Osteoarthrosis, unspecified whether generalized or localized, unspecified site     osteoarthritis lower leg    Osteopenia     Shingles       Past Surgical History:     Past Surgical History:   Procedure Laterality Date    ANKLE SURGERY Right 2013    torn tendon    BRONCHOSCOPY N/A 2019    BRONCHOSCOPY WITH BAL AND TRANSBRONCHIAL BIOPSIES performed by Tri Sarkar MD at [facility]  10/11/2010    Dr. Shantell Klein , polyps and diverticulosis    COLONOSCOPY  2002    Dr. Jonnathan Mcintyre, Diverticulosis    COLONOSCOPY  2015    Dr. Shantell Klein- diverticulosis, sessile polyp, 5 years     COLONOSCOPY  16    Dr Anshu Maloney; Diverticulosis    COLONOSCOPY  2016    Dr Shantell Klein,     INTRACAPSULAR CATARACT EXTRACTION Right 7/15/2020    PHACOEMULSIFICATION OF CATARACT RIGHT EYE WITH INTRAOCULAR LENS IMPLANT performed by Mat Lehman MD at [facility] Bilateral 2020    BILATERAL LUMBAR FOUR TRANSFORAMINAL EPIDURAL STEROID INJECTION SITE CONFIRMED BY FLUOROSCOPY performed by Win Harris MD at [facility] Bilateral 2020    BILATERAL LUMBAR mouth daily 5/26/20 11/17/20 Yes Adan Rg MD   rosuvastatin (CRESTOR) 40 MG tablet TAKE ONE TABLET BY MOUTH DAILY 5/6/20  Yes Adan Rg MD   lidocaine (LIDODERM) 5 % Place 1 patch onto the skin every 12 hours 12 hours on, 12 hours off. 5/5/20  Yes Farshad Kong MD   traZODone (DESYREL) 100 MG tablet Take 1 tablet by mouth nightly as needed for Sleep 3/13/20  Yes AMEE Marion CNP   amLODIPine (NORVASC) 10 MG tablet Take 1 tablet by mouth daily 3/13/20  Yes AMEE Marion CNP   meloxicam (MOBIC) 15 MG tablet Take 1 tablet by mouth daily 2/24/20  Yes AMEE Marion CNP   Cyanocobalamin (B-12 PO) Take by mouth daily    Yes Historical Provider, MD   VITAMIN D, CHOLECALCIFEROL, PO Take 1 tablet by mouth daily   Yes Historical Provider, MD   vitamin E 1000 UNITS capsule Take 1,000 Units by mouth daily. Yes Historical Provider, MD   blood glucose test strips (ASCENSIA AUTODISC VI;ONE TOUCH ULTRA TEST VI) strip One Touch Ultra test Strips testing daily per E11.9 7/27/20   AMEE Marion CNP   TRADJENTA 5 MG tablet TAKE ONE TABLET BY MOUTH DAILY 6/11/20   Adan Rg MD   FREESTYLE LANCETS MISC 1 each by Does not apply route daily 11/26/19   AMEE Marion CNP   blood glucose test strips (FREESTYLE LITE) strip 1 each by In Vitro route daily Testing 1-2 times daily per e11.9 11/26/19   AMEE Marion CNP   blood glucose monitor strips Test 2 times a day & as needed for symptoms of irregular blood glucose.  One Touch Verio strips 7/24/19   Adan Rg MD   Lancets MISC 1 each by Does not apply route 2 times daily 7/24/19   Adan Rg MD   [facility] LANCETS 33O MISC USE ONE LANCET TO TEST TWICE A DAY per E11.9 3/5/18   Adan Rg MD   Probiotic Product (PROBIOTIC ACIDOPHILUS) CAPS Take 1 capsule by mouth daily    Historical Provider, MD     Allergies:  Percocet [oxycodone-acetaminophen]  Social History:    reports that she has never smoked. She has never used smokeless tobacco. She reports current alcohol use. She reports that she does not use drugs. Family History:   Family History   Problem Relation Age of Onset    Heart Disease Mother     Rheum Arthritis Mother     Heart Disease Father        Vitals: Blood pressure (!) 173/58, pulse 64, temperature 96.7 °F (35.9 °C), temperature source Temporal, resp. rate 16, height 5' 2\" (1.575 m), weight 144 lb (65.3 kg), SpO2 94 %, not currently breastfeeding. PHYSICAL EXAM:  HENT: Airway patent and reviewed  Cardiovascular: Normal rate, regular rhythm, normal heart sounds. Pulmonary/Chest: No wheezes. No rhonchi. No rales. Abdominal: Soft. Bowel sounds are normal. No distension. Extremities: Moves all extremities equally  Lumbar Spine: Painful range of motion, no midline tenderness       Diagnosis:Lumbar spondylosis without radiculopathy    Plan: Proceed with planned procedure    The patient was counseled at length about the risks of claudia Covid-19 in the talita-operative and post-operative states including the recovery window of their procedure. The patient was made aware that claudia Covid-19 after a surgical procedure may worsen their prognosis for recovering from the virus and lend to a higher morbidity and or mortality risk. The patient was given the options of postponing their procedure. All of the risks, benefits, and alternatives were discussed. The patient does wish to proceed with the procedure. ASA CLASS:         []   I. Normal, healthy adult           [x]   II.  Mild systemic disease            []   III. Severe systemic disease      Mallampati: Mallampati Class II - (soft palate, fauces & uvula are visible)      Sedation plan:   [x]  Local              []  Minimal                  []  General anesthesia    Patient's condition acceptable for planned procedure/sedation.    Post Procedure Plan   Return to same level of care   ______________________     The risks and benefits as well as alternatives to the procedure have been discussed with the patient and or family. The patient and or next of kin understands and agrees to proceed.     Doroteo Orr M.D.

## 2020-09-14 RX ORDER — ESCITALOPRAM OXALATE 20 MG/1
TABLET ORAL
Qty: 90 TABLET | Refills: 0 | Status: SHIPPED | OUTPATIENT
Start: 2020-09-14 | End: 2020-12-17 | Stop reason: SDUPTHER

## 2020-09-22 RX ORDER — TRAMADOL HYDROCHLORIDE 50 MG/1
TABLET ORAL
Qty: 42 TABLET | Refills: 0 | Status: SHIPPED | OUTPATIENT
Start: 2020-09-22 | End: 2020-10-20

## 2020-09-29 ENCOUNTER — OFFICE VISIT (OUTPATIENT)
Dept: ORTHOPEDIC SURGERY | Age: 80
End: 2020-09-29
Payer: MEDICARE

## 2020-09-29 VITALS — BODY MASS INDEX: 26.49 KG/M2 | TEMPERATURE: 97 F | RESPIRATION RATE: 14 BRPM | HEIGHT: 62 IN | WEIGHT: 143.96 LBS

## 2020-09-29 PROCEDURE — 99213 OFFICE O/P EST LOW 20 MIN: CPT | Performed by: PHYSICIAN ASSISTANT

## 2020-09-29 NOTE — PROGRESS NOTES
Follow up: [address] [place] [address]  1940  [id number]         Chief Complaint   Patient presents with    Lower Back Pain     9/1: RFA B/L L3, L4 MB L5 DR   [facility] Discuss Medications     requesting something stronger than Tramadol for pain          HISTORY OF PRESENT ILLNESS:  Ms. Alvaro Albarado is a 68 y.o. female returns for a follow up visit for multiple medical problems. Her current presenting problems are   1. Spondylosis without myelopathy or radiculopathy, lumbar region    2. Lumbar stenosis with neurogenic claudication    3. Lumbar radiculopathy    . As per information/history obtained from the PADT(patient assessment and documentation tool) - She complains of pain in the lower back with radiation to the lower back She rates the pain 6/10 and describes it as aching. Pain is made worse by: lying down in bed, first thing in the morning. She denies side effects from the current pain regimen. Patient reports that since the last follow up visit the physical functioning is better, family/social relationships are better, mood is better and sleep patterns are better, and that the overall functioning is better. Patient denies neurological bowel or bladder. Patient returns following a radiofrequency ablation to bilateral L3, L4 medial branches and L5 dorsal ramus on 9/1/2020. She describes nearly 95% improvement of her symptoms on the left side. Her right side continues to bother her, but this is the worst in the morning. It does ease throughout the day. Patient reports that in the morning she uses ice over her low back, takes a pain pill that she received from her knee and rests, after that her pain will nearly subside. The patient does present today with a cane for ambulation. She rates her pain today at a 6/10, however she notes this is particularly on the right side,nothing on the left. She denies any trauma or additional injury to her low back since her last visit.   The patient can sit for about an hour and stand for approximately 5 minutes without a considerable amount of pain. Ms Adelfo Joya is requesting something stronger than Tramadol. She is on this currently from Dr. Balbir Vazquez office for her knee. This was just refilled on 9/22/2020 for a quantity of 42 tablets. Patient notes taking this only in the morning. The patient describes that she has plenty of medication left and she only takes this once per day. Associated signs and symptoms:   Neurogenic bowel or bladder symptoms:  no   Perceived weakness:  no   Difficulty walking:  no            Past medical, surgical, social and family history reviewed with the patient.      Past Medical History:   Past Medical History:   Diagnosis Date    Diabetes mellitus ([address]     Essential hypertension, benign     GERD (gastroesophageal reflux disease)     Hyperlipidemia     Interstitial lung disease (HCC)     Osteoarthrosis, unspecified whether generalized or localized, unspecified site     osteoarthritis lower leg    Osteopenia     Shingles       Past Surgical History:     Past Surgical History:   Procedure Laterality Date    ANKLE SURGERY Right 04/01/2013    torn tendon    BRONCHOSCOPY N/A 8/1/2019    BRONCHOSCOPY WITH BAL AND TRANSBRONCHIAL BIOPSIES performed by Davied Sacks, MD at [address] [place] [address]  10/11/2010    Dr. Epifanio Davis , polyps and diverticulosis    COLONOSCOPY  11/11/2002    Dr. María Elena Mansfield, Diverticulosis    COLONOSCOPY  04/20/2015    Dr. Epifanio Davis- diverticulosis, sessile polyp, 5 years     COLONOSCOPY  4/13/16    Dr Kevin Thompson; Diverticulosis    COLONOSCOPY  11/28/2016    Dr Epifanio Davis,     INTRACAPSULAR CATARACT EXTRACTION Right 7/15/2020    PHACOEMULSIFICATION OF CATARACT RIGHT EYE WITH INTRAOCULAR LENS IMPLANT performed by Flo Mortensen MD at [facility] Bilateral 9/1/2020    BILATERAL LUMBAR THREE, LUMBAR FOUR, LUMBAR FIVE RADIOFREQUENCY ABLATION SITE CONFIRMED BY FLUOROSCOPY performed by Randi Williamson MD at [facility] OR    PAIN MANAGEMENT PROCEDURE Bilateral 6/23/2020    BILATERAL LUMBAR FOUR TRANSFORAMINAL EPIDURAL STEROID INJECTION SITE CONFIRMED BY FLUOROSCOPY performed by Deon Eubanks MD at [facility] Bilateral 7/7/2020    BILATERAL LUMBAR FOUR TRANSFORAMINAL EPIDURAL STEROID INJECTION SITE CONFIRMED BY FLUOROSCOPY performed by Deon Eubanks MD at [facility] Bilateral 8/4/2020    BILATERAL LUMBAR THREE, LUMBAR FOUR, LUMBAR FIVE DORSAL RAMUS MEDIAL BRANCH BLOCK SITE CONFIRMED BY FLUOROSCOPY performed by Deon Eubanks MD at [address] [place] [address] Bilateral 8/18/2020    BILATERAL LUMBAR THREE, LUMBAR FOUR, LUMBAR FIVE DORSAL RAMUS MEDIAL BRANCH BLOCK SITE CONFIR,ED BY FLUOROSCOPY performed by Deon Eubanks MD at [facility] Right     UPPER GASTROINTESTINAL ENDOSCOPY  8/17/2011    Dr. Delma Su - moderate gastritis , hiatal hernia     UPPER GASTROINTESTINAL ENDOSCOPY  4/20/15    Dr. Delma Su - polyps removed, diverticulosis, follow up in 5 years   [address] [place] [address]  8/16/2012    DR. Ramos - with mesh     Current Medications:     Current Outpatient Medications:     traMADol (ULTRAM) 50 MG tablet, TAKE ONE TABLET BY MOUTH EVERY 4 HOURS AS NEEDED FOR PAIN FOR UP TO 7 DAYS, Disp: 42 tablet, Rfl: 0    metFORMIN (GLUCOPHAGE) 500 MG tablet, TAKE ONE TABLET BY MOUTH EVERY MORNING AND TAKE TWO TABLETS BY MOUTH AT NIGHT WITH DINNER, Disp: 270 tablet, Rfl: 0    escitalopram (LEXAPRO) 20 MG tablet, TAKE ONE TABLET BY MOUTH DAILY, Disp: 90 tablet, Rfl: 0    Diclofenac Sodium POWD, 2 g by Does not apply route 4 times daily Formula #8E Baclo 2% Diclo 3% DMSO 5% Evnas 6% Lido 2% Prilo 2%, Disp: 240 g, Rfl: 11    omeprazole (PRILOSEC) 20 MG delayed release capsule, Take 20 mg by mouth daily, Disp: , Rfl:     irbesartan (AVAPRO) 300 MG tablet, Take 300 mg by mouth nightly, Disp: , Rfl:     ondansetron (ZOFRAN) 4 MG tablet, Take 1 tablet by mouth every 8 hours as needed for Nausea or Vomiting, Disp: 10 tablet, Rfl: 0    blood glucose test strips (ASCENSIA AUTODISC VI;ONE TOUCH ULTRA TEST VI) strip, One Touch Ultra test Strips testing daily per E11.9, Disp: 100 each, Rfl: 3    TRADJENTA 5 MG tablet, TAKE ONE TABLET BY MOUTH DAILY, Disp: 90 tablet, Rfl: 0    rosuvastatin (CRESTOR) 40 MG tablet, TAKE ONE TABLET BY MOUTH DAILY, Disp: 90 tablet, Rfl: 1    lidocaine (LIDODERM) 5 %, Place 1 patch onto the skin every 12 hours 12 hours on, 12 hours off., Disp: 30 patch, Rfl: 0    traZODone (DESYREL) 100 MG tablet, Take 1 tablet by mouth nightly as needed for Sleep, Disp: 90 tablet, Rfl: 1    amLODIPine (NORVASC) 10 MG tablet, Take 1 tablet by mouth daily, Disp: 90 tablet, Rfl: 1    meloxicam (MOBIC) 15 MG tablet, Take 1 tablet by mouth daily, Disp: 30 tablet, Rfl: 3    FREESTYLE LANCETS MISC, 1 each by Does not apply route daily, Disp: 100 each, Rfl: 3    blood glucose test strips (FREESTYLE LITE) strip, 1 each by In Vitro route daily Testing 1-2 times daily per e11.9, Disp: 100 each, Rfl: 3    blood glucose monitor strips, Test 2 times a day & as needed for symptoms of irregular blood glucose. One Touch Verio strips, Disp: 100 strip, Rfl: 2    Lancets MISC, 1 each by Does not apply route 2 times daily, Disp: 100 each, Rfl: 5    ONETOUCH DELICA LANCETS 29W MISC, USE ONE LANCET TO TEST TWICE A DAY per E11.9, Disp: 100 each, Rfl: 5    Cyanocobalamin (B-12 PO), Take by mouth daily , Disp: , Rfl:     Probiotic Product (PROBIOTIC ACIDOPHILUS) CAPS, Take 1 capsule by mouth daily, Disp: , Rfl:     VITAMIN D, CHOLECALCIFEROL, PO, Take 1 tablet by mouth daily, Disp: , Rfl:     vitamin E 1000 UNITS capsule, Take 1,000 Units by mouth daily. , Disp: , Rfl:   Allergies:  Percocet [oxycodone-acetaminophen]  Social History:    reports that she has never smoked.  She has never used smokeless tobacco. abnormal movements are noted. LUMBAR/SACRAL EXAMINATION:  · Inspection: Local inspection shows no step-off or bruising. Lumbar alignment is normal. No instability is noted. · Palpation:   No evidence of tenderness at the midline. Lumbar paraspinal tenderness: No tenderness to palpation of the lumbar paraspinals  Bursal tenderness No tenderness bilaterally  There is no paraspinal spasm. · Range of Motion: pain-free ROM  · Strength:   Strength testing is 5/5 in all muscle groups tested. · Special Tests:   Straight leg raise and crossed SLR negative. Reginaldo's testing is negative bilaterally. FADIR's testing is negative bilaterally. Bowstring test negative. Slump test negative. · Skin: There are no rashes, ulcerations or lesions. · Reflexes: Reflexes are symmetrically 2+ at the patellar and ankle tendons. Clonus absent bilaterally at the feet. · Gait & station: uses a single point cane to ambulate and no ataxia  · Additional Examinations:  · RIGHT LOWER EXTREMITY: Inspection/examination of the right lower extremity does show knee tenderness, with no deformity or injury. Range of motion is normal with mild increase in pain with flexion and extension at the knee. There is no gross instability. There are no rashes, ulcerations or lesions. Strength and tone are normal. No atrophy or abnormal movements are noted. · LEFT LOWER EXTREMITY:  Inspection/examination of the left lower extremity does show knee tenderness, with no deformity or injury. Range of motion is normal with mild increase in pain with flexion and extension at the knee. There is no gross instability. There are no rashes, ulcerations or lesions. Strength and tone are normal. No atrophy or abnormal movements are noted. Diagnostic Testing:    MR Lumbar spine shows from 5/22/20:  Comments:         MRI without contrast is obtained. There is trace retrolisthesis of L5. There is mild scoliosis. There is diffuse disc desiccation at all levels. There is loss of height at all levels except L4-5. Conus terminates normally at T12-L1.         T11-12: Diffuse disc bulge and small right paracentral disc herniation resulting in mild central canal stenosis.         T12-L1: Sagittal images demonstrate no spinal stenosis.         L1-2: Diffuse disc bulge and facet hypertrophy resulting in mild-moderate central canal stenosis and moderate right, mild left foraminal stenosis.         L2-3: Diffuse disc bulge and facet/ligamentum flavum hypertrophy resulting in moderate central canal stenosis and moderate bilateral foraminal stenosis.         L3-4: Disc osteophyte complex and facet/ligament flavum hypertrophy resulting in severe central canal stenosis and moderate-severe right, severe left foraminal stenosis.         L4-5: Diffuse disc bulge and facet/ligamentum flavum hypertrophy resulting in severe central canal stenosis and moderate right, severe left foraminal stenosis.         L5-S1: Facet hypertrophy without significant central canal or foraminal stenosis.           Impression         Extensive multilevel spinal stenosis most significant at L3-4 and L4-5. Results for orders placed or performed during the hospital encounter of 09/01/20   POCT Glucose   Result Value Ref Range    POC Glucose 139 (H) 70 - 99 mg/dl    Performed on ACCU-CHEK      Impression:       1. Spondylosis without myelopathy or radiculopathy, lumbar region    2. Lumbar stenosis with neurogenic claudication    3. Lumbar radiculopathy        Plan:  Clinical Course: Above diagnoses are improving     I discussed the diagnosis and the treatment options with Melanie Riddle today. In Summary:  The various treatment options were outlined and discussed with Jacki Cifuentes including:  Conservative care options: physical therapy, ice, medications, bracing, and activity modification. The indications for therapeutic injections. The indications for additional imaging/laboratory studies.   The indications for (possible future) interventions. After considering the various options discussed, Babak Gray elected to pursue a course of treatment that includes the followin. Medications:  No further recommendations for new medications. The patient is currently taking Tramadol 50 mg 1 p.o. daily. If the patient needs a refill of this medication or if she is considering something stronger she will need to call the office for an additional appointment. 2. PT:  Encouraged to continue with Home exercise program.  The patient will begin in person physical therapy after she completes the cataract surgery. 3. Further studies: No further studies. 4. Interventional:  The patient is 95% relieved following a Lumbar RFA. 5. Follow up:  As needed. Babak Gray was instructed to call the office if her symptoms worsen or if new symptoms appear prior to the next scheduled visit. She is specifically instructed to contact the office between now & her scheduled appointment if she has concerns related to her condition or if she needs assistance in scheduling the above tests. She is welcome to call for an appointment sooner if she has any additional concerns or questions. Marilu Stuart ATC, am scribing for and in the presence of Liban Krueger PA-C.    20 10:31 AM Vincent Thao ATC    The physical examination was performed between the patient and Zeeshan Balderrama PA-C All counseling during the appointment was performed between the patient and provider. Dada Sanchez PA-C, personally performed the services described in this documentation as scribed by CHUY Keys in my presence and it is both accurate and complete.               DEXTER Rudd PA-C  Board Certified by the [employer] on Certification of Physician Assistants  [id number]  Partner of [facility] ([facility])               This dictation was performed with a verbal recognition program [facility] [employer]) and it was checked for errors. It is possible that there are still dictated errors within this office note. If so, please bring any errors to my attention for an addendum. All efforts were made to ensure that this office note is accurate.

## 2020-10-13 ENCOUNTER — OFFICE VISIT (OUTPATIENT)
Dept: INTERNAL MEDICINE CLINIC | Age: 80
End: 2020-10-13
Payer: MEDICARE

## 2020-10-13 VITALS
HEIGHT: 62 IN | TEMPERATURE: 97.1 F | OXYGEN SATURATION: 94 % | HEART RATE: 68 BPM | DIASTOLIC BLOOD PRESSURE: 64 MMHG | SYSTOLIC BLOOD PRESSURE: 128 MMHG | WEIGHT: 130 LBS | BODY MASS INDEX: 23.92 KG/M2

## 2020-10-13 DIAGNOSIS — E08.21 DIABETES MELLITUS DUE TO UNDERLYING CONDITION WITH DIABETIC NEPHROPATHY, WITHOUT LONG-TERM CURRENT USE OF INSULIN (HCC): ICD-10-CM

## 2020-10-13 PROBLEM — R63.0 DECREASED APPETITE: Status: ACTIVE | Noted: 2020-10-13

## 2020-10-13 LAB
A/G RATIO: 2 (ref 1.1–2.2)
ALBUMIN SERPL-MCNC: 4.8 G/DL (ref 3.4–5)
ALP BLD-CCNC: 87 U/L (ref 40–129)
ALT SERPL-CCNC: 10 U/L (ref 10–40)
ANION GAP SERPL CALCULATED.3IONS-SCNC: 15 MMOL/L (ref 3–16)
AST SERPL-CCNC: 12 U/L (ref 15–37)
BILIRUB SERPL-MCNC: 0.5 MG/DL (ref 0–1)
BUN BLDV-MCNC: 24 MG/DL (ref 7–20)
CALCIUM SERPL-MCNC: 11.4 MG/DL (ref 8.3–10.6)
CHLORIDE BLD-SCNC: 103 MMOL/L (ref 99–110)
CHOLESTEROL, FASTING: 136 MG/DL (ref 0–199)
CO2: 21 MMOL/L (ref 21–32)
CREAT SERPL-MCNC: 1.2 MG/DL (ref 0.6–1.2)
GFR AFRICAN AMERICAN: 53
GFR NON-AFRICAN AMERICAN: 43
GLOBULIN: 2.4 G/DL
GLUCOSE BLD-MCNC: 130 MG/DL (ref 70–99)
HCT VFR BLD CALC: 33.7 % (ref 36–48)
HDLC SERPL-MCNC: 76 MG/DL (ref 40–60)
HEMOGLOBIN: 11.3 G/DL (ref 12–16)
LDL CHOLESTEROL CALCULATED: 39 MG/DL
MCH RBC QN AUTO: 29.7 PG (ref 26–34)
MCHC RBC AUTO-ENTMCNC: 33.4 G/DL (ref 31–36)
MCV RBC AUTO: 88.7 FL (ref 80–100)
PDW BLD-RTO: 14.9 % (ref 12.4–15.4)
PLATELET # BLD: 219 K/UL (ref 135–450)
PMV BLD AUTO: 8.1 FL (ref 5–10.5)
POTASSIUM SERPL-SCNC: 4 MMOL/L (ref 3.5–5.1)
RBC # BLD: 3.8 M/UL (ref 4–5.2)
SODIUM BLD-SCNC: 139 MMOL/L (ref 136–145)
TOTAL PROTEIN: 7.2 G/DL (ref 6.4–8.2)
TRIGLYCERIDE, FASTING: 103 MG/DL (ref 0–150)
VLDLC SERPL CALC-MCNC: 21 MG/DL
WBC # BLD: 6.8 K/UL (ref 4–11)

## 2020-10-13 PROCEDURE — 99213 OFFICE O/P EST LOW 20 MIN: CPT | Performed by: NURSE PRACTITIONER

## 2020-10-13 ASSESSMENT — PATIENT HEALTH QUESTIONNAIRE - PHQ9
1. LITTLE INTEREST OR PLEASURE IN DOING THINGS: 0
SUM OF ALL RESPONSES TO PHQ9 QUESTIONS 1 & 2: 0
2. FEELING DOWN, DEPRESSED OR HOPELESS: 0
SUM OF ALL RESPONSES TO PHQ QUESTIONS 1-9: 0
SUM OF ALL RESPONSES TO PHQ QUESTIONS 1-9: 0
DEPRESSION UNABLE TO ASSESS: FUNCTIONAL CAPACITY MOTIVATION LIMITS ACCURACY

## 2020-10-13 ASSESSMENT — ENCOUNTER SYMPTOMS
CHEST TIGHTNESS: 0
SHORTNESS OF BREATH: 0
SINUS PRESSURE: 0
RHINORRHEA: 0
COLOR CHANGE: 0
CONSTIPATION: 0
WHEEZING: 0
EYE ITCHING: 0
EYE REDNESS: 0
COUGH: 0
VOMITING: 0
ABDOMINAL PAIN: 0
BLOOD IN STOOL: 0
DIARRHEA: 0
BACK PAIN: 0
SORE THROAT: 0
NAUSEA: 0

## 2020-10-13 NOTE — ASSESSMENT & PLAN NOTE
Will increase lexapro to 40 mg   Follow up in 1 months time  Long conversation about grief and what this looks like for her  Support provided

## 2020-10-14 ENCOUNTER — CARE COORDINATION (OUTPATIENT)
Dept: CARE COORDINATION | Age: 80
End: 2020-10-14

## 2020-10-14 LAB
ESTIMATED AVERAGE GLUCOSE: 131.2 MG/DL
HBA1C MFR BLD: 6.2 %

## 2020-10-14 SDOH — HEALTH STABILITY: MENTAL HEALTH: HOW MANY STANDARD DRINKS CONTAINING ALCOHOL DO YOU HAVE ON A TYPICAL DAY?: 1 OR 2

## 2020-10-14 SDOH — HEALTH STABILITY: PHYSICAL HEALTH: ON AVERAGE, HOW MANY MINUTES DO YOU ENGAGE IN EXERCISE AT THIS LEVEL?: 0 MIN

## 2020-10-14 SDOH — ECONOMIC STABILITY: TRANSPORTATION INSECURITY
IN THE PAST 12 MONTHS, HAS THE LACK OF TRANSPORTATION KEPT YOU FROM MEDICAL APPOINTMENTS OR FROM GETTING MEDICATIONS?: NO

## 2020-10-14 SDOH — SOCIAL STABILITY: SOCIAL NETWORK: HOW OFTEN DO YOU ATTENT MEETINGS OF THE CLUB OR ORGANIZATION YOU BELONG TO?: NEVER

## 2020-10-14 SDOH — HEALTH STABILITY: PHYSICAL HEALTH: ON AVERAGE, HOW MANY DAYS PER WEEK DO YOU ENGAGE IN MODERATE TO STRENUOUS EXERCISE (LIKE A BRISK WALK)?: 0 DAYS

## 2020-10-14 SDOH — ECONOMIC STABILITY: FOOD INSECURITY: WITHIN THE PAST 12 MONTHS, YOU WORRIED THAT YOUR FOOD WOULD RUN OUT BEFORE YOU GOT MONEY TO BUY MORE.: NEVER TRUE

## 2020-10-14 SDOH — SOCIAL STABILITY: SOCIAL NETWORK
DO YOU BELONG TO ANY CLUBS OR ORGANIZATIONS SUCH AS CHURCH GROUPS UNIONS, FRATERNAL OR ATHLETIC GROUPS, OR SCHOOL GROUPS?: NO

## 2020-10-14 SDOH — SOCIAL STABILITY: SOCIAL NETWORK: HOW OFTEN DO YOU ATTEND CHURCH OR RELIGIOUS SERVICES?: NEVER

## 2020-10-14 SDOH — SOCIAL STABILITY: SOCIAL NETWORK: HOW OFTEN DO YOU GET TOGETHER WITH FRIENDS OR RELATIVES?: ONCE A WEEK

## 2020-10-14 SDOH — SOCIAL STABILITY: SOCIAL NETWORK
IN A TYPICAL WEEK, HOW MANY TIMES DO YOU TALK ON THE PHONE WITH FAMILY, FRIENDS, OR NEIGHBORS?: MORE THAN THREE TIMES A WEEK

## 2020-10-14 SDOH — ECONOMIC STABILITY: INCOME INSECURITY: HOW HARD IS IT FOR YOU TO PAY FOR THE VERY BASICS LIKE FOOD, HOUSING, MEDICAL CARE, AND HEATING?: NOT VERY HARD

## 2020-10-14 SDOH — ECONOMIC STABILITY: TRANSPORTATION INSECURITY
IN THE PAST 12 MONTHS, HAS LACK OF TRANSPORTATION KEPT YOU FROM MEETINGS, WORK, OR FROM GETTING THINGS NEEDED FOR DAILY LIVING?: NO

## 2020-10-14 SDOH — ECONOMIC STABILITY: FOOD INSECURITY: WITHIN THE PAST 12 MONTHS, THE FOOD YOU BOUGHT JUST DIDN'T LAST AND YOU DIDN'T HAVE MONEY TO GET MORE.: NEVER TRUE

## 2020-10-14 SDOH — SOCIAL STABILITY: SOCIAL NETWORK: ARE YOU MARRIED, WIDOWED, DIVORCED, SEPARATED, NEVER MARRIED, OR LIVING WITH A PARTNER?: WIDOWED

## 2020-10-14 SDOH — HEALTH STABILITY: MENTAL HEALTH
STRESS IS WHEN SOMEONE FEELS TENSE, NERVOUS, ANXIOUS, OR CAN'T SLEEP AT NIGHT BECAUSE THEIR MIND IS TROUBLED. HOW STRESSED ARE YOU?: NOT AT ALL

## 2020-10-14 SDOH — HEALTH STABILITY: MENTAL HEALTH: HOW OFTEN DO YOU HAVE A DRINK CONTAINING ALCOHOL?: MONTHLY OR LESS

## 2020-10-14 ASSESSMENT — PATIENT HEALTH QUESTIONNAIRE - PHQ9
SUM OF ALL RESPONSES TO PHQ QUESTIONS 1-9: 0

## 2020-10-14 NOTE — CARE COORDINATION
Ambulatory Care Coordination Note  10/14/2020  CM Risk Score: 6  Charlson 10 Year Mortality Risk Score: 98%     ACC: Ena Pereyra, RN  Outreach per PCP referral for identified community-based support needs. Summary Note: c/o unplanned weight loss secondary to poor appetite. Note 13.5 lbs in just under 3 weeks. Denies belief poor appetite is r/t depression, though readily self offers she became a  2.17.20 but believes she has/is continuing to adjust to life lived alone. Describes supportive family. Her dtr in [address] [facility] [place] mother is relocating from [place], building a house a few doors down from pt's son & dtr in law. Pt shares 'I don't have the means to build a house though - so that's not an option for me.'    She shares that her son took her to examine [address] [place] [address], as an option to transition to senior community living. .  Lives alone since 's passing; limited family support at present d/t Covid. Family prefers to safely distance from her to protect her d/t pt risk factors. They leave groceries or other provisions in pt garage for pt to retrieve after they have left premises. No longer drives d/t vision, which is not new - just deterioriating (cataracts & generally declining vision) which she is following w/providers. Socialization is also limited, at present time, due to pt risk factors for susceptibility for Covid. Denies thoughts of self harm or SI  pt shares her son took her to look into [address] [place] [address], to transition to senior living. She quickly adds, \"but I am NOT ready for THAT place! \"  . Reviewed pt may be eligible for A homemaking support, as well as MOW and possibly medical transportation support - all of which pt identifies this call 'would be helpful' for her at this time. Pt wants to review added support of COA with her children before signing up for services. For this reason, pt prefers to contact COA directly herself, when she is ready.   Pt receptive to referral to RD to review diet as r/t poor appetite and unplanned weight loss. Reviewed pt education, resources which may prove helpful. Pt verbalized understanding of above and agreement with the following  Plan: referral to RD for nutritional counseling (as stated above)  Complete SDOH and ACP in future ACC outreaches as needed. Ambulatory Care Coordination Assessment    Care Coordination Protocol  Program Enrollment:  Complex Care  Referral from Primary Care Provider:  Yes  Week 1 - Initial Assessment     Do you have all of your prescriptions and are they filled?:  Yes  Barriers to medication adherence:  None  Are you able to afford your medications?:  Yes  How often do you have trouble taking your medications the way you have been told to take them?:  I always take them as prescribed. Do you have Home O2 Therapy?:  Yes   Oxygen Regimen:  PRN Flow - Enter rate/FIO2:  2   Method of Delivery:  Nasal Cannula   CPAP Use:  None      Ability to seek help/take action for Emergent Urgent situations i.e. fire, crime, inclement weather or health crisis.:  Needs Assistance  Ability to ambulate to restroom:  Independent  Ability handle personal hygeine needs (bathing/dressing/grooming): Independent  Ability to manage Medications: Independent  Ability to prepare Food Preparation:  Independent  Ability to maintain home (clean home, laundry):  Needs Assistance  Ability to drive and/or has transportation:  Dependent  Ability to do shopping:  Dependent  Ability to manage finances:   Independent  Is patient able to live independently?:  Yes     Current Housing:  Private Residence        Per the Fall Risk Screening, did the patient have 2 or more falls or 1 fall with injury in the past year?:  No     Frequent urination at night?:  No  Do you use rails/bars?:  Yes  Do you have a non-slip tub mat?:  Yes     Are you experiencing loss of meaning?:  No  Are you experiencing loss of hope and peace?:  No     Thinking about your patient's physical health needs, are there any symptoms or problems (risk indicators) you are unsure about that require further investigation?:  No identified areas of uncertainly or problems already being investigated   Are the patients physical health problems impacting on their mental well-being?:  No identified areas of concern   Are there any problems with your patients lifestyle behaviors (alcohol, drugs, diet, exercise) that are impacting on physical or mental well-being?:  No identified areas of concern   Do you have any other concerns about your patients mental well-being? How would you rate their severity and impact on the patient?:  No identified areas of concern   How would you rate their home environment in terms of safety and stability (including domestic violence, insecure housing, neighbor harassment)?:  Consistently safe, supportive, stable, no identified problems   How do daily activities impact on the patient's well-being? (include current or anticipated unemployment, work, caregiving, access to transportation or other):  No identified problems or perceived positive benefits   How would you rate their social network (family, work, friends)?:  Adequate participation with social networks   How would you rate their financial resources (including ability to afford all required medical care)?:  Financially secure, some resource challenges   How wells does the patient now understand their health and well-being (symptoms, signs or risk factors) and what they need to do to manage their health?:  Reasonable to good understanding and already engages in managing health or is willing to undertake better management   How well do you think your patient can engage in healthcare discussions? (Barriers include language, deafness, aphasia, alcohol or drug problems, learning difficulties, concentration):   Adequate communication, with or without minor barriers   Do other services need to be involved to help this patient?:  Other care/services not in place and required   Are current services involved with this patient well-coordinated? (Include coordination with other services you are now recommendation):  Required care/services in place with some coordination barriers   Suggested Interventions and Community Resources  Fall Risk Prevention:  Completed Meals on Wheels: In Process   Other Services or Interventions:  review of progress towards pt centered goals/barriers. Grief/ Bereavement Counselor:  Naseem   Registered Dietician:  Completed   Senior Services:  Completed   Zone Management Tools:  Completed         Schedule an appointment with the patient's PCP, Set up/Review an Education Plan, Set up/Review Goals              Prior to Admission medications    Medication Sig Start Date End Date Taking?  Authorizing Provider   metFORMIN (GLUCOPHAGE) 500 MG tablet TAKE ONE TABLET BY MOUTH EVERY MORNING AND TAKE TWO TABLETS BY MOUTH AT NIGHT WITH DINNER 9/22/20   Maxwell Kohler MD   escitalopram (LEXAPRO) 20 MG tablet TAKE ONE TABLET BY MOUTH DAILY 9/14/20   Maxwell Kohler MD   Diclofenac Sodium POWD 2 g by Does not apply route 4 times daily Formula #8E Baclo 2% Diclo 3% DMSO 5% Evans 6% Lido 2% Prilo 2% 8/26/20   NATALIE Sellers   omeprazole (PRILOSEC) 20 MG delayed release capsule Take 20 mg by mouth daily    Historical Provider, MD   irbesartan (AVAPRO) 300 MG tablet Take 300 mg by mouth nightly    Historical Provider, MD   ondansetron (ZOFRAN) 4 MG tablet Take 1 tablet by mouth every 8 hours as needed for Nausea or Vomiting 7/28/20 7/28/21  NATALIE Robins   blood glucose test strips (ASCENSIA AUTODISC VI;ONE TOUCH ULTRA TEST VI) strip One Touch Ultra test Strips testing daily per E11.9 7/27/20   AMEE Jackson - CNP   TRADJENTA 5 MG tablet TAKE ONE TABLET BY MOUTH DAILY 6/11/20   Maxwell Kohler MD   rosuvastatin (CRESTOR) 40 MG tablet TAKE ONE TABLET BY MOUTH DAILY 5/6/20   Maxwell Kohler MD

## 2020-10-15 ENCOUNTER — TELEPHONE (OUTPATIENT)
Dept: INTERNAL MEDICINE CLINIC | Age: 80
End: 2020-10-15

## 2020-10-19 ENCOUNTER — TELEPHONE (OUTPATIENT)
Dept: INTERNAL MEDICINE CLINIC | Age: 80
End: 2020-10-19

## 2020-10-19 NOTE — TELEPHONE ENCOUNTER
----- Message from Edson Anguiano sent at 10/19/2020 11:20 AM EDT -----  Subject: Results Request    QUESTIONS  Which lab or imaging result is the patient calling about? Looking for   testing from 10/13/2020  Which provider ordered the test? Selena Alan   [place] [facility]   Date the test was performed? 2020-10-13  Additional Information for Provider?   ---------------------------------------------------------------------------  --------------  [address]  What is the best way for the office to contact you? OK to leave message on   voicemail  Preferred Call Back Phone Number?  [phone]

## 2020-10-20 RX ORDER — TRAMADOL HYDROCHLORIDE 50 MG/1
TABLET ORAL
Qty: 42 TABLET | Refills: 0 | Status: SHIPPED | OUTPATIENT
Start: 2020-10-20 | End: 2020-10-27

## 2020-10-23 ENCOUNTER — TELEPHONE (OUTPATIENT)
Dept: INTERNAL MEDICINE CLINIC | Age: 80
End: 2020-10-23

## 2020-10-23 RX ORDER — TRAZODONE HYDROCHLORIDE 100 MG/1
100 TABLET ORAL NIGHTLY PRN
Qty: 90 TABLET | Refills: 1 | Status: SHIPPED | OUTPATIENT
Start: 2020-10-23 | End: 2021-04-22 | Stop reason: SDUPTHER

## 2020-10-23 NOTE — TELEPHONE ENCOUNTER
----- Message from Anel Schmid sent at 10/22/2020  1:25 PM EDT -----  Subject: Refill Request    QUESTIONS  Name of Medication? traZODone (DESYREL) 100 MG tablet  Patient-reported dosage and instructions? 100 mg once a day in the evening   for sleep  How many days do you have left? 4  Preferred Pharmacy? [address] [facility]  Pharmacy phone number (if available)? [phone]  Additional Information for Provider?   ---------------------------------------------------------------------------  --------------  CALL BACK INFO  What is the best way for the office to contact you? OK to leave message on   voicemail  Preferred Call Back Phone Number?  [phone]

## 2020-11-18 ENCOUNTER — CARE COORDINATION (OUTPATIENT)
Dept: CARE COORDINATION | Age: 80
End: 2020-11-18

## 2020-11-18 NOTE — CARE COORDINATION
Ambulatory Care Coordination Note  11/18/2020  CM Risk Score: 6  Charlson 10 Year Mortality Risk Score: 98%     ACC: Lex Nelson RN    Summary Note:   General Assessment    Do you have any symptoms that are causing concern?:  No     reviewed delay of RD outreach d/t ACM had inadvertently not yet entered referral. Offered apologies for this. Pt states she has 'been busy with all this paperwork from [address], anyway' and assures ACM that delay of RD outreach is understood. She still has poor appetite, which is not worse, not new. Reflects she has lost some weight evidenced by looser fitting clothes, but she does not find weight loss concerning. Pt further commented, \"I guess I could afford to lose some weight. \"  Other: pt requests if ACM could help her in collaboration w/COA to obtain replacement forms for paperwork recently sent. She states she did complete the forms re heat support, but 'need(s) all the other forms'. She has misplaced many of the forms COA was in need for her to complete. Call placed to COA. Call advanced through different depts to ascertain appropriate contact- outcome: reviewed above pt concern w/Tracie Contreras CM @ [facility] program. Tracie was uncertain of what paperwork pt may be referring to, but agrees to make outreach for this concern. Plan: advised RD of referral need for support r/t unplanned weight loss; poor appetite. Pt to anticipate outreach by Jimena Castillo CM @ [facility] to review pt support needs. Care Coordination Interventions    Program Enrollment:  Complex Care  Referral from Primary Care Provider:  Yes  Suggested Interventions and Community Resources  Fall Risk Prevention:  Completed (Comment: fall March 2020 'legs went out from under me while standing in my kitchen\"; pt states it was thought to be r/t UTI diagnosed soon thereafter)  Grief Counselor:  Declined (Comment: 10.14.20 declines need ( Feb 2020))  Meals on Wheels:   In Process (Comment: 10.14.20 pending pt to review w/[facility] Intake for eligibility)  Registered Dietician:  Completed (Comment: 10.14.20 referral to RD for pt concerns r/t unplanned weight loss; poor appetite)  Senior Services:  Completed (Comment: 10.14.20 referral to [facility]A for intake interview for eligibility for HHA homemaking, MOW, & med transportation support if eligible. Pt states preference to discuss w/her family first Amanda King, SHANA @ [facility] [phone])  Zone Management Tools:  Completed (Comment: DM)  Other Services or Interventions:  review of progress towards pt centered goals/barriers. Goals Addressed                 This Visit's Progress     avoid unplanned weight loss (pt-stated)   On track     Minimize/avoid further unplanned weight loss    Barriers: fear of failure and lack of education - uncertainty about healthy diet/meal planning to achieve goal to attain & maintain healthy weight  Plan for overcoming my barriers: engage in Care Coordination  Confidence: 9/10  Anticipated Goal Completion Date: 2.28.21      [address] Goal (pt-stated)   On track     I will complete referral to [facility] on Aging (Senior Services) for assistance. Barriers: impairment:  visual and physical: limited endurance secondary to poor appetite, unplanned weight loss and lack of support  Plan for overcoming my barriers: engage in Care Coordination  Confidence: 9/10  Anticipated Goal Completion Date: 2.28.21       Control Diabetes   On track     Patient Self-Management Goal for Chronic Condition  Goal: I will exercise for 30 minutes, 3-5 days per week. Barriers to success: lack of motivation  Plan for overcoming my barriers: being more motivated  Confidence: 7  Date goal set: 08/12/2014  Date goal attained:   Patient given educational materials on Exercise    I have instructed Jacki to complete a self tracking handout on Daily Exercise and to bring it with them to her next appointment.              Prior to Admission medications    Medication Sig Start Date End Date Taking?  Authorizing Provider   traZODone (DESYREL) 100 MG tablet Take 1 tablet by mouth nightly as needed for Sleep 10/23/20   AMEE Godoy CNP   metFORMIN (GLUCOPHAGE) 500 MG tablet TAKE ONE TABLET BY MOUTH EVERY MORNING AND TAKE TWO TABLETS BY MOUTH AT NIGHT WITH DINNER 9/22/20   Piper Munoz MD   escitalopram (LEXAPRO) 20 MG tablet TAKE ONE TABLET BY MOUTH DAILY 9/14/20   Piper Munoz MD   Diclofenac Sodium POWD 2 g by Does not apply route 4 times daily Formula #8E Baclo 2% Diclo 3% DMSO 5% Evans 6% Lido 2% Prilo 2% 8/26/20   NATALIE Nesbitt   omeprazole (PRILOSEC) 20 MG delayed release capsule Take 20 mg by mouth daily    Historical Provider, MD   irbesartan (AVAPRO) 300 MG tablet Take 300 mg by mouth nightly    Historical Provider, MD   ondansetron (ZOFRAN) 4 MG tablet Take 1 tablet by mouth every 8 hours as needed for Nausea or Vomiting 7/28/20 7/28/21  NATALIE Burgos   blood glucose test strips (ASCENSIA AUTODISC VI;ONE TOUCH ULTRA TEST VI) strip One Touch Ultra test Strips testing daily per E11.9 7/27/20   AMEE Godoy CNP   TRADJENTA 5 MG tablet TAKE ONE TABLET BY MOUTH DAILY 6/11/20   Piper Munoz MD   rosuvastatin (CRESTOR) 40 MG tablet TAKE ONE TABLET BY MOUTH DAILY 5/6/20   Piper Munoz MD   lidocaine (LIDODERM) 5 % Place 1 patch onto the skin every 12 hours 12 hours on, 12 hours off. 5/5/20   Fritz Ibarra MD   amLODIPine (NORVASC) 10 MG tablet Take 1 tablet by mouth daily 3/13/20   AMEE Godoy CNP   meloxicam (MOBIC) 15 MG tablet Take 1 tablet by mouth daily 2/24/20   AMEE Godoy CNP   FREESTYLE LANCETS MISC 1 each by Does not apply route daily 11/26/19   AMEE Godoy CNP   blood glucose test strips (FREESTYLE LITE) strip 1 each by In Vitro route daily Testing 1-2 times daily per e11.9 11/26/19   AMEE Godoy - CNP   blood glucose monitor strips Test 2 times a day & as needed for symptoms of irregular blood glucose. One Touch Verio strips 7/24/19   Maximino Doe MD   Lancets MISC 1 each by Does not apply route 2 times daily 7/24/19   Maximino Doe MD   [facility] LANCETS 99H MISC USE ONE LANCET TO TEST TWICE A DAY per E11.9 3/5/18   Maximino Doe MD   Cyanocobalamin (B-12 PO) Take by mouth daily     Historical Provider, MD   Probiotic Product (PROBIOTIC ACIDOPHILUS) CAPS Take 1 capsule by mouth daily    Historical Provider, MD   VITAMIN D, CHOLECALCIFEROL, PO Take 1 tablet by mouth daily    Historical Provider, MD   vitamin E 1000 UNITS capsule Take 1,000 Units by mouth daily.       Historical Provider, MD       Future Appointments   Date Time Provider Romina Shipman   11/23/2020 12:15 PM AMEE Cope - CNP [facility]   12/8/2020 10:00 AM Sharmaine Swanson MD [facility] P/CC [facility]

## 2020-11-19 ENCOUNTER — CARE COORDINATION (OUTPATIENT)
Dept: CARE COORDINATION | Age: 80
End: 2020-11-19

## 2020-11-19 NOTE — CARE COORDINATION
Terrell Severino  November 19, 2020    Initial Referral Reason: Weight loss concerns    Patient Care Team:  AMEE Silva CNP as PCP - General (Nurse Practitioner)  AMEE Silva CNP as PCP - [facility]  Berl Gowers, MD as Consulting Physician (Otolaryngology)  Melissa Zavala RD, LD as Diabetic Educator (Dietitian)  Marco Pizarro RN as Registered Nurse  Abe Clark MD as Consulting Physician (Pulmonology)  Alka Simmons RN as Ambulatory Care Manager  Waqar Tejeda MS, RD, LD as Ambulatory Care Manager (Dietitian)    Past Medical History and Medication list: Reviewed. Biochemical Data, Medical Tests and Procedures:    Lab Results   Component Value Date    LABA1C 6.2 10/13/2020     Anthropometric Measurements:    Height: 5'2\"  Weight: 130 lbs  BMI: 23  IBW: 110 lbs +/-10%  %IBW: 118%  Weight changes: 9/2020 - 143 lbs; 8/2020 - 144 lbs  9% wt change in 1 month    Physical Exam Findings:  Deferred    Nutrition Interview: Spoke with patient regarding concerns for weight loss. Patient believes she has lost some weight since  past away in February of this year. She states she has an ok appetite, but main concern is with her appearance and feeling of weight changes. She has been trying to maintain an adequate intake. Reviewed intake (below), patient seems to have varying intake throughout the week. Discussed that recently she was able to move her treadmill up from storage and she will start walking. Reviewed importance of adequate protein and nutrient as well as water intake with increasing physical activity especially after weight loss. Reviewed meal/snack ideas with increase protein. All nutrition related questions/concerns reviewed and addressed appropriately.      24-Hour Diet Recall  Breakfast  Consumed: Waffle or pancakes, eggs, fruit, will sometimes have reyna or 1/2 bagel as well    Lunch  Consumed: Usually skips or has MOW meal - today had potato skins and mixed vegetables    Dinner  Consumed: MOW meal or whatever patient may make at home    Beverages: water    Nutrition Diagnosis:  #1 Problem predicted suboptimal intake       Etiology related to decreased energy intake        Signs/Symptoms as evidenced by food recall and weight change of 9% wt loss in 1 month      Nutrition Intervention:    Estimated Needs  regular diet providing 3414-8171 kcals to promote wt gain/maintenance [facility]). Estimated daily CHO Needs: 145-180 g (based on 45-85% total calorie intake)  Estimated daily Protein Needs: 59 g (based on 1 g/kg)  Estimated daily Fluid Needs: 64 oz. #1 Nutrition Information: Provided patient with increasing protein and MyPlate diet guidelines handouts. For reinforcement of concepts discussed during nutrition interview. #2 Nutrition Counseling: Used open-ended questions to assess patients perceived susceptibility and severity of disease state. Discussed potential impact of health threat on patient's lifestyle. Used open-ended questions to assess patient's perception regarding benefits of and barriers to implementation of nutrition therapy. #3 Nutrition Education: Clearly defined the benefits of nutrition therapy. Summarized and affirmed positive aspects of current nutrition patterns. Provided education regarding value of adherence to regular diet. Discussed ways to establish applying concepts of alternatives and choices regarding implementation of diet. Explored ideas for small, incremental goals to initiate behavior change. Follow Up: Will follow up with patient in 2-3 weeks to ensure information was received and continue assisting with nutrition related concerns as appropriate.        Andie Prado MS, RDN, LD, [place]  [phone]

## 2020-11-23 ENCOUNTER — VIRTUAL VISIT (OUTPATIENT)
Dept: INTERNAL MEDICINE CLINIC | Age: 80
End: 2020-11-23
Payer: MEDICARE

## 2020-11-23 PROCEDURE — 99214 OFFICE O/P EST MOD 30 MIN: CPT | Performed by: NURSE PRACTITIONER

## 2020-11-23 RX ORDER — ROSUVASTATIN CALCIUM 40 MG/1
40 TABLET, COATED ORAL EVERY EVENING
COMMUNITY
End: 2021-02-10

## 2020-11-23 ASSESSMENT — ENCOUNTER SYMPTOMS
SORE THROAT: 0
CONSTIPATION: 0
EYE ITCHING: 0
COLOR CHANGE: 0
EYE REDNESS: 0
NAUSEA: 0
CHEST TIGHTNESS: 0
BLOOD IN STOOL: 0
COUGH: 0
VOMITING: 0
SINUS PRESSURE: 0
ABDOMINAL PAIN: 0
BACK PAIN: 0
SHORTNESS OF BREATH: 0
RHINORRHEA: 0
WHEEZING: 0
DIARRHEA: 0

## 2020-11-23 NOTE — PROGRESS NOTES
tablet Take 1 tablet by mouth daily Yes AMEE Logan CNP   FREESTYLE LANCETS MISC 1 each by Does not apply route daily Yes AMEE Yee CNP   blood glucose test strips (FREESTYLE LITE) strip 1 each by In Vitro route daily Testing 1-2 times daily per e11.9 Yes AMEE Logan CNP   blood glucose monitor strips Test 2 times a day & as needed for symptoms of irregular blood glucose. One Touch Verio strips Yes Wilamn Cartagena MD   Lancets MISC 1 each by Does not apply route 2 times daily Yes Wilman Cartagena MD   [facility] LANCETS 04D MISC USE ONE LANCET TO TEST TWICE A DAY per E11.9 Yes Wilman Cartagena MD   Probiotic Product (PROBIOTIC ACIDOPHILUS) CAPS Take 1 capsule by mouth daily Yes Historical Provider, MD   VITAMIN D, CHOLECALCIFEROL, PO Take 1 tablet by mouth daily Yes Historical Provider, MD   vitamin E 1000 UNITS capsule Take 1,000 Units by mouth daily.    Yes Historical Provider, MD   irbesartan (AVAPRO) 300 MG tablet Take 300 mg by mouth nightly  Historical Provider, MD   Cyanocobalamin (B-12 PO) Take by mouth daily   Historical Provider, MD       Past Medical History:   Diagnosis Date    Diabetes mellitus ([address]     Essential hypertension, benign     GERD (gastroesophageal reflux disease)     Hyperlipidemia     Interstitial lung disease ([address]     Osteoarthrosis, unspecified whether generalized or localized, unspecified site     osteoarthritis lower leg    Osteopenia     Shingles       Past Surgical History:   Procedure Laterality Date    ANKLE SURGERY Right 04/01/2013    torn tendon    BRONCHOSCOPY N/A 8/1/2019    BRONCHOSCOPY WITH BAL AND TRANSBRONCHIAL BIOPSIES performed by Oma Spatz, MD at [address] [place] [address]  10/11/2010    Dr. Geovany Robin , polyps and diverticulosis    COLONOSCOPY  11/11/2002    Dr. Gisela Hilario, Diverticulosis    COLONOSCOPY  04/20/2015    Dr. Geovany Robin- diverticulosis, sessile polyp, 5 years     COLONOSCOPY  4/13/16    Dr Rosa Ren; Diverticulosis    COLONOSCOPY  11/28/2016    Dr Kevin Bragg,     INTRACAPSULAR CATARACT EXTRACTION Right 7/15/2020    PHACOEMULSIFICATION OF CATARACT RIGHT EYE WITH INTRAOCULAR LENS IMPLANT performed by Theodore Ugalde MD at [facility] Bilateral 9/1/2020    BILATERAL LUMBAR THREE, LUMBAR FOUR, LUMBAR FIVE RADIOFREQUENCY ABLATION SITE CONFIRMED BY FLUOROSCOPY performed by Ac Villa MD at [facility] Bilateral 6/23/2020    BILATERAL LUMBAR FOUR TRANSFORAMINAL EPIDURAL STEROID INJECTION SITE CONFIRMED BY FLUOROSCOPY performed by Ac Villa MD at [facility] Bilateral 7/7/2020    BILATERAL LUMBAR FOUR TRANSFORAMINAL EPIDURAL STEROID INJECTION SITE CONFIRMED BY FLUOROSCOPY performed by Ac Villa MD at [facility] Bilateral 8/4/2020    BILATERAL LUMBAR THREE, LUMBAR FOUR, LUMBAR FIVE DORSAL RAMUS MEDIAL BRANCH BLOCK SITE CONFIRMED BY FLUOROSCOPY performed by Ac Villa MD at [facility] Bilateral 8/18/2020    BILATERAL LUMBAR THREE, LUMBAR FOUR, LUMBAR FIVE DORSAL RAMUS MEDIAL BRANCH BLOCK SITE CONFIR,ED BY FLUOROSCOPY performed by Ac Villa MD at [facility] Right     UPPER GASTROINTESTINAL ENDOSCOPY  8/17/2011    Dr. Tala Johnson - moderate gastritis , hiatal hernia     UPPER GASTROINTESTINAL ENDOSCOPY  4/20/15    Dr. Tala Johnson - polyps removed, diverticulosis, follow up in 5 years   [facility]  8/16/2012    DR. Ramos - with mesh      Relationships   Social connections    Talks on phone: More than three times a week    Gets together: Once a week    Attends [employer] service: Never    Active member of club or organization: No    Attends meetings of clubs or organizations: Never    Relationship status:       Family History   Problem Relation Age of Onset    Heart Disease Mother     Rheum Arthritis Mother     Heart Disease Father          No flowsheet data found. PHYSICAL EXAMINATION:  Physical Exam  Constitutional:       Appearance: Normal appearance. HENT:      Head: Normocephalic and atraumatic. Nose: Nose normal.   Eyes:      Extraocular Movements: Extraocular movements intact. Conjunctiva/sclera: Conjunctivae normal.      Pupils: Pupils are equal, round, and reactive to light. Neck:      Musculoskeletal: Normal range of motion. Pulmonary:      Effort: Pulmonary effort is normal.   Musculoskeletal: Normal range of motion. Skin:     Coloration: Skin is not jaundiced or pale. Findings: No bruising, erythema, lesion or rash. Neurological:      Mental Status: She is alert and oriented to person, place, and time. Mental status is at baseline. Psychiatric:         Mood and Affect: Mood normal.         Behavior: Behavior normal.         Thought Content: Thought content normal.         Judgment: Judgment normal.         ASSESSMENT/PLAN:  Problem List     DM (diabetes mellitus) (HCC)     Stable, controlled  Will stop tradjenta due to cost  Increased metformin to 1000 mg bid   Follow up 3 months           Relevant Medications    metFORMIN (GLUCOPHAGE) 500 MG tablet    Pure hypercholesterolemia     Stable, controlled  No changes  Continue current treatment plan            Relevant Medications    amLODIPine (NORVASC) 10 MG tablet    irbesartan (AVAPRO) 300 MG tablet    rosuvastatin (CRESTOR) 40 MG tablet    Anxiety     Stable, controlled  No changes  Continue current treatment plan            Relevant Medications    escitalopram (LEXAPRO) 20 MG tablet    traZODone (DESYREL) 100 MG tablet            No follow-ups on file. Washington Peñaloza is a 68 y.o. female being evaluated by a Virtual Visit (video visit) encounter to address concerns as mentioned above. A caregiver was present when appropriate.  Due to this being a TeleHealth

## 2020-12-03 ENCOUNTER — CARE COORDINATION (OUTPATIENT)
Dept: CARE COORDINATION | Age: 80
End: 2020-12-03

## 2020-12-03 NOTE — CARE COORDINATION
Ambulatory Care Coordination Note  12/3/2020  CM Risk Score: 6  Charlson 10 Year Mortality Risk Score: 98%     ACC: Ashley Nash, RN    Summary Note: several weeks s/p cataract surgery; she is now cleared for further follow up. She had surgery on her eyelids Monday. She is recovering with no problem or concern, for this as well. Appetite unchanged. Waxes/wanes. No better, no worse. Pt verbalizes understanding of pt education, available resources, and ability to outreach to [facility] for support as needed and agreement with the following  Plan: continue [facility] support for health coaching, care collaboration, support w/community resources, and help with any newly presenting concerns as needed. Pt understands frequency of routine outreach initiated by [facility] may be lesser, at present time, as [facility] continues to support Covid CT outreach as well. For this reason, pt agrees to initiate outreach to [facility] as needed as well. Care Coordination Interventions    Program Enrollment:  Complex Care  Referral from Primary Care Provider:  Yes  Suggested Interventions and Community Resources  Fall Risk Prevention:  Completed (Comment: fall March 2020 'legs went out from under me while standing in my kitchen\"; pt states it was thought to be r/t UTI diagnosed soon thereafter)  Grief Counselor:  Declined (Comment: 10.14.20 declines need ( Feb 2020))  Meals on Wheels: In Process (Comment: 10.14.20 pending pt to review w/[employer] Intake for eligibility)  Registered Dietician:  Completed (Comment: 10.14.20 referral to RD for pt concerns r/t unplanned weight loss; poor appetite)  Senior Services:  Completed (Comment: 10.14.20 referral to [employer] for intake interview for eligibility for HHA homemaking, MOW, & med transportation support if eligible.  Pt states preference to discuss w/her family first Esme Gentile, SHANA @ [facility] [phone])  [employer] Management Tools:  Completed (Comment: DM)  Other Services or Interventions:  review of progress towards pt escitalopram (LEXAPRO) 20 MG tablet TAKE ONE TABLET BY MOUTH DAILY 9/14/20   Wandy Bojorquez MD   Diclofenac Sodium POWD 2 g by Does not apply route 4 times daily Formula #8E Baclo 2% Diclo 3% DMSO 5% Evans 6% Lido 2% Prilo 2% 8/26/20   NATALIE Murphy   omeprazole (PRILOSEC) 20 MG delayed release capsule Take 20 mg by mouth daily    Historical Provider, MD   irbesartan (AVAPRO) 300 MG tablet Take 300 mg by mouth nightly    Historical Provider, MD   ondansetron (ZOFRAN) 4 MG tablet Take 1 tablet by mouth every 8 hours as needed for Nausea or Vomiting 7/28/20 7/28/21  NATALIE Adams   blood glucose test strips (ASCENSIA AUTODISC VI;ONE TOUCH ULTRA TEST VI) strip One Touch Ultra test Strips testing daily per E11.9 7/27/20   [facility]   lidocaine (LIDODERM) 5 % Place 1 patch onto the skin every 12 hours 12 hours on, 12 hours off. 5/5/20   Tato Chang MD   amLODIPine (NORVASC) 10 MG tablet Take 1 tablet by mouth daily 3/13/20   [facility], APRN [facility]   meloxicam (MOBIC) 15 MG tablet Take 1 tablet by mouth daily 2/24/20   [facility]   FREESTYLE LANCETS MISC 1 each by Does not apply route daily 11/26/19   [facility]   blood glucose test strips (FREESTYLE LITE) strip 1 each by In Vitro route daily Testing 1-2 times daily per e11.9 11/26/19   [facility]   blood glucose monitor strips Test 2 times a day & as needed for symptoms of irregular blood glucose.  One Touch Verio strips 7/24/19   Wandy Bojorquez MD   Lancets MISC 1 each by Does not apply route 2 times daily 7/24/19   Wandy Bojorquez MD   Early Poster DELICA LANCETS 38G MISC USE ONE LANCET TO TEST TWICE A DAY per E11.9 3/5/18   Wandy Bojorquez MD   Cyanocobalamin (B-12 PO) Take by mouth daily     Historical Provider, MD   Probiotic Product (PROBIOTIC ACIDOPHILUS) CAPS Take 1 capsule by mouth daily    Historical Provider, MD   VITAMIN D, CHOLECALCIFEROL, PO Take 1 tablet by mouth daily    Historical Provider, MD   vitamin E 1000 UNITS capsule Take 1,000 Units by mouth daily.       Historical Provider, MD       Future Appointments   Date Time Provider Romina Shipman   12/9/2020 10:20 AM MD Roger Fang P/CC [facility]   2/23/2021 10:00 AM [facility]ui [facility]

## 2020-12-06 ENCOUNTER — HOSPITAL ENCOUNTER (EMERGENCY)
Age: 80
Discharge: HOME | End: 2020-12-06
Attending: EMERGENCY MEDICINE
Payer: MEDICARE

## 2020-12-06 VITALS
HEART RATE: 72 BPM | TEMPERATURE: 97.5 F | DIASTOLIC BLOOD PRESSURE: 63 MMHG | RESPIRATION RATE: 16 BRPM | SYSTOLIC BLOOD PRESSURE: 145 MMHG | OXYGEN SATURATION: 99 %

## 2020-12-06 PROCEDURE — 99284 EMERGENCY DEPT VISIT MOD MDM: CPT

## 2020-12-06 PROCEDURE — 6370000000 HC RX 637 (ALT 250 FOR IP): Performed by: STUDENT IN AN ORGANIZED HEALTH CARE EDUCATION/TRAINING PROGRAM

## 2020-12-06 RX ADMIN — LIDOCAINE HYDROCHLORIDE: 20 SOLUTION ORAL; TOPICAL at 02:36

## 2020-12-06 ASSESSMENT — ENCOUNTER SYMPTOMS
TROUBLE SWALLOWING: 1
RHINORRHEA: 0
DIARRHEA: 0
NAUSEA: 0
ABDOMINAL PAIN: 0
VOMITING: 0
CHOKING: 0
SORE THROAT: 0
COUGH: 0
SHORTNESS OF BREATH: 0

## 2020-12-06 NOTE — ED PROVIDER NOTES
[facility]          EM RESIDENT NOTE       Date of evaluation: 12/6/2020    Chief Complaint     Dysphagia (Pt reports tonight felt like mucus may have caused something to get caught in her throat, denies any recent ingestion, attempted to drink water to flush it, however attempting to do so make her choke on the water. NIHS 0 at triage. )      History of Present Illness     Jacki Farley is a 68 y.o. female with a past medical history of diabetes, hypertension, GERD, and interstitial lung disease on home oxygen who presents with difficulty swallowing. The patient states she woke up around 11:30 PM tonight and attempted to drink a sip of water. She states she was unable to swallow it, and started coughing. She has never had anything like this before. She states she still feels like she has the sensation of the water stuck in her throat. Her son, who is in the room, states that EMS was able to get her sips of water after this event and prior to bring her to the hospital without issue. She denies any shortness of breath, coughing, fevers, chills, or difficulty breathing. She denies any numbness, weakness, tingling, or difficulties with walking, dizziness, or lightheadedness. Review of Systems     Review of Systems   Constitutional: Negative for chills and fever. HENT: Positive for trouble swallowing. Negative for congestion, dental problem, rhinorrhea and sore throat. Eyes: Negative for visual disturbance. Respiratory: Negative for cough, choking and shortness of breath. Cardiovascular: Negative for chest pain. Gastrointestinal: Negative for abdominal pain, diarrhea, nausea and vomiting. Genitourinary: Negative for flank pain and urgency. Musculoskeletal: Negative for gait problem and myalgias. Skin: Negative for rash and wound. Neurological: Negative for dizziness, syncope, speech difficulty, weakness, light-headedness, numbness and headaches. Past Medical, Surgical, Family, and Social History     She has a past medical history of Diabetes mellitus ([address] Essential hypertension, benign, GERD (gastroesophageal reflux disease), Hyperlipidemia, Interstitial lung disease ([address] Osteoarthrosis, unspecified whether generalized or localized, unspecified site, Osteopenia, and Shingles. She has a past surgical history that includes Colonoscopy (10/11/2010); Upper gastrointestinal endoscopy (8/17/2011); shoulder surgery (Right); Colonoscopy (11/11/2002); ventral hernia repair (8/16/2012); Ankle surgery (Right, 04/01/2013); Upper gastrointestinal endoscopy (4/20/15); Colonoscopy (04/20/2015); Colonoscopy (4/13/16); Colonoscopy (11/28/2016); bronchoscopy (N/A, 8/1/2019); Pain management procedure (Bilateral, 6/23/2020); Pain management procedure (Bilateral, 7/7/2020); Intracapsular cataract extraction (Right, 7/15/2020); Pain management procedure (Bilateral, 8/4/2020); Pain management procedure (Bilateral, 8/18/2020); and Nerve Surgery (Bilateral, 9/1/2020). Her family history includes Heart Disease in her father and mother; Rheum Arthritis in her mother. She reports that she has never smoked. She has never used smokeless tobacco. She reports current alcohol use of about 1.0 standard drinks of alcohol per week. She reports that she does not use drugs.     Medications     Discharge Medication List as of 12/6/2020  3:23 AM      CONTINUE these medications which have NOT CHANGED    Details   rosuvastatin (CRESTOR) 40 MG tablet Take 40 mg by mouth every eveningHistorical Med      traZODone (DESYREL) 100 MG tablet Take 1 tablet by mouth nightly as needed for Sleep, Disp-90 tablet,R-1Normal      metFORMIN (GLUCOPHAGE) 500 MG tablet TAKE ONE TABLET BY MOUTH EVERY MORNING AND TAKE TWO TABLETS BY MOUTH AT NIGHT WITH DINNER, Disp-270 tablet,R-0Normal      escitalopram (LEXAPRO) 20 MG tablet TAKE ONE TABLET BY MOUTH DAILY, Disp-90 tablet,R-0Normal      Diclofenac Sodium POWD 2 g by Does not apply route 4 times daily Formula #8E Baclo 2% Diclo 3% DMSO 5% Evans 6% Lido 2% Prilo 2%, Disp-240 g,R-11Normal      omeprazole (PRILOSEC) 20 MG delayed release capsule Take 20 mg by mouth dailyHistorical Med      irbesartan (AVAPRO) 300 MG tablet Take 300 mg by mouth nightlyHistorical Med      ondansetron (ZOFRAN) 4 MG tablet Take 1 tablet by mouth every 8 hours as needed for Nausea or Vomiting, Disp-10 tablet,R-0Print      !! blood glucose test strips (ASCENSIA AUTODISC VI;ONE TOUCH ULTRA TEST VI) strip Disp-100 each,R-3, NormalOne Touch Ultra test Strips testing daily per E11.9      lidocaine (LIDODERM) 5 % Place 1 patch onto the skin every 12 hours 12 hours on, 12 hours off., Disp-30 patch, R-0Normal      amLODIPine (NORVASC) 10 MG tablet Take 1 tablet by mouth daily, Disp-90 tablet, R-1Normal      meloxicam (MOBIC) 15 MG tablet Take 1 tablet by mouth daily, Disp-30 tablet, R-3Normal      !! FREESTYLE LANCETS MISC DAILY Starting Tue 11/26/2019, Disp-100 each, R-3, Normal      !! blood glucose test strips (FREESTYLE LITE) strip DAILY Starting Tue 11/26/2019, Disp-100 each, R-3, NormalTesting 1-2 times daily per e11.9      !! blood glucose monitor strips Test 2 times a day & as needed for symptoms of irregular blood glucose. One Touch Verio strips, Disp-100 strip, R-2, Normal      !! Lancets MISC 2 TIMES DAILY Starting Wed 7/24/2019, Disp-100 each, R-5, Normal      !! ONETOUCH DELICA LANCETS 90P MISC USE ONE LANCET TO TEST TWICE A DAY per E11.9, Disp-100 each, R-5Normal      Cyanocobalamin (B-12 PO) Take by mouth daily Historical Med      Probiotic Product (PROBIOTIC ACIDOPHILUS) CAPS Take 1 capsule by mouth daily      VITAMIN D, CHOLECALCIFEROL, PO Take 1 tablet by mouth daily      vitamin E 1000 UNITS capsule Take 1,000 Units by mouth daily. !! - Potential duplicate medications found. Please discuss with provider.           Allergies     She is allergic to percocet [oxycodone-acetaminophen]. Physical Exam     INITIAL VITALS: BP: (!) 163/79, Temp: 97.5 °F (36.4 °C), Pulse: 76, Resp: 16, SpO2: 97 %   Physical Exam  Constitutional:       Appearance: Normal appearance. HENT:      Head: Normocephalic and atraumatic. Nose: Nose normal. No congestion or rhinorrhea. Mouth/Throat:      Mouth: Mucous membranes are dry. Pharynx: No oropharyngeal exudate or posterior oropharyngeal erythema. Eyes:      General:         Right eye: No discharge. Left eye: No discharge. Extraocular Movements: Extraocular movements intact. Conjunctiva/sclera: Conjunctivae normal.      Comments: Aging ecchymoses around the eyes, recent cataract and eyelid lift surgery   Neck:      Musculoskeletal: Normal range of motion and neck supple. No neck rigidity or muscular tenderness. Cardiovascular:      Rate and Rhythm: Normal rate and regular rhythm. Pulses: Normal pulses. Heart sounds: Normal heart sounds. Pulmonary:      Effort: Pulmonary effort is normal. No respiratory distress. Breath sounds: Normal breath sounds. No wheezing, rhonchi or rales. Abdominal:      General: Abdomen is flat. Palpations: Abdomen is soft. Tenderness: There is no abdominal tenderness. There is no guarding or rebound. Musculoskeletal: Normal range of motion. General: No swelling, tenderness or deformity. Lymphadenopathy:      Cervical: No cervical adenopathy. Skin:     General: Skin is warm and dry. Capillary Refill: Capillary refill takes less than 2 seconds. Neurological:      General: No focal deficit present. Mental Status: She is alert. Cranial Nerves: No cranial nerve deficit. Sensory: No sensory deficit. Motor: No weakness. Coordination: Coordination normal.         DiagnosticResults     RADIOLOGY:  No orders to display       LABS:   No results found for this visit on 12/06/20.       RECENT VITALS:  BP: (!) 145/63, Temp: 97.5 °F (36.4 °C), Pulse: 72,Resp: 16, SpO2: 99 %     Procedures       ED Course     Nursing Notes, Past Medical Hx, Past Surgical Hx, Social Hx, Allergies, and Family Hx were reviewed. The patient was given the followingmedications:  Orders Placed This Encounter   Medications    aluminum & magnesium hydroxide-simethicone (MAALOX) 30 mL, lidocaine viscous hcl (XYLOCAINE) 5 mL (GI COCKTAIL)       CONSULTS:  None    MEDICAL DECISION MAKING / ASSESSMENT / Angel Luis Matt is a 68 y.o. female presenting with difficulty swallowing. Patient was overall well-appearing, and did not have any other focal neurologic deficits consistent with a stroke therefore head CT is not obtained. The patient was also observed drinking an entire glass of water per the attending physician without difficulty, choking, or difficulty breathing afterwards. However the patient still continued to complain of a globus sensation in her throat. Therefore a GI cocktail was provided with the patient able to fully finish this medication along with a cup of water. She did not have any choking episodes or difficulties breathing while drinking 2 glasses of water here in the emergency department. The patient still complained of globus sensation and dysphagia, however with her overall well appearance and ability to drink while here in the emergency department she is felt safe for discharge home with appropriate GI follow-up for dysphagia. This was discussed with the patient and her son, who are able to ask all questions and were agreeable to this plan. This patient was also evaluated by the attending physician. All care plans werediscussed and agreed upon. Clinical Impression     1.  Dysphagia, unspecified type        Disposition     PATIENT REFERRED TO:  Gastroenterology  [phone]  Schedule an appointment as soon as possible for a visit   to follow up on your difficulties swallowing      DISCHARGE MEDICATIONS:  Discharge Medication List as of 12/6/2020  3:23 AM          DISPOSITION       Karla Arevalo MD  12/06/20 1602

## 2020-12-07 ENCOUNTER — CARE COORDINATION (OUTPATIENT)
Dept: CARE COORDINATION | Age: 80
End: 2020-12-07

## 2020-12-07 RX ORDER — MELOXICAM 15 MG/1
TABLET ORAL
Qty: 90 TABLET | Refills: 2 | Status: SHIPPED | OUTPATIENT
Start: 2020-12-07 | End: 2021-02-23

## 2020-12-07 NOTE — ED PROVIDER NOTES
ED Attending Attestation Note     Date of evaluation: 12/6/2020    This patient was seen by the resident. I have seen and examined the patient, agree with the workup, evaluation, management and diagnosis. The care plan has been discussed. My assessment reveals a well appearing 68 y.o. F who presents with a sensation of dysphagia. Able to take liquids without difficulty here. Possibly pill esophagitis versus stricture. Also considered mass. Low suspicion for impaction. Seems more transport dysphagia rather than transfer and low suspicion for primary CNS cause. Will refer to PCP and GI for possible EGD.        David Peterson MD  12/07/20 [id number]

## 2020-12-10 ENCOUNTER — CARE COORDINATION (OUTPATIENT)
Dept: CARE COORDINATION | Age: 80
End: 2020-12-10

## 2020-12-17 ENCOUNTER — VIRTUAL VISIT (OUTPATIENT)
Dept: PULMONOLOGY | Age: 80
End: 2020-12-17
Payer: MEDICARE

## 2020-12-17 ENCOUNTER — TELEPHONE (OUTPATIENT)
Dept: INTERNAL MEDICINE CLINIC | Age: 80
End: 2020-12-17

## 2020-12-17 PROCEDURE — 99214 OFFICE O/P EST MOD 30 MIN: CPT | Performed by: INTERNAL MEDICINE

## 2020-12-17 RX ORDER — ESCITALOPRAM OXALATE 20 MG/1
TABLET ORAL
Qty: 90 TABLET | Refills: 0 | Status: SHIPPED | OUTPATIENT
Start: 2020-12-17 | End: 2021-03-22

## 2020-12-17 ASSESSMENT — ENCOUNTER SYMPTOMS
SHORTNESS OF BREATH: 1
CHEST TIGHTNESS: 0
EYE REDNESS: 0
WHEEZING: 0
COUGH: 0

## 2020-12-17 NOTE — PROGRESS NOTES
Pulmonology Video Visit    Pursuant to the emergency declaration under the [address] [facility], 1135 waiver authority and the Coronavirus Preparedness and [facility] Appropriations Act this Video Visit was insisted, with patient's consent, to reduce the patient's risk of exposure to COVID-19 and provide continuity of care for an established patient. The patient was at home, while the provider was at the clinic. Services were provided through a synchronous discussion through a Video Visit to substitute for in-person clinic visit, and coded as such. [facility] Pulmonary and Critical Care    Outpatient Note    Subjective:   CHIEF COMPLAINT:   Chief Complaint   Patient presents with    Other     4 mo f/u CT scan      Interval history on 12/17/20  Overall she feels her breathing is better. She gets SOB with exertion. sats goes down to 79% with exertion. Appetite is poor since  passed away. Interval history on 6/25/20  Overall she feels fine. She is on 2 liters of O2.  sats at rest are 99%. She is following social distancing   She stopped using prednisone 3 weeks ago. However she is getting epidural steroids due to weakness. It was tapered off. Interval history on 3/5/20  Lost her  last month. She came accompanied by a family member who is staying with her these days. There is no SOB at rest.  She is using O2 at home with exertion. Overall she feels her breathing is better. She is still on prednisone 15mg daily. Interval history on 2/3/20  No major events since I saw her last.  She doesn't use O2 unless she feels she needs it. She feels better. Main complain today is left hip pain. Interval history on 1/7/20  Patient is here today for regular f/u. Overall she feels she is getting stronger,. She is not using O2 supplement when getting outside as the portable concentrator bothers her due to its weight.     She remains on prednisone 15mg daily     Interval history on 12/10/19  She feels better and denies have SOB at rest however she does have SOB on exertion for which she has to stop. She doesn't like using O2. Pulse ox at rest is 97%, however it goes down within few minutes of exertion    Interval history 11/5/2019  Patient is here today for post hospital follow-up. She was admitted to the hospital on 10/13/2019 and discharged on 10/16/2019. She was admitted with acute hypoxic respiratory failure and pneumonia. She was treated with IV antibiotics and steroids and was discharged on tapering dose prednisone. She was discharged on oxygen. At the time of evaluation she was on room air and she was feeling better. She denied increasing cough or sputum production. There is no fever or chills    Interval history on 9/10/19  Overall she feels better. Coughing is less, and she is able to walk longer distances. Pulse ox is in the mid 90s. There is no fever or chills    Interval history on 7/26/19  Continue to have cough, though she was given taper dose steroids and felt better on it. There is no fever or chills. Sputum is clear. She was seen by rheumatology, there is no evidence of active rheumatological illness. Anti SSA, SSB, CK was ordered. HPI:  On 7/1/19   The patient is 68 y.o. female who presents today for a new patient visit for SOB. This is not entirely new, but apparently it is slowly getting worse. In the mornings feels out of breath while doing her usual ADL but this gets better as she sit to have breakfast.    She gets SOB on brisk walking and going up hills. She was diagnosed with walking pneumonia in May. At that time she was tired and out of breath. She has chronic cough with occasional sputum production, which is white in color. There is some wt loss ~ 10 lbs after getting pneumonia but gained back two lbs     No nasal congestion but has throat congestion.   No post nasal drip. Has GERD for about two years. On omeprazole once daily   She has dry eyes, no dryness in the mouth  No hx of joint pain, warmth or stiffness. No hx of rash.       Past Medical History:    Past Medical History:   Diagnosis Date    Diabetes mellitus ([address]     Essential hypertension, benign     GERD (gastroesophageal reflux disease)     Hyperlipidemia     Interstitial lung disease (HCC)     Osteoarthrosis, unspecified whether generalized or localized, unspecified site     osteoarthritis lower leg    Osteopenia     Shingles        Social History:    Social History     Tobacco Use   Smoking Status Never Smoker   Smokeless Tobacco Never Used       Family History:  Family History   Problem Relation Age of Onset    Heart Disease Mother     Rheum Arthritis Mother     Heart Disease Father        Current Medications:  Current Outpatient Medications on File Prior to Visit   Medication Sig Dispense Refill    meloxicam (MOBIC) 15 MG tablet TAKE ONE TABLET BY MOUTH DAILY 90 tablet 2    rosuvastatin (CRESTOR) 40 MG tablet Take 40 mg by mouth every evening      traZODone (DESYREL) 100 MG tablet Take 1 tablet by mouth nightly as needed for Sleep 90 tablet 1    metFORMIN (GLUCOPHAGE) 500 MG tablet TAKE ONE TABLET BY MOUTH EVERY MORNING AND TAKE TWO TABLETS BY MOUTH AT NIGHT WITH DINNER (Patient taking differently: 1,000 mg 2 times daily (with meals) ) 270 tablet 0    Diclofenac Sodium POWD 2 g by Does not apply route 4 times daily Formula #8E Baclo 2% Diclo 3% DMSO 5% Evans 6% Lido 2% Prilo 2% 240 g 11    omeprazole (PRILOSEC) 20 MG delayed release capsule Take 20 mg by mouth daily      irbesartan (AVAPRO) 300 MG tablet Take 300 mg by mouth nightly      ondansetron (ZOFRAN) 4 MG tablet Take 1 tablet by mouth every 8 hours as needed for Nausea or Vomiting 10 tablet 0    blood glucose test strips (ASCENSIA AUTODISC VI;ONE TOUCH ULTRA TEST VI) strip One Touch Ultra test Strips testing daily per E11.9 100 each 3    lidocaine (LIDODERM) 5 % Place 1 patch onto the skin every 12 hours 12 hours on, 12 hours off. 30 patch 0    amLODIPine (NORVASC) 10 MG tablet Take 1 tablet by mouth daily 90 tablet 1    FREESTYLE LANCETS MISC 1 each by Does not apply route daily 100 each 3    blood glucose test strips (FREESTYLE LITE) strip 1 each by In Vitro route daily Testing 1-2 times daily per e11.9 100 each 3    blood glucose monitor strips Test 2 times a day & as needed for symptoms of irregular blood glucose. One Touch Verio strips 100 strip 2    Lancets MISC 1 each by Does not apply route 2 times daily 100 each 5    ONETOUCH DELICA LANCETS 43T MISC USE ONE LANCET TO TEST TWICE A DAY per E11.9 100 each 5    Cyanocobalamin (B-12 PO) Take by mouth daily       Probiotic Product (PROBIOTIC ACIDOPHILUS) CAPS Take 1 capsule by mouth daily      VITAMIN D, CHOLECALCIFEROL, PO Take 1 tablet by mouth daily      vitamin E 1000 UNITS capsule Take 1,000 Units by mouth daily. No current facility-administered medications on file prior to visit. REVIEW OF SYSTEMS:    Review of Systems   Constitutional: Negative for chills and fever. HENT: Negative for postnasal drip. Eyes: Negative for redness. Dry eyes   Respiratory: Positive for shortness of breath (on exertion). Negative for cough, chest tightness and wheezing. Cardiovascular: Negative for chest pain, palpitations and leg swelling. Gastrointestinal:        GERD   Musculoskeletal: Negative for arthralgias and joint swelling. Skin: Negative for rash. Neurological: Negative for weakness. Psychiatric/Behavioral: The patient is not nervous/anxious. All other systems reviewed and are negative. Objective:   PHYSICAL EXAM:        VITALS:  There were no vitals taken for this visit.     Physical Exam    DATA:    CT chest with contrast       HISTORY: Pulmonary nodules.       COMPARISON: February 7, 2019.       Individualized dose optimization technique was used in order to meet ALARA standards for radiation dose reduction.  In addition to vendor specific dose reduction algorithms, the dose reduction techniques vary based on the specific scanner utilized but    frequently include automated exposure control, adjustment of the mA and/or kV according to patient size, and use of iterative reconstruction technique.           FINDINGS:       Basilar predominant groundglass opacity with areas of traction bronchiectasis are similar to the prior examination. There is no definite honeycombing identified.       7 mm nodule identified in the right upper lobe (image 38, series 200) is unchanged since prior exam.       5 mm nodule in the left upper lobe (image 27, series 2) unchanged. No new or enlarging pulmonary nodules.       A small pericardial effusion is identified. Mildly enlarged mediastinal lymph nodes are stable since the prior examination.       Hiatal hernia is present.       There is no destructive bone lesion.           Impression   1. Stable pulmonary nodules. Continued follow-up is indicated. 2. Stable appearance of interstitial lung disease as described above. There is no definite honeycombing. The favored differential diagnosis is nonspecific interstitial pneumonia. This would be an atypical appearance for usual interstitial pneumonia. 3. Mediastinal adenopathy, stable. 4. Hiatal hernia. Echo on 6/11/19  Normal left ventricle size. There is mild concentric left ventricular   hypertrophy. Overall left ventricular systolic function appears normal with   an ejection fraction visually estimated at 55%. No regional wall motion   abnormalities are noted. Diastolic filling parameters suggest grade II   diastolic dysfunction.   Trace mitral regurgitation is present.   Trivial tricuspid regurgitation. Estimated pulmonary artery systolic   pressure is elevated at 44 mmHg assuming a right atrial pressure of 3 mmHg.     PFT  DATE OF PROCEDURE: 06/11/2019     INDICATION:  Shortness of breath.     BMI:  31.6.     TOBACCO HISTORY:  Never smoked.     Spirometry data is acceptable and reproducible.     Lung volumes performed via plethysmography.     Room air oxygen saturation is 92%.     SPIROMETRY:  FEV1 to FVC ratio is 80%. Prebronchodilator FEV1 is 1.12,  which is 58% of predicted. Postbronchodilator FEV1 is 1.36 which is 71%  of predicted for a 21% increase. Prebronchodilator FVC is 1.4, which is  54% of predicted. Postbronchodilator FVC is 1.84 which is 71% of  predicted for 31% increase.     Lung volumes showed total lung capacity of 3.04 which is 62% of  predicted. Residual volume is 1.46 which is 64% of predicted.     Diffusion capacity is 9.61, which is 45% of predicted. This is not  adjusted for hemoglobin.     IMPRESSION:  1. No obstructive defect. 2.  There is a significant response to bronchodilators in small and  large airways. 3.  Moderate restrictive defect is present. 4.  Moderate decrease in diffusion capacity    CT scan on 10/8/19  1. Stable findings compared prior study. Nonspecific interstitial pneumonia is favored in the absence of lower lobe predominance and lack of honeycomb changes. 2. Stable right upper lobe pulmonary nodule. 3. Annual surveillance with high-resolution CT is recommended for. Large hiatal hernia. 5. Coronary artery calcification     01/20/2020   PULMONARY FUNCTION TEST     INDICATIONS:  Interstitial lung disease.     BMI:  31.5.     TOBACCO HISTORY:  Never smoked.     Spirometry data is acceptable and reproducible.     Lung volumes performed via plethysmography.     Room air oxygen saturation is 97%.     SPIROMETRY:  FEV1 to FVC ratio is 86%. Prebronchodilator FEV1 is 1.57,  which is 91% of predicted. Prebronchodilator FVC is 1.83, which is 78%  of predicted.     Lung volumes show total lung capacity of 2.88, which is 63% of  predicted.   Residual volume is 0.92, which is 42% of predicted.    Diffusion capacity is 16.02, which is 80% of predicted.     IMPRESSION:  1. No obstructive defect. 2.  There is a mild restrictive defect. 3.  Diffusion capacity is normal.  4.  When compared to previous pulmonary function tests dated 10/08/2019,  there is a mild-to-moderate drop in FVC, FEV1, and total lung capacity. Diffusion capacity is substantially higher, almost double making me  wonder his diffusion capacity in 10/2019 was real.     Six-minute walk was initially performed on room air. Of note, the  patient is on home oxygen 2 liters. At rest on room air, the patient's  oxygen saturation was 98%. Within 2 minutes, it dropped to 85%  necessitating pause and walk to place 2 liters of oxygen on. With 2  liters of oxygen, her oxygen saturation maintained 93 to 97%. The  patient walked a total of 660 feet.     The patient has significant oxygen desaturation with submaximal  exercise. She does qualify for home oxygen with exertion and oxygen  flow rate is deemed to be 2 liters. pulmonary function tests and 6MWT on 6/2/20  6/3/2020 10:20 AM  Pulmonary Function Test:     Indication: NSIP    Test comment:     Spirometry data is acceptable and reproducible.     Maximum effort given during the test.    Pulse ox is 95% on room air    Estimated body mass index is 26.15 kg/m² as calculated from the   following:    Height as of 5/5/20: 5' 2.01\" (1.575 m).    Weight as of 5/26/20: 143 lb (64.9 kg).     Spirometry data:    FEV1/FVC: 87. Predicted ratio 74    FEV1 1.59L, which is 87% predicted    FVC is 1.82L, which is 74% predicted    Lung Volumes:    TLC (by Plethysmography) is 2.63L, which is 55% predicted    RV is 0.67L which is 29% predicted    Diffusion Capacity:    DLCO is 5.43 which is 26% predicted    Impression:    1. There is no obstruction present    2. There is restriction.  Based on FEV1 it is considered mild   however based on TLC it is considered severe    3. There is severe reduction in diffusion capacity    Comment:   PFT was compared to the one on 1/20/20 - FEV1 and FVC are stable.      SIX-MINUTE WALK:    A six-minute walk was started on room air.  Patient was placed on   2 liters of O2 at minute 4   The patientwalked a total of 510 feet.  She did stop or pause   during the walk to place O2 and due to back pain.  Baseline  oxygen saturation 95%.  The lowest oxygen saturation during the   walk was 82% on minute 3 for which she was placed on 2 liters.    O2 recovered to 95-98% during the remaining time of testing. 6MWT was compared to the one we have on 10/8/19:  Faheem Reusing walked decreased from 660ft to 510ft  At that time O2 sats dropped to 86% on minute 4 and placed on O2   on minute 5    Comment: 6MWT is slightly worse than how it was last year on   10/8/19     Assessment:      Diagnosis Orders   1. NSIP (nonspecific interstitial pneumonia) ([facility] [address]  CT CHEST HIGH RESOLUTION       Plan:   68years old female with NSIP. Diagnosis is based on clinical presentation, CT findings and response to steroids. She also has large hiatal hernia      PFT and 6MWT on 6/2/20 are essentially stable. Prednisone was tapered off on May 2020 due to weakness and back issues. At the time of evaluation she was on room air, she use O2 at 2 liters on PRN bases. (There is no specific exposure that she or her family can remember  She is not on chronic medication that is known to cause ILD  She does not have pets  Hypersensitivity panel is negative   Rheumatoid factor, GIUSEPPE and sed rate are negative. SSA and SSB are negative  She was seen by rheumatology. There is no evidence of active rheumatic disease. BAL is negative for infectious etiology)     Had bronch on 8/1/19  Started on prednisone @ 20mg on 8/5/19 decrease to 15 mg in Dec. 2019  Tapered off around mid May. CT abdomen in July 2020 compared to prior CT chest on 10/2019, ILD is stable.       Plan:  Get high res CT  Continue O2 with exertion

## 2020-12-28 ENCOUNTER — TELEPHONE (OUTPATIENT)
Dept: INTERNAL MEDICINE CLINIC | Age: 80
End: 2020-12-28

## 2020-12-28 NOTE — TELEPHONE ENCOUNTER
Patient requesting a medication refill.   Medication metFORMIN (GLUCOPHAGE)   Dosage 500 MG tablet   FrequencyTAKE ONE TABLET BY MOUTH EVERY MORNING AND TAKE TWO TABLETS BY MOUTH AT NIGHT WITH DINNER  Last filled on 9/22/20  Hal DELACRUZFirst[facility] HospitalASHLEY [facility], [address] [place] [address] [phone]

## 2021-01-12 ENCOUNTER — CARE COORDINATION (OUTPATIENT)
Dept: CARE COORDINATION | Age: 81
End: 2021-01-12

## 2021-01-12 NOTE — CARE COORDINATION
Ambulatory Care Coordination Note  1/12/2021  CM Risk Score: 6  Charlson 10 Year Mortality Risk Score: 98%     ACC: Rema Arreola RN    Summary Note: no newly presenting concerns requiring further ACC support @ this time. Approaching 1 yr anniversary of 's passing. Pt states she is adjusting to the loss. Continues to decline need/want for grief counseling. Pt is scheduled for diagnostic testing next Wednesday. She is hopeful this testing will help providers to know more about potential cause for poor appetite. MOW service resumed this month (pt had suspended MOW over the holidays)  Pt verbalized understanding of above and agreement with the following     Plan: graduate from [facility] d/t goals met  Pt will self outreach to [facility] in future as needed  Advise PCP care team pt would like to be notified when number available to call for scheduling Covid vaccine. M will also attempt one subsequent outreach to advise, as number for scheduling Covid vaccination is made available to [facility] patients. General Assessment    Do you have any symptoms that are causing concern?: No         Care Coordination Interventions    Program Enrollment: Complex Care  Referral from Primary Care Provider: Yes  Suggested Interventions and Community Resources  Fall Risk Prevention: Completed (Comment: fall March 2020 'legs went out from under me while standing in my kitchen\"; pt states it was thought to be r/t UTI diagnosed soon thereafter)  Grief Counselor: Declined (Comment: 10.14.20 declines need ( Feb 2020))  Meals on Wheels: In Process (Comment: 10.14.20 pending pt to review w/[employer] Intake for eligibility)  Registered Dietician: Completed (Comment: 10.14.20 referral to RD for pt concerns r/t unplanned weight loss; poor appetite)  Senior Services: Completed (Comment: 10.14.20 referral to [employer] for intake interview for eligibility for HHA homemaking, MOW, & med transportation support if eligible.  Pt states preference to discuss (PRILOSEC) 20 MG delayed release capsule Take 20 mg by mouth daily    Historical Provider, MD   irbesartan (AVAPRO) 300 MG tablet Take 300 mg by mouth nightly    Historical Provider, MD   ondansetron (ZOFRAN) 4 MG tablet Take 1 tablet by mouth every 8 hours as needed for Nausea or Vomiting 7/28/20 7/28/21  NATALIE Castro   blood glucose test strips (ASCENSIA AUTODISC VI;ONE TOUCH ULTRA TEST VI) strip One Touch Ultra test Strips testing daily per E11.9 7/27/20   AMEE Moser CNP   lidocaine (LIDODERM) 5 % Place 1 patch onto the skin every 12 hours 12 hours on, 12 hours off. 5/5/20   Kamaljit Smith MD   amLODIPine (NORVASC) 10 MG tablet Take 1 tablet by mouth daily 3/13/20   AMEE Moser CNP   FREESTYLE LANCETS MISC 1 each by Does not apply route daily 11/26/19   AMEE Moser CNP   blood glucose test strips (FREESTYLE LITE) strip 1 each by In Vitro route daily Testing 1-2 times daily per e11.9 11/26/19   AMEE Moser CNP   blood glucose monitor strips Test 2 times a day & as needed for symptoms of irregular blood glucose. One Touch Verio strips 7/24/19   Litzy Ledezma MD   Lancets MISC 1 each by Does not apply route 2 times daily 7/24/19   Litzy Ledezma MD   [facility] LANCETS 25D MISC USE ONE LANCET TO TEST TWICE A DAY per E11.9 3/5/18   Litzy Ledezma MD   Cyanocobalamin (B-12 PO) Take by mouth daily     Historical Provider, MD   Probiotic Product (PROBIOTIC ACIDOPHILUS) CAPS Take 1 capsule by mouth daily    Historical Provider, MD   VITAMIN D, CHOLECALCIFEROL, PO Take 1 tablet by mouth daily    Historical Provider, MD   vitamin E 1000 UNITS capsule Take 1,000 Units by mouth daily.       Historical Provider, MD       Future Appointments   Date Time Provider Romina Shipman   1/20/2021 10:30 AM [facility] CT RM 1 [facility] CT [facility]   2/23/2021 10:00 AM AMEE Moser CNP [facility]

## 2021-01-12 NOTE — CARE COORDINATION
Outreach for follow up/review progress towards goals. Inquired pt status, any newly identified needs. Provided & repeated direct callback to reach [facility]  Further reviewed that the frequency of routine outreach initiated by [facility] may be lesser, at present time, as [facility] continues to support Covid CT outreach as well. For this reason, repeated invitation for pt/family to make outreach to [facility] as needed as well, between [facility] outreach attempts.

## 2021-01-15 ENCOUNTER — CARE COORDINATION (OUTPATIENT)
Dept: CARE COORDINATION | Age: 81
End: 2021-01-15

## 2021-01-15 NOTE — CARE COORDINATION
Per pt prior request - outreach made to provide number to call to schedule for Covid vaccine [phone]  Pt accurately read back and verbalized appreciation for the information.

## 2021-01-19 ENCOUNTER — TELEPHONE (OUTPATIENT)
Dept: PULMONOLOGY | Age: 81
End: 2021-01-19

## 2021-01-19 NOTE — TELEPHONE ENCOUNTER
Ms. Rosemary Barragan would like you to know that CT chest that you ordered is scheduled for Wed., 01/20/2021. She would like communication re: the result when available.     She can be reached at [phone]

## 2021-01-20 ENCOUNTER — HOSPITAL ENCOUNTER (OUTPATIENT)
Dept: CT IMAGING | Age: 81
Discharge: HOME | End: 2021-01-20
Payer: MEDICARE

## 2021-01-20 DIAGNOSIS — J84.89 NSIP (NONSPECIFIC INTERSTITIAL PNEUMONIA) (HCC): ICD-10-CM

## 2021-01-20 PROCEDURE — 71250 CT THORAX DX C-: CPT

## 2021-01-20 NOTE — TELEPHONE ENCOUNTER
I attempted to call her back, she did not answer. Her CT scan is stable. No need for any new intervention.       Lets see her again in 3 months    Thanks

## 2021-01-21 ENCOUNTER — TELEPHONE (OUTPATIENT)
Dept: INTERNAL MEDICINE CLINIC | Age: 81
End: 2021-01-21

## 2021-01-27 ENCOUNTER — TELEPHONE (OUTPATIENT)
Dept: INTERNAL MEDICINE CLINIC | Age: 81
End: 2021-01-27

## 2021-02-10 RX ORDER — ROSUVASTATIN CALCIUM 40 MG/1
TABLET, COATED ORAL
Qty: 90 TABLET | Refills: 0 | Status: SHIPPED | OUTPATIENT
Start: 2021-02-10 | End: 2021-06-07 | Stop reason: SDUPTHER

## 2021-02-23 ENCOUNTER — OFFICE VISIT (OUTPATIENT)
Dept: INTERNAL MEDICINE CLINIC | Age: 81
End: 2021-02-23
Payer: MEDICARE

## 2021-02-23 VITALS
DIASTOLIC BLOOD PRESSURE: 68 MMHG | WEIGHT: 123 LBS | TEMPERATURE: 97.3 F | BODY MASS INDEX: 21 KG/M2 | SYSTOLIC BLOOD PRESSURE: 138 MMHG | HEART RATE: 76 BPM | HEIGHT: 64 IN

## 2021-02-23 DIAGNOSIS — F41.9 ANXIETY: ICD-10-CM

## 2021-02-23 DIAGNOSIS — G89.29 CHRONIC BILATERAL LOW BACK PAIN WITHOUT SCIATICA: ICD-10-CM

## 2021-02-23 DIAGNOSIS — E08.21 DIABETES MELLITUS DUE TO UNDERLYING CONDITION WITH DIABETIC NEPHROPATHY, WITHOUT LONG-TERM CURRENT USE OF INSULIN (HCC): ICD-10-CM

## 2021-02-23 DIAGNOSIS — M54.50 CHRONIC BILATERAL LOW BACK PAIN WITHOUT SCIATICA: ICD-10-CM

## 2021-02-23 PROBLEM — S39.012A LUMBAR STRAIN: Status: RESOLVED | Noted: 2018-10-02 | Resolved: 2021-02-23

## 2021-02-23 PROBLEM — J84.89 NSIP (NONSPECIFIC INTERSTITIAL PNEUMONIA) (HCC): Status: RESOLVED | Noted: 2019-12-10 | Resolved: 2021-02-23

## 2021-02-23 PROBLEM — K52.9 COLITIS: Status: RESOLVED | Noted: 2019-01-30 | Resolved: 2021-02-23

## 2021-02-23 PROCEDURE — 3288F FALL RISK ASSESSMENT DOCD: CPT | Performed by: NURSE PRACTITIONER

## 2021-02-23 PROCEDURE — 99214 OFFICE O/P EST MOD 30 MIN: CPT | Performed by: NURSE PRACTITIONER

## 2021-02-23 RX ORDER — OMEPRAZOLE 20 MG/1
40 CAPSULE, DELAYED RELEASE ORAL DAILY
Qty: 30 CAPSULE | Refills: 0
Start: 2021-02-23 | End: 2021-08-26

## 2021-02-23 RX ORDER — OMEGA-3/DHA/EPA/FISH OIL 1000 MG
1 CAPSULE ORAL DAILY
Qty: 90 CAPSULE | Refills: 0 | Status: SHIPPED | OUTPATIENT
Start: 2021-02-23

## 2021-02-23 ASSESSMENT — ENCOUNTER SYMPTOMS
EYE ITCHING: 0
DIARRHEA: 0
RHINORRHEA: 0
VOMITING: 0
SINUS PRESSURE: 0
EYE REDNESS: 0
NAUSEA: 0
ABDOMINAL PAIN: 0
BACK PAIN: 0
COUGH: 0
WHEEZING: 0
CHEST TIGHTNESS: 0
COLOR CHANGE: 0
SORE THROAT: 0
CONSTIPATION: 0
BLOOD IN STOOL: 0
SHORTNESS OF BREATH: 0

## 2021-02-23 ASSESSMENT — PATIENT HEALTH QUESTIONNAIRE - PHQ9
SUM OF ALL RESPONSES TO PHQ9 QUESTIONS 1 & 2: 0
2. FEELING DOWN, DEPRESSED OR HOPELESS: 0
1. LITTLE INTEREST OR PLEASURE IN DOING THINGS: 0

## 2021-02-23 NOTE — ASSESSMENT & PLAN NOTE
With weight loss from grief, and continued controlled A1c's at this time we will stop all diabetes medication and follow-up in April to see how her A1c is doing. Encouraged her to eat well and continue to monitor her carb and sugar intake. Advised that she may benefit from 5 smaller meals throughout the day rather than 3 bigger meals. Also suggest drinking a protein shake daily in addition to her meals not to be used as a meal replacement.

## 2021-02-23 NOTE — ASSESSMENT & PLAN NOTE
Anxiety and depression seem to be well controlled on current treatment plan there will be no changes at this time.

## 2021-03-22 DIAGNOSIS — I10 ESSENTIAL HYPERTENSION, BENIGN: ICD-10-CM

## 2021-03-22 RX ORDER — AMLODIPINE BESYLATE 10 MG/1
TABLET ORAL
Qty: 90 TABLET | Refills: 0 | Status: SHIPPED | OUTPATIENT
Start: 2021-03-22 | End: 2021-06-07 | Stop reason: SDUPTHER

## 2021-03-22 RX ORDER — ESCITALOPRAM OXALATE 20 MG/1
TABLET ORAL
Qty: 90 TABLET | Refills: 0 | Status: SHIPPED | OUTPATIENT
Start: 2021-03-22 | End: 2021-06-15

## 2021-04-05 ENCOUNTER — OFFICE VISIT (OUTPATIENT)
Dept: PULMONOLOGY | Age: 81
End: 2021-04-05
Payer: MEDICARE

## 2021-04-05 VITALS
WEIGHT: 128 LBS | TEMPERATURE: 97.2 F | HEART RATE: 69 BPM | BODY MASS INDEX: 23.55 KG/M2 | OXYGEN SATURATION: 95 % | HEIGHT: 62 IN | SYSTOLIC BLOOD PRESSURE: 114 MMHG | DIASTOLIC BLOOD PRESSURE: 60 MMHG

## 2021-04-05 DIAGNOSIS — J84.89 NSIP (NONSPECIFIC INTERSTITIAL PNEUMONIA) (HCC): Primary | ICD-10-CM

## 2021-04-05 PROCEDURE — 99214 OFFICE O/P EST MOD 30 MIN: CPT | Performed by: INTERNAL MEDICINE

## 2021-04-05 ASSESSMENT — ENCOUNTER SYMPTOMS
WHEEZING: 0
COUGH: 0
SHORTNESS OF BREATH: 1
CHEST TIGHTNESS: 0
EYE REDNESS: 0

## 2021-04-05 NOTE — PROGRESS NOTES
Overall she feels fine. She started driving. Mask is making her SOB. Walking is limited due to back pain. She still has O2 but came to the office without O2. There is no cough, however she does have sputum production in the morning. He had EGD 2 weeks ago with esophageal dilatation. She also has hiatal hernia and dose was increased to 40mg.

## 2021-04-05 NOTE — PROGRESS NOTES
[facility]    Outpatient Note    Subjective:   CHIEF COMPLAINT:   Chief Complaint   Patient presents with    Follow-up     Interval history on 4/5/21  Overall she feels fine. She started driving. Mask is making her SOB. Walking is limited due to back pain. She still has O2 but came to the office without it     There is no cough, however she does have sputum production in the morning. He had EGD 2 weeks ago with esophageal dilatation. She also has hiatal hernia for which she is on PPI. Dose was increased to 40mg recently. Interval history on 12/17/20  Overall she feels her breathing is better. She gets SOB with exertion. sats goes down to 79% with exertion. Appetite is poor since  passed away. Interval history on 6/25/20  Overall she feels fine. She is on 2 liters of O2.  sats at rest are 99%. She is following social distancing   She stopped using prednisone 3 weeks ago. However she is getting epidural steroids due to weakness. It was tapered off. Interval history on 3/5/20  Lost her  last month. She came accompanied by a family member who is staying with her these days. There is no SOB at rest.  She is using O2 at home with exertion. Overall she feels her breathing is better. She is still on prednisone 15mg daily. Interval history on 2/3/20  No major events since I saw her last.  She doesn't use O2 unless she feels she needs it. She feels better. Main complain today is left hip pain. Interval history on 1/7/20  Patient is here today for regular f/u. Overall she feels she is getting stronger,. She is not using O2 supplement when getting outside as the portable concentrator bothers her due to its weight. She remains on prednisone 15mg daily     Interval history on 12/10/19  She feels better and denies have SOB at rest however she does have SOB on exertion for which she has to stop.    She doesn't like using O2. Pulse ox at rest is 97%, however it goes down within few minutes of exertion    Interval history 11/5/2019  Patient is here today for post hospital follow-up. She was admitted to the hospital on 10/13/2019 and discharged on 10/16/2019. She was admitted with acute hypoxic respiratory failure and pneumonia. She was treated with IV antibiotics and steroids and was discharged on tapering dose prednisone. She was discharged on oxygen. At the time of evaluation she was on room air and she was feeling better. She denied increasing cough or sputum production. There is no fever or chills    Interval history on 9/10/19  Overall she feels better. Coughing is less, and she is able to walk longer distances. Pulse ox is in the mid 90s. There is no fever or chills    Interval history on 7/26/19  Continue to have cough, though she was given taper dose steroids and felt better on it. There is no fever or chills. Sputum is clear. She was seen by rheumatology, there is no evidence of active rheumatological illness. Anti SSA, SSB, CK was ordered. HPI:  On 7/1/19   The patient is 68 y.o. female who presents today for a new patient visit for SOB. This is not entirely new, but apparently it is slowly getting worse. In the mornings feels out of breath while doing her usual ADL but this gets better as she sit to have breakfast.    She gets SOB on brisk walking and going up hills. She was diagnosed with walking pneumonia in May. At that time she was tired and out of breath. She has chronic cough with occasional sputum production, which is white in color. There is some wt loss ~ 10 lbs after getting pneumonia but gained back two lbs     No nasal congestion but has throat congestion. No post nasal drip. Has GERD for about two years. On omeprazole once daily   She has dry eyes, no dryness in the mouth  No hx of joint pain, warmth or stiffness. No hx of rash.       Past blood glucose monitor strips Test 2 times a day & as needed for symptoms of irregular blood glucose. One Touch Verio strips 100 strip 2    Cyanocobalamin (B-12 PO) Take by mouth daily       VITAMIN D, CHOLECALCIFEROL, PO Take 1 tablet by mouth daily      vitamin E 1000 UNITS capsule Take 1,000 Units by mouth daily.  diclofenac (VOLTAREN) 50 MG EC tablet Take 1 tablet by mouth 3 times daily (with meals) (Patient not taking: Reported on 4/5/2021) 60 tablet 3     No current facility-administered medications on file prior to visit. REVIEW OF SYSTEMS:    Review of Systems   Constitutional: Negative for chills and fever. HENT: Negative for postnasal drip. Eyes: Negative for redness. Dry eyes   Respiratory: Positive for shortness of breath (on exertion). Negative for cough, chest tightness and wheezing. Cardiovascular: Negative for chest pain, palpitations and leg swelling. Gastrointestinal:        GERD   Musculoskeletal: Negative for arthralgias and joint swelling. Skin: Negative for rash. Neurological: Negative for weakness. Psychiatric/Behavioral: The patient is not nervous/anxious. All other systems reviewed and are negative.         Objective:   PHYSICAL EXAM:        VITALS:  /60 (Site: Right Upper Arm, Position: Sitting, Cuff Size: Medium Adult)   Pulse 69   Temp 97.2 °F (36.2 °C) (Infrared)   Ht 5' 2\" (1.575 m)   Wt 128 lb (58.1 kg)   SpO2 95%   BMI 23.41 kg/m²     Physical Exam    DATA:    CT chest with contrast       HISTORY: Pulmonary nodules.       COMPARISON: February 7, 2019.       Individualized dose optimization technique was used in order to meet ALARA standards for radiation dose reduction.  In addition to vendor specific dose reduction algorithms, the dose reduction techniques vary based on the specific scanner utilized but    frequently include automated exposure control, adjustment of the mA and/or kV according to patient size, and use of iterative reconstruction technique.           FINDINGS:       Basilar predominant groundglass opacity with areas of traction bronchiectasis are similar to the prior examination. There is no definite honeycombing identified.       7 mm nodule identified in the right upper lobe (image 38, series 200) is unchanged since prior exam.       5 mm nodule in the left upper lobe (image 27, series 2) unchanged. No new or enlarging pulmonary nodules.       A small pericardial effusion is identified. Mildly enlarged mediastinal lymph nodes are stable since the prior examination.       Hiatal hernia is present.       There is no destructive bone lesion.           Impression   1. Stable pulmonary nodules. Continued follow-up is indicated. 2. Stable appearance of interstitial lung disease as described above. There is no definite honeycombing. The favored differential diagnosis is nonspecific interstitial pneumonia. This would be an atypical appearance for usual interstitial pneumonia. 3. Mediastinal adenopathy, stable. 4. Hiatal hernia. Echo on 6/11/19  Normal left ventricle size. There is mild concentric left ventricular   hypertrophy. Overall left ventricular systolic function appears normal with   an ejection fraction visually estimated at 55%. No regional wall motion   abnormalities are noted. Diastolic filling parameters suggest grade II   diastolic dysfunction.   Trace mitral regurgitation is present.   Trivial tricuspid regurgitation. Estimated pulmonary artery systolic   pressure is elevated at 44 mmHg assuming a right atrial pressure of 3 mmHg. PFT  DATE OF PROCEDURE:  06/11/2019     INDICATION:  Shortness of breath.     BMI:  31.6.     TOBACCO HISTORY:  Never smoked.     Spirometry data is acceptable and reproducible.     Lung volumes performed via plethysmography.     Room air oxygen saturation is 92%.     SPIROMETRY:  FEV1 to FVC ratio is 80%. Prebronchodilator FEV1 is 1.12,  which is 58% of predicted. Postbronchodilator FEV1 is 1.36 which is 71%  of predicted for a 21% increase. Prebronchodilator FVC is 1.4, which is  54% of predicted. Postbronchodilator FVC is 1.84 which is 71% of  predicted for 31% increase.     Lung volumes showed total lung capacity of 3.04 which is 62% of  predicted. Residual volume is 1.46 which is 64% of predicted.     Diffusion capacity is 9.61, which is 45% of predicted. This is not  adjusted for hemoglobin.     IMPRESSION:  1. No obstructive defect. 2.  There is a significant response to bronchodilators in small and  large airways. 3.  Moderate restrictive defect is present. 4.  Moderate decrease in diffusion capacity    CT scan on 10/8/19  1. Stable findings compared prior study. Nonspecific interstitial pneumonia is favored in the absence of lower lobe predominance and lack of honeycomb changes. 2. Stable right upper lobe pulmonary nodule. 3. Annual surveillance with high-resolution CT is recommended for. Large hiatal hernia. 5. Coronary artery calcification     01/20/2020   PULMONARY FUNCTION TEST     INDICATIONS:  Interstitial lung disease.     BMI:  31.5.     TOBACCO HISTORY:  Never smoked.     Spirometry data is acceptable and reproducible.     Lung volumes performed via plethysmography.     Room air oxygen saturation is 97%.     SPIROMETRY:  FEV1 to FVC ratio is 86%. Prebronchodilator FEV1 is 1.57,  which is 91% of predicted. Prebronchodilator FVC is 1.83, which is 78%  of predicted.     Lung volumes show total lung capacity of 2.88, which is 63% of  predicted. Residual volume is 0.92, which is 42% of predicted.     Diffusion capacity is 16.02, which is 80% of predicted.     IMPRESSION:  1. No obstructive defect. 2.  There is a mild restrictive defect. 3.  Diffusion capacity is normal.  4.  When compared to previous pulmonary function tests dated 10/08/2019,  there is a mild-to-moderate drop in FVC, FEV1, and total lung capacity.    Diffusion capacity is substantially higher, almost double making me  wonder his diffusion capacity in 10/2019 was real.     Six-minute walk was initially performed on room air. Of note, the  patient is on home oxygen 2 liters. At rest on room air, the patient's  oxygen saturation was 98%. Within 2 minutes, it dropped to 85%  necessitating pause and walk to place 2 liters of oxygen on. With 2  liters of oxygen, her oxygen saturation maintained 93 to 97%. The  patient walked a total of 660 feet.     The patient has significant oxygen desaturation with submaximal  exercise. She does qualify for home oxygen with exertion and oxygen  flow rate is deemed to be 2 liters. pulmonary function tests and 6MWT on 6/2/20  6/3/2020 10:20 AM  Pulmonary Function Test:     Indication: NSIP    Test comment:     Spirometry data is acceptable and reproducible.     Maximum effort given during the test.    Pulse ox is 95% on room air    Estimated body mass index is 26.15 kg/m² as calculated from the   following:    Height as of 5/5/20: 5' 2.01\" (1.575 m).    Weight as of 5/26/20: 143 lb (64.9 kg).     Spirometry data:    FEV1/FVC: 87. Predicted ratio 74    FEV1 1.59L, which is 87% predicted    FVC is 1.82L, which is 74% predicted    Lung Volumes:    TLC (by Plethysmography) is 2.63L, which is 55% predicted    RV is 0.67L which is 29% predicted    Diffusion Capacity:    DLCO is 5.43 which is 26% predicted    Impression:    1. There is no obstruction present    2. There is restriction.  Based on FEV1 it is considered mild   however based on TLC it is considered severe    3. There is severe reduction in diffusion capacity    Comment:   PFT was compared to the one on 1/20/20 - FEV1 and FVC are stable.      SIX-MINUTE WALK:    A six-minute walk was started on room air.  Patient was placed on   2 liters of O2 at minute 4   The patientwalked a total of 510 feet.  She did stop or pause   during the walk to place O2 and due to back pain.  Baseline  oxygen saturation 95%.  The lowest oxygen saturation during the   walk was 82% on minute 3 for which she was placed on 2 liters.    O2 recovered to 95-98% during the remaining time of testing. 6MWT was compared to the one we have on 10/8/19:  Glenda Parker walked decreased from 660ft to 510ft  At that time O2 sats dropped to 86% on minute 4 and placed on O2   on minute 5    Comment: 6MWT is slightly worse than how it was last year on   10/8/19     High res CT on 1/20/21  Stable findings of the chest when compared with the prior study. Findings again favor an NSIP pattern of interstitial lung disease.       Stable pulmonary nodularity. Assessment:      Diagnosis Orders   1. NSIP (nonspecific interstitial pneumonia) ([address]  Full PFT Study With Bronchodilator    Carbon Monoxide Diffusing Capacity    [facility]       Plan:   68years old female with NSIP. Diagnosis is based on clinical presentation, CT findings and response to steroids. She also has large hiatal hernia      PFT and 6MWT on 6/2/20 are essentially stable. Had bronch on 8/1/19  Started on prednisone @ 20mg on 8/5/19 decrease to 15 mg in Dec. 2019  Tapered off around mid May 2020 due to weakness and back issues. CT scan on 1/20/21   Reviewed: stable. She has O2, but came to the office without it. When walking the main limitation is due to back pain. 6MWT was done in the office today. On minute 4 pulse ox was 89% but she had to stop due to back pain. (There is no specific exposure that she or her family can remember  She is not on chronic medication that is known to cause ILD  She does not have pets  Hypersensitivity panel is negative   Rheumatoid factor, GIUSEPPE and sed rate are negative. SSA and SSB are negative  She was seen by rheumatology. There is no evidence of active rheumatic disease. BAL is negative for infectious etiology)     Plan:  Continue O2 with exertion  She had COVID vaccine.    Refer to pulmonary rehab  PFT in 6 months to assure stability

## 2021-04-22 ENCOUNTER — HOSPITAL ENCOUNTER (OUTPATIENT)
Dept: GENERAL RADIOLOGY | Age: 81
Discharge: HOME | End: 2021-04-22
Payer: MEDICARE

## 2021-04-22 ENCOUNTER — HOSPITAL ENCOUNTER (OUTPATIENT)
Age: 81
Discharge: HOME | End: 2021-04-22
Payer: MEDICARE

## 2021-04-22 ENCOUNTER — OFFICE VISIT (OUTPATIENT)
Dept: INTERNAL MEDICINE CLINIC | Age: 81
End: 2021-04-22
Payer: MEDICARE

## 2021-04-22 VITALS
HEIGHT: 62 IN | WEIGHT: 126 LBS | DIASTOLIC BLOOD PRESSURE: 76 MMHG | HEART RATE: 72 BPM | BODY MASS INDEX: 23.19 KG/M2 | SYSTOLIC BLOOD PRESSURE: 128 MMHG

## 2021-04-22 DIAGNOSIS — J84.89 NSIP (NONSPECIFIC INTERSTITIAL PNEUMONIA) (HCC): ICD-10-CM

## 2021-04-22 DIAGNOSIS — M54.50 CHRONIC MIDLINE LOW BACK PAIN WITHOUT SCIATICA: ICD-10-CM

## 2021-04-22 DIAGNOSIS — G89.29 CHRONIC MIDLINE LOW BACK PAIN WITHOUT SCIATICA: ICD-10-CM

## 2021-04-22 DIAGNOSIS — I10 ESSENTIAL HYPERTENSION, BENIGN: ICD-10-CM

## 2021-04-22 DIAGNOSIS — M54.50 CHRONIC MIDLINE LOW BACK PAIN WITHOUT SCIATICA: Primary | ICD-10-CM

## 2021-04-22 DIAGNOSIS — G89.29 CHRONIC MIDLINE LOW BACK PAIN WITHOUT SCIATICA: Primary | ICD-10-CM

## 2021-04-22 PROCEDURE — 72220 X-RAY EXAM SACRUM TAILBONE: CPT

## 2021-04-22 PROCEDURE — 72100 X-RAY EXAM L-S SPINE 2/3 VWS: CPT

## 2021-04-22 PROCEDURE — 99214 OFFICE O/P EST MOD 30 MIN: CPT | Performed by: NURSE PRACTITIONER

## 2021-04-22 PROCEDURE — 3288F FALL RISK ASSESSMENT DOCD: CPT | Performed by: NURSE PRACTITIONER

## 2021-04-22 RX ORDER — TRAZODONE HYDROCHLORIDE 100 MG/1
100 TABLET ORAL NIGHTLY PRN
Qty: 90 TABLET | Refills: 1 | Status: SHIPPED | OUTPATIENT
Start: 2021-04-22 | End: 2021-08-18

## 2021-04-22 RX ORDER — TRAMADOL HYDROCHLORIDE 50 MG/1
50 TABLET ORAL EVERY 4 HOURS PRN
Qty: 18 TABLET | Refills: 0 | Status: SHIPPED | OUTPATIENT
Start: 2021-04-22 | End: 2021-06-01

## 2021-04-22 RX ORDER — LIDOCAINE 50 MG/G
1 PATCH TOPICAL EVERY 12 HOURS
Qty: 30 PATCH | Refills: 0 | Status: SHIPPED | OUTPATIENT
Start: 2021-04-22 | End: 2022-02-23 | Stop reason: ALTCHOICE

## 2021-04-22 ASSESSMENT — PATIENT HEALTH QUESTIONNAIRE - PHQ9
SUM OF ALL RESPONSES TO PHQ9 QUESTIONS 1 & 2: 0
SUM OF ALL RESPONSES TO PHQ QUESTIONS 1-9: 0
1. LITTLE INTEREST OR PLEASURE IN DOING THINGS: 0
SUM OF ALL RESPONSES TO PHQ QUESTIONS 1-9: 0
DEPRESSION UNABLE TO ASSESS: FUNCTIONAL CAPACITY MOTIVATION LIMITS ACCURACY
SUM OF ALL RESPONSES TO PHQ QUESTIONS 1-9: 0
2. FEELING DOWN, DEPRESSED OR HOPELESS: 0

## 2021-04-22 ASSESSMENT — ENCOUNTER SYMPTOMS
BACK PAIN: 1
EYE ITCHING: 0
CONSTIPATION: 0
EYE REDNESS: 0
VOMITING: 0
SORE THROAT: 0
SINUS PRESSURE: 0
ABDOMINAL PAIN: 0
CHEST TIGHTNESS: 0
RHINORRHEA: 0
SHORTNESS OF BREATH: 1
NAUSEA: 0
DIARRHEA: 0
WHEEZING: 0
COUGH: 0
BLOOD IN STOOL: 0
COLOR CHANGE: 0

## 2021-04-22 NOTE — ASSESSMENT & PLAN NOTE
Encouraged to wear O2 at least at nighttime, especially with reports of generalized pain in the morning.

## 2021-04-22 NOTE — PROGRESS NOTES
Romel Mancini (:  1940) is a 68 y.o. female,Established patient, here for evaluation of the following chief complaint(s): Other (2 month Follow up)      ASSESSMENT/PLAN:  1. Chronic midline low back pain without sciatica  Assessment & Plan:  RX for tramadol. X-rays ordered. Orders:  -     XR LUMBAR SPINE (2-3 VIEWS); Future  -     XR SACRUM COCCYX (MIN 2 VIEWS); Future  -     traMADol (ULTRAM) 50 MG tablet; Take 1 tablet by mouth every 4 hours as needed for Pain for up to 3 days. Intended supply: 3 days. Take lowest dose possible to manage pain, Disp-18 tablet, R-0Normal  2. Essential hypertension, benign  Assessment & Plan:  Stable, controlled  No changes  Continue current treatment plan   3. NSIP (nonspecific interstitial pneumonia) ([facility] [address]  Assessment & Plan:  Encouraged to wear O2 at least at nighttime, especially with reports of generalized pain in the morning. No follow-ups on file. SUBJECTIVE/OBJECTIVE:  Patient is here for f/u in chronic medical conditions. Reporting morning pain arthritis in bilateral hands and lower back is getting worse, especially in the morning, and she can hardly move. Has to ice lower back every morning. Interested in pain medication. Patient reports SOB, but refuses to wear O2. Follows with pulmonology. She has completed her living will and would like to update that in the chart. She has had her covid vaccines. Current Outpatient Medications   Medication Sig Dispense Refill    diclofenac (VOLTAREN) 50 MG EC tablet Take 1 tablet by mouth 3 times daily (with meals) 60 tablet 3    lidocaine (LIDODERM) 5 % Place 1 patch onto the skin every 12 hours 12 hours on, 12 hours off. 30 patch 0    traMADol (ULTRAM) 50 MG tablet Take 1 tablet by mouth every 4 hours as needed for Pain for up to 3 days. Intended supply: 3 days.  Take lowest dose possible to manage pain 18 tablet 0    traZODone (DESYREL) 100 MG tablet Take 1 tablet by mouth nightly as needed for Sleep 90 tablet 1    escitalopram (LEXAPRO) 20 MG tablet TAKE ONE TABLET BY MOUTH DAILY 90 tablet 0    amLODIPine (NORVASC) 10 MG tablet TAKE ONE TABLET BY MOUTH DAILY 90 tablet 0    omeprazole (PRILOSEC) 20 MG delayed release capsule Take 2 capsules by mouth daily 30 capsule 0    Probiotic Product (PROBIOTIC ACIDOPHILUS BIOBEADS) CAPS Take 1 capsule by mouth daily 90 capsule 0    rosuvastatin (CRESTOR) 40 MG tablet TAKE ONE TABLET BY MOUTH DAILY 90 tablet 0    Diclofenac Sodium POWD 2 g by Does not apply route 4 times daily Formula #8E Baclo 2% Diclo 3% DMSO 5% Evans 6% Lido 2% Prilo 2% 240 g 11    ondansetron (ZOFRAN) 4 MG tablet Take 1 tablet by mouth every 8 hours as needed for Nausea or Vomiting 10 tablet 0    blood glucose test strips (ASCENSIA AUTODISC VI;ONE TOUCH ULTRA TEST VI) strip One Touch Ultra test Strips testing daily per E11.9 100 each 3    blood glucose test strips (FREESTYLE LITE) strip 1 each by In Vitro route daily Testing 1-2 times daily per e11.9 100 each 3    blood glucose monitor strips Test 2 times a day & as needed for symptoms of irregular blood glucose. One Touch Verio strips 100 strip 2    Cyanocobalamin (B-12 PO) Take by mouth daily       VITAMIN D, CHOLECALCIFEROL, PO Take 1 tablet by mouth daily      vitamin E 1000 UNITS capsule Take 1,000 Units by mouth daily. No current facility-administered medications for this visit. Review of Systems   Constitutional: Negative for chills, fatigue and fever. HENT: Negative for congestion, ear pain, postnasal drip, rhinorrhea, sinus pressure, sneezing and sore throat. Eyes: Negative for redness and itching. Respiratory: Positive for shortness of breath. Negative for cough, chest tightness and wheezing. Cardiovascular: Negative for chest pain and palpitations. Gastrointestinal: Negative for abdominal pain, blood in stool, constipation, diarrhea, nausea and vomiting.    Endocrine: Negative for cold intolerance and heat intolerance. Genitourinary: Negative for difficulty urinating, dysuria, flank pain, frequency, hematuria and urgency. Musculoskeletal: Positive for arthralgias and back pain. Negative for joint swelling and myalgias. Skin: Negative for color change, pallor, rash and wound. Allergic/Immunologic: Negative for environmental allergies and food allergies. Neurological: Negative for dizziness, seizures, syncope, weakness, light-headedness, numbness and headaches. Hematological: Negative for adenopathy. Does not bruise/bleed easily. Psychiatric/Behavioral: Negative for confusion, sleep disturbance and suicidal ideas. The patient is not nervous/anxious and is not hyperactive. Vitals:    04/22/21 1033   BP: 128/76   Site: Left Upper Arm   Position: Sitting   Cuff Size: Medium Adult   Pulse: 72   Weight: 126 lb (57.2 kg)   Height: 5' 2\" (1.575 m)       Physical Exam  Constitutional:       Appearance: Normal appearance. She is well-developed. HENT:      Head: Normocephalic and atraumatic. Right Ear: Hearing normal.      Left Ear: Hearing normal.      Nose: No mucosal edema. Right Sinus: No maxillary sinus tenderness or frontal sinus tenderness. Left Sinus: No maxillary sinus tenderness or frontal sinus tenderness. Mouth/Throat: Tonsils: No tonsillar abscesses. Eyes:      Extraocular Movements: Extraocular movements intact. Pupils: Pupils are equal, round, and reactive to light. Cardiovascular:      Rate and Rhythm: Normal rate and regular rhythm. Pulses: Normal pulses. Heart sounds: Normal heart sounds. Pulmonary:      Effort: Pulmonary effort is normal.      Breath sounds: Normal breath sounds. Lymphadenopathy:      Head:      Right side of head: No submental, submandibular, tonsillar, preauricular, posterior auricular or occipital adenopathy.       Left side of head: No submental, submandibular, tonsillar, preauricular, posterior auricular or occipital adenopathy. Skin:     General: Skin is warm and dry. Neurological:      Mental Status: She is alert. Psychiatric:         Mood and Affect: Mood normal.         Behavior: Behavior normal.                 An electronic signature was used to authenticate this note.     --AMEE Walton - CNP

## 2021-04-28 ENCOUNTER — TELEPHONE (OUTPATIENT)
Dept: PULMONOLOGY | Age: 81
End: 2021-04-28

## 2021-04-28 DIAGNOSIS — J84.9 ILD (INTERSTITIAL LUNG DISEASE) (HCC): Primary | ICD-10-CM

## 2021-04-28 NOTE — TELEPHONE ENCOUNTER
Please advise   [facility]ie Wang at [facility] calls to ask if we can change diagnosis on referral from NSIP (nonspecific interstitial pneumonia) to ILD (Interstitial lung disease) for medicare to cover     Patient has orientation tomorrow but diagnosis needs to be changed before her first actual appointment

## 2021-04-29 ENCOUNTER — HOSPITAL ENCOUNTER (OUTPATIENT)
Dept: CARDIAC REHAB | Age: 81
Setting detail: THERAPIES SERIES
Discharge: HOME | End: 2021-04-29
Payer: MEDICARE

## 2021-04-29 PROCEDURE — G0239 OTH RESP PROC, GROUP: HCPCS

## 2021-05-03 ENCOUNTER — HOSPITAL ENCOUNTER (OUTPATIENT)
Dept: CARDIAC REHAB | Age: 81
Setting detail: THERAPIES SERIES
Discharge: HOME | End: 2021-05-03
Payer: MEDICARE

## 2021-05-03 ENCOUNTER — OFFICE VISIT (OUTPATIENT)
Dept: INTERNAL MEDICINE CLINIC | Age: 81
End: 2021-05-03
Payer: MEDICARE

## 2021-05-03 VITALS
HEART RATE: 66 BPM | BODY MASS INDEX: 22.86 KG/M2 | DIASTOLIC BLOOD PRESSURE: 60 MMHG | WEIGHT: 125 LBS | OXYGEN SATURATION: 90 % | SYSTOLIC BLOOD PRESSURE: 143 MMHG

## 2021-05-03 DIAGNOSIS — R41.0 DISORIENTED: Primary | ICD-10-CM

## 2021-05-03 DIAGNOSIS — N30.90 CYSTITIS: ICD-10-CM

## 2021-05-03 DIAGNOSIS — R35.0 URINE FREQUENCY: ICD-10-CM

## 2021-05-03 LAB
BILIRUBIN, POC: NEGATIVE
BLOOD URINE, POC: ABNORMAL
CLARITY, POC: ABNORMAL
COLOR, POC: YELLOW
GLUCOSE URINE, POC: NEGATIVE
KETONES, POC: NEGATIVE
LEUKOCYTE EST, POC: ABNORMAL
NITRITE, POC: NEGATIVE
PH, POC: 6.5
PROTEIN, POC: 30
SPECIFIC GRAVITY, POC: 1.02
UROBILINOGEN, POC: 0.2

## 2021-05-03 PROCEDURE — 81002 URINALYSIS NONAUTO W/O SCOPE: CPT | Performed by: NURSE PRACTITIONER

## 2021-05-03 PROCEDURE — 99214 OFFICE O/P EST MOD 30 MIN: CPT | Performed by: NURSE PRACTITIONER

## 2021-05-03 PROCEDURE — G0239 OTH RESP PROC, GROUP: HCPCS

## 2021-05-03 RX ORDER — SULFAMETHOXAZOLE AND TRIMETHOPRIM 800; 160 MG/1; MG/1
1 TABLET ORAL 2 TIMES DAILY
Qty: 14 TABLET | Refills: 0 | Status: SHIPPED | OUTPATIENT
Start: 2021-05-03 | End: 2021-05-10

## 2021-05-03 ASSESSMENT — ENCOUNTER SYMPTOMS
BACK PAIN: 0
DIARRHEA: 0
SINUS PAIN: 0
CONSTIPATION: 0
COUGH: 0
COLOR CHANGE: 0
ABDOMINAL PAIN: 0
SINUS PRESSURE: 0
SHORTNESS OF BREATH: 0
WHEEZING: 0

## 2021-05-03 NOTE — ASSESSMENT & PLAN NOTE
UA with leukocytes and RBCs   + delirium   Start bactrim   Send for culture   Call if symptoms worsen or fail to improve

## 2021-05-03 NOTE — TELEPHONE ENCOUNTER
Patient calls to ask if you could call her about flying. She wants to discuss options for oxygen.  May want a POC but not sure until speaking with Dr. Tonya Greer    [phone]

## 2021-05-03 NOTE — PROGRESS NOTES
Smith Swenson (:  1940) is a 68 y.o. female,Established patient, here for evaluation of the following chief complaint(s):  Urinary Tract Infection (Disoriented and confused)      ASSESSMENT/PLAN:  1. Disoriented  -     POCT Urinalysis no Micro  -     Culture, Urine  2. Urine frequency  -     POCT Urinalysis no Micro  -     Culture, Urine  3. Cystitis  Assessment & Plan:  UA with leukocytes and RBCs   + delirium   Start bactrim   Send for culture   Call if symptoms worsen or fail to improve        No follow-ups on file. SUBJECTIVE/OBJECTIVE:  HPI  Patient is here for concern of a UTI. States she started with body aches and chills on Monday of last week. Began with low-grade fever 99.9. Her son noticed slight confusion on Saturday. She denies hematuria or dysuria. She does have some bilateral flank pain although it is mild. Current Outpatient Medications   Medication Sig Dispense Refill    sulfamethoxazole-trimethoprim (BACTRIM DS;SEPTRA DS) 800-160 MG per tablet Take 1 tablet by mouth 2 times daily for 7 days 14 tablet 0    diclofenac (VOLTAREN) 50 MG EC tablet Take 1 tablet by mouth 3 times daily (with meals) 60 tablet 3    lidocaine (LIDODERM) 5 % Place 1 patch onto the skin every 12 hours 12 hours on, 12 hours off.  30 patch 0    traZODone (DESYREL) 100 MG tablet Take 1 tablet by mouth nightly as needed for Sleep 90 tablet 1    escitalopram (LEXAPRO) 20 MG tablet TAKE ONE TABLET BY MOUTH DAILY 90 tablet 0    amLODIPine (NORVASC) 10 MG tablet TAKE ONE TABLET BY MOUTH DAILY 90 tablet 0    omeprazole (PRILOSEC) 20 MG delayed release capsule Take 2 capsules by mouth daily 30 capsule 0    Probiotic Product (PROBIOTIC ACIDOPHILUS BIOBEADS) CAPS Take 1 capsule by mouth daily 90 capsule 0    rosuvastatin (CRESTOR) 40 MG tablet TAKE ONE TABLET BY MOUTH DAILY 90 tablet 0    Diclofenac Sodium POWD 2 g by Does not apply route 4 times daily Formula #8E Baclo 2% Diclo 3% DMSO 5% Evans 6% Lido 2% Prilo 2% 240 g 11    ondansetron (ZOFRAN) 4 MG tablet Take 1 tablet by mouth every 8 hours as needed for Nausea or Vomiting 10 tablet 0    blood glucose test strips (ASCENSIA AUTODISC VI;ONE TOUCH ULTRA TEST VI) strip One Touch Ultra test Strips testing daily per E11.9 100 each 3    blood glucose test strips (FREESTYLE LITE) strip 1 each by In Vitro route daily Testing 1-2 times daily per e11.9 100 each 3    blood glucose monitor strips Test 2 times a day & as needed for symptoms of irregular blood glucose. One Touch Verio strips 100 strip 2    Cyanocobalamin (B-12 PO) Take by mouth daily       VITAMIN D, CHOLECALCIFEROL, PO Take 1 tablet by mouth daily      vitamin E 1000 UNITS capsule Take 1,000 Units by mouth daily. No current facility-administered medications for this visit. Review of Systems   Constitutional: Positive for fatigue. Negative for chills and fever. HENT: Negative for congestion, sinus pressure and sinus pain. Respiratory: Negative for cough, shortness of breath and wheezing. Cardiovascular: Negative for chest pain and palpitations. Gastrointestinal: Negative for abdominal pain, constipation and diarrhea. Genitourinary: Positive for flank pain and urgency. Negative for dysuria. Musculoskeletal: Positive for myalgias. Negative for arthralgias and back pain. Skin: Negative for color change, pallor and rash. Neurological: Negative for dizziness, syncope, weakness, light-headedness and headaches. Psychiatric/Behavioral: Positive for confusion. Negative for behavioral problems and sleep disturbance. The patient is not nervous/anxious. Vitals:    05/03/21 0847   BP: (!) 143/60   Site: Right Upper Arm   Position: Sitting   Cuff Size: Medium Adult   Pulse: 66   SpO2: 90%   Weight: 125 lb (56.7 kg)       Physical Exam  Constitutional:       Appearance: She is well-developed. HENT:      Head: Normocephalic and atraumatic.    Eyes:      Conjunctiva/sclera: Conjunctivae normal.      Pupils: Pupils are equal, round, and reactive to light. Neck:      Musculoskeletal: Normal range of motion and neck supple. Thyroid: No thyromegaly. Vascular: No JVD. Cardiovascular:      Rate and Rhythm: Normal rate and regular rhythm. Heart sounds: Normal heart sounds. Pulmonary:      Effort: Pulmonary effort is normal. No respiratory distress. Breath sounds: Normal breath sounds. No wheezing. Musculoskeletal: Normal range of motion. General: No deformity. Skin:     General: Skin is warm and dry. Capillary Refill: Capillary refill takes less than 2 seconds. Neurological:      Mental Status: She is alert and oriented to person, place, and time. Psychiatric:         Mood and Affect: Mood normal.         Behavior: Behavior normal.           An electronic signature was used to authenticate this note.     --AMEE Mohan - CNP

## 2021-05-05 ENCOUNTER — HOSPITAL ENCOUNTER (OUTPATIENT)
Dept: CARDIAC REHAB | Age: 81
Setting detail: THERAPIES SERIES
Discharge: HOME | End: 2021-05-05
Payer: MEDICARE

## 2021-05-05 LAB — URINE CULTURE, ROUTINE: NORMAL

## 2021-05-05 PROCEDURE — G0239 OTH RESP PROC, GROUP: HCPCS

## 2021-05-05 NOTE — TELEPHONE ENCOUNTER
Patient calls again today to see if you could give her a call. Corky Foster    She won't be home today during 12pm and 3pm today  Please advise callback # [phone]

## 2021-05-06 NOTE — TELEPHONE ENCOUNTER
I called  Advised to use O2 at 4 liters while in the airplane. She will contact [employer] tomorrow to see if they can provide O2. I am not sure her O2 concentrator battery will last long enough on 4 liters.

## 2021-05-10 ENCOUNTER — HOSPITAL ENCOUNTER (OUTPATIENT)
Dept: CARDIAC REHAB | Age: 81
Setting detail: THERAPIES SERIES
Discharge: HOME | End: 2021-05-10
Payer: MEDICARE

## 2021-05-10 PROCEDURE — G0239 OTH RESP PROC, GROUP: HCPCS

## 2021-05-10 RX ORDER — FAMOTIDINE 20 MG
1 TABLET ORAL DAILY
Qty: 90 CAPSULE | Refills: 1 | Status: SHIPPED | OUTPATIENT
Start: 2021-05-10 | End: 2021-11-12 | Stop reason: SDUPTHER

## 2021-05-12 ENCOUNTER — HOSPITAL ENCOUNTER (OUTPATIENT)
Dept: CARDIAC REHAB | Age: 81
Setting detail: THERAPIES SERIES
Discharge: HOME | End: 2021-05-12
Payer: MEDICARE

## 2021-05-12 PROCEDURE — G0239 OTH RESP PROC, GROUP: HCPCS

## 2021-05-14 ENCOUNTER — HOSPITAL ENCOUNTER (OUTPATIENT)
Dept: CARDIAC REHAB | Age: 81
Setting detail: THERAPIES SERIES
Discharge: HOME | End: 2021-05-14
Payer: MEDICARE

## 2021-05-14 PROCEDURE — G0239 OTH RESP PROC, GROUP: HCPCS

## 2021-05-17 ENCOUNTER — HOSPITAL ENCOUNTER (OUTPATIENT)
Dept: CARDIAC REHAB | Age: 81
Setting detail: THERAPIES SERIES
Discharge: HOME | End: 2021-05-17
Payer: MEDICARE

## 2021-05-17 PROCEDURE — G0239 OTH RESP PROC, GROUP: HCPCS

## 2021-05-19 ENCOUNTER — HOSPITAL ENCOUNTER (OUTPATIENT)
Dept: CARDIAC REHAB | Age: 81
Setting detail: THERAPIES SERIES
Discharge: HOME | End: 2021-05-19
Payer: MEDICARE

## 2021-05-19 PROCEDURE — G0239 OTH RESP PROC, GROUP: HCPCS

## 2021-05-24 ENCOUNTER — HOSPITAL ENCOUNTER (OUTPATIENT)
Dept: CARDIAC REHAB | Age: 81
Setting detail: THERAPIES SERIES
Discharge: HOME | End: 2021-05-24
Payer: MEDICARE

## 2021-05-24 PROCEDURE — G0239 OTH RESP PROC, GROUP: HCPCS

## 2021-05-28 ENCOUNTER — HOSPITAL ENCOUNTER (OUTPATIENT)
Dept: CARDIAC REHAB | Age: 81
Setting detail: THERAPIES SERIES
Discharge: HOME | End: 2021-05-28
Payer: MEDICARE

## 2021-05-28 PROCEDURE — G0239 OTH RESP PROC, GROUP: HCPCS

## 2021-05-29 DIAGNOSIS — G89.29 CHRONIC MIDLINE LOW BACK PAIN WITHOUT SCIATICA: ICD-10-CM

## 2021-05-29 DIAGNOSIS — M54.50 CHRONIC MIDLINE LOW BACK PAIN WITHOUT SCIATICA: ICD-10-CM

## 2021-06-01 RX ORDER — TRAMADOL HYDROCHLORIDE 50 MG/1
TABLET ORAL
Qty: 18 TABLET | Refills: 0 | Status: SHIPPED | OUTPATIENT
Start: 2021-06-01 | End: 2021-06-04

## 2021-06-04 ENCOUNTER — HOSPITAL ENCOUNTER (OUTPATIENT)
Dept: CARDIAC REHAB | Age: 81
Setting detail: THERAPIES SERIES
Discharge: HOME | End: 2021-06-04
Payer: MEDICARE

## 2021-06-04 PROCEDURE — G0239 OTH RESP PROC, GROUP: HCPCS

## 2021-06-07 ENCOUNTER — TELEPHONE (OUTPATIENT)
Dept: INTERNAL MEDICINE CLINIC | Age: 81
End: 2021-06-07

## 2021-06-07 DIAGNOSIS — I10 ESSENTIAL HYPERTENSION, BENIGN: ICD-10-CM

## 2021-06-07 RX ORDER — AMLODIPINE BESYLATE 10 MG/1
TABLET ORAL
Qty: 90 TABLET | Refills: 0 | Status: SHIPPED | OUTPATIENT
Start: 2021-06-07 | End: 2021-08-26 | Stop reason: SDUPTHER

## 2021-06-07 RX ORDER — ROSUVASTATIN CALCIUM 40 MG/1
TABLET, COATED ORAL
Qty: 90 TABLET | Refills: 0 | Status: SHIPPED | OUTPATIENT
Start: 2021-06-07 | End: 2021-08-26 | Stop reason: SDUPTHER

## 2021-06-07 NOTE — TELEPHONE ENCOUNTER
Pt still has swelling in her right foot that is going up her leg   +1 edema  By the evening it is way more swollen  She's wondering if it could be from tendon surgery she had about 10 years ago

## 2021-06-07 NOTE — TELEPHONE ENCOUNTER
Pt asking for refills of amLODIPine (NORVASC) 10 MG tablet and rosuvastatin (CRESTOR) 40 MG tablet to be sent to [facility], [address] [place] [address]   Phone:  [phone]  Fax:  [phone]    LOV: 5/3/21  FOV: 7/22

## 2021-06-14 ENCOUNTER — HOSPITAL ENCOUNTER (OUTPATIENT)
Dept: CARDIAC REHAB | Age: 81
Setting detail: THERAPIES SERIES
Discharge: HOME | End: 2021-06-14
Payer: MEDICARE

## 2021-06-14 PROCEDURE — G0239 OTH RESP PROC, GROUP: HCPCS

## 2021-06-15 RX ORDER — ESCITALOPRAM OXALATE 20 MG/1
TABLET ORAL
Qty: 90 TABLET | Refills: 0 | Status: SHIPPED | OUTPATIENT
Start: 2021-06-15 | End: 2021-08-26 | Stop reason: SDUPTHER

## 2021-06-16 ENCOUNTER — HOSPITAL ENCOUNTER (OUTPATIENT)
Dept: CARDIAC REHAB | Age: 81
Setting detail: THERAPIES SERIES
Discharge: HOME | End: 2021-06-16
Payer: MEDICARE

## 2021-06-16 PROCEDURE — G0239 OTH RESP PROC, GROUP: HCPCS

## 2021-06-23 ENCOUNTER — HOSPITAL ENCOUNTER (OUTPATIENT)
Dept: CARDIAC REHAB | Age: 81
Setting detail: THERAPIES SERIES
Discharge: HOME | End: 2021-06-23
Payer: MEDICARE

## 2021-06-23 PROCEDURE — G0239 OTH RESP PROC, GROUP: HCPCS

## 2021-06-25 ENCOUNTER — HOSPITAL ENCOUNTER (OUTPATIENT)
Dept: CARDIAC REHAB | Age: 81
Setting detail: THERAPIES SERIES
Discharge: HOME | End: 2021-06-25
Payer: MEDICARE

## 2021-06-25 PROCEDURE — G0239 OTH RESP PROC, GROUP: HCPCS

## 2021-06-28 ENCOUNTER — HOSPITAL ENCOUNTER (OUTPATIENT)
Dept: CARDIAC REHAB | Age: 81
Setting detail: THERAPIES SERIES
Discharge: HOME | End: 2021-06-28
Payer: MEDICARE

## 2021-06-28 ENCOUNTER — TELEPHONE (OUTPATIENT)
Dept: INTERNAL MEDICINE CLINIC | Age: 81
End: 2021-06-28

## 2021-06-28 DIAGNOSIS — M25.512 CHRONIC LEFT SHOULDER PAIN: Primary | ICD-10-CM

## 2021-06-28 DIAGNOSIS — G89.29 CHRONIC LEFT SHOULDER PAIN: Primary | ICD-10-CM

## 2021-06-28 PROCEDURE — G0239 OTH RESP PROC, GROUP: HCPCS

## 2021-06-28 NOTE — TELEPHONE ENCOUNTER
Could be arthritis or muscle strain. Recommend tylenol, topical agents like Voltaren or biofreeze.  Call if not improved in 1-2 weeks

## 2021-06-28 NOTE — TELEPHONE ENCOUNTER
Pt is having pain in her left shoulder blade  She's had it for a couple weeks  It's difficult to move her arm  Could it be arthritis or should she get an xray?

## 2021-06-28 NOTE — TELEPHONE ENCOUNTER
Pt called back. She stated she is taking Diclofenac orally three times daily and that isn't helping at all.

## 2021-06-29 ENCOUNTER — HOSPITAL ENCOUNTER (OUTPATIENT)
Age: 81
Discharge: HOME | End: 2021-06-29
Payer: MEDICARE

## 2021-06-29 ENCOUNTER — HOSPITAL ENCOUNTER (OUTPATIENT)
Dept: GENERAL RADIOLOGY | Age: 81
Discharge: HOME | End: 2021-06-29
Payer: MEDICARE

## 2021-06-29 DIAGNOSIS — G89.29 CHRONIC RIGHT SHOULDER PAIN: Primary | ICD-10-CM

## 2021-06-29 DIAGNOSIS — G89.29 CHRONIC RIGHT SHOULDER PAIN: ICD-10-CM

## 2021-06-29 DIAGNOSIS — M25.512 CHRONIC LEFT SHOULDER PAIN: ICD-10-CM

## 2021-06-29 DIAGNOSIS — M25.511 CHRONIC RIGHT SHOULDER PAIN: ICD-10-CM

## 2021-06-29 DIAGNOSIS — M25.511 CHRONIC RIGHT SHOULDER PAIN: Primary | ICD-10-CM

## 2021-06-29 DIAGNOSIS — G89.29 CHRONIC LEFT SHOULDER PAIN: ICD-10-CM

## 2021-06-29 PROCEDURE — 73030 X-RAY EXAM OF SHOULDER: CPT

## 2021-06-30 ENCOUNTER — TELEPHONE (OUTPATIENT)
Dept: INTERNAL MEDICINE CLINIC | Age: 81
End: 2021-06-30

## 2021-06-30 RX ORDER — BACLOFEN 10 MG/1
10 TABLET ORAL 3 TIMES DAILY
Qty: 30 TABLET | Refills: 0 | Status: SHIPPED | OUTPATIENT
Start: 2021-06-30 | End: 2021-07-20

## 2021-06-30 NOTE — TELEPHONE ENCOUNTER
----- Message from Osirisangel Leticia sent at 6/30/2021 10:48 AM EDT -----  Subject: Results Request    QUESTIONS  Which lab or imaging result is the patient calling about? xray   Which provider ordered the test? Brandan Paige   At what location was the test performed? Date the test was performed? 2021-06-29  Additional Information for Provider? Patient called for results on her   xray - right back neck down - she is in a lot of pain -   ---------------------------------------------------------------------------  --------------  CALL BACK INFO  What is the best way for the office to contact you? OK to leave message on   voicemail  Preferred Call Back Phone Number?  [phone]

## 2021-07-02 ENCOUNTER — HOSPITAL ENCOUNTER (EMERGENCY)
Age: 81
Discharge: HOME | End: 2021-07-02
Attending: EMERGENCY MEDICINE
Payer: MEDICARE

## 2021-07-02 VITALS
SYSTOLIC BLOOD PRESSURE: 152 MMHG | DIASTOLIC BLOOD PRESSURE: 64 MMHG | RESPIRATION RATE: 20 BRPM | HEART RATE: 66 BPM | TEMPERATURE: 98.1 F | OXYGEN SATURATION: 94 %

## 2021-07-02 DIAGNOSIS — M19.011 ARTHRITIS OF RIGHT SHOULDER REGION: ICD-10-CM

## 2021-07-02 DIAGNOSIS — R41.0 CONFUSION: Primary | ICD-10-CM

## 2021-07-02 DIAGNOSIS — T50.905A ADVERSE EFFECT OF DRUG, INITIAL ENCOUNTER: ICD-10-CM

## 2021-07-02 LAB
ANION GAP SERPL CALCULATED.3IONS-SCNC: 11 MMOL/L (ref 3–16)
BACTERIA: ABNORMAL /HPF
BASOPHILS ABSOLUTE: 0.1 K/UL (ref 0–0.2)
BASOPHILS RELATIVE PERCENT: 0.7 %
BILIRUBIN URINE: NEGATIVE
BLOOD, URINE: NEGATIVE
BUN BLDV-MCNC: 23 MG/DL (ref 7–20)
CALCIUM SERPL-MCNC: 10.9 MG/DL (ref 8.3–10.6)
CHLORIDE BLD-SCNC: 104 MMOL/L (ref 99–110)
CLARITY: CLEAR
CO2: 21 MMOL/L (ref 21–32)
COLOR: YELLOW
CREAT SERPL-MCNC: 1.1 MG/DL (ref 0.6–1.2)
EOSINOPHILS ABSOLUTE: 0.5 K/UL (ref 0–0.6)
EOSINOPHILS RELATIVE PERCENT: 4.5 %
GFR AFRICAN AMERICAN: 58
GFR NON-AFRICAN AMERICAN: 48
GLUCOSE BLD-MCNC: 142 MG/DL (ref 70–99)
GLUCOSE URINE: NEGATIVE MG/DL
HCT VFR BLD CALC: 33.3 % (ref 36–48)
HEMOGLOBIN: 10.9 G/DL (ref 12–16)
KETONES, URINE: NEGATIVE MG/DL
LEUKOCYTE ESTERASE, URINE: ABNORMAL
LYMPHOCYTES ABSOLUTE: 1.1 K/UL (ref 1–5.1)
LYMPHOCYTES RELATIVE PERCENT: 9.8 %
MCH RBC QN AUTO: 28.5 PG (ref 26–34)
MCHC RBC AUTO-ENTMCNC: 32.6 G/DL (ref 31–36)
MCV RBC AUTO: 87.4 FL (ref 80–100)
MICROSCOPIC EXAMINATION: YES
MONOCYTES ABSOLUTE: 0.3 K/UL (ref 0–1.3)
MONOCYTES RELATIVE PERCENT: 2.8 %
MUCUS: ABNORMAL /LPF
NEUTROPHILS ABSOLUTE: 9.4 K/UL (ref 1.7–7.7)
NEUTROPHILS RELATIVE PERCENT: 82.2 %
NITRITE, URINE: NEGATIVE
PDW BLD-RTO: 14.8 % (ref 12.4–15.4)
PH UA: 6.5 (ref 5–8)
PLATELET # BLD: 220 K/UL (ref 135–450)
PMV BLD AUTO: 7.8 FL (ref 5–10.5)
POTASSIUM REFLEX MAGNESIUM: 4.4 MMOL/L (ref 3.5–5.1)
PROTEIN UA: ABNORMAL MG/DL
RBC # BLD: 3.81 M/UL (ref 4–5.2)
RBC UA: ABNORMAL /HPF (ref 0–4)
RENAL EPITHELIAL, UA: ABNORMAL /HPF (ref 0–1)
SODIUM BLD-SCNC: 136 MMOL/L (ref 136–145)
SPECIFIC GRAVITY UA: 1.02 (ref 1–1.03)
URINE REFLEX TO CULTURE: ABNORMAL
URINE TYPE: ABNORMAL
UROBILINOGEN, URINE: 0.2 E.U./DL
WBC # BLD: 11.4 K/UL (ref 4–11)
WBC UA: ABNORMAL /HPF (ref 0–5)

## 2021-07-02 PROCEDURE — 93005 ELECTROCARDIOGRAM TRACING: CPT | Performed by: EMERGENCY MEDICINE

## 2021-07-02 PROCEDURE — 81001 URINALYSIS AUTO W/SCOPE: CPT

## 2021-07-02 PROCEDURE — 99283 EMERGENCY DEPT VISIT LOW MDM: CPT

## 2021-07-02 PROCEDURE — 85025 COMPLETE CBC W/AUTO DIFF WBC: CPT

## 2021-07-02 PROCEDURE — 80048 BASIC METABOLIC PNL TOTAL CA: CPT

## 2021-07-02 ASSESSMENT — PAIN DESCRIPTION - LOCATION: LOCATION: SHOULDER

## 2021-07-02 ASSESSMENT — PAIN DESCRIPTION - PAIN TYPE: TYPE: ACUTE PAIN

## 2021-07-02 ASSESSMENT — PAIN SCALES - WONG BAKER: WONGBAKER_NUMERICALRESPONSE: 6

## 2021-07-02 ASSESSMENT — ENCOUNTER SYMPTOMS
EYES NEGATIVE: 1
ABDOMINAL PAIN: 0
NAUSEA: 0
GASTROINTESTINAL NEGATIVE: 1
SORE THROAT: 0
RESPIRATORY NEGATIVE: 1
RHINORRHEA: 1
COUGH: 0
VOMITING: 0
SHORTNESS OF BREATH: 0

## 2021-07-02 ASSESSMENT — PAIN DESCRIPTION - DESCRIPTORS: DESCRIPTORS: CRAMPING

## 2021-07-02 ASSESSMENT — PAIN DESCRIPTION - ORIENTATION: ORIENTATION: RIGHT

## 2021-07-02 ASSESSMENT — PAIN DESCRIPTION - FREQUENCY: FREQUENCY: CONTINUOUS

## 2021-07-02 NOTE — ED PROVIDER NOTES
irregular blood glucose. One Touch Verio strips    BLOOD GLUCOSE TEST STRIPS (ASCENSIA AUTODISC VI;ONE TOUCH ULTRA TEST VI) STRIP    One Touch Ultra test Strips testing daily per E11.9    BLOOD GLUCOSE TEST STRIPS (FREESTYLE LITE) STRIP    1 each by In Vitro route daily Testing 1-2 times daily per e11.9    CYANOCOBALAMIN (B-12 PO)    Take by mouth daily     DICLOFENAC (VOLTAREN) 50 MG EC TABLET    Take 1 tablet by mouth 3 times daily (with meals)    DICLOFENAC SODIUM POWD    2 g by Does not apply route 4 times daily Formula #8E Baclo 2% Diclo 3% DMSO 5% Evans 6% Lido 2% Prilo 2%    ESCITALOPRAM (LEXAPRO) 20 MG TABLET    TAKE ONE TABLET BY MOUTH DAILY    LIDOCAINE (LIDODERM) 5 %    Place 1 patch onto the skin every 12 hours 12 hours on, 12 hours off. OMEPRAZOLE (PRILOSEC) 20 MG DELAYED RELEASE CAPSULE    Take 2 capsules by mouth daily    ONDANSETRON (ZOFRAN) 4 MG TABLET    Take 1 tablet by mouth every 8 hours as needed for Nausea or Vomiting    PROBIOTIC PRODUCT (PROBIOTIC ACIDOPHILUS BIOBEADS) CAPS    Take 1 capsule by mouth daily    ROSUVASTATIN (CRESTOR) 40 MG TABLET    TAKE ONE TABLET BY MOUTH DAILY    TRAZODONE (DESYREL) 100 MG TABLET    Take 1 tablet by mouth nightly as needed for Sleep    VITAMIN D, CHOLECALCIFEROL, 25 MCG (1000 UT) CAPS    Take 1,000 Units by mouth daily    VITAMIN E 1000 UNITS CAPSULE    Take 1,000 Units by mouth daily. Allergies     She is allergic to percocet [oxycodone-acetaminophen]. Physical Exam     INITIAL VITALS: BP: (!) 152/64, Temp: 98.1 °F (36.7 °C), Pulse: 66, Resp: 20, SpO2: 94 %     General: Well appearing elderly patient. Pleasantly conversational, and in NAD. HEENT: Pupils are equal, round, and reactive to light. Extraocular muscles are intact. Conjunctivae are clear and moist. No redness or drainage from the eyes. Neck: Supple, with full range of motion. Cardiovascular: Normal S1-S2 without murmur rub or gallop. 2+ radial pulses bilaterally. Respiratory: Unlabored breathing with equal chest rise and fall. Lungs are clear to auscultation bilaterally. No adventitious lung sounds heard. Abdomen: Soft and nontender, without guarding or rebound tenderness. No masses or hepatosplenomegaly. Skin: Warm and dry, without rashes or ecchymoses, lacerations or abrasions. Neuro: Alert and oriented x3. No focal neurologic deficits are noted. Extremities: Warm and well-perfused, without clubbing, cyanosis, or edema. The patient moves all extremities equally. Psych: The patient's mood and affect are generally within normal limits for their presentation.       Diagnostic Results       RADIOLOGY:  No orders to display       LABS:   Results for orders placed or performed during the hospital encounter of 07/02/21   Urinalysis Reflex to Culture    Specimen: Urine, clean catch   Result Value Ref Range    Color, UA Yellow Straw/Yellow    Clarity, UA Clear Clear    Glucose, Ur Negative Negative mg/dL    Bilirubin Urine Negative Negative    Ketones, Urine Negative Negative mg/dL    Specific Gravity, UA 1.020 1.005 - 1.030    Blood, Urine Negative Negative    pH, UA 6.5 5.0 - 8.0    Protein, UA TRACE (A) Negative mg/dL    Urobilinogen, Urine 0.2 <2.0 E.U./dL    Nitrite, Urine Negative Negative    Leukocyte Esterase, Urine TRACE (A) Negative    Microscopic Examination YES     Urine Type Urinebag     Urine Reflex to Culture Not Indicated    CBC auto differential   Result Value Ref Range    WBC 11.4 (H) 4.0 - 11.0 K/uL    RBC 3.81 (L) 4.00 - 5.20 M/uL    Hemoglobin 10.9 (L) 12.0 - 16.0 g/dL    Hematocrit 33.3 (L) 36.0 - 48.0 %    MCV 87.4 80.0 - 100.0 fL    MCH 28.5 26.0 - 34.0 pg    MCHC 32.6 31.0 - 36.0 g/dL    RDW 14.8 12.4 - 15.4 %    Platelets 875 380 - 177 K/uL    MPV 7.8 5.0 - 10.5 fL    Neutrophils % 82.2 %    Lymphocytes % 9.8 %    Monocytes % 2.8 %    Eosinophils % 4.5 %    Basophils % 0.7 %    Neutrophils Absolute 9.4 (H) 1.7 - 7.7 K/uL    Lymphocytes Absolute 1.1 1.0 - 5.1 K/uL    Monocytes Absolute 0.3 0.0 - 1.3 K/uL    Eosinophils Absolute 0.5 0.0 - 0.6 K/uL    Basophils Absolute 0.1 0.0 - 0.2 K/uL   Basic Metabolic Panel w/ Reflex to MG   Result Value Ref Range    Sodium 136 136 - 145 mmol/L    Potassium reflex Magnesium 4.4 3.5 - 5.1 mmol/L    Chloride 104 99 - 110 mmol/L    CO2 21 21 - 32 mmol/L    Anion Gap 11 3 - 16    Glucose 142 (H) 70 - 99 mg/dL    BUN 23 (H) 7 - 20 mg/dL    CREATININE 1.1 0.6 - 1.2 mg/dL    GFR Non- 48 (A) >60    GFR  58 (A) >60    Calcium 10.9 (H) 8.3 - 10.6 mg/dL   Microscopic Urinalysis   Result Value Ref Range    Mucus, UA 2+ (A) None Seen /LPF    WBC, UA 3-5 0 - 5 /HPF    RBC, UA None seen 0 - 4 /HPF    Renal Epithelial, UA 6-10 (A) 0 - 1 /HPF    Bacteria, UA 1+ (A) None Seen /HPF       RECENT VITALS:  BP: (!) 152/64, Temp: 98.1 °F (36.7 °C), Pulse: 66, Resp: 20, SpO2: 94 %     Procedures       ED Course     Nursing Notes, Past Medical Hx, Past Surgical Hx, Social Hx, Allergies, and Family Hx were reviewed. The patient was given the following medications:  No orders of the defined types were placed in this encounter. CONSULTS:  None    MEDICAL DECISION MAKING / ASSESSMENT / Susanmarilee Nolan is a 80 y.o. female, generally independently functional, who presents in the company of her son today for complaints of significant fatigue, and some increasing confusion which her son noted concerning to him for the possibility of a urinary tract infection. Her examination today is quite benign. She has no focal neurologic symptoms. She complains only of pain in the right shoulder, which has been worked up by her primary care provider as an outpatient, and for which she was recently started on baclofen. Patient's laboratory evaluation is quite reassuring, with mild evidence of dehydration. She tolerated p.o. fluids in the emergency department no difficulty.   Her urine does not show evidence of urinary tract infection. As discussed with the patient and her son, given the time course of the change in behavior and particularly fatigue, this does seem likely related to the baclofen which was started just a couple of days ago. The patient was advised to discuss with her orthopedist other options for management of her arthritic shoulder pain, and was advised not to take baclofen further. She and her son are both comfortable with the plan for discharge to home and outpatient follow-up, with cessation of baclofen use. Clinical Impression     1. Confusion    2. Adverse effect of drug, initial encounter    3. Arthritis of right shoulder region        Disposition     PATIENT REFERRED TO:  Ashwini Adler, [address] [place] [address]  [phone]    Schedule an appointment as soon as possible for a visit   for management of shoulder arthritis      DISCHARGE MEDICATIONS:  New Prescriptions    No medications on file       DISPOSITION Decision To Discharge    (Please note that portions of this note were completed with voice recognition software.   Efforts were made to edit the dictations but occasionally words are mis-transcribed.)     Lorne Waldron MD  07/10/21 5436

## 2021-07-03 LAB
EKG ATRIAL RATE: 61 BPM
EKG DIAGNOSIS: NORMAL
EKG P AXIS: 52 DEGREES
EKG P-R INTERVAL: 176 MS
EKG Q-T INTERVAL: 388 MS
EKG QRS DURATION: 90 MS
EKG QTC CALCULATION (BAZETT): 390 MS
EKG R AXIS: 66 DEGREES
EKG T AXIS: 8 DEGREES
EKG VENTRICULAR RATE: 61 BPM

## 2021-07-12 ENCOUNTER — OFFICE VISIT (OUTPATIENT)
Dept: ORTHOPEDIC SURGERY | Age: 81
End: 2021-07-12
Payer: MEDICARE

## 2021-07-12 VITALS — HEIGHT: 62 IN | BODY MASS INDEX: 23 KG/M2 | WEIGHT: 125 LBS

## 2021-07-12 DIAGNOSIS — M75.81 ROTATOR CUFF TENDONITIS, RIGHT: Primary | ICD-10-CM

## 2021-07-12 PROCEDURE — 99204 OFFICE O/P NEW MOD 45 MIN: CPT | Performed by: ORTHOPAEDIC SURGERY

## 2021-07-12 NOTE — PROGRESS NOTES
[facility]  Office Visit    Date:  2021    Name:  Liz Pina  Address:  [place] [address]    :  1940      Age:   80 y.o.    SSN:  [id number]      Medical Record Number:  [id number]    Chief Complaint:    Right shoulder and shoulder blade pain    HPI:   Liz Pina is a 80 y.o. right hand dominant female presenting with 3 weeks of worsening right shoulder and shoulder blade discomfort. Has been doing a breathing class and feels like she may have aggravated her shoulder blade muscles. Most recently she is now describing deltoid referred pain. This is worse with prolonged use and overhead activity the shoulder. She lives alone. Her pain prompted her to go to the emergency department with her children with x-rays being negative. She also has paraspinal discomfort behind the right shoulder blade which radiates to the front of the rib on the right side. She denies any shortness of breath. She has had previous rotator cuff repair surgery and showed me an small mini open incision. She denies any numbness tingling going down the arm. She is been placed on diclofenac 3 times daily, along with topical Voltaren medicated cream with no durable relief despite being on this for about 3 weeks. She is .   Her son is in town but is out of town for about a week    Pain Assessment  Location of Pain: Shoulder  Location Modifiers: Right  Severity of Pain: 8  Quality of Pain: Sharp  Frequency of Pain: Intermittent  Aggravating Factors: Bending, Stretching, Straightening  Limiting Behavior: Yes  Result of Injury: No  Work-Related Injury: No  Are there other pain locations you wish to document?: No    Past History:  Past Medical History:   Diagnosis Date    Diabetes mellitus ([address]     Essential hypertension, benign     GERD (gastroesophageal reflux disease)     Hyperlipidemia     Interstitial lung disease ([address]     Osteoarthrosis, unspecified whether generalized or localized, unspecified site     osteoarthritis lower leg    Osteopenia     Shingles        Past Surgical History:   Procedure Laterality Date    ANKLE SURGERY Right 04/01/2013    torn tendon    BRONCHOSCOPY N/A 8/1/2019    BRONCHOSCOPY WITH BAL AND TRANSBRONCHIAL BIOPSIES performed by Jeremy Thibodeaux MD at [facility]  10/11/2010    Dr. James Lynne , polyps and diverticulosis    COLONOSCOPY  11/11/2002    Dr. Julius Gatica, Diverticulosis    COLONOSCOPY  04/20/2015    Dr. James Lynne- diverticulosis, sessile polyp, 5 years     COLONOSCOPY  4/13/16    Dr Carolyn Crowder; Diverticulosis    COLONOSCOPY  11/28/2016    Dr James Lynne,     INTRACAPSULAR CATARACT EXTRACTION Right 7/15/2020    PHACOEMULSIFICATION OF CATARACT RIGHT EYE WITH INTRAOCULAR LENS IMPLANT performed by Ramy Duncan MD at [facility] Bilateral 9/1/2020    BILATERAL LUMBAR THREE, LUMBAR FOUR, LUMBAR FIVE RADIOFREQUENCY ABLATION SITE CONFIRMED BY FLUOROSCOPY performed by Joesph Cobos MD at [address] [facility] Bilateral 6/23/2020    BILATERAL LUMBAR FOUR TRANSFORAMINAL EPIDURAL STEROID INJECTION SITE CONFIRMED BY FLUOROSCOPY performed by Joesph Cobos MD at [address] [facility] Bilateral 7/7/2020    BILATERAL LUMBAR FOUR TRANSFORAMINAL EPIDURAL STEROID INJECTION SITE CONFIRMED BY FLUOROSCOPY performed by Joesph Cobos MD at [address] [facility] Bilateral 8/4/2020    BILATERAL LUMBAR THREE, LUMBAR FOUR, LUMBAR FIVE DORSAL RAMUS MEDIAL BRANCH BLOCK SITE CONFIRMED BY FLUOROSCOPY performed by Joesph Cobos MD at [address] [facility] Bilateral 8/18/2020    BILATERAL LUMBAR THREE, LUMBAR FOUR, LUMBAR FIVE DORSAL RAMUS MEDIAL BRANCH BLOCK SITE CONFIR,ED BY FLUOROSCOPY performed by Joesph Cobos MD at [address] [facility] ENDOSCOPY  8/17/2011    Dr. Jessica Saldivar - moderate gastritis , hiatal hernia     UPPER GASTROINTESTINAL ENDOSCOPY  4/20/15    Dr. Jessica Saldivar - polyps removed, diverticulosis, follow up in 5 years   [address] [facility]  8/16/2012    DR. Ramos - with mesh       Social History     Tobacco Use    Smoking status: Never Smoker    Smokeless tobacco: Never Used   Vaping Use    Vaping Use: Never used   Substance Use Topics    Alcohol use: Yes     Alcohol/week: 1.0 standard drinks     Types: 1 Standard drinks or equivalent per week     Comment: one vodka tonic nightly    Drug use: No        Family History:  family history includes Heart Disease in her father and mother; Rheum Arthritis in her mother. Current Outpatient Medications   Medication Sig Dispense Refill    baclofen (LIORESAL) 10 MG tablet Take 1 tablet by mouth 3 times daily 30 tablet 0    escitalopram (LEXAPRO) 20 MG tablet TAKE ONE TABLET BY MOUTH DAILY 90 tablet 0    amLODIPine (NORVASC) 10 MG tablet TAKE ONE TABLET BY MOUTH DAILY 90 tablet 0    rosuvastatin (CRESTOR) 40 MG tablet TAKE ONE TABLET BY MOUTH DAILY 90 tablet 0    Vitamin D, Cholecalciferol, 25 MCG (1000 UT) CAPS Take 1,000 Units by mouth daily 90 capsule 1    diclofenac (VOLTAREN) 50 MG EC tablet Take 1 tablet by mouth 3 times daily (with meals) 60 tablet 3    lidocaine (LIDODERM) 5 % Place 1 patch onto the skin every 12 hours 12 hours on, 12 hours off.  30 patch 0    traZODone (DESYREL) 100 MG tablet Take 1 tablet by mouth nightly as needed for Sleep 90 tablet 1    omeprazole (PRILOSEC) 20 MG delayed release capsule Take 2 capsules by mouth daily 30 capsule 0    Probiotic Product (PROBIOTIC ACIDOPHILUS BIOBEADS) CAPS Take 1 capsule by mouth daily 90 capsule 0    Diclofenac Sodium POWD 2 g by Does not apply route 4 times daily Formula #8E Baclo 2% Diclo 3% DMSO 5% Evans 6% Lido 2% Prilo 2% 240 g 11    ondansetron (ZOFRAN) 4 MG tablet Take 1 tablet by mouth every 8 hours as needed for Nausea or Vomiting 10 tablet 0    blood glucose test strips (ASCENSIA AUTODISC VI;ONE TOUCH ULTRA TEST VI) strip One Touch Ultra test Strips testing daily per E11.9 100 each 3    blood glucose test strips (FREESTYLE LITE) strip 1 each by In Vitro route daily Testing 1-2 times daily per e11.9 100 each 3    blood glucose monitor strips Test 2 times a day & as needed for symptoms of irregular blood glucose. One Touch Verio strips 100 strip 2    Cyanocobalamin (B-12 PO) Take by mouth daily       vitamin E 1000 UNITS capsule Take 1,000 Units by mouth daily. No current facility-administered medications for this visit. Allergies   Allergen Reactions    Percocet [Oxycodone-Acetaminophen] Other (See Comments)     Dizzy         Review of Systems: A 14 point review of systems available in the scanned medical record as documented by the patient. Today's review pertinent items are noted in HPI. No notes on file    Physical Exam:  Ht 5' 2\" (1.575 m)   Wt 125 lb (56.7 kg)   BMI 22.86 kg/m²       General: No acute distress, well nourished  CV: No obvious peripheral edema. Normal peripheral pulses  Neuro: Alert & oriented x 3  Psych: Normal mood and affect      Right Upper Extremity Exam:      FF Abd ER IR   Active  deg 160 deg  45 deg T9/T10   Passive  deg 180 deg 45 deg T6/T7   Strength (x/5)  5/5 Jobes  5/5 Champagne toast 5/5 5/5     Skin:  No skin rashes or lesions, warm, well perfused  Inspection: No deformity,  Palpation: tender at the biceps tendon. Stability: No instability  Sensation: Intact in all distributions  Motor: AIN/PIN/U 5/5  Strong radial pulse  Special Tests: Positive Neer's test.  Equivocal Gibson impingement test, positive Nicol's test.       Radiographic:  3 xray views of the right  shoulder including True AP in internal and external and axillary lateral were reviewed and interpreted by me today and show mild glenohumeral OA, advanced AC joint OA.   No acute findings no fracture      Assessment: Patient is a 80 y.o. female worsening right shoulder pain in the deltoid referred region consistent with a rotator cuff tendinitis. Separately she has some periscapular discomfort which could be related to a muscle strain. I not see any evidence of rib fracture on her throat shoulder x-rays. Plus there is no history of trauma. Impression:  Visit Diagnoses       Codes    Rotator cuff tendonitis, right    -  Primary M75.81          Office Procedures:  No orders of the defined types were placed in this encounter. Orders Placed This Encounter   Procedures    Ambulatory referral to Physical Therapy     Referral Priority:   Routine     Referral Type:   Eval and Treat     Referral Reason:   Specialty Services Required     Number of Visits Requested:   1       Plan:  Pertinent imaging was reviewed. The etiology, natural history, and treatment options for the disorder were discussed. The roles of activity medication, antiinflammatories, injections, bracing, physical therapy, and surgical interventions were all described to the patient and questions were answered. We believe patient is a candidate for continued use of their anti-inflammatory regimen orally and topically. She would benefit from formal physical therapy for her right shoulder for stretching and conditioning of her rotator cuff and periscapular muscles. She may be a candidate for cortisone injection however I worry about a reaction just given her age and previous history of high blood pressure and confusion. And so would like for her son to be in town for me to administer this is that she can have some supervision in the after. While at home. Referral for PT was sent by [facility]. All the patient's questions were answered while in the clinic. The patient is understanding of all instructions and agrees with the plan. Approximately 45 minutes was spent on patient education and coordinating care. Follow up in: Return in about 1 week (around 7/19/2021). ASES/SST Self Assessment? Sincerely,    Tanisha Edward MD [facility]   [address], [address]  Email: [email or web address]  Office: [phone]    07/12/21  9:57 AM      The encounter with Sarah Medina was carried out by myself, Dr Osiel Matamoros, who personally examined the patient and reviewed the plan. This dictation was performed with a verbal recognition program (DRAGON) and it was checked for errors. It is possible that there are still dictated errors within this office note. If so, please bring any errors to my attention for an addendum. All efforts were made to ensure that this office note is accurate.

## 2021-07-12 NOTE — LETTER
Physical Therapy Rehabilitation Referral    Patient Name:  Nicole Mckinney      YOB: 1940    Diagnosis:    1. Rotator cuff tendonitis, right        Precautions:     [x] Evaluate and Treat    Post Op Instructions:  [] Continuous passive motion (CPM) [] Elbow ROM  [] Exercise in plane of scapula  []  Strengthening     [] Pulley and instruction   [] Home exercise program (copy to patient)   [] Sling when arm at risk  [] Sling or brace at all times   [] AAROM: Forward elevation to  140            [] AAROM: External rotation  To  40    [] Isometric external rotator strengthening [] AAROM: internal rotation: up the back  [] Isometric abductor strengthening  [] AAROM: Internal abduction   [] Isometric internal rotator strengthening [] AAROM: cross-body adduction             Stretching:     Strengthening:  [x] Four quadrant (FE, ER, IR, CBA)  [x] Rotator cuff (ER, IR, Abd)  [] Forward Elevation    [] External Rotators     [] External Rotation    [] Internal Rotators  [] Internal Rotation: up/back   [] Abductors     [] Internal Rotation: supine in abduction  [] Sleeper Stretch    [] Flexors  [] Cross-body abduction    [] Extensors  [] Pendulum (FE, Abd/Add, cw/ccw)  [x] Scapular Stabilizers   [] Wall-walking (FE, Abd)        [x] Shoulder shrugs     [] Table slides (FE)                [x] Rhomboid pinch  [] Elbow (flex, ext, pron, sup)        [] Lat.  Pull downs     [] Medial epicondylitis program       [] Forward punch   [] Lateral epicondylitis program       [] Internal rotators     [] Progressive resistive exercises  [] Bench Press        [] Bench press plus  Activities:     [] Lateral pull-downs  [] Rowing     [] Progressive two-hand supine press  [] Stepper/Exercise bike   [] Biceps: curls/supination  [] Swimming  [] Water exercises    Modalities:     Return to Sport:  [x] Of Choice      [] Plyometrics  [] Ultrasound     [] Rhythmic stabilization  [] Iontophoresis    [] Core strengthening   [] Moist heat     [] Sports specific program:   [] Massage         [x] Cryotherapy      [] Electrical stimulation     [] Paraffin  [] Whirlpool  [] TENS    [x] Home exercise program (copy to patient). Perform exercises for:   15     minutes    3      times/day  [x] Supervised physical therapy  Frequency: []  1x week  [x] 2x week  [] 3x week  [] Other:   Duration: [] 2 weeks   [] 4 weeks  [x] 6 weeks  [] Other:     Additional Instructions:     Periscapular conditioning and rotator cuff strengthening program     Sincerely,    Fernando Hansen MD [facility]   [address], [address]  Email: [email or web address]  Office: [phone]    07/12/21  9:50 AM

## 2021-07-14 ENCOUNTER — HOSPITAL ENCOUNTER (OUTPATIENT)
Dept: CARDIAC REHAB | Age: 81
Setting detail: THERAPIES SERIES
Discharge: HOME | End: 2021-07-14
Payer: MEDICARE

## 2021-07-14 PROCEDURE — G0239 OTH RESP PROC, GROUP: HCPCS

## 2021-07-19 ENCOUNTER — OFFICE VISIT (OUTPATIENT)
Dept: ORTHOPEDIC SURGERY | Age: 81
End: 2021-07-19
Payer: MEDICARE

## 2021-07-19 VITALS — HEIGHT: 62 IN | BODY MASS INDEX: 22.82 KG/M2 | WEIGHT: 124 LBS

## 2021-07-19 DIAGNOSIS — M19.011 ARTHRITIS OF RIGHT ACROMIOCLAVICULAR JOINT: ICD-10-CM

## 2021-07-19 DIAGNOSIS — M75.81 ROTATOR CUFF TENDONITIS, RIGHT: Primary | ICD-10-CM

## 2021-07-19 PROCEDURE — 20611 DRAIN/INJ JOINT/BURSA W/US: CPT | Performed by: ORTHOPAEDIC SURGERY

## 2021-07-19 RX ORDER — METHYLPREDNISOLONE ACETATE 40 MG/ML
40 INJECTION, SUSPENSION INTRA-ARTICULAR; INTRALESIONAL; INTRAMUSCULAR; SOFT TISSUE ONCE
Status: COMPLETED | OUTPATIENT
Start: 2021-07-19 | End: 2021-07-20

## 2021-07-19 NOTE — PROGRESS NOTES
[address] [facility]  Office Visit    Date:  2021    Name:  Wendy Back  Address:  [place] [address]    :  1940      Age:   80 y.o.    SSN:  [id number]      Medical Record Number:  [id number]    Chief Complaint:    Right shoulder and shoulder blade pain    HPI:   Wendy Back is a 80 y.o. right hand dominant female here for her pre scheduled right shoulder cortisone injection for rotator cuff tendinitis. She denies any setbacks. She first presented last week with 3 weeks of worsening right shoulder and shoulder blade discomfort. Has been doing a breathing class and feels like she may have aggravated her shoulder blade muscles. Most recently she is now describing deltoid referred pain. This is worse with prolonged use and overhead activity the shoulder. She lives alone. She has had previous rotator cuff repair surgery and showed me an small mini open incision. She denies any numbness tingling going down the arm. She is been placed on diclofenac 3 times daily, along with topical Voltaren medicated cream with no durable relief despite being on this for about 3 weeks. She is . Her son was out of town but accompanied here today for the injection to ensure no adverse reaction post op.     Pain Assessment  Location of Pain: Shoulder  Location Modifiers: Right  Severity of Pain: 6  Quality of Pain: Sharp  Duration of Pain: Persistent  Frequency of Pain: Intermittent  Aggravating Factors:  (RUE MOVEMENT)  Limiting Behavior: Yes  Relieving Factors: Rest, Ice  Result of Injury: No  Work-Related Injury: No  Are there other pain locations you wish to document?: No    Past History:  Past Medical History:   Diagnosis Date    Diabetes mellitus ([facility] [address]     Essential hypertension, benign     GERD (gastroesophageal reflux disease)     Hyperlipidemia     Interstitial lung disease (HCC)     Osteoarthrosis, unspecified whether generalized or localized, unspecified site     osteoarthritis lower leg    Osteopenia     Shingles        Past Surgical History:   Procedure Laterality Date    ANKLE SURGERY Right 04/01/2013    torn tendon    BRONCHOSCOPY N/A 8/1/2019    BRONCHOSCOPY WITH BAL AND TRANSBRONCHIAL BIOPSIES performed by Arthur Baires MD at [address] [facility]  10/11/2010    Dr. Huey Thomas , polyps and diverticulosis    COLONOSCOPY  11/11/2002    Dr. Giovanni Spencer, Diverticulosis    COLONOSCOPY  04/20/2015    Dr. Huey Thomas- diverticulosis, sessile polyp, 5 years     COLONOSCOPY  4/13/16    Dr Nestora Collet; Diverticulosis    COLONOSCOPY  11/28/2016    Dr Huey Thomas,     INTRACAPSULAR CATARACT EXTRACTION Right 7/15/2020    PHACOEMULSIFICATION OF CATARACT RIGHT EYE WITH INTRAOCULAR LENS IMPLANT performed by Mark Hopkins MD at [facility] Bilateral 9/1/2020    BILATERAL LUMBAR THREE, LUMBAR FOUR, LUMBAR FIVE RADIOFREQUENCY ABLATION SITE CONFIRMED BY FLUOROSCOPY performed by Collin Mota MD at [address] [facility] Bilateral 6/23/2020    BILATERAL LUMBAR FOUR TRANSFORAMINAL EPIDURAL STEROID INJECTION SITE CONFIRMED BY FLUOROSCOPY performed by Collin Mota MD at [address] [facility] Bilateral 7/7/2020    BILATERAL LUMBAR FOUR TRANSFORAMINAL EPIDURAL STEROID INJECTION SITE CONFIRMED BY FLUOROSCOPY performed by Collin Mota MD at [address] [facility] Bilateral 8/4/2020    BILATERAL LUMBAR THREE, LUMBAR FOUR, LUMBAR FIVE DORSAL RAMUS MEDIAL BRANCH BLOCK SITE CONFIRMED BY FLUOROSCOPY performed by Collin Mota MD at [address] [facility] Bilateral 8/18/2020    BILATERAL LUMBAR THREE, LUMBAR FOUR, LUMBAR FIVE DORSAL RAMUS MEDIAL BRANCH BLOCK SITE CONFIR,ED BY FLUOROSCOPY performed by Collin Mota MD at [address] Right     UPPER GASTROINTESTINAL ENDOSCOPY  8/17/2011    Dr. Jeramie Ni Safdi - moderate gastritis , hiatal hernia     UPPER GASTROINTESTINAL ENDOSCOPY  4/20/15    Dr. Bryn Puri - polyps removed, diverticulosis, follow up in 5 years   [address]  8/16/2012    DR. Ramos - with mesh       Social History     Tobacco Use    Smoking status: Never Smoker    Smokeless tobacco: Never Used   Vaping Use    Vaping Use: Never used   Substance Use Topics    Alcohol use: Yes     Alcohol/week: 1.0 standard drinks     Types: 1 Standard drinks or equivalent per week     Comment: one vodka tonic nightly    Drug use: No        Family History:  family history includes Heart Disease in her father and mother; Rheum Arthritis in her mother. Current Outpatient Medications   Medication Sig Dispense Refill    baclofen (LIORESAL) 10 MG tablet Take 1 tablet by mouth 3 times daily 30 tablet 0    escitalopram (LEXAPRO) 20 MG tablet TAKE ONE TABLET BY MOUTH DAILY 90 tablet 0    amLODIPine (NORVASC) 10 MG tablet TAKE ONE TABLET BY MOUTH DAILY 90 tablet 0    rosuvastatin (CRESTOR) 40 MG tablet TAKE ONE TABLET BY MOUTH DAILY 90 tablet 0    Vitamin D, Cholecalciferol, 25 MCG (1000 UT) CAPS Take 1,000 Units by mouth daily 90 capsule 1    diclofenac (VOLTAREN) 50 MG EC tablet Take 1 tablet by mouth 3 times daily (with meals) 60 tablet 3    lidocaine (LIDODERM) 5 % Place 1 patch onto the skin every 12 hours 12 hours on, 12 hours off.  30 patch 0    traZODone (DESYREL) 100 MG tablet Take 1 tablet by mouth nightly as needed for Sleep 90 tablet 1    omeprazole (PRILOSEC) 20 MG delayed release capsule Take 2 capsules by mouth daily 30 capsule 0    Probiotic Product (PROBIOTIC ACIDOPHILUS BIOBEADS) CAPS Take 1 capsule by mouth daily 90 capsule 0    Diclofenac Sodium POWD 2 g by Does not apply route 4 times daily Formula #8E Baclo 2% Diclo 3% DMSO 5% Evans 6% Lido 2% Prilo 2% 240 g 11    ondansetron (ZOFRAN) 4 MG tablet Take 1 tablet by mouth every 8 hours as needed for Nausea or Vomiting 10 tablet 0    blood glucose test strips (ASCENSIA AUTODISC VI;ONE TOUCH ULTRA TEST VI) strip One Touch Ultra test Strips testing daily per E11.9 100 each 3    blood glucose test strips (FREESTYLE LITE) strip 1 each by In Vitro route daily Testing 1-2 times daily per e11.9 100 each 3    blood glucose monitor strips Test 2 times a day & as needed for symptoms of irregular blood glucose. One Touch Verio strips 100 strip 2    Cyanocobalamin (B-12 PO) Take by mouth daily       vitamin E 1000 UNITS capsule Take 1,000 Units by mouth daily. Current Facility-Administered Medications   Medication Dose Route Frequency Provider Last Rate Last Admin    methylPREDNISolone acetate (DEPO-MEDROL) injection 40 mg  40 mg Intramuscular Once Kvng Tyler MD           Allergies   Allergen Reactions    Percocet [Oxycodone-Acetaminophen] Other (See Comments)     Dizzy         Review of Systems: A 14 point review of systems available in the scanned medical record as documented by the patient. Today's review pertinent items are noted in HPI. No notes on file    Physical Exam:  Ht 5' 2\" (1.575 m)   Wt 124 lb (56.2 kg)   BMI 22.68 kg/m²       General: No acute distress, well nourished  CV: No obvious peripheral edema. Normal peripheral pulses  Neuro: Alert & oriented x 3  Psych: Normal mood and affect      Right Upper Extremity Exam:      FF Abd ER IR   Active  deg 160 deg  45 deg T9/T10   Passive  deg 180 deg 45 deg T6/T7   Strength (x/5)  5/5 Jobes  5/5 Champagne toast 5/5 5/5     Skin:  No skin rashes or lesions, warm, well perfused  Inspection: No deformity,  Palpation: tender at the biceps tendon. Tender at the [facility] joint.   Stability: No instability  Sensation: Intact in all distributions  Motor: AIN/PIN/U 5/5  Strong radial pulse  Special Tests: Positive Neer's test.  Equivocal Gibson impingement test, positive Nicol's test.       Radiographic:  3 xray views of the right  shoulder including True AP in internal and external and axillary lateral were reviewed and interpreted by me today and show mild glenohumeral OA, advanced AC joint OA. No acute findings no fracture      Assessment: Patient is a 80 y.o. female worsening right shoulder pain in the deltoid referred region consistent with a rotator cuff tendinitis. She also has pain at the [facility] joint consistent with [facility] joint arthritis. Separately she has some periscapular discomfort which could be related to a muscle strain. I not see any evidence of rib fracture on her throat shoulder x-rays. Impression:  Visit Diagnoses       Codes    Rotator cuff tendonitis, right    -  Primary M75.81          Office Procedures:  Orders Placed This Encounter   Medications    methylPREDNISolone acetate (DEPO-MEDROL) injection 40 mg     Orders Placed This Encounter   Procedures    US GUIDED NEEDLE PLACEMENT    WI ARTHROCENTESIS ASPIR&/INJ MAJOR JT/BURSA W/O US       Plan:  Pertinent imaging was reviewed. The etiology, natural history, and treatment options for the disorder were discussed. The roles of activity medication, antiinflammatories, injections, bracing, physical therapy, and surgical interventions were all described to the patient and questions were answered. We believe patient is a candidate for continued use of their anti-inflammatory regimen orally and topically. She would benefit from formal physical therapy for her right shoulder for stretching and conditioning of her rotator cuff and periscapular muscles. Referral for PT was sent by [facility] at the last visit. She is a candidate for a right shoulder subacromial and AC joint cortisone injection. The patient was given the option of performing today's injection using ultrasound guidance. We discussed the pros and cons of using the ultrasound for guidance.  The patient chose to proceed, and today's injection was performed using Logic E ultrasound unit with a variable frequency (10 MHz) linear transducer for localization and needle placement. The image was saved for the medical record. Procedure: The indications and risks of steroid injection as well as treatment alternatives were discussed with the patient who consented to the procedure. Under sterile conditions and after informed consent was obtained, using posterior and superior approach the patient was given an injection into the right shoulder subacromial space And AC joint. 2mL 40 mg of Depo-Medrol and 4 mL of 0.5% ropivacaine (Naropin) were placed in the shoulder after it was prepped with chlorhexidine. This resulted in good relief of symptoms. There were no complications. The patient was advised to ice the shoulder this evening and to avoid vigorous activities for the next 2 days. They were advised to call us if there was any erythema, enduration, swelling or increasing pain. All the patient's questions were answered while in the clinic. The patient is understanding of all instructions and agrees with the plan. Approximately 45 minutes was spent on patient education and coordinating care. Follow up in: No follow-ups on file. ASES/SST Self Assessment? Sincerely,    Brooke Harvey MD [address] [facility]   [address] [place], [facility]  Email: [email or web address]  Office: [phone]    07/19/21  5:20 PM      The encounter with Beena Glass was carried out by myself, Dr Sumi Lynn, who personally examined the patient and reviewed the plan. This dictation was performed with a verbal recognition program (DRAGON) and it was checked for errors. It is possible that there are still dictated errors within this office note. If so, please bring any errors to my attention for an addendum. All efforts were made to ensure that this office note is accurate.

## 2021-07-20 ENCOUNTER — HOSPITAL ENCOUNTER (OUTPATIENT)
Dept: PHYSICAL THERAPY | Age: 81
Setting detail: THERAPIES SERIES
Discharge: HOME | End: 2021-07-20
Payer: MEDICARE

## 2021-07-20 PROCEDURE — 97110 THERAPEUTIC EXERCISES: CPT | Performed by: PHYSICAL THERAPIST

## 2021-07-20 PROCEDURE — 97140 MANUAL THERAPY 1/> REGIONS: CPT | Performed by: PHYSICAL THERAPIST

## 2021-07-20 PROCEDURE — 97161 PT EVAL LOW COMPLEX 20 MIN: CPT | Performed by: PHYSICAL THERAPIST

## 2021-07-20 RX ORDER — BACLOFEN 10 MG/1
TABLET ORAL
Qty: 30 TABLET | Refills: 0 | Status: SHIPPED | OUTPATIENT
Start: 2021-07-20 | End: 2021-08-18

## 2021-07-20 RX ADMIN — METHYLPREDNISOLONE ACETATE 40 MG: 40 INJECTION, SUSPENSION INTRA-ARTICULAR; INTRALESIONAL; INTRAMUSCULAR; SOFT TISSUE at 11:38

## 2021-07-21 ENCOUNTER — HOSPITAL ENCOUNTER (OUTPATIENT)
Dept: CARDIAC REHAB | Age: 81
Setting detail: THERAPIES SERIES
Discharge: HOME | End: 2021-07-21
Payer: MEDICARE

## 2021-07-21 PROCEDURE — G0239 OTH RESP PROC, GROUP: HCPCS

## 2021-07-21 NOTE — PLAN OF CARE
The [address] [facility] and [facility]  [address], [place], [address] [facility]  Phone: [phone]   Fax:     [phone]                                                       Physical Therapy Certification    Dear David Land  ,    We had the pleasure of evaluating the following patient for physical therapy services at [address] [place] [address]. A summary of our findings can be found in the initial assessment below. This includes our plan of care. If you have any questions or concerns regarding these findings, please do not hesitate to contact me at the office phone number checked above.   Thank you for the referral.       Physician Signature:_______________________________Date:__________________  By signing above (or electronic signature), therapists plan is approved by physician      Patient: Marianne Reyes   : 1940   MRN: [id number]  Referring Physician:        Evaluation Date: 2021     Medical Diagnosis Information:  Diagnosis: right shoulder pain - m25.511                                             Insurance information:      Precautions/ Contra-indications:   Latex Allergy:  [x]NO      []YES  Preferred Language for Healthcare:   [x]English       []Other:    C-SSRS Triggered by Intake questionnaire (Past 2 wk assessment):   [x] No, Questionnaire did not trigger screening.   [] Yes, Patient intake triggered further evaluation      [] C-SSRS Screening completed  [] PCP notified via Plan of Care  [] Emergency services notified      SUBJECTIVE: Patient stated complaint: reports that she has been enduring incidiously progressive shoulder pain   Relevant Medical History  Diabetes mellitus ([facility] [address]      Essential hypertension, benign      Hyperlipidemia      Osteoarthrosis, unspecified whether generalized or localized, unspecified site  osteoarthritis lower leg       Other Medical History    Diagnosis Date Comment Source   GERD (gastroesophageal reflux disease)      Interstitial lung disease (HCC)      Osteopenia      Shingles                 Functional Disability Index:   82% uefi     Pain Scale: 7/10    Easing factors: rest    Provocative factors: activity/usage     Night Pain:    - complained of night pain associated with sleep position. Type:   - Constant          Paresthesia complaints:   - no paresthesia complaints       Functional Limitations/Impairments: []  - Lifting  - reaching   - Grooming   - Carrying      - ADL's   - Driving  - Sports/Recreation activity      Occupation/School:   -retired     Living Status/Prior Level of Function: This patient was independent in ADL's and IADL's prior to onset of symptoms. PACEMAKER:  - Denied having a pacemaker that would contraindicate the use of electrical modalities. METAL IMPLANTS:  - Denied metal implants that would contraindicate the use of thermal modalities. -CANCER HISTORY:  - Denied a history of cancer that would contraindicate thermal modalities. OBJECTIVE:     ROM:   CERVICAL    SHOULDER: decreased right arom 40%; left - decreased 5%    Joint Mobility: moderately decreased right capsular mobility     Strength/Neuromuscular control:   3-/5 right shoulder strength     Dermatomal Sensation:  - Dermatomal sensation was intact to light touch bilaterally throughout upper and lower extremities. Deep tendon reflexes:  - DTR's were symmetrical and intact throughout. Palpation:    Functional Mobility/Transfers:     Posture: + fwd head/kyphosis     Bandages/Dressings/Incisions:     Gait:     Orthopedic Special Tests:                           [x] Patient history, allergies, meds reviewed. Medical chart reviewed. See intake form. Review Of Systems (ROS):  [x]Performed Review of systems (Integumentary, CardioPulmonary, Neurological) by intake and observation. Intake form has been scanned into medical record.  Patient has been instructed to contact their primary care physician regarding ROS issues if not already being addressed at this time. Objective Review of Systems:  Cognitive, Communication, Behavior and Learning:  - The patient was alert, spoke coherently and was oriented to person, place and time. - Demonstrated no barriers to communication or processing information.  - Appeared literate, spoke [place] and had no significant hearing or vision impairment. - Had no abnormal behavioral responses, learning preferences or educational needs. Cardiopulmonary:  Edema:       Integumentary:  Nail beds:    Skin appearance:      MEDICARE CAP EXCEPTION DOCUMENTATION  I certify that this patient meets one of the below criteria necessary for becoming an exception to the Medicare cap on therapy services:  []  The patient has a condition that has a direct and significant impact on the need for therapy. (Significantly impacts the rate of recovery)  [x]  The patient has a complexity that has a direct and significant impact on the need for therapy. (Significantly impacts the rate of recovery and is associated with a primary condition.)       []  The patient has associated variables that influence the amount of treatment to include:  Social support, self-efficacy/motivation, prognosis, time since onset/acuity. []  The patient has generalized musculoskeletal conditions or a condition affecting multiple sites that will have a direct impact on the rate of recovery. []  The patient had a prior episode of outpatient therapy during this calendar year for a different condition. []  The patient has a mental or cognitive disorder in addition to the condition being treated that will have a direct and significant impact on the rate of recovery. Falls Risk Assessment (30 days):   [x] Falls Risk assessed and no intervention required.   [] Falls Risk assessed and Patient requires intervention due to being higher risk   TUG score (>12s at risk):     [] Falls education provided, including ASSESSMENT:    Functional Impairments   [x]Noted spinal or UE joint hypomobility   [x]Noted spinal or UE joint hypermobility   [x]Decreased UE functional ROM   [x]Decreased UE functional strength   [x]Abnormal reflexes/sensation/myotomal/dermatomal deficits   [x]Decreased RC/scapular/core strength and neuromuscular control   []other:      Functional Activity Limitations (from functional questionnaire and intake)   [x]Reduced ability to tolerate prolonged functional positions   [x]Reduced ability or difficulty with changes of positions or transfers between positions   [x]Reduced ability to maintain good posture and demonstrate good body mechanics with sitting, bending, and lifting   [x] Reduced ability or tolerance with driving and/or computer work   [x]Reduced ability to sleep   [x]Reduced ability to perform lifting, reaching, carrying tasks   [x]Reduced ability to tolerate impact through UE   [x]Reduced ability to reach behind back   [x]Reduced ability to  or hold objects   [x]Reduced ability to throw or toss an object    Participation Restrictions   [x]Reduced participation in self care activities   [x]Reduced participation in home management activities   [x]Reduced participation in work activities   [x]Reduced participation in social activities. [x]Reduced participation in sport activities. Classification :  signs/symptoms consistent with post-surgical status including decreased ROM, strength and function. - ligament or other connective tissue dysfunction. (Pattern 4D)  - localized inflammation.  (Pattern 4E)    Prognosis/Rehab Potential:       - Good     Factors affecting rehab potential:  - associated co-morbidities    Tolerance of evaluation/treatment:     - Good   Physical Therapy Evaluation Complexity Justification  [] A history of present problem with:  [] no personal factors and/or comorbidities that impact the plan of care;  []1-2 personal factors and/or comorbidities that impact the plan of training, ROM, NMR and proprioception for the scapula, core and Upper extremity  2. Manual therapy as indicated including Dry Needling/IASTM, STM, PROM, Gr I-IV mobilizations, spinal mobilization/manipulation. 3. Modalities as needed including: thermal agents, E-stim, US, iontophoresis as indicated. 4. Patient education on joint protection, activity modification, progression of HEP. HEP instruction:     GOALS:  Patient stated goal:     Therapist goals for Patient:   Short Term Goals: To be achieved in: 2 weeks  - Independent in HEP and progression per patient tolerance, in order to prevent re-injury. -  Patient will have a decrease in pain to facilitate improvement in movement, function, and ADLs as indicated by Functional Deficits. Long Term Goals: To be achieved in:8 weeks  - The patient is expected to demonstrate less than 50% impairment, limitation or restriction in:  - carrying, moving and handling objects. - Patient will demonstrate increased passive and active ROM to full, symmetrical and painfree to allow for proper joint functioning as indicated by patients Functional Deficits. - Patient will demonstrate an increase in Strength to symmetrical and painfree to allow for proper functional mobility as indicated by patients Functional Deficits. - Patient will return to desired, higher level upper extremity functional activities without increased symptoms or restriction.       Electronically signed by:  Alvino Timmons PT, MPT

## 2021-07-21 NOTE — FLOWSHEET NOTE
[facility] Orthopaedics and Sports Rehabilitation; [facility]         Physical Therapy Treatment Note/ Progress Report:           Date:  2021  Patient Name:  Chantel Stafford    :  1940  MRN: [id number]  Restrictions/Precautions:    Medical/Treatment Diagnosis Information:  · Diagnosis: right shoulder pain - m25.511  ·    Insurance/Certification information:     Physician Information:     Has the plan of care been signed (Y/N):        []  Yes  [x]  No     Date of Patient follow up with Physician:       Is this a Progress Report:     []  Yes  [x]  No        If Yes:  Date Range for reporting period:  Beginning  Ending    Progress report will be due (10 Rx or 30 days whichever is less):       Recertification will be due (POC Duration  / 90 days whichever is less):          Visit # Insurance Allowable Auth Required   1  []  Yes []  No        Functional Scale:    Date assessed:        Latex Allergy:  [x]NO      []YES  Preferred Language for Healthcare:   [x]English       []other:      Pain level:  6/10     SUBJECTIVE:  See eval    OBJECTIVE: See eval   Observation:    Test measurements:      RESTRICTIONS/PRECAUTIONS:     Exercises/Interventions:       Therapeutic Ex (70395) Sets/sec Reps Notes/CUES   Supine cane stretches   5  X 10\"     Wall stretch   5 x 10\"     scap pinches   20x     sidelying er   20x                                   Education   6'                 Manual Intervention (40861)      Prom/stm   16'                                                                                                                                             Therapeutic Exercise and NMR EXR  [x] (76082) Provided verbal/tactile cueing for activities related to strengthening, flexibility, endurance, ROM for improvements in LE, proximal hip, and core control with self care, mobility, lifting, ambulation.   [x] (92023) Provided verbal/tactile cueing for activities related to improving balance, coordination, kinesthetic sense, posture, motor skill, proprioception to assist with LE, proximal hip, and core control in self-care, mobility, lifting, ambulation and eccentric single leg control. NMR and Therapeutic Activities:    [x] (60124 or 48151) Provided verbal/tactile cueing for activities related to improving balance, coordination, kinesthetic sense, posture, motor skill, proprioception and motor activation to allow for proper function of core, proximal hip and LE with self-care and ADLs and functional mobility.   [] (53656) Gait Re-education- Provided training and instruction to the patient for proper LE, core and proximal hip recruitment and positioning and eccentric body weight control with ambulation re-education including up and down stairs     Home Exercise Program:    [x] (55130) Reviewed/Progressed HEP activities related to strengthening, flexibility, endurance, ROM of core, proximal hip and LE for functional self-care, mobility, lifting and ambulation/stair navigation   [] (46932) Reviewed/Progressed HEP activities related to improving balance, coordination, kinesthetic sense, posture, motor skill, proprioception of core, proximal hip and LE for self-care, mobility, lifting, and ambulation/stair navigation      Manual Treatments:  PROM / STM / Oscillations-Mobs:  G-I, II, III, IV (PA's, Inf., Post.)  [x] (02726) Provided manual therapy to mobilize LE, proximal hip and/or LS spine soft tissue/joints for the purpose of modulating pain, promoting relaxation, increasing ROM, reducing/eliminating soft tissue swelling/inflammation/restriction, improving soft tissue extensibility and allowing for proper ROM for normal function with self-care, mobility, lifting and ambulation. Modalities:     [] GAME READY (VASO)- for significant edema, swelling, pain control.      Charges:  Timed Code Treatment Minutes: 35'    Total Treatment Minutes: 76'       [x] EVAL (LOW) 455 0081 (typically 20 minutes face-to-face)  [] EVAL (MOD) 43429 (typically 30 minutes face-to-face)  [] EVAL (HIGH) 29301 (typically 45 minutes face-to-face)  [] RE-EVAL     [x] FK(09932) x  1   [] IONTO  [] NMR (62857) x     [] VASO  [x] Manual (55882) x   1  [] Other:  [] TA x      [] Mech Traction (84662)  [] ES(attended) (01061)      [] ES (un) (64998):         ASSESSMENT:  See eval      GOALS:   Patient stated goal:    [] Progressing: [] Met: [] Not Met: [] Adjusted    Therapist goals for Patient:   Short Term Goals: To be achieved in: 2 weeks  1. Independent in HEP and progression per patient tolerance, in order to prevent re-injury. [] Progressing: [] Met: [] Not Met: [] Adjusted   2. Patient will have a decrease in pain to facilitate improvement in movement, function, and ADLs as indicated by Functional Deficits. [] Progressing: [] Met: [] Not Met: [] Adjusted    Long Term Goals: To be achieved in:   8 weeks  - The patient is expected to demonstrate less than 50% impairment, limitation or restriction in:  - carrying, moving and handling objects. - Patient will demonstrate increased passive and active ROM to full, symmetrical and painfree to allow for proper joint functioning as indicated by patients Functional Deficits. [] Progressing: [] Met: [] Not Met: [] Adjusted  - Patient will demonstrate an increase in Strength to symmetrical and painfree to allow for proper functional mobility as indicated by patients Functional Deficits  . [] Progressing: [] Met: [] Not Met: [] Adjusted  - Patient will return to desired, higher level upper extremity functional activities without increased symptoms or restriction. [] Progressing: [] Met: [] Not Met: [] Adjusted    Overall Progression Towards Functional goals/ Treatment Progress Update:  [] Patient is progressing as expected towards functional goals listed. [] Progression is slowed due to complexities/Impairments listed.   [] Progression has been slowed due to co-morbidities. [x] Plan just implemented, too soon to assess goals progression <30days   [] Goals require adjustment due to lack of progress  [] Patient is not progressing as expected and requires additional follow up with physician  [] Other    Prognosis for POC: [x] Good [] Fair  [] Poor      Patient requires continued skilled intervention: [x] Yes  [] No    Treatment/Activity Tolerance:  [x] Patient able to complete treatment  [] Patient limited by fatigue  [] Patient limited by pain    [] Patient limited by other medical complications  [] Other:         Return to Play: (if applicable)   []  Stage 1: Intro to Strength   []  Stage 2: Return to Run and Strength   []  Stage 3: Return to Jump and Strength   []  Stage 4: Dynamic Strength and Agility   []  Stage 5: Sport Specific Training     []  Ready to Return to Play, Meets All Above Stages   []  Not Ready for Return to Sports   Comments:                               PLAN: See eval  [] Continue per plan of care [] Alter current plan (see comments above)  [x] Plan of care initiated [] Hold pending MD visit [] Discharge      Electronically signed by:  Deep Juarez PT, MPT     Note: If patient does not return for scheduled/ recommended follow up visits, this note will serve as a discharge from care along with most recent update on progress.

## 2021-07-22 ENCOUNTER — HOSPITAL ENCOUNTER (OUTPATIENT)
Dept: PHYSICAL THERAPY | Age: 81
Setting detail: THERAPIES SERIES
Discharge: HOME | End: 2021-07-22
Payer: MEDICARE

## 2021-07-22 NOTE — FLOWSHEET NOTE
hip, and core control with self care, mobility, lifting, ambulation. [x] (69212) Provided verbal/tactile cueing for activities related to improving balance, coordination, kinesthetic sense, posture, motor skill, proprioception to assist with LE, proximal hip, and core control in self-care, mobility, lifting, ambulation and eccentric single leg control. NMR and Therapeutic Activities:    [x] (80106 or 79982) Provided verbal/tactile cueing for activities related to improving balance, coordination, kinesthetic sense, posture, motor skill, proprioception and motor activation to allow for proper function of core, proximal hip and LE with self-care and ADLs and functional mobility.   [] (14729) Gait Re-education- Provided training and instruction to the patient for proper LE, core and proximal hip recruitment and positioning and eccentric body weight control with ambulation re-education including up and down stairs     Home Exercise Program:    [x] (59866) Reviewed/Progressed HEP activities related to strengthening, flexibility, endurance, ROM of core, proximal hip and LE for functional self-care, mobility, lifting and ambulation/stair navigation   [] (79746) Reviewed/Progressed HEP activities related to improving balance, coordination, kinesthetic sense, posture, motor skill, proprioception of core, proximal hip and LE for self-care, mobility, lifting, and ambulation/stair navigation      Manual Treatments:  PROM / STM / Oscillations-Mobs:  G-I, II, III, IV (PA's, Inf., Post.)  [x] (55844) Provided manual therapy to mobilize LE, proximal hip and/or LS spine soft tissue/joints for the purpose of modulating pain, promoting relaxation, increasing ROM, reducing/eliminating soft tissue swelling/inflammation/restriction, improving soft tissue extensibility and allowing for proper ROM for normal function with self-care, mobility, lifting and ambulation.      Modalities:     [] GAME READY (VASO)- for significant edema, swelling, pain control. Charges:  Timed Code Treatment Minutes: 35'    Total Treatment Minutes: 76'       [] EVAL (LOW) 01299 (typically 20 minutes face-to-face)  [] EVAL (MOD) 42660 (typically 30 minutes face-to-face)  [] EVAL (HIGH) 91859 (typically 45 minutes face-to-face)  [] RE-EVAL     [x] FY(27621) x  1   [] IONTO  [x] NMR (65428) x     [] VASO  [x] Manual (83834) x   1  [] Other:  [] TA x      [] Mech Traction (21299)  [] ES(attended) (63325)      [] ES (un) (14606):         ASSESSMENT:  Tolerating       GOALS:   Patient stated goal:    [] Progressing: [] Met: [] Not Met: [] Adjusted    Therapist goals for Patient:   Short Term Goals: To be achieved in: 2 weeks  1. Independent in HEP and progression per patient tolerance, in order to prevent re-injury. [] Progressing: [] Met: [] Not Met: [] Adjusted   2. Patient will have a decrease in pain to facilitate improvement in movement, function, and ADLs as indicated by Functional Deficits. [] Progressing: [] Met: [] Not Met: [] Adjusted    Long Term Goals: To be achieved in:   8 weeks  - The patient is expected to demonstrate less than 50% impairment, limitation or restriction in:  - carrying, moving and handling objects. - Patient will demonstrate increased passive and active ROM to full, symmetrical and painfree to allow for proper joint functioning as indicated by patients Functional Deficits. [x] Progressing: [] Met: [] Not Met: [] Adjusted  - Patient will demonstrate an increase in Strength to symmetrical and painfree to allow for proper functional mobility as indicated by patients Functional Deficits  . [x] Progressing: [] Met: [] Not Met: [] Adjusted  - Patient will return to desired, higher level upper extremity functional activities without increased symptoms or restriction.       [x] Progressing: [] Met: [] Not Met: [] Adjusted    Overall Progression Towards Functional goals/ Treatment Progress Update:  [] Patient is progressing as expected towards functional goals listed. [] Progression is slowed due to complexities/Impairments listed. [] Progression has been slowed due to co-morbidities. [x] Plan just implemented, too soon to assess goals progression <30days   [] Goals require adjustment due to lack of progress  [] Patient is not progressing as expected and requires additional follow up with physician  [] Other    Prognosis for POC: [x] Good [] Fair  [] Poor      Patient requires continued skilled intervention: [x] Yes  [] No    Treatment/Activity Tolerance:  [x] Patient able to complete treatment  [] Patient limited by fatigue  [] Patient limited by pain    [] Patient limited by other medical complications  [] Other:         Return to Play: (if applicable)   []  Stage 1: Intro to Strength   []  Stage 2: Return to Run and Strength   []  Stage 3: Return to Jump and Strength   []  Stage 4: Dynamic Strength and Agility   []  Stage 5: Sport Specific Training     []  Ready to Return to Play, Meets All Above Stages   []  Not Ready for Return to Sports   Comments:                               PLAN: See eval  [x] Continue per plan of care [] Alter current plan (see comments above)  [] Plan of care initiated [] Hold pending MD visit [] Discharge      Electronically signed by: Miguel Christianson, SPT,  Yunior Andrews, PT, MPT     Note: If patient does not return for scheduled/ recommended follow up visits, this note will serve as a discharge from care along with most recent update on progress.

## 2021-07-23 ENCOUNTER — HOSPITAL ENCOUNTER (OUTPATIENT)
Dept: CARDIAC REHAB | Age: 81
Setting detail: THERAPIES SERIES
Discharge: HOME | End: 2021-07-23
Payer: MEDICARE

## 2021-07-23 PROCEDURE — G0239 OTH RESP PROC, GROUP: HCPCS

## 2021-07-26 ENCOUNTER — OFFICE VISIT (OUTPATIENT)
Dept: INTERNAL MEDICINE CLINIC | Age: 81
End: 2021-07-26
Payer: MEDICARE

## 2021-07-26 ENCOUNTER — HOSPITAL ENCOUNTER (OUTPATIENT)
Dept: CARDIAC REHAB | Age: 81
Setting detail: THERAPIES SERIES
Discharge: HOME | End: 2021-07-26
Payer: MEDICARE

## 2021-07-26 VITALS
DIASTOLIC BLOOD PRESSURE: 58 MMHG | HEART RATE: 66 BPM | OXYGEN SATURATION: 94 % | WEIGHT: 113 LBS | SYSTOLIC BLOOD PRESSURE: 124 MMHG | HEIGHT: 62 IN | BODY MASS INDEX: 20.8 KG/M2

## 2021-07-26 DIAGNOSIS — E83.52 HYPERCALCEMIA: ICD-10-CM

## 2021-07-26 DIAGNOSIS — F32.4 MAJOR DEPRESSIVE DISORDER IN PARTIAL REMISSION, UNSPECIFIED WHETHER RECURRENT (HCC): ICD-10-CM

## 2021-07-26 DIAGNOSIS — R63.4 UNINTENTIONAL WEIGHT LOSS: ICD-10-CM

## 2021-07-26 DIAGNOSIS — Z00.00 ROUTINE GENERAL MEDICAL EXAMINATION AT A HEALTH CARE FACILITY: Primary | ICD-10-CM

## 2021-07-26 DIAGNOSIS — D64.9 ANEMIA, UNSPECIFIED TYPE: ICD-10-CM

## 2021-07-26 DIAGNOSIS — E08.21 DIABETES MELLITUS DUE TO UNDERLYING CONDITION WITH DIABETIC NEPHROPATHY, WITHOUT LONG-TERM CURRENT USE OF INSULIN (HCC): ICD-10-CM

## 2021-07-26 DIAGNOSIS — E78.00 PURE HYPERCHOLESTEROLEMIA: ICD-10-CM

## 2021-07-26 DIAGNOSIS — I10 ESSENTIAL HYPERTENSION, BENIGN: ICD-10-CM

## 2021-07-26 LAB
BASOPHILS ABSOLUTE: 0.1 K/UL (ref 0–0.2)
BASOPHILS RELATIVE PERCENT: 0.7 %
EOSINOPHILS ABSOLUTE: 0.7 K/UL (ref 0–0.6)
EOSINOPHILS RELATIVE PERCENT: 7.2 %
HCT VFR BLD CALC: 33.1 % (ref 36–48)
HEMOGLOBIN: 11 G/DL (ref 12–16)
LYMPHOCYTES ABSOLUTE: 1.2 K/UL (ref 1–5.1)
LYMPHOCYTES RELATIVE PERCENT: 12.8 %
MCH RBC QN AUTO: 29.1 PG (ref 26–34)
MCHC RBC AUTO-ENTMCNC: 33.2 G/DL (ref 31–36)
MCV RBC AUTO: 87.7 FL (ref 80–100)
MONOCYTES ABSOLUTE: 0.5 K/UL (ref 0–1.3)
MONOCYTES RELATIVE PERCENT: 5.5 %
NEUTROPHILS ABSOLUTE: 7.2 K/UL (ref 1.7–7.7)
NEUTROPHILS RELATIVE PERCENT: 73.8 %
PDW BLD-RTO: 15.9 % (ref 12.4–15.4)
PLATELET # BLD: 192 K/UL (ref 135–450)
PMV BLD AUTO: 8.6 FL (ref 5–10.5)
RBC # BLD: 3.77 M/UL (ref 4–5.2)
WBC # BLD: 9.7 K/UL (ref 4–11)

## 2021-07-26 PROCEDURE — G0439 PPPS, SUBSEQ VISIT: HCPCS | Performed by: INTERNAL MEDICINE

## 2021-07-26 PROCEDURE — 99214 OFFICE O/P EST MOD 30 MIN: CPT | Performed by: INTERNAL MEDICINE

## 2021-07-26 PROCEDURE — G0239 OTH RESP PROC, GROUP: HCPCS

## 2021-07-26 RX ORDER — GLIPIZIDE 10 MG/1
TABLET ORAL
COMMUNITY
End: 2021-11-30 | Stop reason: ALTCHOICE

## 2021-07-26 ASSESSMENT — PATIENT HEALTH QUESTIONNAIRE - PHQ9
SUM OF ALL RESPONSES TO PHQ QUESTIONS 1-9: 0
2. FEELING DOWN, DEPRESSED OR HOPELESS: 0
SUM OF ALL RESPONSES TO PHQ QUESTIONS 1-9: 0
SUM OF ALL RESPONSES TO PHQ9 QUESTIONS 1 & 2: 0
1. LITTLE INTEREST OR PLEASURE IN DOING THINGS: 0
SUM OF ALL RESPONSES TO PHQ QUESTIONS 1-9: 0

## 2021-07-26 NOTE — PATIENT INSTRUCTIONS
Personalized Preventive Plan for Beatrice Nye - 7/26/2021  Medicare offers a range of preventive health benefits. Some of the tests and screenings are paid in full while other may be subject to a deductible, co-insurance, and/or copay. Some of these benefits include a comprehensive review of your medical history including lifestyle, illnesses that may run in your family, and various assessments and screenings as appropriate. After reviewing your medical record and screening and assessments performed today your provider may have ordered immunizations, labs, imaging, and/or referrals for you. A list of these orders (if applicable) as well as your Preventive Care list are included within your After Visit Summary for your review. Other Preventive Recommendations:    · A preventive eye exam performed by an eye specialist is recommended every 1-2 years to screen for glaucoma; cataracts, macular degeneration, and other eye disorders. · A preventive dental visit is recommended every 6 months. · Try to get at least 150 minutes of exercise per week or 10,000 steps per day on a pedometer . · Order or download the FREE \"Exercise & Physical Activity: Your Everyday Guide\" from [employer]. Call 0-992.165.8834 or search [employer] online. · You need 2645-7818 mg of calcium and 6686-4632 IU of vitamin D per day. It is possible to meet your calcium requirement with diet alone, but a vitamin D supplement is usually necessary to meet this goal.  · When exposed to the sun, use a sunscreen that protects against both UVA and UVB radiation with an SPF of 30 or greater. Reapply every 2 to 3 hours or after sweating, drying off with a towel, or swimming. · Always wear a seat belt when traveling in a car. Always wear a helmet when riding a bicycle or motorcycle.     Glucerna - try this supplement drink This office note has been dictated.     José Miguel Woodruff MD

## 2021-07-26 NOTE — PROGRESS NOTES
Medicare Annual Wellness Visit  Name: Franco Putnam Date: 2021   MRN: [id number] Sex: Female   Age: 80 y.o. Ethnicity: Non- / Non    : 1940 Race: White (non-)      Delma Galarza is here for Medicare AWV    Screenings for behavioral, psychosocial and functional/safety risks, and cognitive dysfunction are all negative except as indicated below. These results, as well as other patient data from the [address] form, are documented in Flowsheets linked to this Encounter. Allergies   Allergen Reactions    Pravastatin      Other reaction(s): increases blood pressure    Percocet [Oxycodone-Acetaminophen] Other (See Comments)     Dizzy       Prior to Visit Medications    Medication Sig Taking? Authorizing Provider   glipiZIDE (GLUCOTROL) 10 MG tablet Take by mouth Yes Historical Provider, MD   linagliptin (TRADJENTA) 5 MG tablet Take by mouth Yes Historical Provider, MD   baclofen (LIORESAL) 10 MG tablet TAKE ONE TABLET BY MOUTH THREE TIMES A DAY Yes AMEE Pardo CNP   escitalopram (LEXAPRO) 20 MG tablet TAKE ONE TABLET BY MOUTH DAILY Yes AMEE Pardo CNP   amLODIPine (NORVASC) 10 MG tablet TAKE ONE TABLET BY MOUTH DAILY Yes AMEE Aguillon CNP   rosuvastatin (CRESTOR) 40 MG tablet TAKE ONE TABLET BY MOUTH DAILY Yes AMEE Aguillon CNP   Vitamin D, Cholecalciferol, 25 MCG (1000 UT) CAPS Take 1,000 Units by mouth daily Yes AMEE Jackson CNP   diclofenac (VOLTAREN) 50 MG EC tablet Take 1 tablet by mouth 3 times daily (with meals) Yes AMEE Sims CNP   lidocaine (LIDODERM) 5 % Place 1 patch onto the skin every 12 hours 12 hours on, 12 hours off.  Yes AMEE Jackson CNP   traZODone (DESYREL) 100 MG tablet Take 1 tablet by mouth nightly as needed for Sleep Yes AMEE Jackson CNP   omeprazole (PRILOSEC) 20 MG delayed release capsule Take 2 capsules by mouth daily Yes AMEE Mccoy CNP   Probiotic Product (PROBIOTIC ACIDOPHILUS BIOBEADS) CAPS Take 1 capsule by mouth daily Yes AMEE Mccoy CNP   Diclofenac Sodium POWD 2 g by Does not apply route 4 times daily Formula #8E Baclo 2% Diclo 3% DMSO 5% Evans 6% Lido 2% Prilo 2% Yes NATALIE Armstrong   blood glucose test strips (ASCENSIA AUTODISC VI;ONE TOUCH ULTRA TEST VI) strip One Touch Ultra test Strips testing daily per E11.9 Yes AMEE Jo CNP   blood glucose test strips (FREESTYLE LITE) strip 1 each by In Vitro route daily Testing 1-2 times daily per e11.9 Yes AMEE Mccoy - CNP   blood glucose monitor strips Test 2 times a day & as needed for symptoms of irregular blood glucose. One Touch Verio strips Yes Julia Smith MD   Cyanocobalamin (B-12 PO) Take by mouth daily  Yes Historical Provider, MD   vitamin E 1000 UNITS capsule Take 1,000 Units by mouth daily.    Yes Historical Provider, MD       Past Medical History:   Diagnosis Date    Diabetes mellitus ([address]     Essential hypertension, benign     GERD (gastroesophageal reflux disease)     Hyperlipidemia     Interstitial lung disease (HCC)     Osteoarthrosis, unspecified whether generalized or localized, unspecified site     osteoarthritis lower leg    Osteopenia     Shingles        Past Surgical History:   Procedure Laterality Date    ANKLE SURGERY Right 04/01/2013    torn tendon    BRONCHOSCOPY N/A 8/1/2019    BRONCHOSCOPY WITH BAL AND TRANSBRONCHIAL BIOPSIES performed by Ratna Reddy MD at [address] [place] [address]  10/11/2010    Dr. Perla Qureshi , polyps and diverticulosis    COLONOSCOPY  11/11/2002    Dr. Abdon Mckeon, Diverticulosis    COLONOSCOPY  04/20/2015    Dr. Perla Qureshi- diverticulosis, sessile polyp, 5 years     COLONOSCOPY  4/13/16    Dr Didier Mosley; Diverticulosis    COLONOSCOPY  11/28/2016    Dr Perla Qureshi,    Maxine Gandara EXTRACTION Right 7/15/2020    PHACOEMULSIFICATION OF CATARACT RIGHT EYE WITH INTRAOCULAR LENS IMPLANT performed by Ramy Duncan MD at [facility] Bilateral 9/1/2020    BILATERAL LUMBAR THREE, LUMBAR FOUR, LUMBAR FIVE RADIOFREQUENCY ABLATION SITE CONFIRMED BY FLUOROSCOPY performed by Joesph Cobos MD at [address] [facility] Bilateral 6/23/2020    BILATERAL LUMBAR FOUR TRANSFORAMINAL EPIDURAL STEROID INJECTION SITE CONFIRMED BY FLUOROSCOPY performed by Joesph Cobos MD at [address] [facility] Bilateral 7/7/2020    BILATERAL LUMBAR FOUR TRANSFORAMINAL EPIDURAL STEROID INJECTION SITE CONFIRMED BY FLUOROSCOPY performed by Joesph Cobos MD at [address] [facility] Bilateral 8/4/2020    BILATERAL LUMBAR THREE, LUMBAR FOUR, LUMBAR FIVE DORSAL RAMUS MEDIAL BRANCH BLOCK SITE CONFIRMED BY FLUOROSCOPY performed by Joesph Cobos MD at [address] [facility] Bilateral 8/18/2020    BILATERAL LUMBAR THREE, LUMBAR FOUR, LUMBAR FIVE DORSAL RAMUS MEDIAL BRANCH BLOCK SITE CONFIR,ED BY FLUOROSCOPY performed by Joesph Cobos MD at [address] [facility] Right     UPPER GASTROINTESTINAL ENDOSCOPY  8/17/2011    Dr. Alesha Lloyd - moderate gastritis , hiatal hernia     UPPER GASTROINTESTINAL ENDOSCOPY  4/20/15    Dr. Alesha Lloyd - polyps removed, diverticulosis, follow up in 5 years   [address] [facility]  8/16/2012    DR. Ramos - with mesh       Family History   Problem Relation Age of Onset    Heart Disease Mother     Rheum Arthritis Mother     Heart Disease Father        CareTeam (Including outside providers/suppliers regularly involved in providing care):   Patient Care Team:  Daren Bernardo MD as PCP - General (Internal Medicine)  Daren Bernardo MD as PCP - [facility] Empaneled Provider  Sha Gomez MD as Consulting Physician (Otolaryngology)  Dhaval Chou RD, LD as Diabetic Educator (Dietitian)  Rosales Green, RN as Registered Nurse  Louann Finn MD as Consulting Physician (Pulmonology)    Wt Readings from Last 3 Encounters:   07/26/21 113 lb (51.3 kg)   07/19/21 124 lb (56.2 kg)   07/12/21 125 lb (56.7 kg)     Vitals:    07/26/21 0959   BP: (!) 124/58   Pulse: 66   SpO2: 94%   Weight: 113 lb (51.3 kg)   Height: 5' 2\" (1.575 m)     Body mass index is 20.67 kg/m². Based upon direct observation of the patient, evaluation of cognition reveals recent and remote memory intact. Patient's complete Health Risk Assessment and screening values have been reviewed and are found in Flowsheets. The following problems were reviewed today and where indicated follow up appointments were made and/or referrals ordered. Positive Risk Factor Screenings with Interventions:          General Health and ACP:  General  In general, how would you say your health is?: Good  In the past 7 days, have you experienced any of the following?  New or Increased Pain, New or Increased Fatigue, Loneliness, Social Isolation, Stress or Anger?: (!) New or Increased Pain  Do you get the social and emotional support that you need?: Yes  Do you have a Living Will?: Yes  Advance Directives     Power of  Living Will ACP-Advance Directive ACP-Power of     Not on File Not on File Filed Not on File      General Health Risk Interventions:  · Pain issues: seeing PT    Health Habits/Nutrition:  Health Habits/Nutrition  Do you exercise for at least 20 minutes 2-3 times per week?: Yes  Have you lost any weight without trying in the past 3 months?: (!) Yes  Do you eat only one meal per day?: No  Have you seen the dentist within the past year?: Yes  Body mass index: 20.67  Health Habits/Nutrition Interventions:  · Nutritional issues:  workup ordered for weight loss    Hearing/Vision:  No exam data present  Hearing/Vision  Do you or your family notice any trouble with your hearing that hasn't been managed with hearing aids?: (!) Yes  Do you have difficulty driving, watching TV, or doing any of your daily activities because of your eyesight?: No  Have you had an eye exam within the past year?: Yes  Hearing/Vision Interventions:  · Hearing concerns:  follow up with repeat hearing eval     ADL:  ADLs  In the past 7 days, did you need help from others to perform any of the following everyday activities? Eating, dressing, grooming, bathing, toileting, or walking/balance?: None  In the past 7 days, did you need help from others to take care of any of the following?  Laundry, housekeeping, banking/finances, shopping, telephone use, food preparation, transportation, or taking medications?: [employer], Transportation  ADL Interventions:  · has assistance from family    Personalized Preventive Plan   Current Health Maintenance Status  Immunization History   Administered Date(s) Administered    COVID-19, Ramon Peter, PF, 30mcg/0.3mL 01/26/2021, 02/22/2021    Influenza 09/28/2010, 11/03/2011    Influenza A ([id number]) Vaccine PF IM 12/21/2009    Influenza Vaccine, unspecified formulation 10/15/2016, 11/21/2017, 10/02/2018    Influenza Virus Vaccine 10/02/2009, 10/05/2012, 10/04/2013, 09/30/2014, 10/05/2015, 10/15/2016, 12/09/2019    Influenza Whole 10/04/2013, 09/30/2014, 10/05/2015    Influenza, High Dose (Fluzone 65 yrs and older) 09/01/2011, 12/09/2019    Influenza, High-dose, Quadv, 65 yrs +, IM (Fluzone) 08/26/2020    Meningococcal B, OMV (Bexsero) 11/22/2017    Pneumococcal Conjugate 13-valent ([id number]) 05/20/2015    Pneumococcal Polysaccharide ([id number]) 01/01/2002, 02/01/2009, 02/28/2012, 11/05/2019, 07/14/2021    Td, unspecified formulation 11/10/2009    Tdap (Boostrix, Adacel) 12/31/2016, 05/03/2017    Zoster Recombinant (Shingrix) 03/04/2020, 06/22/2020        Health Maintenance   Topic Date Due    Hepatitis C screen  Never done    Annual Wellness Visit (AWV)  Never done    Flu vaccine (1) 09/01/2021    Lipid screen  10/13/2021    Potassium monitoring  07/02/2022    Creatinine monitoring  07/02/2022    DTaP/Tdap/Td vaccine (3 - Td or Tdap) 05/03/2027    DEXA (modify frequency per FRAX score)  Completed    Shingles Vaccine  Completed    Pneumococcal 65+ years Vaccine  Completed    COVID-19 Vaccine  Completed    Hepatitis A vaccine  Aged Out    Hib vaccine  Aged Out    Meningococcal (ACWY) vaccine  Aged Out     Recommendations for [employer] Due: see orders and patient instructions/AVS.  . Recommended screening schedule for the next 5-10 years is provided to the patient in written form: see Patient Merrick Severs was seen today for medicare aw. Diagnoses and all orders for this visit:    Routine general medical examination at a health care facility               Spell last week - likely sugars  Weight loss  Not eating great  Quan Schimke twice last year, now has back issues, left shoulder (cortisone shot last week)  Better with the escitalopram but still having depression symptoms.   Taking tradjenta and glipizide  Having night sweats  Right leg gets swollen  Has weakness in the hands, difficulty opening jars  Had xrays of back in the Spring

## 2021-07-27 ENCOUNTER — HOSPITAL ENCOUNTER (OUTPATIENT)
Dept: PHYSICAL THERAPY | Age: 81
Setting detail: THERAPIES SERIES
Discharge: HOME | End: 2021-07-27
Payer: MEDICARE

## 2021-07-27 PROBLEM — R63.4 UNINTENTIONAL WEIGHT LOSS: Status: ACTIVE | Noted: 2021-07-27

## 2021-07-27 PROBLEM — B37.31 VAGINAL YEAST INFECTION: Status: RESOLVED | Noted: 2020-07-31 | Resolved: 2021-07-27

## 2021-07-27 PROBLEM — F32.4 MAJOR DEPRESSIVE DISORDER IN PARTIAL REMISSION (HCC): Status: ACTIVE | Noted: 2021-07-27

## 2021-07-27 PROBLEM — E83.52 HYPERCALCEMIA: Status: ACTIVE | Noted: 2021-07-27

## 2021-07-27 PROBLEM — D64.9 ANEMIA: Status: ACTIVE | Noted: 2021-07-27

## 2021-07-27 LAB
A/G RATIO: 1.7 (ref 1.1–2.2)
ALBUMIN SERPL-MCNC: 4.8 G/DL (ref 3.4–5)
ALP BLD-CCNC: 140 U/L (ref 40–129)
ALT SERPL-CCNC: 14 U/L (ref 10–40)
ANION GAP SERPL CALCULATED.3IONS-SCNC: 15 MMOL/L (ref 3–16)
AST SERPL-CCNC: 16 U/L (ref 15–37)
BILIRUB SERPL-MCNC: 0.4 MG/DL (ref 0–1)
BUN BLDV-MCNC: 37 MG/DL (ref 7–20)
CALCIUM SERPL-MCNC: 11.3 MG/DL (ref 8.3–10.6)
CHLORIDE BLD-SCNC: 107 MMOL/L (ref 99–110)
CHOLESTEROL, TOTAL: 150 MG/DL (ref 0–199)
CO2: 22 MMOL/L (ref 21–32)
CREAT SERPL-MCNC: 1.1 MG/DL (ref 0.6–1.2)
ESTIMATED AVERAGE GLUCOSE: 131.2 MG/DL
FERRITIN: 14.3 NG/ML (ref 15–150)
FOLATE: 17.18 NG/ML (ref 4.78–24.2)
GFR AFRICAN AMERICAN: 58
GFR NON-AFRICAN AMERICAN: 48
GLOBULIN: 2.9 G/DL
GLUCOSE BLD-MCNC: 166 MG/DL (ref 70–99)
HBA1C MFR BLD: 6.2 %
HDLC SERPL-MCNC: 68 MG/DL (ref 40–60)
IRON SATURATION: 13 % (ref 15–50)
IRON: 52 UG/DL (ref 37–145)
KAPPA, FREE LIGHT CHAINS, SERUM: 42.78 MG/L (ref 3.3–19.4)
KAPPA/LAMBDA RATIO: 1.68 (ref 0.26–1.65)
KAPPA/LAMBDA TEST COMMENT: ABNORMAL
LAMBDA, FREE LIGHT CHAINS, SERUM: 25.45 MG/L (ref 5.71–26.3)
LDL CHOLESTEROL CALCULATED: 56 MG/DL
PARATHYROID HORMONE INTACT: 86.3 PG/ML (ref 14–72)
POTASSIUM SERPL-SCNC: 4.1 MMOL/L (ref 3.5–5.1)
SODIUM BLD-SCNC: 144 MMOL/L (ref 136–145)
TOTAL IRON BINDING CAPACITY: 412 UG/DL (ref 260–445)
TOTAL PROTEIN: 7.7 G/DL (ref 6.4–8.2)
TRIGL SERPL-MCNC: 131 MG/DL (ref 0–150)
TSH REFLEX: 1.53 UIU/ML (ref 0.27–4.2)
VITAMIN B-12: 551 PG/ML (ref 211–911)
VLDLC SERPL CALC-MCNC: 26 MG/DL

## 2021-07-27 PROCEDURE — 97110 THERAPEUTIC EXERCISES: CPT | Performed by: PHYSICAL THERAPIST

## 2021-07-27 PROCEDURE — 97140 MANUAL THERAPY 1/> REGIONS: CPT | Performed by: PHYSICAL THERAPIST

## 2021-07-27 PROCEDURE — G0283 ELEC STIM OTHER THAN WOUND: HCPCS | Performed by: PHYSICAL THERAPIST

## 2021-07-27 PROCEDURE — 97112 NEUROMUSCULAR REEDUCATION: CPT | Performed by: PHYSICAL THERAPIST

## 2021-07-27 NOTE — FLOWSHEET NOTE
[facility] Orthopaedics and Sports Rehabilitation; [place]         Physical Therapy Treatment Note/ Progress Report:           Date:  2021  Patient Name:  Tomi Gilford    :  1940  MRN: [id number]  Restrictions/Precautions:    Medical/Treatment Diagnosis Information:   Diagnosis: Right shoulder pain- X75.373     Insurance/Certification information:     Physician Information:     Has the plan of care been signed (Y/N):        []  Yes  [x]  No     Date of Patient follow up with Physician:       Is this a Progress Report:     []  Yes  [x]  No        If Yes:  Date Range for reporting period:  Beginning  Ending    Progress report will be due (10 Rx or 30 days whichever is less):       Recertification will be due (POC Duration  / 90 days whichever is less):          Visit # Insurance Allowable Auth Required   3  []  Yes []  No        Functional Scale:    Date assessed:        Latex Allergy:  [x]NO      []YES  Preferred Language for Healthcare:   [x]English       []other:      Pain level:  610     SUBJECTIVE:  States the shoulder is hurting a lot today and feels stiff. Hoping PT will help loosens things up.       OBJECTIVE:  4-/5 shoulder strength    Observation:    Test measurements:      RESTRICTIONS/PRECAUTIONS:     Exercises/Interventions:       Therapeutic Ex (71116) Sets/sec Reps Notes/CUES   Supine cane stretches   5  X 10\"     Wall stretch   5 x 10\"     scap pinches   30x     sidelying er   20x     Prone: rows, ext   25x, 20x     Swiss ball rolls   25x    theraband rows   25x yellow     Supine raises   20x     Pulleys  3'                      Manual Intervention (59059)      Prom/stm   16'                                                                                                                                             Therapeutic Exercise and NMR EXR  [x] () Provided verbal/tactile cueing for activities related to strengthening, flexibility, endurance, ROM for improvements in LE, proximal hip, and core control with self care, mobility, lifting, ambulation. [x] (70993) Provided verbal/tactile cueing for activities related to improving balance, coordination, kinesthetic sense, posture, motor skill, proprioception to assist with LE, proximal hip, and core control in self-care, mobility, lifting, ambulation and eccentric single leg control. NMR and Therapeutic Activities:    [x] (47401 or 16993) Provided verbal/tactile cueing for activities related to improving balance, coordination, kinesthetic sense, posture, motor skill, proprioception and motor activation to allow for proper function of core, proximal hip and LE with self-care and ADLs and functional mobility.   [] (42178) Gait Re-education- Provided training and instruction to the patient for proper LE, core and proximal hip recruitment and positioning and eccentric body weight control with ambulation re-education including up and down stairs     Home Exercise Program:    [x] (52877) Reviewed/Progressed HEP activities related to strengthening, flexibility, endurance, ROM of core, proximal hip and LE for functional self-care, mobility, lifting and ambulation/stair navigation   [] (45282) Reviewed/Progressed HEP activities related to improving balance, coordination, kinesthetic sense, posture, motor skill, proprioception of core, proximal hip and LE for self-care, mobility, lifting, and ambulation/stair navigation      Manual Treatments:  PROM / STM / Oscillations-Mobs:  G-I, II, III, IV (PA's, Inf., Post.)  [x] (03068) Provided manual therapy to mobilize LE, proximal hip and/or LS spine soft tissue/joints for the purpose of modulating pain, promoting relaxation, increasing ROM, reducing/eliminating soft tissue swelling/inflammation/restriction, improving soft tissue extensibility and allowing for proper ROM for normal function with self-care, mobility, lifting and ambulation.      Modalities: CP/E-stim x 15'    [] GAME READY (VASO)- for significant edema, swelling, pain control. Charges:  Timed Code Treatment Minutes: 45'    Total Treatment Minutes: 61'       [] EVAL (LOW) 03917 (typically 20 minutes face-to-face)  [] EVAL (MOD) 53196 (typically 30 minutes face-to-face)  [] EVAL (HIGH) 15181 (typically 45 minutes face-to-face)  [] RE-EVAL     [x] OT(82247) x  1   [] IONTO  [x] NMR (52138) x  1   [] VASO  [x] Manual (32200) x   1  [] Other:  [] TA x      [] Mech Traction (83629)  [] ES(attended) (91735)      [x] ES (un) (15674):         ASSESSMENT:  Tolerated Rx well but does fatigue easily with active movements. Demonstrated good PROM. GOALS:   Patient stated goal:    [] Progressing: [] Met: [] Not Met: [] Adjusted    Therapist goals for Patient:   Short Term Goals: To be achieved in: 2 weeks  1. Independent in HEP and progression per patient tolerance, in order to prevent re-injury. [] Progressing: [] Met: [] Not Met: [] Adjusted   2. Patient will have a decrease in pain to facilitate improvement in movement, function, and ADLs as indicated by Functional Deficits. [] Progressing: [] Met: [] Not Met: [] Adjusted    Long Term Goals: To be achieved in:   8 weeks  - The patient is expected to demonstrate less than 50% impairment, limitation or restriction in:  - carrying, moving and handling objects. - Patient will demonstrate increased passive and active ROM to full, symmetrical and painfree to allow for proper joint functioning as indicated by patients Functional Deficits. [x] Progressing: [] Met: [] Not Met: [] Adjusted  - Patient will demonstrate an increase in Strength to symmetrical and painfree to allow for proper functional mobility as indicated by patients Functional Deficits  . [x] Progressing: [] Met: [] Not Met: [] Adjusted  - Patient will return to desired, higher level upper extremity functional activities without increased symptoms or restriction.       [x] Progressing: [] Met: [] Not Met: [] Adjusted    Overall Progression Towards Functional goals/ Treatment Progress Update:  [] Patient is progressing as expected towards functional goals listed. [] Progression is slowed due to complexities/Impairments listed. [] Progression has been slowed due to co-morbidities. [x] Plan just implemented, too soon to assess goals progression <30days   [] Goals require adjustment due to lack of progress  [] Patient is not progressing as expected and requires additional follow up with physician  [] Other    Prognosis for POC: [x] Good [] Fair  [] Poor      Patient requires continued skilled intervention: [x] Yes  [] No    Treatment/Activity Tolerance:  [x] Patient able to complete treatment  [] Patient limited by fatigue  [] Patient limited by pain    [] Patient limited by other medical complications  [] Other:         Return to Play: (if applicable)   []  Stage 1: Intro to Strength   []  Stage 2: Return to Run and Strength   []  Stage 3: Return to Jump and Strength   []  Stage 4: Dynamic Strength and Agility   []  Stage 5: Sport Specific Training     []  Ready to Return to Play, Meets All Above Stages   []  Not Ready for Return to Sports   Comments:                               PLAN: See eval  [x] Continue per plan of care [] Alter current plan (see comments above)  [] Plan of care initiated [] Hold pending MD visit [] Discharge      Electronically signed by: Mary Butts PT, OMT-C      Note: If patient does not return for scheduled/ recommended follow up visits, this note will serve as a discharge from care along with most recent update on progress.

## 2021-07-27 NOTE — PROGRESS NOTES
Sheron Sanchez (:  1940) is a 80 y.o. female,New patient, here for evaluation of the following chief complaint(s):  Medicare AWV         ASSESSMENT/PLAN:  1. Routine general medical examination at a health care facility  2. Unintentional weight loss  -Unclear etiology of the unintentional weight loss. She needs an evaluation for malignancy. Depression is certainly a possibility, and does not sound like she is eating very much. She is having night sweats which can be a symptom of malignancy however it can also be a side effect of SSRIs. We will start work-up with blood work as below, in the meantime would like her to add in calorie dense snacks twice a day. 3. Anemia, unspecified type  -Anemia noted on prior blood work, evaluate for iron deficiency, B12 deficiency, folate deficiency, check SPEP and serum free light  -     CBC Auto Differential; Future  -     Vitamin B12; Future  -     TSH with Reflex; Future  -     Ferritin; Future  -     IRON AND TIBC; Future  -     Folate; Future  -     PROTEIN ELECTROPHORESIS, SERUM; Future  -     KAPPA/LAMBDA QUANT FREE LIGHT CHAINS SERUM; Future  4. Hypercalcemia  -She has had some hypercalcemia on blood work recently, PTH  -     PTH, Intact; Future  5. Pure hypercholesterolemia   -continue rosuvastatin  6. Diabetes mellitus due to underlying condition with diabetic nephropathy, without long-term current use of insulin ([facility] [address]  -With a significant weight loss she may not need nearly as much diabetes medication. We are somewhat limited in management since she does not check her blood sugars at home. She is having a lot of variability we could consider something like freestyle lisa, will start with an A1c check. -     Comprehensive Metabolic Panel; Future  -     Lipid Panel; Future  -     Hemoglobin A1C; Future  7.  Major depressive disorder in partial remission, unspecified whether recurrent ([facility] [address]   -At this time she is on max dose of escitalopram, for now we will continue this without change, reassess once further work-up has been completed    Return in 1 month (on 8/26/2021) for weight loss. Subjective   SUBJECTIVE/OBJECTIVE:  HPI    Here to establish care. She has been having multiple issues and gradual decline. She lives alone, since her  passed she has been dealing with increased depression. She has been on Lexapro for some time, prior to losing her , it does seem to be helpful though she still feels depressed. She has a history of type 2 diabetes for which she takes Tradjenta and glipizide. She has lost significant amount of weight particularly over the last several months. She states that she eats regularly, however she does not eat a large amount. She had a spell last week where she felt dizzy, unwell. Her sugar was off that she is not sure if it was low or high. She had been at rehab and came back and napped, she did not eat much that day. This does not occur frequently. She has been dealing with pain particularly in the back and the left shoulder. She had a cortisone shot last week. She had x-rays of the back in the spring. She also notes weakness in the hands, difficulty opening jars. Her family is concerned about her weight loss. She has been having night sweats recently. She gets swelling intermittently in the right leg but has a history of prior injury there. She does still get mammograms regularly and is up-to-date. Review of Systems       Objective   Physical Exam  Vitals reviewed. Constitutional:       General: She is not in acute distress. Appearance: Normal appearance. She is well-developed. HENT:      Head: Normocephalic and atraumatic. Right Ear: Tympanic membrane, ear canal and external ear normal.      Left Ear: Tympanic membrane, ear canal and external ear normal.   Cardiovascular:      Rate and Rhythm: Normal rate and regular rhythm. Heart sounds: Normal heart sounds.    Pulmonary: Effort: Pulmonary effort is normal. No respiratory distress. Breath sounds: Normal breath sounds. Abdominal:      General: Abdomen is flat. Bowel sounds are normal. There is no distension. Palpations: Abdomen is soft. There is no mass. Tenderness: There is no abdominal tenderness. Hernia: No hernia is present. Skin:     General: Skin is warm and dry. Neurological:      Mental Status: She is alert and oriented to person, place, and time. Psychiatric:         Mood and Affect: Mood is depressed. Behavior: Behavior normal.         Thought Content: Thought content normal.         Judgment: Judgment normal.                  An electronic signature was used to authenticate this note.     --Justina Epley, MD

## 2021-07-28 ENCOUNTER — TELEPHONE (OUTPATIENT)
Dept: INTERNAL MEDICINE CLINIC | Age: 81
End: 2021-07-28

## 2021-07-28 ENCOUNTER — HOSPITAL ENCOUNTER (OUTPATIENT)
Dept: CARDIAC REHAB | Age: 81
Setting detail: THERAPIES SERIES
Discharge: HOME | End: 2021-07-28
Payer: MEDICARE

## 2021-07-28 DIAGNOSIS — E21.3 HYPERPARATHYROIDISM (HCC): Primary | ICD-10-CM

## 2021-07-28 DIAGNOSIS — D64.9 ANEMIA, UNSPECIFIED TYPE: ICD-10-CM

## 2021-07-28 LAB
ALBUMIN SERPL-MCNC: 3.4 G/DL (ref 3.1–4.9)
ALPHA-1-GLOBULIN: 0.3 G/DL (ref 0.2–0.4)
ALPHA-2-GLOBULIN: 1.2 G/DL (ref 0.4–1.1)
BETA GLOBULIN: 1.4 G/DL (ref 0.9–1.6)
GAMMA GLOBULIN: 1.3 G/DL (ref 0.6–1.8)
SPE/IFE INTERPRETATION: NORMAL

## 2021-07-28 PROCEDURE — G0239 OTH RESP PROC, GROUP: HCPCS

## 2021-07-28 NOTE — TELEPHONE ENCOUNTER
Dora Garcia who is Jacki's Son called in to see if he can get a call back about some of her mychart results that he is seeing stated that he is a Internist in [place] and would like to just know a little more about what is going on.     Please call to advise

## 2021-07-28 NOTE — TELEPHONE ENCOUNTER
Spoke with her son, discussed results. Hypercalcemia appears to be from hyperparathyroidism, will refer to endo and add vit D level. Hypercalcemia could be contributing to change in appetite and mental status changes. Anemia is stable but ferritin is newly low suggesting early iron deficiency. She last had a colonoscopy in 2016, prior GI was Dr. Kierra Bush. Will refer her back to GI for evaluation of the anemia. The SPEP was normal, kappa light chains were elevated but hard to interpret in the setting of chronic kidney disease. Would consider hematology referral next pending the above evals. He also thinks it is possible there may be some hypoxia, she is resistant to wearing her oxygen - it is certainly possible that this is contributing to episodes of confusion. Can you give the patient the referral information for Dr. Praveen Parekh and Dr. Aisha Alas? (I am also sending the info on [employer]). I am referring her to Dr. Praveen Parekh for her high calcium, and to Dr. Aisha Alas because of the anemia. I would also like her to start an iron supplement 1 tab daily as well, if it makes her constipated she can take it every other day.

## 2021-07-29 ENCOUNTER — HOSPITAL ENCOUNTER (OUTPATIENT)
Dept: PHYSICAL THERAPY | Age: 81
Setting detail: THERAPIES SERIES
Discharge: HOME | End: 2021-07-29
Payer: MEDICARE

## 2021-07-29 DIAGNOSIS — E21.3 HYPERPARATHYROIDISM (HCC): ICD-10-CM

## 2021-07-29 LAB — VITAMIN D 25-HYDROXY: 62.7 NG/ML

## 2021-07-29 PROCEDURE — 97110 THERAPEUTIC EXERCISES: CPT | Performed by: PHYSICAL THERAPIST

## 2021-07-29 PROCEDURE — 97140 MANUAL THERAPY 1/> REGIONS: CPT | Performed by: PHYSICAL THERAPIST

## 2021-07-29 PROCEDURE — 97112 NEUROMUSCULAR REEDUCATION: CPT | Performed by: PHYSICAL THERAPIST

## 2021-07-29 PROCEDURE — G0283 ELEC STIM OTHER THAN WOUND: HCPCS | Performed by: PHYSICAL THERAPIST

## 2021-07-29 NOTE — FLOWSHEET NOTE
[facility] Orthopaedics and Sports Rehabilitation; [facility]         Physical Therapy Treatment Note/ Progress Report:           Date:  2021  Patient Name:  Tommy Kohler    :  1940  MRN: [id number]  Restrictions/Precautions:    Medical/Treatment Diagnosis Information:   Diagnosis: Right shoulder pain- Y90.108     Insurance/Certification information:     Physician Information:     Has the plan of care been signed (Y/N):        []  Yes  [x]  No     Date of Patient follow up with Physician:       Is this a Progress Report:     []  Yes  [x]  No        If Yes:  Date Range for reporting period:  Beginning  Ending    Progress report will be due (10 Rx or 30 days whichever is less):       Recertification will be due (POC Duration  / 90 days whichever is less):          Visit # Insurance Allowable Auth Required   4  []  Yes []  No        Functional Scale:    Date assessed:        Latex Allergy:  [x]NO      []YES  Preferred Language for Healthcare:   [x]English       []other:      Pain level:  6/10     SUBJECTIVE:   Sore after the last visit but felt a lot better by the next day.        OBJECTIVE:  4-/5 shoulder strength    Observation:    Test measurements:      RESTRICTIONS/PRECAUTIONS:     Exercises/Interventions:       Therapeutic Ex (51428) Sets/sec Reps Notes/CUES   Supine cane stretches   5  X 10\"     Wall stretch   5 x 10\"     scap pinches   30x     sidelying er   20x     Prone: rows, ext   25x, 20x     Swiss ball rolls   25x    theraband rows   25x yellow     Supine raises   20x     Pulleys  3'                      Manual Intervention (96067)      Prom/stm   16'                                                                                                                                             Therapeutic Exercise and NMR EXR  [x] (00523) Provided verbal/tactile cueing for activities related to strengthening, flexibility, endurance, ROM for improvements in LE, proximal hip, and core control with self care, mobility, lifting, ambulation. [x] (92366) Provided verbal/tactile cueing for activities related to improving balance, coordination, kinesthetic sense, posture, motor skill, proprioception to assist with LE, proximal hip, and core control in self-care, mobility, lifting, ambulation and eccentric single leg control. NMR and Therapeutic Activities:    [x] (84080 or 03363) Provided verbal/tactile cueing for activities related to improving balance, coordination, kinesthetic sense, posture, motor skill, proprioception and motor activation to allow for proper function of core, proximal hip and LE with self-care and ADLs and functional mobility.   [] (50345) Gait Re-education- Provided training and instruction to the patient for proper LE, core and proximal hip recruitment and positioning and eccentric body weight control with ambulation re-education including up and down stairs     Home Exercise Program:    [x] (46457) Reviewed/Progressed HEP activities related to strengthening, flexibility, endurance, ROM of core, proximal hip and LE for functional self-care, mobility, lifting and ambulation/stair navigation   [] (75369) Reviewed/Progressed HEP activities related to improving balance, coordination, kinesthetic sense, posture, motor skill, proprioception of core, proximal hip and LE for self-care, mobility, lifting, and ambulation/stair navigation      Manual Treatments:  PROM / STM / Oscillations-Mobs:  G-I, II, III, IV (PA's, Inf., Post.)  [x] (50322) Provided manual therapy to mobilize LE, proximal hip and/or LS spine soft tissue/joints for the purpose of modulating pain, promoting relaxation, increasing ROM, reducing/eliminating soft tissue swelling/inflammation/restriction, improving soft tissue extensibility and allowing for proper ROM for normal function with self-care, mobility, lifting and ambulation.      Modalities: CP/E-stim x 15'    [] GAME READY (VASO)- for significant edema, swelling, pain control. Charges:  Timed Code Treatment Minutes: 45'    Total Treatment Minutes: 61'       [] EVAL (LOW) 04645 (typically 20 minutes face-to-face)  [] EVAL (MOD) 96445 (typically 30 minutes face-to-face)  [] EVAL (HIGH) 34571 (typically 45 minutes face-to-face)  [] RE-EVAL     [x] OU(32493) x  1   [] IONTO  [x] NMR (41204) x  1   [] VASO  [x] Manual (13633) x   1  [] Other:  [] TA x      [] Mech Traction (44819)  [] ES(attended) (62086)      [x] ES (un) (45384):         ASSESSMENT:  Tolerated Rx well. Max cueing for proper form with AROM. GOALS:   Patient stated goal:    [] Progressing: [] Met: [] Not Met: [] Adjusted    Therapist goals for Patient:   Short Term Goals: To be achieved in: 2 weeks  1. Independent in HEP and progression per patient tolerance, in order to prevent re-injury. [] Progressing: [] Met: [] Not Met: [] Adjusted   2. Patient will have a decrease in pain to facilitate improvement in movement, function, and ADLs as indicated by Functional Deficits. [] Progressing: [] Met: [] Not Met: [] Adjusted    Long Term Goals: To be achieved in:   8 weeks  - The patient is expected to demonstrate less than 50% impairment, limitation or restriction in:  - carrying, moving and handling objects. - Patient will demonstrate increased passive and active ROM to full, symmetrical and painfree to allow for proper joint functioning as indicated by patients Functional Deficits. [x] Progressing: [] Met: [] Not Met: [] Adjusted  - Patient will demonstrate an increase in Strength to symmetrical and painfree to allow for proper functional mobility as indicated by patients Functional Deficits  . [x] Progressing: [] Met: [] Not Met: [] Adjusted  - Patient will return to desired, higher level upper extremity functional activities without increased symptoms or restriction.       [x] Progressing: [] Met: [] Not Met: [] Adjusted    Overall Progression Towards Functional goals/ Treatment Progress Update:  [] Patient is progressing as expected towards functional goals listed. [] Progression is slowed due to complexities/Impairments listed. [] Progression has been slowed due to co-morbidities. [x] Plan just implemented, too soon to assess goals progression <30days   [] Goals require adjustment due to lack of progress  [] Patient is not progressing as expected and requires additional follow up with physician  [] Other    Prognosis for POC: [x] Good [] Fair  [] Poor      Patient requires continued skilled intervention: [x] Yes  [] No    Treatment/Activity Tolerance:  [x] Patient able to complete treatment  [] Patient limited by fatigue  [] Patient limited by pain    [] Patient limited by other medical complications  [] Other:         Return to Play: (if applicable)   []  Stage 1: Intro to Strength   []  Stage 2: Return to Run and Strength   []  Stage 3: Return to Jump and Strength   []  Stage 4: Dynamic Strength and Agility   []  Stage 5: Sport Specific Training     []  Ready to Return to Play, Meets All Above Stages   []  Not Ready for Return to Sports   Comments:                               PLAN: See eval  [x] Continue per plan of care [] Alter current plan (see comments above)  [] Plan of care initiated [] Hold pending MD visit [] Discharge      Electronically signed by: Veronica Castrejon PT, OMT-C      Note: If patient does not return for scheduled/ recommended follow up visits, this note will serve as a discharge from care along with most recent update on progress.

## 2021-07-30 ENCOUNTER — HOSPITAL ENCOUNTER (OUTPATIENT)
Dept: CARDIAC REHAB | Age: 81
Setting detail: THERAPIES SERIES
Discharge: HOME | End: 2021-07-30
Payer: MEDICARE

## 2021-07-30 PROCEDURE — G0239 OTH RESP PROC, GROUP: HCPCS

## 2021-08-02 ENCOUNTER — HOSPITAL ENCOUNTER (OUTPATIENT)
Dept: CARDIAC REHAB | Age: 81
Setting detail: THERAPIES SERIES
Discharge: HOME | End: 2021-08-02
Payer: MEDICARE

## 2021-08-02 PROCEDURE — G0239 OTH RESP PROC, GROUP: HCPCS

## 2021-08-03 ENCOUNTER — HOSPITAL ENCOUNTER (OUTPATIENT)
Dept: PHYSICAL THERAPY | Age: 81
Setting detail: THERAPIES SERIES
Discharge: HOME | End: 2021-08-03
Payer: MEDICARE

## 2021-08-03 PROCEDURE — 97110 THERAPEUTIC EXERCISES: CPT | Performed by: PHYSICAL THERAPIST

## 2021-08-03 PROCEDURE — 97112 NEUROMUSCULAR REEDUCATION: CPT | Performed by: PHYSICAL THERAPIST

## 2021-08-03 PROCEDURE — 97140 MANUAL THERAPY 1/> REGIONS: CPT | Performed by: PHYSICAL THERAPIST

## 2021-08-04 NOTE — FLOWSHEET NOTE
[facility] Orthopaedics and Sports Rehabilitation; [facility]         Physical Therapy Treatment Note/ Progress Report:           Date:  8/3/2021  Patient Name:  Glen Rios    :  1940  MRN: [id number]  Restrictions/Precautions:    Medical/Treatment Diagnosis Information:   Diagnosis: Right shoulder pain- C66.850     Insurance/Certification information:     Physician Information:     Has the plan of care been signed (Y/N):        []  Yes  [x]  No     Date of Patient follow up with Physician:       Is this a Progress Report:     []  Yes  [x]  No        If Yes:  Date Range for reporting period:  Beginning  Ending    Progress report will be due (10 Rx or 30 days whichever is less):       Recertification will be due (POC Duration  / 90 days whichever is less):          Visit # Insurance Allowable Auth Required   5  []  Yes []  No        Functional Scale:    Date assessed:        Latex Allergy:  [x]NO      []YES  Preferred Language for Healthcare:   [x]English       []other:      Pain level:  6/10     SUBJECTIVE:   \"doing ok overall. Sapphire Noman Sapphire Noman \"     OBJECTIVE:  4-/5 shoulder strength    Observation:    Test measurements:      RESTRICTIONS/PRECAUTIONS:     Exercises/Interventions:       Therapeutic Ex (52972) Sets/sec Reps Notes/CUES   Supine cane stretches   5  X 10\"     Wall stretch   5 x 10\"     scap pinches   30x     sidelying er   20x     Prone: rows, ext   25x, 20x     Swiss ball rolls   25x    theraband rows   25x yellow     Supine raises   20x     Pulleys  3'                      Manual Intervention (56674)      Prom/stm   16'                                                                                                                                             Therapeutic Exercise and NMR EXR  [x] (17350) Provided verbal/tactile cueing for activities related to strengthening, flexibility, endurance, ROM for improvements in LE, proximal hip, and core control with self care, mobility, lifting, ambulation. [x] (56135) Provided verbal/tactile cueing for activities related to improving balance, coordination, kinesthetic sense, posture, motor skill, proprioception to assist with LE, proximal hip, and core control in self-care, mobility, lifting, ambulation and eccentric single leg control. NMR and Therapeutic Activities:    [x] (86303 or 16180) Provided verbal/tactile cueing for activities related to improving balance, coordination, kinesthetic sense, posture, motor skill, proprioception and motor activation to allow for proper function of core, proximal hip and LE with self-care and ADLs and functional mobility.   [] (33818) Gait Re-education- Provided training and instruction to the patient for proper LE, core and proximal hip recruitment and positioning and eccentric body weight control with ambulation re-education including up and down stairs     Home Exercise Program:    [x] (83446) Reviewed/Progressed HEP activities related to strengthening, flexibility, endurance, ROM of core, proximal hip and LE for functional self-care, mobility, lifting and ambulation/stair navigation   [] (87491) Reviewed/Progressed HEP activities related to improving balance, coordination, kinesthetic sense, posture, motor skill, proprioception of core, proximal hip and LE for self-care, mobility, lifting, and ambulation/stair navigation      Manual Treatments:  PROM / STM / Oscillations-Mobs:  G-I, II, III, IV (PA's, Inf., Post.)  [x] (26099) Provided manual therapy to mobilize LE, proximal hip and/or LS spine soft tissue/joints for the purpose of modulating pain, promoting relaxation, increasing ROM, reducing/eliminating soft tissue swelling/inflammation/restriction, improving soft tissue extensibility and allowing for proper ROM for normal function with self-care, mobility, lifting and ambulation.      Modalities: CP/E-stim x 15'    [] GAME READY (VASO)- for significant edema, swelling, pain control. Charges:  Timed Code Treatment Minutes: 45'    Total Treatment Minutes: 39'       [] EVAL (LOW) 22091 (typically 20 minutes face-to-face)  [] EVAL (MOD) 94591 (typically 30 minutes face-to-face)  [] EVAL (HIGH) 05078 (typically 45 minutes face-to-face)  [] RE-EVAL     [x] XW(67778) x  1   [] IONTO  [x] NMR (22839) x  1   [] VASO  [x] Manual (99758) x   1  [] Other:  [] TA x      [] Mech Traction (63437)  [] ES(attended) (94790)      [x] ES (un) (29209):         ASSESSMENT:  Tolerated Rx well. Max cueing for proper form with AROM. GOALS:   Patient stated goal:    [] Progressing: [] Met: [] Not Met: [] Adjusted    Therapist goals for Patient:   Short Term Goals: To be achieved in: 2 weeks  1. Independent in HEP and progression per patient tolerance, in order to prevent re-injury. [] Progressing: [] Met: [] Not Met: [] Adjusted   2. Patient will have a decrease in pain to facilitate improvement in movement, function, and ADLs as indicated by Functional Deficits. [] Progressing: [] Met: [] Not Met: [] Adjusted    Long Term Goals: To be achieved in:   8 weeks  - The patient is expected to demonstrate less than 50% impairment, limitation or restriction in:  - carrying, moving and handling objects. - Patient will demonstrate increased passive and active ROM to full, symmetrical and painfree to allow for proper joint functioning as indicated by patients Functional Deficits. [x] Progressing: [] Met: [] Not Met: [] Adjusted  - Patient will demonstrate an increase in Strength to symmetrical and painfree to allow for proper functional mobility as indicated by patients Functional Deficits  . [x] Progressing: [] Met: [] Not Met: [] Adjusted  - Patient will return to desired, higher level upper extremity functional activities without increased symptoms or restriction.       [x] Progressing: [] Met: [] Not Met: [] Adjusted    Overall Progression Towards Functional goals/ Treatment Progress Update:  [] Patient is progressing as expected towards functional goals listed. [] Progression is slowed due to complexities/Impairments listed. [] Progression has been slowed due to co-morbidities. [x] Plan just implemented, too soon to assess goals progression <30days   [] Goals require adjustment due to lack of progress  [] Patient is not progressing as expected and requires additional follow up with physician  [] Other    Prognosis for POC: [x] Good [] Fair  [] Poor      Patient requires continued skilled intervention: [x] Yes  [] No    Treatment/Activity Tolerance:  [x] Patient able to complete treatment  [] Patient limited by fatigue  [] Patient limited by pain    [] Patient limited by other medical complications  [] Other:         Return to Play: (if applicable)   []  Stage 1: Intro to Strength   []  Stage 2: Return to Run and Strength   []  Stage 3: Return to Jump and Strength   []  Stage 4: Dynamic Strength and Agility   []  Stage 5: Sport Specific Training     []  Ready to Return to Play, Meets All Above Stages   []  Not Ready for Return to Sports   Comments:                               PLAN: See eval  [x] Continue per plan of care [] Alter current plan (see comments above)  [] Plan of care initiated [] Hold pending MD visit [] Discharge      Electronically signed by:       Note: If patient does not return for scheduled/ recommended follow up visits, this note will serve as a discharge from care along with most recent update on progress.

## 2021-08-05 ENCOUNTER — APPOINTMENT (OUTPATIENT)
Dept: PHYSICAL THERAPY | Age: 81
End: 2021-08-05
Payer: MEDICARE

## 2021-08-12 ENCOUNTER — HOSPITAL ENCOUNTER (OUTPATIENT)
Dept: PHYSICAL THERAPY | Age: 81
Setting detail: THERAPIES SERIES
Discharge: HOME | End: 2021-08-12
Payer: MEDICARE

## 2021-08-12 PROCEDURE — G0283 ELEC STIM OTHER THAN WOUND: HCPCS | Performed by: SPECIALIST/TECHNOLOGIST

## 2021-08-12 PROCEDURE — 97112 NEUROMUSCULAR REEDUCATION: CPT | Performed by: SPECIALIST/TECHNOLOGIST

## 2021-08-12 PROCEDURE — 97140 MANUAL THERAPY 1/> REGIONS: CPT | Performed by: SPECIALIST/TECHNOLOGIST

## 2021-08-12 PROCEDURE — 97110 THERAPEUTIC EXERCISES: CPT | Performed by: SPECIALIST/TECHNOLOGIST

## 2021-08-12 NOTE — PROGRESS NOTES
[facility] Orthopaedics and Sports Rehabilitation; [facility]         Physical Therapy Treatment Note/ Progress Report:           Date:  2021  Patient Name:  Yoana Lawson    :  1940  MRN: [id number]  Restrictions/Precautions:    Medical/Treatment Diagnosis Information:   Diagnosis: Right shoulder pain- X66.175     Insurance/Certification information:     Physician Information:     Has the plan of care been signed (Y/N):        []  Yes  [x]  No     Date of Patient follow up with Physician:       Is this a Progress Report:     [x]  Yes  []  No        If Yes:  Date Range for reporting period:  Beginning 21  Ending    Progress report will be due (16 Rx or 30 days whichever is less):       Recertification will be due (POC Duration  / 90 days whichever is less):          Visit # Insurance Allowable Auth Required   6  []  Yes []  No        Functional Scale:  UEFI 55% Date assessed:  21      Latex Allergy:  [x]NO      []YES  Preferred Language for Healthcare:   [x]English       []other:      Pain level:  6/10     SUBJECTIVE:   \"My shoulder feels better but I still have some issues with my shoulder. \"     OBJECTIVE:  Taken on 21 = AROM shoulder flex 135°, IR T7, ER T3       4-/5 shoulder strength    Observation:    Test measurements:      RESTRICTIONS/PRECAUTIONS:     Exercises/Interventions:       Therapeutic Ex (05256) Sets/sec Reps Notes/CUES   Supine cane stretches   5  X 10\"     Wall stretch   5 x 10\"     scap pinches        sidelying er   20x     Prone: rows, ext   25x ea      Swiss ball rolls + wedge = sitting   25x    theraband rows  ER   25x yellow  X 20 yell     Supine raises   20x     Pulleys  3'                      Manual Intervention (26379)      Prom/stm   15'                                                                                                                                             Therapeutic Exercise and NMR EXR  [x] (01196) Provided verbal/tactile cueing for activities related to strengthening, flexibility, endurance, ROM for improvements in LE, proximal hip, and core control with self care, mobility, lifting, ambulation. [x] (91264) Provided verbal/tactile cueing for activities related to improving balance, coordination, kinesthetic sense, posture, motor skill, proprioception to assist with LE, proximal hip, and core control in self-care, mobility, lifting, ambulation and eccentric single leg control.      NMR and Therapeutic Activities:    [x] (20931 or 31353) Provided verbal/tactile cueing for activities related to improving balance, coordination, kinesthetic sense, posture, motor skill, proprioception and motor activation to allow for proper function of core, proximal hip and LE with self-care and ADLs and functional mobility.   [] (55949) Gait Re-education- Provided training and instruction to the patient for proper LE, core and proximal hip recruitment and positioning and eccentric body weight control with ambulation re-education including up and down stairs     Home Exercise Program:    [x] (24698) Reviewed/Progressed HEP activities related to strengthening, flexibility, endurance, ROM of core, proximal hip and LE for functional self-care, mobility, lifting and ambulation/stair navigation   [] (93995) Reviewed/Progressed HEP activities related to improving balance, coordination, kinesthetic sense, posture, motor skill, proprioception of core, proximal hip and LE for self-care, mobility, lifting, and ambulation/stair navigation      Manual Treatments:  PROM / STM / Oscillations-Mobs:  G-I, II, III, IV (PA's, Inf., Post.)  [x] (04919) Provided manual therapy to mobilize LE, proximal hip and/or LS spine soft tissue/joints for the purpose of modulating pain, promoting relaxation, increasing ROM, reducing/eliminating soft tissue swelling/inflammation/restriction, improving soft tissue extensibility and allowing for proper ROM for normal function with self-care, mobility, lifting and ambulation. Modalities: CP/E-stim x 15'    [] GAME READY (VASO)- for significant edema, swelling, pain control. Charges:  Timed Code Treatment Minutes: 45'    Total Treatment Minutes: 61'       [] EVAL (LOW) 27054 (typically 20 minutes face-to-face)  [] EVAL (MOD) 77586 (typically 30 minutes face-to-face)  [] EVAL (HIGH) 42993 (typically 45 minutes face-to-face)  [] RE-EVAL     [x] IG(70828) x  1   [] IONTO  [x] NMR (36140) x  1   [] VASO  [x] Manual (41488) x   1  [] Other:  [] TA x      [] Mech Traction (80039)  [] ES(attended) (70173)      [x] ES (un) (39972):         ASSESSMENT:  Tolerated Rx well. Max cueing for proper form with AROM. GOALS:   Patient stated goal:    [] Progressing: [] Met: [] Not Met: [] Adjusted    Therapist goals for Patient:   Short Term Goals: To be achieved in: 2 weeks  1. Independent in HEP and progression per patient tolerance, in order to prevent re-injury. [] Progressing: [] Met: [] Not Met: [] Adjusted   2. Patient will have a decrease in pain to facilitate improvement in movement, function, and ADLs as indicated by Functional Deficits. [] Progressing: [] Met: [] Not Met: [] Adjusted    Long Term Goals: To be achieved in:   8 weeks  - The patient is expected to demonstrate less than 50% impairment, limitation or restriction in:  - carrying, moving and handling objects. - Patient will demonstrate increased passive and active ROM to full, symmetrical and painfree to allow for proper joint functioning as indicated by patients Functional Deficits. [x] Progressing: [] Met: [] Not Met: [] Adjusted  - Patient will demonstrate an increase in Strength to symmetrical and painfree to allow for proper functional mobility as indicated by patients Functional Deficits  .  [x] Progressing: [] Met: [] Not Met: [] Adjusted  - Patient will return to desired, higher level upper extremity functional activities without

## 2021-08-16 ENCOUNTER — TELEPHONE (OUTPATIENT)
Dept: INTERNAL MEDICINE CLINIC | Age: 81
End: 2021-08-16

## 2021-08-16 ENCOUNTER — OFFICE VISIT (OUTPATIENT)
Dept: ORTHOPEDIC SURGERY | Age: 81
End: 2021-08-16
Payer: MEDICARE

## 2021-08-16 VITALS — WEIGHT: 118 LBS | BODY MASS INDEX: 21.71 KG/M2 | HEIGHT: 62 IN

## 2021-08-16 DIAGNOSIS — M19.011 ARTHRITIS OF RIGHT ACROMIOCLAVICULAR JOINT: ICD-10-CM

## 2021-08-16 DIAGNOSIS — M75.81 ROTATOR CUFF TENDONITIS, RIGHT: Primary | ICD-10-CM

## 2021-08-16 PROCEDURE — 99213 OFFICE O/P EST LOW 20 MIN: CPT | Performed by: ORTHOPAEDIC SURGERY

## 2021-08-16 NOTE — LETTER
Physical Therapy Rehabilitation Referral    Patient Name:  Afsaneh Eaton      YOB: 1940    Diagnosis:    1. Rotator cuff tendonitis, right    2. Arthritis of right acromioclavicular joint        Precautions:     [x] Evaluate and Treat    Post Op Instructions:  [] Continuous passive motion (CPM) [] Elbow ROM  [] Exercise in plane of scapula  []  Strengthening     [] Pulley and instruction   [] Home exercise program (copy to patient)   [] Sling when arm at risk  [] Sling or brace at all times   [] AAROM: Forward elevation to  140            [] AAROM: External rotation  To  40    [] Isometric external rotator strengthening [] AAROM: internal rotation: up the back  [] Isometric abductor strengthening  [] AAROM: Internal abduction   [] Isometric internal rotator strengthening [] AAROM: cross-body adduction             Stretching:     Strengthening:  [x] Four quadrant (FE, ER, IR, CBA)  [x] Rotator cuff (ER, IR, Abd)  [] Forward Elevation    [] External Rotators     [] External Rotation    [] Internal Rotators  [] Internal Rotation: up/back   [] Abductors     [] Internal Rotation: supine in abduction  [] Sleeper Stretch    [] Flexors  [] Cross-body abduction    [] Extensors  [] Pendulum (FE, Abd/Add, cw/ccw)  [x] Scapular Stabilizers   [] Wall-walking (FE, Abd)        [x] Shoulder shrugs     [] Table slides (FE)                [x] Rhomboid pinch  [] Elbow (flex, ext, pron, sup)        [] Lat.  Pull downs     [] Medial epicondylitis program       [] Forward punch   [] Lateral epicondylitis program       [] Internal rotators     [] Progressive resistive exercises  [] Bench Press        [] Bench press plus  Activities:     [] Lateral pull-downs  [] Rowing     [] Progressive two-hand supine press  [] Stepper/Exercise bike   [] Biceps: curls/supination  [] Swimming  [] Water exercises    Modalities:     Return to Sport:  [x] Of Choice      [] Plyometrics  [] Ultrasound     [] Rhythmic stabilization  [] Iontophoresis    [] Core strengthening   [] Moist heat     [] Sports specific program:   [] Massage         [x] Cryotherapy      [] Electrical stimulation     [] Paraffin  [] Whirlpool  [] TENS    [x] Home exercise program (copy to patient). Perform exercises for:   15     minutes    3      times/day  [x] Supervised physical therapy  Frequency: []  1x week  [x] 2x week  [] 3x week  [] Other:   Duration: [] 2 weeks   [] 4 weeks  [x] 6 weeks  [] Other:     Additional Instructions:     Continued periscapular strengthening program     Sincerely,    Frida Lobato MD [facility]   [address], [address]  Email: [email or web address]  Office: [phone]    08/16/21  9:34 AM

## 2021-08-16 NOTE — PROGRESS NOTES
[facility]  Office Visit    Date:  2021    Name:  Domingo Reyes  Address:  [place] [address]    :  1940      Age:   80 y.o.    SSN:  [id number]      Medical Record Number:  [id number]    Chief Complaint:    Right shoulder and shoulder blade pain    HPI:   Domingo Reyes is a 80 y.o. right hand dominant female here for follow-up of her right shoulder AC joint injection and subacromial injection for [employer] joint arthritis and biceps rotator cuff tendinitis. She is doing much better today and has been doing physical therapy at our [facility] office. .  Superior shoulder pain is diminished and she had excellent relief with the injection. However, most of her discomfort really remains to be in the shoulder blade periscapular muscle area despite the topical medicated cream.  Physical therapy is focused mostly on mobility and she is yet to start strengthening as far she is aware. She is .       Pain Assessment  Location of Pain: Shoulder  Location Modifiers: Right  Severity of Pain: 4  Quality of Pain:  (SORE/TIGHT)  Frequency of Pain: Constant  Aggravating Factors:  (LIFTING)  Limiting Behavior: Yes  Relieving Factors: Rest, Ice  Result of Injury: No  Work-Related Injury: No  Are there other pain locations you wish to document?: No    Past History:  Past Medical History:   Diagnosis Date    Diabetes mellitus ([address]     Essential hypertension, benign     GERD (gastroesophageal reflux disease)     Hyperlipidemia     Interstitial lung disease (HCC)     Osteoarthrosis, unspecified whether generalized or localized, unspecified site     osteoarthritis lower leg    Osteopenia     Shingles        Past Surgical History:   Procedure Laterality Date    ANKLE SURGERY Right 2013    torn tendon    BRONCHOSCOPY N/A 2019    BRONCHOSCOPY WITH BAL AND TRANSBRONCHIAL BIOPSIES performed by Amee Gómez MD at [address] [place] [address] 10/11/2010    Dr. Sharda Campos , polyps and diverticulosis    COLONOSCOPY  11/11/2002    Dr. Keira Mcdonald, Diverticulosis    COLONOSCOPY  04/20/2015    Dr. Sharda Campos- diverticulosis, sessile polyp, 5 years     COLONOSCOPY  4/13/16    Dr Laith Baig; Diverticulosis    COLONOSCOPY  11/28/2016    Dr Sharda Campos,     INTRACAPSULAR CATARACT EXTRACTION Right 7/15/2020    PHACOEMULSIFICATION OF CATARACT RIGHT EYE WITH INTRAOCULAR LENS IMPLANT performed by Kenia Reyes MD at [facility] Bilateral 9/1/2020    BILATERAL LUMBAR THREE, LUMBAR FOUR, LUMBAR FIVE RADIOFREQUENCY ABLATION SITE CONFIRMED BY FLUOROSCOPY performed by Yusuf Marin MD at [address] [facility] Bilateral 6/23/2020    BILATERAL LUMBAR FOUR TRANSFORAMINAL EPIDURAL STEROID INJECTION SITE CONFIRMED BY FLUOROSCOPY performed by Yusuf Marin MD at [address] [facility] Bilateral 7/7/2020    BILATERAL LUMBAR FOUR TRANSFORAMINAL EPIDURAL STEROID INJECTION SITE CONFIRMED BY FLUOROSCOPY performed by Yusuf Marin MD at [address] [facility] Bilateral 8/4/2020    BILATERAL LUMBAR THREE, LUMBAR FOUR, LUMBAR FIVE DORSAL RAMUS MEDIAL BRANCH BLOCK SITE CONFIRMED BY FLUOROSCOPY performed by Yusuf Marin MD at [address] [facility] Bilateral 8/18/2020    BILATERAL LUMBAR THREE, LUMBAR FOUR, LUMBAR FIVE DORSAL RAMUS MEDIAL BRANCH BLOCK SITE CONFIR,ED BY FLUOROSCOPY performed by Yusuf Marin MD at [address] [facility] Right     UPPER GASTROINTESTINAL ENDOSCOPY  8/17/2011    Dr. Kesha Jay - moderate gastritis , hiatal hernia     UPPER GASTROINTESTINAL ENDOSCOPY  4/20/15    Dr. Kesha Jay - polyps removed, diverticulosis, follow up in 5 years   [address]  8/16/2012    DR. Ramos - with mesh       Social History     Tobacco Use    Smoking status: Never Smoker    Smokeless tobacco: Never Used   Vaping Use    Vaping Use: Never used   Substance Use Topics    Alcohol use: Yes     Alcohol/week: 1.0 standard drinks     Types: 1 Standard drinks or equivalent per week     Comment: one vodka tonic nightly    Drug use: No        Family History:  family history includes Heart Disease in her father and mother; Rheum Arthritis in her mother. Current Outpatient Medications   Medication Sig Dispense Refill    glipiZIDE (GLUCOTROL) 10 MG tablet Take by mouth      linagliptin (TRADJENTA) 5 MG tablet Take by mouth      baclofen (LIORESAL) 10 MG tablet TAKE ONE TABLET BY MOUTH THREE TIMES A DAY 30 tablet 0    escitalopram (LEXAPRO) 20 MG tablet TAKE ONE TABLET BY MOUTH DAILY 90 tablet 0    amLODIPine (NORVASC) 10 MG tablet TAKE ONE TABLET BY MOUTH DAILY 90 tablet 0    rosuvastatin (CRESTOR) 40 MG tablet TAKE ONE TABLET BY MOUTH DAILY 90 tablet 0    Vitamin D, Cholecalciferol, 25 MCG (1000 UT) CAPS Take 1,000 Units by mouth daily 90 capsule 1    diclofenac (VOLTAREN) 50 MG EC tablet Take 1 tablet by mouth 3 times daily (with meals) 60 tablet 3    lidocaine (LIDODERM) 5 % Place 1 patch onto the skin every 12 hours 12 hours on, 12 hours off.  30 patch 0    traZODone (DESYREL) 100 MG tablet Take 1 tablet by mouth nightly as needed for Sleep 90 tablet 1    omeprazole (PRILOSEC) 20 MG delayed release capsule Take 2 capsules by mouth daily 30 capsule 0    Probiotic Product (PROBIOTIC ACIDOPHILUS BIOBEADS) CAPS Take 1 capsule by mouth daily 90 capsule 0    Diclofenac Sodium POWD 2 g by Does not apply route 4 times daily Formula #8E Baclo 2% Diclo 3% DMSO 5% Evans 6% Lido 2% Prilo 2% 240 g 11    blood glucose test strips (ASCENSIA AUTODISC VI;ONE TOUCH ULTRA TEST VI) strip One Touch Ultra test Strips testing daily per E11.9 100 each 3    blood glucose test strips (FREESTYLE LITE) strip 1 each by In Vitro route daily Testing 1-2 times daily per e11.9 100 each 3    blood glucose monitor strips Test 2 times a day & as needed for symptoms of irregular blood glucose. One Touch Verio strips 100 strip 2    Cyanocobalamin (B-12 PO) Take by mouth daily       vitamin E 1000 UNITS capsule Take 1,000 Units by mouth daily. No current facility-administered medications for this visit. Allergies   Allergen Reactions    Pravastatin      Other reaction(s): increases blood pressure    Percocet [Oxycodone-Acetaminophen] Other (See Comments)     Dizzy         Review of Systems: A 14 point review of systems available in the scanned medical record as documented by the patient. Today's review pertinent items are noted in HPI. Physical Exam:  Ht 5' 2\" (1.575 m)   Wt 118 lb (53.5 kg)   BMI 21.58 kg/m²       General: No acute distress, well nourished  CV: No obvious peripheral edema. Normal peripheral pulses  Neuro: Alert & oriented x 3  Psych: Normal mood and affect      Right Upper Extremity Exam:      FF Abd ER IR   Active  deg 160 deg  45 deg T9/T10   Passive  deg 180 deg 45 deg T6/T7   Strength (x/5)  5/5 Jobes  5/5 Champagne toast 5/5 5/5     Skin:  No skin rashes or lesions, warm, well perfused  Inspection: No deformity,  Palpation:  at the biceps tendon. Nontender at the [facility] joint  Stability: No instability  Sensation: Intact in all distributions  Motor: AIN/PIN/U 5/5  Strong radial pulse  Special Tests: Positive Neer's test.  Equivocal Gibson impingement test, positive Nicol's test.       Radiographic:  3 xray views of the right  shoulder including True AP in internal and external and axillary lateral were reviewed and interpreted by me today and show mild glenohumeral OA, advanced AC joint OA. No acute findings no fracture      Assessment: Patient is a 80 y.o. female with slightly improving right shoulder pain in the deltoid referred region consistent with a rotator cuff tendinitis.   She had good relief from cortisone injection for [facility] joint arthritis tenderness    Separately she has some periscapular discomfort which is related to deconditioning and poor scapulothoracic mechanics. Impression:  Visit Diagnoses       Codes    Rotator cuff tendonitis, right    -  Primary M75.81    Arthritis of right acromioclavicular joint     M19.011          Office Procedures:  No orders of the defined types were placed in this encounter. No orders of the defined types were placed in this encounter. Plan:  I think Ghulam Garcia is doing much better. She still has some residual cuff tendinitis causing scapulothoracic dyskinesis and periscapular muscle fatigue and pain. I would recommend continue with formal physical therapy now focusing on Setting the shoulder blade and periscapular conditioning. I would like to see her back in 6  weeks. A new letter was sent to physical therapy. All the patient's questions were answered while in the clinic. The patient is understanding of all instructions and agrees with the plan. Approximately 30 minutes was spent on patient education and coordinating care. Follow up in: Return in about 4 weeks (around 9/13/2021). ASES/SST Self Assessment? Sincerely,    Octavio Godwin MD [address] [place] [address] [address] [place], [address]  Email: [email or web address]  Office: [phone]    08/16/21  6:03 PM      The encounter with Aida Keri was carried out by myself, Dr Jessica Landaverde, who personally examined the patient and reviewed the plan. This dictation was performed with a verbal recognition program (DRAGON) and it was checked for errors. It is possible that there are still dictated errors within this office note. If so, please bring any errors to my attention for an addendum. All efforts were made to ensure that this office note is accurate.

## 2021-08-17 ENCOUNTER — HOSPITAL ENCOUNTER (OUTPATIENT)
Dept: PHYSICAL THERAPY | Age: 81
Setting detail: THERAPIES SERIES
Discharge: HOME | End: 2021-08-17
Payer: MEDICARE

## 2021-08-17 PROCEDURE — 97110 THERAPEUTIC EXERCISES: CPT | Performed by: PHYSICAL THERAPIST

## 2021-08-17 PROCEDURE — 97140 MANUAL THERAPY 1/> REGIONS: CPT | Performed by: PHYSICAL THERAPIST

## 2021-08-18 ENCOUNTER — HOSPITAL ENCOUNTER (OUTPATIENT)
Dept: CARDIAC REHAB | Age: 81
Setting detail: THERAPIES SERIES
Discharge: HOME | End: 2021-08-18
Payer: MEDICARE

## 2021-08-18 ENCOUNTER — TELEPHONE (OUTPATIENT)
Dept: INTERNAL MEDICINE CLINIC | Age: 81
End: 2021-08-18

## 2021-08-18 DIAGNOSIS — D64.9 ANEMIA, UNSPECIFIED TYPE: Primary | ICD-10-CM

## 2021-08-18 DIAGNOSIS — E83.52 HYPERCALCEMIA: ICD-10-CM

## 2021-08-18 PROCEDURE — G0239 OTH RESP PROC, GROUP: HCPCS

## 2021-08-18 RX ORDER — BACLOFEN 10 MG/1
TABLET ORAL
Qty: 30 TABLET | Refills: 0 | Status: SHIPPED | OUTPATIENT
Start: 2021-08-18 | End: 2021-11-30 | Stop reason: ALTCHOICE

## 2021-08-18 NOTE — TELEPHONE ENCOUNTER
Pt would like to have  Blood test orders placed. Pt states that the doctor states that she wanted to check to see if the pt is okay given the changes to her medical medications. Pt did not states she had any symptoms of anemia. Pt plans on getting the blood test this afternoon around 1. Please advise. Please call with more information.

## 2021-08-19 ENCOUNTER — HOSPITAL ENCOUNTER (OUTPATIENT)
Dept: PHYSICAL THERAPY | Age: 81
Setting detail: THERAPIES SERIES
Discharge: HOME | End: 2021-08-19
Payer: MEDICARE

## 2021-08-19 PROCEDURE — 97140 MANUAL THERAPY 1/> REGIONS: CPT | Performed by: PHYSICAL THERAPIST

## 2021-08-19 PROCEDURE — 97110 THERAPEUTIC EXERCISES: CPT | Performed by: PHYSICAL THERAPIST

## 2021-08-19 NOTE — PROGRESS NOTES
[facility] Orthopaedics and Sports Rehabilitation; [facility]         Physical Therapy Treatment Note/ Progress Report:           Date:  2021  Patient Name:  Yoana Lawson    :  1940  MRN: [id number]  Restrictions/Precautions:    Medical/Treatment Diagnosis Information:   Diagnosis: Right shoulder pain- C40.139     Insurance/Certification information:     Physician Information:     Has the plan of care been signed (Y/N):        []  Yes  [x]  No     Date of Patient follow up with Physician:       Is this a Progress Report:     [x]  Yes  []  No        If Yes:  Date Range for reporting period:  Beginning 21  Ending    Progress report will be due (16 Rx or 30 days whichever is less): 3/68/07      Recertification will be due (POC Duration  / 90 days whichever is less):          Visit # Insurance Allowable Auth Required   7  []  Yes []  No        Functional Scale:  UEFI 55% Date assessed:  21      Latex Allergy:  [x]NO      []YES  Preferred Language for Healthcare:   [x]English       []other:      Pain level:  6/10     SUBJECTIVE:   \"md wants me to strengthen the shoulder blade\"    OBJECTIVE:  Taken on 21 = AROM shoulder flex 135°, IR T7, ER T3       4-/5 shoulder strength    Observation:    Test measurements:      RESTRICTIONS/PRECAUTIONS:     Exercises/Interventions:       Therapeutic Ex (06974) Sets/sec Reps Notes/CUES   Supine cane stretches   5  X 10\"     Wall stretch   5 x 10\"     scap pinches        sidelying er   20x     Prone: rows, ext   25x ea      Swiss ball rolls + wedge = sitting   25x    theraband rows  ER   25x yellow  X 20 yell     Supine raises   20x     Pulleys  3'    Standing swiss I, y, t  20x     sidelying scap clock   20x (3:00)          Manual Intervention (49301)      Prom/stm   15' Therapeutic Exercise and NMR EXR  [x] (07911) Provided verbal/tactile cueing for activities related to strengthening, flexibility, endurance, ROM for improvements in LE, proximal hip, and core control with self care, mobility, lifting, ambulation. [x] (88286) Provided verbal/tactile cueing for activities related to improving balance, coordination, kinesthetic sense, posture, motor skill, proprioception to assist with LE, proximal hip, and core control in self-care, mobility, lifting, ambulation and eccentric single leg control.      NMR and Therapeutic Activities:    [x] (40834 or 78624) Provided verbal/tactile cueing for activities related to improving balance, coordination, kinesthetic sense, posture, motor skill, proprioception and motor activation to allow for proper function of core, proximal hip and LE with self-care and ADLs and functional mobility.   [] (95687) Gait Re-education- Provided training and instruction to the patient for proper LE, core and proximal hip recruitment and positioning and eccentric body weight control with ambulation re-education including up and down stairs     Home Exercise Program:    [x] (08768) Reviewed/Progressed HEP activities related to strengthening, flexibility, endurance, ROM of core, proximal hip and LE for functional self-care, mobility, lifting and ambulation/stair navigation   [] (45815) Reviewed/Progressed HEP activities related to improving balance, coordination, kinesthetic sense, posture, motor skill, proprioception of core, proximal hip and LE for self-care, mobility, lifting, and ambulation/stair navigation      Manual Treatments:  PROM / STM / Oscillations-Mobs:  G-I, II, III, IV (PA's, Inf., Post.)  [x] (12519) Provided manual therapy to mobilize LE, proximal hip and/or LS spine soft tissue/joints for the purpose of modulating pain, promoting relaxation, increasing ROM, reducing/eliminating soft tissue swelling/inflammation/restriction, improving soft tissue extensibility and allowing for proper ROM for normal function with self-care, mobility, lifting and ambulation. Modalities: CP/E-stim x 15'    [] GAME READY (VASO)- for significant edema, swelling, pain control. Charges:  Timed Code Treatment Minutes: 45'    Total Treatment Minutes: 61'       [] EVAL (LOW) 16062 (typically 20 minutes face-to-face)  [] EVAL (MOD) 49969 (typically 30 minutes face-to-face)  [] EVAL (HIGH) 89201 (typically 45 minutes face-to-face)  [] RE-EVAL     [x] ZA(02576) x  1   [] IONTO  [x] NMR (63856) x  1   [] VASO  [x] Manual (48074) x   1  [] Other:  [] TA x      [] Mech Traction (92853)  [] ES(attended) (60418)      [x] ES (un) (57070):         ASSESSMENT:  Tolerated Rx well. Max cueing for proper form with AROM. GOALS:   Patient stated goal:    [] Progressing: [] Met: [] Not Met: [] Adjusted    Therapist goals for Patient:   Short Term Goals: To be achieved in: 2 weeks  1. Independent in HEP and progression per patient tolerance, in order to prevent re-injury. [] Progressing: [] Met: [] Not Met: [] Adjusted   2. Patient will have a decrease in pain to facilitate improvement in movement, function, and ADLs as indicated by Functional Deficits. [] Progressing: [] Met: [] Not Met: [] Adjusted    Long Term Goals: To be achieved in:   8 weeks  - The patient is expected to demonstrate less than 50% impairment, limitation or restriction in:  - carrying, moving and handling objects. - Patient will demonstrate increased passive and active ROM to full, symmetrical and painfree to allow for proper joint functioning as indicated by patients Functional Deficits. [x] Progressing: [] Met: [] Not Met: [] Adjusted  - Patient will demonstrate an increase in Strength to symmetrical and painfree to allow for proper functional mobility as indicated by patients Functional Deficits  .  [x] Progressing: [] Met: [] Not Met: [] Adjusted  - Patient will return to desired, higher level upper extremity functional activities without increased symptoms or restriction. [x] Progressing: [] Met: [] Not Met: [] Adjusted    Overall Progression Towards Functional goals/ Treatment Progress Update:  [] Patient is progressing as expected towards functional goals listed. [] Progression is slowed due to complexities/Impairments listed. [] Progression has been slowed due to co-morbidities. [x] Plan just implemented, too soon to assess goals progression <30days   [] Goals require adjustment due to lack of progress  [] Patient is not progressing as expected and requires additional follow up with physician  [] Other    Prognosis for POC: [x] Good [] Fair  [] Poor      Patient requires continued skilled intervention: [x] Yes  [] No    Treatment/Activity Tolerance:  [x] Patient able to complete treatment  [] Patient limited by fatigue  [] Patient limited by pain    [] Patient limited by other medical complications  [] Other:         Return to Play: (if applicable)   []  Stage 1: Intro to Strength   []  Stage 2: Return to Run and Strength   []  Stage 3: Return to Jump and Strength   []  Stage 4: Dynamic Strength and Agility   []  Stage 5: Sport Specific Training     []  Ready to Return to Play, Meets All Above Stages   []  Not Ready for Return to Sports   Comments:                               PLAN: See eval  [x] Continue per plan of care [] Alter current plan (see comments above)  [] Plan of care initiated [] Hold pending MD visit [] Discharge      Electronically signed by:Yunior Andrews PT, MPT       Note: If patient does not return for scheduled/ recommended follow up visits, this note will serve as a discharge from care along with most recent update on progress.

## 2021-08-21 NOTE — FLOWSHEET NOTE
[facility] Orthopaedics and Sports Rehabilitation; [facility]         Physical Therapy Treatment Note/ Progress Report:           Date:  2021  Patient Name:  Ivett Malloy    :  1940  MRN: [id number]  Restrictions/Precautions:    Medical/Treatment Diagnosis Information:   Diagnosis: Right shoulder pain- U82.634     Insurance/Certification information:     Physician Information:     Has the plan of care been signed (Y/N):        []  Yes  [x]  No     Date of Patient follow up with Physician:       Is this a Progress Report:     []  Yes  [x]  No        If Yes:  Date Range for reporting period:  Beginning  Ending    Progress report will be due (10 Rx or 30 days whichever is less):       Recertification will be due (POC Duration  / 90 days whichever is less):          Visit # Insurance Allowable Auth Required   6  []  Yes []  No        Functional Scale:    Date assessed:        Latex Allergy:  [x]NO      []YES  Preferred Language for Healthcare:   [x]English       []other:      Pain level:  6/10     SUBJECTIVE:   \"feels ok. Carroll Joe Carroll Joe \"     OBJECTIVE:  4-/5 shoulder strength    Observation:    Test measurements:      RESTRICTIONS/PRECAUTIONS:     Exercises/Interventions:       Therapeutic Ex (52631) Sets/sec Reps Notes/CUES   Supine cane stretches   5  X 10\"     Wall stretch   5 x 10\"     scap pinches   30x     sidelying er   20x     Prone: rows, ext   25x, 20x     Swiss ball rolls   25x    theraband rows   25x yellow     Supine raises   20x     Pulleys  3'                      Manual Intervention (17248)      Prom/stm   26'                                                                                                                                             Therapeutic Exercise and NMR EXR  [x] (45377) Provided verbal/tactile cueing for activities related to strengthening, flexibility, endurance, ROM for improvements in LE, proximal hip, and core control with self care, mobility, lifting, ambulation. [x] (87504) Provided verbal/tactile cueing for activities related to improving balance, coordination, kinesthetic sense, posture, motor skill, proprioception to assist with LE, proximal hip, and core control in self-care, mobility, lifting, ambulation and eccentric single leg control. NMR and Therapeutic Activities:    [x] (77888 or 20791) Provided verbal/tactile cueing for activities related to improving balance, coordination, kinesthetic sense, posture, motor skill, proprioception and motor activation to allow for proper function of core, proximal hip and LE with self-care and ADLs and functional mobility.   [] (14883) Gait Re-education- Provided training and instruction to the patient for proper LE, core and proximal hip recruitment and positioning and eccentric body weight control with ambulation re-education including up and down stairs     Home Exercise Program:    [x] (94662) Reviewed/Progressed HEP activities related to strengthening, flexibility, endurance, ROM of core, proximal hip and LE for functional self-care, mobility, lifting and ambulation/stair navigation   [] (50854) Reviewed/Progressed HEP activities related to improving balance, coordination, kinesthetic sense, posture, motor skill, proprioception of core, proximal hip and LE for self-care, mobility, lifting, and ambulation/stair navigation      Manual Treatments:  PROM / STM / Oscillations-Mobs:  G-I, II, III, IV (PA's, Inf., Post.)  [x] (11975) Provided manual therapy to mobilize LE, proximal hip and/or LS spine soft tissue/joints for the purpose of modulating pain, promoting relaxation, increasing ROM, reducing/eliminating soft tissue swelling/inflammation/restriction, improving soft tissue extensibility and allowing for proper ROM for normal function with self-care, mobility, lifting and ambulation. Modalities: CP/E-stim x 15'    [] GAME READY (VASO)- for significant edema, swelling, pain control. Charges:  Timed Code Treatment Minutes: 45'    Total Treatment Minutes: 39'       [] EVAL (LOW) 35329 (typically 20 minutes face-to-face)  [] EVAL (MOD) 66582 (typically 30 minutes face-to-face)  [] EVAL (HIGH) 81788 (typically 45 minutes face-to-face)  [] RE-EVAL     [x] XK(73932) x  1   [] IONTO  [] NMR (74290) x [] VASO  [x] Manual (42044) x  2 [] Other:  [] TA x      [] Mech Traction (17107)  [] ES(attended) (23211)      [] ES (un) (46002):         ASSESSMENT:  Tolerated Rx well. Max cueing for proper form with AROM. GOALS:   Patient stated goal:    [] Progressing: [] Met: [] Not Met: [] Adjusted    Therapist goals for Patient:   Short Term Goals: To be achieved in: 2 weeks  1. Independent in HEP and progression per patient tolerance, in order to prevent re-injury. [] Progressing: [] Met: [] Not Met: [] Adjusted   2. Patient will have a decrease in pain to facilitate improvement in movement, function, and ADLs as indicated by Functional Deficits. [] Progressing: [] Met: [] Not Met: [] Adjusted    Long Term Goals: To be achieved in:   8 weeks  - The patient is expected to demonstrate less than 50% impairment, limitation or restriction in:  - carrying, moving and handling objects. - Patient will demonstrate increased passive and active ROM to full, symmetrical and painfree to allow for proper joint functioning as indicated by patients Functional Deficits. [x] Progressing: [] Met: [] Not Met: [] Adjusted  - Patient will demonstrate an increase in Strength to symmetrical and painfree to allow for proper functional mobility as indicated by patients Functional Deficits  . [x] Progressing: [] Met: [] Not Met: [] Adjusted  - Patient will return to desired, higher level upper extremity functional activities without increased symptoms or restriction.       [x] Progressing: [] Met: [] Not Met: [] Adjusted    Overall Progression Towards Functional goals/ Treatment Progress Update:  [] Patient is

## 2021-08-23 ENCOUNTER — HOSPITAL ENCOUNTER (OUTPATIENT)
Dept: CARDIAC REHAB | Age: 81
Setting detail: THERAPIES SERIES
Discharge: HOME | End: 2021-08-23
Payer: MEDICARE

## 2021-08-23 PROCEDURE — G0239 OTH RESP PROC, GROUP: HCPCS

## 2021-08-24 ENCOUNTER — HOSPITAL ENCOUNTER (OUTPATIENT)
Dept: PHYSICAL THERAPY | Age: 81
Setting detail: THERAPIES SERIES
Discharge: HOME | End: 2021-08-24
Payer: MEDICARE

## 2021-08-24 PROCEDURE — 97110 THERAPEUTIC EXERCISES: CPT | Performed by: PHYSICAL THERAPIST

## 2021-08-24 PROCEDURE — 97140 MANUAL THERAPY 1/> REGIONS: CPT | Performed by: PHYSICAL THERAPIST

## 2021-08-25 ENCOUNTER — HOSPITAL ENCOUNTER (OUTPATIENT)
Dept: CARDIAC REHAB | Age: 81
Setting detail: THERAPIES SERIES
Discharge: HOME | End: 2021-08-25
Payer: MEDICARE

## 2021-08-25 PROCEDURE — G0239 OTH RESP PROC, GROUP: HCPCS

## 2021-08-25 NOTE — FLOWSHEET NOTE
[facility] Orthopaedics and Sports Rehabilitation; [place]         Physical Therapy Treatment Note/ Progress Report:           Date:  2021  Patient Name:  Rolly Brothers    :  1940  MRN: [id number]  Restrictions/Precautions:    Medical/Treatment Diagnosis Information:   Diagnosis: Right shoulder pain- S02.616     Insurance/Certification information:     Physician Information:     Has the plan of care been signed (Y/N):        []  Yes  [x]  No     Date of Patient follow up with Physician:       Is this a Progress Report:     []  Yes  [x]  No        If Yes:  Date Range for reporting period:  Beginning  Ending    Progress report will be due (10 Rx or 30 days whichever is less):       Recertification will be due (POC Duration  / 90 days whichever is less):          Visit # Insurance Allowable Auth Required   7  []  Yes []  No        Functional Scale:    Date assessed:        Latex Allergy:  [x]NO      []YES  Preferred Language for Healthcare:   [x]English       []other:      Pain level:  6/10     SUBJECTIVE:   \"I can see some improvements.   That spot behind my shoulder blade is getting me\"    OBJECTIVE:  4-/5 shoulder strength    Observation:    Test measurements:      RESTRICTIONS/PRECAUTIONS:     Exercises/Interventions:       Therapeutic Ex (23003) Sets/sec Reps Notes/CUES   Supine cane stretches   5  X 10\"     Wall stretch   5 x 10\"     scap pinches   30x     sidelying er   20x     Prone: rows, ext   25x, 20x     Swiss ball rolls   25x    theraband rows   25x yellow     Supine raises   20x     Pulleys  3'    Scapular clock   15x 3:00, 6:00                Manual Intervention (73403)      Prom/stm   26'                                                                                                                                             Therapeutic Exercise and NMR EXR  [x] (21327) Provided verbal/tactile cueing for activities related to strengthening, flexibility, endurance, ROM for improvements in LE, proximal hip, and core control with self care, mobility, lifting, ambulation. [x] (70565) Provided verbal/tactile cueing for activities related to improving balance, coordination, kinesthetic sense, posture, motor skill, proprioception to assist with LE, proximal hip, and core control in self-care, mobility, lifting, ambulation and eccentric single leg control. NMR and Therapeutic Activities:    [x] (94699 or 99731) Provided verbal/tactile cueing for activities related to improving balance, coordination, kinesthetic sense, posture, motor skill, proprioception and motor activation to allow for proper function of core, proximal hip and LE with self-care and ADLs and functional mobility.   [] (76933) Gait Re-education- Provided training and instruction to the patient for proper LE, core and proximal hip recruitment and positioning and eccentric body weight control with ambulation re-education including up and down stairs     Home Exercise Program:    [x] (11699) Reviewed/Progressed HEP activities related to strengthening, flexibility, endurance, ROM of core, proximal hip and LE for functional self-care, mobility, lifting and ambulation/stair navigation   [] (75310) Reviewed/Progressed HEP activities related to improving balance, coordination, kinesthetic sense, posture, motor skill, proprioception of core, proximal hip and LE for self-care, mobility, lifting, and ambulation/stair navigation      Manual Treatments:  PROM / STM / Oscillations-Mobs:  G-I, II, III, IV (PA's, Inf., Post.)  [x] (20116) Provided manual therapy to mobilize LE, proximal hip and/or LS spine soft tissue/joints for the purpose of modulating pain, promoting relaxation, increasing ROM, reducing/eliminating soft tissue swelling/inflammation/restriction, improving soft tissue extensibility and allowing for proper ROM for normal function with self-care, mobility, lifting and ambulation. Modalities: CP/E-stim x 15'    [] GAME READY (VASO)- for significant edema, swelling, pain control. Charges:  Timed Code Treatment Minutes: 45'    Total Treatment Minutes: 39'       [] EVAL (LOW) 86935 (typically 20 minutes face-to-face)  [] EVAL (MOD) 19770 (typically 30 minutes face-to-face)  [] EVAL (HIGH) 35460 (typically 45 minutes face-to-face)  [] RE-EVAL     [x] JM(67691) x  1   [] IONTO  [] NMR (99035) x [] VASO  [x] Manual (19822) x  2 [] Other:  [] TA x      [] Mech Traction (35191)  [] ES(attended) (78794)      [] ES (un) (04117):         ASSESSMENT:  Tolerated Rx well. Max cueing for proper form with AROM. GOALS:   Patient stated goal:    [] Progressing: [] Met: [] Not Met: [] Adjusted    Therapist goals for Patient:   Short Term Goals: To be achieved in: 2 weeks  1. Independent in HEP and progression per patient tolerance, in order to prevent re-injury. [x] Progressing: [] Met: [] Not Met: [] Adjusted   2. Patient will have a decrease in pain to facilitate improvement in movement, function, and ADLs as indicated by Functional Deficits. [x] Progressing: [] Met: [] Not Met: [] Adjusted    Long Term Goals: To be achieved in:   8 weeks  - The patient is expected to demonstrate less than 50% impairment, limitation or restriction in:  - carrying, moving and handling objects. - Patient will demonstrate increased passive and active ROM to full, symmetrical and painfree to allow for proper joint functioning as indicated by patients Functional Deficits. [x] Progressing: [] Met: [] Not Met: [] Adjusted  - Patient will demonstrate an increase in Strength to symmetrical and painfree to allow for proper functional mobility as indicated by patients Functional Deficits  . [x] Progressing: [] Met: [] Not Met: [] Adjusted  - Patient will return to desired, higher level upper extremity functional activities without increased symptoms or restriction.       [x] Progressing: [] Met: [] Not Met: [] Adjusted    Overall Progression Towards Functional goals/ Treatment Progress Update:  [] Patient is progressing as expected towards functional goals listed. [] Progression is slowed due to complexities/Impairments listed. [] Progression has been slowed due to co-morbidities. [x] Plan just implemented, too soon to assess goals progression <30days   [] Goals require adjustment due to lack of progress  [] Patient is not progressing as expected and requires additional follow up with physician  [] Other    Prognosis for POC: [x] Good [] Fair  [] Poor      Patient requires continued skilled intervention: [x] Yes  [] No    Treatment/Activity Tolerance:  [x] Patient able to complete treatment  [] Patient limited by fatigue  [] Patient limited by pain    [] Patient limited by other medical complications  [] Other:         Return to Play: (if applicable)   []  Stage 1: Intro to Strength   []  Stage 2: Return to Run and Strength   []  Stage 3: Return to Jump and Strength   []  Stage 4: Dynamic Strength and Agility   []  Stage 5: Sport Specific Training     []  Ready to Return to Play, Meets All Above Stages   []  Not Ready for Return to Sports   Comments:                               PLAN: See eval  [x] Continue per plan of care [] Alter current plan (see comments above)  [] Plan of care initiated [] Hold pending MD visit [] Discharge      Electronically signed by:       Note: If patient does not return for scheduled/ recommended follow up visits, this note will serve as a discharge from care along with most recent update on progress.

## 2021-08-26 ENCOUNTER — OFFICE VISIT (OUTPATIENT)
Dept: INTERNAL MEDICINE CLINIC | Age: 81
End: 2021-08-26
Payer: MEDICARE

## 2021-08-26 ENCOUNTER — HOSPITAL ENCOUNTER (OUTPATIENT)
Dept: PHYSICAL THERAPY | Age: 81
Setting detail: THERAPIES SERIES
Discharge: HOME | End: 2021-08-26
Payer: MEDICARE

## 2021-08-26 VITALS
BODY MASS INDEX: 22.05 KG/M2 | DIASTOLIC BLOOD PRESSURE: 60 MMHG | SYSTOLIC BLOOD PRESSURE: 130 MMHG | WEIGHT: 119.8 LBS | HEIGHT: 62 IN

## 2021-08-26 DIAGNOSIS — E08.21 DIABETES MELLITUS DUE TO UNDERLYING CONDITION WITH DIABETIC NEPHROPATHY, WITHOUT LONG-TERM CURRENT USE OF INSULIN (HCC): ICD-10-CM

## 2021-08-26 DIAGNOSIS — E78.00 PURE HYPERCHOLESTEROLEMIA: ICD-10-CM

## 2021-08-26 DIAGNOSIS — R63.4 UNINTENTIONAL WEIGHT LOSS: ICD-10-CM

## 2021-08-26 DIAGNOSIS — I10 ESSENTIAL HYPERTENSION, BENIGN: ICD-10-CM

## 2021-08-26 DIAGNOSIS — M65.331 TRIGGER MIDDLE FINGER OF RIGHT HAND: Primary | ICD-10-CM

## 2021-08-26 DIAGNOSIS — K21.9 GASTROESOPHAGEAL REFLUX DISEASE, UNSPECIFIED WHETHER ESOPHAGITIS PRESENT: ICD-10-CM

## 2021-08-26 DIAGNOSIS — E21.3 HYPERPARATHYROIDISM (HCC): ICD-10-CM

## 2021-08-26 DIAGNOSIS — D50.9 IRON DEFICIENCY ANEMIA, UNSPECIFIED IRON DEFICIENCY ANEMIA TYPE: ICD-10-CM

## 2021-08-26 PROCEDURE — G0283 ELEC STIM OTHER THAN WOUND: HCPCS | Performed by: PHYSICAL THERAPIST

## 2021-08-26 PROCEDURE — 97110 THERAPEUTIC EXERCISES: CPT | Performed by: PHYSICAL THERAPIST

## 2021-08-26 PROCEDURE — 97140 MANUAL THERAPY 1/> REGIONS: CPT | Performed by: PHYSICAL THERAPIST

## 2021-08-26 PROCEDURE — 99214 OFFICE O/P EST MOD 30 MIN: CPT | Performed by: INTERNAL MEDICINE

## 2021-08-26 RX ORDER — ROSUVASTATIN CALCIUM 40 MG/1
TABLET, COATED ORAL
Qty: 90 TABLET | Refills: 3 | Status: SHIPPED | OUTPATIENT
Start: 2021-08-26 | End: 2022-08-10

## 2021-08-26 RX ORDER — ESCITALOPRAM OXALATE 20 MG/1
TABLET ORAL
Qty: 90 TABLET | Refills: 3 | Status: SHIPPED | OUTPATIENT
Start: 2021-08-26 | End: 2022-08-10

## 2021-08-26 RX ORDER — PANTOPRAZOLE SODIUM 40 MG/1
40 TABLET, DELAYED RELEASE ORAL
Qty: 90 TABLET | Refills: 1 | Status: SHIPPED | OUTPATIENT
Start: 2021-08-26 | End: 2022-02-17

## 2021-08-26 RX ORDER — AMLODIPINE BESYLATE 10 MG/1
TABLET ORAL
Qty: 90 TABLET | Refills: 3 | Status: SHIPPED | OUTPATIENT
Start: 2021-08-26 | End: 2022-08-10

## 2021-08-26 NOTE — PATIENT INSTRUCTIONS
For the iron supplement - ferrous sulfate 325mg, take this once a day, if you get constipated with the iron supplement you can decrease to every other day.     Take the pantoprazole at bedtime    Blood work in 3 months

## 2021-08-26 NOTE — PROGRESS NOTES
wants to manually straighten it. It is a bit painful. Weight loss -she has experienced weight gain since her last visit. She is drinking boost when she has much more palatable than the other supplements. Mental status is clear as well. Hyperparathyroidism -no confusion, abdominal pain or nausea. Hypertension -taking amlodipine, blood pressures controlled. Chronic joint pain, back pain - using diclofenac as needed. Occasional tramadol use. Phlegm/throat clearing particularly in the morning - omeprazole 40mg hasn't helped. Iron deficiency anemia -she did have colonoscopy, report not yet in her chart. It was normal.  She had an upper endoscopy back in the spring. She is not sure what iron she should be taking. Type 2 diabetes -sugars have been stable, no hypoglycemia. Review of Systems       Objective   Physical Exam  Vitals reviewed. Constitutional:       General: She is not in acute distress. Appearance: Normal appearance. She is well-developed. HENT:      Head: Normocephalic and atraumatic. Cardiovascular:      Rate and Rhythm: Normal rate and regular rhythm. Heart sounds: Normal heart sounds. Pulmonary:      Effort: Pulmonary effort is normal. No respiratory distress. Breath sounds: Normal breath sounds. Skin:     General: Skin is warm and dry. Neurological:      Mental Status: She is alert. Psychiatric:         Mood and Affect: Mood normal.         Behavior: Behavior normal.         Thought Content: Thought content normal.         Judgment: Judgment normal.                  An electronic signature was used to authenticate this note.     --Justina Epley, MD

## 2021-08-28 NOTE — FLOWSHEET NOTE
[facility] Orthopaedics and Sports Rehabilitation; [facility]         Physical Therapy Treatment Note/ Progress Report:           Date:  2021  Patient Name:  Terrell Severino    :  1940  MRN: [id number]  Restrictions/Precautions:    Medical/Treatment Diagnosis Information:   Diagnosis: Right shoulder pain- X86.197     Insurance/Certification information:     Physician Information:     Has the plan of care been signed (Y/N):        []  Yes  [x]  No     Date of Patient follow up with Physician:       Is this a Progress Report:     []  Yes  [x]  No        If Yes:  Date Range for reporting period:  Beginning  Ending    Progress report will be due (10 Rx or 30 days whichever is less):       Recertification will be due (POC Duration  / 90 days whichever is less):          Visit # Insurance Allowable Auth Required   8  []  Yes []  No        Functional Scale:    Date assessed:        Latex Allergy:  [x]NO      []YES  Preferred Language for Healthcare:   [x]English       []other:      Pain level:  6/10     SUBJECTIVE:   \"I am improving. ..but i've got a sore spot. ..let's do some of that stim stuff\"    OBJECTIVE:  4-/5 shoulder strength    Observation:    Test measurements:      RESTRICTIONS/PRECAUTIONS:     Exercises/Interventions:       Therapeutic Ex (31240) Sets/sec Reps Notes/CUES   Supine cane stretches   5  X 10\"     Wall stretch   5 x 10\"     scap pinches   30x     sidelying er   20x     Prone: rows, ext   25x, 20x     Swiss ball rolls   25x    theraband rows   25x yellow     Supine raises   20x     Pulleys  3'    Scapular clock   15x 3:00, 6:00                Manual Intervention (79830)      Prom/stm   26'                                                                                                                                             Therapeutic Exercise and NMR EXR  [x] (77039) Provided verbal/tactile cueing for activities related to strengthening, flexibility, endurance, ROM for improvements in LE, proximal hip, and core control with self care, mobility, lifting, ambulation. [x] (65535) Provided verbal/tactile cueing for activities related to improving balance, coordination, kinesthetic sense, posture, motor skill, proprioception to assist with LE, proximal hip, and core control in self-care, mobility, lifting, ambulation and eccentric single leg control. NMR and Therapeutic Activities:    [x] (27778 or 82999) Provided verbal/tactile cueing for activities related to improving balance, coordination, kinesthetic sense, posture, motor skill, proprioception and motor activation to allow for proper function of core, proximal hip and LE with self-care and ADLs and functional mobility.   [] (01315) Gait Re-education- Provided training and instruction to the patient for proper LE, core and proximal hip recruitment and positioning and eccentric body weight control with ambulation re-education including up and down stairs     Home Exercise Program:    [x] (60015) Reviewed/Progressed HEP activities related to strengthening, flexibility, endurance, ROM of core, proximal hip and LE for functional self-care, mobility, lifting and ambulation/stair navigation   [] (53336) Reviewed/Progressed HEP activities related to improving balance, coordination, kinesthetic sense, posture, motor skill, proprioception of core, proximal hip and LE for self-care, mobility, lifting, and ambulation/stair navigation      Manual Treatments:  PROM / STM / Oscillations-Mobs:  G-I, II, III, IV (PA's, Inf., Post.)  [x] (44385) Provided manual therapy to mobilize LE, proximal hip and/or LS spine soft tissue/joints for the purpose of modulating pain, promoting relaxation, increasing ROM, reducing/eliminating soft tissue swelling/inflammation/restriction, improving soft tissue extensibility and allowing for proper ROM for normal function with self-care, mobility, lifting and ambulation. Progression Towards Functional goals/ Treatment Progress Update:  [] Patient is progressing as expected towards functional goals listed. [] Progression is slowed due to complexities/Impairments listed. [] Progression has been slowed due to co-morbidities. [x] Plan just implemented, too soon to assess goals progression <30days   [] Goals require adjustment due to lack of progress  [] Patient is not progressing as expected and requires additional follow up with physician  [] Other    Prognosis for POC: [x] Good [] Fair  [] Poor      Patient requires continued skilled intervention: [x] Yes  [] No    Treatment/Activity Tolerance:  [x] Patient able to complete treatment  [] Patient limited by fatigue  [] Patient limited by pain    [] Patient limited by other medical complications  [] Other:         Return to Play: (if applicable)   []  Stage 1: Intro to Strength   []  Stage 2: Return to Run and Strength   []  Stage 3: Return to Jump and Strength   []  Stage 4: Dynamic Strength and Agility   []  Stage 5: Sport Specific Training     []  Ready to Return to Play, Meets All Above Stages   []  Not Ready for Return to Sports   Comments:                               PLAN: See eval  [x] Continue per plan of care [] Alter current plan (see comments above)  [] Plan of care initiated [] Hold pending MD visit [] Discharge      Electronically signed by:       Note: If patient does not return for scheduled/ recommended follow up visits, this note will serve as a discharge from care along with most recent update on progress.

## 2021-08-31 ENCOUNTER — HOSPITAL ENCOUNTER (OUTPATIENT)
Dept: PHYSICAL THERAPY | Age: 81
Setting detail: THERAPIES SERIES
Discharge: HOME | End: 2021-08-31
Payer: MEDICARE

## 2021-08-31 PROCEDURE — 97110 THERAPEUTIC EXERCISES: CPT | Performed by: PHYSICAL THERAPIST

## 2021-08-31 PROCEDURE — 97140 MANUAL THERAPY 1/> REGIONS: CPT | Performed by: PHYSICAL THERAPIST

## 2021-09-02 ENCOUNTER — HOSPITAL ENCOUNTER (OUTPATIENT)
Dept: PHYSICAL THERAPY | Age: 81
Setting detail: THERAPIES SERIES
Discharge: HOME | End: 2021-09-02
Payer: MEDICARE

## 2021-09-02 PROCEDURE — 97140 MANUAL THERAPY 1/> REGIONS: CPT | Performed by: PHYSICAL THERAPIST

## 2021-09-02 PROCEDURE — 97112 NEUROMUSCULAR REEDUCATION: CPT | Performed by: PHYSICAL THERAPIST

## 2021-09-02 PROCEDURE — 97110 THERAPEUTIC EXERCISES: CPT | Performed by: PHYSICAL THERAPIST

## 2021-09-02 NOTE — FLOWSHEET NOTE
[facility] Orthopaedics and Sports Rehabilitation; [facility]         Physical Therapy Treatment Note/ Progress Report:           Date:  2021  Patient Name:  Beena Glass    :  1940  MRN: [id number]  Restrictions/Precautions:    Medical/Treatment Diagnosis Information:   Diagnosis: Right shoulder pain- O03.308     Insurance/Certification information:     Physician Information:     Has the plan of care been signed (Y/N):        []  Yes  [x]  No     Date of Patient follow up with Physician:       Is this a Progress Report:     []  Yes  [x]  No        If Yes:  Date Range for reporting period:  Beginning  Ending    Progress report will be due (10 Rx or 30 days whichever is less):       Recertification will be due (POC Duration  / 90 days whichever is less):          Visit # Insurance Allowable Auth Required   8  []  Yes []  No        Functional Scale:    Date assessed:        Latex Allergy:  [x]NO      []YES  Preferred Language for Healthcare:   [x]English       []other:      Pain level:  10     SUBJECTIVE:   \"let's keep going. . feels a bit better\"    OBJECTIVE:  4-/5 shoulder strength    Observation:    Test measurements:      RESTRICTIONS/PRECAUTIONS:     Exercises/Interventions:       Therapeutic Ex (68735) Sets/sec Reps Notes/CUES   Supine cane stretches   5  X 10\"     Wall stretch   5 x 10\"     scap pinches   30x     sidelying er   20x     Prone: rows, ext   25x, 20x     Swiss ball rolls   25x    theraband rows   25x yellow     Supine raises   20x     Pulleys  3'    Scapular clock   15x 3:00, 6:00                Manual Intervention (57286)      Prom/stm   26'                                                                                                                                             Therapeutic Exercise and NMR EXR  [x] (81603) Provided verbal/tactile cueing for activities related to strengthening, flexibility, endurance, ROM for improvements in LE, proximal hip, and core control with self care, mobility, lifting, ambulation. [x] (19147) Provided verbal/tactile cueing for activities related to improving balance, coordination, kinesthetic sense, posture, motor skill, proprioception to assist with LE, proximal hip, and core control in self-care, mobility, lifting, ambulation and eccentric single leg control. NMR and Therapeutic Activities:    [x] (83750 or 43078) Provided verbal/tactile cueing for activities related to improving balance, coordination, kinesthetic sense, posture, motor skill, proprioception and motor activation to allow for proper function of core, proximal hip and LE with self-care and ADLs and functional mobility.   [] (20774) Gait Re-education- Provided training and instruction to the patient for proper LE, core and proximal hip recruitment and positioning and eccentric body weight control with ambulation re-education including up and down stairs     Home Exercise Program:    [x] (52445) Reviewed/Progressed HEP activities related to strengthening, flexibility, endurance, ROM of core, proximal hip and LE for functional self-care, mobility, lifting and ambulation/stair navigation   [] (64591) Reviewed/Progressed HEP activities related to improving balance, coordination, kinesthetic sense, posture, motor skill, proprioception of core, proximal hip and LE for self-care, mobility, lifting, and ambulation/stair navigation      Manual Treatments:  PROM / STM / Oscillations-Mobs:  G-I, II, III, IV (PA's, Inf., Post.)  [x] (36401) Provided manual therapy to mobilize LE, proximal hip and/or LS spine soft tissue/joints for the purpose of modulating pain, promoting relaxation, increasing ROM, reducing/eliminating soft tissue swelling/inflammation/restriction, improving soft tissue extensibility and allowing for proper ROM for normal function with self-care, mobility, lifting and ambulation.      Modalities: CP/E-stim x 15'    [] GAME READY (VASO)- for significant edema, swelling, pain control. Charges:  Timed Code Treatment Minutes: 45'    Total Treatment Minutes: 39'       [] EVAL (LOW) 455 1011 (typically 20 minutes face-to-face)  [] EVAL (MOD) 35054 (typically 30 minutes face-to-face)  [] EVAL (HIGH) 33056 (typically 45 minutes face-to-face)  [] RE-EVAL     [x] AX(16476) x  1   [] IONTO  [] NMR (98155) x [] VASO  [x] Manual (66915) x  2 [] Other:  [] TA x      [] Mech Traction (76632)  [] ES(attended) (08558)      [] ES (un) (35270):         ASSESSMENT:  Added stim due to trigger point       GOALS:   Patient stated goal:    [] Progressing: [] Met: [] Not Met: [] Adjusted    Therapist goals for Patient:   Short Term Goals: To be achieved in: 2 weeks  1. Independent in HEP and progression per patient tolerance, in order to prevent re-injury. [x] Progressing: [] Met: [] Not Met: [] Adjusted   2. Patient will have a decrease in pain to facilitate improvement in movement, function, and ADLs as indicated by Functional Deficits. [x] Progressing: [] Met: [] Not Met: [] Adjusted    Long Term Goals: To be achieved in:   8 weeks  - The patient is expected to demonstrate less than 50% impairment, limitation or restriction in:  - carrying, moving and handling objects. - Patient will demonstrate increased passive and active ROM to full, symmetrical and painfree to allow for proper joint functioning as indicated by patients Functional Deficits. [x] Progressing: [] Met: [] Not Met: [] Adjusted  - Patient will demonstrate an increase in Strength to symmetrical and painfree to allow for proper functional mobility as indicated by patients Functional Deficits  . [x] Progressing: [] Met: [] Not Met: [] Adjusted  - Patient will return to desired, higher level upper extremity functional activities without increased symptoms or restriction.       [x] Progressing: [] Met: [] Not Met: [] Adjusted    Overall Progression Towards Functional goals/ Treatment Progress Update:  [] Patient is progressing as expected towards functional goals listed. [] Progression is slowed due to complexities/Impairments listed. [] Progression has been slowed due to co-morbidities. [x] Plan just implemented, too soon to assess goals progression <30days   [] Goals require adjustment due to lack of progress  [] Patient is not progressing as expected and requires additional follow up with physician  [] Other    Prognosis for POC: [x] Good [] Fair  [] Poor      Patient requires continued skilled intervention: [x] Yes  [] No    Treatment/Activity Tolerance:  [x] Patient able to complete treatment  [] Patient limited by fatigue  [] Patient limited by pain    [] Patient limited by other medical complications  [] Other:         Return to Play: (if applicable)   []  Stage 1: Intro to Strength   []  Stage 2: Return to Run and Strength   []  Stage 3: Return to Jump and Strength   []  Stage 4: Dynamic Strength and Agility   []  Stage 5: Sport Specific Training     []  Ready to Return to Play, Meets All Above Stages   []  Not Ready for Return to Sports   Comments:                               PLAN: See eval  [x] Continue per plan of care [] Alter current plan (see comments above)  [] Plan of care initiated [] Hold pending MD visit [] Discharge      Electronically signed by:       Note: If patient does not return for scheduled/ recommended follow up visits, this note will serve as a discharge from care along with most recent update on progress.

## 2021-09-03 ENCOUNTER — HOSPITAL ENCOUNTER (OUTPATIENT)
Dept: CARDIAC REHAB | Age: 81
Setting detail: THERAPIES SERIES
Discharge: HOME | End: 2021-09-03
Payer: MEDICARE

## 2021-09-03 PROCEDURE — G0239 OTH RESP PROC, GROUP: HCPCS

## 2021-09-04 NOTE — FLOWSHEET NOTE
[facility] Orthopaedics and Sports Rehabilitation; [facility]         Physical Therapy Treatment Note/ Progress Report:           Date:  2021  Patient Name:  Cl Atwood    :  1940  MRN: [id number]  Restrictions/Precautions:    Medical/Treatment Diagnosis Information:   Diagnosis: Right shoulder pain- Z20.021     Insurance/Certification information:     Physician Information:     Has the plan of care been signed (Y/N):        []  Yes  [x]  No     Date of Patient follow up with Physician:       Is this a Progress Report:     []  Yes  [x]  No        If Yes:  Date Range for reporting period:  Beginning  Ending    Progress report will be due (10 Rx or 30 days whichever is less):       Recertification will be due (POC Duration  / 90 days whichever is less):          Visit # Insurance Allowable Auth Required   9  []  Yes []  No        Functional Scale:    Date assessed:        Latex Allergy:  [x]NO      []YES  Preferred Language for Healthcare:   [x]English       []other:      Pain level:  4/10     SUBJECTIVE:   \"that spot is doing better. ..my shoulder blade is feeling stronger\"    OBJECTIVE:  4-/5 shoulder strength    Observation:    Test measurements:      RESTRICTIONS/PRECAUTIONS:     Exercises/Interventions:       Therapeutic Ex (77884) Sets/sec Reps Notes/CUES   Supine cane stretches   5  X 10\"     Wall stretch   5 x 10\"     scap pinches   30x     sidelying er   20x     Prone: rows, ext   25x, 20x     Swiss ball rolls   25x    theraband rows   25x yellow     Supine raises   20x     Pulleys  3'    Scapular clock   15x 3:00, 6:00    No $   20x          Manual Intervention (82656)      Prom/stm   19'                                                                                                                                             Therapeutic Exercise and NMR EXR  [x] (05344) Provided verbal/tactile cueing for activities related to strengthening, flexibility, endurance, ROM for improvements in LE, proximal hip, and core control with self care, mobility, lifting, ambulation. [x] (97040) Provided verbal/tactile cueing for activities related to improving balance, coordination, kinesthetic sense, posture, motor skill, proprioception to assist with LE, proximal hip, and core control in self-care, mobility, lifting, ambulation and eccentric single leg control. NMR and Therapeutic Activities:    [x] (74712 or 00417) Provided verbal/tactile cueing for activities related to improving balance, coordination, kinesthetic sense, posture, motor skill, proprioception and motor activation to allow for proper function of core, proximal hip and LE with self-care and ADLs and functional mobility.   [] (40445) Gait Re-education- Provided training and instruction to the patient for proper LE, core and proximal hip recruitment and positioning and eccentric body weight control with ambulation re-education including up and down stairs     Home Exercise Program:    [x] (01152) Reviewed/Progressed HEP activities related to strengthening, flexibility, endurance, ROM of core, proximal hip and LE for functional self-care, mobility, lifting and ambulation/stair navigation   [] (60967) Reviewed/Progressed HEP activities related to improving balance, coordination, kinesthetic sense, posture, motor skill, proprioception of core, proximal hip and LE for self-care, mobility, lifting, and ambulation/stair navigation      Manual Treatments:  PROM / STM / Oscillations-Mobs:  G-I, II, III, IV (PA's, Inf., Post.)  [x] (19163) Provided manual therapy to mobilize LE, proximal hip and/or LS spine soft tissue/joints for the purpose of modulating pain, promoting relaxation, increasing ROM, reducing/eliminating soft tissue swelling/inflammation/restriction, improving soft tissue extensibility and allowing for proper ROM for normal function with self-care, mobility, lifting and ambulation. Modalities: CP/E-stim x 15'    [] GAME READY (VASO)- for significant edema, swelling, pain control. Charges:  Timed Code Treatment Minutes: 45'    Total Treatment Minutes: 39'       [] EVAL (LOW) 47543 (typically 20 minutes face-to-face)  [] EVAL (MOD) 06862 (typically 30 minutes face-to-face)  [] EVAL (HIGH) 42516 (typically 45 minutes face-to-face)  [] RE-EVAL     [x] BQ(63726) x  1   [] IONTO  [x] NMR (09609) x 1 [] VASO  [x] Manual (65059) x  1 [] Other:  [] TA x      [] Mech Traction (61725)  [] ES(attended) (68601)      [] ES (un) (74055):         ASSESSMENT:  Added stim due to trigger point       GOALS:   Patient stated goal:    [] Progressing: [] Met: [] Not Met: [] Adjusted    Therapist goals for Patient:   Short Term Goals: To be achieved in: 2 weeks  1. Independent in HEP and progression per patient tolerance, in order to prevent re-injury. [x] Progressing: [] Met: [] Not Met: [] Adjusted   2. Patient will have a decrease in pain to facilitate improvement in movement, function, and ADLs as indicated by Functional Deficits. [x] Progressing: [] Met: [] Not Met: [] Adjusted    Long Term Goals: To be achieved in:   8 weeks  - The patient is expected to demonstrate less than 50% impairment, limitation or restriction in:  - carrying, moving and handling objects. - Patient will demonstrate increased passive and active ROM to full, symmetrical and painfree to allow for proper joint functioning as indicated by patients Functional Deficits. [x] Progressing: [] Met: [] Not Met: [] Adjusted  - Patient will demonstrate an increase in Strength to symmetrical and painfree to allow for proper functional mobility as indicated by patients Functional Deficits  . [x] Progressing: [] Met: [] Not Met: [] Adjusted  - Patient will return to desired, higher level upper extremity functional activities without increased symptoms or restriction.       [x] Progressing: [] Met: [] Not Met: [] Adjusted    Overall

## 2021-09-07 ENCOUNTER — HOSPITAL ENCOUNTER (OUTPATIENT)
Dept: PHYSICAL THERAPY | Age: 81
Setting detail: THERAPIES SERIES
Discharge: HOME | End: 2021-09-07
Payer: MEDICARE

## 2021-09-07 PROCEDURE — 97110 THERAPEUTIC EXERCISES: CPT | Performed by: PHYSICAL THERAPIST

## 2021-09-07 PROCEDURE — 97140 MANUAL THERAPY 1/> REGIONS: CPT | Performed by: PHYSICAL THERAPIST

## 2021-09-07 PROCEDURE — 97112 NEUROMUSCULAR REEDUCATION: CPT | Performed by: PHYSICAL THERAPIST

## 2021-09-08 ENCOUNTER — HOSPITAL ENCOUNTER (OUTPATIENT)
Dept: CARDIAC REHAB | Age: 81
Setting detail: THERAPIES SERIES
Discharge: HOME | End: 2021-09-08
Payer: MEDICARE

## 2021-09-08 PROCEDURE — G0239 OTH RESP PROC, GROUP: HCPCS

## 2021-09-08 NOTE — FLOWSHEET NOTE
hip, and core control with self care, mobility, lifting, ambulation. [x] (87365) Provided verbal/tactile cueing for activities related to improving balance, coordination, kinesthetic sense, posture, motor skill, proprioception to assist with LE, proximal hip, and core control in self-care, mobility, lifting, ambulation and eccentric single leg control. NMR and Therapeutic Activities:    [x] (35872 or 15941) Provided verbal/tactile cueing for activities related to improving balance, coordination, kinesthetic sense, posture, motor skill, proprioception and motor activation to allow for proper function of core, proximal hip and LE with self-care and ADLs and functional mobility.   [] (20200) Gait Re-education- Provided training and instruction to the patient for proper LE, core and proximal hip recruitment and positioning and eccentric body weight control with ambulation re-education including up and down stairs     Home Exercise Program:    [x] (05696) Reviewed/Progressed HEP activities related to strengthening, flexibility, endurance, ROM of core, proximal hip and LE for functional self-care, mobility, lifting and ambulation/stair navigation   [] (17281) Reviewed/Progressed HEP activities related to improving balance, coordination, kinesthetic sense, posture, motor skill, proprioception of core, proximal hip and LE for self-care, mobility, lifting, and ambulation/stair navigation      Manual Treatments:  PROM / STM / Oscillations-Mobs:  G-I, II, III, IV (PA's, Inf., Post.)  [x] (97319) Provided manual therapy to mobilize LE, proximal hip and/or LS spine soft tissue/joints for the purpose of modulating pain, promoting relaxation, increasing ROM, reducing/eliminating soft tissue swelling/inflammation/restriction, improving soft tissue extensibility and allowing for proper ROM for normal function with self-care, mobility, lifting and ambulation.      Modalities: CP/E-stim x 15'    [] GAME READY (VASO)- for significant edema, swelling, pain control. Charges:  Timed Code Treatment Minutes: 45'    Total Treatment Minutes: 39'       [] EVAL (LOW) 52823 (typically 20 minutes face-to-face)  [] EVAL (MOD) 55367 (typically 30 minutes face-to-face)  [] EVAL (HIGH) 36689 (typically 45 minutes face-to-face)  [] RE-EVAL     [x] GO(62595) x  1   [] IONTO  [x] NMR (40390) x 1 [] VASO  [x] Manual (54493) x  1 [] Other:  [] TA x      [] Mech Traction (41292)  [] ES(attended) (67026)      [] ES (un) (23857):         ASSESSMENT:  Added stim due to trigger point       GOALS:   Patient stated goal:    [] Progressing: [] Met: [] Not Met: [] Adjusted    Therapist goals for Patient:   Short Term Goals: To be achieved in: 2 weeks  1. Independent in HEP and progression per patient tolerance, in order to prevent re-injury. [x] Progressing: [] Met: [] Not Met: [] Adjusted   2. Patient will have a decrease in pain to facilitate improvement in movement, function, and ADLs as indicated by Functional Deficits. [x] Progressing: [] Met: [] Not Met: [] Adjusted    Long Term Goals: To be achieved in:   8 weeks  - The patient is expected to demonstrate less than 50% impairment, limitation or restriction in:  - carrying, moving and handling objects. - Patient will demonstrate increased passive and active ROM to full, symmetrical and painfree to allow for proper joint functioning as indicated by patients Functional Deficits. [x] Progressing: [] Met: [] Not Met: [] Adjusted  - Patient will demonstrate an increase in Strength to symmetrical and painfree to allow for proper functional mobility as indicated by patients Functional Deficits  . [x] Progressing: [] Met: [] Not Met: [] Adjusted  - Patient will return to desired, higher level upper extremity functional activities without increased symptoms or restriction.       [x] Progressing: [] Met: [] Not Met: [] Adjusted    Overall Progression Towards Functional goals/ Treatment Progress Update:  [] Patient is progressing as expected towards functional goals listed. [] Progression is slowed due to complexities/Impairments listed. [] Progression has been slowed due to co-morbidities. [x] Plan just implemented, too soon to assess goals progression <30days   [] Goals require adjustment due to lack of progress  [] Patient is not progressing as expected and requires additional follow up with physician  [] Other    Prognosis for POC: [x] Good [] Fair  [] Poor      Patient requires continued skilled intervention: [x] Yes  [] No    Treatment/Activity Tolerance:  [x] Patient able to complete treatment  [] Patient limited by fatigue  [] Patient limited by pain    [] Patient limited by other medical complications  [] Other:         Return to Play: (if applicable)   []  Stage 1: Intro to Strength   []  Stage 2: Return to Run and Strength   []  Stage 3: Return to Jump and Strength   []  Stage 4: Dynamic Strength and Agility   []  Stage 5: Sport Specific Training     []  Ready to Return to Play, Meets All Above Stages   []  Not Ready for Return to Sports   Comments:                               PLAN: See eval  [x] Continue per plan of care [] Alter current plan (see comments above)  [] Plan of care initiated [] Hold pending MD visit [] Discharge      Electronically signed by:       Note: If patient does not return for scheduled/ recommended follow up visits, this note will serve as a discharge from care along with most recent update on progress.

## 2021-09-09 ENCOUNTER — TELEPHONE (OUTPATIENT)
Dept: INTERNAL MEDICINE CLINIC | Age: 81
End: 2021-09-09

## 2021-09-09 ENCOUNTER — HOSPITAL ENCOUNTER (OUTPATIENT)
Dept: PHYSICAL THERAPY | Age: 81
Setting detail: THERAPIES SERIES
Discharge: HOME | End: 2021-09-09
Payer: MEDICARE

## 2021-09-09 PROCEDURE — 97110 THERAPEUTIC EXERCISES: CPT | Performed by: PHYSICAL THERAPIST

## 2021-09-09 PROCEDURE — 97112 NEUROMUSCULAR REEDUCATION: CPT | Performed by: PHYSICAL THERAPIST

## 2021-09-09 PROCEDURE — 97140 MANUAL THERAPY 1/> REGIONS: CPT | Performed by: PHYSICAL THERAPIST

## 2021-09-09 NOTE — TELEPHONE ENCOUNTER
Patient needs these meds refilled .  She has a few pills left.      escitalopram (LEXAPRO) 20 MG tablet         traZODone (DESYREL) 100 MG tablet           [place] [id number]   Phone:  [phone]  Fax:  [phone]      Please advise and call

## 2021-09-09 NOTE — PROGRESS NOTES
The [address] [facility] and [address] [facility]  [address], [place], [address] [facility]  Phone: [phone]   Fax:     [phone]                                                      Physical Therapy Re-Certification Plan of Care    Dear dr Jhon Santana  ,    We had the pleasure of treating the following patient for physical therapy services at [address] [place] [address]. A summary of our findings can be found in the updated assessment below. This includes our plan of care. If you have any questions or concerns regarding these findings, please do not hesitate to contact me at the office phone number checked above. Thank you for the referral.       Physician Signature:_______________________________Date:__________________  By signing above (or electronic signature), therapists plan is approved by physician      Patient: Delma Galarza   : 1940   MRN: [id number]  Referring Physician:        Evaluation Date: 2021      Medical Diagnosis Information:     Diagnosis: Right shoulder pain- m25.511                                            Insurance information:       Date Range: -   Total visits: 11          SUBJECTIVE: \"I am feeling pretty good. .. \"      Pain Scale:  7/10  :     OBJECTIVE:   Impairments:    ROM:   Decreased shoulder rom: decreased 20% active       Strength/Neuromuscular control:   4/5 shoulder       MEDICARE CAP EXCEPTION DOCUMENTATION  I certify that this patient meets one of the below criteria necessary for becoming an exception to the Medicare cap on therapy services:  [x]  The patient has a condition that has a direct and significant impact on the need for therapy. (Significantly impacts the rate of recovery)  [x]  The patient has a complexity that has a direct and significant impact on the need for therapy.   (Significantly impacts the rate of recovery and is associated with a primary condition.)       []  The patient has associated variables that influence the amount of treatment to include:  Social support, self-efficacy/motivation, prognosis, time since onset/acuity. []  The patient has generalized musculoskeletal conditions or a condition affecting multiple sites that will have a direct impact on the rate of recovery. []  The patient had a prior episode of outpatient therapy during this calendar year for a different condition. []  The patient has a mental or cognitive disorder in addition to the condition being treated that will have a direct and significant impact on the rate of recovery. ASSESSMENT:    Response to Treatment:   - Patient is responding well to treatment and improvement is noted with regards  to goals      \Updated Functional deficiencies/Impairments: The patient demonstrated at least 47% but less than 49% impairment, limitation or restriction in:   - carrying, moving and handling objects.  - Decreased upper extremity functional strength and neuromuscular control - Reduced overall functional level with carrying /lifting  - Noted GHJ joint hypomobility- Reduced overall functional level with carrying /lifting   - Noted cervicothoracic joint hypomobility- Reduced overall functional level with carrying /lifting   - Pain/difficulty with driving and/or computer work- Reduced overall functional level   - Pain/difficulty associated with self care tasks- Reduced overall functional level and ADL status  - Pain/difficulty with lifting/reaching/carrying - Reduced overall functional level with carrying and lifting  - Pain/difficulty with household work- Reduced ADL status  - Unable to perform sport/recreational activity due to pain and dysfunction    Updated Classification:  - ligament or other connective tissue dysfunction. (Pattern 4D)  - localized inflammation.  (Pattern 4E)    Prognosis/Rehab Potential:       - Good       Factors affecting rehab potential:  - associated co-morbidities    Tolerance of evaluation/treatment:     Donna David     New/Updated Goals (if applicable):  [] No change to goals established upon initial eval/last progress note:  New Goals:    GOALS:     Plan of Care:  [x] Continue Current Therapy Intervention    Frequency/Duration:  2 days per week for 8 Weeks:  Interventions:  1. Therapeutic exercise including: strength training, ROM, NMR and proprioception for the scapula, core and Upper extremity  2. Manual therapy as indicated including Dry Needling/IASTM, STM, PROM, Gr I-IV mobilizations, spinal mobilization/manipulation. 3. Modalities as needed including: thermal agents, E-stim, US, iontophoresis as indicated. 4. Patient education on joint protection, activity modification, progression of HEP.         Electronically signed by:  Jerica Russell PT, MPT

## 2021-09-09 NOTE — FLOWSHEET NOTE
[facility] Orthopaedics and Sports Rehabilitation; [facility]         Physical Therapy Treatment Note/ Progress Report:           Date:  2021  Patient Name:  Afsaneh Eaton    :  1940  MRN: [id number]  Restrictions/Precautions:    Medical/Treatment Diagnosis Information:   Diagnosis: Right shoulder pain- S25.196     Insurance/Certification information:     Physician Information:     Has the plan of care been signed (Y/N):        []  Yes  [x]  No     Date of Patient follow up with Physician:       Is this a Progress Report:     []  Yes  [x]  No        If Yes:  Date Range for reporting period:  Beginning  Ending    Progress report will be due (10 Rx or 30 days whichever is less):       Recertification will be due (POC Duration  / 90 days whichever is less):          Visit # Insurance Allowable Auth Required   11  []  Yes []  No        Functional Scale:    Date assessed:        Latex Allergy:  [x]NO      []YES  Preferred Language for Healthcare:   [x]English       []other:      Pain level:  4/10     SUBJECTIVE:   \"I am seeing dr next week. Tara Hernandez \"     OBJECTIVE:   right shoulder strength    Observation:    Test measurements:      RESTRICTIONS/PRECAUTIONS:     Exercises/Interventions:       Therapeutic Ex (02541) Sets/sec Reps Notes/CUES   Supine cane stretches   5  X 10\"     Wall stretch   5 x 10\"     scap pinches   30x     sidelying er   20x     Prone: rows, ext   25x, 20x     Swiss ball rolls   25x    theraband rows   25x yellow     Supine raises   20x     Pulleys  3'    Scapular clock   15x 3:00, 6:00    No $   20x          Manual Intervention (37474)      Prom/stm   19'                                                                                                                                             Therapeutic Exercise and NMR EXR  [x] (38542) Provided verbal/tactile cueing for activities related to strengthening, flexibility, endurance, ROM for improvements in LE, proximal hip, and core control with self care, mobility, lifting, ambulation. [x] (13345) Provided verbal/tactile cueing for activities related to improving balance, coordination, kinesthetic sense, posture, motor skill, proprioception to assist with LE, proximal hip, and core control in self-care, mobility, lifting, ambulation and eccentric single leg control. NMR and Therapeutic Activities:    [x] (70664 or 16208) Provided verbal/tactile cueing for activities related to improving balance, coordination, kinesthetic sense, posture, motor skill, proprioception and motor activation to allow for proper function of core, proximal hip and LE with self-care and ADLs and functional mobility.   [] (09430) Gait Re-education- Provided training and instruction to the patient for proper LE, core and proximal hip recruitment and positioning and eccentric body weight control with ambulation re-education including up and down stairs     Home Exercise Program:    [x] (34653) Reviewed/Progressed HEP activities related to strengthening, flexibility, endurance, ROM of core, proximal hip and LE for functional self-care, mobility, lifting and ambulation/stair navigation   [] (01281) Reviewed/Progressed HEP activities related to improving balance, coordination, kinesthetic sense, posture, motor skill, proprioception of core, proximal hip and LE for self-care, mobility, lifting, and ambulation/stair navigation      Manual Treatments:  PROM / STM / Oscillations-Mobs:  G-I, II, III, IV (PA's, Inf., Post.)  [x] (60348) Provided manual therapy to mobilize LE, proximal hip and/or LS spine soft tissue/joints for the purpose of modulating pain, promoting relaxation, increasing ROM, reducing/eliminating soft tissue swelling/inflammation/restriction, improving soft tissue extensibility and allowing for proper ROM for normal function with self-care, mobility, lifting and ambulation.      Modalities: CP/E-stim x 15'    [] GAME READY (VASO)- for significant edema, swelling, pain control. Charges:  Timed Code Treatment Minutes: 45'    Total Treatment Minutes: 39'       [] EVAL (LOW) 01864 (typically 20 minutes face-to-face)  [] EVAL (MOD) 74874 (typically 30 minutes face-to-face)  [] EVAL (HIGH) 33542 (typically 45 minutes face-to-face)  [] RE-EVAL     [x] XH(26253) x  1   [] IONTO  [x] NMR (38802) x 1 [] VASO  [x] Manual (55156) x  1 [] Other:  [] TA x      [] Mech Traction (58014)  [] ES(attended) (41190)      [] ES (un) (08512):         ASSESSMENT:  Added stim due to trigger point       GOALS:   Patient stated goal:    [] Progressing: [] Met: [] Not Met: [] Adjusted    Therapist goals for Patient:   Short Term Goals: To be achieved in: 2 weeks  1. Independent in HEP and progression per patient tolerance, in order to prevent re-injury. [x] Progressing: [] Met: [] Not Met: [] Adjusted   2. Patient will have a decrease in pain to facilitate improvement in movement, function, and ADLs as indicated by Functional Deficits. [x] Progressing: [] Met: [] Not Met: [] Adjusted    Long Term Goals: To be achieved in:   8 weeks  - The patient is expected to demonstrate less than 50% impairment, limitation or restriction in:  - carrying, moving and handling objects. - Patient will demonstrate increased passive and active ROM to full, symmetrical and painfree to allow for proper joint functioning as indicated by patients Functional Deficits. [x] Progressing: [] Met: [] Not Met: [] Adjusted  - Patient will demonstrate an increase in Strength to symmetrical and painfree to allow for proper functional mobility as indicated by patients Functional Deficits  . [x] Progressing: [] Met: [] Not Met: [] Adjusted  - Patient will return to desired, higher level upper extremity functional activities without increased symptoms or restriction.       [x] Progressing: [] Met: [] Not Met: [] Adjusted    Overall Progression Towards Functional goals/ Treatment Progress Update:  [] Patient is progressing as expected towards functional goals listed. [] Progression is slowed due to complexities/Impairments listed. [] Progression has been slowed due to co-morbidities. [x] Plan just implemented, too soon to assess goals progression <30days   [] Goals require adjustment due to lack of progress  [] Patient is not progressing as expected and requires additional follow up with physician  [] Other    Prognosis for POC: [x] Good [] Fair  [] Poor      Patient requires continued skilled intervention: [x] Yes  [] No    Treatment/Activity Tolerance:  [x] Patient able to complete treatment  [] Patient limited by fatigue  [] Patient limited by pain    [] Patient limited by other medical complications  [] Other:         Return to Play: (if applicable)   []  Stage 1: Intro to Strength   []  Stage 2: Return to Run and Strength   []  Stage 3: Return to Jump and Strength   []  Stage 4: Dynamic Strength and Agility   []  Stage 5: Sport Specific Training     []  Ready to Return to Play, Meets All Above Stages   []  Not Ready for Return to Sports   Comments:                               PLAN: See eval  [x] Continue per plan of care [] Alter current plan (see comments above)  [] Plan of care initiated [] Hold pending MD visit [] Discharge      Electronically signed by:       Note: If patient does not return for scheduled/ recommended follow up visits, this note will serve as a discharge from care along with most recent update on progress.

## 2021-09-10 ENCOUNTER — HOSPITAL ENCOUNTER (OUTPATIENT)
Dept: CARDIAC REHAB | Age: 81
Setting detail: THERAPIES SERIES
Discharge: HOME | End: 2021-09-10
Payer: MEDICARE

## 2021-09-10 PROCEDURE — G0239 OTH RESP PROC, GROUP: HCPCS

## 2021-09-13 ENCOUNTER — HOSPITAL ENCOUNTER (OUTPATIENT)
Dept: CARDIAC REHAB | Age: 81
Setting detail: THERAPIES SERIES
Discharge: HOME | End: 2021-09-13
Payer: MEDICARE

## 2021-09-13 ENCOUNTER — OFFICE VISIT (OUTPATIENT)
Dept: ORTHOPEDIC SURGERY | Age: 81
End: 2021-09-13
Payer: MEDICARE

## 2021-09-13 VITALS — HEIGHT: 62 IN | WEIGHT: 119 LBS | RESPIRATION RATE: 12 BRPM | BODY MASS INDEX: 21.9 KG/M2

## 2021-09-13 DIAGNOSIS — M75.81 ROTATOR CUFF TENDONITIS, RIGHT: Primary | ICD-10-CM

## 2021-09-13 DIAGNOSIS — M19.011 ARTHRITIS OF RIGHT ACROMIOCLAVICULAR JOINT: ICD-10-CM

## 2021-09-13 PROCEDURE — G0239 OTH RESP PROC, GROUP: HCPCS

## 2021-09-13 PROCEDURE — 99213 OFFICE O/P EST LOW 20 MIN: CPT | Performed by: ORTHOPAEDIC SURGERY

## 2021-09-13 NOTE — PROGRESS NOTES
[facility]  Office Visit    Date:  2021    Name:  Yvonne Garcia  Address:  [place] [address]    :  1940      Age:   80 y.o.    SSN:  [id number]      Medical Record Number:  [id number]    Chief Complaint:    Right shoulder and shoulder blade pain    HPI:   Yvonne Garcia is a 80 y.o. right hand dominant female here for follow-up post physical therapy and AC joint injection and subacromial injection [facility] and cuff tendinitis. This was given about 6 weeks ago. She is doing better and progressing and has been doing daily mobility exercises. She has been working with [employer]. She is still noting periscapular shoulder discomfort and soreness     She is . Pain Assessment  Location of Pain: Shoulder  Location Modifiers: Right  Severity of Pain: 4  Quality of Pain: Dull, Aching  Duration of Pain: Persistent  Frequency of Pain: Constant  Aggravating Factors:  Other (Comment) (Increased activity/movement)  Limiting Behavior: Yes  Relieving Factors: Rest  Result of Injury: No  Work-Related Injury: No  Are there other pain locations you wish to document?: No    Past History:  Past Medical History:   Diagnosis Date    Diabetes mellitus ([address]     Essential hypertension, benign     GERD (gastroesophageal reflux disease)     Hyperlipidemia     Interstitial lung disease (HCC)     Osteoarthrosis, unspecified whether generalized or localized, unspecified site     osteoarthritis lower leg    Osteopenia     Shingles        Past Surgical History:   Procedure Laterality Date    ANKLE SURGERY Right 2013    torn tendon    BRONCHOSCOPY N/A 2019    BRONCHOSCOPY WITH BAL AND TRANSBRONCHIAL BIOPSIES performed by Radhika Millan MD at [address] [place] [address]  10/11/2010    Dr. Lee Dubon , polyps and diverticulosis    COLONOSCOPY  2002    Dr. Vicenta Moore, Diverticulosis    COLONOSCOPY  2015 Dr. Ted Majano- diverticulosis, sessile polyp, 5 years     COLONOSCOPY  4/13/16    Dr Arias Gama; Diverticulosis    COLONOSCOPY  11/28/2016    Dr Ted Majano,     INTRACAPSULAR CATARACT EXTRACTION Right 7/15/2020    PHACOEMULSIFICATION OF CATARACT RIGHT EYE WITH INTRAOCULAR LENS IMPLANT performed by Nickolas Dodson MD at [facility] Bilateral 9/1/2020    BILATERAL LUMBAR THREE, LUMBAR FOUR, LUMBAR FIVE RADIOFREQUENCY ABLATION SITE CONFIRMED BY FLUOROSCOPY performed by Monae Kirkaptrick MD at [facility] Bilateral 6/23/2020    BILATERAL LUMBAR FOUR TRANSFORAMINAL EPIDURAL STEROID INJECTION SITE CONFIRMED BY FLUOROSCOPY performed by Monae Kirkpatrick MD at [address] [place] [address] Bilateral 7/7/2020    BILATERAL LUMBAR FOUR TRANSFORAMINAL EPIDURAL STEROID INJECTION SITE CONFIRMED BY FLUOROSCOPY performed by Monae Kirkpatrick MD at [address] [place] [address] Bilateral 8/4/2020    BILATERAL LUMBAR THREE, LUMBAR FOUR, LUMBAR FIVE DORSAL RAMUS MEDIAL BRANCH BLOCK SITE CONFIRMED BY FLUOROSCOPY performed by Monae Kirkpatrick MD at [address] [place] [address] Bilateral 8/18/2020    BILATERAL LUMBAR THREE, LUMBAR FOUR, LUMBAR FIVE DORSAL RAMUS MEDIAL BRANCH BLOCK SITE CONFIR,ED BY FLUOROSCOPY performed by Monae Kirkpatrick MD at [facility] Right     UPPER GASTROINTESTINAL ENDOSCOPY  8/17/2011    Dr. Nirmala Rodriguez - moderate gastritis , hiatal hernia     UPPER GASTROINTESTINAL ENDOSCOPY  4/20/15    Dr. Nirmala Rodriguez - polyps removed, diverticulosis, follow up in 5 years   [facility]  8/16/2012    DR. Ramos - with mesh       Social History     Tobacco Use    Smoking status: Never Smoker    Smokeless tobacco: Never Used   Vaping Use    Vaping Use: Never used   Substance Use Topics    Alcohol use:  Yes     Alcohol/week: 1.0 standard drinks     Types: 1 Standard drinks or equivalent per week Comment: one vodka tonic nightly    Drug use: No        Family History:  family history includes Heart Disease in her father and mother; Rheum Arthritis in her mother. Current Outpatient Medications   Medication Sig Dispense Refill    rosuvastatin (CRESTOR) 40 MG tablet TAKE ONE TABLET BY MOUTH DAILY 90 tablet 3    escitalopram (LEXAPRO) 20 MG tablet TAKE ONE TABLET BY MOUTH DAILY 90 tablet 3    amLODIPine (NORVASC) 10 MG tablet TAKE ONE TABLET BY MOUTH DAILY 90 tablet 3    pantoprazole (PROTONIX) 40 MG tablet Take 1 tablet by mouth every morning (before breakfast) 90 tablet 1    traZODone (DESYREL) 100 MG tablet TAKE ONE TABLET BY MOUTH ONCE NIGHTLY AS NEEDED FOR SLEEP 90 tablet 1    baclofen (LIORESAL) 10 MG tablet TAKE ONE TABLET BY MOUTH THREE TIMES A DAY 30 tablet 0    glipiZIDE (GLUCOTROL) 10 MG tablet Take by mouth      linagliptin (TRADJENTA) 5 MG tablet Take by mouth      Vitamin D, Cholecalciferol, 25 MCG (1000 UT) CAPS Take 1,000 Units by mouth daily 90 capsule 1    diclofenac (VOLTAREN) 50 MG EC tablet Take 1 tablet by mouth 3 times daily (with meals) 60 tablet 3    lidocaine (LIDODERM) 5 % Place 1 patch onto the skin every 12 hours 12 hours on, 12 hours off. 30 patch 0    Probiotic Product (PROBIOTIC ACIDOPHILUS BIOBEADS) CAPS Take 1 capsule by mouth daily 90 capsule 0    Diclofenac Sodium POWD 2 g by Does not apply route 4 times daily Formula #8E Baclo 2% Diclo 3% DMSO 5% Evans 6% Lido 2% Prilo 2% 240 g 11    blood glucose test strips (ASCENSIA AUTODISC VI;ONE TOUCH ULTRA TEST VI) strip One Touch Ultra test Strips testing daily per E11.9 100 each 3    blood glucose test strips (FREESTYLE LITE) strip 1 each by In Vitro route daily Testing 1-2 times daily per e11.9 100 each 3    blood glucose monitor strips Test 2 times a day & as needed for symptoms of irregular blood glucose.  One Touch Verio strips 100 strip 2    Cyanocobalamin (B-12 PO) Take by mouth daily       vitamin E 1000 UNITS capsule Take 1,000 Units by mouth daily. No current facility-administered medications for this visit. Allergies   Allergen Reactions    Pravastatin      Other reaction(s): increases blood pressure    Percocet [Oxycodone-Acetaminophen] Other (See Comments)     Dizzy         Review of Systems: A 14 point review of systems available in the scanned medical record as documented by the patient. Today's review pertinent items are noted in HPI. Physical Exam:  Resp 12   Ht 5' 2\" (1.575 m)   Wt 119 lb (54 kg)   BMI 21.77 kg/m²       General: No acute distress, well nourished  CV: No obvious peripheral edema. Normal peripheral pulses  Neuro: Alert & oriented x 3  Psych: Normal mood and affect      Right Upper Extremity Exam:      FF Abd ER IR   Active  deg 160 deg  45 deg T9/T10   Passive  deg 180 deg 45 deg T6/T7   Strength (x/5)  5/5 Jobes  5/5 Champagne toast 5/5 5/5     Skin:  No skin rashes or lesions, warm, well perfused  Inspection: No deformity,  Palpation: less tender at the biceps tendon. Mildly tender at the [facility] joint. Tender along the medial scapular border and supraspinatous muscles, upper trapezius muscles. Stability: No instability  Sensation: Intact in all distributions  Motor: AIN/PIN/U 5/5  Strong radial pulse  Special Tests: Positive Neer's test.  Equivocal Gibson impingement test, positive Nicol's test.       Radiographic:  3 xray views of the right  shoulder including True AP in internal and external and axillary lateral were reviewed and interpreted by me today and show mild glenohumeral OA, advanced AC joint OA. No acute findings no fracture      Assessment: Patient is a 80 y.o. female with improving right shoulder pain in the deltoid referred region consistent with a rotator cuff tendinitis.   She had good relief from cortisone injection for [facility] joint arthritis tenderness    She continues to have some periscapular discomfort which is related to deconditioning and poor scapulothoracic mechanics. She would benefit from massage therapy. Impression:  Visit Diagnoses       Codes    Rotator cuff tendonitis, right    -  Primary M75.81    Arthritis of right acromioclavicular joint     M19.011          Office Procedures:  No orders of the defined types were placed in this encounter. Orders Placed This Encounter   Procedures   [facility]     Referral Priority:   Routine     Referral Type:   Outpatient Service     Referral Reason:   Specialty Services Required     Number of Visits Requested:   1       Plan:  I think Camilla Hughes is slowly improving. She would benefit from formal massage therapy along the parascapular muscles and continued scapular conditioning at our office as she has been working on. No indication for a repeat injection today. I would like to see her back in 8-9  weeks. At that point we can reassess whether she needs a repeat cortisone injection into the [facility] joint or subacromial space. All the patient's questions were answered while in the clinic. The patient is understanding of all instructions and agrees with the plan. Approximately 30 minutes was spent on patient education and coordinating care. Follow up in: No follow-ups on file. ASES/SST Self Assessment? Sincerely,    Wild Holman MD [facility] [place] [address] [place], [facility]  Email: [email or web address]  Office: [phone]    09/13/21  5:12 PM      The encounter with Yoana Renny was carried out by myself, Dr Adam Zafar, who personally examined the patient and reviewed the plan. This dictation was performed with a verbal recognition program (DRAGON) and it was checked for errors. It is possible that there are still dictated errors within this office note.   If so, please bring any errors to my attention for an addendum. All efforts were made to ensure that this office note is accurate.

## 2021-09-15 ENCOUNTER — OFFICE VISIT (OUTPATIENT)
Dept: ORTHOPEDIC SURGERY | Age: 81
End: 2021-09-15
Payer: MEDICARE

## 2021-09-15 ENCOUNTER — HOSPITAL ENCOUNTER (OUTPATIENT)
Dept: CARDIAC REHAB | Age: 81
Setting detail: THERAPIES SERIES
Discharge: HOME | End: 2021-09-15
Payer: MEDICARE

## 2021-09-15 VITALS — WEIGHT: 123 LBS | HEIGHT: 62 IN | BODY MASS INDEX: 22.63 KG/M2 | RESPIRATION RATE: 16 BRPM

## 2021-09-15 DIAGNOSIS — M65.30 TRIGGER FINGER, ACQUIRED: Primary | ICD-10-CM

## 2021-09-15 PROCEDURE — 99203 OFFICE O/P NEW LOW 30 MIN: CPT | Performed by: ORTHOPAEDIC SURGERY

## 2021-09-15 PROCEDURE — G0239 OTH RESP PROC, GROUP: HCPCS

## 2021-09-15 PROCEDURE — 20550 NJX 1 TENDON SHEATH/LIGAMENT: CPT | Performed by: ORTHOPAEDIC SURGERY

## 2021-09-15 RX ORDER — TRIAMCINOLONE ACETONIDE 40 MG/ML
20 INJECTION, SUSPENSION INTRA-ARTICULAR; INTRAMUSCULAR ONCE
Status: COMPLETED | OUTPATIENT
Start: 2021-09-15 | End: 2021-09-15

## 2021-09-15 RX ORDER — LIDOCAINE HYDROCHLORIDE 10 MG/ML
0.5 INJECTION, SOLUTION INFILTRATION; PERINEURAL ONCE
Status: COMPLETED | OUTPATIENT
Start: 2021-09-15 | End: 2021-09-15

## 2021-09-15 RX ADMIN — TRIAMCINOLONE ACETONIDE 20 MG: 40 INJECTION, SUSPENSION INTRA-ARTICULAR; INTRAMUSCULAR at 10:26

## 2021-09-15 RX ADMIN — LIDOCAINE HYDROCHLORIDE 0.5 ML: 10 INJECTION, SOLUTION INFILTRATION; PERINEURAL at 10:25

## 2021-09-15 NOTE — PROGRESS NOTES
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 Symptoms have been present for 1 month. The digit is stiff, especially in the morning and will frequently stick in the palm when flexed and pop when extended. This is often associated with pain. Mild swelling has been noticed. The patient denies discoloration or history of injury or overuse. Treatment has not been prescribed. The problem has worsened slightly recently. The pain assessment has been reviewed and is correct as stated. .    The patient's social history, past medical history, family history, medications, allergies and review of systems, entered 7/21/21, have been reviewed, and dated and are recorded in the chart. On examination the patient is Height: 5' 2\" (157.5 cm) tall and weighs Weight: 123 lb (55.8 kg). Respirations are 18 per minute. The patient is well nourished, is oriented to time and place, demonstrates appropriate mood and affect as well as normal gait and station. There is mild soft tissue swelling of the digit. There is no deformity. There is tenderness, thickening and nodularity at the base of the flexor tendon sheath. Range of motion is slightly limited in flexion and extension. The digit sticks in flexion and pops into extension, accompanied by some pain. Skin is intact without lesions. Distal circulation and sensation are intact. Muscle strength and coordination are normal.  Reflexes are present bilaterally. Joints are stable. There are no subcutaneous nodules or enlarged epitrochlear lymph nodes. I have personally reviewed and interpreted all previous external imaging studies, laboratory tests(CBC,Lipids,[id number]), diagnostic proceedures and medical encounters pertinent to this patient's visit today. Impression: Right middle finger trigger digit.      The nature of this medical problem is fully discussed with the patient and her son, including all treatment options. All questions are answered. The right  hand is prepared with Betadine and alcohol and the flexor tendon sheath of the right middle finger is injected with 1/2 milliliter of 1% lidocaine and 20 mg.of triamcinalone, with good filling. The patient is advised regarding the expected response and possible reactions from the injection. The patient is asked to call me if full, painless function has not returned within 4 weeks. The possibility of recurrence of the problem is discussed.

## 2021-09-17 ENCOUNTER — HOSPITAL ENCOUNTER (OUTPATIENT)
Dept: CARDIAC REHAB | Age: 81
Setting detail: THERAPIES SERIES
Discharge: HOME | End: 2021-09-17
Payer: MEDICARE

## 2021-09-17 PROCEDURE — G0239 OTH RESP PROC, GROUP: HCPCS

## 2021-09-20 ENCOUNTER — HOSPITAL ENCOUNTER (OUTPATIENT)
Dept: CARDIAC REHAB | Age: 81
Setting detail: THERAPIES SERIES
Discharge: HOME | End: 2021-09-20
Payer: MEDICARE

## 2021-09-20 PROCEDURE — G0239 OTH RESP PROC, GROUP: HCPCS

## 2021-09-23 ENCOUNTER — HOSPITAL ENCOUNTER (OUTPATIENT)
Dept: PHYSICAL THERAPY | Age: 81
Setting detail: THERAPIES SERIES
Discharge: HOME | End: 2021-09-23
Payer: MEDICARE

## 2021-09-23 PROCEDURE — 97140 MANUAL THERAPY 1/> REGIONS: CPT | Performed by: SPECIALIST/TECHNOLOGIST

## 2021-09-23 PROCEDURE — 97110 THERAPEUTIC EXERCISES: CPT | Performed by: SPECIALIST/TECHNOLOGIST

## 2021-09-23 PROCEDURE — G0283 ELEC STIM OTHER THAN WOUND: HCPCS | Performed by: SPECIALIST/TECHNOLOGIST

## 2021-09-23 NOTE — FLOWSHEET NOTE
[facility] Orthopaedics and Sports Rehabilitation; [facility]         Physical Therapy Treatment Note/ Progress Report:           Date:  2021  Patient Name:  Afsaneh Eaton    :  1940  MRN: [id number]  Restrictions/Precautions:    Medical/Treatment Diagnosis Information:   Diagnosis: Right shoulder pain- Q70.688     Insurance/Certification information:     Physician Information:     Has the plan of care been signed (Y/N):        []  Yes  [x]  No     Date of Patient follow up with Physician:       Is this a Progress Report:     []  Yes  [x]  No        If Yes:  Date Range for reporting period:  Beginning  Ending    Progress report will be due (10 Rx or 30 days whichever is less):       Recertification will be due (POC Duration  / 90 days whichever is less):          Visit # Insurance Allowable Auth Required   12  []  Yes []  No        Functional Scale:    Date assessed:        Latex Allergy:  [x]NO      []YES  Preferred Language for Healthcare:   [x]English       []other:      Pain level:  4/10     SUBJECTIVE:   \"The doctor wants me to get massage therapy on my shoulder and my shoulder blade. \"     OBJECTIVE:   right shoulder strength    Observation:    Test measurements:      RESTRICTIONS/PRECAUTIONS:     Exercises/Interventions:       Therapeutic Ex (28879) Sets/sec Reps Notes/CUES   Supine cane stretches   5  X 10\"     Wall stretch   5 x 10\"     scap pinches   30x     sidelying er   20x     Prone: rows, ext   25x, 20x  NV    Swiss ball rolls   25x    theraband rows   25x yellow     Supine raises   20x     Pulleys  3'    Scapular clock   15x 3:00, 6:00 NV   No $   20x          Manual Intervention (05767)      Prom/stm right upper trap, mid trap, rhomboid  25'                                                                                                                                             Therapeutic Exercise and NMR EXR  [x] (93614) Provided verbal/tactile cueing for activities related to strengthening, flexibility, endurance, ROM for improvements in LE, proximal hip, and core control with self care, mobility, lifting, ambulation. [x] (38158) Provided verbal/tactile cueing for activities related to improving balance, coordination, kinesthetic sense, posture, motor skill, proprioception to assist with LE, proximal hip, and core control in self-care, mobility, lifting, ambulation and eccentric single leg control.      NMR and Therapeutic Activities:    [x] (65066 or 68666) Provided verbal/tactile cueing for activities related to improving balance, coordination, kinesthetic sense, posture, motor skill, proprioception and motor activation to allow for proper function of core, proximal hip and LE with self-care and ADLs and functional mobility.   [] (55267) Gait Re-education- Provided training and instruction to the patient for proper LE, core and proximal hip recruitment and positioning and eccentric body weight control with ambulation re-education including up and down stairs     Home Exercise Program:    [x] (10663) Reviewed/Progressed HEP activities related to strengthening, flexibility, endurance, ROM of core, proximal hip and LE for functional self-care, mobility, lifting and ambulation/stair navigation   [] (90524) Reviewed/Progressed HEP activities related to improving balance, coordination, kinesthetic sense, posture, motor skill, proprioception of core, proximal hip and LE for self-care, mobility, lifting, and ambulation/stair navigation      Manual Treatments:  PROM / STM / Oscillations-Mobs:  G-I, II, III, IV (PA's, Inf., Post.)  [x] (25299) Provided manual therapy to mobilize LE, proximal hip and/or LS spine soft tissue/joints for the purpose of modulating pain, promoting relaxation, increasing ROM, reducing/eliminating soft tissue swelling/inflammation/restriction, improving soft tissue extensibility and allowing for proper ROM for normal function with self-care, mobility, lifting and ambulation. Modalities: CP/E-stim x 15'     [] GAME READY (VASO)- for significant edema, swelling, pain control. Charges:  Timed Code Treatment Minutes: 39'    Total Treatment Minutes: 39'       [] EVAL (LOW) 88465 (typically 20 minutes face-to-face)  [] EVAL (MOD) 15984 (typically 30 minutes face-to-face)  [] EVAL (HIGH) 66215 (typically 45 minutes face-to-face)  [] RE-EVAL     [x] EZ(61685) x  1   [] IONTO  [] NMR (09777) x  [] VASO  [x] Manual (47389) x  2 [] Other:  [] TA x      [] Mech Traction (94468)  [] ES(attended) (64380)      [] ES (un) (65964):         ASSESSMENT:  TTP over right upper trap, Mid trap and Rhomboids. GOALS:   Patient stated goal:    [] Progressing: [] Met: [] Not Met: [] Adjusted    Therapist goals for Patient:   Short Term Goals: To be achieved in: 2 weeks  1. Independent in HEP and progression per patient tolerance, in order to prevent re-injury. [x] Progressing: [] Met: [] Not Met: [] Adjusted   2. Patient will have a decrease in pain to facilitate improvement in movement, function, and ADLs as indicated by Functional Deficits. [x] Progressing: [] Met: [] Not Met: [] Adjusted    Long Term Goals: To be achieved in:   8 weeks  - The patient is expected to demonstrate less than 50% impairment, limitation or restriction in:  - carrying, moving and handling objects. - Patient will demonstrate increased passive and active ROM to full, symmetrical and painfree to allow for proper joint functioning as indicated by patients Functional Deficits. [x] Progressing: [] Met: [] Not Met: [] Adjusted  - Patient will demonstrate an increase in Strength to symmetrical and painfree to allow for proper functional mobility as indicated by patients Functional Deficits  .  [x] Progressing: [] Met: [] Not Met: [] Adjusted  - Patient will return to desired, higher level upper extremity functional activities without increased symptoms or restriction. [x] Progressing: [] Met: [] Not Met: [] Adjusted    Overall Progression Towards Functional goals/ Treatment Progress Update:  [] Patient is progressing as expected towards functional goals listed. [] Progression is slowed due to complexities/Impairments listed. [] Progression has been slowed due to co-morbidities. [x] Plan just implemented, too soon to assess goals progression <30days   [] Goals require adjustment due to lack of progress  [] Patient is not progressing as expected and requires additional follow up with physician  [] Other    Prognosis for POC: [x] Good [] Fair  [] Poor      Patient requires continued skilled intervention: [x] Yes  [] No    Treatment/Activity Tolerance:  [x] Patient able to complete treatment  [] Patient limited by fatigue  [] Patient limited by pain    [] Patient limited by other medical complications  [] Other:         Return to Play: (if applicable)   []  Stage 1: Intro to Strength   []  Stage 2: Return to Run and Strength   []  Stage 3: Return to Jump and Strength   []  Stage 4: Dynamic Strength and Agility   []  Stage 5: Sport Specific Training     []  Ready to Return to Play, Meets All Above Stages   []  Not Ready for Return to Sports   Comments:                               PLAN: See eval  [x] Continue per plan of care [] Alter current plan (see comments above)  [] Plan of care initiated [] Hold pending MD visit [] Discharge      Electronically signed by: Montrell Mina PTA  [id number] Andrews, PT, MPT          Note: If patient does not return for scheduled/ recommended follow up visits, this note will serve as a discharge from care along with most recent update on progress.

## 2021-09-24 ENCOUNTER — HOSPITAL ENCOUNTER (OUTPATIENT)
Dept: CARDIAC REHAB | Age: 81
Setting detail: THERAPIES SERIES
Discharge: HOME | End: 2021-09-24
Payer: MEDICARE

## 2021-09-24 PROCEDURE — G0239 OTH RESP PROC, GROUP: HCPCS

## 2021-09-27 ENCOUNTER — HOSPITAL ENCOUNTER (OUTPATIENT)
Dept: CARDIAC REHAB | Age: 81
Setting detail: THERAPIES SERIES
Discharge: HOME | End: 2021-09-27
Payer: MEDICARE

## 2021-09-27 PROCEDURE — G0239 OTH RESP PROC, GROUP: HCPCS

## 2021-09-28 ENCOUNTER — APPOINTMENT (OUTPATIENT)
Dept: PHYSICAL THERAPY | Age: 81
End: 2021-09-28
Payer: MEDICARE

## 2021-09-29 ENCOUNTER — HOSPITAL ENCOUNTER (OUTPATIENT)
Dept: CARDIAC REHAB | Age: 81
Setting detail: THERAPIES SERIES
Discharge: HOME | End: 2021-09-29
Payer: MEDICARE

## 2021-09-29 PROCEDURE — G0239 OTH RESP PROC, GROUP: HCPCS

## 2021-09-30 ENCOUNTER — APPOINTMENT (OUTPATIENT)
Dept: PHYSICAL THERAPY | Age: 81
End: 2021-09-30
Payer: MEDICARE

## 2021-10-05 ENCOUNTER — HOSPITAL ENCOUNTER (OUTPATIENT)
Dept: PULMONOLOGY | Age: 81
Discharge: HOME | End: 2021-10-05
Payer: MEDICARE

## 2021-10-05 DIAGNOSIS — J84.89 NSIP (NONSPECIFIC INTERSTITIAL PNEUMONIA) (HCC): ICD-10-CM

## 2021-10-05 PROCEDURE — 94760 N-INVAS EAR/PLS OXIMETRY 1: CPT

## 2021-10-05 PROCEDURE — 6360000002 HC RX W HCPCS: Performed by: INTERNAL MEDICINE

## 2021-10-05 PROCEDURE — 94726 PLETHYSMOGRAPHY LUNG VOLUMES: CPT

## 2021-10-05 PROCEDURE — 94729 DIFFUSING CAPACITY: CPT

## 2021-10-05 PROCEDURE — 94664 DEMO&/EVAL PT USE INHALER: CPT

## 2021-10-05 PROCEDURE — 94060 EVALUATION OF WHEEZING: CPT

## 2021-10-05 RX ORDER — ALBUTEROL SULFATE 2.5 MG/3ML
2.5 SOLUTION RESPIRATORY (INHALATION) ONCE
Status: COMPLETED | OUTPATIENT
Start: 2021-10-05 | End: 2021-10-05

## 2021-10-05 RX ADMIN — ALBUTEROL SULFATE 2.5 MG: 2.5 SOLUTION RESPIRATORY (INHALATION) at 11:46

## 2021-10-06 NOTE — PROCEDURES
[facility]                [address]                               PULMONARY FUNCTION    PATIENT NAME: Brenda Flores                      :        1940  MED REC NO:   [id number]                          ROOM:  ACCOUNT NO:   [de-identified]                           ADMIT DATE: 10/05/2021  PROVIDER:     Abdiel Denise MD    DATE OF PROCEDURE:  10/05/2021    This study was done in comparison to one from 2020. FINDINGS:  Spirometry for this PFT shows an FEV1 of 1.31, which was 68%  of predicted and a forced vital capacity of 1.76, which was also 68% of  predicted. There was no response to bronchodilators. Lung volume  showed a decreased total lung capacity at 72% of predicted. Diffusion  capacity was moderately decreased even with correction for alveolar  ventilation. In comparison to study from , there has been more than  a 10% decrease in the FEV1 and FVC. Total lung capacity actually was  slightly improved. Diffusion capacity was moderately decreased at that  time as well and may also be slightly improved. CONCLUSION:  This is a patient who has a moderate restrictive defect and  moderately decreased diffusion consistent with her diagnosis of  interstitial lung disease. There does appear to be some slight  progression at least in the forced expiratory volume, but overall it  appears to be stable.         Prosper Curry MD    D: 10/06/2021 10:25:22       T: 10/06/2021 12:54:23     [email or web address]  Job#: [id number]     Doc#: [id number]    CC:

## 2021-10-19 ENCOUNTER — OFFICE VISIT (OUTPATIENT)
Dept: PULMONOLOGY | Age: 81
End: 2021-10-19
Payer: MEDICARE

## 2021-10-19 VITALS
HEART RATE: 62 BPM | SYSTOLIC BLOOD PRESSURE: 137 MMHG | DIASTOLIC BLOOD PRESSURE: 62 MMHG | OXYGEN SATURATION: 95 % | HEIGHT: 62 IN | TEMPERATURE: 96.6 F | WEIGHT: 124 LBS | BODY MASS INDEX: 22.82 KG/M2

## 2021-10-19 DIAGNOSIS — J84.89 NSIP (NONSPECIFIC INTERSTITIAL PNEUMONIA) (HCC): Primary | ICD-10-CM

## 2021-10-19 PROCEDURE — 99214 OFFICE O/P EST MOD 30 MIN: CPT | Performed by: INTERNAL MEDICINE

## 2021-10-19 ASSESSMENT — ENCOUNTER SYMPTOMS
COUGH: 0
EYE REDNESS: 0
WHEEZING: 0
CHEST TIGHTNESS: 0
SHORTNESS OF BREATH: 1

## 2021-10-19 NOTE — PROGRESS NOTES
[facility]    Outpatient Note    Subjective:   CHIEF COMPLAINT:   No chief complaint on file. Interval history on 10/19/21  She is complaining of cough with clear sputum. Gets SOB when doing things in hurry. She use O2 on PRN basis   There is no fever or chills. Interval history on 4/5/21  Overall she feels fine. She started driving. Mask is making her SOB. Walking is limited due to back pain. She still has O2 but came to the office without it     There is no cough, however she does have sputum production in the morning. He had EGD 2 weeks ago with esophageal dilatation. She also has hiatal hernia for which she is on PPI. Dose was increased to 40mg recently. Interval history on 12/17/20  Overall she feels her breathing is better. She gets SOB with exertion. sats goes down to 79% with exertion. Appetite is poor since  passed away. Interval history on 6/25/20  Overall she feels fine. She is on 2 liters of O2.  sats at rest are 99%. She is following social distancing   She stopped using prednisone 3 weeks ago. However she is getting epidural steroids due to weakness. It was tapered off. Interval history on 3/5/20  Lost her  last month. She came accompanied by a family member who is staying with her these days. There is no SOB at rest.  She is using O2 at home with exertion. Overall she feels her breathing is better. She is still on prednisone 15mg daily. Interval history on 2/3/20  No major events since I saw her last.  She doesn't use O2 unless she feels she needs it. She feels better. Main complain today is left hip pain. Interval history on 1/7/20  Patient is here today for regular f/u. Overall she feels she is getting stronger,. She is not using O2 supplement when getting outside as the portable concentrator bothers her due to its weight.     She remains on prednisone 15mg daily Interval history on 12/10/19  She feels better and denies have SOB at rest however she does have SOB on exertion for which she has to stop. She doesn't like using O2. Pulse ox at rest is 97%, however it goes down within few minutes of exertion    Interval history 11/5/2019  Patient is here today for post hospital follow-up. She was admitted to the hospital on 10/13/2019 and discharged on 10/16/2019. She was admitted with acute hypoxic respiratory failure and pneumonia. She was treated with IV antibiotics and steroids and was discharged on tapering dose prednisone. She was discharged on oxygen. At the time of evaluation she was on room air and she was feeling better. She denied increasing cough or sputum production. There is no fever or chills    Interval history on 9/10/19  Overall she feels better. Coughing is less, and she is able to walk longer distances. Pulse ox is in the mid 90s. There is no fever or chills    Interval history on 7/26/19  Continue to have cough, though she was given taper dose steroids and felt better on it. There is no fever or chills. Sputum is clear. She was seen by rheumatology, there is no evidence of active rheumatological illness. Anti SSA, SSB, CK was ordered. HPI:  On 7/1/19   The patient is 80 y.o. female who presents today for a new patient visit for SOB. This is not entirely new, but apparently it is slowly getting worse. In the mornings feels out of breath while doing her usual ADL but this gets better as she sit to have breakfast.    She gets SOB on brisk walking and going up hills. She was diagnosed with walking pneumonia in May. At that time she was tired and out of breath. She has chronic cough with occasional sputum production, which is white in color. There is some wt loss ~ 10 lbs after getting pneumonia but gained back two lbs     No nasal congestion but has throat congestion. No post nasal drip.     Has GERD for about two years. On omeprazole once daily   She has dry eyes, no dryness in the mouth  No hx of joint pain, warmth or stiffness. No hx of rash. Past Medical History:    Past Medical History:   Diagnosis Date    Diabetes mellitus ([address]     Essential hypertension, benign     GERD (gastroesophageal reflux disease)     Hyperlipidemia     Interstitial lung disease (HCC)     Osteoarthrosis, unspecified whether generalized or localized, unspecified site     osteoarthritis lower leg    Osteopenia     Shingles        Social History:    Social History     Tobacco Use   Smoking Status Never Smoker   Smokeless Tobacco Never Used       Family History:  Family History   Problem Relation Age of Onset    Heart Disease Mother     Rheum Arthritis Mother     Heart Disease Father        Current Medications:  Current Outpatient Medications on File Prior to Visit   Medication Sig Dispense Refill    rosuvastatin (CRESTOR) 40 MG tablet TAKE ONE TABLET BY MOUTH DAILY 90 tablet 3    escitalopram (LEXAPRO) 20 MG tablet TAKE ONE TABLET BY MOUTH DAILY 90 tablet 3    amLODIPine (NORVASC) 10 MG tablet TAKE ONE TABLET BY MOUTH DAILY 90 tablet 3    pantoprazole (PROTONIX) 40 MG tablet Take 1 tablet by mouth every morning (before breakfast) 90 tablet 1    traZODone (DESYREL) 100 MG tablet TAKE ONE TABLET BY MOUTH ONCE NIGHTLY AS NEEDED FOR SLEEP 90 tablet 1    baclofen (LIORESAL) 10 MG tablet TAKE ONE TABLET BY MOUTH THREE TIMES A DAY 30 tablet 0    glipiZIDE (GLUCOTROL) 10 MG tablet Take by mouth      linagliptin (TRADJENTA) 5 MG tablet Take by mouth      Vitamin D, Cholecalciferol, 25 MCG (1000 UT) CAPS Take 1,000 Units by mouth daily 90 capsule 1    diclofenac (VOLTAREN) 50 MG EC tablet Take 1 tablet by mouth 3 times daily (with meals) 60 tablet 3    lidocaine (LIDODERM) 5 % Place 1 patch onto the skin every 12 hours 12 hours on, 12 hours off.  30 patch 0    Probiotic Product (PROBIOTIC ACIDOPHILUS BIOBEADS) CAPS Take 1 capsule by mouth daily 90 capsule 0    Diclofenac Sodium POWD 2 g by Does not apply route 4 times daily Formula #8E Baclo 2% Diclo 3% DMSO 5% Evans 6% Lido 2% Prilo 2% 240 g 11    blood glucose test strips (ASCENSIA AUTODISC VI;ONE TOUCH ULTRA TEST VI) strip One Touch Ultra test Strips testing daily per E11.9 100 each 3    blood glucose test strips (FREESTYLE LITE) strip 1 each by In Vitro route daily Testing 1-2 times daily per e11.9 100 each 3    blood glucose monitor strips Test 2 times a day & as needed for symptoms of irregular blood glucose. One Touch Verio strips 100 strip 2    Cyanocobalamin (B-12 PO) Take by mouth daily       vitamin E 1000 UNITS capsule Take 1,000 Units by mouth daily. No current facility-administered medications on file prior to visit. REVIEW OF SYSTEMS:    Review of Systems   Constitutional: Negative for chills and fever. HENT: Negative for postnasal drip. Eyes: Negative for redness. Dry eyes   Respiratory: Positive for shortness of breath (on exertion). Negative for cough, chest tightness and wheezing. Cardiovascular: Negative for chest pain, palpitations and leg swelling. Gastrointestinal:        GERD   Musculoskeletal: Negative for arthralgias and joint swelling. Skin: Negative for rash. Neurological: Negative for weakness. Psychiatric/Behavioral: The patient is not nervous/anxious. All other systems reviewed and are negative.         Objective:   PHYSICAL EXAM:        VITALS:  /62 (Site: Left Upper Arm, Position: Sitting, Cuff Size: Medium Adult)   Pulse 62   Temp 96.6 °F (35.9 °C) (Infrared)   Ht 5' 2\" (1.575 m)   Wt 124 lb (56.2 kg)   SpO2 95%   Breastfeeding No   BMI 22.68 kg/m²     Physical Exam    DATA:    CT chest with contrast       HISTORY: Pulmonary nodules.       COMPARISON: February 7, 2019.       Individualized dose optimization technique was used in order to meet ALARA standards for radiation dose reduction.  In addition to vendor specific dose reduction algorithms, the dose reduction techniques vary based on the specific scanner utilized but    frequently include automated exposure control, adjustment of the mA and/or kV according to patient size, and use of iterative reconstruction technique.           FINDINGS:       Basilar predominant groundglass opacity with areas of traction bronchiectasis are similar to the prior examination. There is no definite honeycombing identified.       7 mm nodule identified in the right upper lobe (image 38, series 200) is unchanged since prior exam.       5 mm nodule in the left upper lobe (image 27, series 2) unchanged. No new or enlarging pulmonary nodules.       A small pericardial effusion is identified. Mildly enlarged mediastinal lymph nodes are stable since the prior examination.       Hiatal hernia is present.       There is no destructive bone lesion.           Impression   1. Stable pulmonary nodules. Continued follow-up is indicated. 2. Stable appearance of interstitial lung disease as described above. There is no definite honeycombing. The favored differential diagnosis is nonspecific interstitial pneumonia. This would be an atypical appearance for usual interstitial pneumonia. 3. Mediastinal adenopathy, stable. 4. Hiatal hernia. Echo on 6/11/19  Normal left ventricle size. There is mild concentric left ventricular   hypertrophy. Overall left ventricular systolic function appears normal with   an ejection fraction visually estimated at 55%. No regional wall motion   abnormalities are noted. Diastolic filling parameters suggest grade II   diastolic dysfunction.   Trace mitral regurgitation is present.   Trivial tricuspid regurgitation. Estimated pulmonary artery systolic   pressure is elevated at 44 mmHg assuming a right atrial pressure of 3 mmHg.     PFT  DATE OF PROCEDURE:  06/11/2019     INDICATION:  Shortness of breath.     BMI:  31.6.     TOBACCO HISTORY:  Never smoked.     Spirometry data is acceptable and reproducible.     Lung volumes performed via plethysmography.     Room air oxygen saturation is 92%.     SPIROMETRY:  FEV1 to FVC ratio is 80%. Prebronchodilator FEV1 is 1.12,  which is 58% of predicted. Postbronchodilator FEV1 is 1.36 which is 71%  of predicted for a 21% increase. Prebronchodilator FVC is 1.4, which is  54% of predicted. Postbronchodilator FVC is 1.84 which is 71% of  predicted for 31% increase.     Lung volumes showed total lung capacity of 3.04 which is 62% of  predicted. Residual volume is 1.46 which is 64% of predicted.     Diffusion capacity is 9.61, which is 45% of predicted. This is not  adjusted for hemoglobin.     IMPRESSION:  1. No obstructive defect. 2.  There is a significant response to bronchodilators in small and  large airways. 3.  Moderate restrictive defect is present. 4.  Moderate decrease in diffusion capacity    CT scan on 10/8/19  1. Stable findings compared prior study. Nonspecific interstitial pneumonia is favored in the absence of lower lobe predominance and lack of honeycomb changes. 2. Stable right upper lobe pulmonary nodule. 3. Annual surveillance with high-resolution CT is recommended for. Large hiatal hernia. 5. Coronary artery calcification     01/20/2020   PULMONARY FUNCTION TEST     INDICATIONS:  Interstitial lung disease.     BMI:  31.5.     TOBACCO HISTORY:  Never smoked.     Spirometry data is acceptable and reproducible.     Lung volumes performed via plethysmography.     Room air oxygen saturation is 97%.     SPIROMETRY:  FEV1 to FVC ratio is 86%. Prebronchodilator FEV1 is 1.57,  which is 91% of predicted. Prebronchodilator FVC is 1.83, which is 78%  of predicted.     Lung volumes show total lung capacity of 2.88, which is 63% of  predicted. Residual volume is 0.92, which is 42% of predicted.     Diffusion capacity is 16.02, which is 80% of predicted.     IMPRESSION:  1.   No obstructive defect. 2.  There is a mild restrictive defect. 3.  Diffusion capacity is normal.  4.  When compared to previous pulmonary function tests dated 10/08/2019,  there is a mild-to-moderate drop in FVC, FEV1, and total lung capacity. Diffusion capacity is substantially higher, almost double making me  wonder his diffusion capacity in 10/2019 was real.     Six-minute walk was initially performed on room air. Of note, the  patient is on home oxygen 2 liters. At rest on room air, the patient's  oxygen saturation was 98%. Within 2 minutes, it dropped to 85%  necessitating pause and walk to place 2 liters of oxygen on. With 2  liters of oxygen, her oxygen saturation maintained 93 to 97%. The  patient walked a total of 660 feet.     The patient has significant oxygen desaturation with submaximal  exercise. She does qualify for home oxygen with exertion and oxygen  flow rate is deemed to be 2 liters. pulmonary function tests and 6MWT on 6/2/20  6/3/2020 10:20 AM  Pulmonary Function Test:     Indication: NSIP    Test comment:     Spirometry data is acceptable and reproducible.     Maximum effort given during the test.    Pulse ox is 95% on room air    Estimated body mass index is 26.15 kg/m² as calculated from the   following:    Height as of 5/5/20: 5' 2.01\" (1.575 m).    Weight as of 5/26/20: 143 lb (64.9 kg). Spirometry data:    FEV1/FVC: 87. Predicted ratio 74    FEV1 1.59L, which is 87% predicted    FVC is 1.82L, which is 74% predicted    Lung Volumes:    TLC (by Plethysmography) is 2.63L, which is 55% predicted    RV is 0.67L which is 29% predicted    Diffusion Capacity:    DLCO is 5.43 which is 26% predicted    Impression:    1. There is no obstruction present    2. There is restriction.  Based on FEV1 it is considered mild   however based on TLC it is considered severe    3. There is severe reduction in diffusion capacity    Comment:   PFT was compared to the one on 1/20/20 - FEV1 and FVC are stable.    SIX-MINUTE WALK:    A six-minute walk was started on room air.  Patient was placed on   2 liters of O2 at minute 4   The patientwalked a total of 510 feet.  She did stop or pause   during the walk to place O2 and due to back pain.  Baseline  oxygen saturation 95%.  The lowest oxygen saturation during the   walk was 82% on minute 3 for which she was placed on 2 liters.    O2 recovered to 95-98% during the remaining time of testing. 6MWT was compared to the one we have on 10/8/19:  Davis Hinojosa walked decreased from 660ft to 510ft  At that time O2 sats dropped to 86% on minute 4 and placed on O2   on minute 5    Comment: 6MWT is slightly worse than how it was last year on   10/8/19     High res CT on 1/20/21  Stable findings of the chest when compared with the prior study. Findings again favor an NSIP pattern of interstitial lung disease.       Stable pulmonary nodularity. Assessment:      Diagnosis Orders   1. NSIP (nonspecific interstitial pneumonia) ([facility] [address]  DME Order for (Specify) as OP       Plan:   68years old female with NSIP. Diagnosis is based on clinical presentation, CT findings and response to steroids. She also has large hiatal hernia      PFT and 6MWT on 6/2/20 are essentially stable. Had bronch on 8/1/19  Started on prednisone @ 20mg on 8/5/19 decrease to 15 mg in Dec. 2019  Tapered off around mid May 2020 due to weakness and back issues. CT scan on 1/20/21   Reviewed: stable. PFT on 10/6/21 reviewed:  TLC improved from 63% to 72%, however FVC decreased from 78% to 68%. RV improved from 42 to 84%. Overall restriction is stable. Oxygen requirement is also stable. When walking the main limitation is due to back pain    Her main complain today is cough mainly in the morning with clear sputum.       (There is no specific exposure that she or her family can remember  She is not on chronic medication that is known to cause ILD  She does not have pets  Hypersensitivity panel is negative   Rheumatoid factor, GIUSEPPE and sed rate are negative. SSA and SSB are negative  She was seen by rheumatology. There is no evidence of active rheumatic disease.    BAL is negative for infectious etiology)       Plan:  Continue O2 with exertion  Flutter valve, will help with bronchiectasis secondary to ILD  F/u in one year

## 2021-11-15 ENCOUNTER — OFFICE VISIT (OUTPATIENT)
Dept: ORTHOPEDIC SURGERY | Age: 81
End: 2021-11-15
Payer: MEDICARE

## 2021-11-15 VITALS — WEIGHT: 124 LBS | BODY MASS INDEX: 22.82 KG/M2 | HEIGHT: 62 IN

## 2021-11-15 DIAGNOSIS — M75.81 ROTATOR CUFF TENDONITIS, RIGHT: Primary | ICD-10-CM

## 2021-11-15 DIAGNOSIS — M25.511 RIGHT SHOULDER PAIN, UNSPECIFIED CHRONICITY: ICD-10-CM

## 2021-11-15 DIAGNOSIS — M19.011 ARTHRITIS OF RIGHT ACROMIOCLAVICULAR JOINT: ICD-10-CM

## 2021-11-15 PROCEDURE — 99214 OFFICE O/P EST MOD 30 MIN: CPT | Performed by: ORTHOPAEDIC SURGERY

## 2021-11-15 NOTE — LETTER
Physical Therapy Rehabilitation Referral    Patient Name:  Flakito Strickland      YOB: 1940    Diagnosis:    1. Rotator cuff tendonitis, right    2. Arthritis of right acromioclavicular joint    3. Right shoulder pain, unspecified chronicity        Precautions:     [x] Evaluate and Treat    Post Op Instructions:  [] Continuous passive motion (CPM) [] Elbow ROM  [x] Exercise in plane of scapula  []  Strengthening     [] Pulley and instruction   [x] Home exercise program (copy to patient)   [] Sling when arm at risk  [] Sling or brace at all times   [] AAROM: Forward elevation to  140            [] AAROM: External rotation  To  40    [] Isometric external rotator strengthening [] AAROM: internal rotation: up the back  [x] Isometric abductor strengthening  [] AAROM: Internal abduction   [] Isometric internal rotator strengthening [] AAROM: cross-body adduction             Stretching:     Strengthening:  [x] Four quadrant (FE, ER, IR, CBA)  [x] Rotator cuff (ER, IR, Abd)  [] Forward Elevation    [] External Rotators     [] External Rotation    [] Internal Rotators  [] Internal Rotation: up/back   [] Abductors     [] Internal Rotation: supine in abduction  [] Sleeper Stretch    [] Flexors  [] Cross-body abduction    [] Extensors  [] Pendulum (FE, Abd/Add, cw/ccw)  [x] Scapular Stabilizers   [] Wall-walking (FE, Abd)        [x] Shoulder shrugs     [] Table slides (FE)                [x] Rhomboid pinch  [] Elbow (flex, ext, pron, sup)        [] Lat.  Pull downs     [] Medial epicondylitis program       [] Forward punch   [] Lateral epicondylitis program       [] Internal rotators     [] Progressive resistive exercises  [] Bench Press        [] Bench press plus  Activities:     [] Lateral pull-downs  [] Rowing     [] Progressive two-hand supine press  [] Stepper/Exercise bike   [] Biceps: curls/supination  [] Swimming  [] Water exercises    Modalities:     Return to Sport:  [x] Of Choice      [] Plyometrics  [] Ultrasound     [] Rhythmic stabilization  [] Iontophoresis    [] Core strengthening   [] Moist heat     [] Sports specific program:   [] Massage         [x] Cryotherapy      [] Electrical stimulation     [] Paraffin  [] Whirlpool  [] TENS    [x] Home exercise program (copy to patient). Perform exercises for:   15     minutes    3      times/day  [x] Supervised physical therapy  Frequency: []  1x week  [x] 2x week  [] 3x week  [] Other:   Duration: [] 2 weeks   [] 4 weeks  [x] 6 weeks  [] Other:     Additional Instructions:     RHOMBOID AND SCAPULAR STRENGTHENING EXERCISES       Sincerely,    Wes Solares MD [facility]  Hip Preservation & Sports Medicine Surgeon   [address] [facility]   [address], [address]  Email: [email or web address]  Office: [phone]    11/15/21  9:12 AM

## 2021-11-15 NOTE — PROGRESS NOTES
Chief Complaint    Shoulder Pain (F/U RIGHT SHOULDER PAIN)      History of Present Illness:  Fidencio Santillan is a pleasant, 80 y.o., female, here today for follow up of right shoulder rotator cuff tendinitis and AC joint arthrosis, 3 months post injections in both joints. She reports no new injuries or setbacks. She is doing home physical therapy with Yana Smith. She still has right posterior shoulder blade discomfort but otherwise feels like she is improving. Some minor cervical neck stiffness. No numbness tingling going down the arm. No pain sleeping. 5/10 aching pain with repetitive use of the shoulder at home. Medical History:  Patient's medications, allergies, past medical, surgical, social and family histories were reviewed and updated as appropriate. No notes on file    Review of Systems  A 14 point review of systems was completed by the patient and is available in the media section of the scanned medical record and was reviewed on 11/15/2021. The review is negative with the exception of those things mentioned in the HPI and Past Medical History    Vital Signs: There were no vitals filed for this visit. General/Appearance: Alert and oriented and in no apparent distress. Skin:  There are no skin lesions, cellulitis, or extreme edema. The patient has warm and well-perfused Bilateral upper extremities with brisk capillary refill. RIGHT Shoulder Exam:  Inspection:  No gross deformities, no signs of infection. Palpation: Tenderness over teres minor/major back of shoulder blade. None around shoulder/AC joint    Active Range of Motion: Forward Elevation 170, Abduction 170, External Rotation 45, Internal Rotation T9    Passive Range of Motion: SAME    Strength:  External Rotation 5/5, Internal Rotation 5/5, Supraspinatus 5/5, Champagne Toast 5/5    Special Tests:   No Channing muscle deformity.     Neurovascular: Sensation to light touch is intact, no motor deficits, palpable radial pulses 2+    CERVICAL SPINE EXAMINATION:     Inspection:  Local inspection shows no step-off or bruising. Cervical alignment is normal.     Palpation:  MILDevidence of tenderness MILDline c7-t1. There is mild paraspinal spasm. No midline bony tenderness. Range of Motion: Full ROM including flexion, extension, and lateral bending     Strength: 5/5 bilateral upper extremities     Skin: Intact without rashes ulcerations or lesions. Sensation: Intact to light touch throughout both upper extremities. Special Tests: Negative Spurling. No focal motor deficits present throughout the upper extremities. Radiology:     Plain radiographs of the cervical spine  comprising 2 views:  were obtained and reviewed in the office: Impression: Mild degenerative disc disease. No major fracture dislocation of the cervical spine that is obvious to me today. Assessment :  Ms. Fidencio Santillan is a pleasant, 80 y.o. patient who is having some improving symptoms with respect to her right shoulder 12 weeks post cortisone injection into the [facility] joint and rotator cuff subacromial space, and with home physical therapy. She still has some scapular deconditioning and tenderness along the short rotator muscles in the shoulder blade. She does have some degenerative disc disease in her spine leading to some stiffness. But no obvious radiculopathy. Impression:  Encounter Diagnoses   Name Primary?     Rotator cuff tendonitis, right Yes    Arthritis of right acromioclavicular joint     Right shoulder pain, unspecified chronicity        Office Procedures:  Orders Placed This Encounter   Procedures    XR CERVICAL SPINE (2-3 VIEWS)     Standing Status:   Future     Number of Occurrences:   1     Standing Expiration Date:   11/15/2022    Ambulatory referral to Physical Therapy     Referral Priority:   Routine     Referral Type:   Eval and Treat     Referral Reason:   Specialty Services Required     Referred to Provider:   Edwige Rahel Puri, PT     Requested Specialty:   Physical Therapy     Number of Visits Requested:   1       Treatment Plan:  . We recommend She continue in physical therapy at home, focusing on rhomboid and trapezius conditioning of the right shoulder blade. . A new physical therapy letter was documented in Epic today and printed and given to the patient given that at home physical therapy. We will see Jacki back in 3 months and/or as needed. All questions were answered to patient's satisfaction and She was encouraged to call with any further questions or concerns. Dorothea Ward is in agreement with this plan. Sincerely,    [place] [address] [place] [address]   [address] [place], [address]  Email: [email or web address]  Office: [phone]    11/15/21  9:27 AM      The encounter with Val Segal was carried out by myself, Dr Juan Francisco Mcnair, who personally examined the patient and reviewed the plan. This dictation was performed with a verbal recognition program (DRAGON) and it was checked for errors. It is possible that there are still dictated errors within this office note. If so, please bring any errors to my attention for an addendum. All efforts were made to ensure that this office note is accurate.

## 2021-11-30 ENCOUNTER — OFFICE VISIT (OUTPATIENT)
Dept: INTERNAL MEDICINE CLINIC | Age: 81
End: 2021-11-30
Payer: MEDICARE

## 2021-11-30 VITALS
DIASTOLIC BLOOD PRESSURE: 60 MMHG | SYSTOLIC BLOOD PRESSURE: 134 MMHG | OXYGEN SATURATION: 94 % | HEART RATE: 60 BPM | BODY MASS INDEX: 22.78 KG/M2 | HEIGHT: 62 IN | WEIGHT: 123.8 LBS | TEMPERATURE: 96.7 F

## 2021-11-30 DIAGNOSIS — M65.30 TRIGGER FINGER, ACQUIRED: ICD-10-CM

## 2021-11-30 DIAGNOSIS — M15.9 PRIMARY OSTEOARTHRITIS INVOLVING MULTIPLE JOINTS: Primary | ICD-10-CM

## 2021-11-30 DIAGNOSIS — E78.00 PURE HYPERCHOLESTEROLEMIA: ICD-10-CM

## 2021-11-30 DIAGNOSIS — D64.9 ANEMIA, UNSPECIFIED TYPE: ICD-10-CM

## 2021-11-30 DIAGNOSIS — D50.9 IRON DEFICIENCY ANEMIA, UNSPECIFIED IRON DEFICIENCY ANEMIA TYPE: ICD-10-CM

## 2021-11-30 DIAGNOSIS — E83.52 HYPERCALCEMIA: ICD-10-CM

## 2021-11-30 DIAGNOSIS — E21.3 HYPERPARATHYROIDISM (HCC): ICD-10-CM

## 2021-11-30 DIAGNOSIS — E08.21 DIABETES MELLITUS DUE TO UNDERLYING CONDITION WITH DIABETIC NEPHROPATHY, WITHOUT LONG-TERM CURRENT USE OF INSULIN (HCC): ICD-10-CM

## 2021-11-30 DIAGNOSIS — R63.4 UNINTENTIONAL WEIGHT LOSS: ICD-10-CM

## 2021-11-30 DIAGNOSIS — I10 ESSENTIAL HYPERTENSION, BENIGN: ICD-10-CM

## 2021-11-30 DIAGNOSIS — J84.89 NSIP (NONSPECIFIC INTERSTITIAL PNEUMONIA) (HCC): ICD-10-CM

## 2021-11-30 LAB
A/G RATIO: 1.6 (ref 1.1–2.2)
ALBUMIN SERPL-MCNC: 4.7 G/DL (ref 3.4–5)
ALP BLD-CCNC: 120 U/L (ref 40–129)
ALT SERPL-CCNC: 11 U/L (ref 10–40)
ANION GAP SERPL CALCULATED.3IONS-SCNC: 15 MMOL/L (ref 3–16)
AST SERPL-CCNC: 15 U/L (ref 15–37)
BASOPHILS ABSOLUTE: 0.1 K/UL (ref 0–0.2)
BASOPHILS RELATIVE PERCENT: 0.9 %
BILIRUB SERPL-MCNC: 0.6 MG/DL (ref 0–1)
BUN BLDV-MCNC: 24 MG/DL (ref 7–20)
CALCIUM SERPL-MCNC: 11 MG/DL (ref 8.3–10.6)
CHLORIDE BLD-SCNC: 105 MMOL/L (ref 99–110)
CHOLESTEROL, TOTAL: 138 MG/DL (ref 0–199)
CO2: 21 MMOL/L (ref 21–32)
CREAT SERPL-MCNC: 1.1 MG/DL (ref 0.6–1.2)
EOSINOPHILS ABSOLUTE: 0.5 K/UL (ref 0–0.6)
EOSINOPHILS RELATIVE PERCENT: 5.1 %
FERRITIN: 43.6 NG/ML (ref 15–150)
GFR AFRICAN AMERICAN: 58
GFR NON-AFRICAN AMERICAN: 48
GLUCOSE BLD-MCNC: 157 MG/DL (ref 70–99)
HCT VFR BLD CALC: 37.9 % (ref 36–48)
HDLC SERPL-MCNC: 72 MG/DL (ref 40–60)
HEMOGLOBIN: 12.3 G/DL (ref 12–16)
IRON SATURATION: 25 % (ref 15–50)
IRON: 83 UG/DL (ref 37–145)
LDL CHOLESTEROL CALCULATED: 47 MG/DL
LYMPHOCYTES ABSOLUTE: 1.4 K/UL (ref 1–5.1)
LYMPHOCYTES RELATIVE PERCENT: 14.6 %
MCH RBC QN AUTO: 29.8 PG (ref 26–34)
MCHC RBC AUTO-ENTMCNC: 32.4 G/DL (ref 31–36)
MCV RBC AUTO: 91.8 FL (ref 80–100)
MONOCYTES ABSOLUTE: 0.5 K/UL (ref 0–1.3)
MONOCYTES RELATIVE PERCENT: 5.4 %
NEUTROPHILS ABSOLUTE: 7.3 K/UL (ref 1.7–7.7)
NEUTROPHILS RELATIVE PERCENT: 74 %
PARATHYROID HORMONE INTACT: 98.3 PG/ML (ref 14–72)
PDW BLD-RTO: 14.3 % (ref 12.4–15.4)
PLATELET # BLD: 212 K/UL (ref 135–450)
PMV BLD AUTO: 8.5 FL (ref 5–10.5)
POTASSIUM SERPL-SCNC: 4.2 MMOL/L (ref 3.5–5.1)
RBC # BLD: 4.13 M/UL (ref 4–5.2)
SODIUM BLD-SCNC: 141 MMOL/L (ref 136–145)
TOTAL IRON BINDING CAPACITY: 334 UG/DL (ref 260–445)
TOTAL PROTEIN: 7.6 G/DL (ref 6.4–8.2)
TRIGL SERPL-MCNC: 97 MG/DL (ref 0–150)
VLDLC SERPL CALC-MCNC: 19 MG/DL
WBC # BLD: 9.8 K/UL (ref 4–11)

## 2021-11-30 PROCEDURE — 99214 OFFICE O/P EST MOD 30 MIN: CPT | Performed by: INTERNAL MEDICINE

## 2021-11-30 NOTE — PROGRESS NOTES
Taz Mendoza (:  1940) is a 80 y.o. female,Established patient, here for evaluation of the following chief complaint(s):  Diabetes, Hypertension, and Hyperlipidemia         ASSESSMENT/PLAN:  1. Primary osteoarthritis involving multiple joints   -Can use diclofenac as needed, counseled to primarily try to use Tylenol and other non-NSAID modalities due to potential for worsening hypertension, renal dysfunction, gastritis with NSAIDs  2. Essential hypertension, benign   -Controlled, continue amlodipine 10 mg daily  3. Anemia, unspecified type   -Recheck iron studies, continue supplement  4. Diabetes mellitus due to underlying condition with diabetic nephropathy, without long-term current use of insulin (HCC)   -Reassess A1c, currently diet controlled  5. Trigger finger, acquired   -Improved, monitor  6. NSIP (nonspecific interstitial pneumonia) ([facility])   -Stable, continue oxygen with exertion. Counseled on not to use the flutter valve. 7. Hypercalcemia   -Recheck calcium level, avoid supplements  8. Pure hypercholesterolemia   -Stable, continue rosuvastatin  9. Unintentional weight loss   -Weight has stabilized, continue to monitor    Return in about 4 months (around 3/30/2022) for anemia, DM follow up. Subjective   SUBJECTIVE/OBJECTIVE:  HPI    Arthritis pain - still having low back pain, had xray at the orthopedists which showed severe arthritis. Has taken ibuprofen which helps. Uses heat and ice. GERD -taking the pantoprazole, not having a lot of heartburn but does have cough. NSIP - was given a flutter valve. Gets SOB with exertion. (Stable. She uses oxygen only with exertion. Hyperlipidemia-she takes rosuvastatin daily, no side effects    Hypertension -adherent to blood pressure medication denies chest pain, shortness of breath, leg swelling.     Iron deficiency - taking iron once daily, no significant constipation with medication    Weight loss -appetite has been good, eating well.    Trigger finger has improved with the steroid injection    Type 2 diabetes -she is off of all diabetes medications    Review of Systems       Objective   Physical Exam  Vitals reviewed. Constitutional:       General: She is not in acute distress. Appearance: Normal appearance. She is well-developed. HENT:      Head: Normocephalic and atraumatic. Cardiovascular:      Rate and Rhythm: Normal rate and regular rhythm. Heart sounds: Normal heart sounds. Pulmonary:      Effort: Pulmonary effort is normal. No respiratory distress. Comments: Bibasilar crackles  Skin:     General: Skin is warm and dry. Neurological:      Mental Status: She is alert. Psychiatric:         Mood and Affect: Mood normal.         Behavior: Behavior normal.         Thought Content: Thought content normal.         Judgment: Judgment normal.                  An electronic signature was used to authenticate this note.     --Marina Maloney MD

## 2021-11-30 NOTE — PATIENT INSTRUCTIONS
Patient Education        Hand Arthritis: Exercises  Introduction  Here are some examples of exercises for you to try. The exercises may be suggested for a condition or for rehabilitation. Start each exercise slowly. Ease off the exercises if you start to have pain. You will be told when to start these exercises and which ones will work best for you. How to do the exercises  Tendon glides    1. In this exercise, the steps follow one another to a make a continuous movement. 2. With your affected hand, point your fingers and thumb straight up. Your wrist should be relaxed, following the line of your fingers and thumb. 3. Curl your fingers so that the top two joints in them are bent, and your fingers wrap down. Your fingertips should touch or be near the base of your fingers. Your fingers will look like a hook. 4. Make a fist by bending your knuckles. Your thumb can gently rest against your index (pointing) finger. 5. Unwind your fingers slightly so that your fingertips can touch the base of your palm. Your thumb can rest against your index finger. 6. Move back to your starting position, with your fingers and thumb pointing up. 7. Repeat the series of motions 8 to 12 times. 8. Switch hands and repeat steps 1 through 6, even if only one hand is sore. Intrinsic flexion    1. Rest your affected hand on a table and bend the large joints where your fingers connect to your hand. Keep your thumb and the other joints in your fingers straight. 2. Slowly straighten your fingers. Your wrist should be relaxed, following the line of your fingers and thumb. 3. Move back to your starting position, with your hand bent. 4. Repeat 8 to 12 times. 5. Switch hands and repeat steps 1 through 4, even if only one hand is sore. Finger extension    1. Place your affected hand flat on a table. 2. Lift and then lower one finger at a time off the table. 3. Repeat 8 to 12 times.   4. Switch hands and repeat steps 1 through 3, even if only one hand is sore. MP extension    1. Place your good hand on a table, palm up. Put your affected hand on top of your good hand with your fingers wrapped around the thumb of your good hand like you are making a fist.  2. Slowly uncurl the joints of your affected hand where your fingers connect to your hand so that only the top two joints of your fingers are bent. Your fingers will look like a hook. 3. Move back to your starting position, with your fingers wrapped around your good thumb. 4. Repeat 8 to 12 times. 5. Switch hands and repeat steps 1 through 4, even if only one hand is sore. PIP extension (with MP extension)    1. Place your good hand on a table, palm up. Put your affected hand on top of your good hand, palm up. 2. Use the thumb and fingers of your good hand to grasp below the middle joint of one finger of your affected hand. 3. Straighten the last two joints of that finger. 4. Repeat 8 to 12 times. 5. Repeat steps 1 through 4 with each finger. 6. Switch hands and repeat steps 1 through 5, even if only one hand is sore. DIP flexion    1. With your good hand, grasp one finger of your affected hand. Your thumb will be on the top side of your finger just below the joint that is closest to your fingernail. 2. Slowly bend your affected finger only at the joint closest to your fingernail. 3. Repeat 8 to 12 times. 4. Repeat steps 1 through 3 with each finger. 5. Switch hands and repeat steps 1 through 4, even if only one hand is sore. Follow-up care is a key part of your treatment and safety. Be sure to make and go to all appointments, and call your doctor if you are having problems. It's also a good idea to know your test results and keep a list of the medicines you take. Where can you learn more? Go to [email or web address] org and sign in to your [facility] [employer] [facility] account. Enter I362 in the Celery box to learn more about \"Hand Arthritis: Exercises. \"     If you do not have an account, please click on the \"Sign Up Now\" link. Current as of: July 1, 2021               Content Version: 13.0  © 2006-2021 [employer]wise, Incorporated. Care instructions adapted under license by [facility] [place] ([facility]). If you have questions about a medical condition or this instruction, always ask your healthcare professional. [address] any warranty or liability for your use of this information.

## 2021-12-01 PROBLEM — N30.90 CYSTITIS: Status: RESOLVED | Noted: 2021-05-03 | Resolved: 2021-12-01

## 2021-12-01 LAB
ESTIMATED AVERAGE GLUCOSE: 139.9 MG/DL
HBA1C MFR BLD: 6.5 %

## 2022-01-06 NOTE — PROGRESS NOTES
Pt ready for d/c home, instructions given and reviewed with pt and family, they denied questions, pt taken by wheelchair to private auto for d/c home in stable condition. Subjective:      Patient ID: Senia Quintana Joseph Schrader MD;  Location: [facility] ([address] [place] [address]);  Service: Endoscopy;  Laterality: N/A;  fully vaccainted -  2nd floor due to availabilty/urgency-    ESOPHAGOGASTRODUODENOSCOPY N/A 11/23/2021    Procedure: EGD (ESOPHAGOGASTRODUODENOSCOPY);  Surgeon: Joseph Schrader MD;  Location: [facility] ([address] [place] [address]);  

## 2022-02-14 RX ORDER — TRAZODONE HYDROCHLORIDE 100 MG/1
TABLET ORAL
Qty: 90 TABLET | Refills: 1 | Status: SHIPPED | OUTPATIENT
Start: 2022-02-14 | End: 2022-07-01 | Stop reason: ALTCHOICE

## 2022-02-17 RX ORDER — PANTOPRAZOLE SODIUM 40 MG/1
TABLET, DELAYED RELEASE ORAL
Qty: 90 TABLET | Refills: 1 | Status: SHIPPED | OUTPATIENT
Start: 2022-02-17 | End: 2022-07-01

## 2022-02-23 ENCOUNTER — APPOINTMENT (OUTPATIENT)
Dept: CT IMAGING | Age: 82
DRG: 660 | End: 2022-02-23
Payer: MEDICARE

## 2022-02-23 ENCOUNTER — HOSPITAL ENCOUNTER (INPATIENT)
Age: 82
LOS: 2 days | Discharge: HOME HEALTH | DRG: 660 | End: 2022-02-25
Attending: EMERGENCY MEDICINE | Admitting: HOSPITALIST
Payer: MEDICARE

## 2022-02-23 ENCOUNTER — APPOINTMENT (OUTPATIENT)
Dept: GENERAL RADIOLOGY | Age: 82
DRG: 660 | End: 2022-02-23
Payer: MEDICARE

## 2022-02-23 DIAGNOSIS — N39.0 URINARY TRACT INFECTION WITHOUT HEMATURIA, SITE UNSPECIFIED: ICD-10-CM

## 2022-02-23 DIAGNOSIS — R73.9 HYPERGLYCEMIA: ICD-10-CM

## 2022-02-23 DIAGNOSIS — N20.0 KIDNEY STONE: Primary | ICD-10-CM

## 2022-02-23 LAB
A/G RATIO: 1.2 (ref 1.1–2.2)
ALBUMIN SERPL-MCNC: 4.1 G/DL (ref 3.4–5)
ALP BLD-CCNC: 101 U/L (ref 40–129)
ALT SERPL-CCNC: 14 U/L (ref 10–40)
ANION GAP SERPL CALCULATED.3IONS-SCNC: 9 MMOL/L (ref 3–16)
AST SERPL-CCNC: 39 U/L (ref 15–37)
BACTERIA: ABNORMAL /HPF
BASOPHILS ABSOLUTE: 0 K/UL (ref 0–0.2)
BASOPHILS RELATIVE PERCENT: 0.1 %
BILIRUB SERPL-MCNC: 0.6 MG/DL (ref 0–1)
BILIRUBIN URINE: NEGATIVE
BLOOD, URINE: ABNORMAL
BUN BLDV-MCNC: 20 MG/DL (ref 7–20)
CALCIUM SERPL-MCNC: 10.7 MG/DL (ref 8.3–10.6)
CHLORIDE BLD-SCNC: 105 MMOL/L (ref 99–110)
CLARITY: CLEAR
CO2: 23 MMOL/L (ref 21–32)
COLOR: YELLOW
CREAT SERPL-MCNC: 1 MG/DL (ref 0.6–1.2)
EKG ATRIAL RATE: 81 BPM
EKG DIAGNOSIS: NORMAL
EKG P AXIS: 48 DEGREES
EKG P-R INTERVAL: 180 MS
EKG Q-T INTERVAL: 376 MS
EKG QRS DURATION: 86 MS
EKG QTC CALCULATION (BAZETT): 436 MS
EKG R AXIS: -26 DEGREES
EKG T AXIS: 104 DEGREES
EKG VENTRICULAR RATE: 81 BPM
EOSINOPHILS ABSOLUTE: 0.1 K/UL (ref 0–0.6)
EOSINOPHILS RELATIVE PERCENT: 0.7 %
EPITHELIAL CELLS, UA: ABNORMAL /HPF (ref 0–5)
GFR AFRICAN AMERICAN: >60
GFR NON-AFRICAN AMERICAN: 53
GLUCOSE BLD-MCNC: 117 MG/DL (ref 70–99)
GLUCOSE BLD-MCNC: 182 MG/DL (ref 70–99)
GLUCOSE BLD-MCNC: 214 MG/DL (ref 70–99)
GLUCOSE URINE: NEGATIVE MG/DL
HCT VFR BLD CALC: 35.7 % (ref 36–48)
HEMOGLOBIN: 12.2 G/DL (ref 12–16)
KETONES, URINE: NEGATIVE MG/DL
LEUKOCYTE ESTERASE, URINE: ABNORMAL
LIPASE: 35 U/L (ref 13–60)
LYMPHOCYTES ABSOLUTE: 0.5 K/UL (ref 1–5.1)
LYMPHOCYTES RELATIVE PERCENT: 5.5 %
MCH RBC QN AUTO: 31.4 PG (ref 26–34)
MCHC RBC AUTO-ENTMCNC: 34.3 G/DL (ref 31–36)
MCV RBC AUTO: 91.4 FL (ref 80–100)
MICROSCOPIC EXAMINATION: YES
MONOCYTES ABSOLUTE: 0.5 K/UL (ref 0–1.3)
MONOCYTES RELATIVE PERCENT: 5.8 %
NEUTROPHILS ABSOLUTE: 7.4 K/UL (ref 1.7–7.7)
NEUTROPHILS RELATIVE PERCENT: 87.9 %
NITRITE, URINE: POSITIVE
PDW BLD-RTO: 13.3 % (ref 12.4–15.4)
PERFORMED ON: ABNORMAL
PERFORMED ON: ABNORMAL
PH UA: 6 (ref 5–8)
PLATELET # BLD: 206 K/UL (ref 135–450)
PLATELET SLIDE REVIEW: ADEQUATE
PMV BLD AUTO: 8.6 FL (ref 5–10.5)
POTASSIUM REFLEX MAGNESIUM: 5.6 MMOL/L (ref 3.5–5.1)
POTASSIUM SERPL-SCNC: 4.6 MMOL/L (ref 3.5–5.1)
PROTEIN UA: NEGATIVE MG/DL
RBC # BLD: 3.9 M/UL (ref 4–5.2)
RBC UA: ABNORMAL /HPF (ref 0–4)
SLIDE REVIEW: ABNORMAL
SODIUM BLD-SCNC: 137 MMOL/L (ref 136–145)
SPECIFIC GRAVITY UA: 1.01 (ref 1–1.03)
TOTAL PROTEIN: 7.6 G/DL (ref 6.4–8.2)
TROPONIN: <0.01 NG/ML
URINE TYPE: ABNORMAL
UROBILINOGEN, URINE: 0.2 E.U./DL
WBC # BLD: 8.4 K/UL (ref 4–11)
WBC UA: ABNORMAL /HPF (ref 0–5)

## 2022-02-23 PROCEDURE — 99284 EMERGENCY DEPT VISIT MOD MDM: CPT

## 2022-02-23 PROCEDURE — 6360000002 HC RX W HCPCS: Performed by: STUDENT IN AN ORGANIZED HEALTH CARE EDUCATION/TRAINING PROGRAM

## 2022-02-23 PROCEDURE — 87186 SC STD MICRODIL/AGAR DIL: CPT

## 2022-02-23 PROCEDURE — 84132 ASSAY OF SERUM POTASSIUM: CPT

## 2022-02-23 PROCEDURE — 74177 CT ABD & PELVIS W/CONTRAST: CPT

## 2022-02-23 PROCEDURE — 2580000003 HC RX 258: Performed by: PHYSICIAN ASSISTANT

## 2022-02-23 PROCEDURE — 6370000000 HC RX 637 (ALT 250 FOR IP): Performed by: STUDENT IN AN ORGANIZED HEALTH CARE EDUCATION/TRAINING PROGRAM

## 2022-02-23 PROCEDURE — 84484 ASSAY OF TROPONIN QUANT: CPT

## 2022-02-23 PROCEDURE — 1200000000 HC SEMI PRIVATE

## 2022-02-23 PROCEDURE — 93005 ELECTROCARDIOGRAM TRACING: CPT | Performed by: PHYSICIAN ASSISTANT

## 2022-02-23 PROCEDURE — 85025 COMPLETE CBC W/AUTO DIFF WBC: CPT

## 2022-02-23 PROCEDURE — 36415 COLL VENOUS BLD VENIPUNCTURE: CPT

## 2022-02-23 PROCEDURE — 6360000002 HC RX W HCPCS: Performed by: PHYSICIAN ASSISTANT

## 2022-02-23 PROCEDURE — 2580000003 HC RX 258: Performed by: STUDENT IN AN ORGANIZED HEALTH CARE EDUCATION/TRAINING PROGRAM

## 2022-02-23 PROCEDURE — 99222 1ST HOSP IP/OBS MODERATE 55: CPT | Performed by: HOSPITALIST

## 2022-02-23 PROCEDURE — 6370000000 HC RX 637 (ALT 250 FOR IP): Performed by: PHYSICIAN ASSISTANT

## 2022-02-23 PROCEDURE — 87086 URINE CULTURE/COLONY COUNT: CPT

## 2022-02-23 PROCEDURE — 71046 X-RAY EXAM CHEST 2 VIEWS: CPT

## 2022-02-23 PROCEDURE — 87077 CULTURE AEROBIC IDENTIFY: CPT

## 2022-02-23 PROCEDURE — 81001 URINALYSIS AUTO W/SCOPE: CPT

## 2022-02-23 PROCEDURE — 96365 THER/PROPH/DIAG IV INF INIT: CPT

## 2022-02-23 PROCEDURE — 6360000004 HC RX CONTRAST MEDICATION: Performed by: EMERGENCY MEDICINE

## 2022-02-23 PROCEDURE — 83690 ASSAY OF LIPASE: CPT

## 2022-02-23 PROCEDURE — 96366 THER/PROPH/DIAG IV INF ADDON: CPT

## 2022-02-23 PROCEDURE — 80053 COMPREHEN METABOLIC PANEL: CPT

## 2022-02-23 RX ORDER — ACETAMINOPHEN 325 MG/1
650 TABLET ORAL ONCE
Status: COMPLETED | OUTPATIENT
Start: 2022-02-23 | End: 2022-02-23

## 2022-02-23 RX ORDER — ONDANSETRON 4 MG/1
4 TABLET, ORALLY DISINTEGRATING ORAL EVERY 8 HOURS PRN
Status: DISCONTINUED | OUTPATIENT
Start: 2022-02-23 | End: 2022-02-25 | Stop reason: HOSPADM

## 2022-02-23 RX ORDER — TRAMADOL HYDROCHLORIDE 50 MG/1
50 TABLET ORAL EVERY 6 HOURS PRN
COMMUNITY
End: 2022-09-08 | Stop reason: ALTCHOICE

## 2022-02-23 RX ORDER — SODIUM CHLORIDE 9 MG/ML
25 INJECTION, SOLUTION INTRAVENOUS PRN
Status: DISCONTINUED | OUTPATIENT
Start: 2022-02-23 | End: 2022-02-25 | Stop reason: HOSPADM

## 2022-02-23 RX ORDER — ONDANSETRON 2 MG/ML
4 INJECTION INTRAMUSCULAR; INTRAVENOUS EVERY 6 HOURS PRN
Status: DISCONTINUED | OUTPATIENT
Start: 2022-02-23 | End: 2022-02-25 | Stop reason: HOSPADM

## 2022-02-23 RX ORDER — ROSUVASTATIN CALCIUM 20 MG/1
40 TABLET, COATED ORAL NIGHTLY
Status: DISCONTINUED | OUTPATIENT
Start: 2022-02-23 | End: 2022-02-25 | Stop reason: HOSPADM

## 2022-02-23 RX ORDER — INSULIN LISPRO 100 [IU]/ML
0-6 INJECTION, SOLUTION INTRAVENOUS; SUBCUTANEOUS
Status: DISCONTINUED | OUTPATIENT
Start: 2022-02-23 | End: 2022-02-25 | Stop reason: HOSPADM

## 2022-02-23 RX ORDER — DEXTROSE MONOHYDRATE 50 MG/ML
100 INJECTION, SOLUTION INTRAVENOUS PRN
Status: DISCONTINUED | OUTPATIENT
Start: 2022-02-23 | End: 2022-02-25 | Stop reason: HOSPADM

## 2022-02-23 RX ORDER — NICOTINE POLACRILEX 4 MG
15 LOZENGE BUCCAL PRN
Status: DISCONTINUED | OUTPATIENT
Start: 2022-02-23 | End: 2022-02-25 | Stop reason: HOSPADM

## 2022-02-23 RX ORDER — 0.9 % SODIUM CHLORIDE 0.9 %
500 INTRAVENOUS SOLUTION INTRAVENOUS ONCE
Status: COMPLETED | OUTPATIENT
Start: 2022-02-23 | End: 2022-02-23

## 2022-02-23 RX ORDER — ACETAMINOPHEN 650 MG/1
650 SUPPOSITORY RECTAL EVERY 6 HOURS PRN
Status: DISCONTINUED | OUTPATIENT
Start: 2022-02-23 | End: 2022-02-25 | Stop reason: HOSPADM

## 2022-02-23 RX ORDER — OXYCODONE HYDROCHLORIDE AND ACETAMINOPHEN 5; 325 MG/1; MG/1
2 TABLET ORAL EVERY 6 HOURS PRN
Status: DISCONTINUED | OUTPATIENT
Start: 2022-02-23 | End: 2022-02-24

## 2022-02-23 RX ORDER — ACETAMINOPHEN 325 MG/1
650 TABLET ORAL EVERY 6 HOURS PRN
Status: DISCONTINUED | OUTPATIENT
Start: 2022-02-23 | End: 2022-02-25 | Stop reason: HOSPADM

## 2022-02-23 RX ORDER — DEXTROSE MONOHYDRATE 25 G/50ML
12.5 INJECTION, SOLUTION INTRAVENOUS PRN
Status: DISCONTINUED | OUTPATIENT
Start: 2022-02-23 | End: 2022-02-23

## 2022-02-23 RX ORDER — SODIUM CHLORIDE 0.9 % (FLUSH) 0.9 %
5-40 SYRINGE (ML) INJECTION EVERY 12 HOURS SCHEDULED
Status: DISCONTINUED | OUTPATIENT
Start: 2022-02-23 | End: 2022-02-25 | Stop reason: HOSPADM

## 2022-02-23 RX ORDER — LIDOCAINE 50 MG/G
1 PATCH TOPICAL DAILY PRN
COMMUNITY

## 2022-02-23 RX ORDER — INSULIN LISPRO 100 [IU]/ML
0-3 INJECTION, SOLUTION INTRAVENOUS; SUBCUTANEOUS NIGHTLY
Status: DISCONTINUED | OUTPATIENT
Start: 2022-02-23 | End: 2022-02-25 | Stop reason: HOSPADM

## 2022-02-23 RX ORDER — POLYETHYLENE GLYCOL 3350 17 G/17G
17 POWDER, FOR SOLUTION ORAL DAILY PRN
Status: DISCONTINUED | OUTPATIENT
Start: 2022-02-23 | End: 2022-02-25 | Stop reason: HOSPADM

## 2022-02-23 RX ORDER — HEPARIN SODIUM 5000 [USP'U]/ML
5000 INJECTION, SOLUTION INTRAVENOUS; SUBCUTANEOUS EVERY 8 HOURS SCHEDULED
Status: DISCONTINUED | OUTPATIENT
Start: 2022-02-23 | End: 2022-02-25 | Stop reason: HOSPADM

## 2022-02-23 RX ORDER — AMLODIPINE BESYLATE 10 MG/1
10 TABLET ORAL DAILY
Status: DISCONTINUED | OUTPATIENT
Start: 2022-02-23 | End: 2022-02-25 | Stop reason: HOSPADM

## 2022-02-23 RX ORDER — SODIUM CHLORIDE 9 MG/ML
INJECTION, SOLUTION INTRAVENOUS CONTINUOUS
Status: DISCONTINUED | OUTPATIENT
Start: 2022-02-23 | End: 2022-02-25

## 2022-02-23 RX ORDER — SODIUM CHLORIDE 0.9 % (FLUSH) 0.9 %
5-40 SYRINGE (ML) INJECTION PRN
Status: DISCONTINUED | OUTPATIENT
Start: 2022-02-23 | End: 2022-02-25 | Stop reason: HOSPADM

## 2022-02-23 RX ORDER — OXYCODONE HYDROCHLORIDE AND ACETAMINOPHEN 5; 325 MG/1; MG/1
1 TABLET ORAL EVERY 6 HOURS PRN
Status: DISCONTINUED | OUTPATIENT
Start: 2022-02-23 | End: 2022-02-24

## 2022-02-23 RX ADMIN — CEFTRIAXONE 1000 MG: 1 INJECTION, POWDER, FOR SOLUTION INTRAMUSCULAR; INTRAVENOUS at 14:09

## 2022-02-23 RX ADMIN — OXYCODONE HYDROCHLORIDE AND ACETAMINOPHEN 1 TABLET: 5; 325 TABLET ORAL at 22:50

## 2022-02-23 RX ADMIN — HEPARIN SODIUM 5000 UNITS: 5000 INJECTION INTRAVENOUS; SUBCUTANEOUS at 17:25

## 2022-02-23 RX ADMIN — IOPAMIDOL 80 ML: 755 INJECTION, SOLUTION INTRAVENOUS at 12:15

## 2022-02-23 RX ADMIN — ACETAMINOPHEN 650 MG: 325 TABLET ORAL at 10:07

## 2022-02-23 RX ADMIN — HEPARIN SODIUM 5000 UNITS: 5000 INJECTION INTRAVENOUS; SUBCUTANEOUS at 22:50

## 2022-02-23 RX ADMIN — SODIUM CHLORIDE 500 ML: 9 INJECTION, SOLUTION INTRAVENOUS at 14:31

## 2022-02-23 RX ADMIN — ROSUVASTATIN CALCIUM 40 MG: 20 TABLET, COATED ORAL at 20:25

## 2022-02-23 RX ADMIN — OXYCODONE HYDROCHLORIDE AND ACETAMINOPHEN 1 TABLET: 5; 325 TABLET ORAL at 16:55

## 2022-02-23 RX ADMIN — SODIUM CHLORIDE: 9 INJECTION, SOLUTION INTRAVENOUS at 18:33

## 2022-02-23 RX ADMIN — MEROPENEM 1000 MG: 1 INJECTION, POWDER, FOR SOLUTION INTRAVENOUS at 18:59

## 2022-02-23 ASSESSMENT — PAIN - FUNCTIONAL ASSESSMENT: PAIN_FUNCTIONAL_ASSESSMENT: ACTIVITIES ARE NOT PREVENTED

## 2022-02-23 ASSESSMENT — PAIN SCALES - GENERAL
PAINLEVEL_OUTOF10: 6
PAINLEVEL_OUTOF10: 0

## 2022-02-23 ASSESSMENT — PAIN DESCRIPTION - ONSET: ONSET: GRADUAL

## 2022-02-23 ASSESSMENT — PAIN DESCRIPTION - LOCATION: LOCATION: ABDOMEN

## 2022-02-23 ASSESSMENT — ENCOUNTER SYMPTOMS
SHORTNESS OF BREATH: 0
COUGH: 0
CHEST TIGHTNESS: 0
CONSTIPATION: 0
DIARRHEA: 0
VOMITING: 0
ABDOMINAL PAIN: 1
NAUSEA: 0
BACK PAIN: 0
EYES NEGATIVE: 1
WHEEZING: 0

## 2022-02-23 ASSESSMENT — PAIN DESCRIPTION - PAIN TYPE: TYPE: ACUTE PAIN

## 2022-02-23 ASSESSMENT — PAIN DESCRIPTION - FREQUENCY: FREQUENCY: INTERMITTENT

## 2022-02-23 ASSESSMENT — PAIN DESCRIPTION - PROGRESSION: CLINICAL_PROGRESSION: GRADUALLY WORSENING

## 2022-02-23 ASSESSMENT — PAIN DESCRIPTION - DESCRIPTORS: DESCRIPTORS: DISCOMFORT

## 2022-02-23 NOTE — H&P
Internal Medicine  PGY-1  History & Physical      CC left flank pain     History Obtained From:  Patient, EMR     HISTORY OF PRESENT ILLNESS:    Patient is a 79 yo female with a PMHx of T2DM, HTN, GERD, HLD, OA, who presents with left sided flank pain. Her pain began yesterday night on the left side of her abdomen and radiates to her left flank. She used trazodone last night to help her sleep through the pain. She describes the pain as sharp, constant pain. She endorses the pain in her abdomen and left flank, but denies any other symptoms. Upon arrival to the ED, vitals are stable on admission, but did have elevated temperature at home of over 100. BMP and CBC were unremarkable. UA indicated positive nitrites and leukocytes with elevated WBC 21-50 and 4+ bacteria. CXR unchanged from prior. CT abdomen/pelvis indicated moderate to severe left sided hydronephrosis and hydroureter causing by 5mm obstructing calculus at left UVJ and left sided nephrolithiasis. She was given a dose of rocephin in the ED and they discussed with urology, who plan on brining patient in tomorrow for procedure to break up stone.        Past Medical History:        Diagnosis Date    Diabetes mellitus ([address]     Essential hypertension, benign     GERD (gastroesophageal reflux disease)     Hyperlipidemia     Interstitial lung disease (HCC)     Osteoarthrosis, unspecified whether generalized or localized, unspecified site     osteoarthritis lower leg    Osteopenia     Shingles    ·     Past Surgical History:        Procedure Laterality Date    ANKLE SURGERY Right 04/01/2013    torn tendon    BRONCHOSCOPY N/A 8/1/2019    BRONCHOSCOPY WITH BAL AND TRANSBRONCHIAL BIOPSIES performed by Shelley Cooper MD at [address] [facility]  10/11/2010    Dr. Shelbi Farmer , polyps and diverticulosis    COLONOSCOPY  11/11/2002    Dr. Costa Hart, Diverticulosis    COLONOSCOPY  04/20/2015    Dr. Shelbi Farmer- diverticulosis, sessile polyp, 5 years     COLONOSCOPY  4/13/16    Dr Temi Salcedo; Diverticulosis    COLONOSCOPY  11/28/2016    Dr Dwight Burnett,     INTRACAPSULAR CATARACT EXTRACTION Right 7/15/2020    PHACOEMULSIFICATION OF CATARACT RIGHT EYE WITH INTRAOCULAR LENS IMPLANT performed by Ruben Murillo MD at [facility] Bilateral 9/1/2020    BILATERAL LUMBAR THREE, LUMBAR FOUR, LUMBAR FIVE RADIOFREQUENCY ABLATION SITE CONFIRMED BY FLUOROSCOPY performed by Arjun Zayas MD at [address] [facility] Bilateral 6/23/2020    BILATERAL LUMBAR FOUR TRANSFORAMINAL EPIDURAL STEROID INJECTION SITE CONFIRMED BY FLUOROSCOPY performed by Arjun Zayas MD at [address] [facility] Bilateral 7/7/2020    BILATERAL LUMBAR FOUR TRANSFORAMINAL EPIDURAL STEROID INJECTION SITE CONFIRMED BY FLUOROSCOPY performed by Arjun Zayas MD at [address] [facility] Bilateral 8/4/2020    BILATERAL LUMBAR THREE, LUMBAR FOUR, LUMBAR FIVE DORSAL RAMUS MEDIAL BRANCH BLOCK SITE CONFIRMED BY FLUOROSCOPY performed by Arjun Zayas MD at [address] [facility] Bilateral 8/18/2020    BILATERAL LUMBAR THREE, LUMBAR FOUR, LUMBAR FIVE DORSAL RAMUS MEDIAL BRANCH BLOCK SITE CONFIR,ED BY FLUOROSCOPY performed by Arjun Zayas MD at [address] Right     UPPER GASTROINTESTINAL ENDOSCOPY  8/17/2011    Dr. Lalo Krause - moderate gastritis , hiatal hernia     UPPER GASTROINTESTINAL ENDOSCOPY  4/20/15    Dr. Lalo Krause - polyps removed, diverticulosis, follow up in 5 years   [address]  8/16/2012    DR. Ramos - with mesh   ·     Medications Priorto Admission:    · Not in a hospital admission. Allergies:  Pravastatin and Percocet [oxycodone-acetaminophen]    Social History:   · TOBACCO:   reports that she has never smoked.  She has never used smokeless tobacco.  · ETOH:   reports current alcohol use of about 1.0 standard drink of alcohol per week. · DRUGS : denies   · Patient currently lives at home, ambulates with walker at night and cane while outside. Otherwise, does not require assistance with ambulation. ·   Family History:       Problem Relation Age of Onset    Heart Disease Mother     Rheum Arthritis Mother     Heart Disease Father    ·     Review of Systems   Constitutional: Positive for fever. Negative for chills. Respiratory: Negative for cough and shortness of breath. Cardiovascular: Negative for chest pain and palpitations. Gastrointestinal: Positive for abdominal pain. Negative for diarrhea, nausea and vomiting. Genitourinary: Negative for difficulty urinating, dysuria and hematuria. Skin: Negative for rash and wound. Neurological: Negative for dizziness and light-headedness. ROS: A 10 point review of systems was conducted, significant findings as noted in HPI. Physical Exam  Constitutional:       Appearance: Normal appearance. She is normal weight. HENT:      Head: Normocephalic and atraumatic. Right Ear: External ear normal.      Left Ear: External ear normal.      Nose: Nose normal.      Mouth/Throat:      Mouth: Mucous membranes are dry. Pharynx: Oropharynx is clear. Eyes:      General: No scleral icterus. Extraocular Movements: Extraocular movements intact. Pupils: Pupils are equal, round, and reactive to light. Neck:      Vascular: No carotid bruit. Cardiovascular:      Rate and Rhythm: Normal rate and regular rhythm. Heart sounds: Murmur (over mitral valve and left lower sternal border) heard. Pulmonary:      Effort: Pulmonary effort is normal.      Breath sounds: Rales (Over lower 1/4 of both lungs) present. Abdominal:      General: Abdomen is flat. Bowel sounds are normal. There is no distension. Palpations: Abdomen is soft. Tenderness: There is left CVA tenderness. Hernia: No hernia is present.       Comments: Left-sided tenderness in mid abdomen with guarding    Musculoskeletal:         General: No swelling. Cervical back: Normal range of motion. Right lower leg: No edema. Left lower leg: No edema. Lymphadenopathy:      Cervical: No cervical adenopathy. Skin:     General: Skin is warm and dry. Findings: No erythema or rash. Neurological:      General: No focal deficit present. Mental Status: She is alert and oriented to person, place, and time. Psychiatric:         Mood and Affect: Mood normal.         Behavior: Behavior normal.       Physical exam:       Vitals:    02/23/22 1258   BP:    Pulse: 68   Resp: 28   Temp:    SpO2: 97%       DATA:    Labs:  CBC:   Recent Labs     02/23/22  1004   WBC 8.4   HGB 12.2   HCT 35.7*          BMP:   Recent Labs     02/23/22  1004 02/23/22  1110     --    K 5.6* 4.6     --    CO2 23  --    BUN 20  --    CREATININE 1.0  --    GLUCOSE 214*  --      LFT's:   Recent Labs     02/23/22  1004   AST 39*   ALT 14   BILITOT 0.6   ALKPHOS 101     Troponin:   Recent Labs     02/23/22  1004   TROPONINI <0.01       U/A:  Recent Labs     02/23/22  1259   COLORU Yellow   PHUR 6.0   WBCUA 21-50*   RBCUA 5-10*   BACTERIA 4+*   CLARITYU Clear   SPECGRAV 1.015   LEUKOCYTESUR MODERATE*   UROBILINOGEN 0.2   BILIRUBINUR Negative   BLOODU TRACE-INTACT*   GLUCOSEU Negative       CT ABDOMEN PELVIS W IV CONTRAST Additional Contrast? None   Final Result      Moderate to severe left-sided hydronephrosis and hydroureter caused by a 5 mm obstructing calculus at the left UVJ. Mild intra and extrahepatic ductal dilatation, indeterminate. MRCP may be helpful for evaluation. Left-sided nephrolithiasis. Large hiatal hernia. Pulmonary fibrosis noted in the lung bases. Small to moderate pericardial effusion      XR CHEST (2 VW)   Final Result      Similar appearance of bilateral airspace disease.               ASSESSMENT AND PLAN:    Patient is a 79 yo female with a PMHx of T2DM, HTN, GERD, HLD, OA, who presents with left sided flank pain. Kidney stone   CT abdomen/pelvis indicated moderate to severe left sided hydronephrosis and hydroureter causing by 5mm obstructing calculus at left UVJ and left sided nephrolithiasis. -I/Os   -urine culture pending   -percocet q6h PRN   -urology cs-appreciate recs   -plan for procedure isaiah    -saline 50cc IVF     UTI  UA indicated positive nitrites and leukocytes with elevated WBC 21-50 and 4+ bacteria. Hx of ESBL and MDRO Klebsiella. -urine culture pending   -merrem 1g q12h      HLD  -continue home crestor     T2DM  -hypoglycemia protocol  -POCT glc checks   - LDSS    HTN  -resume home Norvasc       Will discuss with attending physician Dr. Brandan Olivier MD.     Code Status: Full code  FEN: Regular, NPO @ midnight   PPX: SCDs, SQH   DISPO: COREY Chaudhari,   2/23/2022,  3:16 PM    Addendum to Resident H& P/Progress note:  I have personally seen,examined and evaluated the patient.  I have reviewed the current history, physical findings, labs and assessment and plan and agree with note as documented by resident MD ( )  + History of interstitial lung disease  We will hold amlodipine for systolic blood pressure less than 130 mmHg  Start gentle hydration with IV fluids  Pain control will be provided      Brandan Olivier MD, Aliya Hammer

## 2022-02-23 NOTE — CARE COORDINATION
Patient is active with [facility] and she gets meals on wheels. If you have any questions, please contact Edith Schaefer at [address] at [phone]. Please call her with dc plan.     Thank you  Efe Ramirez  [phone]

## 2022-02-23 NOTE — ED PROVIDER NOTES
ED Attending Attestation Note     Date of evaluation: 2/23/2022    This patient was seen by the advance practice provider. I have seen and examined the patient, agree with the workup, evaluation, management and diagnosis. The care plan has been discussed. I have reviewed the ECG and concur with the EVGENY's interpretation. My assessment reveals patient comes in with several complaints. Complains of left lower quad abdominal pain for since last night. She had two bowel movements yesterday. Also planes of left-sided chest pain this morning. She says fevers or been up to 100 and home. Her sats were 85% on room air when I walk into the room however she says she wears oxygen at home when she feels like she needs it. She is tachypneic. Effort symmetrical. No peritoneal signs.      Ilana Marcos MD  02/23/22 [id number]

## 2022-02-23 NOTE — PROGRESS NOTES
.Pharmacy Note - Renal Dosing and Extended Infusion Beta-Lactam Adjustment    Meropenem ordered for treatment of UTI. Per [facility] Renal Dose Adjustment Policy and Extended Infusion Beta-Lactam Policy, Meropenem 1g L3S will be changed to 1000 mg q12h extended infusion with loading dose 1g. [facility] [place]   RENAL DOSE ADJUSTMENT MADE PER P/T PROTOCOL    PREVIOUS ORDER:  Meropenem 1g q8    Estimated Creatinine Clearance: 36 mL/min (based on SCr of 1 mg/dL). Recent Labs     02/23/22  1004   BUN 20   CREATININE 1.0        NEW RENALLY ADJUSTED ORDER:  Meropenem 1g q12 (extended infusion)    Delmy Rubio [facility]  2/23/2022 6:39 PM     Estimated Creatinine Clearance: Estimated Creatinine Clearance: 36 mL/min (based on SCr of 1 mg/dL). Dialysis Status, SADA, CKD: Age    Rationale for Adjustment: Agent is renally eliminated. Pharmacy will continue to monitor renal function, cultures and sensitivities (where available) and adjust dose as necessary. Please call with any questions.     Victoria Woo formerly [facility]

## 2022-02-23 NOTE — ED NOTES
Clinical Pharmacy Progress Note  Medication History     Admit Date: 2/23/2022    List of current medications the patient is taking is complete, and home medication list in Epic has been updated to reflect the changes noted below. Source(s) of information: Patient    Changes made to medication list:    Medications removed (no longer taking):  · None    Medications added:  · None    Medication doses / instructions adjusted:  · Lidocaine patch changed to PRN    Thanks!   Rolf Baig PharmD  Pharmacy Resident   Please call with questions [phone]  2/23/2022 3:05 PM

## 2022-02-23 NOTE — ED TRIAGE NOTES
Pt reports LLQ pain last night, 2 bowel movement yesterday, then L sided chest pain this am, reports fever up to 100 at home. Pt alert and oriented and breathing easily on ra.  No nausea or dizziness at this time

## 2022-02-23 NOTE — ED PROVIDER NOTES
[address] ENCOUNTER          PHYSICIAN ASSISTANT NOTE       Date of evaluation: 2/23/2022    Chief Complaint     Chest Pain (R sided near breast) and Fever (up to 100 at home )      History of Present Illness     Jacki Putnam is a 80 y.o. female who presents with left sided flank pain and suprapubic pain with a fever. Patient originally stated that she had right sided chest pain but after further discussion with the patient she reports that her pain is no longer present on the right side. Patient states that her pain is located at her left flank and radiates into her back, left lower quadrant and suprapubic region. Her flank pain woke her up in the middle of the night last night at 23:00. She had a fever at 8:00 of 100.2F. She rates her pain a 6/10 and reports that she has not taken anything for the pain or fever today. She states that she has had a fever, chills, weight loss, increasing SOB, and polyuria. She denies N/V, diaphoresis, headache, vision changes, hearing changes, dyspnea, dysuria, hematuria, diarrhea or constipation. Review of Systems     Review of Systems   Constitutional: Negative. Negative for chills and fatigue. HENT: Negative. Eyes: Negative. Respiratory: Negative for cough, chest tightness, shortness of breath and wheezing. Cardiovascular: Negative. Negative for chest pain, palpitations and leg swelling. Gastrointestinal: Positive for abdominal pain. Negative for constipation, diarrhea, nausea and vomiting. Endocrine: Negative. Genitourinary: Positive for flank pain. Negative for difficulty urinating, dysuria, frequency and hematuria. Musculoskeletal: Negative for back pain and neck pain. Skin: Negative. Neurological: Negative for dizziness, weakness, light-headedness and headaches. Psychiatric/Behavioral: Negative. All other systems reviewed and are negative.       Past Medical, Surgical, Family, and Social History     She has a past medical history of Diabetes mellitus ([facility] [place] [address] Essential hypertension, benign, GERD (gastroesophageal reflux disease), Hyperlipidemia, Interstitial lung disease ([facility] [address] Osteoarthrosis, unspecified whether generalized or localized, unspecified site, Osteopenia, and Shingles. She has a past surgical history that includes Colonoscopy (10/11/2010); Upper gastrointestinal endoscopy (8/17/2011); shoulder surgery (Right); Colonoscopy (11/11/2002); ventral hernia repair (8/16/2012); Ankle surgery (Right, 04/01/2013); Upper gastrointestinal endoscopy (4/20/15); Colonoscopy (04/20/2015); Colonoscopy (4/13/16); Colonoscopy (11/28/2016); bronchoscopy (N/A, 8/1/2019); Pain management procedure (Bilateral, 6/23/2020); Pain management procedure (Bilateral, 7/7/2020); Intracapsular cataract extraction (Right, 7/15/2020); Pain management procedure (Bilateral, 8/4/2020); Pain management procedure (Bilateral, 8/18/2020); and Nerve Surgery (Bilateral, 9/1/2020). Her family history includes Heart Disease in her father and mother; Rheum Arthritis in her mother. She reports that she has never smoked. She has never used smokeless tobacco. She reports current alcohol use of about 1.0 standard drink of alcohol per week. She reports that she does not use drugs. Medications     Previous Medications    AMLODIPINE (NORVASC) 10 MG TABLET    TAKE ONE TABLET BY MOUTH DAILY    CHOLECALCIFEROL (VITAMIN D3) 25 MCG (1000 UT) CAPS    TAKE ONE CAPSULE BY MOUTH DAILY    CYANOCOBALAMIN (B-12 PO)    Take by mouth daily     DICLOFENAC (VOLTAREN) 50 MG EC TABLET    Take 1 tablet by mouth 3 times daily as needed for Pain    ESCITALOPRAM (LEXAPRO) 20 MG TABLET    TAKE ONE TABLET BY MOUTH DAILY    LIDOCAINE (LIDODERM) 5 %    Place 1 patch onto the skin every 12 hours 12 hours on, 12 hours off.     PANTOPRAZOLE (PROTONIX) 40 MG TABLET    TAKE ONE TABLET BY MOUTH EVERY MORNING BEFORE BREAKFAST    PROBIOTIC PRODUCT (PROBIOTIC ACIDOPHILUS BIOBEADS) CAPS Take 1 capsule by mouth daily    ROSUVASTATIN (CRESTOR) 40 MG TABLET    TAKE ONE TABLET BY MOUTH DAILY    TRAMADOL (ULTRAM) 50 MG TABLET    Take 50 mg by mouth every 6 hours as needed for Pain. As needed    TRAZODONE (DESYREL) 100 MG TABLET    TAKE ONE TABLET BY MOUTH ONCE NIGHTLY AS NEEDED FOR SLEEP    VITAMIN E 1000 UNITS CAPSULE    Take 1,000 Units by mouth daily. Allergies     She is allergic to pravastatin and percocet [oxycodone-acetaminophen]. Physical Exam     INITIAL VITALS: BP: (!) 143/69, Temp: 98.3 °F (36.8 °C), Pulse: 80, Resp: 19, SpO2: 94 %  Physical Exam  Vitals and nursing note reviewed. Constitutional:       General: She is not in acute distress. Appearance: She is well-developed. HENT:      Head: Normocephalic and atraumatic. Right Ear: External ear normal.      Left Ear: External ear normal.      Nose: Nose normal.      Mouth/Throat:      Mouth: Mucous membranes are moist.   Eyes:      General:         Right eye: No discharge. Left eye: No discharge. Extraocular Movements: Extraocular movements intact. Conjunctiva/sclera: Conjunctivae normal.      Pupils: Pupils are equal, round, and reactive to light. Cardiovascular:      Rate and Rhythm: Normal rate and regular rhythm. Pulses: Normal pulses. Heart sounds: No murmur heard. Pulmonary:      Effort: Pulmonary effort is normal.      Comments: Faint crackles in the bases  Abdominal:      General: Bowel sounds are normal.      Tenderness: There is abdominal tenderness. There is no guarding or rebound. Comments: On examination she has reproducible tenderness to palpation in the left lower quadrant as well as the left flank. No rebound or guarding. No peritoneal signs. Musculoskeletal:         General: Normal range of motion. Cervical back: Normal range of motion and neck supple. Skin:     General: Skin is warm and dry.       Capillary Refill: Capillary refill takes less than 2 seconds. Neurological:      General: No focal deficit present. Mental Status: She is alert and oriented to person, place, and time. Sensory: No sensory deficit. Deep Tendon Reflexes: Reflexes normal.   Psychiatric:         Mood and Affect: Mood normal.         Behavior: Behavior normal.         Thought Content: Thought content normal.         Judgment: Judgment normal.         Diagnostic Results     EKG   Interpreted in conjunction with emergency department physician No att. providers found  Normal sinus rhythm with a rate of 81, normal intervals, . No ST elevation or depression. Nonspecific ST with no acute ischemic patterns    RADIOLOGY:  CT ABDOMEN PELVIS W IV CONTRAST Additional Contrast? None   Final Result      Moderate to severe left-sided hydronephrosis and hydroureter caused by a 5 mm obstructing calculus at the left UVJ. Mild intra and extrahepatic ductal dilatation, indeterminate. MRCP may be helpful for evaluation. Left-sided nephrolithiasis. Large hiatal hernia. Pulmonary fibrosis noted in the lung bases. Small to moderate pericardial effusion      XR CHEST (2 VW)   Final Result      Similar appearance of bilateral airspace disease.           LABS:   Results for orders placed or performed during the hospital encounter of 02/23/22   CBC with Auto Differential   Result Value Ref Range    WBC 8.4 4.0 - 11.0 K/uL    RBC 3.90 (L) 4.00 - 5.20 M/uL    Hemoglobin 12.2 12.0 - 16.0 g/dL    Hematocrit 35.7 (L) 36.0 - 48.0 %    MCV 91.4 80.0 - 100.0 fL    MCH 31.4 26.0 - 34.0 pg    MCHC 34.3 31.0 - 36.0 g/dL    RDW 13.3 12.4 - 15.4 %    Platelets 712 046 - 753 K/uL    MPV 8.6 5.0 - 10.5 fL    PLATELET SLIDE REVIEW Adequate     SLIDE REVIEW see below     Neutrophils % 87.9 %    Lymphocytes % 5.5 %    Monocytes % 5.8 %    Eosinophils % 0.7 %    Basophils % 0.1 %    Neutrophils Absolute 7.4 1.7 - 7.7 K/uL    Lymphocytes Absolute 0.5 (L) 1.0 - 5.1 K/uL    Monocytes Absolute 0.5 0.0 - 1.3 K/uL    Eosinophils Absolute 0.1 0.0 - 0.6 K/uL    Basophils Absolute 0.0 0.0 - 0.2 K/uL   Comprehensive Metabolic Panel w/ Reflex to MG   Result Value Ref Range    Sodium 137 136 - 145 mmol/L    Potassium reflex Magnesium 5.6 (H) 3.5 - 5.1 mmol/L    Chloride 105 99 - 110 mmol/L    CO2 23 21 - 32 mmol/L    Anion Gap 9 3 - 16    Glucose 214 (H) 70 - 99 mg/dL    BUN 20 7 - 20 mg/dL    CREATININE 1.0 0.6 - 1.2 mg/dL    GFR Non- 53 (A) >60    GFR African American >60 >60    Calcium 10.7 (H) 8.3 - 10.6 mg/dL    Total Protein 7.6 6.4 - 8.2 g/dL    Albumin 4.1 3.4 - 5.0 g/dL    Albumin/Globulin Ratio 1.2 1.1 - 2.2    Total Bilirubin 0.6 0.0 - 1.0 mg/dL    Alkaline Phosphatase 101 40 - 129 U/L    ALT 14 10 - 40 U/L    AST 39 (H) 15 - 37 U/L   Urinalysis   Result Value Ref Range    Color, UA Yellow Straw/Yellow    Clarity, UA Clear Clear    Glucose, Ur Negative Negative mg/dL    Bilirubin Urine Negative Negative    Ketones, Urine Negative Negative mg/dL    Specific Gravity, UA 1.015 1.005 - 1.030    Blood, Urine TRACE-INTACT (A) Negative    pH, UA 6.0 5.0 - 8.0    Protein, UA Negative Negative mg/dL    Urobilinogen, Urine 0.2 <2.0 E.U./dL    Nitrite, Urine POSITIVE (A) Negative    Leukocyte Esterase, Urine MODERATE (A) Negative    Microscopic Examination YES     Urine Type NotGiven    Troponin   Result Value Ref Range    Troponin <0.01 <0.01 ng/mL   Potassium   Result Value Ref Range    Potassium 4.6 3.5 - 5.1 mmol/L   Lipase   Result Value Ref Range    Lipase 35.0 13.0 - 60.0 U/L   Microscopic Urinalysis   Result Value Ref Range    WBC, UA 21-50 (A) 0 - 5 /HPF    RBC, UA 5-10 (A) 0 - 4 /HPF    Epithelial Cells, UA 2-5 0 - 5 /HPF    Bacteria, UA 4+ (A) None Seen /HPF   EKG 12 Lead   Result Value Ref Range    Ventricular Rate 81 BPM    Atrial Rate 81 BPM    P-R Interval 180 ms    QRS Duration 86 ms    Q-T Interval 376 ms    QTc Calculation (Bazett) 436 ms    P Axis 48 degrees    R Axis -26 degrees    T Axis 104 degrees    Diagnosis       EKG performed in ER and to be interpreted by ER physician. Confirmed by MD, ER ([id number]),  Fuad Liang ([id number]) on 2/23/2022 10:58:12 AM           RECENT VITALS:  BP: (!) 161/72, Temp: 98.3 °F (36.8 °C), Pulse: 68, Resp: 28, SpO2: 97 %     Procedures         ED Course     Nursing Notes, Past Medical Hx,Past Surgical Hx, Social Hx, Allergies, and Family Hx were reviewed. The patient was given the following medications:  Orders Placed This Encounter   Medications    acetaminophen (TYLENOL) tablet 650 mg    iopamidol (ISOVUE-370) 76 % injection 80 mL    cefTRIAXone (ROCEPHIN) 1000 mg IVPB in 50 mL D5W minibag     Order Specific Question:   Antimicrobial Indications     Answer: Other     Order Specific Question:   Other Abx Indication     Answer:   infected stone       CONSULTS:  IP CONSULT TO UROLOGY  IP CONSULT TO PRIMARY CARE PROVIDER    MARCOS Ferrari / MARITO / Humberto Shanda is a 80 y.o. female who presented to the emergency department with left-sided pain that started in her back and radiated to the left flank and abdominal region. She had an IV established with labs drawn. CBC is unremarkable. BMP showed a glucose of 214 with a potassium hemolyzed at 5.6. Repeat potassium was within normal limits at 4.6. Troponin was unremarkable at less than 0.01 urinalysis showed trace blood with positive nitrites and moderate leukocytes. Microscopic urinalysis shows evidence of infection with 21-50 white blood cells, 5-10 red blood cells. Chest x-ray was unremarkable. Given the patient's abdominal and flank pain a CT of the abdomen and pelvis with IV contrast was ordered. CT shows moderate to severe left-sided hydronephrosis and hydroureter caused by a 5 mm obstructing calculus at the left UVJ. Left-sided nephrolithiasis was noted as well. A urine culture was ordered as well as a gram of Rocephin IV.   I have a call out to both the urologist as well as the patient's primary care physician to discuss admitting the patient for further evaluation and treatment given her age, the size of the stone and her UTI. I spoke with Dr. Julia Stevenson of urology as well as the patient's PCP, Dr. Dominga Whitfield. The patient will be admitted for further evaluation and treatment. I then spoke with the AOD who will admit the patient along with Dr. Lolita Bassett. This patient was also evaluated by the attending physician. All care plans were discussed and agreed upon. Clinical Impression     1. Kidney stone    2. Urinary tract infection without hematuria, site unspecified    3. Hyperglycemia        Disposition     PATIENT REFERRED TO:  No follow-up provider specified.     DISCHARGE MEDICATIONS:  New Prescriptions    No medications on file       DISPOSITION Decision To Admit 02/23/2022 02:10:01 PM     Yogi Pascual  02/23/22 1606

## 2022-02-23 NOTE — ED NOTES
Pt placed on supplemental 02 for  RA sat being 87-88%.  Pt placed on 2l/min via NC to bring o2 sat up to 93%     Ismael Aguirre RN  02/23/22 2353

## 2022-02-24 ENCOUNTER — ANESTHESIA EVENT (OUTPATIENT)
Dept: OPERATING ROOM | Age: 82
DRG: 660 | End: 2022-02-24
Payer: MEDICARE

## 2022-02-24 ENCOUNTER — ANESTHESIA (OUTPATIENT)
Dept: OPERATING ROOM | Age: 82
DRG: 660 | End: 2022-02-24
Payer: MEDICARE

## 2022-02-24 VITALS — DIASTOLIC BLOOD PRESSURE: 58 MMHG | SYSTOLIC BLOOD PRESSURE: 106 MMHG | TEMPERATURE: 96.4 F | OXYGEN SATURATION: 94 %

## 2022-02-24 LAB
ANION GAP SERPL CALCULATED.3IONS-SCNC: 9 MMOL/L (ref 3–16)
BASOPHILS ABSOLUTE: 0 K/UL (ref 0–0.2)
BASOPHILS RELATIVE PERCENT: 0.8 %
BUN BLDV-MCNC: 16 MG/DL (ref 7–20)
CALCIUM SERPL-MCNC: 10.5 MG/DL (ref 8.3–10.6)
CHLORIDE BLD-SCNC: 107 MMOL/L (ref 99–110)
CO2: 24 MMOL/L (ref 21–32)
CREAT SERPL-MCNC: 1 MG/DL (ref 0.6–1.2)
EOSINOPHILS ABSOLUTE: 0.1 K/UL (ref 0–0.6)
EOSINOPHILS RELATIVE PERCENT: 2.9 %
GFR AFRICAN AMERICAN: >60
GFR NON-AFRICAN AMERICAN: 53
GLUCOSE BLD-MCNC: 116 MG/DL (ref 70–99)
GLUCOSE BLD-MCNC: 125 MG/DL (ref 70–99)
GLUCOSE BLD-MCNC: 130 MG/DL (ref 70–99)
GLUCOSE BLD-MCNC: 86 MG/DL (ref 70–99)
GLUCOSE BLD-MCNC: 98 MG/DL (ref 70–99)
HCT VFR BLD CALC: 32.4 % (ref 36–48)
HEMOGLOBIN: 10.9 G/DL (ref 12–16)
LYMPHOCYTES ABSOLUTE: 0.8 K/UL (ref 1–5.1)
LYMPHOCYTES RELATIVE PERCENT: 21 %
MAGNESIUM: 1.7 MG/DL (ref 1.8–2.4)
MCH RBC QN AUTO: 31.1 PG (ref 26–34)
MCHC RBC AUTO-ENTMCNC: 33.8 G/DL (ref 31–36)
MCV RBC AUTO: 92.1 FL (ref 80–100)
MONOCYTES ABSOLUTE: 0.4 K/UL (ref 0–1.3)
MONOCYTES RELATIVE PERCENT: 10.3 %
NEUTROPHILS ABSOLUTE: 2.4 K/UL (ref 1.7–7.7)
NEUTROPHILS RELATIVE PERCENT: 65 %
PDW BLD-RTO: 13.3 % (ref 12.4–15.4)
PERFORMED ON: ABNORMAL
PERFORMED ON: ABNORMAL
PERFORMED ON: NORMAL
PERFORMED ON: NORMAL
PLATELET # BLD: 141 K/UL (ref 135–450)
PMV BLD AUTO: 8.1 FL (ref 5–10.5)
POTASSIUM REFLEX MAGNESIUM: 4.4 MMOL/L (ref 3.5–5.1)
RBC # BLD: 3.51 M/UL (ref 4–5.2)
SODIUM BLD-SCNC: 140 MMOL/L (ref 136–145)
WBC # BLD: 3.6 K/UL (ref 4–11)

## 2022-02-24 PROCEDURE — 2580000003 HC RX 258: Performed by: ANESTHESIOLOGY

## 2022-02-24 PROCEDURE — 3700000000 HC ANESTHESIA ATTENDED CARE: Performed by: UROLOGY

## 2022-02-24 PROCEDURE — 80048 BASIC METABOLIC PNL TOTAL CA: CPT

## 2022-02-24 PROCEDURE — 6360000002 HC RX W HCPCS

## 2022-02-24 PROCEDURE — 0T778DZ DILATION OF LEFT URETER WITH INTRALUMINAL DEVICE, VIA NATURAL OR ARTIFICIAL OPENING ENDOSCOPIC: ICD-10-PCS | Performed by: HOSPITALIST

## 2022-02-24 PROCEDURE — 85025 COMPLETE CBC W/AUTO DIFF WBC: CPT

## 2022-02-24 PROCEDURE — 3600000004 HC SURGERY LEVEL 4 BASE: Performed by: UROLOGY

## 2022-02-24 PROCEDURE — 2500000003 HC RX 250 WO HCPCS: Performed by: ANESTHESIOLOGY

## 2022-02-24 PROCEDURE — C2617 STENT, NON-COR, TEM W/O DEL: HCPCS | Performed by: UROLOGY

## 2022-02-24 PROCEDURE — 7100000000 HC PACU RECOVERY - FIRST 15 MIN: Performed by: UROLOGY

## 2022-02-24 PROCEDURE — 83735 ASSAY OF MAGNESIUM: CPT

## 2022-02-24 PROCEDURE — 6360000002 HC RX W HCPCS: Performed by: STUDENT IN AN ORGANIZED HEALTH CARE EDUCATION/TRAINING PROGRAM

## 2022-02-24 PROCEDURE — 6370000000 HC RX 637 (ALT 250 FOR IP): Performed by: STUDENT IN AN ORGANIZED HEALTH CARE EDUCATION/TRAINING PROGRAM

## 2022-02-24 PROCEDURE — 2709999900 HC NON-CHARGEABLE SUPPLY: Performed by: UROLOGY

## 2022-02-24 PROCEDURE — C1758 CATHETER, URETERAL: HCPCS | Performed by: UROLOGY

## 2022-02-24 PROCEDURE — 2580000003 HC RX 258: Performed by: STUDENT IN AN ORGANIZED HEALTH CARE EDUCATION/TRAINING PROGRAM

## 2022-02-24 PROCEDURE — 7100000001 HC PACU RECOVERY - ADDTL 15 MIN: Performed by: UROLOGY

## 2022-02-24 PROCEDURE — C1769 GUIDE WIRE: HCPCS | Performed by: UROLOGY

## 2022-02-24 PROCEDURE — 6360000002 HC RX W HCPCS: Performed by: ANESTHESIOLOGY

## 2022-02-24 PROCEDURE — 36415 COLL VENOUS BLD VENIPUNCTURE: CPT

## 2022-02-24 PROCEDURE — 99232 SBSQ HOSP IP/OBS MODERATE 35: CPT | Performed by: HOSPITALIST

## 2022-02-24 PROCEDURE — 1200000000 HC SEMI PRIVATE

## 2022-02-24 PROCEDURE — 3600000014 HC SURGERY LEVEL 4 ADDTL 15MIN: Performed by: UROLOGY

## 2022-02-24 PROCEDURE — 3700000001 HC ADD 15 MINUTES (ANESTHESIA): Performed by: UROLOGY

## 2022-02-24 DEVICE — URETERAL STENT WITH SIDE HOLES 6FX24CM
Type: IMPLANTABLE DEVICE | Site: URETER | Status: FUNCTIONAL
Brand: TRIA™ SOFT

## 2022-02-24 RX ORDER — FENTANYL CITRATE 50 UG/ML
INJECTION, SOLUTION INTRAMUSCULAR; INTRAVENOUS PRN
Status: DISCONTINUED | OUTPATIENT
Start: 2022-02-24 | End: 2022-02-24 | Stop reason: SDUPTHER

## 2022-02-24 RX ORDER — MEPERIDINE HYDROCHLORIDE 25 MG/ML
12.5 INJECTION INTRAMUSCULAR; INTRAVENOUS; SUBCUTANEOUS EVERY 5 MIN PRN
Status: DISCONTINUED | OUTPATIENT
Start: 2022-02-24 | End: 2022-02-25 | Stop reason: HOSPADM

## 2022-02-24 RX ORDER — GLYCOPYRROLATE 1 MG/5 ML
SYRINGE (ML) INTRAVENOUS PRN
Status: DISCONTINUED | OUTPATIENT
Start: 2022-02-24 | End: 2022-02-24 | Stop reason: SDUPTHER

## 2022-02-24 RX ORDER — MORPHINE SULFATE 2 MG/ML
1 INJECTION, SOLUTION INTRAMUSCULAR; INTRAVENOUS EVERY 6 HOURS PRN
Status: DISCONTINUED | OUTPATIENT
Start: 2022-02-24 | End: 2022-02-25 | Stop reason: HOSPADM

## 2022-02-24 RX ORDER — MAGNESIUM SULFATE IN WATER 40 MG/ML
2000 INJECTION, SOLUTION INTRAVENOUS ONCE
Status: COMPLETED | OUTPATIENT
Start: 2022-02-24 | End: 2022-02-24

## 2022-02-24 RX ORDER — OXYCODONE HYDROCHLORIDE 5 MG/1
10 TABLET ORAL PRN
Status: ACTIVE | OUTPATIENT
Start: 2022-02-24 | End: 2022-02-24

## 2022-02-24 RX ORDER — SODIUM CHLORIDE 0.9 % (FLUSH) 0.9 %
5-40 SYRINGE (ML) INJECTION PRN
Status: DISCONTINUED | OUTPATIENT
Start: 2022-02-24 | End: 2022-02-25 | Stop reason: HOSPADM

## 2022-02-24 RX ORDER — METOCLOPRAMIDE HYDROCHLORIDE 5 MG/ML
10 INJECTION INTRAMUSCULAR; INTRAVENOUS
Status: ACTIVE | OUTPATIENT
Start: 2022-02-24 | End: 2022-02-24

## 2022-02-24 RX ORDER — MORPHINE SULFATE 2 MG/ML
2 INJECTION, SOLUTION INTRAMUSCULAR; INTRAVENOUS EVERY 6 HOURS PRN
Status: DISCONTINUED | OUTPATIENT
Start: 2022-02-24 | End: 2022-02-25 | Stop reason: HOSPADM

## 2022-02-24 RX ORDER — PROPOFOL 10 MG/ML
INJECTION, EMULSION INTRAVENOUS PRN
Status: DISCONTINUED | OUTPATIENT
Start: 2022-02-24 | End: 2022-02-24 | Stop reason: SDUPTHER

## 2022-02-24 RX ORDER — SODIUM CHLORIDE 0.9 % (FLUSH) 0.9 %
5-40 SYRINGE (ML) INJECTION EVERY 12 HOURS SCHEDULED
Status: DISCONTINUED | OUTPATIENT
Start: 2022-02-24 | End: 2022-02-25 | Stop reason: HOSPADM

## 2022-02-24 RX ORDER — SODIUM CHLORIDE 9 MG/ML
25 INJECTION, SOLUTION INTRAVENOUS PRN
Status: DISCONTINUED | OUTPATIENT
Start: 2022-02-24 | End: 2022-02-25 | Stop reason: HOSPADM

## 2022-02-24 RX ORDER — DIPHENHYDRAMINE HYDROCHLORIDE 50 MG/ML
12.5 INJECTION INTRAMUSCULAR; INTRAVENOUS
Status: ACTIVE | OUTPATIENT
Start: 2022-02-24 | End: 2022-02-24

## 2022-02-24 RX ORDER — OXYCODONE HYDROCHLORIDE 5 MG/1
5 TABLET ORAL PRN
Status: ACTIVE | OUTPATIENT
Start: 2022-02-24 | End: 2022-02-24

## 2022-02-24 RX ORDER — SODIUM CHLORIDE 9 MG/ML
INJECTION, SOLUTION INTRAVENOUS CONTINUOUS PRN
Status: DISCONTINUED | OUTPATIENT
Start: 2022-02-24 | End: 2022-02-24 | Stop reason: SDUPTHER

## 2022-02-24 RX ADMIN — Medication 0.2 MG: at 22:20

## 2022-02-24 RX ADMIN — AMLODIPINE BESYLATE 10 MG: 10 TABLET ORAL at 08:45

## 2022-02-24 RX ADMIN — MAGNESIUM SULFATE HEPTAHYDRATE 2000 MG: 2 INJECTION, SOLUTION INTRAVENOUS at 08:45

## 2022-02-24 RX ADMIN — FENTANYL CITRATE 25 MCG: 50 INJECTION, SOLUTION INTRAMUSCULAR; INTRAVENOUS at 22:34

## 2022-02-24 RX ADMIN — PROPOFOL 150 MG: 10 INJECTION, EMULSION INTRAVENOUS at 22:13

## 2022-02-24 RX ADMIN — FENTANYL CITRATE 25 MCG: 50 INJECTION, SOLUTION INTRAMUSCULAR; INTRAVENOUS at 22:48

## 2022-02-24 RX ADMIN — SODIUM CHLORIDE: 9 INJECTION, SOLUTION INTRAVENOUS at 22:03

## 2022-02-24 RX ADMIN — FENTANYL CITRATE 50 MCG: 50 INJECTION, SOLUTION INTRAMUSCULAR; INTRAVENOUS at 22:17

## 2022-02-24 RX ADMIN — SODIUM CHLORIDE: 9 INJECTION, SOLUTION INTRAVENOUS at 18:22

## 2022-02-24 RX ADMIN — MEROPENEM 1000 MG: 1 INJECTION, POWDER, FOR SOLUTION INTRAVENOUS at 18:36

## 2022-02-24 RX ADMIN — PROPOFOL 50 MG: 10 INJECTION, EMULSION INTRAVENOUS at 22:16

## 2022-02-24 RX ADMIN — MEROPENEM 1000 MG: 1 INJECTION, POWDER, FOR SOLUTION INTRAVENOUS at 08:38

## 2022-02-24 RX ADMIN — Medication 0.2 MG: at 22:22

## 2022-02-24 ASSESSMENT — PULMONARY FUNCTION TESTS
PIF_VALUE: 3
PIF_VALUE: 2
PIF_VALUE: 3
PIF_VALUE: 1
PIF_VALUE: 3
PIF_VALUE: 4
PIF_VALUE: 3
PIF_VALUE: 3
PIF_VALUE: 1
PIF_VALUE: 1
PIF_VALUE: 3
PIF_VALUE: 0
PIF_VALUE: 1
PIF_VALUE: 3
PIF_VALUE: 1
PIF_VALUE: 3
PIF_VALUE: 0
PIF_VALUE: 3
PIF_VALUE: 1
PIF_VALUE: 3
PIF_VALUE: 4
PIF_VALUE: 3
PIF_VALUE: 3
PIF_VALUE: 0
PIF_VALUE: 4
PIF_VALUE: 3
PIF_VALUE: 0
PIF_VALUE: 3
PIF_VALUE: 0
PIF_VALUE: 3
PIF_VALUE: 4
PIF_VALUE: 3
PIF_VALUE: 0
PIF_VALUE: 3
PIF_VALUE: 4
PIF_VALUE: 0
PIF_VALUE: 0
PIF_VALUE: 5
PIF_VALUE: 1
PIF_VALUE: 3
PIF_VALUE: 2
PIF_VALUE: 2

## 2022-02-24 ASSESSMENT — PAIN SCALES - GENERAL
PAINLEVEL_OUTOF10: 0

## 2022-02-24 ASSESSMENT — PAIN DESCRIPTION - LOCATION: LOCATION: ABDOMEN

## 2022-02-24 NOTE — PLAN OF CARE
Problem: Falls - Risk of:  Goal: Will remain free from falls  Description: Will remain free from falls  Outcome: Met This Shift  Note: Patient up with walker and standby assist.   Gait steady. Help given to push IV pole and O2. Bed locked in low position. Call light kept in reach. Patient calls out appropriately for assistance. Safety maintained. Problem: Pain:  Description: Pain management should include both nonpharmacologic and pharmacologic interventions. Goal: Control of acute pain  Description: Control of acute pain  Outcome: Met This Shift  Note: Patient has denied pain this shift. Will monitor.

## 2022-02-24 NOTE — CARE COORDINATION
Cm following pt from home alone has family support, plans to return home,   Urology plans OR today for cysto and stent with OP follow up. +UA on IV ABX .    Electronically signed by Robin Jensen RN on 2/24/2022 at 1:53 PM   [phone]

## 2022-02-24 NOTE — PROGRESS NOTES
Patient alert and oriented, vitals stable. Denies pain this shift. IV right forearm infusing NS at 50 ml/hour, maintained. Intermittent Meropenem infused as ordered. NPO for procedure. Consent signed. Patient with O2 per NC at 2 liters to maintained O2 sats > 92%. Lungs sounds diminished. Will monitor.

## 2022-02-24 NOTE — CONSULTS
Urology Attending Consult Note      Reason for Consultation: 5mm L UVJ stone with UTI    History: 79 yo F with no prior kidney stone history who presented to [facility] yesterday with 2 day history of LLQ pain and fever. CT scan revealed a 5mm L UVJ stone with severe hydronephrosis. UA positive, culture pending. WBC WNL. On Merrem. Family History, Social History, Review of Systems:  Reviewed and agreed to as per chart    Vitals:  BP (!) 140/81   Pulse 60   Temp 98.4 °F (36.9 °C) (Oral)   Resp 18   Ht 5' 2\" (1.575 m)   Wt 120 lb (54.4 kg)   SpO2 96%   BMI 21.95 kg/m²   Temp  Av.2 °F (36.8 °C)  Min: 97.8 °F (36.6 °C)  Max: 98.5 °F (36.9 °C)    Intake/Output Summary (Last 24 hours) at 2022 0848  Last data filed at 2022 0759  Gross per 24 hour   Intake --   Output 100 ml   Net -100 ml         Physical:   Well developed, well nourished in no acute distress   Mood indicates no abnormalities. Pt doesnt appear depressed   Orientated to time and place   Neck is supple, trachea is midline   Respiratory effort is normal   Cardiovascular show no extremity swelling   Abdomen no masses or hernias are palpated, there is no tenderness. Liver and Spleen appear normal.   Skin show no abnormal lesions   Lymph nodes are not palpated in the inguinal, neck, or axillary area.     Female :    Voiding clear urine      Labs:  WBC:    Lab Results   Component Value Date    WBC 8.4 2022     Hemoglobin/Hematocrit:    Lab Results   Component Value Date    HGB 12.2 2022    HCT 35.7 2022     BMP:    Lab Results   Component Value Date     2022    K 4.4 2022     2022    CO2 24 2022    BUN 16 2022    LABALBU 4.1 2022    CREATININE 1.0 2022    CALCIUM 10.5 2022    GFRAA >60 2022    GFRAA >60 2013    LABGLOM 53 2022     PT/INR:  No results found for: PROTIME, INR  PTT:  No results found for: APTT[APTT    Urinalysis: Positive    Antibiotic Therapy:  Merrem    Imaging:   Impression       Moderate to severe left-sided hydronephrosis and hydroureter caused by a 5 mm obstructing calculus at the left UVJ.       Mild intra and extrahepatic ductal dilatation, indeterminate. MRCP may be helpful for evaluation.       Left-sided nephrolithiasis.       Large hiatal hernia.       Pulmonary fibrosis noted in the lung bases.       Small to moderate pericardial effusion     Impression/Plan:  79 yo F admitted with L UVJ stone and UTI. -Pain still persisting this AM  -UA positive, culture pending. Continue IV abx  -Will add on today for cystoscopy, L stent insertion.  Keep NPO, obtain consent  -Call with any questions     NATALIE Guerrero

## 2022-02-24 NOTE — PROGRESS NOTES
Progress Note    Admit Date: 2/23/2022  Day: 1  Diet: Diet NPO    CC: left flank pain     Interval history:  No acute events overnight. Patient seen and examined at been side. Patient reports abdominal had Improved. Patient denies shortness of breath, chest pain, chills, nausea, vomiting She denies urinary frequency, dysuria. Patient is hemodynamically stable and afebrile She remains on Heparin sc     HPI:  Patient is a 79 yo female with a PMHx of T2DM, HTN, GERD, HLD, OA, who presents with left sided flank pain. Her pain began yesterday night on the left side of her abdomen and radiates to her left flank. She used trazodone last night to help her sleep through the pain. She describes the pain as sharp, constant pain. She endorses the pain in her abdomen and left flank, but denies any other symptoms. Upon arrival to the ED, vitals are stable on admission, but did have elevated temperature at home of over 100. BMP and CBC were unremarkable. UA indicated positive nitrites and leukocytes with elevated WBC 21-50 and 4+ bacteria. CXR unchanged from prior. CT abdomen/pelvis indicated moderate to severe left sided hydronephrosis and hydroureter causing by 5mm obstructing calculus at left UVJ and left sided nephrolithiasis. She was given a dose of rocephin in the ED and they discussed with urology, who plan on brining patient in tomorrow for procedure to break up stone.         Medications:     Scheduled Meds:   magnesium sulfate  2,000 mg IntraVENous Once    sodium chloride flush  5-40 mL IntraVENous 2 times per day    heparin (porcine)  5,000 Units SubCUTAneous 3 times per day    amLODIPine  10 mg Oral Daily    rosuvastatin  40 mg Oral Nightly    insulin lispro  0-6 Units SubCUTAneous TID     insulin lispro  0-3 Units SubCUTAneous Nightly    meropenem  1,000 mg IntraVENous Q12H     Continuous Infusions:   sodium chloride      dextrose      sodium chloride 50 mL/hr at 02/23/22 1833     PRN Meds:sodium Recent Labs     02/23/22  1004   WBC 8.4   HGB 12.2   HCT 35.7*      MCV 91.4     Renal:    Recent Labs     02/23/22  1004 02/23/22  1110 02/24/22  0532     --  140   K 5.6* 4.6 4.4     --  107   CO2 23  --  24   BUN 20  --  16   CREATININE 1.0  --  1.0   GLUCOSE 214*  --  116*   CALCIUM 10.7*  --  10.5   MG  --   --  1.70*   ANIONGAP 9  --  9     Hepatic:   Recent Labs     02/23/22  1004   AST 39*   ALT 14   BILITOT 0.6   PROT 7.6   LABALBU 4.1   ALKPHOS 101     Troponin:   Recent Labs     02/23/22  1004   TROPONINI <0.01     -----------------------------------------------------------------  RAD:   CT ABDOMEN PELVIS W IV CONTRAST Additional Contrast? None   Final Result      Moderate to severe left-sided hydronephrosis and hydroureter caused by a 5 mm obstructing calculus at the left UVJ. Mild intra and extrahepatic ductal dilatation, indeterminate. MRCP may be helpful for evaluation. Left-sided nephrolithiasis. Large hiatal hernia. Pulmonary fibrosis noted in the lung bases. Small to moderate pericardial effusion      XR CHEST (2 VW)   Final Result      Similar appearance of bilateral airspace disease. Assessment/Plan:   Patient is a 81 yo female with a PMHx of T2DM, HTN, GERD, HLD, OA, who presents with left  flank pain. Left sided hydronephrosis secondary to obstructing left UVJ  stone   CT abdomen/pelvis indicated moderate to severe left sided hydronephrosis and hydroureter causing by 5mm obstructing calculus at left UVJ and left sided nephrolithiasis. -I/Os   -urine culture pending   -percocet q6h PRN   -urology cs-appreciate recs   -plan for procedure today    -NS @50 mL/hr  -Since patient has UTI and will underwent urologic procedure, will monitor the patient today after procedure      UTI  UA indicated positive nitrites and leukocytes with elevated WBC 21-50 and 4+ bacteria. Hx of ESBL and MDRO Klebsiella.     -urine culture pending   -merrem 1g q12h      HLD  -continue home crestor      T2DM  -hypoglycemia protocol  -POCT glc checks   - LDSS     HTN  -Home amlodipine, hold if SBP <130        Code Status: Full Code  FEN: NPO  PPX: Heparin sc  DISPO: GMF    Sylvia Pelaez MD, PGY-1  02/24/22  6:45 AM    This patient has been staffed and discussed with Marquis Cedric MD.     Addendum to Resident H& P/Progress note:  I have personally seen,examined and evaluated the patient.  I have reviewed the current history, physical findings, labs and assessment and plan and agree with note as documented by resident MD ( )    Component 2/23/22 1413   Organism Escherichia coli Abnormal  P    Urine Culture, Routine >100,000 CFU/ml   Sensitivity to follow         We will continue current management  We will need to have cystoscopy with left ureteral stent insertion    Marquis Cedric MD, [address]

## 2022-02-24 NOTE — PROGRESS NOTES
Patient arrived from ED, alert and oriented, vitals stable. Denies pain. IV RFA, NS started at 50 ml/hr. Patient withO@ on at 2 liters to maintain O2 sats > 92%. Oriented to room, call light, tv, bed, menu. Reviewed safety and plan of care. Bed alarm on, call light in reach.

## 2022-02-25 ENCOUNTER — APPOINTMENT (OUTPATIENT)
Dept: GENERAL RADIOLOGY | Age: 82
DRG: 660 | End: 2022-02-25
Payer: MEDICARE

## 2022-02-25 VITALS
OXYGEN SATURATION: 97 % | HEIGHT: 62 IN | TEMPERATURE: 97.8 F | SYSTOLIC BLOOD PRESSURE: 134 MMHG | RESPIRATION RATE: 18 BRPM | WEIGHT: 120 LBS | DIASTOLIC BLOOD PRESSURE: 69 MMHG | HEART RATE: 65 BPM | BODY MASS INDEX: 22.08 KG/M2

## 2022-02-25 LAB
ANION GAP SERPL CALCULATED.3IONS-SCNC: 10 MMOL/L (ref 3–16)
BASOPHILS ABSOLUTE: 0 K/UL (ref 0–0.2)
BASOPHILS RELATIVE PERCENT: 0.8 %
BUN BLDV-MCNC: 14 MG/DL (ref 7–20)
CALCIUM SERPL-MCNC: 10.2 MG/DL (ref 8.3–10.6)
CHLORIDE BLD-SCNC: 108 MMOL/L (ref 99–110)
CO2: 18 MMOL/L (ref 21–32)
CREAT SERPL-MCNC: 0.8 MG/DL (ref 0.6–1.2)
EOSINOPHILS ABSOLUTE: 0.3 K/UL (ref 0–0.6)
EOSINOPHILS RELATIVE PERCENT: 4.8 %
GFR AFRICAN AMERICAN: >60
GFR NON-AFRICAN AMERICAN: >60
GLUCOSE BLD-MCNC: 137 MG/DL (ref 70–99)
GLUCOSE BLD-MCNC: 87 MG/DL (ref 70–99)
GLUCOSE BLD-MCNC: 89 MG/DL (ref 70–99)
GLUCOSE BLD-MCNC: 99 MG/DL (ref 70–99)
HCT VFR BLD CALC: 31.9 % (ref 36–48)
HEMOGLOBIN: 10.7 G/DL (ref 12–16)
LYMPHOCYTES ABSOLUTE: 1.1 K/UL (ref 1–5.1)
LYMPHOCYTES RELATIVE PERCENT: 18.4 %
MAGNESIUM: 1.7 MG/DL (ref 1.8–2.4)
MCH RBC QN AUTO: 31.8 PG (ref 26–34)
MCHC RBC AUTO-ENTMCNC: 33.6 G/DL (ref 31–36)
MCV RBC AUTO: 94.6 FL (ref 80–100)
MONOCYTES ABSOLUTE: 0.6 K/UL (ref 0–1.3)
MONOCYTES RELATIVE PERCENT: 10.1 %
NEUTROPHILS ABSOLUTE: 3.8 K/UL (ref 1.7–7.7)
NEUTROPHILS RELATIVE PERCENT: 65.9 %
ORGANISM: ABNORMAL
PDW BLD-RTO: 13.3 % (ref 12.4–15.4)
PERFORMED ON: ABNORMAL
PERFORMED ON: NORMAL
PERFORMED ON: NORMAL
PLATELET # BLD: 141 K/UL (ref 135–450)
PMV BLD AUTO: 7.7 FL (ref 5–10.5)
POTASSIUM REFLEX MAGNESIUM: 4.1 MMOL/L (ref 3.5–5.1)
RBC # BLD: 3.37 M/UL (ref 4–5.2)
SODIUM BLD-SCNC: 136 MMOL/L (ref 136–145)
URINE CULTURE, ROUTINE: ABNORMAL
URINE CULTURE, ROUTINE: ABNORMAL
WBC # BLD: 5.8 K/UL (ref 4–11)

## 2022-02-25 PROCEDURE — 6360000002 HC RX W HCPCS

## 2022-02-25 PROCEDURE — 6360000002 HC RX W HCPCS: Performed by: UROLOGY

## 2022-02-25 PROCEDURE — 85025 COMPLETE CBC W/AUTO DIFF WBC: CPT

## 2022-02-25 PROCEDURE — 80048 BASIC METABOLIC PNL TOTAL CA: CPT

## 2022-02-25 PROCEDURE — 36415 COLL VENOUS BLD VENIPUNCTURE: CPT

## 2022-02-25 PROCEDURE — 2580000003 HC RX 258: Performed by: UROLOGY

## 2022-02-25 PROCEDURE — 97116 GAIT TRAINING THERAPY: CPT

## 2022-02-25 PROCEDURE — 6370000000 HC RX 637 (ALT 250 FOR IP): Performed by: UROLOGY

## 2022-02-25 PROCEDURE — 99239 HOSP IP/OBS DSCHRG MGMT >30: CPT | Performed by: HOSPITALIST

## 2022-02-25 PROCEDURE — 71045 X-RAY EXAM CHEST 1 VIEW: CPT

## 2022-02-25 PROCEDURE — 2700000000 HC OXYGEN THERAPY PER DAY

## 2022-02-25 PROCEDURE — 97162 PT EVAL MOD COMPLEX 30 MIN: CPT

## 2022-02-25 PROCEDURE — 97166 OT EVAL MOD COMPLEX 45 MIN: CPT

## 2022-02-25 PROCEDURE — 97530 THERAPEUTIC ACTIVITIES: CPT

## 2022-02-25 PROCEDURE — 97535 SELF CARE MNGMENT TRAINING: CPT

## 2022-02-25 PROCEDURE — 83735 ASSAY OF MAGNESIUM: CPT

## 2022-02-25 PROCEDURE — 94618 PULMONARY STRESS TESTING: CPT

## 2022-02-25 PROCEDURE — 94761 N-INVAS EAR/PLS OXIMETRY MLT: CPT

## 2022-02-25 RX ORDER — CIPROFLOXACIN 500 MG/1
500 TABLET, FILM COATED ORAL 2 TIMES DAILY
Qty: 20 TABLET | Refills: 0 | Status: SHIPPED | OUTPATIENT
Start: 2022-02-25 | End: 2022-03-07

## 2022-02-25 RX ORDER — MAGNESIUM SULFATE IN WATER 40 MG/ML
2000 INJECTION, SOLUTION INTRAVENOUS ONCE
Status: COMPLETED | OUTPATIENT
Start: 2022-02-25 | End: 2022-02-25

## 2022-02-25 RX ADMIN — MORPHINE SULFATE 2 MG: 2 INJECTION, SOLUTION INTRAMUSCULAR; INTRAVENOUS at 00:14

## 2022-02-25 RX ADMIN — AMLODIPINE BESYLATE 10 MG: 10 TABLET ORAL at 08:21

## 2022-02-25 RX ADMIN — ACETAMINOPHEN 650 MG: 325 TABLET ORAL at 09:43

## 2022-02-25 RX ADMIN — ROSUVASTATIN CALCIUM 40 MG: 20 TABLET, COATED ORAL at 00:32

## 2022-02-25 RX ADMIN — ACETAMINOPHEN 650 MG: 325 TABLET ORAL at 14:39

## 2022-02-25 RX ADMIN — MAGNESIUM SULFATE HEPTAHYDRATE 2000 MG: 2 INJECTION, SOLUTION INTRAVENOUS at 08:27

## 2022-02-25 RX ADMIN — MEROPENEM 1000 MG: 1 INJECTION, POWDER, FOR SOLUTION INTRAVENOUS at 08:32

## 2022-02-25 ASSESSMENT — PAIN SCALES - GENERAL
PAINLEVEL_OUTOF10: 6
PAINLEVEL_OUTOF10: 8
PAINLEVEL_OUTOF10: 6
PAINLEVEL_OUTOF10: 4
PAINLEVEL_OUTOF10: 5

## 2022-02-25 NOTE — BRIEF OP NOTE
Brief Postoperative Note      Patient: Rosalba Villalba  YOB: 1940  MRN: [id number]    Date of Procedure: 2/24/2022    Pre-Op Diagnosis: LEFT INFECTED URETERAL STONE    Post-Op Diagnosis: Same       Procedure(s):  CYSTOSCOPY URETERAL STENT INSERTION    Surgeon(s):  Osiel Hare MD    Assistant:  * No surgical staff found *    Anesthesia: General    Estimated Blood Loss (mL): Minimal    Complications: None    Specimens:   * No specimens in log *    Implants:  * No implants in log *      Drains: * No LDAs found *    Findings: infected left stone left ureter    Electronically signed by Osiel Hare MD on 2/24/2022 at 10:11 PM

## 2022-02-25 NOTE — PROGRESS NOTES
02/25/22 1219   Resting (Room Air)   SpO2 94   HR 58   Resting (On O2)   SpO2 95   HR 70   O2 Device Nasal cannula   O2 Flow Rate (l/min) 2 l/min   During Walk (Room Air)   SpO2 86   HR 84   Walk/Assistance Device Walker   Rate of Dyspnea 1   Symptoms Shortness of breath   During Walk (On O2)   SpO2 89   HR 69   O2 Device Nasal cannula   O2 Flow Rate (l/min) 3 l/min   Walk/Assistance Device Walker   Rate of Dyspnea 1   Comments pt walked without walker too   After Walk   SpO2 95   HR 70   O2 Device Nasal cannula   O2 Flow Rate (l/min) 2 l/min   Rate of Dyspnea 0   Does the Patient Qualify for Home O2 Yes   Liter Flow at Rest 0   Liter Flow on Exertion 3   Does the Patient Need Portable Oxygen Tanks Yes

## 2022-02-25 NOTE — PROGRESS NOTES
Urology Attending Progress Note      Subjective: Pain improved, no other complaints    Vitals:  /67   Pulse 63   Temp 97.9 °F (36.6 °C) (Oral)   Resp 18   Ht 5' 2\" (1.575 m)   Wt 120 lb (54.4 kg)   SpO2 93%   BMI 21.95 kg/m²   Temp  Av.6 °F (35.9 °C)  Min: 91.8 °F (33.2 °C)  Max: 99.5 °F (37.5 °C)    Intake/Output Summary (Last 24 hours) at 2022 0904  Last data filed at 2022 0857  Gross per 24 hour   Intake 1439 ml   Output 0 ml   Net 1439 ml       Exam: Voiding red tinged urine    Labs:  WBC:    Lab Results   Component Value Date    WBC 5.8 2022     Hemoglobin/Hematocrit:    Lab Results   Component Value Date    HGB 10.7 2022    HCT 31.9 2022     BMP:    Lab Results   Component Value Date     2022    K 4.1 2022     2022    CO2 18 2022    BUN 14 2022    LABALBU 4.1 2022    CREATININE 0.8 2022    CALCIUM 10.2 2022    GFRAA >60 2022    GFRAA >60 2013    LABGLOM >60 2022     PT/INR:  No results found for: PROTIME, INR  PTT:  No results found for: APTT[APTT    Urine Culture: E.coli    Antibiotic Therapy: Merrem    Impression/Plan: 79 yo F admitted with L UVJ stone and UTI. POD#1 sp cystoscopy, L stent insertion     -Pain improved  -UCx growing E.coli. Continue abx  -Ok to DC from our standpoint.  Will need outpatient L URS in ~2 weeks once infection clears.  -Call with any questions        NATALIE Arceo

## 2022-02-25 NOTE — PROGRESS NOTES
Discharge noted. IV removed, belongings gathered and packed. Tele box returned to nurses station. AVS printed and reviewed, all questions answered. Patient's son will be transporting her home at 4:15pm. Will continue to monitor patient until d/c.

## 2022-02-25 NOTE — ANESTHESIA PRE PROCEDURE
Department of Anesthesiology  Preprocedure Note       Name:  Moose Farah   Age:  80 y.o.  :  1940                                          MRN:  [id number]         Date:  2022      Surgeon: Mayte Diallo):  Cammie Jefferson MD    Procedure: Procedure(s):  CYSTOSCOPY URETERAL STENT INSERTION    Medications prior to admission:   Prior to Admission medications    Medication Sig Start Date End Date Taking? Authorizing Provider   traMADol (ULTRAM) 50 MG tablet Take 50 mg by mouth every 6 hours as needed for Pain. As needed   Yes Historical Provider, MD   lidocaine (LIDODERM) 5 % Place 1 patch onto the skin daily as needed for Pain 12 hours on, 12 hours off. Yes Historical Provider, MD   cyanocobalamin 1000 MCG tablet Take 1,000 mcg by mouth daily   Yes Historical Provider, MD   pantoprazole (PROTONIX) 40 MG tablet TAKE ONE TABLET BY MOUTH EVERY MORNING BEFORE BREAKFAST 22  Yes Sammi Quiroga MD   traZODone (DESYREL) 100 MG tablet TAKE ONE TABLET BY MOUTH ONCE NIGHTLY AS NEEDED FOR SLEEP 22  Yes Sammi Quiroga MD   diclofenac (VOLTAREN) 50 MG EC tablet Take 1 tablet by mouth 3 times daily as needed for Pain 21  Yes Sammi Quiroga MD   Cholecalciferol (VITAMIN D3) 25 MCG (1000 UT) CAPS TAKE ONE CAPSULE BY MOUTH DAILY 21  Yes Ne Lacey APRN - CNP   rosuvastatin (CRESTOR) 40 MG tablet TAKE ONE TABLET BY MOUTH DAILY 21  Yes Sammi Quiroga MD   escitalopram (LEXAPRO) 20 MG tablet TAKE ONE TABLET BY MOUTH DAILY 21  Yes Sammi Quiroga MD   amLODIPine (NORVASC) 10 MG tablet TAKE ONE TABLET BY MOUTH DAILY 21  Yes Sammi Quiroga MD   Probiotic Product (PROBIOTIC ACIDOPHILUS BIOBEADS) CAPS Take 1 capsule by mouth daily 21  Yes Lauren Eldora Gaucher, APRN - CNP   vitamin E 1000 UNITS capsule Take 1,000 Units by mouth daily.      Yes Historical Provider, MD       Current medications:    Current Facility-Administered Medications   Medication Dose Route Frequency Provider Last Rate Last Admin    morphine (PF) injection 1 mg  1 mg IntraVENous Q6H PRN Mario Steiner MD        Or   Sahu morphine (PF) injection 2 mg  2 mg IntraVENous Q6H PRN Mario Steiner MD        sodium chloride flush 0.9 % injection 5-40 mL  5-40 mL IntraVENous 2 times per day Eduardo Alonsoir, DO        sodium chloride flush 0.9 % injection 5-40 mL  5-40 mL IntraVENous PRN Eduardo Alonsoir, DO        0.9 % sodium chloride infusion  25 mL IntraVENous PRN Eduardo Watson, DO        ondansetron (ZOFRAN-ODT) disintegrating tablet 4 mg  4 mg Oral Q8H PRN Eduardo Watson, DO        Or    ondansetron (ZOFRAN) injection 4 mg  4 mg IntraVENous Q6H PRN Eduardo Watson, DO        polyethylene glycol (GLYCOLAX) packet 17 g  17 g Oral Daily PRN Eduardo Alonsoir, DO        acetaminophen (TYLENOL) tablet 650 mg  650 mg Oral Q6H PRN Eduardo Alonsoir, DO        Or    acetaminophen (TYLENOL) suppository 650 mg  650 mg Rectal Q6H PRN Eduardo Alonsoir, DO        [Held by provider] heparin (porcine) injection 5,000 Units  5,000 Units SubCUTAneous 3 times per day Markus Rouse MD   5,000 Units at 02/23/22 2250    glucose (GLUTOSE) 40 % oral gel 15 g  15 g Oral PRN Markus Rouse MD        glucagon (rDNA) injection 1 mg  1 mg IntraMUSCular PRN Markus Rouse MD        dextrose 5 % solution  100 mL/hr IntraVENous PRN Markus Rouse MD        amLODIPine (NORVASC) tablet 10 mg  10 mg Oral Daily Eduardo Chaudhari, DO   10 mg at 02/24/22 0845    rosuvastatin (CRESTOR) tablet 40 mg  40 mg Oral Nightly Markus Rouse MD   40 mg at 02/23/22 2025    dextrose bolus (hypoglycemia) 10% 125 mL  125 mL IntraVENous PRDEBBIE Rouse MD        Or    dextrose bolus (hypoglycemia) 10% 250 mL  250 mL IntraVENous PRN Markus Rouse MD        0.9 % sodium chloride infusion   IntraVENous Continuous Eduardo Chaudhari DO 50 mL/hr at 02/24/22 1822 New Bag at 02/24/22 1822    insulin lispro (1 Unit Dial) 0-6 Units  0-6 Units SubCUTAneous TID WC Eduardo Chaudhari DO        insulin lispro (1 Unit Dial) 0-3 Units  0-3 Units SubCUTAneous Nightly Eduardo Chaudhari,         meropenem (MERREM) 1,000 mg in sodium chloride 0.9 % 100 mL IVPB (mini-bag)  1,000 mg IntraVENous Q12H Eduardo Chaudhari, DO 33.3 mL/hr at 02/24/22 1836 1,000 mg at 02/24/22 1836       Allergies:     Allergies   Allergen Reactions    Pravastatin      Other reaction(s): increases blood pressure    Percocet [Oxycodone-Acetaminophen] Other (See Comments)     Dizzy       Problem List:    Patient Active Problem List   Diagnosis Code    DM [facility]diabetes mellitus) [facility] E11.9    Essential hypertension I10    Pure hypercholesterolemia E78.00    Left flank pain R10.9    Anxiety F41.9    ILD (interstitial lung disease) ([address] J84.9    NSIP (nonspecific interstitial pneumonia) ([facility] J84.89    Urinary tract infection without hematuria N39.0    Left hip pain M25.552    Chronic midline low back pain without sciatica M54.50, G89.29    Other age-related cataract H25.89    Left lower quadrant abdominal pain R10.32    Left knee pain M25.562    Decreased appetite R63.0    Major depressive disorder in partial remission ([address] F32.4    Unintentional weight loss R63.4    Hypercalcemia E83.52    Anemia D64.9    Trigger finger, acquired M65.30    Kidney stone N20.0    Left ureteral calculus N20.1       Past Medical History:        Diagnosis Date    Diabetes mellitus ([address]     Essential hypertension, benign     GERD (gastroesophageal reflux disease)     Hyperlipidemia     Interstitial lung disease (HCC)     Osteoarthrosis, unspecified whether generalized or localized, unspecified site     osteoarthritis lower leg    Osteopenia     Shingles        Past Surgical History:        Procedure Laterality Date    ANKLE SURGERY Right 04/01/2013    torn tendon    BRONCHOSCOPY N/A 8/1/2019    BRONCHOSCOPY WITH BAL AND TRANSBRONCHIAL BIOPSIES performed by Junior Fuller MD at [address] [place] [address]  10/11/2010    Dr. Kiki Shell , polyps and diverticulosis  COLONOSCOPY  11/11/2002    Dr. Marlene Jean, Diverticulosis    COLONOSCOPY  04/20/2015    Dr. Sammy Perez- diverticulosis, sessile polyp, 5 years     COLONOSCOPY  4/13/16    Dr Bernarda Beasley; Diverticulosis    COLONOSCOPY  11/28/2016    Dr Sammy Perez,     INTRACAPSULAR CATARACT EXTRACTION Right 7/15/2020    PHACOEMULSIFICATION OF CATARACT RIGHT EYE WITH INTRAOCULAR LENS IMPLANT performed by Snu Zimmerman MD at [facility] Bilateral 9/1/2020    BILATERAL LUMBAR THREE, LUMBAR FOUR, LUMBAR FIVE RADIOFREQUENCY ABLATION SITE CONFIRMED BY FLUOROSCOPY performed by Marilia Gallegos MD at [address] [place] [address] Bilateral 6/23/2020    BILATERAL LUMBAR FOUR TRANSFORAMINAL EPIDURAL STEROID INJECTION SITE CONFIRMED BY FLUOROSCOPY performed by Marilia Gallegos MD at [address] [place] [address] Bilateral 7/7/2020    BILATERAL LUMBAR FOUR TRANSFORAMINAL EPIDURAL STEROID INJECTION SITE CONFIRMED BY FLUOROSCOPY performed by Marilia Gallegos MD at [address] [place] [address] Bilateral 8/4/2020    BILATERAL LUMBAR THREE, LUMBAR FOUR, LUMBAR FIVE DORSAL RAMUS MEDIAL BRANCH BLOCK SITE CONFIRMED BY FLUOROSCOPY performed by Marilia Gallegos MD at [address] [place] [address] Bilateral 8/18/2020    BILATERAL LUMBAR THREE, LUMBAR FOUR, LUMBAR FIVE DORSAL RAMUS MEDIAL BRANCH BLOCK SITE CONFIR,ED BY FLUOROSCOPY performed by Marilia Gallegos MD at [address] [place] [address] Right     UPPER GASTROINTESTINAL ENDOSCOPY  8/17/2011    Dr. Tasia Tovar - moderate gastritis , hiatal hernia     UPPER GASTROINTESTINAL ENDOSCOPY  4/20/15    Dr. Tasia Tovar - polyps removed, diverticulosis, follow up in 5 years   [facility]  8/16/2012    DR. Ramos - with mesh       Social History:    Social History     Tobacco Use    Smoking status: Never Smoker    Smokeless tobacco: Never Used   Substance Use Topics    Alcohol use:  Yes Alcohol/week: 1.0 standard drink     Types: 1 Standard drinks or equivalent per week     Comment: one vodka tonic nightly                                Counseling given: Not Answered      Vital Signs (Current):   Vitals:    02/24/22 0738 02/24/22 1137 02/24/22 1531 02/24/22 1939   BP: (!) 140/81 (!) 142/63 126/68 121/87   Pulse: 60 60 63 64   Resp: 18 18 18 18   Temp: 98.4 °F (36.9 °C) 99.5 °F (37.5 °C) 98.5 °F (36.9 °C) 97.4 °F (36.3 °C)   TempSrc: Oral Oral Oral Oral   SpO2: 96% 96% 100%    Weight:       Height:                                                  BP Readings from Last 3 Encounters:   02/24/22 121/87   11/30/21 134/60   10/19/21 137/62       NPO Status:                                                                                 BMI:   Wt Readings from Last 3 Encounters:   02/23/22 120 lb (54.4 kg)   11/30/21 123 lb 12.8 oz (56.2 kg)   11/15/21 124 lb (56.2 kg)     Body mass index is 21.95 kg/m².     CBC:   Lab Results   Component Value Date    WBC 3.6 02/24/2022    RBC 3.51 02/24/2022    HGB 10.9 02/24/2022    HCT 32.4 02/24/2022    MCV 92.1 02/24/2022    RDW 13.3 02/24/2022     02/24/2022       CMP:   Lab Results   Component Value Date     02/24/2022    K 4.4 02/24/2022     02/24/2022    CO2 24 02/24/2022    BUN 16 02/24/2022    CREATININE 1.0 02/24/2022    GFRAA >60 02/24/2022    GFRAA >60 02/25/2013    AGRATIO 1.2 02/23/2022    LABGLOM 53 02/24/2022    GLUCOSE 116 02/24/2022    PROT 7.6 02/23/2022    PROT 6.6 02/25/2013    CALCIUM 10.5 02/24/2022    BILITOT 0.6 02/23/2022    ALKPHOS 101 02/23/2022    AST 39 02/23/2022    ALT 14 02/23/2022       POC Tests:   Recent Labs     02/24/22  1640   POCGLU 98       Coags: No results found for: PROTIME, INR, APTT    HCG (If Applicable): No results found for: PREGTESTUR, PREGSERUM, HCG, HCGQUANT     ABGs:   Lab Results   Component Value Date    PHART 7.390 06/04/2019    PO2ART 84.0 06/04/2019    YGZ4OQY 34.0 06/04/2019    JCZ8OZF 20 06/04/2019    BEART -3.7 06/04/2019    S8YGDSZL 95 06/04/2019        Type & Screen (If Applicable):  No results found for: LABABO, LABRH    Drug/Infectious Status (If Applicable):  No results found for: HIV, HEPCAB    COVID-19 Screening (If Applicable):   Lab Results   Component Value Date    COVID19 Not Detected 07/23/2020           Anesthesia Evaluation  Patient summary reviewed and Nursing notes reviewed no history of anesthetic complications:   Airway: Mallampati: II  TM distance: >3 FB   Neck ROM: full  Mouth opening: > = 3 FB Dental:          Pulmonary:                              Cardiovascular:    (+) hypertension:,                   Neuro/Psych:               GI/Hepatic/Renal:   (+) GERD:,           Endo/Other:    (+) DiabetesType II DM, , .                 Abdominal:             Vascular: Other Findings:             Anesthesia Plan      general     ASA 1    (71-year-old female presents for CYSTOSCOPY LEFT URETERAL STENT INSERTION. Plan general anesthesia with ASA standard monitors. Questions answered. Patient agreeable with anesthetic plan.  )  Induction: intravenous. Anesthetic plan and risks discussed with patient.         Attending anesthesiologist reviewed and agrees with Damien Otreo MD   2/24/2022

## 2022-02-25 NOTE — PROGRESS NOTES
Patient is alert and oriented. VSS. Minimal complaints of left-sided pain. PRN pain medication given. Patient ambulated to bathroom several times with standard walker and standby assistance. Urine is mostly yellow but has a pink tinge d/t procedure yesterday. Patient remains on 1.5L of O2. No issues with any medications. Patient to be discharge after evaluated by PT/OT. Call light within reach. Will continue to monitor and await discharge order. Patient states that her son will be picking her up to take her home.

## 2022-02-25 NOTE — PROGRESS NOTES
PACU Transfer Note    Vitals:    02/24/22 2321   BP: (!) 146/69   Pulse: 76   Resp: 20   Temp: 97 °F (36.1 °C)   SpO2: 93%     BP within 20% of baseline.      In: 300 [I.V.:300]  Out: 0     Pain assessment:  none  Pain Level: 0    Report given to Receiving unit RN.    2/24/2022 11:32 PM

## 2022-02-25 NOTE — CARE COORDINATION
Case Management Assessment            Discharge Note                    Date / Time of Note: 2/25/2022 3:04 PM                  Discharge Note Completed by: Bridget Flores RN    Patient Name: Nawaf Beltre   YOB: 1940  Diagnosis: Kidney stone [N20.0]  Hyperglycemia [R73.9]  Urinary tract infection without hematuria, site unspecified [N39.0]   Date / Time: 2/23/2022  9:08 AM    Current PCP: Janie Arguello MD  Clinic patient: No    Hospitalization in the last 30 days: No    Advance Directives:  Code Status: Full Code  [place] DNR form completed and on chart: Not Indicated    Financial:  Payor: Omar Nelson / Plan: [facility] PPO / Product Type: Medicare /      Pharmacy:    [facility] [address] [place] [phone]  [address] [place] [address]  Phone: [phone] Fax: [phone]      Assistance purchasing medications?: Potential Assistance Purchasing Medications: No  Assistance provided by Case Management: None at this time    Does patient want to participate in local refill/ meds to beds program?:      Meds To Beds General Rules:  1. Can ONLY be done Monday- Friday between 8:30am-5pm  2. Prescription(s) must be in pharmacy by 3pm to be filled same day  3. Copy of patient's insurance/ prescription drug card and patient face sheet must be sent along with the prescription(s)  4. Cost of Rx cannot be added to hospital bill. If financial assistance is needed, please contact unit  or ;  or  CANNOT provide pharmacy voucher for patients co-pays  5.  Patients can then  the prescription on their way out of the hospital at discharge, or pharmacy can deliver to the bedside if staff is available. (payment due at time of pick-up or delivery - cash, check, or card accepted)     Able to afford home medications/ co-pay costs: Yes    ADLS:  Current PT AM-PAC Score: 19 /24  Current OT AM-PAC Score:   /24      DISCHARGE Disposition: Home with [address]: [facility] skilled care     LOC at discharge: Not Applicable  LEA Completed: Not Indicated    Notification completed in HENS/PAS?:  Not Applicable    IMM Completed:   Not Indicated    Transportation:  Transportation PLAN for discharge: family   Mode of Transport: [address] ordered at discharge: Yes  [address]: [facility]  Phone: [phone]  Fax: [phone]  Orders faxed: Paulina Salter aware of DC today will follow for [facility] AT [facility] needs at [address]:  DME Provider: none  Equipment obtained during hospitalization:     Home Oxygen and Respiratory Equipment:  Oxygen needed at discharge?: Not [address]: Not Applicable  Portable tank available for discharge?: Not Indicated    Dialysis:  Dialysis patient: No    Dialysis Center:  Not Applicable      Additional CM Notes: Pt from home will DC home with [facility] skilled care [facility] aware of DC will follow for [facility] AT [facility] needs. Family to drive home, will follow up OP with Urology team     The Plan for Transition of Care is related to the following treatment goals of Kidney stone [N20.0]  Hyperglycemia [R73.9]  Urinary tract infection without hematuria, site unspecified [N39.0]    The Patient and/or patient representative Jacki and her family were provided with a choice of provider and agrees with the discharge plan Yes    Freedom of choice list was provided with basic dialogue that supports the patient's individualized plan of care/goals and shares the quality data associated with the providers.  Yes    Care Transitions patient: Yes    Fawad Reilly RN  [facility]  Case Management Department  Ph: [phone]  Fax: [phone]

## 2022-02-25 NOTE — PROGRESS NOTES
Occupational Therapy   Occupational Therapy Initial Assessment and Treatment  Date: 2022   Patient Name: Devora Solares  MRN: [id number]     : 1940    Date of Service: 2022    Discharge Recommendations:  Devora Solares scored a 20/24 on the AM-PAC ADL Inpatient form. Current research shows that an AM-PAC score of 18 or greater is typically associated with a discharge to the patient's home setting. Based on the patient's AM-PAC score, and their current ADL deficits, it is recommended that the patient have 2-3 sessions per week of Occupational Therapy at d/c to increase the patient's independence. At this time, this patient demonstrates the endurance and safety to discharge home with home services and a follow up treatment frequency of 2-3x/wk. Please see assessment section for further patient specific details. If patient discharges prior to next session this note will serve as a discharge summary. Please see below for the latest assessment towards goals. OT Equipment Recommendations  Equipment Needed: No  Other: rec use owned shower chair and RW    Assessment   Performance deficits / Impairments: Decreased functional mobility ; Decreased ADL status; Decreased endurance;Decreased balance;Decreased high-level IADLs  After evaluation and treatment, pt found to be presenting with the above mentioned deficits. Pt would benefit from continued skilled occupational therapy to address these deficits, increasing safety and independence with ADL and functional mobility. She is roughly SBA with RW for all standing level ADL and functional mobility and is demonstrating ability to implement energy conservation and breathing techniques with min VCs at this time. Pt needing supplemental O2 at this time. Will continue to assess for discharge needs.      Treatment Diagnosis: decreased ability to perform ADL 2/2 UTI  Prognosis: Good  Decision Making: Medium Complexity  REQUIRES OT FOLLOW UP: Yes  Activity Tolerance  Activity Tolerance: Patient Tolerated treatment well  Activity Tolerance: Pt on 1.5L O2 via NC during session. 96%, 70bpm at rest in supine. On RA, SPO2 dropped to 80% with 4 mins toileting/grooming in standing, recovering to 88% on RA, needing 2L via NC to recover to 96%. After walking ~4 mins on 2L, SPO2 dropped to 88%, recovered to 94% with 1 min seated rest with cues for PLB. Safety Devices  Safety Devices in place: Yes  Type of devices: Call light within reach; Chair alarm in place; Left in chair;Nurse notified;Gait belt           Patient Diagnosis(es): The primary encounter diagnosis was Kidney stone. Diagnoses of Urinary tract infection without hematuria, site unspecified and Hyperglycemia were also pertinent to this visit. has a past medical history of Diabetes mellitus ([address] Essential hypertension, benign, GERD (gastroesophageal reflux disease), Hyperlipidemia, Interstitial lung disease ([address] Osteoarthrosis, unspecified whether generalized or localized, unspecified site, Osteopenia, and Shingles. has a past surgical history that includes Colonoscopy (10/11/2010); Upper gastrointestinal endoscopy (8/17/2011); shoulder surgery (Right); Colonoscopy (11/11/2002); ventral hernia repair (8/16/2012); Ankle surgery (Right, 04/01/2013); Upper gastrointestinal endoscopy (4/20/15); Colonoscopy (04/20/2015); Colonoscopy (4/13/16); Colonoscopy (11/28/2016); bronchoscopy (N/A, 8/1/2019); Pain management procedure (Bilateral, 6/23/2020); Pain management procedure (Bilateral, 7/7/2020); Intracapsular cataract extraction (Right, 7/15/2020); Pain management procedure (Bilateral, 8/4/2020); Pain management procedure (Bilateral, 8/18/2020); Nerve Surgery (Bilateral, 9/1/2020); and Cystoscopy (Left, 2/24/2022). Treatment Diagnosis: decreased ability to perform ADL 2/2 UTI      Restrictions  Position Activity Restriction  Other position/activity restrictions:  Up with assist    Subjective   General  Chart Reviewed: Yes  Patient assessed for rehabilitation services?: Yes  Family / Caregiver Present: No  Referring Practitioner: Mani Rodriguez MD  Diagnosis: L UVJ stone and UTI s/p cystoscopy with L stent insertion on 2/24/22  Subjective  Subjective: RN cleared pt for tx. Pt supine at session start. Patient Currently in Pain: Yes (L sided abdominal pain, RN notified)    Social/Functional History  Social/Functional History  Lives With: Alone  Type of Home:  (Referred to as landominium)  Home Layout: Two level (Everything on main floor)  Home Access: Ramped entrance  Bathroom Shower/Tub: Walk-in shower  Bathroom Toilet: Handicap height  Bathroom Equipment: Shower chair,Grab bars in shower,Commode  Home Equipment:  (Uses walker at night and cane when outside)  [employer] Help From: Family  ADL Assistance: [address]: Independent  Homemaking Responsibilities: Yes  Ambulation Assistance: Independent  Transfer Assistance: Independent  Active : Yes  Leisure & Hobbies: Reading, watching movies, and going to dinner  Additional Comments: Pt reports 0 falls in the last 6 months. Does not have support since  passed other than son who schedule is typically busy. Pt intermittenly wears 2L O2 at home but states she tends to overdo herself.        Objective   Vision: Within Functional Limits  Hearing: Exceptions to [facility]  Hearing Exceptions: Bilateral hearing aid;Hard of hearing/hearing concerns      Orientation  Overall Orientation Status: Within Functional Limits        Balance  Sitting Balance: Stand by assistance  Standing Balance: Contact guard assistance (to SBA with RW)  Functional Mobility  Functional - Mobility Device: Rolling Walker  Activity: To/from bathroom  Assist Level: Contact guard assistance (to SBA)  Toilet Transfers  Toilet - Technique: Ambulating (with RW)  Equipment Used: Standard toilet  Toilet Transfer: Stand by assistance     ADL  Feeding: Supervision (to drink sitting)  Grooming: Stand by assistance (to wash hands standing)  Toileting: Stand by assistance  Additional Comments: RW used for standing ADL with mod VCs for technique     Tone RUE  RUE Tone: Normotonic  Tone LUE  LUE Tone: Normotonic  Coordination  Movements Are Fluid And Coordinated: Yes        Bed mobility  Supine to Sit: Stand by assistance  Scooting: Stand by assistance     Transfers  Sit to stand: Stand by assistance  Stand to sit: Stand by assistance  Transfer Comments: Mod VCs for safe t/f technique and safe use of RW        Cognition  Overall Cognitive Status: WFL  Cognition Comment: Pt re-educated on energy conservation and breathing techniques                                BUE strength and AROM are WFL based on functional presentation. Education: Role of OT, safe t/f training, safe use of DME, awareness of deficits, discharge planning, ADL as therapeutic exercise, importance of OOB, energy conservation and breathing techniques        Plan   Plan  Times per week: 2-5  Current Treatment Recommendations: Strengthening,Balance Training,Functional Mobility Training,Endurance Training,Safety Education & Training,Patient/Caregiver Education & Training,Equipment Evaluation, Education, & procurement,Self-Care / ADL    AM-PAC Score        AM-[facility] Inpatient Daily Activity Raw Score: 20 (02/25/22 1539)  AM-PAC Inpatient ADL T-Scale Score : 42.03 (02/25/22 1539)  ADL Inpatient CMS 0-100% Score: 38.32 (02/25/22 1539)  ADL Inpatient CMS G-Code Modifier : Mook Moreno (02/25/22 1539)    Goals  Short term goals  Time Frame for Short term goals: 1 week  Short term goal 1: LB dressing with SPV  Short term goal 2: Toilet t/f and hygiene from ambulatory level with SPV  Short term goal 3: Tolerate 5 mins functional standing activity with SPO2 >90%  Short term goal 4: Initiate energy conservation and breathing techniques 100% of time when appropriate during session without cues/assist.  Patient Goals   Patient goals :  \"Be able to do my grocery shopping. \"       Therapy Time   Individual Concurrent Group Co-treatment   Time In 4717         Time Out 1425         Minutes 30         Timed Code Treatment Minutes: 15 Minutes       If patient is discharged prior to next treatment session, this note will serve as the discharge summary.   Annabelle Rick, OTR/L #[id number]

## 2022-02-25 NOTE — DISCHARGE SUMMARY
INTERNAL MEDICINE DEPARTMENT AT [address] [place] [address]  DISCHARGE SUMMARY    Patient ID: Romel Mancini                                             Discharge Date: 2/25/2022   Patient's PCP: Dianna Olivier MD                                          Discharge Physician: Akanksha Nowak MD MD  Admit Date: 2/23/2022   Admitting Physician: Akanksah Nowak MD    PROBLEMS DURING HOSPITALIZATION:  Present on Admission:   Kidney stone   Essential hypertension   Left flank pain   Urinary tract infection without hematuria   ILD (interstitial lung disease) ([address]   Left ureteral calculus      DISCHARGE DIAGNOSES:  · Left sided hydronephrosis secondary to impacted left ureteral stone  · UTI secondary to E. coli  · HLD  · T2DM  · HTN          The following issues were addressed during hospitalization:    Patient is a 79 yo female with a PMHx of T2DM, HTN, GERD, HLD, OA, who presented with left sided flank pain. Her pain began yesterday night on the left side of her abdomen and radiates to her left flank. She used trazodone last night to help her sleep through the pain. She described the pain as sharp, constant pain. She endorsed the pain in her abdomen and left flank, but denies any other symptoms. Upon arrival to the ED, vitals were stable on admission, but did have elevated temperature at home of over 100. BMP and CBC were unremarkable. UA indicated positive nitrites and leukocytes with elevated WBC 21-50 and 4+ bacteria. CXR unchanged from prior. CT abdomen/pelvis indicated moderate to severe left sided hydronephrosis and hydroureter causing by 5mm obstructing calculus at left UVJ and left sided nephrolithiasis. She was given a dose of rocephin in the ED and they discussed with urology, who plan on brining patient in tomorrow for procedure to break up stone. Patient was initially started on Merrem for her history of MDRO UTI. Patient was a started on Merrem. UA was positive, urine culture grew E. coli.   E. coli sensitivities came back and patient was not able to ciprofloxacin. Patient was taken for left ureteral stent placement without any complication. Patient was cleared by urology for discharge. Home O2 evaluation was performed and recommended 3 L/min of O2 support when ambulating, 2 L/min of O2 support at rest.  Patient will follow up with urology in 1/2 weeks. Patient follow-up with her PCP in 1 week. Urine stones studies are pending, patient will need further work-up to determine etiology of stones and future treatment. On the day of discharge patient was alert, oriented,  hemodynamically stable, afebrile. Patient denied any exertional chest pain, dyspnea, palpitations, syncope, orthopnea, edema or paroxysmal nocturnal dyspnea. The patient denied abdominal or flank pain, anorexia, nausea or vomiting, dysphagia, change in bowel habits or black or bloody stools or weight loss. She was discharged to Her  home in stable condition. Physical Exam:  /69   Pulse 65   Temp 97.8 °F (36.6 °C) (Oral)   Resp 18   Ht 5' 2\" (1.575 m)   Wt 120 lb (54.4 kg)   SpO2 97%   BMI 21.95 kg/m²       Consults: urology  Significant Diagnostic Studies: CT abdomen and pelvis,   CXR. .     Treatments: Merrem, Dilaudid, ureteral stent placement  Disposition: home  Discharged Condition: Stable  Follow Up: Primary Care Physician in one week    DISCHARGE MEDICATION:     Medication List      START taking these medications    ciprofloxacin 500 MG tablet  Commonly known as: CIPRO  Take 1 tablet by mouth 2 times daily for 10 days        CHANGE how you take these medications    lidocaine 5 %  Commonly known as: LIDODERM  What changed: Another medication with the same name was removed. Continue taking this medication, and follow the directions you see here.         CONTINUE taking these medications    amLODIPine 10 MG tablet  Commonly known as: NORVASC  TAKE ONE TABLET BY MOUTH DAILY     cyanocobalamin 1000 MCG tablet diclofenac 50 MG EC tablet  Commonly known as: VOLTAREN  Take 1 tablet by mouth 3 times daily as needed for Pain     escitalopram 20 MG tablet  Commonly known as: LEXAPRO  TAKE ONE TABLET BY MOUTH DAILY     pantoprazole 40 MG tablet  Commonly known as: PROTONIX  TAKE ONE TABLET BY MOUTH EVERY MORNING BEFORE BREAKFAST     Probiotic Acidophilus BioBeads Caps  Take 1 capsule by mouth daily     rosuvastatin 40 MG tablet  Commonly known as: CRESTOR  TAKE ONE TABLET BY MOUTH DAILY     traMADol 50 MG tablet  Commonly known as: ULTRAM     traZODone 100 MG tablet  Commonly known as: DESYREL  TAKE ONE TABLET BY MOUTH ONCE NIGHTLY AS NEEDED FOR SLEEP     Vitamin D3 25 MCG (1000 UT) Caps  TAKE ONE CAPSULE BY MOUTH DAILY     vitamin E 1000 units capsule           Where to Get Your Medications      These medications were sent to [address] [place] [address], [address] [place] [address]    Phone: [phone]   · ciprofloxacin 500 MG tablet       Activity: activity as tolerated  Diet: regular diet  Wound Care: none needed    Time Spent on discharge is more than 30 minutes. Signed:  Sera Wilkes MD  PGY-1  2/25/2022    Addendum to Resident H& P/Progress note:  I have personally seen,examined and evaluated the patient.  I have reviewed the current history, physical findings, labs and assessment and plan and agree with note as documented by resident MD ( Dagmar Jose)      Ami Clay MD, Bull Estrada

## 2022-02-25 NOTE — OP NOTE
tolerated the procedure well  and was brought back to the recovery room in stable condition. The  patient will be scheduled for cystoscopy, left ureteroscopy, stone  extraction in one to two weeks after her infection resolves.         Lonnie Sosa MD    D: 02/24/2022 23:04:55       T: 02/25/2022 1:42:11     MD/VIRI_CHRISTOPHER  Job#: [id number]     Doc#: [id number]    CC:

## 2022-02-25 NOTE — PROGRESS NOTES
Progress Note    Admit Date: 2/23/2022  Day: 2  Diet: ADULT DIET; Regular; 3 carb choices (45 gm/meal)    CC: left flank pain     Interval history:  No acute events overnight. Patient seen and examined at been side. Patient states she feels better, her abdominal pain is controlled. Patient denies shortness of breath, chest pain, chills, nausea, vomiting She denies urinary frequency, dysuria. Patient is hemodynamically stable and afebrile She remains on Heparin sc   Left ureter stent placed yesterday with no complications   WBC 5.8  Culture grew E coli, sensitivities has not came back   On 1.5 L oxygen >96%       HPI:  Patient is a 79 yo female with a PMHx of T2DM, HTN, GERD, HLD, OA, who presents with left sided flank pain. Her pain began yesterday night on the left side of her abdomen and radiates to her left flank. She used trazodone last night to help her sleep through the pain. She describes the pain as sharp, constant pain. She endorses the pain in her abdomen and left flank, but denies any other symptoms. Upon arrival to the ED, vitals are stable on admission, but did have elevated temperature at home of over 100. BMP and CBC were unremarkable. UA indicated positive nitrites and leukocytes with elevated WBC 21-50 and 4+ bacteria. CXR unchanged from prior. CT abdomen/pelvis indicated moderate to severe left sided hydronephrosis and hydroureter causing by 5mm obstructing calculus at left UVJ and left sided nephrolithiasis. She was given a dose of rocephin in the ED and they discussed with urology, who plan on brining patient in tomorrow for procedure to break up stone.         Medications:     Scheduled Meds:   magnesium sulfate  2,000 mg IntraVENous Once    sodium chloride flush  5-40 mL IntraVENous 2 times per day    heparin (porcine)  5,000 Units SubCUTAneous 3 times per day    amLODIPine  10 mg Oral Daily    rosuvastatin  40 mg Oral Nightly    insulin lispro  0-6 Units SubCUTAneous TID WC    insulin lispro  0-3 Units SubCUTAneous Nightly    meropenem  1,000 mg IntraVENous Q12H     Continuous Infusions:   sodium chloride      dextrose      sodium chloride 50 mL/hr at 02/24/22 1822     PRN Meds:morphine **OR** morphine, sodium chloride flush, sodium chloride, ondansetron **OR** ondansetron, polyethylene glycol, acetaminophen **OR** acetaminophen, glucose, glucagon (rDNA), dextrose, dextrose bolus (hypoglycemia) **OR** dextrose bolus (hypoglycemia)    Objective:   Vitals:   T-max:  Patient Vitals for the past 8 hrs:   BP Temp Temp src Pulse Resp SpO2   02/25/22 0730 137/67 97.9 °F (36.6 °C) Oral 63 18 93 %   02/25/22 0725 -- -- -- -- -- (!) 88 %   02/25/22 0447 (!) 145/69 97.4 °F (36.3 °C) Oral 64 18 --       Intake/Output Summary (Last 24 hours) at 2/25/2022 0855  Last data filed at 2/24/2022 2332  Gross per 24 hour   Intake 1199 ml   Output 0 ml   Net 1199 ml       ROS: A 10 point review of systems was conducted, significant findings as noted in HPI. Physical Exam  Constitutional:       Appearance: Normal appearance. HENT:      Head: Normocephalic. Mouth/Throat:      Mouth: Mucous membranes are dry. Eyes:      Extraocular Movements: Extraocular movements intact. Conjunctiva/sclera: Conjunctivae normal.      Pupils: Pupils are equal, round, and reactive to light. Cardiovascular:      Rate and Rhythm: Normal rate and regular rhythm. Pulses: Normal pulses. Heart sounds: Murmur (left lower sternal border) heard. Pulmonary:      Effort: Pulmonary effort is normal.      Breath sounds: Examination of the right-lower field reveals rhonchi. Examination of the left-lower field reveals rhonchi. Rhonchi present. Abdominal:      General: Abdomen is flat. Bowel sounds are normal.      Palpations: Abdomen is soft. Tenderness: There is abdominal tenderness.       Comments: Left-sided tenderness in mid abdomen with guarding     Musculoskeletal:         General: Normal range of motion. Cervical back: Normal range of motion. Neurological:      General: No focal deficit present. Mental Status: She is alert and oriented to person, place, and time. Mental status is at baseline. Psychiatric:         Mood and Affect: Mood normal.         Behavior: Behavior normal.           LABS:    CBC:   Recent Labs     02/23/22  1004 02/24/22  0532 02/25/22  0531   WBC 8.4 3.6* 5.8   HGB 12.2 10.9* 10.7*   HCT 35.7* 32.4* 31.9*    141 141   MCV 91.4 92.1 94.6     Renal:    Recent Labs     02/23/22  1004 02/23/22  1110 02/24/22  0532 02/25/22  0531     --  140 136   K 5.6* 4.6 4.4 4.1     --  107 108   CO2 23  --  24 18*   BUN 20  --  16 14   CREATININE 1.0  --  1.0 0.8   GLUCOSE 214*  --  116* 87   CALCIUM 10.7*  --  10.5 10.2   MG  --   --  1.70* 1.70*   ANIONGAP 9  --  9 10     Hepatic:   Recent Labs     02/23/22  1004   AST 39*   ALT 14   BILITOT 0.6   PROT 7.6   LABALBU 4.1   ALKPHOS 101     Troponin:   Recent Labs     02/23/22  1004   TROPONINI <0.01     -----------------------------------------------------------------  RAD:   CT ABDOMEN PELVIS W IV CONTRAST Additional Contrast? None   Final Result      Moderate to severe left-sided hydronephrosis and hydroureter caused by a 5 mm obstructing calculus at the left UVJ. Mild intra and extrahepatic ductal dilatation, indeterminate. MRCP may be helpful for evaluation. Left-sided nephrolithiasis. Large hiatal hernia. Pulmonary fibrosis noted in the lung bases. Small to moderate pericardial effusion      XR CHEST (2 VW)   Final Result      Similar appearance of bilateral airspace disease. XR CHEST PORTABLE    (Results Pending)       Assessment/Plan:   Patient is a 81 yo female with a PMHx of T2DM, HTN, GERD, HLD, OA, who presents with left  flank pain.       Left sided hydronephrosis secondary to obstructing left UVJ  stone   CT abdomen/pelvis indicated moderate to severe left sided hydronephrosis

## 2022-02-25 NOTE — PROGRESS NOTES
Patient admitted to PACU # 10 from OR at [address] - Left    per Dr. Wilman Orozco. Attached to PACU monitoring system and report received from anesthesia provider. Patient was reported to be hemodynamically stable during procedure. Patient drowsy on admission and denied pain. Pt NSR on monitor. Pt blood glucose 86. Pt NSR on monitor. Pt on 3 L NC. Will continue to monitor.

## 2022-02-25 NOTE — PROGRESS NOTES
Physical Therapy    Facility/Department: [address]  Initial Assessment    NAME: Arden Cifuentes  : 1940  MRN: [id number]    Date of Service: 2022    Discharge Recommendations: Arden Cifuentes scored a 19/24 on the AM-PAC short mobility form. Current research shows that an AM-PAC score of 18 or greater is typically associated with a discharge to the patient's home setting. Based on the patient's AM-PAC score and their current functional mobility deficits, it is recommended that the patient have 2-3 sessions per week of Physical Therapy at d/c to increase the patient's independence. At this time, this patient demonstrates the endurance and safety to discharge home and a follow up treatment frequency of 2-3x/wk. Please see assessment section for further patient specific details. If patient discharges prior to next session this note will serve as a discharge summary. Please see below for the latest assessment towards goals. PT Equipment Recommendations  Equipment Needed: No    Assessment   Body structures, Functions, Activity limitations: Decreased functional mobility ; Decreased strength;Decreased endurance;Decreased balance  Assessment: Pt pleasant and agreeable to PT assessment. Pt from home and lives alone with minimal support available. Performs all household tasks independently. Typically amb with walker at night and SPC when outside. Required x1 assist with functional mobility. Pt on 2L O2 intermittently at home and reports she often fatigues herself. Pt plans to return home alone and agreeable to home PT. Pt would benefit from skilled physical therapy in order to maximize functional mobility and independence. Treatment Diagnosis: Decreased endurance associated with lung disease  Prognosis: Good  Decision Making: Medium Complexity  PT Education: Goals;PT Role;Plan of Care;Transfer Training;Energy Conservation;General Safety; Adaptive Device Training;Gait Training;Functional Mobility Training  Patient Education: Pt education on role of PT and energy conservation techniques such as pursed lip breathing. Pt verbalized understanding. REQUIRES PT FOLLOW UP: Yes  Activity Tolerance  Activity Tolerance: Patient limited by fatigue       Patient Diagnosis(es): The primary encounter diagnosis was Kidney stone. Diagnoses of Urinary tract infection without hematuria, site unspecified and Hyperglycemia were also pertinent to this visit. has a past medical history of Diabetes mellitus ([facility] [address] Essential hypertension, benign, GERD (gastroesophageal reflux disease), Hyperlipidemia, Interstitial lung disease ([address] Osteoarthrosis, unspecified whether generalized or localized, unspecified site, Osteopenia, and Shingles. has a past surgical history that includes Colonoscopy (10/11/2010); Upper gastrointestinal endoscopy (8/17/2011); shoulder surgery (Right); Colonoscopy (11/11/2002); ventral hernia repair (8/16/2012); Ankle surgery (Right, 04/01/2013); Upper gastrointestinal endoscopy (4/20/15); Colonoscopy (04/20/2015); Colonoscopy (4/13/16); Colonoscopy (11/28/2016); bronchoscopy (N/A, 8/1/2019); Pain management procedure (Bilateral, 6/23/2020); Pain management procedure (Bilateral, 7/7/2020); Intracapsular cataract extraction (Right, 7/15/2020); Pain management procedure (Bilateral, 8/4/2020); Pain management procedure (Bilateral, 8/18/2020); Nerve Surgery (Bilateral, 9/1/2020); and Cystoscopy (Left, 2/24/2022). Restrictions  Position Activity Restriction  Other position/activity restrictions: Up with assist  Vision/Hearing  Vision: Within Functional Limits  Hearing: Exceptions to [facility]  Hearing Exceptions: Bilateral hearing aid;Hard of hearing/hearing concerns     Subjective  General  Chart Reviewed: Yes  Patient assessed for rehabilitation services?: Yes  Additional Pertinent Hx: Ms. Linda Bentley is a 81 y/o female presenting s/p cystoscopy ureteral stent insertion on 2/24 by Dr. Escobar Uriostegui.  Pt was dx with L sided hydronephrosis secondary to obstructing left UVJ  stone. XR-chest: Coarse multifocal consolidation in part d/t pulmonary fibrosis, prominence of main pulmonary artery 2/2 pulmonary HTN, scoliosis. CT-abdomen/pelvis: Moderate to severe left-sided hydronephrosis and hydroureter caused by a 5 mm obstructing calculus at the left UVJ. PMH: DM, HTN, HLD, lung disease, and osteopenia  Family / Caregiver Present: No  Referring Practitioner: NATALIE Moseley  Referral Date : 02/23/22  Diagnosis: L sided hydronephrosis secondary to obstructing left UVJ  stone  Subjective  Subjective: Pt presented lying supine in bed. Pleasant and agreeable to PT. Pt stated \"I like reading my book and having a glass of wine\"  Pain Screening  Patient Currently in Pain: Yes (L sided abdominal pain, RN notified)  Pain Assessment  Pain Level: 5  Vital Signs  Patient Currently in Pain: Yes (L sided abdominal pain, RN notified)       Orientation  Orientation  Overall Orientation Status: Within Functional Limits  Social/Functional History  Social/Functional History  Lives With: Alone  Type of Home:  (Referred to as landominium)  Home Layout: Two level (Everything on main floor)  Home Access: Ramped entrance  Bathroom Shower/Tub: Walk-in shower  Bathroom Toilet: Handicap height  Bathroom Equipment: Shower chair,Grab bars in shower,Commode  Home Equipment:  (Uses walker at night and cane when outside)  Jay Help From: Family  ADL Assistance: [address] [place] [address] Avenue: Independent  Homemaking Responsibilities: Yes  Ambulation Assistance: Independent  Transfer Assistance: Independent  Active : Yes  Leisure & Hobbies: Reading, watching movies, and going to dinner  Additional Comments: Pt reports 0 falls in the last 6 months. Does not have support since  passed other than son who schedule is typically busy. Pt intermittenly wears 2L O2 at home but states she tends to overdo herself.   Cognition   Cognition  Overall Cognitive Status: WFL    Objective             Strength RLE  Strength RLE: WFL  Comment: Gobal strength assessed to be at least 3/5 with functional mobility  Strength LLE  Strength LLE: WFL  Comment: Gobal strength assessed to be at least 3/5 with functional mobility        Bed mobility  Supine to Sit: Stand by assistance  Scooting: Stand by assistance  Transfers  Sit to Stand: Stand by assistance  Stand to sit: Stand by assistance (Required VC's to push up from bed with walker)  Ambulation  Ambulation?: Yes  Ambulation 1  Device: Rolling Walker  Assistance: Contact guard assistance (Progressing to SBA with further amb)  Quality of Gait: Slow and hesitent, no difficulty navigating walker, mostly steady  Gait Deviations: Slow [place];Decreased step length;Decreased step height  Distance: 20 ft to and from bathroom, 150ft in rudd  Comments: SpO2 desat to 79% up to 90% upon returning from bathroom on RA. Desat to 88% up to 94% on 1.5L O2 upon returning from walk. Demonstrated increased WOB and required x1 min standing rest break during amb. Pt mildly impulsive with mobility and required VC's to wait for therapy assistance t/o session.      Balance  Posture: Good  Sitting - Static: Good  Sitting - Dynamic: Good  Standing - Static: Good (Stood statically while performing ADL's with OT at sink x2-3 min with SBA)  Standing - Dynamic: Fair (CGA progressing to SBA with walker)        Plan   Plan  Times per week: 2-5  Current Treatment Recommendations: Strengthening,Balance Training,Functional Mobility Training,Transfer Training,Endurance Training,Gait Training,Neuromuscular Re-education,Home Exercise Program,Safety Education & Training,Patient/Caregiver Education & Training  Safety Devices  Type of devices: Call light within reach,Chair alarm in place,Left in chair,Nurse notified    G-Code       OutComes Score                                                  AM-PAC Score  AM-PAC Inpatient Mobility Raw Score : 19 (02/25/22 1454)  AM-PAC Inpatient T-Scale Score : 45.44 (02/25/22 1454)  Mobility Inpatient CMS 0-100% Score: 41.77 (02/25/22 1454)  Mobility Inpatient CMS G-Code Modifier : CK (02/25/22 1454)          Goals  Short term goals  Time Frame for Short term goals: D/C  Short term goal 1: Perform supine to sit transfer independently  Short term goal 2: Perform sit to stand transfer independently  Short term goal 3: Amb x250 ft with supervision and SpO2>90%  Patient Goals   Patient goals : To return home       Therapy Time   Individual Concurrent Group Co-treatment   Time In 1342         Time Out 1425         Minutes 43                Timed Code Treatment Minutes:   28    Total Treatment Minutes:  Steven Jimenez, SPT     Therapist was present, directed the patient's care, made skilled judgement, and was responsible for assessment and treatment of the patient.   Dallin Rios, [address] [place] [address]

## 2022-02-25 NOTE — ANESTHESIA POSTPROCEDURE EVALUATION
Department of Anesthesiology  Postprocedure Note    Patient: Kalen Mcdonald  MRN: [id number]  YOB: 1940  Date of evaluation: 2/24/2022  Time:  11:08 PM     Procedure Summary     Date: 02/24/22 Room / Location: [address] [place] [address]    Anesthesia Start: 2203 Anesthesia Stop: 2308    Procedure: CYSTOSCOPY URETERAL STENT INSERTION (Left ) Diagnosis: (LEFT INFECTED URETERAL STONE)    Surgeons: Tomasita Aschoff, MD Responsible Provider: Kimberlyn Warren MD    Anesthesia Type: general ASA Status: 3          Anesthesia Type: general    Virgie Phase I:      Virgie Phase II:      Last vitals: Reviewed and per EMR flowsheets.        Anesthesia Post Evaluation    Patient location during evaluation: PACU  Patient participation: complete - patient participated  Level of consciousness: awake and alert  Pain score: 0  Airway patency: patent  Nausea & Vomiting: no nausea and no vomiting  Complications: no  Cardiovascular status: hemodynamically stable  Respiratory status: acceptable  Hydration status: euvolemic

## 2022-02-28 ENCOUNTER — TELEPHONE (OUTPATIENT)
Dept: INTERNAL MEDICINE CLINIC | Age: 82
End: 2022-02-28

## 2022-02-28 ENCOUNTER — CARE COORDINATION (OUTPATIENT)
Dept: CASE MANAGEMENT | Age: 82
End: 2022-02-28

## 2022-02-28 DIAGNOSIS — N20.0 KIDNEY STONE: Primary | ICD-10-CM

## 2022-02-28 PROCEDURE — 1111F DSCHRG MED/CURRENT MED MERGE: CPT | Performed by: INTERNAL MEDICINE

## 2022-02-28 NOTE — TELEPHONE ENCOUNTER
Mckayla calling in to see if Dr. Bravo Fuentes will sign off on orders for Home health      Please advise and call

## 2022-02-28 NOTE — CARE COORDINATION
[place] [facility] Transitions Initial Follow Up Call    Call within 2 business days of discharge: Yes    Patient: Renuka Greardo Patient : 1940    MRN: [id number]  Reason for Admission: Kidney stone Discharge Date: 22 RARS: Readmission Risk Score: 7.5 ( )      Last Discharge [facility]       Complaint Diagnosis Description Type Department Provider    22 Chest Pain; Fever Kidney stone . .. ED to Hosp-Admission (Discharged) (ADMITTED) [address] Umang Posadas MD; Cherrie Black,... Spoke with: Renuka Gerardo who reports she is doing ok but having some slight pain on the left side where the stone is. Patient reports some blood in urine but is getting better. Patient states that she goes to the [facility] to have the stone removed on 3/9/2022. Patient and writer attempted to get HFU with PCP but no appointment were available. So patient is keeping her 3/23/2022 appointment for [facility]. Patient denies cp, sob, cough, dizziness, headache, n/v, diarrhea, fever, or chills. Patient report that appetite and fluid intake is good and denies any problems with bowel or bladder. Patient is taking all medications as ordered. Writer and patient did a complete medication review and 1111f was completed. Patient reports that Eli REYNOLDS will be out to see her tomorrow. Denies any needs at this time. Patient instructed to continue to monitor s/s, reporting any that may present to MD immediately for early intervention. Reminded of COVID 19 precautions. Agreeable to f/u calls. Facility: [facility].    Transitions of Care Initial Call    Was this an external facility discharge? No Discharge Facility: [facility] to be reviewed by the provider   Additional needs identified to be addressed with provider: Yes  HFU             Method of communication with provider : phone      Advance Care Planning:   Does patient have an Advance Directive: reviewed and current.      Advance Care Planning   Healthcare Decision Maker:    Primary Decision Maker: Beverley Billy - [phone]    Secondary Decision Maker: Vera Vargas - Naveed - [phone]    Was this a readmission? No  Patient stated reason for admission: Kidney stone  Patients top risk factors for readmission: medical condition-kidney stone    Care Transition Nurse (CTN) contacted the patient by telephone to perform post hospital discharge assessment. Verified name and  with patient as identifiers. Provided introduction to self, and explanation of the CTN role. CTN reviewed discharge instructions, medical action plan and red flags with patient who verbalized understanding. Patient given an opportunity to ask questions and does not have any further questions or concerns at this time. Were discharge instructions available to patient? Yes. Reviewed appropriate site of care based on symptoms and resources available to patient including: PCP, Specialist, Home health and When to call 911. The patient agrees to contact the PCP office for questions related to their healthcare. Medication reconciliation was performed with patient, who verbalizes understanding of administration of home medications. Advised obtaining a 90-day supply of all daily and as-needed medications. Covid Risk Education     Educated patient about risk for severe COVID-19 due to risk factors according to CDC guidelines. LPN CC reviewed discharge instructions, medical action plan and red flag symptoms with the patient who verbalized understanding. Discussed COVID vaccination status: Yes. Education provided on COVID-19 vaccination as appropriate. Discussed exposure protocols and quarantine with CDC Guidelines. Patient was given an opportunity to verbalize any questions and concerns and agrees to contact LPN CC or health care provider for questions related to their healthcare.     Reviewed and educated patient on any new and changed medications related to discharge diagnosis. Was patient discharged with a pulse oximeter? No Discussed and confirmed pulse oximeter discharge instructions and when to notify provider or seek emergency care. LPN CC provided contact information. Plan for follow-up call in 5-7 days based on severity of symptoms and risk factors.   Plan for next call: symptom management-.       Care Transitions 24 Hour Call    Do you have any ongoing symptoms?: No  Do you have a copy of your discharge instructions?: Yes  Do you have all of your prescriptions and are they filled?: Yes  Have you been contacted by a [facility] Pharmacist?: No  Have you scheduled your follow up appointment?: Yes  How are you going to get to your appointment?: Car - drive self  Were you discharged with any Home Care or Post Acute Services: Yes  Post Acute Services: Home Health (Comment: [facility] Skilled care)  Patient DME: Sae Hodges, Other  Other Patient DME: grab bars in shower and by the toilet; ramp for access into her home  Patient Home Equipment: Oxygen  Do you have support at home?: Alone  Do you feel like you have everything you need to keep you well at home?: Yes  Are you an active caregiver in your home?: No  Care Transitions Interventions   Home Care Waiver: Completed            Follow Up  Future Appointments   Date Time Provider Romina Shipman   3/8/2022 10:45 AM Lia Mcmillna MD [facility] BRIAN   3/23/2022  8:40 AM [facility] [address] [place] [facility]   10/19/2022 11:40 AM MD Roger Guillory P/CC BRIAN Ag LPN

## 2022-03-01 DIAGNOSIS — N20.1 LEFT URETERAL CALCULUS: Primary | ICD-10-CM

## 2022-03-01 RX ORDER — TRAMADOL HYDROCHLORIDE 50 MG/1
50 TABLET ORAL EVERY 6 HOURS PRN
Qty: 20 TABLET | Refills: 0 | Status: SHIPPED | OUTPATIENT
Start: 2022-03-01 | End: 2022-03-06

## 2022-03-01 RX ORDER — TRAMADOL HYDROCHLORIDE 50 MG/1
50 TABLET ORAL EVERY 6 HOURS PRN
Qty: 30 TABLET | Refills: 0 | Status: CANCELLED | OUTPATIENT
Start: 2022-03-01 | End: 2022-03-31

## 2022-03-01 NOTE — TELEPHONE ENCOUNTER
Patient needs refill for the following med:     traMADol (ULTRAM) 50 MG tablet         [address] [place] [address], [address] [place] [address] [phone]    Please advise

## 2022-03-02 NOTE — PROGRESS NOTES
Physician Progress Note      Naheed Helton  [facility] #:                  [id number]  :                       1940  ADMIT DATE:       2022 9:08 AM  DISCH DATE:        2022 5:15 PM  RESPONDING  PROVIDER #:        Jovanni Billy          QUERY TEXT:    Pt admitted with UTI. Noted documentation of pericardial effusion in CT   abdomen result report on 22. If possible, please document in progress   notes and discharge summary:    The medical record reflects the following:  Risk Factors: 80year old female  Clinical Indicators: Per CT abdomen- Small to moderate pericardial effusion. Per ED note- \"Patient originally stated that she had right sided chest pain   but after further discussion with the patient she reports that her pain is no   longer present on the right side\". .. \"She states that she has had a fever,   chills, weight loss, increasing SOB, and polyuria\". Treatment: Imaging, labs, monitoring    Thank you,  Stephanie AGUILERA RN CDS  Options provided:  -- Pericardial effusion not clinically significant  -- Pericardial effusion is clinically significant  -- Other - I will add my own diagnosis  -- Disagree - Not applicable / Not valid  -- Disagree - Clinically unable to determine / Unknown  -- Refer to Clinical Documentation Reviewer    PROVIDER RESPONSE TEXT:    Pericardial effusion is not clinically significant. Query created by: Aimee Eastman on 3/2/2022 8:05 AM      QUERY TEXT:    Pt admitted with UTI. Noted documentation of interstitial lung disease in H&P   on . If possible, please document in progress notes and discharge   summary:    The medical record reflects the following:  Risk Factors: 80year old female  Clinical Indicators: Per CT abdomen- \"Pulmonary fibrosis noted in the lung   bases. \". Per H&P- \"+ History of interstitial lung disease\". Per discharge   summary- \"Problems on admission. Gweneth Latch Gweneth Latch ILD (interstitial lung disease. Gweneth Latch Gweneth Latch Home O2   evaluation was performed and recommended 3 L/min of O2 support when   ambulating, 2 L/min of O2 support at rest\". Treatment: Home O2 evaluation, imaging, monitoring    Thank you,  Lucy LIMN RN CDS  Options provided:  -- Interstitial lung disease evaluated and treated  -- Interstitial lung disease not evaluated and treated  -- Other - I will add my own diagnosis  -- Disagree - Not applicable / Not valid  -- Disagree - Clinically unable to determine / Unknown  -- Refer to Clinical Documentation Reviewer    PROVIDER RESPONSE TEXT:    Patient's interstitial lung disease was evaluated and treated. Query created by:  Mercedes Noble on 3/2/2022 8:05 AM      Electronically signed by:  Jonita Hashimoto 3/2/2022 9:57 AM

## 2022-03-03 ENCOUNTER — TELEPHONE (OUTPATIENT)
Dept: PULMONOLOGY | Age: 82
End: 2022-03-03

## 2022-03-03 ENCOUNTER — TELEPHONE (OUTPATIENT)
Dept: INTERNAL MEDICINE CLINIC | Age: 82
End: 2022-03-03

## 2022-03-03 NOTE — TELEPHONE ENCOUNTER
Please advise patient Is calling to let  know she is having surgery 3-9-22 and needs to talk to  before procedure. She was told she needs oxygen and should call and talk to her Pulmonary doctor before surgery.

## 2022-03-03 NOTE — TELEPHONE ENCOUNTER
[facility] called into the office to report OT seeing patient two times a week for 4 weeks. Please advise.

## 2022-03-07 ENCOUNTER — CARE COORDINATION (OUTPATIENT)
Dept: CASE MANAGEMENT | Age: 82
End: 2022-03-07

## 2022-03-07 NOTE — CARE COORDINATION
Are you at higher risk for severe illness?  Wash your hands often.  Avoid close contact (6 feet, which is about two arm lengths) with people who are sick.  Put distance between yourself and other people if COVID-19 is spreading in your community.  Clean and disinfect frequently touched surfaces.  Avoid all cruise travel and non-essential air travel.  Call your healthcare professional if you have concerns about COVID-19 and your underlying condition or if you are sick. Pt will take all meds as prescribed and schedule / keep doctor's appt. [facility] provided education on s/s that require medical attention and when to seek medical attention. [facility] advised Pt of use urgent care or physician's 24 hr access line if assistance is needed after hours or on the weekend. Pt denies any needs or concerns and is/ agreeable with additional calls.     Follow Up  Future Appointments   Date Time Provider Romina Shipman   3/8/2022 10:45 AM [facility] [place] [facility]   3/23/2022  8:40 AM Sun Galeano MD [facility] [address]  [place] - DYD   10/19/2022 11:40 AM Sharmaine Godwin MD [facility] P/CC BRIAN Finnegan, RN

## 2022-03-08 ENCOUNTER — OFFICE VISIT (OUTPATIENT)
Dept: ORTHOPEDIC SURGERY | Age: 82
End: 2022-03-08
Payer: MEDICARE

## 2022-03-08 VITALS — WEIGHT: 120 LBS | RESPIRATION RATE: 18 BRPM | BODY MASS INDEX: 22.08 KG/M2 | HEIGHT: 62 IN

## 2022-03-08 DIAGNOSIS — M65.30 TRIGGER FINGER, ACQUIRED: Primary | ICD-10-CM

## 2022-03-08 PROCEDURE — 20550 NJX 1 TENDON SHEATH/LIGAMENT: CPT | Performed by: ORTHOPAEDIC SURGERY

## 2022-03-08 PROCEDURE — 99213 OFFICE O/P EST LOW 20 MIN: CPT | Performed by: ORTHOPAEDIC SURGERY

## 2022-03-08 RX ORDER — LIDOCAINE HYDROCHLORIDE 10 MG/ML
0.5 INJECTION, SOLUTION INFILTRATION; PERINEURAL ONCE
Status: COMPLETED | OUTPATIENT
Start: 2022-03-08 | End: 2022-03-08

## 2022-03-08 RX ORDER — TRIAMCINOLONE ACETONIDE 40 MG/ML
20 INJECTION, SUSPENSION INTRA-ARTICULAR; INTRAMUSCULAR ONCE
Status: COMPLETED | OUTPATIENT
Start: 2022-03-08 | End: 2022-03-08

## 2022-03-08 RX ADMIN — TRIAMCINOLONE ACETONIDE 20 MG: 40 INJECTION, SUSPENSION INTRA-ARTICULAR; INTRAMUSCULAR at 12:27

## 2022-03-08 RX ADMIN — LIDOCAINE HYDROCHLORIDE 0.5 ML: 10 INJECTION, SOLUTION INFILTRATION; PERINEURAL at 12:26

## 2022-03-08 NOTE — PROGRESS NOTES
I last examined this patient 6 months ago at which time I injected the right middle finger for treatment of trigger finger. She obtained prompt and complete relief of all symptoms. Unfortunately, the condition has recurred and the patient returns to the office requesting additional treatment. She complains of pain, swelling, catching and stiffness of the digit for the past 3 weeks. Symptoms have become worse recently. The patient's social history, past medical history, family history, medications, allergies and review of systems have been reviewed, dated 3/8/22 and are recorded in the chart. I have personally examined and reviewed all previous imaging studies, laboratory tests(Glucose, CBC, BMP) and medical encounters pertinent to this patient's visit today. On examination there is mild soft tissue swelling of the digit. There is no deformity. There is tenderness, thickening and nodularity at the base of the flexor tendon sheath. Range of motion is slightly limited in flexion and extension. The digit sticks in flexion and pops into extension, accompanied by some pain. Skin is intact without lesions. Distal circulation and sensation are intact. Muscle strength and coordination are normal.  Reflexes and present bilaterally. Joints are stable. There are no subcutaneous nodules or enlarged epitrochlear lymph nodes. Impression: The right  hand is prepared with Betadine and alcohol and the flexor tendon sheath of the right middle finger is injected with 1/2 milliliter of 1% lidocaine and 20 mg.of triamcinalone, with good filling. The patient is advised regarding the expected response and possible reactions from the injection. Acetaminophen or ibuprofen are prescribed for any significant post injection pain. The patient is asked to call me if full, painless function has not returned within 4 weeks. The possibility of recurrence of the problem is discussed.

## 2022-03-10 ENCOUNTER — TELEPHONE (OUTPATIENT)
Dept: PULMONOLOGY | Age: 82
End: 2022-03-10

## 2022-03-10 NOTE — TELEPHONE ENCOUNTER
Please advise patient need f/u appt from hospital.   She having increased sob. And would like to be seen sooner. Only open slot I had was April 28, can she be seen the week of April 4 th utility week?

## 2022-03-15 ENCOUNTER — CARE COORDINATION (OUTPATIENT)
Dept: CASE MANAGEMENT | Age: 82
End: 2022-03-15

## 2022-03-15 NOTE — CARE COORDINATION
[place] [facility] Transitions Initial Follow Up Call    3/15/22    Patient:  Monique Weiss  Patient :  1940  MRN:  [id number]   Reason for Admission:    Discharge Date:  22  RARS:     CTC attempt to reach Pt regarding recent hospital discharge. CTC left voice recording with call back number requesting a call back. Follow up appointments:    Future Appointments   Date Time Provider [facility]   3/23/2022  8:40 AM [facility] [address] [place] [facility]   2022  1:40 PM Ivana Arzate MD [place] P/CC [facility]   10/19/2022 11:40 AM Sharmaine De La Rosa MD [facility] P/CC [facility]       Yumiko Maldonado.  RAPHAEL MccallN, RN  Care Transition Coordinator  Contact Number:  [phone]

## 2022-03-23 ENCOUNTER — OFFICE VISIT (OUTPATIENT)
Dept: INTERNAL MEDICINE CLINIC | Age: 82
End: 2022-03-23
Payer: MEDICARE

## 2022-03-23 ENCOUNTER — CARE COORDINATION (OUTPATIENT)
Dept: CASE MANAGEMENT | Age: 82
End: 2022-03-23

## 2022-03-23 VITALS
HEIGHT: 62 IN | HEART RATE: 61 BPM | BODY MASS INDEX: 21.71 KG/M2 | SYSTOLIC BLOOD PRESSURE: 120 MMHG | WEIGHT: 118 LBS | OXYGEN SATURATION: 94 % | DIASTOLIC BLOOD PRESSURE: 60 MMHG

## 2022-03-23 DIAGNOSIS — E08.21 DIABETES MELLITUS DUE TO UNDERLYING CONDITION WITH DIABETIC NEPHROPATHY, WITHOUT LONG-TERM CURRENT USE OF INSULIN (HCC): ICD-10-CM

## 2022-03-23 DIAGNOSIS — R61 NIGHT SWEATS: ICD-10-CM

## 2022-03-23 DIAGNOSIS — J84.89 NSIP (NONSPECIFIC INTERSTITIAL PNEUMONIA) (HCC): Primary | ICD-10-CM

## 2022-03-23 DIAGNOSIS — K83.8 INTRAHEPATIC BILE DUCT DILATION: ICD-10-CM

## 2022-03-23 DIAGNOSIS — K14.5 TONGUE FISSURE: ICD-10-CM

## 2022-03-23 DIAGNOSIS — K83.8 COMMON BILE DUCT DILATION: ICD-10-CM

## 2022-03-23 DIAGNOSIS — F32.4 MAJOR DEPRESSIVE DISORDER IN PARTIAL REMISSION, UNSPECIFIED WHETHER RECURRENT (HCC): ICD-10-CM

## 2022-03-23 DIAGNOSIS — E21.3 HYPERPARATHYROIDISM (HCC): ICD-10-CM

## 2022-03-23 DIAGNOSIS — N20.0 KIDNEY STONE: ICD-10-CM

## 2022-03-23 LAB
A/G RATIO: 1.8 (ref 1.1–2.2)
ALBUMIN SERPL-MCNC: 4.6 G/DL (ref 3.4–5)
ALP BLD-CCNC: 138 U/L (ref 40–129)
ALT SERPL-CCNC: 11 U/L (ref 10–40)
ANION GAP SERPL CALCULATED.3IONS-SCNC: 14 MMOL/L (ref 3–16)
AST SERPL-CCNC: 16 U/L (ref 15–37)
BASOPHILS ABSOLUTE: 0.1 K/UL (ref 0–0.2)
BASOPHILS RELATIVE PERCENT: 1 %
BILIRUB SERPL-MCNC: 0.5 MG/DL (ref 0–1)
BUN BLDV-MCNC: 20 MG/DL (ref 7–20)
CALCIUM SERPL-MCNC: 11.2 MG/DL (ref 8.3–10.6)
CHLORIDE BLD-SCNC: 106 MMOL/L (ref 99–110)
CO2: 21 MMOL/L (ref 21–32)
CREAT SERPL-MCNC: 1.1 MG/DL (ref 0.6–1.2)
EOSINOPHILS ABSOLUTE: 0.3 K/UL (ref 0–0.6)
EOSINOPHILS RELATIVE PERCENT: 5.6 %
GFR AFRICAN AMERICAN: 58
GFR NON-AFRICAN AMERICAN: 48
GLUCOSE BLD-MCNC: 148 MG/DL (ref 70–99)
HCT VFR BLD CALC: 36.9 % (ref 36–48)
HEMOGLOBIN: 12.2 G/DL (ref 12–16)
LYMPHOCYTES ABSOLUTE: 1.1 K/UL (ref 1–5.1)
LYMPHOCYTES RELATIVE PERCENT: 17.3 %
MCH RBC QN AUTO: 30.4 PG (ref 26–34)
MCHC RBC AUTO-ENTMCNC: 32.9 G/DL (ref 31–36)
MCV RBC AUTO: 92.4 FL (ref 80–100)
MONOCYTES ABSOLUTE: 0.4 K/UL (ref 0–1.3)
MONOCYTES RELATIVE PERCENT: 6.5 %
NEUTROPHILS ABSOLUTE: 4.3 K/UL (ref 1.7–7.7)
NEUTROPHILS RELATIVE PERCENT: 69.6 %
PDW BLD-RTO: 13.8 % (ref 12.4–15.4)
PLATELET # BLD: 177 K/UL (ref 135–450)
PMV BLD AUTO: 8.3 FL (ref 5–10.5)
POTASSIUM SERPL-SCNC: 4.8 MMOL/L (ref 3.5–5.1)
RBC # BLD: 4 M/UL (ref 4–5.2)
SODIUM BLD-SCNC: 141 MMOL/L (ref 136–145)
TOTAL PROTEIN: 7.1 G/DL (ref 6.4–8.2)
TSH REFLEX: 1.58 UIU/ML (ref 0.27–4.2)
WBC # BLD: 6.2 K/UL (ref 4–11)

## 2022-03-23 PROCEDURE — 99214 OFFICE O/P EST MOD 30 MIN: CPT | Performed by: INTERNAL MEDICINE

## 2022-03-23 SDOH — ECONOMIC STABILITY: FOOD INSECURITY: WITHIN THE PAST 12 MONTHS, YOU WORRIED THAT YOUR FOOD WOULD RUN OUT BEFORE YOU GOT MONEY TO BUY MORE.: NEVER TRUE

## 2022-03-23 SDOH — ECONOMIC STABILITY: FOOD INSECURITY: WITHIN THE PAST 12 MONTHS, THE FOOD YOU BOUGHT JUST DIDN'T LAST AND YOU DIDN'T HAVE MONEY TO GET MORE.: NEVER TRUE

## 2022-03-23 ASSESSMENT — SOCIAL DETERMINANTS OF HEALTH (SDOH): HOW HARD IS IT FOR YOU TO PAY FOR THE VERY BASICS LIKE FOOD, HOUSING, MEDICAL CARE, AND HEATING?: NOT HARD AT ALL

## 2022-03-23 NOTE — PROGRESS NOTES
Dee Dee Arshad (:  1940) is a 80 y.o. female,Established patient, here for evaluation of the following chief complaint(s):  Diabetes (follow up ) and Hypertension (follow up )         ASSESSMENT/PLAN:  1. NSIP (nonspecific interstitial pneumonia) ([facility] [address]   - increased symptoms - will follow up with pulmonologist. Likely needs another CT chest, may need additional workup, has appointment within the next couple of weeks  2. Night sweats  - maybe related to the worsening pulmonary function, no recent significant weight loss. Check labs, other testing as below  -     Comprehensive Metabolic Panel; Future  -     CBC with Auto Differential; Future  -     TSH with Reflex; Future  3. Common bile duct dilation  - non-specific change noted on CT scan, needs MRCP  -     MRI ABDOMEN WO CONTRAST MRCP; Future  4. Intrahepatic bile duct dilation  -     MRI ABDOMEN WO CONTRAST MRCP; Future  5. Hyperparathyroidism ([facility] [address]   - stable, monitor, no symptoms, avoid calcium supplement  6. Tongue fissure   - appears benign, can have the dentist check, have ENT look if significantly changing  7. Major depressive disorder in partial remission, unspecified whether recurrent (HCC)   - stable, continue escitalopram 20mg daily  8. Diabetes mellitus due to underlying condition with diabetic nephropathy, without long-term current use of insulin (HCC)  - controlled off medication  -     Hemoglobin A1C; Future  9. Kidney stone   - first episode, now doing well. Will have repeat CT per urology    Return in about 3 months (around 2022) for weight, shortness of breath. Subjective   SUBJECTIVE/OBJECTIVE:  HPI    She had an episode of kidney stones. She had to have cystoscopy, but issue is now resolved. At rest O2 is normal - desating with exertion. Using oxygen with exertion. Seeing Dr. Kareen Byrne. Night sweats have started since returning home - soaking pajamas. She has not noted a significant weight loss.     Fissure noted in tongue - no symptoms, was noted incidentally by the dental hygienist.     Mood is doing ok on current medication    Not taking any calcium supplement, takes vitamin D3.     Review of Systems       Objective   Physical Exam  Vitals reviewed. Constitutional:       General: She is not in acute distress. Appearance: Normal appearance. She is well-developed. HENT:      Head: Normocephalic and atraumatic. Cardiovascular:      Rate and Rhythm: Normal rate and regular rhythm. Heart sounds: Normal heart sounds. Pulmonary:      Effort: Pulmonary effort is normal. No respiratory distress. Breath sounds: Normal breath sounds. Skin:     General: Skin is warm and dry. Neurological:      Mental Status: She is alert and oriented to person, place, and time. Psychiatric:         Mood and Affect: Mood normal.         Behavior: Behavior normal.         Thought Content: Thought content normal.         Judgment: Judgment normal.                  An electronic signature was used to authenticate this note.     --Oscar Hernandez MD

## 2022-03-23 NOTE — CARE COORDINATION
[place] [facility] Transitions Initial Follow Up Call    3/23/22    Patient:  Ladarius Santiago  Patient :  1940  MRN:  [id number]   Reason for Admission:  Kidney stone  Discharge Date:  22  RARS: 8    CTC attempt to reach Pt regarding recent hospital discharge. CTC unable to leave voice recording with call back number requesting a call back / no answer. Follow up appointments:    Future Appointments   Date Time Provider Romina Shipman   2022  1:40 PM MD Roger Milligan P/CC [facility]   2022 10:00 AM [facility]   2022  8:40 AM MD CYNTHIA Singh [address] [facility] [place] - DYD   10/19/2022 11:40 AM MD Roger Milligan P/CC [facility]       ALE Ramachandran, RN  Care Transition Coordinator  Contact Number:  [phone]

## 2022-03-24 LAB
ESTIMATED AVERAGE GLUCOSE: 134.1 MG/DL
HBA1C MFR BLD: 6.3 %

## 2022-03-27 PROBLEM — R10.32 LEFT LOWER QUADRANT ABDOMINAL PAIN: Status: RESOLVED | Noted: 2020-07-31 | Resolved: 2022-03-27

## 2022-04-04 ENCOUNTER — OFFICE VISIT (OUTPATIENT)
Dept: PULMONOLOGY | Age: 82
End: 2022-04-04
Payer: MEDICARE

## 2022-04-04 VITALS
SYSTOLIC BLOOD PRESSURE: 142 MMHG | DIASTOLIC BLOOD PRESSURE: 63 MMHG | OXYGEN SATURATION: 90 % | HEART RATE: 79 BPM | WEIGHT: 120 LBS | HEIGHT: 62 IN | BODY MASS INDEX: 22.08 KG/M2 | TEMPERATURE: 96.5 F

## 2022-04-04 DIAGNOSIS — J96.11 CHRONIC RESPIRATORY FAILURE WITH HYPOXIA (HCC): ICD-10-CM

## 2022-04-04 DIAGNOSIS — R29.898 MUSCULAR DECONDITIONING: Primary | ICD-10-CM

## 2022-04-04 DIAGNOSIS — J84.89 NSIP (NONSPECIFIC INTERSTITIAL PNEUMONIA) (HCC): ICD-10-CM

## 2022-04-04 PROCEDURE — 1111F DSCHRG MED/CURRENT MED MERGE: CPT | Performed by: INTERNAL MEDICINE

## 2022-04-04 PROCEDURE — 99214 OFFICE O/P EST MOD 30 MIN: CPT | Performed by: INTERNAL MEDICINE

## 2022-04-04 ASSESSMENT — ENCOUNTER SYMPTOMS
COUGH: 0
CHEST TIGHTNESS: 0
WHEEZING: 0
EYE REDNESS: 0
SHORTNESS OF BREATH: 1

## 2022-04-04 NOTE — PROGRESS NOTES
[facility] Pulmonary and Critical Care    Outpatient Note    Subjective:   CHIEF COMPLAINT:   Chief Complaint   Patient presents with    Follow-Up from Hospital     Interval history on 4/4/22  She was admitted to the hospital on 2/23/22 and discharged on 2/25. She was treated for left sided hydronephrosis and E.coli UTI secondary to impacted ureteral stone. She had JJ stent but was then removed. Her main complain at this time is the increase in O2 requirement. She is on it all the time now. There is no increase in cough or sputum production. No fever or chills. Interval history on 10/19/21  She is complaining of cough with clear sputum. Gets SOB when doing things in hurry. She use O2 on PRN basis   There is no fever or chills. Interval history on 4/5/21  Overall she feels fine. She started driving. Mask is making her SOB. Walking is limited due to back pain. She still has O2 but came to the office without it     There is no cough, however she does have sputum production in the morning. He had EGD 2 weeks ago with esophageal dilatation. She also has hiatal hernia for which she is on PPI. Dose was increased to 40mg recently. Interval history on 12/17/20  Overall she feels her breathing is better. She gets SOB with exertion. sats goes down to 79% with exertion. Appetite is poor since  passed away. Interval history on 6/25/20  Overall she feels fine. She is on 2 liters of O2.  sats at rest are 99%. She is following social distancing   She stopped using prednisone 3 weeks ago. However she is getting epidural steroids due to weakness. It was tapered off. Her main complain at this time is she is onO2 all the time. O2 level when sitting even without O2 it is 98-99%. When she walk it goes down to 90% per pt report. She is currently on 2 liters. Interval history on 3/5/20  Lost her  last month.   She came accompanied by a family member who is staying with her these days. There is no SOB at rest.  She is using O2 at home with exertion. Overall she feels her breathing is better. She is still on prednisone 15mg daily. Interval history on 2/3/20  No major events since I saw her last.  She doesn't use O2 unless she feels she needs it. She feels better. Main complain today is left hip pain. Interval history on 1/7/20  Patient is here today for regular f/u. Overall she feels she is getting stronger,. She is not using O2 supplement when getting outside as the portable concentrator bothers her due to its weight. She remains on prednisone 15mg daily     Interval history on 12/10/19  She feels better and denies have SOB at rest however she does have SOB on exertion for which she has to stop. She doesn't like using O2. Pulse ox at rest is 97%, however it goes down within few minutes of exertion    Interval history 11/5/2019  Patient is here today for post hospital follow-up. She was admitted to the hospital on 10/13/2019 and discharged on 10/16/2019. She was admitted with acute hypoxic respiratory failure and pneumonia. She was treated with IV antibiotics and steroids and was discharged on tapering dose prednisone. She was discharged on oxygen. At the time of evaluation she was on room air and she was feeling better. She denied increasing cough or sputum production. There is no fever or chills    Interval history on 9/10/19  Overall she feels better. Coughing is less, and she is able to walk longer distances. Pulse ox is in the mid 90s. There is no fever or chills    Interval history on 7/26/19  Continue to have cough, though she was given taper dose steroids and felt better on it. There is no fever or chills. Sputum is clear. She was seen by rheumatology, there is no evidence of active rheumatological illness. Anti SSA, SSB, CK was ordered.       HPI:  On 7/1/19   The patient is 80 y.o. female who presents today for a new patient visit for SOB. This is not entirely new, but apparently it is slowly getting worse. In the mornings feels out of breath while doing her usual ADL but this gets better as she sit to have breakfast.    She gets SOB on brisk walking and going up hills. She was diagnosed with walking pneumonia in May. At that time she was tired and out of breath. She has chronic cough with occasional sputum production, which is white in color. There is some wt loss ~ 10 lbs after getting pneumonia but gained back two lbs     No nasal congestion but has throat congestion. No post nasal drip. Has GERD for about two years. On omeprazole once daily   She has dry eyes, no dryness in the mouth  No hx of joint pain, warmth or stiffness. No hx of rash. Past Medical History:    Past Medical History:   Diagnosis Date    Diabetes mellitus ([facility] [address]     Essential hypertension, benign     GERD (gastroesophageal reflux disease)     Hyperlipidemia     Interstitial lung disease (HCC)     Osteoarthrosis, unspecified whether generalized or localized, unspecified site     osteoarthritis lower leg    Osteopenia     Shingles        Social History:    Social History     Tobacco Use   Smoking Status Never Smoker   Smokeless Tobacco Never Used       Family History:  Family History   Problem Relation Age of Onset    Heart Disease Mother     Rheum Arthritis Mother     Heart Disease Father        Current Medications:  Current Outpatient Medications on File Prior to Visit   Medication Sig Dispense Refill    traMADol (ULTRAM) 50 MG tablet Take 50 mg by mouth every 6 hours as needed for Pain. As needed (Patient not taking: Reported on 2/28/2022)      lidocaine (LIDODERM) 5 % Place 1 patch onto the skin daily as needed for Pain 12 hours on, 12 hours off.       cyanocobalamin 1000 MCG tablet Take 1,000 mcg by mouth daily      pantoprazole (PROTONIX) 40 MG tablet TAKE ONE TABLET BY MOUTH EVERY MORNING BEFORE BREAKFAST 90 tablet 1    traZODone (DESYREL) 100 MG tablet TAKE ONE TABLET BY MOUTH ONCE NIGHTLY AS NEEDED FOR SLEEP 90 tablet 1    diclofenac (VOLTAREN) 50 MG EC tablet Take 1 tablet by mouth 3 times daily as needed for Pain 60 tablet 3    Cholecalciferol (VITAMIN D3) 25 MCG (1000 UT) CAPS TAKE ONE CAPSULE BY MOUTH DAILY 90 capsule 1    rosuvastatin (CRESTOR) 40 MG tablet TAKE ONE TABLET BY MOUTH DAILY 90 tablet 3    escitalopram (LEXAPRO) 20 MG tablet TAKE ONE TABLET BY MOUTH DAILY 90 tablet 3    amLODIPine (NORVASC) 10 MG tablet TAKE ONE TABLET BY MOUTH DAILY 90 tablet 3    Probiotic Product (PROBIOTIC ACIDOPHILUS BIOBEADS) CAPS Take 1 capsule by mouth daily 90 capsule 0    vitamin E 1000 UNITS capsule Take 1,000 Units by mouth daily. No current facility-administered medications on file prior to visit. REVIEW OF SYSTEMS:    Review of Systems   Constitutional: Negative for chills and fever. HENT: Negative for postnasal drip. Eyes: Negative for redness. Dry eyes   Respiratory: Positive for shortness of breath (on exertion). Negative for cough, chest tightness and wheezing. Cardiovascular: Negative for chest pain, palpitations and leg swelling. Gastrointestinal:        GERD   Musculoskeletal: Negative for arthralgias and joint swelling. Skin: Negative for rash. Neurological: Negative for weakness. Psychiatric/Behavioral: The patient is not nervous/anxious. All other systems reviewed and are negative. Objective:   PHYSICAL EXAM:        VITALS:  BP (!) 142/63 (Site: Right Upper Arm, Position: Sitting, Cuff Size: Medium Adult)   Pulse 79   Temp 96.5 °F (35.8 °C) (Infrared)   Ht 5' 2\" (1.575 m)   Wt 120 lb (54.4 kg)   SpO2 90%   BMI 21.95 kg/m²     Physical Exam  Vitals reviewed. Constitutional:       Appearance: She is well-developed. HENT:      Head: Normocephalic and atraumatic.    Eyes:      Pupils: Pupils are equal, round, and reactive to light. Neck:      Vascular: No JVD. Cardiovascular:      Rate and Rhythm: Normal rate and regular rhythm. Heart sounds: No murmur heard. Pulmonary:      Effort: Pulmonary effort is normal. No respiratory distress. Breath sounds: No stridor. Rales present. No wheezing. Abdominal:      General: Bowel sounds are normal.      Palpations: Abdomen is soft. Musculoskeletal:         General: No deformity. Cervical back: Neck supple. Skin:     General: Skin is warm and dry. Neurological:      Mental Status: She is alert and oriented to person, place, and time. Psychiatric:         Behavior: Behavior normal.         DATA:    CT chest with contrast       HISTORY: Pulmonary nodules.       COMPARISON: February 7, 2019.       Individualized dose optimization technique was used in order to meet ALARA standards for radiation dose reduction.  In addition to vendor specific dose reduction algorithms, the dose reduction techniques vary based on the specific scanner utilized but    frequently include automated exposure control, adjustment of the mA and/or kV according to patient size, and use of iterative reconstruction technique.           FINDINGS:       Basilar predominant groundglass opacity with areas of traction bronchiectasis are similar to the prior examination. There is no definite honeycombing identified.       7 mm nodule identified in the right upper lobe (image 38, series 200) is unchanged since prior exam.       5 mm nodule in the left upper lobe (image 27, series 2) unchanged. No new or enlarging pulmonary nodules.       A small pericardial effusion is identified. Mildly enlarged mediastinal lymph nodes are stable since the prior examination.       Hiatal hernia is present.       There is no destructive bone lesion.           Impression   1. Stable pulmonary nodules. Continued follow-up is indicated. 2. Stable appearance of interstitial lung disease as described above. There is no definite honeycombing. The favored differential diagnosis is nonspecific interstitial pneumonia. This would be an atypical appearance for usual interstitial pneumonia. 3. Mediastinal adenopathy, stable. 4. Hiatal hernia. Echo on 6/11/19  Normal left ventricle size. There is mild concentric left ventricular   hypertrophy. Overall left ventricular systolic function appears normal with   an ejection fraction visually estimated at 55%. No regional wall motion   abnormalities are noted. Diastolic filling parameters suggest grade II   diastolic dysfunction.   Trace mitral regurgitation is present.   Trivial tricuspid regurgitation. Estimated pulmonary artery systolic   pressure is elevated at 44 mmHg assuming a right atrial pressure of 3 mmHg. PFT  DATE OF PROCEDURE:  06/11/2019     INDICATION:  Shortness of breath.     BMI:  31.6.     TOBACCO HISTORY:  Never smoked.     Spirometry data is acceptable and reproducible.     Lung volumes performed via plethysmography.     Room air oxygen saturation is 92%.     SPIROMETRY:  FEV1 to FVC ratio is 80%. Prebronchodilator FEV1 is 1.12,  which is 58% of predicted. Postbronchodilator FEV1 is 1.36 which is 71%  of predicted for a 21% increase. Prebronchodilator FVC is 1.4, which is  54% of predicted. Postbronchodilator FVC is 1.84 which is 71% of  predicted for 31% increase.     Lung volumes showed total lung capacity of 3.04 which is 62% of  predicted. Residual volume is 1.46 which is 64% of predicted.     Diffusion capacity is 9.61, which is 45% of predicted. This is not  adjusted for hemoglobin.     IMPRESSION:  1. No obstructive defect. 2.  There is a significant response to bronchodilators in small and  large airways. 3.  Moderate restrictive defect is present. 4.  Moderate decrease in diffusion capacity    CT scan on 10/8/19  1. Stable findings compared prior study.  Nonspecific interstitial pneumonia is favored in the absence of lower lobe predominance and lack of honeycomb changes. 2. Stable right upper lobe pulmonary nodule. 3. Annual surveillance with high-resolution CT is recommended for. Large hiatal hernia. 5. Coronary artery calcification     01/20/2020   PULMONARY FUNCTION TEST     INDICATIONS:  Interstitial lung disease.     BMI:  31.5.     TOBACCO HISTORY:  Never smoked.     Spirometry data is acceptable and reproducible.     Lung volumes performed via plethysmography.     Room air oxygen saturation is 97%.     SPIROMETRY:  FEV1 to FVC ratio is 86%. Prebronchodilator FEV1 is 1.57,  which is 91% of predicted. Prebronchodilator FVC is 1.83, which is 78%  of predicted.     Lung volumes show total lung capacity of 2.88, which is 63% of  predicted. Residual volume is 0.92, which is 42% of predicted.     Diffusion capacity is 16.02, which is 80% of predicted.     IMPRESSION:  1. No obstructive defect. 2.  There is a mild restrictive defect. 3.  Diffusion capacity is normal.  4.  When compared to previous pulmonary function tests dated 10/08/2019,  there is a mild-to-moderate drop in FVC, FEV1, and total lung capacity. Diffusion capacity is substantially higher, almost double making me  wonder his diffusion capacity in 10/2019 was real.     Six-minute walk was initially performed on room air. Of note, the  patient is on home oxygen 2 liters. At rest on room air, the patient's  oxygen saturation was 98%. Within 2 minutes, it dropped to 85%  necessitating pause and walk to place 2 liters of oxygen on. With 2  liters of oxygen, her oxygen saturation maintained 93 to 97%. The  patient walked a total of 660 feet.     The patient has significant oxygen desaturation with submaximal  exercise. She does qualify for home oxygen with exertion and oxygen  flow rate is deemed to be 2 liters.     pulmonary function tests and 6MWT on 6/2/20  6/3/2020 10:20 AM  Pulmonary Function Test:     Indication: NSIP    Test comment:     Spirometry data is acceptable and reproducible.     Maximum effort given during the test.    Pulse ox is 95% on room air    Estimated body mass index is 26.15 kg/m² as calculated from the   following:    Height as of 5/5/20: 5' 2.01\" (1.575 m).    Weight as of 5/26/20: 143 lb (64.9 kg). Spirometry data:    FEV1/FVC: 87. Predicted ratio 74    FEV1 1.59L, which is 87% predicted    FVC is 1.82L, which is 74% predicted    Lung Volumes:    TLC (by Plethysmography) is 2.63L, which is 55% predicted    RV is 0.67L which is 29% predicted    Diffusion Capacity:    DLCO is 5.43 which is 26% predicted    Impression:    1. There is no obstruction present    2. There is restriction.  Based on FEV1 it is considered mild   however based on TLC it is considered severe    3. There is severe reduction in diffusion capacity    Comment:   PFT was compared to the one on 1/20/20 - FEV1 and FVC are stable.      SIX-MINUTE WALK:    A six-minute walk was started on room air.  Patient was placed on   2 liters of O2 at minute 4   The patientwalked a total of 510 feet.  She did stop or pause   during the walk to place O2 and due to back pain.  Baseline  oxygen saturation 95%.  The lowest oxygen saturation during the   walk was 82% on minute 3 for which she was placed on 2 liters.    O2 recovered to 95-98% during the remaining time of testing. 6MWT was compared to the one we have on 10/8/19:  [place] walked decreased from 660ft to 510ft  At that time O2 sats dropped to 86% on minute 4 and placed on O2   on minute 5    Comment: 6MWT is slightly worse than how it was last year on   10/8/19     High res CT on 1/20/21  Stable findings of the chest when compared with the prior study. Findings again favor an NSIP pattern of interstitial lung disease.       Stable pulmonary nodularity.      CT abdomen on 2/23/22  EXAM: CT ABDOMEN AND PELVIS WITH CONTRAST ON 2/23/2022       INDICATION: Left lower quadrant pain       COMPARISON: CT abdomen/pelvis 7/28/2020     TECHNIQUE: Multidetector CT imaging was obtained from lung bases through pelvis. Axial images and multiplanar reformatted images are provided for review. Dose modulation, iterative reconstruction and/or weight based adjustments of the mA/kV were utilized    to reduce radiation dose to as low as reasonably achievable       IV Contrast: 80 cc Isovue 370   Oral Contrast: None       LIMITATION: None       FINDINGS:       : No additional abnormality       LUNG BASES: Honeycombing is noted in the lung bases. There is a small pericardial effusion. The heart is enlarged.       LIVER: The liver is homogeneous in its enhancement.       SPLEEN: The spleen is normal in size and attenuation.       GALLBLADDER AND BILIARY TREE: No calcified stones are seen. The common bile duct is dilated measuring up to 9 to 10 mm. There is mild intrahepatic ductal dilatation noted as well.        PANCREAS: Pancreas appears homogeneous in its enhancement without evidence of enlargement.       ADRENAL GLANDS: Normal.       KIDNEYS AND URETERS: The kidneys concentrate contrast bilaterally. There is a 5 mm nonobstructing calculus in the inferior pole of the left kidney.       There is moderate to severe left-sided hydronephrosis and hydroureter to the level of the ureterovesicular junction. This is caused by a 5 mm calculus at the left UVJ. There is no right-sided hydronephrosis or hydroureter. No right-sided nephrolithiasis    is visualized.       BOWEL: No oral contrast was given. There is a large fetal hernia. The stomach is unremarkable. The duodenum is normal in location. Small bowel is normal in caliber. The colon shows scattered diverticuli. No definite wall thickening or inflammatory    change.  Colon is filled with stool.  The appendix is normal. The terminal ileum is unremarkable.       PERITONEUM/RETROPERITONEUM: No ascites or free air is visualized.       LYMPH NODES: No retroperitoneal or pelvic lymphadenopathy is visualized.     VESSELS: Aorta is normal in caliber with mild calcification. Celiac and SMA are normal.  Portal and hepatic veins are patent. The splenic vein is patent. The SMV is unremarkable.       REPRODUCTIVE ORGANS: The uterus and ovaries are unremarkable.       URINARY BLADDER: Normal.       ABDOMINAL WALL: Normal.       BONES: Degenerative changes are seen throughout the lumbar spine. There is a grade 1 anterolisthesis of L4 and L5.       OTHER FINDINGS: None.           Impression       Moderate to severe left-sided hydronephrosis and hydroureter caused by a 5 mm obstructing calculus at the left UVJ.       Mild intra and extrahepatic ductal dilatation, indeterminate. MRCP may be helpful for evaluation.       Left-sided nephrolithiasis.       Large hiatal hernia.       Pulmonary fibrosis noted in the lung bases.       Small to moderate pericardial effusion     Assessment:      Diagnosis Orders   1. NSIP (nonspecific interstitial pneumonia) ([facility])     2. Chronic respiratory failure with hypoxia [facility])         Plan:   68years old female with NSIP. Diagnosis is based on clinical presentation, CT findings and response to steroids. She also has large hiatal hernia      PFT and 6MWT on 6/2/20 are essentially stable. Had bronch on 8/1/19  Started on prednisone @ 20mg on 8/5/19 decrease to 15 mg in Dec. 2019  Tapered off around mid May 2020 due to weakness and back issues. CT scan on 1/20/21   Reviewed: stable. PFT on 10/6/21 reviewed:  TLC improved from 63% to 72%, however FVC decreased from 78% to 68%. RV improved from 42 to 84%. Overall restriction is stable. Oxygen requirement is also stable. (There is no specific exposure that she or her family can remember  She is not on chronic medication that is known to cause ILD  She does not have pets  Hypersensitivity panel is negative   Rheumatoid factor, GIUSEPPE and sed rate are negative. SSA and SSB are negative  She was seen by rheumatology.   There is no evidence of active rheumatic disease. BAL is negative for infectious etiology)     Echo on 6/11/19 with grade II diastolic dysfunction. At that time estimated RVSP was 44    CT chest on 1/20/21 compared with CT abdomen on 2/23/22. Lower lungs with stable changes. Main complain today is worsening O2 requirement after hospital discharge on 2/25. 6MWT was done and findings are similar to prior PFT. Sats are in upper 90s at rest, goes down to ~ 80% with exertion. She is deconditioned due to recent hospitalization with sepsis, E.coli UTI and kidney stone.       Plan:  Continue O2 with exertion  Flutter valve, will help with bronchiectasis secondary to ILD  Physical therapy   F/u in one year

## 2022-04-07 ENCOUNTER — CARE COORDINATION (OUTPATIENT)
Dept: CARE COORDINATION | Age: 82
End: 2022-04-07

## 2022-04-07 DIAGNOSIS — J96.11 CHRONIC HYPOXEMIC RESPIRATORY FAILURE (HCC): ICD-10-CM

## 2022-04-07 DIAGNOSIS — R29.898 MUSCULAR DECONDITIONING: ICD-10-CM

## 2022-04-07 DIAGNOSIS — R29.898 DEFICIENCIES OF LIMBS: ICD-10-CM

## 2022-04-07 DIAGNOSIS — J84.89 ORGANIZED PNEUMONIA (HCC): Primary | ICD-10-CM

## 2022-04-07 NOTE — CARE COORDINATION
Ambulatory Care Coordination Note  4/7/2022  CM Risk Score: 2  Charlson 10 Year Mortality Risk Score: 98%     ACC: Daniela Irving, RN [facility]    Hx: Type 2 DM, HTN, ILD, PNA, Pure Hypercholesterolemia, Anxiety, Weight Loss, Hyperparathyroidism, UTI, Kidney Stone, Sepsis    Summary Note: The RN, ACM introduced herself to the pt and Care Coordination. The pt agreed to work with the RN, ACM. Her main complain at this time is the increase in O2 requirement. She is on it all the time now and stated it is hard to get around the house with the tubing. . The pt recently saw her pulmonologist and he felt the pt is deconditioned due to recent hospitalization with sepsis, E.coli UTI and kidney stone. The pulmonologist recommended the pt request home health PT from her PCP. Plan:  Continue on all current medications. Continue O2 with exertion. Flutter valve, will help with bronchiectasis secondary to ILD  The RN, ACM will consult with the PCP in r/t home PT. The RN, ACM will review SDOH and medications at the next encounter. Ambulatory Care Coordination Assessment    Care Coordination Protocol  Program Enrollment: Complex Care  Referral from Primary Care Provider: No  Week 1 - Initial Assessment     Do you have all of your prescriptions and are they filled?: Yes  Barriers to medication adherence: None  Are you able to afford your medications?: Yes  How often do you have trouble taking your medications the way you have been told to take them?: I always take them as prescribed. Do you have Home O2 Therapy?: Yes   Oxygen Regimen: PRN Flow - Enter rate/FIO2: 2   Method of Delivery: Nasal Cannula   CPAP Use: None      Ability to seek help/take action for Emergent Urgent situations i.e. fire, crime, inclement weather or health crisis.: Needs Assistance  Ability to ambulate to restroom: Independent  Ability handle personal hygeine needs (bathing/dressing/grooming):  Independent  Ability to manage Medications: Independent  Ability to prepare Food Preparation: Independent  Ability to maintain home (clean home, laundry): Needs Assistance  Ability to drive and/or has transportation: Dependent  Ability to do shopping: Dependent  Ability to manage finances: Independent  Is patient able to live independently?: Yes     Current Housing: Private Residence        Per the Fall Risk Screening, did the patient have 2 or more falls or 1 fall with injury in the past year?: No     Frequent urination at night?: No  Do you use rails/bars?: Yes  Do you have a non-slip tub mat?: Yes     Are you experiencing loss of meaning?: No  Are you experiencing loss of hope and peace?: No     Thinking about your patient's physical health needs, are there any symptoms or problems (risk indicators) you are unsure about that require further investigation?: Moderate to severe symptoms or problems that impact on daily life   Are the patients physical health problems impacting on their mental well-being?: Mild impact on mental well-being e.g. \"\"feeling fed-up\"\", \"\"reduced enjoyment\"\"   Are there any problems with your patients lifestyle behaviors (alcohol, drugs, diet, exercise) that are impacting on physical or mental well-being?: No identified areas of concern   Do you have any other concerns about your patients mental well-being?  How would you rate their severity and impact on the patient?: Mild problems - don't interfere with function   How would you rate their home environment in terms of safety and stability (including domestic violence, insecure housing, neighbor harassment)?: Safe, stable, but with some inconsistency   How do daily activities impact on the patient's well-being? (include current or anticipated unemployment, work, caregiving, access to transportation or other): Some general dissatisfaction but no concern   How would you rate their social network (family, work, friends)?: Adequate participation with social networks   How would you rate their financial resources (including ability to afford all required medical care)?: Financially secure, some resource challenges   How wells does the patient now understand their health and well-being (symptoms, signs or risk factors) and what they need to do to manage their health?: Reasonable to good understanding and already engages in managing health or is willing to undertake better management   How well do you think your patient can engage in healthcare discussions? (Barriers include language, deafness, aphasia, alcohol or drug problems, learning difficulties, concentration): Clear and open communication, no identified barriers   Do other services need to be involved to help this patient?: Other care/services in place but not sufficient   Are current services involved with this patient well-coordinated? (Include coordination with other services you are now recommendation): Required care/services missing and/or fragmented   Suggested Interventions and Community Resources  Diabetes Education: Not Started   Fall Risk Prevention: In Process Other Services or Interventions: Sees pulmonology   Physical Therapy: In Process   Zone Management Tools: In Process         Set up/Review Goals, Set up/Review an Education Plan              Prior to Admission medications    Medication Sig Start Date End Date Taking? Authorizing Provider   traMADol (ULTRAM) 50 MG tablet Take 50 mg by mouth every 6 hours as needed for Pain. As needed  Patient not taking: Reported on 2/28/2022    Historical Provider, MD   lidocaine (LIDODERM) 5 % Place 1 patch onto the skin daily as needed for Pain 12 hours on, 12 hours off.     Historical Provider, MD   cyanocobalamin 1000 MCG tablet Take 1,000 mcg by mouth daily    Historical Provider, MD   pantoprazole (PROTONIX) 40 MG tablet TAKE ONE TABLET BY MOUTH EVERY MORNING BEFORE BREAKFAST 2/17/22   Huma Lockhart MD   traZODone (DESYREL) 100 MG tablet TAKE ONE TABLET BY MOUTH ONCE NIGHTLY AS NEEDED FOR SLEEP 2/14/22   Jeanie Carrion MD   diclofenac (VOLTAREN) 50 MG EC tablet Take 1 tablet by mouth 3 times daily as needed for Pain 11/30/21   Jeanie Carrion MD   Cholecalciferol (VITAMIN D3) 25 MCG (1000 UT) CAPS TAKE ONE CAPSULE BY MOUTH DAILY 11/12/21   AMEE Rome CNP   rosuvastatin (CRESTOR) 40 MG tablet TAKE ONE TABLET BY MOUTH DAILY 8/26/21   Jeanie Carrion MD   escitalopram (LEXAPRO) 20 MG tablet TAKE ONE TABLET BY MOUTH DAILY 8/26/21   Jeanie Carrion MD   amLODIPine (NORVASC) 10 MG tablet TAKE ONE TABLET BY MOUTH DAILY 8/26/21   Jeanie Carrion MD   Probiotic Product (PROBIOTIC ACIDOPHILUS BIOBEADS) CAPS Take 1 capsule by mouth daily 2/23/21   AMEE Godoy CNP   vitamin E 1000 UNITS capsule Take 1,000 Units by mouth daily.       Historical Provider, MD       Future Appointments   Date Time Provider Romina Shipman   4/14/2022 10:00 AM [facility]   6/23/2022  8:40 AM Jeanie Carrion MD [facility] [address] [facility] [place] - [facility]   4/3/2023  1:00 PM MD Roger Chávez P/CC [facility]     ,   Diabetes Assessment    Meal Planning: Avoidance of concentrated sweets   How often do you test your blood sugar?: No Testing   Have you ever had to go to the ED for symptoms of low blood sugar?: No       No patient-reported symptoms       and   General Assessment    Do you have any symptoms that are causing concern?: Yes  Progression since Onset: Unchanged  Reported Symptoms: Shortness of Breath, Weakness

## 2022-04-08 ENCOUNTER — TELEPHONE (OUTPATIENT)
Dept: ORTHOPEDIC SURGERY | Age: 82
End: 2022-04-08

## 2022-04-08 NOTE — TELEPHONE ENCOUNTER
Patient states right hand middle finger still having trigger problems. Please call patient to advise.

## 2022-04-12 ENCOUNTER — TELEPHONE (OUTPATIENT)
Dept: INTERNAL MEDICINE CLINIC | Age: 82
End: 2022-04-12

## 2022-04-12 NOTE — TELEPHONE ENCOUNTER
Patients home care is calling to inform of her      Occupational therapy completed today    Insurance only allows for  evaluation only    FYI

## 2022-04-13 ENCOUNTER — CARE COORDINATION (OUTPATIENT)
Dept: CARE COORDINATION | Age: 82
End: 2022-04-13

## 2022-04-13 NOTE — CARE COORDINATION
Ambulatory Care Coordination Note  4/13/2022  CM Risk Score: 2  Charlson 10 Year Mortality Risk Score: 98%     ACC: Vic Jesus RN [facility]    Hx: Type 2 DM, HTN, ILD, PNA, Pure Hypercholesterolemia, Anxiety, Weight Loss, Hyperparathyroidism, UTI, Kidney Stone, Sepsis, Chronic Respiratory Failure with Hypoxia       Summary Note: The pt's main complain at this time is the increase in O2 requirement. She is on it all the time now during the day. The pt stated she gets SOB with activity. There is no increase in cough or sputum production. The pt stated she went to [facility] to get some exercise and walk around and she used her portable O2. The pt stated her SpO2 on the O2 is 97-99% but her SpO2 will drop significantly without the O2. The pt stated her pulmonologist told she does not need to sleep with the O2 and her RA SpO2 at rest stays 91-93%. The pt stated [facility] re-evaluated the pt and she does not qualify for additional OT or PT. The pt stated her last PT was on 4/8/22. The pt stated she has been doing the exercises and feels like she is getting stronger. Plan:  The pt will use her O2 during the day with activity. The pt is scheduled for an MRI of her right hip on 4/14/22. Continue to do her PT exercises. Continue on all current medications. Care Coordination Interventions    Program Enrollment: Complex Care  Referral from Primary Care Provider: No  Suggested Interventions and Community Resources  Diabetes Education: Not Started  Fall Risk Prevention: In Process  Physical Therapy: In Process  Zone Management Tools: In Process  Other Services or Interventions: Sees pulmonology         Goals Addressed    None         Prior to Admission medications    Medication Sig Start Date End Date Taking? Authorizing Provider   traMADol (ULTRAM) 50 MG tablet Take 50 mg by mouth every 6 hours as needed for Pain.  As needed  Patient not taking: Reported on 2/28/2022    Historical Provider, MD lidocaine (LIDODERM) 5 % Place 1 patch onto the skin daily as needed for Pain 12 hours on, 12 hours off. Historical Provider, MD   cyanocobalamin 1000 MCG tablet Take 1,000 mcg by mouth daily    Historical Provider, MD   pantoprazole (PROTONIX) 40 MG tablet TAKE ONE TABLET BY MOUTH EVERY MORNING BEFORE BREAKFAST 2/17/22   Mary Alonso MD   traZODone (DESYREL) 100 MG tablet TAKE ONE TABLET BY MOUTH ONCE NIGHTLY AS NEEDED FOR SLEEP 2/14/22   Mary Alonso MD   diclofenac (VOLTAREN) 50 MG EC tablet Take 1 tablet by mouth 3 times daily as needed for Pain 11/30/21   Mary Alonso MD   Cholecalciferol (VITAMIN D3) 25 MCG (1000 UT) CAPS TAKE ONE CAPSULE BY MOUTH DAILY 11/12/21   AMEE Domingo CNP   rosuvastatin (CRESTOR) 40 MG tablet TAKE ONE TABLET BY MOUTH DAILY 8/26/21   Mary Alonso MD   escitalopram (LEXAPRO) 20 MG tablet TAKE ONE TABLET BY MOUTH DAILY 8/26/21   Mary Alonso MD   amLODIPine (NORVASC) 10 MG tablet TAKE ONE TABLET BY MOUTH DAILY 8/26/21   Mary Alonso MD   Probiotic Product (PROBIOTIC ACIDOPHILUS BIOBEADS) CAPS Take 1 capsule by mouth daily 2/23/21   AMEE Gongora CNP   vitamin E 1000 UNITS capsule Take 1,000 Units by mouth daily.       Historical Provider, MD       Future Appointments   Date Time Provider Romina Shipman   4/14/2022 10:00 AM [facility]   6/23/2022  8:40 AM Mary Alonso MD [facility] [address] [facility] [place] - [facility]   4/3/2023  1:00 PM MD Roger Chang P/CC [facility]      and   General Assessment

## 2022-04-14 ENCOUNTER — HOSPITAL ENCOUNTER (OUTPATIENT)
Dept: MRI IMAGING | Age: 82
Discharge: HOME | End: 2022-04-14
Payer: MEDICARE

## 2022-04-14 DIAGNOSIS — K83.8 INTRAHEPATIC BILE DUCT DILATION: ICD-10-CM

## 2022-04-14 DIAGNOSIS — K83.8 COMMON BILE DUCT DILATION: ICD-10-CM

## 2022-04-14 PROCEDURE — 74181 MRI ABDOMEN W/O CONTRAST: CPT

## 2022-05-11 ENCOUNTER — TELEPHONE (OUTPATIENT)
Dept: PULMONOLOGY | Age: 82
End: 2022-05-11

## 2022-05-11 ENCOUNTER — CARE COORDINATION (OUTPATIENT)
Dept: CARE COORDINATION | Age: 82
End: 2022-05-11

## 2022-05-11 NOTE — CARE COORDINATION
Ambulatory Care Coordination Note  5/11/2022  CM Risk Score: 2  Charlson 10 Year Mortality Risk Score: 98%     ACC: Fabiano Broussard, RN [facility]    Hx: Type 2 DM, HTN, ILD, PNA, Pure Hypercholesterolemia, Anxiety, Weight Loss, Hyperparathyroidism, UTI, Kidney Stone, Sepsis      Summary Note: The pt stated she normally has some SOB and wears O2 @ 2 L/min per NC. The pt stated her SpO2 on O2 has been 97'-99% but if she takes the O2 off she can drop to 85 % or lower. The pt stated she had 2 episodes in the last 3 weeks where she has been sitting and became increasingly SOB. The pt stated both times it has happened after she has taken a sip of water or coffee. The pt denied any current increased SOB. The pt denied any chest pain or palpitations when she gets SOB. The pt denied a cough or increased sputum. The pt stated she normally cough in the morning when she first gets up and will bring up some whitish mucus. The pt stated she is able to go out with her portable O2 to the store or hairdresser. Plan:  The RN, ACM will assist the pt in obtaining a sooner appointment with the PCP. Care Coordination Interventions    Program Enrollment: Complex Care  Referral from Primary Care Provider: No  Suggested Interventions and Community Resources  Diabetes Education: In Process  Fall Risk Prevention: In Process  Physical Therapy: Completed  Zone Management Tools: In Process  Other Services or Interventions: Sees pulmonology         Goals Addressed                 This Visit's Progress     Community Resource Goal   No change     I will follow up with my COA CM to see if I qualify for more services.     Barriers: lack of motivation, financial, and lack of education  Plan for overcoming my barriers: Work with the RN, ACM and COA CM  Confidence: 7/10  Anticipated Goal Completion Date: 7/7/22       Reduce Falls    On track     I will reduce my risk of falls by the following: Use walking aids like cane or walker  Follow through on orders for PT    Barriers: financial and stress  Plan for overcoming my barriers: Work with the RN, AMBAR  Confidence: 8/10  Anticipated Goal Completion Date: 7/7/22            Prior to Admission medications    Medication Sig Start Date End Date Taking? Authorizing Provider   traMADol (ULTRAM) 50 MG tablet Take 50 mg by mouth every 6 hours as needed for Pain. As needed  Patient not taking: Reported on 2/28/2022    Historical Provider, MD   lidocaine (LIDODERM) 5 % Place 1 patch onto the skin daily as needed for Pain 12 hours on, 12 hours off. Historical Provider, MD   cyanocobalamin 1000 MCG tablet Take 1,000 mcg by mouth daily    Historical Provider, MD   pantoprazole (PROTONIX) 40 MG tablet TAKE ONE TABLET BY MOUTH EVERY MORNING BEFORE BREAKFAST 2/17/22   Kera Brown MD   traZODone (DESYREL) 100 MG tablet TAKE ONE TABLET BY MOUTH ONCE NIGHTLY AS NEEDED FOR SLEEP 2/14/22   Kera Brown MD   diclofenac (VOLTAREN) 50 MG EC tablet Take 1 tablet by mouth 3 times daily as needed for Pain 11/30/21   Kera Brown MD   Cholecalciferol (VITAMIN D3) 25 MCG (1000 UT) CAPS TAKE ONE CAPSULE BY MOUTH DAILY 11/12/21   AMEE Ngo CNP   rosuvastatin (CRESTOR) 40 MG tablet TAKE ONE TABLET BY MOUTH DAILY 8/26/21   Kera Brown MD   escitalopram (LEXAPRO) 20 MG tablet TAKE ONE TABLET BY MOUTH DAILY 8/26/21   Kera Brown MD   amLODIPine (NORVASC) 10 MG tablet TAKE ONE TABLET BY MOUTH DAILY 8/26/21   Kera Brown MD   Probiotic Product (PROBIOTIC ACIDOPHILUS BIOBEADS) CAPS Take 1 capsule by mouth daily 2/23/21   AMEE Reynolds CNP   vitamin E 1000 UNITS capsule Take 1,000 Units by mouth daily.       Historical Provider, MD       Future Appointments   Date Time Provider Romina Ramonita   6/23/2022  8:40 AM MD [facility]ANNA [facility] [address] [facility] [place] - [facility]   4/3/2023  1:00 PM MD Roger Hendrickson P/[facility]     ,   Diabetes Assessment    Meal Planning: Avoidance of concentrated sweets   How often do you test your blood sugar?: No Testing   Have you ever had to go to the ED for symptoms of low blood sugar?: No       No patient-reported symptoms       and   General Assessment    Do you have any symptoms that are causing concern?: Yes  Progression since Onset: Intermittent - Waxing/Waning  Reported Symptoms: Shortness of Breath (Comment: Pt stated 2 episode of onset of SOB)

## 2022-05-11 NOTE — TELEPHONE ENCOUNTER
C/o epiisodes of SOB, gasping for air, during which she \"can't get any breath\", lasting about 3 mins, until she \"can calm myself down and breathe heavily until I can get my breath\". She has had two episodes in past 3 weeks. She had one that was bad enough to break a blood vessel in her eye. She states that they occur during rest, not times of exertion. She speculated that they might occur after drinking fluid (once coffee, once water). She would like a call to discuss. [phone]  Best to reach her after 5 pm today. She also has a call out to Dr. Harper Walton, PCP, regarding this.

## 2022-05-12 ENCOUNTER — HOSPITAL ENCOUNTER (OUTPATIENT)
Age: 82
Discharge: HOME | End: 2022-05-12
Payer: MEDICARE

## 2022-05-12 ENCOUNTER — OFFICE VISIT (OUTPATIENT)
Dept: INTERNAL MEDICINE CLINIC | Age: 82
End: 2022-05-12
Payer: MEDICARE

## 2022-05-12 ENCOUNTER — HOSPITAL ENCOUNTER (OUTPATIENT)
Dept: GENERAL RADIOLOGY | Age: 82
Discharge: HOME | End: 2022-05-12
Payer: MEDICARE

## 2022-05-12 VITALS
BODY MASS INDEX: 22.26 KG/M2 | HEIGHT: 62 IN | WEIGHT: 121 LBS | HEART RATE: 94 BPM | DIASTOLIC BLOOD PRESSURE: 68 MMHG | SYSTOLIC BLOOD PRESSURE: 132 MMHG

## 2022-05-12 DIAGNOSIS — R06.02 SHORTNESS OF BREATH: ICD-10-CM

## 2022-05-12 DIAGNOSIS — R06.02 SHORTNESS OF BREATH: Primary | ICD-10-CM

## 2022-05-12 PROCEDURE — 99214 OFFICE O/P EST MOD 30 MIN: CPT | Performed by: INTERNAL MEDICINE

## 2022-05-12 PROCEDURE — 71046 X-RAY EXAM CHEST 2 VIEWS: CPT

## 2022-05-12 NOTE — TELEPHONE ENCOUNTER
Jacki calls back and apologizes for missing your call yesterday. Ask if you could give her a callback today.   [phone]

## 2022-05-12 NOTE — PROGRESS NOTES
Srini Rossi (:  1940) is a 80 y.o. female,Established patient, here for evaluation of the following chief complaint(s):  Shortness of Breath (popped blood vessel in right eye )         ASSESSMENT/PLAN:  1. Shortness of breath  -Episodic, severe shortness of breath. May be related to underlying pulmonary disease. Less likely infectious since she has inconsistent symptoms and no fevers, will check chest x-ray. We will also evaluate echo, she had some pulmonary hypertension noted on an echo in 2019, I wonder if this may have changed. -     ECHO Complete 2D W Doppler W Color; Future  -     XR CHEST (2 VW); Future    Follow-up as scheduled in     Subjective   SUBJECTIVE/OBJECTIVE:  HPI    She has been experiencing intermittent episodes when she cannot catch her breath. Does not happen with exertion (she has shortness of breath with exertion but it is different than these acute episodes), she has sudden onset of shortness of breath when sitting. She had 2 episodes in the last few weeks, she had them prior to that but very infrequently. She does not recall any palpitations, chest pain, or chest pressure. The onset is so severe that she only focuses on catching her breath. [facility], the other occurred when she was sitting at home. She was wearing the oxygen when she was at home. She did not have her pulse oximeter on at this time. She is concerned that it became more frequent. She does not feel that it was triggered by anxiety. She denies any orthopnea recently. No weight gain. Review of Systems       Objective   Physical Exam  Vitals reviewed. Constitutional:       General: She is not in acute distress. Appearance: She is well-developed. HENT:      Head: Normocephalic and atraumatic. Cardiovascular:      Rate and Rhythm: Normal rate and regular rhythm. Heart sounds: Murmur heard.     Systolic murmur is present with a grade of 2/6.      Pulmonary:      Effort: Pulmonary effort is normal. No respiratory distress. Breath sounds: Normal breath sounds. Skin:     General: Skin is warm and dry. Neurological:      Mental Status: She is alert and oriented to person, place, and time. Psychiatric:         Behavior: Behavior normal.         Thought Content: Thought content normal.         Judgment: Judgment normal.                  An electronic signature was used to authenticate this note.     --Mary Alonso MD

## 2022-05-31 ENCOUNTER — CARE COORDINATION (OUTPATIENT)
Dept: CARE COORDINATION | Age: 82
End: 2022-05-31

## 2022-06-01 NOTE — CARE COORDINATION
RN, ACM Note:  The pt called back and stated she is moving to a [facility] community. The pt stated she will need another portable O 2 tank because the hallways are long. The pt asked the nurse how to go about getting another portable tank. The RN, ACM instructed the pt to contact her O2 supplier and ask what is needed and then call the nurse back if more information or an order is needed from the PCP.

## 2022-06-06 ENCOUNTER — TELEPHONE (OUTPATIENT)
Dept: INTERNAL MEDICINE CLINIC | Age: 82
End: 2022-06-06

## 2022-06-06 DIAGNOSIS — R13.10 DYSPHAGIA, UNSPECIFIED TYPE: Primary | ICD-10-CM

## 2022-06-07 NOTE — TELEPHONE ENCOUNTER
Spoke with son, she has had issues with swallowing before typically due to sinus and reflux issues, but recently has been having dysphagia only to liquids. She does have an active prescription for 40 mg of pantoprazole. Since dysphagia is only to liquids would start with SLP evaluation, referral placed and will contact patient with the information.

## 2022-06-13 ENCOUNTER — HOSPITAL ENCOUNTER (OUTPATIENT)
Dept: NON INVASIVE DIAGNOSTICS | Age: 82
Discharge: HOME | End: 2022-06-13
Payer: MEDICARE

## 2022-06-13 DIAGNOSIS — R06.02 SHORTNESS OF BREATH: ICD-10-CM

## 2022-06-13 LAB
LV EF: 63 %
LVEF MODALITY: NORMAL

## 2022-06-13 PROCEDURE — 93306 TTE W/DOPPLER COMPLETE: CPT

## 2022-06-14 ENCOUNTER — CARE COORDINATION (OUTPATIENT)
Dept: CARE COORDINATION | Age: 82
End: 2022-06-14

## 2022-06-20 ENCOUNTER — CARE COORDINATION (OUTPATIENT)
Dept: CARE COORDINATION | Age: 82
End: 2022-06-20

## 2022-06-20 ENCOUNTER — TELEPHONE (OUTPATIENT)
Dept: INTERNAL MEDICINE CLINIC | Age: 82
End: 2022-06-20

## 2022-06-20 NOTE — CARE COORDINATION
RN, ACM Note:  The RN, ACM spoke with the PCP and relayed the pt's symptoms. The PCP will call the pt's son to discuss a plan of care. Pt was informed that the PCP will call and discuss the pt's care with her son, Rosa Wright who is a physician. The pt was in agreement with the plan.

## 2022-06-20 NOTE — CARE COORDINATION
Patient contacted regarding COVID-19 diagnosis and pulse oximeter ordered at discharge. Discussed COVID-19 related testing which was available at this time. Test results were positive. Patient informed of results, if available? Yes. Pt was at her son's yesterday and he tested the pt and she found out she had COVID. Ambulatory Care Manager contacted the patient by telephone to perform post discharge assessment. Call within 2 business days of discharge: Yes. Verified name and  with patient as identifiers. Provided introduction to self, and explanation of the CTN/ACM role, and reason for call due to risk factors for infection and/or exposure to COVID-19. Symptoms reviewed with patient who verbalized the following symptoms: cough  shortness of breath  sweating  diarrhea  no new symptoms  no worsening symptoms  headache, sore throat, left ear, red spots in mucus when she blows her nose. Pt becomes SOB with exertion and SpO2 drops in the 80's. The pt increased her O2 to 3 L/min per nc. The pt stated her SpO2 has been 94-95% at rest but drops to 91% with exertion. Pt stated her HR at rest has been 48-49. Due to no new or worsening symptoms encounter was routed to provider for escalation. Discussed follow-up appointments. If no appointment was previously scheduled, appointment scheduling offered: Pt does not have a smart phone to do a virtual appointment. Pt has a scheduled appointment for 22 and the RN, AMBAR instructed the pt to see if the PCP wants to rescheduled it. [facility] follow up appointment(s):   Future Appointments   Date Time Provider Romina Shipman   2022  8:40 [facility] [place] [facility]   4/3/2023  1:00 PM Amaryllis Lundborg, MD [facility] P[facility]     Non-face-to-face services provided:  Reviewed signs and symptoms to report to the PCP. Reviewed  isolation protocols     Advance Care Planning:   Does patient have an Advance Directive:  decision maker updated. Educated patient about risk for severe COVID-19 due to risk factors according to CDC guidelines. ACM reviewed discharge instructions, medical action plan and red flag symptoms with the patient who verbalized understanding. Discussed COVID vaccination status: Yes. Education provided on COVID-19 vaccination as appropriate. Discussed exposure protocols and quarantine with CDC Guidelines. Patient was given an opportunity to verbalize any questions and concerns and agrees to contact ACM or health care provider for questions related to their healthcare. Reviewed and educated patient on any new and changed medications related to discharge diagnosis     Was patient discharged with a pulse oximeter? Yes and Pt has her own pulse oximeter . SpO2 is 91-94% on 3 l/min per nc. Discussed and confirmed pulse oximeter discharge instructions and when to notify provider or seek emergency care. The RN, ACM explained to the pt that the PCP did want to do a virtual appointment b/c she wanted to start the pt on an antiviral but she needs to adjust some of the pt's other medications. The pt does not have a smart phone and does not how to do a visit on her computer. The RN, ACM will contact the PCP. ACM provided contact information. Plan for follow-up call in 3-5 days based on severity of symptoms and risk factors.

## 2022-06-20 NOTE — TELEPHONE ENCOUNTER
Patients son is calling regarding his mom. He said she has covid and he wanted t make sure she was ok. He is out of town and was concerned.   She has lung disease an on oxygen and was tested for covid the other day     He would like to see if maybe she can get an appt or what should she do        Please advise and call

## 2022-06-20 NOTE — TELEPHONE ENCOUNTER
Yes which pharmacy? I called her son, we will cut the amlodipine and rosuvastatin in half while she is on the paxlovid. He will also communicate this to his brother who helps with her meds.   If her oxygen drops further then would recommend ED evaluation

## 2022-06-20 NOTE — TELEPHONE ENCOUNTER
If she is able to do a virtual visit I can squeeze her in this afternoon  I would want her to take paxlovid so even if she can't do a virtual, I can send it - though I would need her to adjust a couple of the medications she takes while she is on the paxlovid

## 2022-06-21 ENCOUNTER — CARE COORDINATION (OUTPATIENT)
Dept: CARE COORDINATION | Age: 82
End: 2022-06-21

## 2022-06-21 NOTE — CARE COORDINATION
The pt stated she has a scheduled appointment for this week and wants to know when she should reschedule since she has COVID.  Pt taking the antiviral.

## 2022-06-27 ENCOUNTER — TELEPHONE (OUTPATIENT)
Dept: ENT CLINIC | Age: 82
End: 2022-06-27

## 2022-06-27 NOTE — TELEPHONE ENCOUNTER
The patient returned your call.  She is unable to come in earlier and has requested to keep her 3pm appointment

## 2022-06-28 ENCOUNTER — PROCEDURE VISIT (OUTPATIENT)
Dept: SPEECH THERAPY | Age: 82
End: 2022-06-28
Payer: MEDICARE

## 2022-06-28 DIAGNOSIS — R13.12 OROPHARYNGEAL DYSPHAGIA: ICD-10-CM

## 2022-06-28 DIAGNOSIS — K21.9 LARYNGOPHARYNGEAL REFLUX (LPR): ICD-10-CM

## 2022-06-28 DIAGNOSIS — J84.9 ILD (INTERSTITIAL LUNG DISEASE) (HCC): ICD-10-CM

## 2022-06-28 PROCEDURE — 92612 ENDOSCOPY SWALLOW (FEES) VID: CPT | Performed by: SPEECH-LANGUAGE PATHOLOGIST

## 2022-06-28 PROCEDURE — 92526 ORAL FUNCTION THERAPY: CPT | Performed by: SPEECH-LANGUAGE PATHOLOGIST

## 2022-06-28 NOTE — PROGRESS NOTES
[facility]) Ear, Nose, and Throat  SPEECH THERAPY  Fiberoptic Endoscopic Evaluation of Swallowing  (FEES)/Treatment      Name: Jessica Lynn  YOB: 1940  Gender: female  MRN: [id number]  Referring Physician: Dr. Kate Griffith  Diagnosis: Dysphagia, unspecified  Onset Date: ~6 months ago  History of Present Illness/Injury:    Patient Active Problem List    Diagnosis Date Noted    Left flank pain 08/30/2011    DM (diabetes mellitus) ([address] 06/24/2010    Essential hypertension 06/24/2010    Pure hypercholesterolemia 06/24/2010    Hyperparathyroidism ([address] 03/23/2022    Left ureteral calculus     Kidney stone 02/23/2022    Trigger finger, acquired 09/15/2021    [facility] depressive disorder [facility] partial remission [facility] 07/27/2021    Unintentional weight loss 07/27/2021    Hypercalcemia 07/27/2021    Anemia 07/27/2021    Decreased appetite 10/13/2020    Left knee pain 08/26/2020    Other age-related cataract 07/09/2020    Chronic midline low back pain without sciatica 05/05/2020    Left hip pain 02/24/2020    Urinary tract infection without hematuria 02/05/2020    NSIP (nonspecific interstitial pneumonia) ([address] 12/10/2019    ILD (interstitial lung disease) ([facility] 07/02/2019    Anxiety 12/11/2018     Date of Exam: 6/28/2022    Recent Chest X-ray (5/12/22)-  Impression       Persistent interstitial infiltrates suggesting edema and/or fibrosis with partial improvement from prior study. Previous SLP Evaluations- NA    Patient Complaints/Reason for Referral:  Jessica Lynn was referred for a FEES to assess the efficiency of his/her swallow function, assess for aspiration, and to make recommendations regarding safe dietary consistencies, effective compensatory strategies, and safe eating environment. SUBJECTIVE:   Pt presents w/ multiple month hx of dysphagia characterized by coughing during meals, specifically w/ thin liquids. Does not happen daily, but can happen 3-4x weekly.  Long coughing spell occurs after, resulting in difficulty catching breath. Son present and endorsed occurs most frequently when pt eating out at restaurants; related to distraction. Occasional difficulties w/ solids (specifically bread) that results in \"sticking\" sensation; tries to clear w/ liquid wash, which results in coughing episode. Reported sensation of thick mucus in the chest that is difficult to clear; worse in the morning. Hx of acid reflux; on Pantoprazole 40 mg. Endorsed mild dysphonia characterized by roughness. Hx of dyspnea w/ ILD; followed by pulmonology but does utilize supplemental oxygen. IMPRESSIONS:   Pt presents w/ grossly functional oropharyngeal swallow characterized by timely swallow initiation and adequate pharyngeal constriction; diffuse pharyngeal residue w/ thin liquids t/o pharynx however, noted to have thick mucus/secretions that mixed w/ liquid. No pharyngeal residue observed w/ any consistencies >thin liquid. No penetration/aspiration noted w/ initial trials of thin liquids; trace aspiration noted x1 during last swallow of evaluation, d/t increasing SOB and decreased swallow coordination for apneic period. RECOMMENDATIONS:  Diet: [] NPO   [] Alternative means of nutrition / hydration  [x] PO  Solid Consistency: Regular  Liquid Consistency:  Thin  Compensatory Techniques: Effortful swallow; small, single sips; reduce environmental distractions  Positioning/Supervision: Upright; remain upright 30-45 min  Referral to: [x] SLP (Dysphagia Tx)   [] ENT  [x] GI    ORAL MOTOR EXAMINATION:  Alert : [x] Yes  [] No    Functional Comm : [x] intact  [] impaired [] absent  Voice Quality : [x] normal  [] hoarse  [] wet  [] aphonic  [] breathy  [] strained   -Pt reported mild hoarseness  -[place]; does wear hearing aids    Tongue Range of Motion :  [x] intact  [] impaired [] absent    Tongue Strength :  [x] intact  [] impaired [] absent   Velopharyngeal competence : [x] intact  [] impaired [] absent   Gag : LEFT: [x] intact  [] impaired [] absent     RIGHT: [x] intact  [] impaired [] absent  Volitional Cough : [x] intact  [] impaired [] absent   Volitional Swallow : [x] intact  [] impaired [] absent   Spontaneous Swallow : [x] intact  [] impaired [] absent     ENDOSCOPIC EXAMINATION:  Vocal Fold adduction : [x] Complete  [] Incomplete   Interarytenoid/Post-com Edema : [x] Yes  [] No   Arytenoid Edema : [x] Yes  [] No   Arytenoid Erythema : [] Yes  [x] No   Vocal Fold Edema : [] Yes  [x] No   Pharyngeal Squeeze : LEFT: [x] intact  [] impaired [] absent      RIGHT:[x] intact  [] impaired [] absent    SWALLOWING EVALUATION:  Testing position :[x] chair, upright  [] chair, 45 degrees  [] bed, upright      [] bed, 45 degrees [] fully upright  Baseline Pooling of Secretions : [x] Yes  [] No    -Thick mucus draining from nasopharynx onto PPW  -Collection of thick & frothy secretions in valleculae extending into bilateral pyriforms  Baseline Penetration of Secretions : [] Yes  [x] No   Baseline Aspiration of Secretions : [] Yes  [x] No  Backflow of Secretions : [] Yes  [x] No    CONSISTENCIES TRIALED :   Thin Puree MechSoft Solid   Spillage [] Yes [x] No [] Yes  [x] No [] Yes  [x] No [] Yes  [x] No     Laryngeal  Penetration  Able to clear? [] Yes [x] No    [] Yes [] No [] Yes  [x] No    [] Yes  [] No [] Yes  [x] No    [] Yes  [] No [] Yes  [x] No    [] Yes  [] No     Pharyngeal Residue  Able to clear? [x] Yes [] No    [x] Yes [] No [] Yes  [x] No    [] Yes  [] No [] Yes  [x] No    [] Yes  [] No [] Yes  [x] No    [] Yes  [] No     Pharyngeal Pooling  Able to clear? [x] Yes [] No    [x] Yes [] No [] Yes  [x] No    [] Yes  [] No [] Yes  [x] No    [] Yes  [] No [] Yes  [x] No    [] Yes  [] No   Aspiration    Response    Able to clear?  [x] Yes [] No    []Silent [x]Cough    [] Yes  [x] No [] Yes  [x] No    []Silent []Cough    [] Yes  [] No [] Yes  [x] No    []Silent []Cough    [] Yes  [] No [] Yes  [x] No    []Silent []Cough    [] Yes  [] No       Bolus Backflow at UES  Able to clear? [] Yes  [x] No      [] Yes  [] No [] Yes  [x] No      [] Yes  [] No [] Yes  [x] No      [] Yes  [] No [] Yes  [x] No      [] Yes  [] No        Beginning of evaluation            Post-thin liquids     Post-puree              Post-regular solids     Aspiration              Aspiration    PPW secretions    COMPENSATORY TECHNIQUES FOR INCREASED SAFETY:  Postural Changes : [] Yes  [x] No   Comments:    INCREASED RISK OF ASPIRATION DUE TO:  Poor Oral control and/or copious oral residue : [] Yes  [x] No   Redcued laryngopharyngeal sensation : [] Yes  [x] No  Premature spillage of bolus : [] Yes  [x] No  Inability to clear material from valleculae, pharynx, pyriform sinus or endolarynx : [] Yes  [x] No   Backflow to Pharynx : [] Yes  [x] No     Other: Impaired breath coordination during meals    Endoscope was removed without incident, no adverse reactions. PENETRATION-ASPIRATION SCALE (PAS)  [] 8 Material enters the airway, passes below the vocal folds, and no effort is made to eject  [x] 7 Material enters the airway, passes below the vocal folds, and is not ejected from the trachea despite effort  [] 6 Material enters the airway, passes below the vocal folds, and is ejected into the larynx or out of the airway  [] 5 Material enters the airway, contacts the vocal folds, and is not ejected into the airway  [] 4 Material enters the airway, contacts the vocal folds, and is ejected from the airway  [] 3 Material enters the airway, remains above the vocal folds, and is not ejected from airway  [] 2 Material enters the airway, remains above the vocal folds, and is ejected from airway  [] 1 Material does not enter the airway    Dysphagia Treatment Goals & Education  -Reviewed FEES w/ pt and son; educated to grossly functional swallow, specifically given pt's age. Reviewed x1 episode of aspiration at end of evaluation; son endorsed same coughing episode that occurs at home. -Discussed ILD and direct impact on breath coordination and SOB; discussed initiation of tx for coordination of swallow to reduce mismatched apneic phase which could result in ongoing aspiration. Expressed understanding.  -Educated to suspected component of reflux, specifically related to sensation of solids sticking and thick, white mucus in the mornings. Will discuss reflux medication w/ PCP and considering increasing BID to see if any changes occur. SLP recommended: Yes  Barriers to treatment: Respiratory deficits  Potential benefits from rehab include: Improved swallow physiology (timing/coordination)  Frequency: 4 sessions over 6-8/wk  Prognosis is: Fair    RECOMMENDATIONS:   1. Dr. Jerod Massey was present to assist in diagnosis and provide assessment and plan for treatment. 2. Follow good hygiene behaviors and precautions, including increasing oral care and hydration. 3. Follow dietary precautions and behavioral lifestyle changes regarding laryngopharyngeal reflux, including taking PPI as prescribed by physician. 4. Dysphagia therapy to focus on re-strengthening and balancing the pharyngeal/laryngeal musculature, to increase efficiency and safety of swallow function, and decrease possible comorbidities. 5. Pt is a good candidate for further medical evaluation/intervention at the discretion of the treating physician. 6. Pt to follow up with PCP/Dr. Manning .       CPT Code Units Billed Time Billed Today Date of POC Start Re-Certification Date Referring Provider   [phone] 2 Unit Time in: 1500  Time out: 7421  Total time: 55 min 6/28/2022 60 days Dr. Nichelle Moreno     Thank you,    Wolf Baig) [address], [address] [place] [address]; [id number]  Speech-Language Pathologist

## 2022-06-29 ENCOUNTER — CARE COORDINATION (OUTPATIENT)
Dept: CARE COORDINATION | Age: 82
End: 2022-06-29

## 2022-06-29 NOTE — CARE COORDINATION
Patient contacted regarding COVID-19 diagnosis. Discussed COVID-19 related testing which was available at this time. Test results were positive. Patient informed of results, if available? Yes    Ambulatory Care Manager contacted the patient by telephone to perform follow-up assessment. Verified name and  with patient as identifiers. Patient has following risk factors of: pneumonia  diabetes  HTN. Pt stated she is doing much better. The pt stated she was seen and evaluated by the Speech Therapist on 22. The pt has a scheduled appointment with the PCP on 22. Symptoms reviewed with patient who verbalized the following symptoms: no new symptoms  no worsening symptoms. Due to no new or worsening symptoms encounter was not routed to provider for escalation. Educated patient about risk for severe COVID-19 due to risk factors according to CDC guidelines. ACM reviewed discharge instructions, medical action plan and red flag symptoms with the patient who verbalized understanding. Discussed COVID vaccination status: Yes. Education provided on COVID-19 vaccination as appropriate. Discussed exposure protocols and quarantine with CDC Guidelines. Patient was given an opportunity to verbalize any questions and concerns and agrees to contact ACM or health care provider for questions related to their healthcare. Was patient discharged with a pulse oximeter? no    ACM provided contact information. No further follow-up call identified based on severity of symptoms and risk factors.

## 2022-07-01 ENCOUNTER — OFFICE VISIT (OUTPATIENT)
Dept: INTERNAL MEDICINE CLINIC | Age: 82
End: 2022-07-01
Payer: MEDICARE

## 2022-07-01 VITALS
SYSTOLIC BLOOD PRESSURE: 110 MMHG | HEART RATE: 62 BPM | WEIGHT: 122 LBS | OXYGEN SATURATION: 93 % | HEIGHT: 62 IN | DIASTOLIC BLOOD PRESSURE: 46 MMHG | BODY MASS INDEX: 22.45 KG/M2

## 2022-07-01 DIAGNOSIS — E08.21 DIABETES MELLITUS DUE TO UNDERLYING CONDITION WITH DIABETIC NEPHROPATHY, WITHOUT LONG-TERM CURRENT USE OF INSULIN (HCC): ICD-10-CM

## 2022-07-01 DIAGNOSIS — E21.3 HYPERPARATHYROIDISM (HCC): ICD-10-CM

## 2022-07-01 DIAGNOSIS — I10 ESSENTIAL HYPERTENSION: ICD-10-CM

## 2022-07-01 DIAGNOSIS — J34.89 SINUS PAIN: Primary | ICD-10-CM

## 2022-07-01 DIAGNOSIS — J84.9 ILD (INTERSTITIAL LUNG DISEASE) (HCC): ICD-10-CM

## 2022-07-01 DIAGNOSIS — E83.52 HYPERCALCEMIA: ICD-10-CM

## 2022-07-01 PROCEDURE — 99214 OFFICE O/P EST MOD 30 MIN: CPT | Performed by: INTERNAL MEDICINE

## 2022-07-01 PROCEDURE — 1123F ACP DISCUSS/DSCN MKR DOCD: CPT | Performed by: INTERNAL MEDICINE

## 2022-07-01 RX ORDER — AMOXICILLIN AND CLAVULANATE POTASSIUM 875; 125 MG/1; MG/1
1 TABLET, FILM COATED ORAL 2 TIMES DAILY
Qty: 10 TABLET | Refills: 0 | Status: SHIPPED | OUTPATIENT
Start: 2022-07-01 | End: 2022-07-06

## 2022-07-01 RX ORDER — ESOMEPRAZOLE MAGNESIUM 40 MG/1
40 CAPSULE, DELAYED RELEASE ORAL
Qty: 90 CAPSULE | Refills: 1 | Status: SHIPPED | OUTPATIENT
Start: 2022-07-01

## 2022-07-01 ASSESSMENT — PATIENT HEALTH QUESTIONNAIRE - PHQ9
9. THOUGHTS THAT YOU WOULD BE BETTER OFF DEAD, OR OF HURTING YOURSELF: 0
SUM OF ALL RESPONSES TO PHQ QUESTIONS 1-9: 0
4. FEELING TIRED OR HAVING LITTLE ENERGY: 0
7. TROUBLE CONCENTRATING ON THINGS, SUCH AS READING THE NEWSPAPER OR WATCHING TELEVISION: 0
8. MOVING OR SPEAKING SO SLOWLY THAT OTHER PEOPLE COULD HAVE NOTICED. OR THE OPPOSITE, BEING SO FIGETY OR RESTLESS THAT YOU HAVE BEEN MOVING AROUND A LOT MORE THAN USUAL: 0
1. LITTLE INTEREST OR PLEASURE IN DOING THINGS: 0
SUM OF ALL RESPONSES TO PHQ QUESTIONS 1-9: 0
SUM OF ALL RESPONSES TO PHQ QUESTIONS 1-9: 0
SUM OF ALL RESPONSES TO PHQ9 QUESTIONS 1 & 2: 0
3. TROUBLE FALLING OR STAYING ASLEEP: 0
6. FEELING BAD ABOUT YOURSELF - OR THAT YOU ARE A FAILURE OR HAVE LET YOURSELF OR YOUR FAMILY DOWN: 0
5. POOR APPETITE OR OVEREATING: 0
SUM OF ALL RESPONSES TO PHQ QUESTIONS 1-9: 0
2. FEELING DOWN, DEPRESSED OR HOPELESS: 0

## 2022-07-01 NOTE — PROGRESS NOTES
Alo Perez (:  1940) is a [de-identified] y.o. female,Established patient, here for evaluation of the following chief complaint(s):  Shortness of Breath (follow up )         ASSESSMENT/PLAN:  1. Sinus pain   -Treat with Augmentin 875-125 mg twice daily x5 days  2. ILD (interstitial lung disease) ([facility])   -Currently stable, though continues to have significant symptoms. Continue supplemental oxygen. 3. Hyperparathyroidism ([address]  -Calcium had improved, reassess  -     Comprehensive Metabolic Panel; Future  -     Lipid Panel; Future  -     CBC with Auto Differential; Future  -     Hemoglobin A1C; Future  4. Hypercalcemia   -As above, reassess  5. Essential hypertension   -Blood pressure is on the low side, diastolic below 50. Decrease amlodipine to 5 mg daily  6. Diabetes mellitus due to underlying condition with diabetic nephropathy, without long-term current use of insulin ([facility])  -Diet controlled  -     Comprehensive Metabolic Panel; Future  -     Lipid Panel; Future  -     CBC with Auto Differential; Future  -     Hemoglobin A1C; Future      Return in about 2 months (around 2022) for weight, blood pressure. Subjective   SUBJECTIVE/OBJECTIVE:  HPI    Facial pain - starts over left eye, goes down into cheek. Thinks it is the sinuses. COVID symptoms seem to have resolved. No dental pain. Biggest ongoing issue is the breathing. She has fatigue moving around. She is using the supplemental oxygen. Hyperparathyroidism/hypercalcemia - no confusion or nausea. She is taking the amlodipine 10 mg daily for blood pressure. Denies lightheadedness. Review of Systems       Objective   Physical Exam  Vitals reviewed. Constitutional:       General: She is not in acute distress. Appearance: Normal appearance. She is well-developed. HENT:      Head: Normocephalic and atraumatic. Nose:      Right Sinus: Maxillary sinus tenderness present. No frontal sinus tenderness.    Cardiovascular: Rate and Rhythm: Normal rate and regular rhythm. Heart sounds: Normal heart sounds. Pulmonary:      Effort: Pulmonary effort is normal. No respiratory distress. Comments: Bilateral crackles at baseline  On supplemental oxygen  Skin:     General: Skin is warm and dry. Neurological:      Mental Status: She is alert. Psychiatric:         Mood and Affect: Mood normal.         Behavior: Behavior normal.         Thought Content: Thought content normal.         Judgment: Judgment normal.                  An electronic signature was used to authenticate this note.     --Gertrudis Pickens MD

## 2022-07-05 DIAGNOSIS — E21.3 HYPERPARATHYROIDISM (HCC): ICD-10-CM

## 2022-07-05 DIAGNOSIS — E08.21 DIABETES MELLITUS DUE TO UNDERLYING CONDITION WITH DIABETIC NEPHROPATHY, WITHOUT LONG-TERM CURRENT USE OF INSULIN (HCC): ICD-10-CM

## 2022-07-05 LAB
A/G RATIO: 1.7 (ref 1.1–2.2)
ALBUMIN SERPL-MCNC: 4.4 G/DL (ref 3.4–5)
ALP BLD-CCNC: 106 U/L (ref 40–129)
ALT SERPL-CCNC: 6 U/L (ref 10–40)
ANION GAP SERPL CALCULATED.3IONS-SCNC: 14 MMOL/L (ref 3–16)
AST SERPL-CCNC: 16 U/L (ref 15–37)
BASOPHILS ABSOLUTE: 0.1 K/UL (ref 0–0.2)
BASOPHILS RELATIVE PERCENT: 0.6 %
BILIRUB SERPL-MCNC: 0.6 MG/DL (ref 0–1)
BUN BLDV-MCNC: 26 MG/DL (ref 7–20)
CALCIUM SERPL-MCNC: 10.7 MG/DL (ref 8.3–10.6)
CHLORIDE BLD-SCNC: 103 MMOL/L (ref 99–110)
CHOLESTEROL, TOTAL: 153 MG/DL (ref 0–199)
CO2: 21 MMOL/L (ref 21–32)
CREAT SERPL-MCNC: 0.9 MG/DL (ref 0.6–1.2)
EOSINOPHILS ABSOLUTE: 0.4 K/UL (ref 0–0.6)
EOSINOPHILS RELATIVE PERCENT: 4.3 %
GFR AFRICAN AMERICAN: >60
GFR NON-AFRICAN AMERICAN: >60
GLUCOSE BLD-MCNC: 266 MG/DL (ref 70–99)
HCT VFR BLD CALC: 35.6 % (ref 36–48)
HDLC SERPL-MCNC: 53 MG/DL (ref 40–60)
HEMOGLOBIN: 11.8 G/DL (ref 12–16)
LDL CHOLESTEROL CALCULATED: 71 MG/DL
LYMPHOCYTES ABSOLUTE: 1.1 K/UL (ref 1–5.1)
LYMPHOCYTES RELATIVE PERCENT: 13.1 %
MCH RBC QN AUTO: 31.1 PG (ref 26–34)
MCHC RBC AUTO-ENTMCNC: 33.2 G/DL (ref 31–36)
MCV RBC AUTO: 93.5 FL (ref 80–100)
MONOCYTES ABSOLUTE: 0.4 K/UL (ref 0–1.3)
MONOCYTES RELATIVE PERCENT: 5.1 %
NEUTROPHILS ABSOLUTE: 6.3 K/UL (ref 1.7–7.7)
NEUTROPHILS RELATIVE PERCENT: 76.9 %
PDW BLD-RTO: 13.5 % (ref 12.4–15.4)
PLATELET # BLD: 200 K/UL (ref 135–450)
PMV BLD AUTO: 8.2 FL (ref 5–10.5)
POTASSIUM SERPL-SCNC: 4.4 MMOL/L (ref 3.5–5.1)
RBC # BLD: 3.81 M/UL (ref 4–5.2)
SODIUM BLD-SCNC: 138 MMOL/L (ref 136–145)
TOTAL PROTEIN: 7 G/DL (ref 6.4–8.2)
TRIGL SERPL-MCNC: 146 MG/DL (ref 0–150)
VLDLC SERPL CALC-MCNC: 29 MG/DL
WBC # BLD: 8.2 K/UL (ref 4–11)

## 2022-07-06 LAB
ESTIMATED AVERAGE GLUCOSE: 131.2 MG/DL
HBA1C MFR BLD: 6.2 %

## 2022-07-25 ENCOUNTER — OFFICE VISIT (OUTPATIENT)
Dept: PULMONOLOGY | Age: 82
End: 2022-07-25
Payer: MEDICARE

## 2022-07-25 VITALS
SYSTOLIC BLOOD PRESSURE: 145 MMHG | HEART RATE: 62 BPM | TEMPERATURE: 96.6 F | OXYGEN SATURATION: 93 % | BODY MASS INDEX: 21.71 KG/M2 | DIASTOLIC BLOOD PRESSURE: 67 MMHG | WEIGHT: 118 LBS | HEIGHT: 62 IN

## 2022-07-25 DIAGNOSIS — J84.89 NSIP (NONSPECIFIC INTERSTITIAL PNEUMONIA) (HCC): Primary | ICD-10-CM

## 2022-07-25 PROBLEM — J84.9 ILD (INTERSTITIAL LUNG DISEASE) (HCC): Status: RESOLVED | Noted: 2019-07-02 | Resolved: 2022-07-25

## 2022-07-25 PROCEDURE — 99212 OFFICE O/P EST SF 10 MIN: CPT | Performed by: INTERNAL MEDICINE

## 2022-07-25 PROCEDURE — 1123F ACP DISCUSS/DSCN MKR DOCD: CPT | Performed by: INTERNAL MEDICINE

## 2022-07-25 ASSESSMENT — ENCOUNTER SYMPTOMS
CHEST TIGHTNESS: 0
COUGH: 0
WHEEZING: 0
EYE REDNESS: 0
SHORTNESS OF BREATH: 1

## 2022-07-25 NOTE — PROGRESS NOTES
[facility] Pulmonary and Critical Care    Outpatient Note    Subjective:   CHIEF COMPLAINT:   Chief Complaint   Patient presents with    Shortness of Breath     Interval history on 7/25/22  Main complain is OLIVER. She is selling her house and moving to assisted living. Interval history on 4/4/22  She was admitted to the hospital on 2/23/22 and discharged on 2/25. She was treated for left sided hydronephrosis and E.coli UTI secondary to impacted ureteral stone. She had JJ stent but was then removed. Her main complain at this time is the increase in O2 requirement. She is on it all the time now. There is no increase in cough or sputum production. No fever or chills. Interval history on 10/19/21  She is complaining of cough with clear sputum. Gets SOB when doing things in hurry. She use O2 on PRN basis   There is no fever or chills. Interval history on 4/5/21  Overall she feels fine. She started driving. Mask is making her SOB. Walking is limited due to back pain. She still has O2 but came to the office without it     There is no cough, however she does have sputum production in the morning. He had EGD 2 weeks ago with esophageal dilatation. She also has hiatal hernia for which she is on PPI. Dose was increased to 40mg recently. Interval history on 12/17/20  Overall she feels her breathing is better. She gets SOB with exertion. sats goes down to 79% with exertion. Appetite is poor since  passed away. Interval history on 6/25/20  Overall she feels fine. She is on 2 liters of O2.  sats at rest are 99%. She is following social distancing   She stopped using prednisone 3 weeks ago. However she is getting epidural steroids due to weakness. It was tapered off. Her main complain at this time is she is onO2 all the time. O2 level when sitting even without O2 it is 98-99%. When she walk it goes down to 90% per pt report.   She is currently on 2 liters. Interval history on 3/5/20  Lost her  last month. She came accompanied by a family member who is staying with her these days. There is no SOB at rest.  She is using O2 at home with exertion. Overall she feels her breathing is better. She is still on prednisone 15mg daily. Interval history on 2/3/20  No major events since I saw her last.  She doesn't use O2 unless she feels she needs it. She feels better. Main complain today is left hip pain. Interval history on 1/7/20  Patient is here today for regular f/u. Overall she feels she is getting stronger,. She is not using O2 supplement when getting outside as the portable concentrator bothers her due to its weight. She remains on prednisone 15mg daily     Interval history on 12/10/19  She feels better and denies have SOB at rest however she does have SOB on exertion for which she has to stop. She doesn't like using O2. Pulse ox at rest is 97%, however it goes down within few minutes of exertion    Interval history 11/5/2019  Patient is here today for post hospital follow-up. She was admitted to the hospital on 10/13/2019 and discharged on 10/16/2019. She was admitted with acute hypoxic respiratory failure and pneumonia. She was treated with IV antibiotics and steroids and was discharged on tapering dose prednisone. She was discharged on oxygen. At the time of evaluation she was on room air and she was feeling better. She denied increasing cough or sputum production. There is no fever or chills    Interval history on 9/10/19  Overall she feels better. Coughing is less, and she is able to walk longer distances. Pulse ox is in the mid 90s. There is no fever or chills    Interval history on 7/26/19  Continue to have cough, though she was given taper dose steroids and felt better on it. There is no fever or chills. Sputum is clear.     She was seen by rheumatology, there is no evidence of active rheumatological illness. Anti SSA, SSB, CK was ordered. HPI:  On 7/1/19   The patient is [de-identified] y.o. female who presents today for a new patient visit for SOB. This is not entirely new, but apparently it is slowly getting worse. In the mornings feels out of breath while doing her usual ADL but this gets better as she sit to have breakfast.    She gets SOB on brisk walking and going up hills. She was diagnosed with walking pneumonia in May. At that time she was tired and out of breath. She has chronic cough with occasional sputum production, which is white in color. There is some wt loss ~ 10 lbs after getting pneumonia but gained back two lbs     No nasal congestion but has throat congestion. No post nasal drip. Has GERD for about two years. On omeprazole once daily   She has dry eyes, no dryness in the mouth  No hx of joint pain, warmth or stiffness. No hx of rash. Past Medical History:    Past Medical History:   Diagnosis Date    Diabetes mellitus ([facility] [address]     Essential hypertension, benign     GERD (gastroesophageal reflux disease)     Hyperlipidemia     Interstitial lung disease (HCC)     Osteoarthrosis, unspecified whether generalized or localized, unspecified site     osteoarthritis lower leg    Osteopenia     Shingles        Social History:    Social History     Tobacco Use   Smoking Status Never   Smokeless Tobacco Never       Family History:  Family History   Problem Relation Age of Onset    Heart Disease Mother     Rheum Arthritis Mother     Heart Disease Father        Current Medications:  Current Outpatient Medications on File Prior to Visit   Medication Sig Dispense Refill    esomeprazole (NEXIUM) 40 MG delayed release capsule Take 1 capsule by mouth every morning (before breakfast) 90 capsule 1    ciclopirox (PENLAC) 8 % solution Apply to adjacent skin and affected nails daily. Remove with alcohol every 7 days.  6 mL 0    traMADol (ULTRAM) 50 MG tablet Take 50 mg by mouth every 6 hours as needed for Pain. As needed       lidocaine (LIDODERM) 5 % Place 1 patch onto the skin daily as needed for Pain 12 hours on, 12 hours off.      cyanocobalamin 1000 MCG tablet Take 1,000 mcg by mouth daily      diclofenac (VOLTAREN) 50 MG EC tablet Take 1 tablet by mouth 3 times daily as needed for Pain 60 tablet 3    Cholecalciferol (VITAMIN D3) 25 MCG (1000 UT) CAPS TAKE ONE CAPSULE BY MOUTH DAILY 90 capsule 1    rosuvastatin (CRESTOR) 40 MG tablet TAKE ONE TABLET BY MOUTH DAILY 90 tablet 3    escitalopram (LEXAPRO) 20 MG tablet TAKE ONE TABLET BY MOUTH DAILY 90 tablet 3    amLODIPine (NORVASC) 10 MG tablet TAKE ONE TABLET BY MOUTH DAILY 90 tablet 3    Probiotic Product (PROBIOTIC ACIDOPHILUS BIOBEADS) CAPS Take 1 capsule by mouth daily 90 capsule 0    vitamin E 1000 UNITS capsule Take 1,000 Units by mouth daily. No current facility-administered medications on file prior to visit. REVIEW OF SYSTEMS:    Review of Systems   Constitutional:  Negative for chills and fever. HENT:  Negative for postnasal drip. Eyes:  Negative for redness. Dry eyes   Respiratory:  Positive for shortness of breath (on exertion). Negative for cough, chest tightness and wheezing. Cardiovascular:  Negative for chest pain, palpitations and leg swelling. Gastrointestinal:         GERD   Musculoskeletal:  Negative for arthralgias and joint swelling. Skin:  Negative for rash. Neurological:  Negative for weakness. Psychiatric/Behavioral:  The patient is not nervous/anxious. All other systems reviewed and are negative. Objective:   PHYSICAL EXAM:        VITALS:  BP (!) 145/67 (Site: Right Upper Arm, Position: Sitting, Cuff Size: Medium Adult)   Pulse 62   Temp (!) 96.6 °F (35.9 °C) (Infrared)   Ht 5' 2\" (1.575 m)   Wt 118 lb (53.5 kg)   SpO2 93%   BMI 21.58 kg/m²     Physical Exam  Vitals reviewed. Constitutional:       Appearance: She is well-developed.    HENT:      Head: Normocephalic and atraumatic. Eyes:      Pupils: Pupils are equal, round, and reactive to light. Neck:      Vascular: No JVD. Cardiovascular:      Rate and Rhythm: Normal rate and regular rhythm. Heart sounds: No murmur heard. Pulmonary:      Effort: Pulmonary effort is normal. No respiratory distress. Breath sounds: No stridor. Rales present. No wheezing. Abdominal:      General: Bowel sounds are normal.      Palpations: Abdomen is soft. Musculoskeletal:         General: No deformity. Cervical back: Neck supple. Skin:     General: Skin is warm and dry. Neurological:      Mental Status: She is alert and oriented to person, place, and time. Psychiatric:         Behavior: Behavior normal.       DATA:    CT chest with contrast       HISTORY: Pulmonary nodules. COMPARISON: February 7, 2019. Individualized dose optimization technique was used in order to meet ALARA standards for radiation dose reduction. In addition to vendor specific dose reduction algorithms, the dose reduction techniques vary based on the specific scanner utilized but    frequently include automated exposure control, adjustment of the mA and/or kV according to patient size, and use of iterative reconstruction technique. FINDINGS:       Basilar predominant groundglass opacity with areas of traction bronchiectasis are similar to the prior examination. There is no definite honeycombing identified. 7 mm nodule identified in the right upper lobe (image 38, series 200) is unchanged since prior exam.       5 mm nodule in the left upper lobe (image 27, series 2) unchanged. No new or enlarging pulmonary nodules. A small pericardial effusion is identified. Mildly enlarged mediastinal lymph nodes are stable since the prior examination. Hiatal hernia is present. There is no destructive bone lesion. Impression   1. Stable pulmonary nodules. Continued follow-up is indicated. 2. Stable appearance of interstitial lung disease as described above. There is no definite honeycombing. The favored differential diagnosis is nonspecific interstitial pneumonia. This would be an atypical appearance for usual interstitial pneumonia. 3. Mediastinal adenopathy, stable. 4. Hiatal hernia. Echo on 6/11/19  Normal left ventricle size. There is mild concentric left ventricular   hypertrophy. Overall left ventricular systolic function appears normal with   an ejection fraction visually estimated at 55%. No regional wall motion   abnormalities are noted. Diastolic filling parameters suggest grade II   diastolic dysfunction. Trace mitral regurgitation is present. Trivial tricuspid regurgitation. Estimated pulmonary artery systolic   pressure is elevated at 44 mmHg assuming a right atrial pressure of 3 mmHg. PFT  DATE OF PROCEDURE:  06/11/2019     INDICATION:  Shortness of breath. BMI:  31.6. TOBACCO HISTORY:  Never smoked. Spirometry data is acceptable and reproducible. Lung volumes performed via plethysmography. Room air oxygen saturation is 92%. SPIROMETRY:  FEV1 to FVC ratio is 80%. Prebronchodilator FEV1 is 1.12,  which is 58% of predicted. Postbronchodilator FEV1 is 1.36 which is 71%  of predicted for a 21% increase. Prebronchodilator FVC is 1.4, which is  54% of predicted. Postbronchodilator FVC is 1.84 which is 71% of  predicted for 31% increase. Lung volumes showed total lung capacity of 3.04 which is 62% of  predicted. Residual volume is 1.46 which is 64% of predicted. Diffusion capacity is 9.61, which is 45% of predicted. This is not  adjusted for hemoglobin. IMPRESSION:  1. No obstructive defect. 2.  There is a significant response to bronchodilators in small and  large airways. 3.  Moderate restrictive defect is present. 4.  Moderate decrease in diffusion capacity    CT scan on 10/8/19  1. Stable findings compared prior study. Nonspecific interstitial pneumonia is favored in the absence of lower lobe predominance and lack of honeycomb changes. 2. Stable right upper lobe pulmonary nodule. 3. Annual surveillance with high-resolution CT is recommended for. Large hiatal hernia. 5. Coronary artery calcification     01/20/2020   PULMONARY FUNCTION TEST     INDICATIONS:  Interstitial lung disease. BMI:  31.5.     TOBACCO HISTORY:  Never smoked. Spirometry data is acceptable and reproducible. Lung volumes performed via plethysmography. Room air oxygen saturation is 97%. SPIROMETRY:  FEV1 to FVC ratio is 86%. Prebronchodilator FEV1 is 1.57,  which is 91% of predicted. Prebronchodilator FVC is 1.83, which is 78%  of predicted. Lung volumes show total lung capacity of 2.88, which is 63% of  predicted. Residual volume is 0.92, which is 42% of predicted. Diffusion capacity is 16.02, which is 80% of predicted. IMPRESSION:  1. No obstructive defect. 2.  There is a mild restrictive defect. 3.  Diffusion capacity is normal.  4.  When compared to previous pulmonary function tests dated 10/08/2019,  there is a mild-to-moderate drop in FVC, FEV1, and total lung capacity. Diffusion capacity is substantially higher, almost double making me  wonder his diffusion capacity in 10/2019 was real.     Six-minute walk was initially performed on room air. Of note, the  patient is on home oxygen 2 liters. At rest on room air, the patient's  oxygen saturation was 98%. Within 2 minutes, it dropped to 85%  necessitating pause and walk to place 2 liters of oxygen on. With 2  liters of oxygen, her oxygen saturation maintained 93 to 97%. The  patient walked a total of 660 feet. The patient has significant oxygen desaturation with submaximal  exercise. She does qualify for home oxygen with exertion and oxygen  flow rate is deemed to be 2 liters.     pulmonary function tests and 6MWT on 6/2/20  6/3/2020 10:20 AM  Pulmonary Function Test:     Indication: NSIP    Test comment:     Spirometry data is acceptable and reproducible. Maximum effort given during the test.    Pulse ox is 95% on room air    Estimated body mass index is 26.15 kg/m² as calculated from the   following:    Height as of 5/5/20: 5' 2.01\" (1.575 m). Weight as of 5/26/20: 143 lb (64.9 kg). Spirometry data:    FEV1/FVC: 87. Predicted ratio 74    FEV1 1.59L, which is 87% predicted    FVC is 1.82L, which is 74% predicted    Lung Volumes:    TLC (by Plethysmography) is 2.63L, which is 55% predicted    RV is 0.67L which is 29% predicted    Diffusion Capacity:    DLCO is 5.43 which is 26% predicted    Impression:    1. There is no obstruction present    2. There is restriction. Based on FEV1 it is considered mild   however based on TLC it is considered severe    3. There is severe reduction in diffusion capacity    Comment:   PFT was compared to the one on 1/20/20 - FEV1 and FVC are stable. SIX-MINUTE WALK:    A six-minute walk was started on room air. Patient was placed on   2 liters of O2 at minute 4   The patientwalked a total of 510 feet. She did stop or pause   during the walk to place O2 and due to back pain. Baseline  oxygen saturation 95%. The lowest oxygen saturation during the   walk was 82% on minute 3 for which she was placed on 2 liters. O2 recovered to 95-98% during the remaining time of testing. 6MWT was compared to the one we have on 10/8/19:  Jane Banda walked decreased from 660ft to 510ft  At that time O2 sats dropped to 86% on minute 4 and placed on O2   on minute 5    Comment: 6MWT is slightly worse than how it was last year on   10/8/19     High res CT on 1/20/21  Stable findings of the chest when compared with the prior study. Findings again favor an NSIP pattern of interstitial lung disease. Stable pulmonary nodularity.      CT abdomen on 2/23/22  EXAM: CT ABDOMEN AND PELVIS WITH CONTRAST ON 2/23/2022       INDICATION: Left lower quadrant pain       COMPARISON: CT abdomen/pelvis 7/28/2020       TECHNIQUE: Multidetector CT imaging was obtained from lung bases through pelvis. Axial images and multiplanar reformatted images are provided for review. Dose modulation, iterative reconstruction and/or weight based adjustments of the mA/kV were utilized    to reduce radiation dose to as low as reasonably achievable       IV Contrast: 80 cc Isovue 370   Oral Contrast: None       LIMITATION: None       FINDINGS:       : No additional abnormality       LUNG BASES: Honeycombing is noted in the lung bases. There is a small pericardial effusion. The heart is enlarged. LIVER: The liver is homogeneous in its enhancement. SPLEEN: The spleen is normal in size and attenuation. GALLBLADDER AND BILIARY TREE: No calcified stones are seen. The common bile duct is dilated measuring up to 9 to 10 mm. There is mild intrahepatic ductal dilatation noted as well. PANCREAS: Pancreas appears homogeneous in its enhancement without evidence of enlargement. ADRENAL GLANDS: Normal.       KIDNEYS AND URETERS: The kidneys concentrate contrast bilaterally. There is a 5 mm nonobstructing calculus in the inferior pole of the left kidney. There is moderate to severe left-sided hydronephrosis and hydroureter to the level of the ureterovesicular junction. This is caused by a 5 mm calculus at the left UVJ. There is no right-sided hydronephrosis or hydroureter. No right-sided nephrolithiasis    is visualized. BOWEL: No oral contrast was given. There is a large fetal hernia. The stomach is unremarkable. The duodenum is normal in location. Small bowel is normal in caliber. The colon shows scattered diverticuli. No definite wall thickening or inflammatory    change. Colon is filled with stool. The appendix is normal. The terminal ileum is unremarkable. PERITONEUM/RETROPERITONEUM: No ascites or free air is visualized.        LYMPH NODES: No retroperitoneal or pelvic lymphadenopathy is visualized. VESSELS: Aorta is normal in caliber with mild calcification. Celiac and SMA are normal.  Portal and hepatic veins are patent. The splenic vein is patent. The SMV is unremarkable. REPRODUCTIVE ORGANS: The uterus and ovaries are unremarkable. URINARY BLADDER: Normal.       ABDOMINAL WALL: Normal.       BONES: Degenerative changes are seen throughout the lumbar spine. There is a grade 1 anterolisthesis of L4 and L5.       OTHER FINDINGS: None. Impression       Moderate to severe left-sided hydronephrosis and hydroureter caused by a 5 mm obstructing calculus at the left UVJ. Mild intra and extrahepatic ductal dilatation, indeterminate. MRCP may be helpful for evaluation. Left-sided nephrolithiasis. Large hiatal hernia. Pulmonary fibrosis noted in the lung bases. Small to moderate pericardial effusion     Assessment:      Diagnosis Orders   1. NSIP (nonspecific interstitial pneumonia) ([facility] [address]            [de-identified]years old female with NSIP. Diagnosis is based on clinical presentation, CT findings and response to steroids. She also has large hiatal hernia      PFT and 6MWT on 6/2/20 are essentially stable. Had bronch on 8/1/19  Started on prednisone @ 20mg on 8/5/19 decrease to 15 mg in Dec. 2019  Tapered off around mid May 2020 due to weakness and back issues. CT scan on 1/20/21 is stable over two years. PFT on 10/6/21 reviewed:  TLC improved from 63% to 72%, however FVC decreased from 78% to 68%. RV improved from 42 to 84%. Overall restriction is stable. (There is no specific exposure that she or her family can remember  She is not on chronic medication that is known to cause ILD  She does not have pets  Hypersensitivity panel is negative   Rheumatoid factor, GIUSEPPE and sed rate are negative. SSA and SSB are negative  She was seen by rheumatology.   There is no evidence of active rheumatic disease. BAL is negative for infectious etiology)     Echo on 6/11/19 with grade II diastolic dysfunction. Clinically euvolemic     CT chest on 1/20/21 compared with CT abdomen on 2/23/22. Lower lungs with stable changes. Complaining of OLIVER. She is using O2 at 2 liters. She is worried if she use higher O2 flow the battery will run out. Advised to have extra battery. I showed her how to change it. Plan:  Increase O2 to 2.5 liters. She was not able to finish a 6MWT. Lowest O2 sat with exertion was 91% while on 2 liters.

## 2022-07-27 ENCOUNTER — PROCEDURE VISIT (OUTPATIENT)
Dept: SPEECH THERAPY | Age: 82
End: 2022-07-27
Payer: MEDICARE

## 2022-07-27 DIAGNOSIS — K21.9 LARYNGOPHARYNGEAL REFLUX (LPR): ICD-10-CM

## 2022-07-27 DIAGNOSIS — R13.12 OROPHARYNGEAL DYSPHAGIA: Primary | ICD-10-CM

## 2022-07-27 PROCEDURE — 92526 ORAL FUNCTION THERAPY: CPT | Performed by: SPEECH-LANGUAGE PATHOLOGIST

## 2022-07-27 NOTE — PROGRESS NOTES
[facility]) ENT  Dysphagia Therapy      Pt Seen for Therapy - Session # 2     Diagnosis/Indication:   Primary: Dysphagia  Secondary: LPR  Tertiary: ILD    Background History:   Pt presents w/ multiple month hx of dysphagia characterized by coughing during meals, specifically w/ thin liquids. Does not happen daily, but can happen 3-4x weekly. Long coughing spell occurs after, resulting in difficulty catching breath. Son present and endorsed occurs most frequently when pt eating out at restaurants; related to distraction. Occasional difficulties w/ solids (specifically bread) that results in \"sticking\" sensation; tries to clear w/ liquid wash, which results in coughing episode. Reported sensation of thick mucus in the chest that is difficult to clear; worse in the morning. Hx of acid reflux; on Pantoprazole 40 mg. Endorsed mild dysphonia characterized by roughness. Hx of dyspnea w/ ILD; followed by pulmonology but does utilize supplemental oxygen. Previous SLP Evaluations: 6/28/22  Pt presents w/ grossly functional oropharyngeal swallow characterized by timely swallow initiation and adequate pharyngeal constriction; diffuse pharyngeal residue w/ thin liquids t/o pharynx however, noted to have thick mucus/secretions that mixed w/ liquid. No pharyngeal residue observed w/ any consistencies >thin liquid. No penetration/aspiration noted w/ initial trials of thin liquids; trace aspiration noted x1 during last swallow of evaluation, d/t increasing SOB and decreased swallow coordination for apneic period. SUBJECTIVE:   Pt reported positive improvement at this time; almost full resolution of coughing episodes, and no longer feels fearful of choking during meals. Feels this is d/t changing reflux medication to Nexium. [facility] moved to jail home. Per chart review, has previously seen GI w/ EGD in 2020 and dx of hiatal hernia; esophageal dilation performed at that time as well. Has not been seen by GI since. Weight:   Wt Readings from Last 3 Encounters:   07/25/22 118 lb (53.5 kg)   07/01/22 122 lb (55.3 kg)   05/12/22 121 lb (54.9 kg)     OBJECTIVE:   Dysphagia Therapy    Goals Session #2   Patient will demonstrate comprehension of presence of dysphagia, risks associated with penetration/ aspiration, and need to adhere to management recommendations Pt endorsed having no swallowing difficulties since starting acid reflux medication. D/t relief of coughing/choking episodes since, provided additional acid reflux (medicinal and dietary) education in order to aid management of all symptoms associated. Discussed swallow/breathing coordination and how SOB relates to dysphagia; discussed and provided exercises. Patient will demonstrate ability to utilize compensatory strategies  to reduce risk of penetration/aspiration with 90% accuracy, independently. slow rate   small bolus size   supraglottic swallow   upright posture    Pt able to utilize compensatory strategies to reduce risk of aspiration/penetration w/ 75% acc. Patient will complete exercises for improved oral stage swallowing and pre-pharyngeal stage bolus control x (25) reps independently and accurately Introduced pt to 3 second prep; was able to complete x10 w/ minimal cueing. Needed reminder to exhale before initiation of swallow; able to apply feedback and eventually executed independently. Patient will complete exercises for improved laryngeal closure x (15) reps independently and accurately Introduced to supraglottic swallow; pt able to complete with moderate-max cueing; needed multiple reminders of steps and had difficulty holding breath t/o swallow. Pt able to complete x10 when going through strategy step by step. ASSESSMENT:      [de-identified] y.o. female w/ hx of dysphagia; reported significant improvement this date. Endorsed no coughing/choking episodes since starting new acid reflux medication per PCP.  Provided with supraglottic swallow as well as 3 second oral prep in order to work on swallow/breathing coordination; encouraged to complete daily to aid in swallow safety d/t pt SOB. Given improvement w/ reflux medication, reflux counseling provided including dietary changes, behavioral changes (not laying down right after eating), and use of Gaviscon in conjunction w/ Nexium. Pt expressed understanding to all the above. At this time, pt is making excellent progress towards goals and prognosis remains good w/ ongoing adherence to recommendations. May benefit from GI referral d/t hx of a hiatal hernia and dilation, but will monitor at this time. Diet Recommendations:   Solid Consistency: Regular  Liquid Consistency: Thin    RECOMMENDATIONS:      Follow HEP and RTC for ongoing dysphagia tx  2.    RTC in 4 weeks    CPT Code Units Billed Time Billed Today Date of POC Start Re-Certification Date Referring Provider   77157 1 Unit Time in: 7924  Time out: 1005  Total time: 35 min 6/28/22 60 days Dr. Zipporah Sandhoff       Thank you,    Eddie Sandifer) [address]; [id number]  Speech-Language Pathologist

## 2022-07-27 NOTE — PATIENT INSTRUCTIONS
Three second prep  Take a small sip of liquid  Hold in mouth for 3-seconds  Exhale air  Effortful swallow  -25x daily    Supraglottic Swallow  Hold your breath (exhale)  Swallow  Cough  Second swallow  Inhale  -Try to do all with one breath, meaning don't inhale until the very end!  -15x daily     Reflux  -Continue medication (Nexium) in the morning  -Diet awareness   -Reduce triggers (listed in packet)  -Gaviscon Tablets   -2 after meals as needed   -2 before bed

## 2022-07-29 ENCOUNTER — CARE COORDINATION (OUTPATIENT)
Dept: CARE COORDINATION | Age: 82
End: 2022-07-29

## 2022-07-29 NOTE — CARE COORDINATION
Ambulatory Care Coordination Note  7/29/2022    ACC: Bertram Buckner, RN [facility]    Hx: Type 2 DM, HTN, ILD, PNA, Pure Hypercholesterolemia, Anxiety, Weight Loss, Hyperparathyroidism, UTI, Kidney Stone, Sepsis    Summary Note: The pt stated she recently saw her pulmonologist and also had an appointment with the Speech Therapist. The pt stated she was informed that acid reflux was causing some problems with her breathing. The pt was started on Esomeprazole 40 mg 1 capsule daily and Gaviscon 2 tablets after meals and at bedtime. The pt stated liquids seem to make her cough and choke more than solid foods. The pt stated she just started on the new medications and they seem to be helping. The pt stated she was also given swallowing exercises to do and she feels the exercises are really helping. The pt stated she was given a list of foods to avoid that trigger coughing. The pt stated she is drinking more water and avoiding caffeine and juices. The pt stated her breathing is about the same and stated the pulmonologist did increase her O2 to 2/5 L/min per nc. The pt stated the pulmonologist told the pt to exercise at least 3-4 times a week. The pt stated overall she feels she is doing well. The RN, ACM discussed graduation from Care Coordination at this time and informed the pt that the nurse is retiring. The RN, ACM gave the pt the contact information for the new RN, ACM. The pt thanked the nurse for all her help. Plan:  The RN, ACM will sign off. Care Coordination Interventions    Program Enrollment: Complex Care  Referral from Primary Care Provider: No  Suggested Interventions and Community Resources  Diabetes Education: In Process  Fall Risk Prevention: In Process  Physical Therapy: Completed  Zone Management Tools:  In Process  Other Services or Interventions: Sees pulmonology          Goals Addressed                   This Visit's Progress     COMPLETED: Community Resource Goal   On track     I will follow MD Roger Ruiz P/CC [facility]   ,   Diabetes Assessment      Meal Planning: Avoidance of concentrated sweets   How often do you test your blood sugar?: No Testing   Have you ever had to go to the ED for symptoms of low blood sugar?: No       No patient-reported symptoms        , and   General Assessment    Do you have any symptoms that are causing concern?: No

## 2022-08-10 DIAGNOSIS — I10 ESSENTIAL HYPERTENSION, BENIGN: ICD-10-CM

## 2022-08-10 RX ORDER — AMLODIPINE BESYLATE 10 MG/1
TABLET ORAL
Qty: 90 TABLET | Refills: 1 | Status: SHIPPED | OUTPATIENT
Start: 2022-08-10

## 2022-08-10 RX ORDER — ESCITALOPRAM OXALATE 20 MG/1
TABLET ORAL
Qty: 90 TABLET | Refills: 1 | Status: SHIPPED | OUTPATIENT
Start: 2022-08-10

## 2022-08-10 RX ORDER — ROSUVASTATIN CALCIUM 40 MG/1
TABLET, COATED ORAL
Qty: 90 TABLET | Refills: 1 | Status: SHIPPED | OUTPATIENT
Start: 2022-08-10

## 2022-08-22 ENCOUNTER — TELEPHONE (OUTPATIENT)
Dept: INTERNAL MEDICINE CLINIC | Age: 82
End: 2022-08-22

## 2022-08-22 ENCOUNTER — HOSPITAL ENCOUNTER (OUTPATIENT)
Dept: GENERAL RADIOLOGY | Age: 82
Discharge: HOME | End: 2022-08-22
Payer: MEDICARE

## 2022-08-22 ENCOUNTER — OFFICE VISIT (OUTPATIENT)
Dept: INTERNAL MEDICINE CLINIC | Age: 82
End: 2022-08-22
Payer: MEDICARE

## 2022-08-22 ENCOUNTER — HOSPITAL ENCOUNTER (OUTPATIENT)
Age: 82
Discharge: HOME | End: 2022-08-22
Payer: MEDICARE

## 2022-08-22 VITALS
HEIGHT: 62 IN | OXYGEN SATURATION: 93 % | DIASTOLIC BLOOD PRESSURE: 68 MMHG | WEIGHT: 114 LBS | HEART RATE: 64 BPM | SYSTOLIC BLOOD PRESSURE: 132 MMHG | BODY MASS INDEX: 20.98 KG/M2

## 2022-08-22 DIAGNOSIS — R06.02 SHORTNESS OF BREATH: Primary | ICD-10-CM

## 2022-08-22 DIAGNOSIS — L98.9 SKIN LESION OF RIGHT LOWER LIMB: ICD-10-CM

## 2022-08-22 DIAGNOSIS — R06.02 SHORTNESS OF BREATH: ICD-10-CM

## 2022-08-22 DIAGNOSIS — R51.9 LEFT FACIAL PAIN: ICD-10-CM

## 2022-08-22 DIAGNOSIS — Z12.31 ENCOUNTER FOR SCREENING MAMMOGRAM FOR BREAST CANCER: ICD-10-CM

## 2022-08-22 DIAGNOSIS — J84.89 NSIP (NONSPECIFIC INTERSTITIAL PNEUMONIA) (HCC): ICD-10-CM

## 2022-08-22 PROCEDURE — 1123F ACP DISCUSS/DSCN MKR DOCD: CPT | Performed by: INTERNAL MEDICINE

## 2022-08-22 PROCEDURE — 99214 OFFICE O/P EST MOD 30 MIN: CPT | Performed by: INTERNAL MEDICINE

## 2022-08-22 PROCEDURE — 71046 X-RAY EXAM CHEST 2 VIEWS: CPT

## 2022-08-22 NOTE — PROGRESS NOTES
Librado Vieira (:  1940) is a [de-identified] y.o. female,Established patient, here for evaluation of the following chief complaint(s):  Shortness of Breath (No other symptoms, has been going on but starting to progress )         ASSESSMENT/PLAN:  1. Shortness of breath  -Not convincing for pneumonia, check chest x-ray, if there appears to be new consolidation would treat empirically. Otherwise we will proceed with repeat CT chest.  She is due to follow-up with me already in a couple of weeks. -     XR CHEST (2 VW); Future  2. Skin lesion of right lower limb  -     Talia Mckeon MD, Dermatology, [place]  3. Left facial pain   -Could be sinus related given the location, she has some tenderness in the area of the left maxillary sinus. Since antibiotics did not seem to help, will try nasal spray, it is possible the dryness from nasal cannula oxygen could be contributing. We will try saline spray and then Flonase if not helpful. 4. Encounter for screening mammogram for breast cancer  -     [facility] DIGITAL SCREEN W OR WO CAD BILATERAL; Future    No follow-ups on file. Subjective   SUBJECTIVE/OBJECTIVE:  HPI    Has been having increased shortness of breath. She saw the pulmonologist about 3 weeks ago so it started at least that long ago, oxygen flow was increased by half a liter to 2.5 L/min, he recommended that she walk more. Since that time the shortness of breath has increased. No leg swelling. No fevers, she has chills on and off but that has been going on for a long time. She is having difficulty walking to the point that she cannot go down for dinner and has it delivered to her room. At 1 point last week her oxygen was in the 60s resting. It is better today at 93%, which is typical.    She still is having some pain around the left eye. She saw the eye doctor who told her it is not anything related to the eye. She did take the antibiotics and did not seem to make a difference.     The nodule on her right lower leg has been there for a long time, it bleeds intermittently, symptoms will fall off and grow back. Review of Systems       Objective   Physical Exam  Vitals reviewed. Constitutional:       General: She is not in acute distress. Appearance: Normal appearance. She is well-developed. HENT:      Head: Normocephalic and atraumatic. Cardiovascular:      Rate and Rhythm: Normal rate and regular rhythm. Heart sounds: Normal heart sounds. Pulmonary:      Effort: No respiratory distress. Comments: Increased respiratory effort  Bibasilar crackles, more on the right than the left  Skin:     General: Skin is warm and dry. Comments: Raised nodule with erythematous base on the right anterior shin   Neurological:      Mental Status: She is alert. Psychiatric:         Behavior: Behavior normal.         Thought Content: Thought content normal.         Judgment: Judgment normal.                An electronic signature was used to authenticate this note.     --Bari Augustin MD

## 2022-08-22 NOTE — TELEPHONE ENCOUNTER
ECC CALL. Pt calling because starting Friday she had SOB and is getting worse she was on 2.5 of oxygen but now up to 3. She stated unable to do much without having a hard time breathing. She thinks she has some type of infection in her chest. No other symptoms.

## 2022-08-22 NOTE — PATIENT INSTRUCTIONS
Start with saline nasal spray - this one you can even use every couple of hours if needed, but would use at least twice a day  If this doesn't seem to help with the pain around the eye, try adding fluticasone nasal spray (Flonase) for at least a couple of weeks and see if that improves anything    Check a chest Xray today

## 2022-08-25 ENCOUNTER — TELEPHONE (OUTPATIENT)
Dept: INTERNAL MEDICINE CLINIC | Age: 82
End: 2022-08-25

## 2022-08-25 NOTE — TELEPHONE ENCOUNTER
----- Message from Ricky Bowen sent at 8/25/2022  3:09 PM EDT -----  Subject: Message to Provider    QUESTIONS  Information for Provider? Pt was returning a call to DR Manning,, Regarding to   get a cscan for her son. . Experiencing extreme diarrhea. Wanted to know if   can get prescription for Diarrhea.   ---------------------------------------------------------------------------  --------------  Blessing CRAIN  [phone]; OK to leave message on voicemail  ---------------------------------------------------------------------------  --------------  SCRIPT ANSWERS  Relationship to Patient?  Self

## 2022-08-25 NOTE — TELEPHONE ENCOUNTER
The order for the CT lung is in the system  As long as she is not having abdominal pain, fever, or blood in the stool, can use imodium OTC

## 2022-08-26 ENCOUNTER — TELEPHONE (OUTPATIENT)
Dept: INTERNAL MEDICINE CLINIC | Age: 82
End: 2022-08-26

## 2022-08-26 NOTE — TELEPHONE ENCOUNTER
----- Message from Elaina Thakkar sent at 8/26/2022  3:20 PM EDT -----  Subject: Message to Provider    QUESTIONS  Information for Provider? Patient called and explained she is feeling   better with her diarrhea and doesn't need to be seen. However, she did   explain to me she is having SOB but is fine she will using a some C span   and the doctor is aware of her breathing issues. I tried to get her to   talk to a nurse but she refused also she said she is having problems with   her phone would not be able to get ahold of her.  ---------------------------------------------------------------------------  --------------  Carmen KUMAR  [phone]; OK to leave message on voicemail  ---------------------------------------------------------------------------  --------------  SCRIPT ANSWERS  Relationship to Patient?  Self

## 2022-08-30 ENCOUNTER — OFFICE VISIT (OUTPATIENT)
Dept: DERMATOLOGY | Age: 82
End: 2022-08-30
Payer: MEDICARE

## 2022-08-30 DIAGNOSIS — L82.0 INFLAMED SEBORRHEIC KERATOSIS: Primary | ICD-10-CM

## 2022-08-30 DIAGNOSIS — D23.30 INTRADERMAL NEVUS OF FACE: ICD-10-CM

## 2022-08-30 DIAGNOSIS — L81.4 LENTIGINES: ICD-10-CM

## 2022-08-30 PROCEDURE — 1123F ACP DISCUSS/DSCN MKR DOCD: CPT | Performed by: INTERNAL MEDICINE

## 2022-08-30 PROCEDURE — 17110 DESTRUCTION B9 LES UP TO 14: CPT | Performed by: INTERNAL MEDICINE

## 2022-08-30 PROCEDURE — 99203 OFFICE O/P NEW LOW 30 MIN: CPT | Performed by: INTERNAL MEDICINE

## 2022-08-30 NOTE — PATIENT INSTRUCTIONS
[email or web address]! Please follow the instructions below as we discussed in clinic:      We froze a benign seborrheic keratosis today on your right shin    Cryosurgery (Freezing) Wound Care Instructions    AFTER THE PROCEDURE:   You will notice swelling and redness around the site. This is normal.   You may experience a sharp or sore feeling for the next several days. For this discomfort, you may take acetaminophen (Tylenol©). A blister may develop at the treated area, sometimes as soon as by the end of the day. After several days, the blister will subside and a scab will form. If the area is bumped or traumatized during the first few days following freezing, you may develop bleeding into the blister, forming a blood blister. This is nothing to be alarmed about. If the blister is tense, uncomfortable, or much larger than the site that was frozen, you may pop the blister along its edge with a sterile needle (boiled, heated under a flame, or cleaned with alcohol) to allow the fluid to drain out. If the blister does not bother you, no treatment is needed. Do NOT peel off the top of the blister roof. It will act as a dressing on top of your wound. WOUND CARE:   You may shower or bathe as usual, but avoid scrubbing the areas that have been frozen. Cleanse the site twice a day with mild soapy water, and then apply a thin film of white petrolatum (Vaseline©). You do not need to cover the area, but can if you prefer. Do NOT allow the site to become dry or crusted, or attempt to dry it out with rubbing alcohol or hydrogen peroxide. Continue this regimen until the area is pink and healed. Depending on the size and location of your cryosurgery site, healing may take 2 to 4 weeks. The area may continue to be pink for several weeks, and over the next few months may become darker or lighter than the surrounding skin. This may be a permanent change.

## 2022-08-30 NOTE — PROGRESS NOTES
Dermatology  Uche Santoyo MD  [phone]    Date of Visit: 8/30/2022  LV: 2017 Dora Marr is a [de-identified] y.o. female who presents for skin lesions. Here with son    Chief Complaint:   Chief Complaint   Patient presents with    Lesion(s)     Lesion- rt. Lower leg, and mole on left upper lip        History of Present Illness:    Concern:  Bump on R shin  Duration:  Many months  Symptoms: Bothersome when shaving  Previous treatments:  None    Concerns: Bump on L upper cutaneous lip  Duration: Many years  Sx: None    *Derm hx:  -Saw Aiyana Jumbo 2019 for AK and FBSE + possible rash 2/2 hair dye or other product that has since resolved  *Personal history of skin cancer: None  -Hx moderately dysplastic nevus   *Family history of skin cancer: None     Review of Systems:  Gen: Feels well, good sense of health. Skin: No new or changing moles, no history of keloids or hypertrophic scars. Past Medical History, Family History, Surgical History, Medications and Allergies reviewed.     Past Medical History:   Diagnosis Date    Diabetes mellitus ([address]     Essential hypertension, benign     GERD (gastroesophageal reflux disease)     Hyperlipidemia     Interstitial lung disease (HCC)     Osteoarthrosis, unspecified whether generalized or localized, unspecified site     osteoarthritis lower leg    Osteopenia     Shingles      Past Surgical History:   Procedure Laterality Date    ANKLE SURGERY Right 04/01/2013    torn tendon    BRONCHOSCOPY N/A 8/1/2019    BRONCHOSCOPY WITH BAL AND TRANSBRONCHIAL BIOPSIES performed by Salena Daley MD at [place] [address]  10/11/2010    Dr. Dorys Hassan , polyps and diverticulosis    COLONOSCOPY  11/11/2002    Dr. Juan Antonio Fajardo, Diverticulosis    COLONOSCOPY  04/20/2015    Dr. Dorys Hassan- diverticulosis, sessile polyp, 5 years     COLONOSCOPY  4/13/16    Dr Hoda Howard; Diverticulosis    COLONOSCOPY  11/28/2016    Dr Dorys Hassan,     CYSTOSCOPY Left 2/24/2022 CYSTOSCOPY URETERAL STENT INSERTION performed by Ramy Llanos MD at [address] Right 7/15/2020    PHACOEMULSIFICATION OF CATARACT RIGHT EYE WITH INTRAOCULAR LENS IMPLANT performed by Rudi Dahl MD at [address] [facility],  [address] Bilateral 9/1/2020    BILATERAL LUMBAR THREE, LUMBAR FOUR, LUMBAR FIVE RADIOFREQUENCY ABLATION SITE CONFIRMED BY FLUOROSCOPY performed by Bjorn Leon MD at [address] [employer] [facility] Bilateral 6/23/2020    BILATERAL LUMBAR FOUR TRANSFORAMINAL EPIDURAL STEROID INJECTION SITE CONFIRMED BY FLUOROSCOPY performed by Bjorn Leon MD at [facility] [employer] [facility] Bilateral 7/7/2020    BILATERAL LUMBAR FOUR TRANSFORAMINAL EPIDURAL STEROID INJECTION SITE CONFIRMED BY FLUOROSCOPY performed by Bjorn Leon MD at [address] [place] [address] [employer] [facility] Bilateral 8/4/2020    BILATERAL LUMBAR THREE, LUMBAR FOUR, LUMBAR FIVE DORSAL RAMUS MEDIAL BRANCH BLOCK SITE CONFIRMED BY FLUOROSCOPY performed by Bjorn Leon MD at [facility] [facility] Bilateral 8/18/2020    BILATERAL LUMBAR THREE, LUMBAR FOUR, LUMBAR FIVE DORSAL RAMUS MEDIAL BRANCH BLOCK SITE CONFIR,ED BY FLUOROSCOPY performed by Bjorn Leon MD at [place] [address] Right     UPPER GASTROINTESTINAL ENDOSCOPY  8/17/2011    Dr. Ria Thompson - moderate gastritis , hiatal hernia     UPPER GASTROINTESTINAL ENDOSCOPY  4/20/15    Dr. Ria Thompson - polyps removed, diverticulosis, follow up in 5 years    [facility]  8/16/2012    DR. Ramos - with mesh       Allergies   Allergen Reactions    Pravastatin      Other reaction(s): increases blood pressure    Percocet [Oxycodone-Acetaminophen] Other (See Comments)     Dizzy     Outpatient Medications Marked as Taking for the 8/30/22 encounter (Office Visit) with Pierre Klein MD   Medication Sig Dispense Refill    rosuvastatin (CRESTOR) 40 MG tablet TAKE ONE TABLET BY MOUTH DAILY 90 tablet 1    escitalopram (LEXAPRO) 20 MG tablet TAKE ONE TABLET BY MOUTH DAILY 90 tablet 1    amLODIPine (NORVASC) 10 MG tablet TAKE ONE TABLET BY MOUTH DAILY 90 tablet 1    esomeprazole (NEXIUM) 40 MG delayed release capsule Take 1 capsule by mouth every morning (before breakfast) 90 capsule 1    ciclopirox (PENLAC) 8 % solution Apply to adjacent skin and affected nails daily. Remove with alcohol every 7 days. 6 mL 0    traMADol (ULTRAM) 50 MG tablet Take 50 mg by mouth every 6 hours as needed for Pain. As needed       lidocaine (LIDODERM) 5 % Place 1 patch onto the skin daily as needed for Pain 12 hours on, 12 hours off.      diclofenac (VOLTAREN) 50 MG EC tablet Take 1 tablet by mouth 3 times daily as needed for Pain 60 tablet 3    Cholecalciferol (VITAMIN D3) 25 MCG (1000 UT) CAPS TAKE ONE CAPSULE BY MOUTH DAILY 90 capsule 1    Probiotic Product (PROBIOTIC ACIDOPHILUS BIOBEADS) CAPS Take 1 capsule by mouth daily 90 capsule 0    vitamin E 1000 UNITS capsule Take 1,000 Units by mouth daily. Physical Examination   No acute distress. Mood clear/affect appropriate. Alert and oriented. Mucous membranes moist.  Sclera anicteric. Visible skin exam was conducted to include the scalp, face, lips/teeth, lids/conjunctiva, ears, neck, right and left hands and forearms and R shin was normal with the following exceptions:  -Stuck on tan papule , irritated, on R shin   -Skin colored papule on L upper cutaneous lip      Assessment and Plan     1. Inflamed seborrheic keratosis  -R shin  -Ddx favored ISK vs. Less likely irritated HAK  -Given sx, we treated with cryo. Cryotherapy was discussed and patient agreed to proceed. Consent was obtained. 1 lesions were treated cryotherapy: R shin. 2 cycles of liquid nitrogen applied to each lesion for 5 seconds using a Cry-Ac cryo spray gun. Patient was educated regarding the potential risks of blister formation and discomfort. Wound care was discussed. The patient tolerated the procedure well and there were no immediate complications.    -If doesn't resolve in 6 weeks, rec RTC for biopsy    2. Lentigines  -Benign, reassurance   - Reviewed relationship with sun exposure  - Rec daily sunscreen with SPF 30, broad spectrum    3. Intradermal nevus, L upper cutaneous lip  -Benign, reassurance   -Pt interested in removal. Reviewed risk of scarring, bleeding, etc. Pt defers for now    Return in about 6 weeks (around 10/11/2022). Note is transcribed using voice recognition software. Inadvertent computerized transcription errors may be present.     Pierre Klein MD

## 2022-08-31 ENCOUNTER — HOSPITAL ENCOUNTER (OUTPATIENT)
Dept: WOMENS IMAGING | Age: 82
Discharge: HOME | End: 2022-08-31
Payer: MEDICARE

## 2022-08-31 VITALS — WEIGHT: 119 LBS | HEIGHT: 62 IN | BODY MASS INDEX: 21.9 KG/M2

## 2022-08-31 DIAGNOSIS — Z12.31 ENCOUNTER FOR SCREENING MAMMOGRAM FOR BREAST CANCER: ICD-10-CM

## 2022-08-31 PROCEDURE — 77063 BREAST TOMOSYNTHESIS BI: CPT

## 2022-09-01 ENCOUNTER — HOSPITAL ENCOUNTER (OUTPATIENT)
Dept: CT IMAGING | Age: 82
Discharge: HOME | End: 2022-09-01
Payer: MEDICARE

## 2022-09-01 DIAGNOSIS — J84.89 NSIP (NONSPECIFIC INTERSTITIAL PNEUMONIA) (HCC): ICD-10-CM

## 2022-09-01 PROCEDURE — 71250 CT THORAX DX C-: CPT

## 2022-09-08 ENCOUNTER — OFFICE VISIT (OUTPATIENT)
Dept: INTERNAL MEDICINE CLINIC | Age: 82
End: 2022-09-08
Payer: MEDICARE

## 2022-09-08 VITALS
HEIGHT: 62 IN | HEART RATE: 60 BPM | DIASTOLIC BLOOD PRESSURE: 60 MMHG | TEMPERATURE: 99.5 F | BODY MASS INDEX: 21.05 KG/M2 | SYSTOLIC BLOOD PRESSURE: 90 MMHG | OXYGEN SATURATION: 91 % | WEIGHT: 114.4 LBS

## 2022-09-08 DIAGNOSIS — J84.89 NSIP (NONSPECIFIC INTERSTITIAL PNEUMONIA) (HCC): Primary | ICD-10-CM

## 2022-09-08 DIAGNOSIS — R63.0 DECREASED APPETITE: ICD-10-CM

## 2022-09-08 DIAGNOSIS — I10 ESSENTIAL HYPERTENSION: ICD-10-CM

## 2022-09-08 PROCEDURE — 99214 OFFICE O/P EST MOD 30 MIN: CPT | Performed by: INTERNAL MEDICINE

## 2022-09-08 PROCEDURE — 1123F ACP DISCUSS/DSCN MKR DOCD: CPT | Performed by: INTERNAL MEDICINE

## 2022-09-08 NOTE — PROGRESS NOTES
Margoth Burnett (:  1940) is a [de-identified] y.o. female,Established patient, here for evaluation of the following chief complaint(s):  Hypertension, Weight Loss (No appetite, drinking one ensure a day.), and Shortness of Breath (Even with the oxygen, not exercising because of the breathing)         ASSESSMENT/PLAN:  1. NSIP (nonspecific interstitial pneumonia) (HCC)  -Increase symptoms, no progression noted on CT scan. She has noted increased oxygen requirement as well. She had an echo earlier in the summer which was unremarkable. We will check pulmonary function testing and see if there is any change there. She did pulmonary rehab last year. -     Full PFT Study With Bronchodilator; Future  2. Decreased appetite   -Likely related to underlying lung disease. Discussed adding high-calorie snacks, will need to monitor  3. Essential hypertension   -Currently taking amlodipine 10 mg daily. We will need to monitor, may need to decrease dose. Return in about 1 month (around 10/8/2022) for weight, breathing. Subjective   SUBJECTIVE/OBJECTIVE:  HPI    Respiratory issues continue to be significant. She has been having significant limitations to her activities due to her shortness of breath. She moved to an independent living apartment, but is having difficulty getting to meals since it is a long walk from her apartment. They are able to deliver her mail but she does not have the same variety of options. She has lost weight again. She has had an Ensure every day, but family is concerned that she is still not getting enough calories. She is taking the amlodipine daily. No leg swelling. Review of Systems       Objective   Physical Exam  Vitals reviewed. Constitutional:       General: She is not in acute distress. Appearance: Normal appearance. She is well-developed. HENT:      Head: Normocephalic and atraumatic. Cardiovascular:      Rate and Rhythm: Normal rate and regular rhythm. Heart sounds: Normal heart sounds. Pulmonary:      Effort: No respiratory distress. Breath sounds: Rales present. No wheezing. Comments: Increased respiratory effort. Wearing supplemental oxygen  Skin:     General: Skin is warm and dry. Neurological:      Mental Status: She is alert. Psychiatric:         Behavior: Behavior normal.         Thought Content: Thought content normal.         Judgment: Judgment normal.                An electronic signature was used to authenticate this note.     --Radhika Simons MD

## 2022-09-13 ENCOUNTER — TELEPHONE (OUTPATIENT)
Dept: INTERNAL MEDICINE CLINIC | Age: 82
End: 2022-09-13

## 2022-09-13 RX ORDER — FLUTICASONE PROPIONATE AND SALMETEROL 250; 50 UG/1; UG/1
1 POWDER RESPIRATORY (INHALATION) EVERY 12 HOURS
Qty: 60 EACH | Refills: 0 | Status: SHIPPED | OUTPATIENT
Start: 2022-09-13 | End: 2022-10-10 | Stop reason: ALTCHOICE

## 2022-09-13 NOTE — TELEPHONE ENCOUNTER
Patients son (doctor) called, his mother is having trouble breathing and he would like to discuss this with Dr. Estella Handy. Please ask Dr. Estella Handy to call him back.

## 2022-09-14 NOTE — TELEPHONE ENCOUNTER
Spoke with son yesterday PM - CT stable, checking PFTs, anxiety could be playing a role too - will do a trial of Advair, she is already scheduled to see me in a month

## 2022-09-20 ENCOUNTER — OFFICE VISIT (OUTPATIENT)
Dept: PULMONOLOGY | Age: 82
End: 2022-09-20
Payer: MEDICARE

## 2022-09-20 VITALS
HEIGHT: 62 IN | OXYGEN SATURATION: 94 % | WEIGHT: 115 LBS | DIASTOLIC BLOOD PRESSURE: 59 MMHG | BODY MASS INDEX: 21.16 KG/M2 | HEART RATE: 58 BPM | SYSTOLIC BLOOD PRESSURE: 114 MMHG

## 2022-09-20 DIAGNOSIS — R63.4 WEIGHT LOSS: ICD-10-CM

## 2022-09-20 DIAGNOSIS — E83.52 HYPERCALCEMIA: ICD-10-CM

## 2022-09-20 DIAGNOSIS — J84.89 NSIP (NONSPECIFIC INTERSTITIAL PNEUMONIA) (HCC): Primary | ICD-10-CM

## 2022-09-20 LAB
A/G RATIO: 2 (ref 1.1–2.2)
ALBUMIN SERPL-MCNC: 4.5 G/DL (ref 3.4–5)
ALP BLD-CCNC: 105 U/L (ref 40–129)
ALT SERPL-CCNC: 8 U/L (ref 10–40)
ANION GAP SERPL CALCULATED.3IONS-SCNC: 14 MMOL/L (ref 3–16)
AST SERPL-CCNC: 14 U/L (ref 15–37)
BASOPHILS ABSOLUTE: 0 K/UL (ref 0–0.2)
BASOPHILS RELATIVE PERCENT: 0.7 %
BILIRUB SERPL-MCNC: 0.5 MG/DL (ref 0–1)
BUN BLDV-MCNC: 26 MG/DL (ref 7–20)
CALCIUM SERPL-MCNC: 10.7 MG/DL (ref 8.3–10.6)
CHLORIDE BLD-SCNC: 105 MMOL/L (ref 99–110)
CO2: 23 MMOL/L (ref 21–32)
CREAT SERPL-MCNC: 1 MG/DL (ref 0.6–1.2)
EOSINOPHILS ABSOLUTE: 0.3 K/UL (ref 0–0.6)
EOSINOPHILS RELATIVE PERCENT: 4.5 %
GFR AFRICAN AMERICAN: >60
GFR NON-AFRICAN AMERICAN: 53
GLUCOSE BLD-MCNC: 94 MG/DL (ref 70–99)
HCT VFR BLD CALC: 33.3 % (ref 36–48)
HEMOGLOBIN: 11.1 G/DL (ref 12–16)
LYMPHOCYTES ABSOLUTE: 1.2 K/UL (ref 1–5.1)
LYMPHOCYTES RELATIVE PERCENT: 18.9 %
MAGNESIUM: 1.8 MG/DL (ref 1.8–2.4)
MCH RBC QN AUTO: 30 PG (ref 26–34)
MCHC RBC AUTO-ENTMCNC: 33.2 G/DL (ref 31–36)
MCV RBC AUTO: 90.4 FL (ref 80–100)
MONOCYTES ABSOLUTE: 0.4 K/UL (ref 0–1.3)
MONOCYTES RELATIVE PERCENT: 5.5 %
NEUTROPHILS ABSOLUTE: 4.5 K/UL (ref 1.7–7.7)
NEUTROPHILS RELATIVE PERCENT: 70.4 %
PARATHYROID HORMONE INTACT: 90.9 PG/ML (ref 14–72)
PDW BLD-RTO: 14.4 % (ref 12.4–15.4)
PLATELET # BLD: 212 K/UL (ref 135–450)
PMV BLD AUTO: 7.8 FL (ref 5–10.5)
POTASSIUM SERPL-SCNC: 4.7 MMOL/L (ref 3.5–5.1)
RBC # BLD: 3.69 M/UL (ref 4–5.2)
SODIUM BLD-SCNC: 142 MMOL/L (ref 136–145)
TOTAL PROTEIN: 6.8 G/DL (ref 6.4–8.2)
VITAMIN D 25-HYDROXY: 69 NG/ML
WBC # BLD: 6.4 K/UL (ref 4–11)

## 2022-09-20 PROCEDURE — 1123F ACP DISCUSS/DSCN MKR DOCD: CPT | Performed by: INTERNAL MEDICINE

## 2022-09-20 PROCEDURE — 99214 OFFICE O/P EST MOD 30 MIN: CPT | Performed by: INTERNAL MEDICINE

## 2022-09-20 ASSESSMENT — ENCOUNTER SYMPTOMS
SHORTNESS OF BREATH: 1
EYE REDNESS: 0
COUGH: 0
CHEST TIGHTNESS: 0
WHEEZING: 0

## 2022-09-20 NOTE — PROGRESS NOTES
[facility]    Outpatient Note    Subjective:   CHIEF COMPLAINT:   No chief complaint on file. Interval history on 9/20/22  She is complaining of SOB and dyspnea  with minimal exertion. She looks weaker and lost significant wt. She has poor appetite and drinking water all the time. There is no nausea or vomiting. She is also complaining of left neck pain under the jaw and intermittent headache    Interval history on 7/25/22  Main complain is OLIVER. She is selling her house and moving to assisted living. Interval history on 4/4/22  She was admitted to the hospital on 2/23/22 and discharged on 2/25. She was treated for left sided hydronephrosis and E.coli UTI secondary to impacted ureteral stone. She had JJ stent but was then removed. Her main complain at this time is the increase in O2 requirement. She is on it all the time now. There is no increase in cough or sputum production. No fever or chills. Interval history on 10/19/21  She is complaining of cough with clear sputum. Gets SOB when doing things in hurry. She use O2 on PRN basis   There is no fever or chills. Interval history on 4/5/21  Overall she feels fine. She started driving. Mask is making her SOB. Walking is limited due to back pain. She still has O2 but came to the office without it     There is no cough, however she does have sputum production in the morning. He had EGD 2 weeks ago with esophageal dilatation. She also has hiatal hernia for which she is on PPI. Dose was increased to 40mg recently. Interval history on 12/17/20  Overall she feels her breathing is better. She gets SOB with exertion. sats goes down to 79% with exertion. Appetite is poor since  passed away. Interval history on 6/25/20  Overall she feels fine. She is on 2 liters of O2.  sats at rest are 99%.     She is following social distancing   She stopped using prednisone 3 weeks ago. However she is getting epidural steroids due to weakness. It was tapered off. Her main complain at this time is she is onO2 all the time. O2 level when sitting even without O2 it is 98-99%. When she walk it goes down to 90% per pt report. She is currently on 2 liters. Interval history on 3/5/20  Lost her  last month. She came accompanied by a family member who is staying with her these days. There is no SOB at rest.  She is using O2 at home with exertion. Overall she feels her breathing is better. She is still on prednisone 15mg daily. Interval history on 2/3/20  No major events since I saw her last.  She doesn't use O2 unless she feels she needs it. She feels better. Main complain today is left hip pain. Interval history on 1/7/20  Patient is here today for regular f/u. Overall she feels she is getting stronger,. She is not using O2 supplement when getting outside as the portable concentrator bothers her due to its weight. She remains on prednisone 15mg daily     Interval history on 12/10/19  She feels better and denies have SOB at rest however she does have SOB on exertion for which she has to stop. She doesn't like using O2. Pulse ox at rest is 97%, however it goes down within few minutes of exertion    Interval history 11/5/2019  Patient is here today for post hospital follow-up. She was admitted to the hospital on 10/13/2019 and discharged on 10/16/2019. She was admitted with acute hypoxic respiratory failure and pneumonia. She was treated with IV antibiotics and steroids and was discharged on tapering dose prednisone. She was discharged on oxygen. At the time of evaluation she was on room air and she was feeling better. She denied increasing cough or sputum production. There is no fever or chills    Interval history on 9/10/19  Overall she feels better. Coughing is less, and she is able to walk longer distances.    Pulse ox is in the mid 90s. There is no fever or chills    Interval history on 7/26/19  Continue to have cough, though she was given taper dose steroids and felt better on it. There is no fever or chills. Sputum is clear. She was seen by rheumatology, there is no evidence of active rheumatological illness. Anti SSA, SSB, CK was ordered. HPI:  On 7/1/19   The patient is [de-identified] y.o. female who presents today for a new patient visit for SOB. This is not entirely new, but apparently it is slowly getting worse. In the mornings feels out of breath while doing her usual ADL but this gets better as she sit to have breakfast.    She gets SOB on brisk walking and going up hills. She was diagnosed with walking pneumonia in May. At that time she was tired and out of breath. She has chronic cough with occasional sputum production, which is white in color. There is some wt loss ~ 10 lbs after getting pneumonia but gained back two lbs     No nasal congestion but has throat congestion. No post nasal drip. Has GERD for about two years. On omeprazole once daily   She has dry eyes, no dryness in the mouth  No hx of joint pain, warmth or stiffness. No hx of rash.       Past Medical History:    Past Medical History:   Diagnosis Date    Diabetes mellitus ([address]     Essential hypertension, benign     GERD (gastroesophageal reflux disease)     Hyperlipidemia     Interstitial lung disease (HCC)     Osteoarthrosis, unspecified whether generalized or localized, unspecified site     osteoarthritis lower leg    Osteopenia     Shingles        Social History:    Social History     Tobacco Use   Smoking Status Never   Smokeless Tobacco Never       Family History:  Family History   Problem Relation Age of Onset    Heart Disease Mother     Rheum Arthritis Mother     Heart Disease Father        Current Medications:  Current Outpatient Medications on File Prior to Visit   Medication Sig Dispense Refill    fluticasone-salmeterol (ADVAIR DISKUS) 250-50 MCG/ACT AEPB diskus inhaler Inhale 1 puff into the lungs in the morning and 1 puff in the evening. 60 each 0    rosuvastatin (CRESTOR) 40 MG tablet TAKE ONE TABLET BY MOUTH DAILY 90 tablet 1    escitalopram (LEXAPRO) 20 MG tablet TAKE ONE TABLET BY MOUTH DAILY 90 tablet 1    amLODIPine (NORVASC) 10 MG tablet TAKE ONE TABLET BY MOUTH DAILY 90 tablet 1    esomeprazole (NEXIUM) 40 MG delayed release capsule Take 1 capsule by mouth every morning (before breakfast) 90 capsule 1    ciclopirox (PENLAC) 8 % solution Apply to adjacent skin and affected nails daily. Remove with alcohol every 7 days. 6 mL 0    lidocaine (LIDODERM) 5 % Place 1 patch onto the skin daily as needed for Pain 12 hours on, 12 hours off.      cyanocobalamin 1000 MCG tablet Take 1,000 mcg by mouth daily      Cholecalciferol (VITAMIN D3) 25 MCG (1000 UT) CAPS TAKE ONE CAPSULE BY MOUTH DAILY 90 capsule 1    Probiotic Product (PROBIOTIC ACIDOPHILUS BIOBEADS) CAPS Take 1 capsule by mouth daily 90 capsule 0    vitamin E 1000 UNITS capsule Take 1,000 Units by mouth daily. No current facility-administered medications on file prior to visit. REVIEW OF SYSTEMS:    Review of Systems   Constitutional:  Positive for unexpected weight change. Negative for chills and fever. HENT:  Negative for congestion, facial swelling, postnasal drip and sinus pain. Eyes:  Negative for redness. Dry eyes   Respiratory:  Positive for shortness of breath (on exertion). Negative for cough, chest tightness and wheezing. Cardiovascular:  Negative for chest pain, palpitations and leg swelling. Gastrointestinal:         GERD   Skin:  Negative for rash. Neurological:  Negative for weakness. Psychiatric/Behavioral:  The patient is not nervous/anxious. All other systems reviewed and are negative.       Objective:   PHYSICAL EXAM:        VITALS:  BP (!) 114/59 (Site: Left Upper Arm, Position: Sitting, Cuff Size: Medium Adult) Pulse 58   Ht 5' 2\" (1.575 m)   Wt 115 lb (52.2 kg)   SpO2 94% Comment: 91 L  BMI 21.03 kg/m²     Physical Exam  Vitals reviewed. Constitutional:       Appearance: She is well-developed. HENT:      Head: Normocephalic and atraumatic. Eyes:      Pupils: Pupils are equal, round, and reactive to light. Neck:      Vascular: No JVD. Cardiovascular:      Rate and Rhythm: Normal rate and regular rhythm. Heart sounds: No murmur heard. Pulmonary:      Effort: Pulmonary effort is normal. No respiratory distress. Breath sounds: No stridor. Rales present. No wheezing. Abdominal:      General: Bowel sounds are normal.      Palpations: Abdomen is soft. Musculoskeletal:         General: No deformity. Cervical back: Neck supple. Skin:     General: Skin is warm and dry. Neurological:      Mental Status: She is alert and oriented to person, place, and time. Psychiatric:         Behavior: Behavior normal.       DATA:    CT chest with contrast       HISTORY: Pulmonary nodules. COMPARISON: February 7, 2019. Individualized dose optimization technique was used in order to meet ALARA standards for radiation dose reduction. In addition to vendor specific dose reduction algorithms, the dose reduction techniques vary based on the specific scanner utilized but    frequently include automated exposure control, adjustment of the mA and/or kV according to patient size, and use of iterative reconstruction technique. FINDINGS:       Basilar predominant groundglass opacity with areas of traction bronchiectasis are similar to the prior examination. There is no definite honeycombing identified. 7 mm nodule identified in the right upper lobe (image 38, series 200) is unchanged since prior exam.       5 mm nodule in the left upper lobe (image 27, series 2) unchanged. No new or enlarging pulmonary nodules. A small pericardial effusion is identified.  Mildly enlarged mediastinal lymph nodes are stable since the prior examination. Hiatal hernia is present. There is no destructive bone lesion. Impression   1. Stable pulmonary nodules. Continued follow-up is indicated. 2. Stable appearance of interstitial lung disease as described above. There is no definite honeycombing. The favored differential diagnosis is nonspecific interstitial pneumonia. This would be an atypical appearance for usual interstitial pneumonia. 3. Mediastinal adenopathy, stable. 4. Hiatal hernia. Echo on 6/11/19  Normal left ventricle size. There is mild concentric left ventricular   hypertrophy. Overall left ventricular systolic function appears normal with   an ejection fraction visually estimated at 55%. No regional wall motion   abnormalities are noted. Diastolic filling parameters suggest grade II   diastolic dysfunction. Trace mitral regurgitation is present. Trivial tricuspid regurgitation. Estimated pulmonary artery systolic   pressure is elevated at 44 mmHg assuming a right atrial pressure of 3 mmHg. PFT  DATE OF PROCEDURE:  06/11/2019     INDICATION:  Shortness of breath. BMI:  31.6. TOBACCO HISTORY:  Never smoked. Spirometry data is acceptable and reproducible. Lung volumes performed via plethysmography. Room air oxygen saturation is 92%. SPIROMETRY:  FEV1 to FVC ratio is 80%. Prebronchodilator FEV1 is 1.12,  which is 58% of predicted. Postbronchodilator FEV1 is 1.36 which is 71%  of predicted for a 21% increase. Prebronchodilator FVC is 1.4, which is  54% of predicted. Postbronchodilator FVC is 1.84 which is 71% of  predicted for 31% increase. Lung volumes showed total lung capacity of 3.04 which is 62% of  predicted. Residual volume is 1.46 which is 64% of predicted. Diffusion capacity is 9.61, which is 45% of predicted. This is not  adjusted for hemoglobin. IMPRESSION:  1. No obstructive defect.   2.  There is a significant response to bronchodilators in small and  large airways. 3.  Moderate restrictive defect is present. 4.  Moderate decrease in diffusion capacity    CT scan on 10/8/19  1. Stable findings compared prior study. Nonspecific interstitial pneumonia is favored in the absence of lower lobe predominance and lack of honeycomb changes. 2. Stable right upper lobe pulmonary nodule. 3. Annual surveillance with high-resolution CT is recommended for. Large hiatal hernia. 5. Coronary artery calcification     01/20/2020   PULMONARY FUNCTION TEST     INDICATIONS:  Interstitial lung disease. BMI:  31.5.     TOBACCO HISTORY:  Never smoked. Spirometry data is acceptable and reproducible. Lung volumes performed via plethysmography. Room air oxygen saturation is 97%. SPIROMETRY:  FEV1 to FVC ratio is 86%. Prebronchodilator FEV1 is 1.57,  which is 91% of predicted. Prebronchodilator FVC is 1.83, which is 78%  of predicted. Lung volumes show total lung capacity of 2.88, which is 63% of  predicted. Residual volume is 0.92, which is 42% of predicted. Diffusion capacity is 16.02, which is 80% of predicted. IMPRESSION:  1. No obstructive defect. 2.  There is a mild restrictive defect. 3.  Diffusion capacity is normal.  4.  When compared to previous pulmonary function tests dated 10/08/2019,  there is a mild-to-moderate drop in FVC, FEV1, and total lung capacity. Diffusion capacity is substantially higher, almost double making me  wonder his diffusion capacity in 10/2019 was real.     Six-minute walk was initially performed on room air. Of note, the  patient is on home oxygen 2 liters. At rest on room air, the patient's  oxygen saturation was 98%. Within 2 minutes, it dropped to 85%  necessitating pause and walk to place 2 liters of oxygen on. With 2  liters of oxygen, her oxygen saturation maintained 93 to 97%. The  patient walked a total of 660 feet.      The patient has significant oxygen study. Findings again favor an NSIP pattern of interstitial lung disease. Stable pulmonary nodularity. CT abdomen on 2/23/22  EXAM: CT ABDOMEN AND PELVIS WITH CONTRAST ON 2/23/2022       INDICATION: Left lower quadrant pain       COMPARISON: CT abdomen/pelvis 7/28/2020       TECHNIQUE: Multidetector CT imaging was obtained from lung bases through pelvis. Axial images and multiplanar reformatted images are provided for review. Dose modulation, iterative reconstruction and/or weight based adjustments of the mA/kV were utilized    to reduce radiation dose to as low as reasonably achievable       IV Contrast: 80 cc Isovue 370   Oral Contrast: None       LIMITATION: None       FINDINGS:       : No additional abnormality       LUNG BASES: Honeycombing is noted in the lung bases. There is a small pericardial effusion. The heart is enlarged. LIVER: The liver is homogeneous in its enhancement. SPLEEN: The spleen is normal in size and attenuation. GALLBLADDER AND BILIARY TREE: No calcified stones are seen. The common bile duct is dilated measuring up to 9 to 10 mm. There is mild intrahepatic ductal dilatation noted as well. PANCREAS: Pancreas appears homogeneous in its enhancement without evidence of enlargement. ADRENAL GLANDS: Normal.       KIDNEYS AND URETERS: The kidneys concentrate contrast bilaterally. There is a 5 mm nonobstructing calculus in the inferior pole of the left kidney. There is moderate to severe left-sided hydronephrosis and hydroureter to the level of the ureterovesicular junction. This is caused by a 5 mm calculus at the left UVJ. There is no right-sided hydronephrosis or hydroureter. No right-sided nephrolithiasis    is visualized. BOWEL: No oral contrast was given. There is a large fetal hernia. The stomach is unremarkable. The duodenum is normal in location. Small bowel is normal in caliber. The colon shows scattered diverticuli.  No definite wall thickening or inflammatory    change. Colon is filled with stool. The appendix is normal. The terminal ileum is unremarkable. PERITONEUM/RETROPERITONEUM: No ascites or free air is visualized. LYMPH NODES: No retroperitoneal or pelvic lymphadenopathy is visualized. VESSELS: Aorta is normal in caliber with mild calcification. Celiac and SMA are normal.  Portal and hepatic veins are patent. The splenic vein is patent. The SMV is unremarkable. REPRODUCTIVE ORGANS: The uterus and ovaries are unremarkable. URINARY BLADDER: Normal.       ABDOMINAL WALL: Normal.       BONES: Degenerative changes are seen throughout the lumbar spine. There is a grade 1 anterolisthesis of L4 and L5.       OTHER FINDINGS: None. Impression       Moderate to severe left-sided hydronephrosis and hydroureter caused by a 5 mm obstructing calculus at the left UVJ. Mild intra and extrahepatic ductal dilatation, indeterminate. MRCP may be helpful for evaluation. Left-sided nephrolithiasis. Large hiatal hernia. Pulmonary fibrosis noted in the lung bases. Small to moderate pericardial effusion     Echo on 6/13/22   Summary   Normal left ventricle size and systolic function with an estimated ejection   fraction of 60%-65%. Mild concentric left ventricular hypertrophy. No regional wall motion abnormalities are seen. Grade I diastolic dysfunction with elevated LVEDP. CT chest high res on 9/1/22  Impression:        Stable interstitial lung disease manifested as extensive subpleural reticulation without definite honeycombing or air trapping. Stable mild bronchiectasis. Stable small pericardial effusion. Assessment:      Diagnosis Orders   1. NSIP (nonspecific interstitial pneumonia) ([facility] [address]          Plan:   [de-identified]years old female with NSIP. Diagnosis is based on clinical presentation, CT findings and response to steroids.     She also has large hiatal hernia PFT and 6MWT on 6/2/20 are essentially stable. Had bronch on 8/1/19  Started on prednisone @ 20mg on 8/5/19 decrease to 15 mg in Dec. 2019  Tapered off around mid May 2020 due to weakness and back issues. CT scan on 1/20/21 is stable over two years. CT scan on 9/1/22 also stable. Images reviewed. (PFT on 10/6/21:  TLC improved from 63% to 72%, however FVC decreased from 78% to 68%. RV improved from 42 to 84%. Overall restriction is stable)      (There is no specific exposure that she or her family can remember  She is not on chronic medication that is known to cause ILD  She does not have pets  Hypersensitivity panel is negative   Rheumatoid factor, GIUSEPPE and sed rate are negative. SSA and SSB are negative  She was seen by rheumatology. There is no evidence of active rheumatic disease. BAL is negative for infectious etiology)     Echo on 6/11/19 with grade II diastolic dysfunction. Clinically euvolemic     She is using O2 2-4 liters. I wonder if she has symptomatic hypercalcemia. She has constipation, always thirsty, BUN/Cr ratio is high. I checked ionized calcium and corrected ionized calcium is 1.53. Of note she also has hx of kidney stone. PTH is high. Will refer to nephrology for further evaluation. Discussed with Dr. Maria E Shirley.

## 2022-09-21 ENCOUNTER — TELEPHONE (OUTPATIENT)
Dept: PULMONOLOGY | Age: 82
End: 2022-09-21

## 2022-09-21 NOTE — TELEPHONE ENCOUNTER
Patient has neck and ear pain  Was told you were calling PCP to discuss yesterday visit. Would like a callback.  Call Nadine Perea (son) [phone]

## 2022-09-22 LAB
CALCIUM IONIZED, CALC AT PH 7.4: 1.53 MMOL/L (ref 1.11–1.3)
CALCIUM IONIZED: 1.48 MMOL/L (ref 1.11–1.3)
VITAMIN D 1,25-DIHYDROXY: 62.5 PG/ML (ref 19.9–79.3)

## 2022-09-23 ASSESSMENT — ENCOUNTER SYMPTOMS
SINUS PAIN: 0
FACIAL SWELLING: 0

## 2022-09-27 ENCOUNTER — TELEPHONE (OUTPATIENT)
Dept: PULMONOLOGY | Age: 82
End: 2022-09-27

## 2022-09-27 NOTE — TELEPHONE ENCOUNTER
Spoke to pt son explained we have not received anything on this. He stated that they told him they faxed it today. Looked through on all faxes and nothing was received on Dr. Nivia naranjo. Pt son is going to wait to see what dr Jonas Vanessa office states.

## 2022-09-27 NOTE — TELEPHONE ENCOUNTER
Carol Benavidez ( son ) calls to see if we received a order from [facility] for Jacki to refill O2 tanks at home. I let him know we have not received anything for Dr Marsha Ybarra.     He will call [facility] and see what fax number they have

## 2022-09-27 NOTE — TELEPHONE ENCOUNTER
Patients son called again  regarding his moms oxygen tank     [facility]     Please advise and call     He said the [facility]  place said they sent over info a few days ago but nothing was done.  The son  said they only fill orders on Wednesday so if its not signed today then she has to wait 2 wks to get it again and she will be stuck in her apartment         Fax# [phone]

## 2022-09-29 ENCOUNTER — HOSPITAL ENCOUNTER (OUTPATIENT)
Dept: CT IMAGING | Age: 82
Discharge: HOME | End: 2022-09-29
Payer: MEDICARE

## 2022-09-29 DIAGNOSIS — R63.4 WEIGHT LOSS: ICD-10-CM

## 2022-09-29 PROCEDURE — A4641 RADIOPHARM DX AGENT NOC: HCPCS | Performed by: INTERNAL MEDICINE

## 2022-09-29 PROCEDURE — 6360000004 HC RX CONTRAST MEDICATION: Performed by: INTERNAL MEDICINE

## 2022-09-29 PROCEDURE — 74177 CT ABD & PELVIS W/CONTRAST: CPT

## 2022-09-29 RX ADMIN — IOPAMIDOL 75 ML: 755 INJECTION, SOLUTION INTRAVENOUS at 13:31

## 2022-09-29 RX ADMIN — BARIUM SULFATE 900 ML: 21 SUSPENSION ORAL at 13:31

## 2022-10-07 ENCOUNTER — TELEPHONE (OUTPATIENT)
Dept: INTERNAL MEDICINE CLINIC | Age: 82
End: 2022-10-07

## 2022-10-07 DIAGNOSIS — R39.9 UTI SYMPTOMS: Primary | ICD-10-CM

## 2022-10-07 LAB
BACTERIA: NORMAL /HPF
BILIRUBIN URINE: NEGATIVE
BLOOD, URINE: NEGATIVE
CLARITY: CLEAR
COLOR: YELLOW
EPITHELIAL CELLS, UA: 3 /HPF (ref 0–5)
GLUCOSE URINE: NEGATIVE MG/DL
HYALINE CASTS: 0 /LPF (ref 0–8)
KETONES, URINE: NEGATIVE MG/DL
LEUKOCYTE ESTERASE, URINE: ABNORMAL
MICROSCOPIC EXAMINATION: YES
NITRITE, URINE: NEGATIVE
PH UA: 6.5 (ref 5–8)
PROTEIN UA: NEGATIVE MG/DL
RBC UA: 2 /HPF (ref 0–4)
SPECIFIC GRAVITY UA: 1.01 (ref 1–1.03)
URINE REFLEX TO CULTURE: ABNORMAL
URINE TYPE: ABNORMAL
UROBILINOGEN, URINE: 0.2 E.U./DL
WBC UA: 1 /HPF (ref 0–5)

## 2022-10-07 NOTE — TELEPHONE ENCOUNTER
Ms. Cintia Dorman will be having urine and blood tests today (10/7/22) and would like Dr. Natalie Velazquez to review them for white blood count levels. Her sons suspect that she has a UTI.

## 2022-10-10 ENCOUNTER — OFFICE VISIT (OUTPATIENT)
Dept: INTERNAL MEDICINE CLINIC | Age: 82
End: 2022-10-10
Payer: MEDICARE

## 2022-10-10 VITALS
BODY MASS INDEX: 21.42 KG/M2 | DIASTOLIC BLOOD PRESSURE: 68 MMHG | HEIGHT: 62 IN | WEIGHT: 116.4 LBS | SYSTOLIC BLOOD PRESSURE: 126 MMHG

## 2022-10-10 DIAGNOSIS — E21.3 HYPERPARATHYROIDISM (HCC): ICD-10-CM

## 2022-10-10 DIAGNOSIS — Z00.00 MEDICARE ANNUAL WELLNESS VISIT, SUBSEQUENT: Primary | ICD-10-CM

## 2022-10-10 DIAGNOSIS — E08.21 DIABETES MELLITUS DUE TO UNDERLYING CONDITION WITH DIABETIC NEPHROPATHY, WITHOUT LONG-TERM CURRENT USE OF INSULIN (HCC): ICD-10-CM

## 2022-10-10 DIAGNOSIS — I10 ESSENTIAL HYPERTENSION: ICD-10-CM

## 2022-10-10 DIAGNOSIS — R63.4 UNINTENTIONAL WEIGHT LOSS: ICD-10-CM

## 2022-10-10 DIAGNOSIS — J84.89 NSIP (NONSPECIFIC INTERSTITIAL PNEUMONIA) (HCC): ICD-10-CM

## 2022-10-10 PROCEDURE — 1123F ACP DISCUSS/DSCN MKR DOCD: CPT | Performed by: INTERNAL MEDICINE

## 2022-10-10 PROCEDURE — G0439 PPPS, SUBSEQ VISIT: HCPCS | Performed by: INTERNAL MEDICINE

## 2022-10-10 ASSESSMENT — LIFESTYLE VARIABLES
HOW OFTEN DO YOU HAVE A DRINK CONTAINING ALCOHOL: 2-4 TIMES A MONTH
HOW MANY STANDARD DRINKS CONTAINING ALCOHOL DO YOU HAVE ON A TYPICAL DAY: 1 OR 2

## 2022-10-10 ASSESSMENT — PATIENT HEALTH QUESTIONNAIRE - PHQ9
7. TROUBLE CONCENTRATING ON THINGS, SUCH AS READING THE NEWSPAPER OR WATCHING TELEVISION: 0
3. TROUBLE FALLING OR STAYING ASLEEP: 0
2. FEELING DOWN, DEPRESSED OR HOPELESS: 0
SUM OF ALL RESPONSES TO PHQ QUESTIONS 1-9: 0
SUM OF ALL RESPONSES TO PHQ QUESTIONS 1-9: 0
6. FEELING BAD ABOUT YOURSELF - OR THAT YOU ARE A FAILURE OR HAVE LET YOURSELF OR YOUR FAMILY DOWN: 0
SUM OF ALL RESPONSES TO PHQ QUESTIONS 1-9: 0
SUM OF ALL RESPONSES TO PHQ QUESTIONS 1-9: 0
5. POOR APPETITE OR OVEREATING: 0
8. MOVING OR SPEAKING SO SLOWLY THAT OTHER PEOPLE COULD HAVE NOTICED. OR THE OPPOSITE, BEING SO FIGETY OR RESTLESS THAT YOU HAVE BEEN MOVING AROUND A LOT MORE THAN USUAL: 0
SUM OF ALL RESPONSES TO PHQ9 QUESTIONS 1 & 2: 0
1. LITTLE INTEREST OR PLEASURE IN DOING THINGS: 0
4. FEELING TIRED OR HAVING LITTLE ENERGY: 0
10. IF YOU CHECKED OFF ANY PROBLEMS, HOW DIFFICULT HAVE THESE PROBLEMS MADE IT FOR YOU TO DO YOUR WORK, TAKE CARE OF THINGS AT HOME, OR GET ALONG WITH OTHER PEOPLE: 0
9. THOUGHTS THAT YOU WOULD BE BETTER OFF DEAD, OR OF HURTING YOURSELF: 0

## 2022-10-10 NOTE — PROGRESS NOTES
Medicare Annual Wellness Visit    Daniel Garcia is here for Medicare AWV    Assessment & Plan   Medicare annual wellness visit, subsequent  NSIP (nonspecific interstitial pneumonia) ([facility] [address]   -It is unclear why her dyspnea and oxygen requirement has increased despite no progression on imaging, anxiety may be exacerbating some of her symptoms and deconditioning may be playing a role.   -Since she has PFTs scheduled, will discontinue the Advair had a time and see if that is revealing. She already had an echocardiogram earlier this year which was normal.   -The other thing to consider would be sleep apnea, she has never had a sleep study. Thing that should be able to tolerate a mask. We will wait on PFTs first.  Diabetes mellitus due to underlying condition with diabetic nephropathy, without long-term current use of insulin ([facility] [address]  -Diet controlled, monitor  -     Comprehensive Metabolic Panel; Future  -     Lipid Panel; Future  -     CBC with Auto Differential; Future  -     Hemoglobin A1C; Future  Hyperparathyroidism (HCC)   -Mild hypercalcemia, likely be causing any symptoms, she is seeing the nephrologist and is monitoring  Essential hypertension   -Controlled on amlodipine 5 mg daily  Unintentional weight loss   -Weight appears to stabilized, monitor    Recommendations for Preventive Services Due: see orders and patient instructions/AVS.  Recommended screening schedule for the next 5-10 years is provided to the patient in written form: see Patient Instructions/AVS.     Return in 1 month (on 11/10/2022) for lungs. Subjective   The following acute and/or chronic problems were also addressed today:    Had to change her oxygen tanks to portable tanks, the she had with her. No warning.,  She has not consistently been able to use the tanks correctly. They have someone coming in every other day, family is also keeping an eye on it. They have noticed over time some cognitive decline.   She has PFTs scheduled, lung disease was stable on the CT scan. She has been using Advair, the pulmonologist discontinued it though she thinks there may have been a slight improvement in breathing with it. Patient's complete Health Risk Assessment and screening values have been reviewed and are found in Flowsheets. The following problems were reviewed today and where indicated follow up appointments were made and/or referrals ordered. Positive Risk Factor Screenings with Interventions:             General Health and ACP:  General  In general, how would you say your health is?: Good  In the past 7 days, have you experienced any of the following: New or Increased Pain, New or Increased Fatigue, Loneliness, Social Isolation, Stress or Anger?: (!) Yes  Select all that apply: (!) Stress  Do you get the social and emotional support that you need?: Yes  Do you have a Living Will?: Yes    Advance Directives       Power of  Living Will ACP-Advance Directive ACP-Power of     Not on File Not on File Filed Not on File          General Health Risk Interventions:  Stress: discussed stress with oxygen tanks  No Living Will: ACP documents already completed- patient asked to provide copy to the office    Health Habits/Nutrition:  Physical Activity: Inactive    Days of Exercise per Week: 0 days    Minutes of Exercise per Session: 0 min     Have you lost any weight without trying in the past 3 months?: No  Body mass index: 21.29  Have you seen the dentist within the past year?: Yes  Health Habits/Nutrition Interventions:  Inadequate physical activity:  patient is not ready to increase his/her physical activity level at this time             Objective   Vitals:    10/10/22 1048   BP: 126/68   Site: Right Upper Arm   Weight: 116 lb 6.4 oz (52.8 kg)   Height: 5' 2\" (1.575 m)      Body mass index is 21.29 kg/m². Physical Exam  Vitals reviewed. Constitutional:       General: She is not in acute distress. Appearance: Normal appearance. She is well-developed. HENT:      Head: Normocephalic and atraumatic. Cardiovascular:      Rate and Rhythm: Normal rate and regular rhythm. Heart sounds: Normal heart sounds. Pulmonary:      Effort: No respiratory distress. Breath sounds: Normal breath sounds. Comments: Increased work of breathing but not in distress  Using supplemental oxygen  Few crackles  Skin:     General: Skin is warm and dry. Neurological:      Mental Status: She is alert. Psychiatric:         Mood and Affect: Mood is anxious. Behavior: Behavior normal.         Thought Content: Thought content normal.         Judgment: Judgment normal.               Allergies   Allergen Reactions    Pravastatin      Other reaction(s): increases blood pressure    Percocet [Oxycodone-Acetaminophen] Other (See Comments)     Dizzy     Prior to Visit Medications    Medication Sig Taking? Authorizing Provider   rosuvastatin (CRESTOR) 40 MG tablet TAKE ONE TABLET BY MOUTH DAILY Yes Vanesa Farah MD   escitalopram (LEXAPRO) 20 MG tablet TAKE ONE TABLET BY MOUTH DAILY Yes Vanesa Farah MD   amLODIPine (NORVASC) 10 MG tablet TAKE ONE TABLET BY MOUTH DAILY  Patient taking differently: 5 mg TAKE ONE TABLET BY MOUTH DAILY Yes Vanesa Farah MD   esomeprazole (NEXIUM) 40 MG delayed release capsule Take 1 capsule by mouth every morning (before breakfast) Yes Vanesa Farah MD   ciclopirox (PENLAC) 8 % solution Apply to adjacent skin and affected nails daily. Remove with alcohol every 7 days. Yes Vanesa Farah MD   lidocaine (LIDODERM) 5 % Place 1 patch onto the skin daily as needed for Pain 12 hours on, 12 hours off.  Yes Historical Provider, MD   cyanocobalamin 1000 MCG tablet Take 1,000 mcg by mouth daily Yes Historical Provider, MD   Cholecalciferol (VITAMIN D3) 25 MCG (1000 UT) CAPS TAKE ONE CAPSULE BY MOUTH DAILY Yes AEME Anaya - CNP   Probiotic Product (PROBIOTIC ACIDOPHILUS BIOBEADS) CAPS Take 1 capsule by mouth daily Yes Vanita Slade AMEE Metcalf - CNP   vitamin E 1000 UNITS capsule Take 1,000 Units by mouth daily.    Yes Historical Provider, MD Kothari (Including outside providers/suppliers regularly involved in providing care):   Patient Care Team:  Tiburcio Scott MD as PCP - General (Internal Medicine)  Tiburcio Scott MD as PCP - REHABILITATION [facility] Empaneled Provider  Joss Cuba MD as Consulting Physician (Otolaryngology)  Alison Bojorquez RD, LD as Diabetic Educator (Dietitian Registered)  Andrew Frausto RN as Registered Nurse  Ondina Pedro MD as Consulting Physician (Pulmonology)     Reviewed and updated this visit:  Tobacco  Allergies  Meds  Med Hx  Surg Hx  Soc Hx  Fam Hx

## 2022-10-10 NOTE — PATIENT INSTRUCTIONS
Personalized Preventive Plan for Kevin Zarate - 10/10/2022  Medicare offers a range of preventive health benefits. Some of the tests and screenings are paid in full while other may be subject to a deductible, co-insurance, and/or copay. Some of these benefits include a comprehensive review of your medical history including lifestyle, illnesses that may run in your family, and various assessments and screenings as appropriate. After reviewing your medical record and screening and assessments performed today your provider may have ordered immunizations, labs, imaging, and/or referrals for you. A list of these orders (if applicable) as well as your Preventive Care list are included within your After Visit Summary for your review. Other Preventive Recommendations:    A preventive eye exam performed by an eye specialist is recommended every 1-2 years to screen for glaucoma; cataracts, macular degeneration, and other eye disorders. A preventive dental visit is recommended every 6 months. Try to get at least 150 minutes of exercise per week or 10,000 steps per day on a pedometer . Order or download the FREE \"Exercise & Physical Activity: Your Everyday Guide\" from [employer]. Call 5-999.214.6766 or search [employer] online. You need 8432-0207 mg of calcium and 1487-6079 IU of vitamin D per day. It is possible to meet your calcium requirement with diet alone, but a vitamin D supplement is usually necessary to meet this goal.  When exposed to the sun, use a sunscreen that protects against both UVA and UVB radiation with an SPF of 30 or greater. Reapply every 2 to 3 hours or after sweating, drying off with a towel, or swimming. Always wear a seat belt when traveling in a car. Always wear a helmet when riding a bicycle or motorcycle.

## 2022-10-17 LAB — PTH RELATED PEPTIDE: 3.9 PMOL/L (ref 0–3.4)

## 2022-10-18 ENCOUNTER — HOSPITAL ENCOUNTER (OUTPATIENT)
Dept: PULMONOLOGY | Age: 82
Discharge: HOME | End: 2022-10-18
Payer: MEDICARE

## 2022-10-18 DIAGNOSIS — J84.89 NSIP (NONSPECIFIC INTERSTITIAL PNEUMONIA) (HCC): ICD-10-CM

## 2022-10-18 PROCEDURE — 94729 DIFFUSING CAPACITY: CPT

## 2022-10-18 PROCEDURE — 6360000002 HC RX W HCPCS: Performed by: INTERNAL MEDICINE

## 2022-10-18 PROCEDURE — 94760 N-INVAS EAR/PLS OXIMETRY 1: CPT

## 2022-10-18 PROCEDURE — 94060 EVALUATION OF WHEEZING: CPT

## 2022-10-18 PROCEDURE — 94726 PLETHYSMOGRAPHY LUNG VOLUMES: CPT

## 2022-10-18 PROCEDURE — 94664 DEMO&/EVAL PT USE INHALER: CPT

## 2022-10-18 PROCEDURE — 94150 VITAL CAPACITY TEST: CPT

## 2022-10-18 RX ORDER — ALBUTEROL SULFATE 2.5 MG/3ML
2.5 SOLUTION RESPIRATORY (INHALATION) EVERY 6 HOURS PRN
Status: DISCONTINUED | OUTPATIENT
Start: 2022-10-18 | End: 2022-10-19 | Stop reason: HOSPADM

## 2022-10-18 RX ADMIN — ALBUTEROL SULFATE 2.5 MG: 2.5 SOLUTION RESPIRATORY (INHALATION) at 11:52

## 2022-10-22 NOTE — PROCEDURES
[address]               [address]                               PULMONARY FUNCTION    PATIENT NAME: Riana Harvey                      :        1940  MED REC NO:   [id number]                          ROOM:  ACCOUNT NO:   [de-identified]                           ADMIT DATE: 10/18/2022  PROVIDER:     Ruben Brady MD    DATE OF PROCEDURE:  10/18/2022    FINDINGS:  Spirometry for this patient shows an FEV1 of 1.28 which is  80% of predicted and a forced vital capacity of 1.71 which is 79% of  predicted, giving a ratio of 75. Post bronchodilator these values were  worse with post bronchodilator FEV1 of 1.03 and forced vital capacity of  1.64. Lung volumes show a decreased total lung capacity of 69% of  predicted. Diffusion capacity was corrected to severely reduced with  alveolar ventilation. CONCLUSION:  Difficult to interpret study although meets criteria for a  moderate restricted defect with severely decreased diffusion. Findings  could be consistent with the diagnosis of NSIP listed as the reason for  the study.         Bridgett Stroud MD    D: 10/21/2022 19:19:35       T: 10/22/2022 5:34:03     [email or web address]  Job#: [id number]     Doc#: [id number]    CC:

## 2022-11-01 ENCOUNTER — OFFICE VISIT (OUTPATIENT)
Dept: PULMONOLOGY | Age: 82
End: 2022-11-01
Payer: MEDICARE

## 2022-11-01 VITALS
DIASTOLIC BLOOD PRESSURE: 74 MMHG | WEIGHT: 117 LBS | BODY MASS INDEX: 21.53 KG/M2 | SYSTOLIC BLOOD PRESSURE: 171 MMHG | HEIGHT: 62 IN | OXYGEN SATURATION: 88 %

## 2022-11-01 DIAGNOSIS — J84.89 NSIP (NONSPECIFIC INTERSTITIAL PNEUMONIA) (HCC): Primary | ICD-10-CM

## 2022-11-01 PROCEDURE — 3074F SYST BP LT 130 MM HG: CPT | Performed by: INTERNAL MEDICINE

## 2022-11-01 PROCEDURE — 99213 OFFICE O/P EST LOW 20 MIN: CPT | Performed by: INTERNAL MEDICINE

## 2022-11-01 PROCEDURE — 1123F ACP DISCUSS/DSCN MKR DOCD: CPT | Performed by: INTERNAL MEDICINE

## 2022-11-01 PROCEDURE — 3078F DIAST BP <80 MM HG: CPT | Performed by: INTERNAL MEDICINE

## 2022-11-01 ASSESSMENT — ENCOUNTER SYMPTOMS
CHEST TIGHTNESS: 0
WHEEZING: 0
FACIAL SWELLING: 0
SINUS PAIN: 0
COUGH: 0
SHORTNESS OF BREATH: 1
EYE REDNESS: 0

## 2022-11-01 NOTE — PROGRESS NOTES
[facility]    Outpatient Note    Subjective:   CHIEF COMPLAINT:   Chief Complaint   Patient presents with    Follow-up       Interval history on 11/1/22  Overall same general condition. She has new O2 tank. She is happy with the new system except it is hard for her to change the regulator on the tank and has to have someone changing it for her daily. She was seen by nephrology and will have a 2nd appointment tomorrow to start treatment for hypercalcemia     Interval history on 9/20/22  She is complaining of SOB and dyspnea  with minimal exertion. She looks weaker and lost significant wt. She has poor appetite and drinking water all the time. There is no nausea or vomiting. She is also complaining of left neck pain under the jaw and intermittent headache    Interval history on 7/25/22  Main complain is OLIVER. She is selling her house and moving to assisted living. Interval history on 4/4/22  She was admitted to the hospital on 2/23/22 and discharged on 2/25. She was treated for left sided hydronephrosis and E.coli UTI secondary to impacted ureteral stone. She had JJ stent but was then removed. Her main complain at this time is the increase in O2 requirement. She is on it all the time now. There is no increase in cough or sputum production. No fever or chills. Interval history on 10/19/21  She is complaining of cough with clear sputum. Gets SOB when doing things in hurry. She use O2 on PRN basis   There is no fever or chills. Interval history on 4/5/21  Overall she feels fine. She started driving. Mask is making her SOB. Walking is limited due to back pain. She still has O2 but came to the office without it     There is no cough, however she does have sputum production in the morning. He had EGD 2 weeks ago with esophageal dilatation. She also has hiatal hernia for which she is on PPI.   Dose was increased to 40mg recently. Interval history on 12/17/20  Overall she feels her breathing is better. She gets SOB with exertion. sats goes down to 79% with exertion. Appetite is poor since  passed away. Interval history on 6/25/20  Overall she feels fine. She is on 2 liters of O2.  sats at rest are 99%. She is following social distancing   She stopped using prednisone 3 weeks ago. However she is getting epidural steroids due to weakness. It was tapered off. Her main complain at this time is she is onO2 all the time. O2 level when sitting even without O2 it is 98-99%. When she walk it goes down to 90% per pt report. She is currently on 2 liters. Interval history on 3/5/20  Lost her  last month. She came accompanied by a family member who is staying with her these days. There is no SOB at rest.  She is using O2 at home with exertion. Overall she feels her breathing is better. She is still on prednisone 15mg daily. Interval history on 2/3/20  No major events since I saw her last.  She doesn't use O2 unless she feels she needs it. She feels better. Main complain today is left hip pain. Interval history on 1/7/20  Patient is here today for regular f/u. Overall she feels she is getting stronger,. She is not using O2 supplement when getting outside as the portable concentrator bothers her due to its weight. She remains on prednisone 15mg daily     Interval history on 12/10/19  She feels better and denies have SOB at rest however she does have SOB on exertion for which she has to stop. She doesn't like using O2. Pulse ox at rest is 97%, however it goes down within few minutes of exertion    Interval history 11/5/2019  Patient is here today for post hospital follow-up. She was admitted to the hospital on 10/13/2019 and discharged on 10/16/2019. She was admitted with acute hypoxic respiratory failure and pneumonia.   She was treated with IV antibiotics and steroids and was discharged on tapering dose prednisone. She was discharged on oxygen. At the time of evaluation she was on room air and she was feeling better. She denied increasing cough or sputum production. There is no fever or chills    Interval history on 9/10/19  Overall she feels better. Coughing is less, and she is able to walk longer distances. Pulse ox is in the mid 90s. There is no fever or chills    Interval history on 7/26/19  Continue to have cough, though she was given taper dose steroids and felt better on it. There is no fever or chills. Sputum is clear. She was seen by rheumatology, there is no evidence of active rheumatological illness. Anti SSA, SSB, CK was ordered. HPI:  On 7/1/19   The patient is [de-identified] y.o. female who presents today for a new patient visit for SOB. This is not entirely new, but apparently it is slowly getting worse. In the mornings feels out of breath while doing her usual ADL but this gets better as she sit to have breakfast.    She gets SOB on brisk walking and going up hills. She was diagnosed with walking pneumonia in May. At that time she was tired and out of breath. She has chronic cough with occasional sputum production, which is white in color. There is some wt loss ~ 10 lbs after getting pneumonia but gained back two lbs     No nasal congestion but has throat congestion. No post nasal drip. Has GERD for about two years. On omeprazole once daily   She has dry eyes, no dryness in the mouth  No hx of joint pain, warmth or stiffness. No hx of rash.       Past Medical History:    Past Medical History:   Diagnosis Date    Diabetes mellitus ([facility] [address]     Essential hypertension, benign     GERD (gastroesophageal reflux disease)     Hyperlipidemia     Interstitial lung disease (HCC)     Osteoarthrosis, unspecified whether generalized or localized, unspecified site     osteoarthritis lower leg    Osteopenia     Shingles        Social History:    Social History     Tobacco Use   Smoking Status Never   Smokeless Tobacco Never       Family History:  Family History   Problem Relation Age of Onset    Heart Disease Mother     Rheum Arthritis Mother     Heart Disease Father        Current Medications:  Current Outpatient Medications on File Prior to Visit   Medication Sig Dispense Refill    rosuvastatin (CRESTOR) 40 MG tablet TAKE ONE TABLET BY MOUTH DAILY 90 tablet 1    escitalopram (LEXAPRO) 20 MG tablet TAKE ONE TABLET BY MOUTH DAILY 90 tablet 1    amLODIPine (NORVASC) 10 MG tablet TAKE ONE TABLET BY MOUTH DAILY (Patient taking differently: 5 mg TAKE ONE TABLET BY MOUTH DAILY) 90 tablet 1    esomeprazole (NEXIUM) 40 MG delayed release capsule Take 1 capsule by mouth every morning (before breakfast) 90 capsule 1    ciclopirox (PENLAC) 8 % solution Apply to adjacent skin and affected nails daily. Remove with alcohol every 7 days. 6 mL 0    lidocaine (LIDODERM) 5 % Place 1 patch onto the skin daily as needed for Pain 12 hours on, 12 hours off.      cyanocobalamin 1000 MCG tablet Take 1,000 mcg by mouth daily      Cholecalciferol (VITAMIN D3) 25 MCG (1000 UT) CAPS TAKE ONE CAPSULE BY MOUTH DAILY 90 capsule 1    Probiotic Product (PROBIOTIC ACIDOPHILUS BIOBEAT[employer]) CAPS Take 1 capsule by mouth daily 90 capsule 0    vitamin E 1000 UNITS capsule Take 1,000 Units by mouth daily. No current facility-administered medications on file prior to visit. REVIEW OF SYSTEMS:    Review of Systems   Constitutional:  Positive for unexpected weight change. Negative for chills and fever. HENT:  Negative for congestion, facial swelling, postnasal drip and sinus pain. Eyes:  Negative for redness. Dry eyes   Respiratory:  Positive for shortness of breath (on exertion). Negative for cough, chest tightness and wheezing. Cardiovascular:  Negative for chest pain, palpitations and leg swelling. Gastrointestinal:         GERD   Skin:  Negative for rash. Neurological:  Negative for weakness. Psychiatric/Behavioral:  The patient is not nervous/anxious. All other systems reviewed and are negative. Objective:   PHYSICAL EXAM:        VITALS:  BP (!) 171/74 (Site: Right Upper Arm, Position: Sitting, Cuff Size: Medium Adult)   Ht 5' 2\" (1.575 m)   Wt 117 lb (53.1 kg)   SpO2 (!) 88% Comment: 3L  BMI 21.40 kg/m²     Physical Exam  Vitals reviewed. Constitutional:       Appearance: She is well-developed. HENT:      Head: Normocephalic and atraumatic. Eyes:      Pupils: Pupils are equal, round, and reactive to light. Neck:      Vascular: No JVD. Cardiovascular:      Rate and Rhythm: Normal rate and regular rhythm. Heart sounds: No murmur heard. Pulmonary:      Effort: Pulmonary effort is normal. No respiratory distress. Breath sounds: No stridor. Rales present. No wheezing. Abdominal:      General: Bowel sounds are normal.      Palpations: Abdomen is soft. Musculoskeletal:         General: No deformity. Cervical back: Neck supple. Skin:     General: Skin is warm and dry. Neurological:      Mental Status: She is alert and oriented to person, place, and time. Psychiatric:         Behavior: Behavior normal.       DATA:    CT chest with contrast       HISTORY: Pulmonary nodules. COMPARISON: February 7, 2019. Individualized dose optimization technique was used in order to meet ALARA standards for radiation dose reduction. In addition to vendor specific dose reduction algorithms, the dose reduction techniques vary based on the specific scanner utilized but    frequently include automated exposure control, adjustment of the mA and/or kV according to patient size, and use of iterative reconstruction technique. FINDINGS:       Basilar predominant groundglass opacity with areas of traction bronchiectasis are similar to the prior examination. There is no definite honeycombing identified.        7 mm nodule identified in the right upper lobe (image 38, series 200) is unchanged since prior exam.       5 mm nodule in the left upper lobe (image 27, series 2) unchanged. No new or enlarging pulmonary nodules. A small pericardial effusion is identified. Mildly enlarged mediastinal lymph nodes are stable since the prior examination. Hiatal hernia is present. There is no destructive bone lesion. Impression   1. Stable pulmonary nodules. Continued follow-up is indicated. 2. Stable appearance of interstitial lung disease as described above. There is no definite honeycombing. The favored differential diagnosis is nonspecific interstitial pneumonia. This would be an atypical appearance for usual interstitial pneumonia. 3. Mediastinal adenopathy, stable. 4. Hiatal hernia. Echo on 6/11/19  Normal left ventricle size. There is mild concentric left ventricular   hypertrophy. Overall left ventricular systolic function appears normal with   an ejection fraction visually estimated at 55%. No regional wall motion   abnormalities are noted. Diastolic filling parameters suggest grade II   diastolic dysfunction. Trace mitral regurgitation is present. Trivial tricuspid regurgitation. Estimated pulmonary artery systolic   pressure is elevated at 44 mmHg assuming a right atrial pressure of 3 mmHg. PFT  DATE OF PROCEDURE:  06/11/2019     INDICATION:  Shortness of breath. BMI:  31.6. TOBACCO HISTORY:  Never smoked. Spirometry data is acceptable and reproducible. Lung volumes performed via plethysmography. Room air oxygen saturation is 92%. SPIROMETRY:  FEV1 to FVC ratio is 80%. Prebronchodilator FEV1 is 1.12,  which is 58% of predicted. Postbronchodilator FEV1 is 1.36 which is 71%  of predicted for a 21% increase. Prebronchodilator FVC is 1.4, which is  54% of predicted. Postbronchodilator FVC is 1.84 which is 71% of  predicted for 31% increase.      Lung volumes showed total lung capacity of 3.04 which is 62% of  predicted. Residual volume is 1.46 which is 64% of predicted. Diffusion capacity is 9.61, which is 45% of predicted. This is not  adjusted for hemoglobin. IMPRESSION:  1. No obstructive defect. 2.  There is a significant response to bronchodilators in small and  large airways. 3.  Moderate restrictive defect is present. 4.  Moderate decrease in diffusion capacity    CT scan on 10/8/19  1. Stable findings compared prior study. Nonspecific interstitial pneumonia is favored in the absence of lower lobe predominance and lack of honeycomb changes. 2. Stable right upper lobe pulmonary nodule. 3. Annual surveillance with high-resolution CT is recommended for. Large hiatal hernia. 5. Coronary artery calcification     01/20/2020   PULMONARY FUNCTION TEST     INDICATIONS:  Interstitial lung disease. BMI:  31.5.     TOBACCO HISTORY:  Never smoked. Spirometry data is acceptable and reproducible. Lung volumes performed via plethysmography. Room air oxygen saturation is 97%. SPIROMETRY:  FEV1 to FVC ratio is 86%. Prebronchodilator FEV1 is 1.57,  which is 91% of predicted. Prebronchodilator FVC is 1.83, which is 78%  of predicted. Lung volumes show total lung capacity of 2.88, which is 63% of  predicted. Residual volume is 0.92, which is 42% of predicted. Diffusion capacity is 16.02, which is 80% of predicted. IMPRESSION:  1. No obstructive defect. 2.  There is a mild restrictive defect. 3.  Diffusion capacity is normal.  4.  When compared to previous pulmonary function tests dated 10/08/2019,  there is a mild-to-moderate drop in FVC, FEV1, and total lung capacity. Diffusion capacity is substantially higher, almost double making me  wonder his diffusion capacity in 10/2019 was real.     Six-minute walk was initially performed on room air. Of note, the  patient is on home oxygen 2 liters.   At rest on room air, the patient's  oxygen saturation was 98%. Within 2 minutes, it dropped to 85%  necessitating pause and walk to place 2 liters of oxygen on. With 2  liters of oxygen, her oxygen saturation maintained 93 to 97%. The  patient walked a total of 660 feet. The patient has significant oxygen desaturation with submaximal  exercise. She does qualify for home oxygen with exertion and oxygen  flow rate is deemed to be 2 liters. pulmonary function tests and 6MWT on 6/2/20  6/3/2020 10:20 AM  Pulmonary Function Test:     Indication: NSIP    Test comment:     Spirometry data is acceptable and reproducible. Maximum effort given during the test.    Pulse ox is 95% on room air    Estimated body mass index is 26.15 kg/m² as calculated from the   following:    Height as of 5/5/20: 5' 2.01\" (1.575 m). Weight as of 5/26/20: 143 lb (64.9 kg). Spirometry data:    FEV1/FVC: 87. Predicted ratio 74    FEV1 1.59L, which is 87% predicted    FVC is 1.82L, which is 74% predicted    Lung Volumes:    TLC (by Plethysmography) is 2.63L, which is 55% predicted    RV is 0.67L which is 29% predicted    Diffusion Capacity:    DLCO is 5.43 which is 26% predicted    Impression:    1. There is no obstruction present    2. There is restriction. Based on FEV1 it is considered mild   however based on TLC it is considered severe    3. There is severe reduction in diffusion capacity    Comment:   PFT was compared to the one on 1/20/20 - FEV1 and FVC are stable. SIX-MINUTE WALK:    A six-minute walk was started on room air. Patient was placed on   2 liters of O2 at minute 4   The patientwalked a total of 510 feet. She did stop or pause   during the walk to place O2 and due to back pain. Baseline  oxygen saturation 95%. The lowest oxygen saturation during the   walk was 82% on minute 3 for which she was placed on 2 liters. O2 recovered to 95-98% during the remaining time of testing.     6MWT was compared to the one we have on 10/8/19:  Distance walked decreased from 660ft to 510ft  At that time O2 sats dropped to 86% on minute 4 and placed on O2   on minute 5    Comment: 6MWT is slightly worse than how it was last year on   10/8/19     High res CT on 1/20/21  Stable findings of the chest when compared with the prior study. Findings again favor an NSIP pattern of interstitial lung disease. Stable pulmonary nodularity. CT abdomen on 2/23/22  EXAM: CT ABDOMEN AND PELVIS WITH CONTRAST ON 2/23/2022       INDICATION: Left lower quadrant pain       COMPARISON: CT abdomen/pelvis 7/28/2020       TECHNIQUE: Multidetector CT imaging was obtained from lung bases through pelvis. Axial images and multiplanar reformatted images are provided for review. Dose modulation, iterative reconstruction and/or weight based adjustments of the mA/kV were utilized    to reduce radiation dose to as low as reasonably achievable       IV Contrast: 80 cc Isovue 370   Oral Contrast: None       LIMITATION: None       FINDINGS:       : No additional abnormality       LUNG BASES: Honeycombing is noted in the lung bases. There is a small pericardial effusion. The heart is enlarged. LIVER: The liver is homogeneous in its enhancement. SPLEEN: The spleen is normal in size and attenuation. GALLBLADDER AND BILIARY TREE: No calcified stones are seen. The common bile duct is dilated measuring up to 9 to 10 mm. There is mild intrahepatic ductal dilatation noted as well. PANCREAS: Pancreas appears homogeneous in its enhancement without evidence of enlargement. ADRENAL GLANDS: Normal.       KIDNEYS AND URETERS: The kidneys concentrate contrast bilaterally. There is a 5 mm nonobstructing calculus in the inferior pole of the left kidney. There is moderate to severe left-sided hydronephrosis and hydroureter to the level of the ureterovesicular junction. This is caused by a 5 mm calculus at the left UVJ. There is no right-sided hydronephrosis or hydroureter.  No right-sided nephrolithiasis    is visualized. BOWEL: No oral contrast was given. There is a large fetal hernia. The stomach is unremarkable. The duodenum is normal in location. Small bowel is normal in caliber. The colon shows scattered diverticuli. No definite wall thickening or inflammatory    change. Colon is filled with stool. The appendix is normal. The terminal ileum is unremarkable. PERITONEUM/RETROPERITONEUM: No ascites or free air is visualized. LYMPH NODES: No retroperitoneal or pelvic lymphadenopathy is visualized. VESSELS: Aorta is normal in caliber with mild calcification. Celiac and SMA are normal.  Portal and hepatic veins are patent. The splenic vein is patent. The SMV is unremarkable. REPRODUCTIVE ORGANS: The uterus and ovaries are unremarkable. URINARY BLADDER: Normal.       ABDOMINAL WALL: Normal.       BONES: Degenerative changes are seen throughout the lumbar spine. There is a grade 1 anterolisthesis of L4 and L5.       OTHER FINDINGS: None. Impression       Moderate to severe left-sided hydronephrosis and hydroureter caused by a 5 mm obstructing calculus at the left UVJ. Mild intra and extrahepatic ductal dilatation, indeterminate. MRCP may be helpful for evaluation. Left-sided nephrolithiasis. Large hiatal hernia. Pulmonary fibrosis noted in the lung bases. Small to moderate pericardial effusion     Echo on 6/13/22   Summary   Normal left ventricle size and systolic function with an estimated ejection   fraction of 60%-65%. Mild concentric left ventricular hypertrophy. No regional wall motion abnormalities are seen. Grade I diastolic dysfunction with elevated LVEDP. CT chest high res on 9/1/22  Impression:        Stable interstitial lung disease manifested as extensive subpleural reticulation without definite honeycombing or air trapping. Stable mild bronchiectasis.        Stable small pericardial effusion. Assessment:      Diagnosis Orders   1. NSIP (nonspecific interstitial pneumonia) ([facility] [address]          Plan:   [de-identified]years old female with NSIP. Diagnosis is based on clinical presentation, CT findings and response to steroids. She also has large hiatal hernia      PFT and 6MWT on 6/2/20 are essentially stable. Had bronch on 8/1/19  Started on prednisone @ 20mg on 8/5/19 decrease to 15 mg in Dec. 2019  Tapered off around mid May 2020 due to weakness and back issues. CT scan on 1/20/21 is stable over two years. CT scan on 9/1/22 also stable. Images reviewed. (PFT on 10/6/21:  TLC improved from 63% to 72%, however FVC decreased from 78% to 68%. RV improved from 42 to 84%. Overall restriction is stable)      (There is no specific exposure that she or her family can remember  She is not on chronic medication that is known to cause ILD  She does not have pets  Hypersensitivity panel is negative   Rheumatoid factor, GIUSEPPE and sed rate are negative. SSA and SSB are negative  She was seen by rheumatology. There is no evidence of active rheumatic disease. BAL is negative for infectious etiology)     Echo on 6/11/19 with grade II diastolic dysfunction. Clinically euvolemic     Oxygen requirement is stable since the past visit. Hypercalcemia is a factor contributing to her fatigue,thirst, constipation and high BUN/Cr. She is not a candidate for parathyroid surgery and will start new medication this week hopefully to control hypercalcemia.

## 2022-11-10 ENCOUNTER — OFFICE VISIT (OUTPATIENT)
Dept: INTERNAL MEDICINE CLINIC | Age: 82
End: 2022-11-10
Payer: MEDICARE

## 2022-11-10 VITALS
WEIGHT: 114 LBS | SYSTOLIC BLOOD PRESSURE: 124 MMHG | HEIGHT: 62 IN | DIASTOLIC BLOOD PRESSURE: 68 MMHG | BODY MASS INDEX: 20.98 KG/M2

## 2022-11-10 DIAGNOSIS — D64.9 ANEMIA, UNSPECIFIED TYPE: ICD-10-CM

## 2022-11-10 DIAGNOSIS — E21.3 HYPERPARATHYROIDISM (HCC): Primary | ICD-10-CM

## 2022-11-10 DIAGNOSIS — E08.21 DIABETES MELLITUS DUE TO UNDERLYING CONDITION WITH DIABETIC NEPHROPATHY, WITHOUT LONG-TERM CURRENT USE OF INSULIN (HCC): ICD-10-CM

## 2022-11-10 DIAGNOSIS — J84.89 NSIP (NONSPECIFIC INTERSTITIAL PNEUMONIA) (HCC): ICD-10-CM

## 2022-11-10 DIAGNOSIS — I10 ESSENTIAL HYPERTENSION: ICD-10-CM

## 2022-11-10 PROBLEM — N39.0 URINARY TRACT INFECTION WITHOUT HEMATURIA: Status: RESOLVED | Noted: 2020-02-05 | Resolved: 2022-11-10

## 2022-11-10 PROCEDURE — 1123F ACP DISCUSS/DSCN MKR DOCD: CPT | Performed by: INTERNAL MEDICINE

## 2022-11-10 PROCEDURE — 99214 OFFICE O/P EST MOD 30 MIN: CPT | Performed by: INTERNAL MEDICINE

## 2022-11-10 PROCEDURE — 3074F SYST BP LT 130 MM HG: CPT | Performed by: INTERNAL MEDICINE

## 2022-11-10 PROCEDURE — 3078F DIAST BP <80 MM HG: CPT | Performed by: INTERNAL MEDICINE

## 2022-11-10 RX ORDER — CINACALCET 30 MG/1
90 TABLET, FILM COATED ORAL DAILY
COMMUNITY
Start: 2022-11-02 | End: 2023-01-31

## 2022-11-10 NOTE — PROGRESS NOTES
Natividad Mcgregor (:  1940) is a [de-identified] y.o. female,Established patient, here for evaluation of the following chief complaint(s):  Hypertension and Diabetes         ASSESSMENT/PLAN:  1. Hyperparathyroidism ([address]   -Currently on treatment with the nephrologist  2. NSIP (nonspecific interstitial pneumonia) ([address]   -Symptoms worsened over the summer but now appear to have stabilized. Discussed trying to increase activity, this is difficult to achieve. Continue with supplemental oxygen. We will need to continue to watch the weight, it is down a couple pounds from the last visit but overall stable looking back several months. 3. Diabetes mellitus due to underlying condition with diabetic nephropathy, without long-term current use of insulin ([address]   -Has been controlled, not on medication, A1c is ordered and will check with the next blood work  4. Essential hypertension   -Stable on amlodipine 10 mg daily  5. Anemia, unspecified type  -Mild anemia on last couple labs, will recheck B12 and iron with next blood work  -     Vitamin B12; Future  -     Iron and TIBC; Future  -     Ferritin; Future    Return in about 3 months (around 2/10/2023) for weight, breathing. Subjective   SUBJECTIVE/OBJECTIVE:  HPI    She started on Sensipar for hypercalcemia. They have follow-up scheduled with the nephrologist in January. The calcium level was staying between 10 and 11. Breathing is about stable. She does seem to be doing better with the current tank but has some difficulty with opening part of it, they are looking at moving her to an assisted living so she can have more consistent help with the oxygen tanks. Still not moving around that much, she gets short of breath with little exertion. They did see the pulmonologist, at this point it appears that lung disease is stable, deconditioning is probably related to a significant amount of her shortness of breath.     She is taking the blood pressure medication, blood pressure has been controlled recently. Review of Systems       Objective   Physical Exam  Vitals reviewed. Constitutional:       General: She is not in acute distress. Appearance: Normal appearance. She is well-developed. HENT:      Head: Normocephalic and atraumatic. Cardiovascular:      Rate and Rhythm: Normal rate and regular rhythm. Heart sounds: Normal heart sounds. Pulmonary:      Effort: No respiratory distress. Comments: Increased respiratory effort, at baseline  Few crackles  Musculoskeletal:      Right lower leg: No edema. Left lower leg: No edema. Skin:     General: Skin is warm and dry. Neurological:      Mental Status: She is alert. Psychiatric:         Behavior: Behavior normal.         Thought Content: Thought content normal.         Judgment: Judgment normal.                An electronic signature was used to authenticate this note.     --Huma Moreno MD

## 2022-11-16 ENCOUNTER — CARE COORDINATION (OUTPATIENT)
Dept: CARE COORDINATION | Age: 82
End: 2022-11-16

## 2022-11-16 NOTE — CARE COORDINATION
ACM received incoming call from patient, patient provided identifiers. Patient notes she used to work with previous ACM before long term and was given this ACM's number. Patient notes she has been feeling shaky since Sunday. Notes she has drank orange juice and soda the past few days but is still shaky. Thought it may be her blood sugar being too low. ACM asked if patient was able to check her BS, patient notes she no longer has any testing supplies as she did not have to test anymore. ACM discussed other symptoms of low/high blood sugar. Patient denies any dizziness, headache, sweating, fast heart beat, increased thirst, frequent urination, sweet smelling breath. ACM asked when the patient had the orange juice and patient noted she just drank it before calling, ACM asked when the last time the patient ate, and she noted she just had some scrambled eggs. This ACM is not sure this blood sugar related due to the amount of days this has been occurring and that patient has had food/drink, not on medications for DM, stable A1C at last check. Patient last A1C was 6.2. ACM reviewed last office note from PCP also noting DM is stable and not controlled with any medication. ACM reviewed patient chart, noting hx of hypertension. ACM asked if patient could take her blood pressure, patient denied the ability to check her BP at home. ACM asked if there were any changes to her medications and patient notes that she was given a new medication Sensipar recently. She then noted that is when the shaking started. ACM discussed the possible side effects of this medication. ACM asked the patient if she was SOB (patient is on 3L NC due to lung disease) CP, Pressure, or any numbness. Patient denied. AC then provided the patient with the phone number to the office at [facility] to call and discuss her new onset of symptoms. Patient thanked ACM.   ACM asked if patient would be okay to receive follow up calls, and patient in agreement.  ACM will enroll in CC at this time and follow up with the patient to complete enrollment and discuss RPM.      Plan   Advise PCP of potential medication reaction  Follow up with patient in one day to see if symptoms have resolved  Complete CC enrollment and discuss RPM

## 2022-11-16 NOTE — CARE COORDINATION
Patient called to let [facility] know that the provider who prescribed the Sensipar has advised her to no longer take this medication due to side effects. Patient notes she would like to discuss with Dr. Daniela Meyer testing her BS again and asked for an appointment. [facility] reached out to [facility] who ask the request be sent to the pool for her to discuss with Dr. Daniela Meyer as far as scheduling. Patient was quick to get off the call and thanked [facility]. Will set follow up at a later date.    Plan   Route appointment request to pool  Follow up one week   Complete CC enrollment

## 2022-11-17 ENCOUNTER — TELEPHONE (OUTPATIENT)
Dept: INTERNAL MEDICINE CLINIC | Age: 82
End: 2022-11-17

## 2022-11-17 DIAGNOSIS — E08.21 DIABETES MELLITUS DUE TO UNDERLYING CONDITION WITH DIABETIC NEPHROPATHY, WITHOUT LONG-TERM CURRENT USE OF INSULIN (HCC): ICD-10-CM

## 2022-11-17 DIAGNOSIS — I10 ESSENTIAL HYPERTENSION: Primary | ICD-10-CM

## 2022-11-17 RX ORDER — LANCETS 30 GAUGE
1 EACH MISCELLANEOUS DAILY
Qty: 100 EACH | Refills: 3 | Status: SHIPPED | OUTPATIENT
Start: 2022-11-17

## 2022-11-17 RX ORDER — MINERAL OIL 100 MG/100ML
OIL ORAL; TOPICAL
Qty: 1 EACH | Refills: 0 | Status: SHIPPED | OUTPATIENT
Start: 2022-11-17

## 2022-11-17 RX ORDER — BLOOD-GLUCOSE METER
1 KIT MISCELLANEOUS DAILY
Qty: 1 KIT | Refills: 0 | Status: SHIPPED | OUTPATIENT
Start: 2022-11-17

## 2022-11-17 RX ORDER — GLUCOSAMINE HCL/CHONDROITIN SU 500-400 MG
CAPSULE ORAL
Qty: 100 STRIP | Refills: 3 | Status: SHIPPED | OUTPATIENT
Start: 2022-11-17

## 2022-11-17 NOTE — TELEPHONE ENCOUNTER
Pt states she got it wrong its her blood sugar monitor not her blood pressure monitor         States she apologizes and would like this ordered for the blood sugar monitor     Also needs lancets/needles  the device and test strips.  She states she lost everything         Please advise

## 2022-11-29 ENCOUNTER — CARE COORDINATION (OUTPATIENT)
Dept: CARE COORDINATION | Age: 82
End: 2022-11-29

## 2022-11-29 SDOH — ECONOMIC STABILITY: HOUSING INSECURITY
IN THE LAST 12 MONTHS, WAS THERE A TIME WHEN YOU DID NOT HAVE A STEADY PLACE TO SLEEP OR SLEPT IN A SHELTER (INCLUDING NOW)?: NO

## 2022-11-29 SDOH — ECONOMIC STABILITY: HOUSING INSECURITY: IN THE LAST 12 MONTHS, HOW MANY PLACES HAVE YOU LIVED?: 1

## 2022-11-29 SDOH — ECONOMIC STABILITY: INCOME INSECURITY: IN THE LAST 12 MONTHS, WAS THERE A TIME WHEN YOU WERE NOT ABLE TO PAY THE MORTGAGE OR RENT ON TIME?: NO

## 2022-11-29 NOTE — CARE COORDINATION
Ambulatory Care Coordination Note  11/29/2022    ACC: Faby Cordova, OVIDIO    ACM outreach to patient to [facility]. Patient notes she is doing better and the shaking has stopped with the reduction of sensipar. She notes that she is down to one pill a day which has seemed to resolve the issue. Patient notes that she moving this week to [facility] in [place] which is an assisted living facility that has nursing on site. Patient notes she will feel more comfortable with this location that has short walks to dining area and help with her oxygen needs. Patient notes she would still like follow up calls from [facility]. Patient denies any concerns with her BS. Previous call patient thought she may be having low bs symptoms, however since symptoms have resolved she no longer feels the need to test daily. She noted she plans to  the new monitor and test if she feels symptoms return. Patient denied any needs or concerns at this time time. AC will send resources to patient for hypo/hyperglycemia to keep on hand. Patient thanked AC. Will follow up with patient to review materials once in new home. Plan   Follow up one month  Assess for needs  Review resources sent      Offered patient enrollment in the Remote Patient Monitoring (RPM) program for in-home monitoring: NA. Lab Results       None            Care Coordination Interventions    Referral from Primary Care Provider: No  Suggested Interventions and Community Resources  Diabetes Education: In Process  Zone Management Tools: In Process (Comment: N[place]LEON 11.29.22)          Goals Addressed                   This Visit's Progress     Conditions and Symptoms        I will schedule office visits, as directed by my provider. I will keep my appointment or reschedule if I have to cancel. I will notify my provider of any barriers to my plan of care. I will follow my Zone Management tool to seek urgent or emergent care.   I will notify my provider of any symptoms that ACIDOPHILUS BIOBEADS) CAPS Take 1 capsule by mouth daily 2/23/21   Edith Kaiser APRN - CNP   vitamin E 1000 UNITS capsule Take 1,000 Units by mouth daily. Historical Provider, MD       Future Appointments   Date Time Provider Romina Chisholmisti   1/31/2023 11:40 AM MD Roger Muniz/CC [facility]   2/16/2023 11:40 AM Sean Ceballos MD [facility] [address] [facility] [place] - [facility]    and   Diabetes Assessment      Meal Planning: Avoidance of concentrated sweets   How often do you test your blood sugar?: Other (Comment: Patient has begin testing when she feels off)   Do you have barriers with adherence to non-pharmacologic self-management interventions?  (Nutrition/Exercise/Self-Monitoring): No   Have you ever had to go to the ED for symptoms of low blood sugar?: No       No patient-reported symptoms Name band;

## 2022-12-29 ENCOUNTER — CARE COORDINATION (OUTPATIENT)
Dept: CARE COORDINATION | Age: 82
End: 2022-12-29

## 2023-01-04 RX ORDER — ESOMEPRAZOLE MAGNESIUM 40 MG/1
40 CAPSULE, DELAYED RELEASE ORAL
Qty: 90 CAPSULE | Refills: 1 | Status: SHIPPED | OUTPATIENT
Start: 2023-01-04

## 2023-01-04 NOTE — TELEPHONE ENCOUNTER
Renewal of prescription-       esomeprazole ([address]) 40 MG delayed release capsule      [facility] [id number] - [facility], [address] [phone]      Please advise

## 2023-01-12 ENCOUNTER — TELEPHONE (OUTPATIENT)
Dept: PULMONOLOGY | Age: 83
End: 2023-01-12

## 2023-01-12 ENCOUNTER — CARE COORDINATION (OUTPATIENT)
Dept: CARE COORDINATION | Age: 83
End: 2023-01-12

## 2023-01-12 NOTE — CARE COORDINATION
Ambulatory Care Coordination Note  1/12/2023    ACC: Phyllis Mckinney, RN    ACM outreach to patient to [facility]. Patient notes that she is now using her oxygen at all times 3L and said \"I'm doing well besides my breathing\". She notes that her oxygen sometimes drops to 78% and takes between 5-10 mins to recover during ambulation. Once it recovers she notes oxygen in the 90%. She was given portable tanks which she uses go to to the dining rudd and is taken by MICKI Dodson to limit her exertion. Patient was seen by pulmonology in November of 2022 which noted she is to return in three months, with appointment scheduled for 1/31/2023. Noting oxygen requirement was stable at that time. Patient notes she has SOB on exertion, denies CP, pressure, cough. Patient was speaking in full sentences at the time of the call and did not sound winded. ACM discussed last visit notes with pulmonology and patient notes a change in her baseline. ACM advised patient she will reach out to pulmonology office to discuss symptoms and advise on a plan. Will outreach patient once ACM receives update. ACM outreach to [facility] and discussed patient symptoms. Modesto Simpson notes she will route to provider to review and asked ACM to also route note. Provider has no earlier appointments at this time. ACM contacted patient and had to leave voicemail message, ACM gave strict precautions to call Dr. Calero's office or ED if oxygen would drop and remain under 90%. Plan   Route note to Dr. Jonathan Mckinney to review and advise  Follow up once response received                      Offered patient enrollment in the Remote Patient Monitoring (RPM) program for in-home monitoring: NA. Lab Results       None            Care Coordination Interventions    Referral from Primary Care Provider: No  Suggested Interventions and Community Resources  Diabetes Education: In Process  Zone Management Tools:  In Process (Comment: DM 11.29.22)          Goals Addressed                   This Visit's Progress     Conditions and Symptoms   On track     I will schedule office visits, as directed by my provider. I will keep my appointment or reschedule if I have to cancel. I will notify my provider of any barriers to my plan of care. I will follow my Zone Management tool to seek urgent or emergent care. I will notify my provider of any symptoms that indicate a worsening of my condition. Barriers: lack of support and lack of education  Plan for overcoming my barriers: ACM  Confidence: 9/10  Anticipated Goal Completion Date: 3.20.23                Prior to Admission medications    Medication Sig Start Date End Date Taking? Authorizing Provider   esomeprazole (NEXIUM) 40 MG delayed release capsule Take 1 capsule by mouth every morning (before breakfast) 1/4/23   Dominga Butt MD   Blood Pressure Monitor RYDER Use as directed for blood pressure monitoring 11/17/22   Dominga Butt MD   glucose monitoring (FREESTYLE FREEDOM) kit 1 kit by Does not apply route daily 11/17/22   Dominga Butt MD   Lancets MISC 1 each by Does not apply route daily 11/17/22   Dominga Butt MD   blood glucose monitor strips Test 1 times a day & as needed for symptoms of irregular blood glucose. Dispense sufficient amount for indicated testing frequency plus additional to accommodate PRN testing needs. 11/17/22   Dominga Butt MD   cinacalcet (SENSIPAR) 30 MG tablet Take 90 mg by mouth daily 11/2/22 1/31/23  Historical Provider, MD   rosuvastatin (CRESTOR) 40 MG tablet TAKE ONE TABLET BY MOUTH DAILY 8/10/22   Dominga Butt MD   escitalopram (LEXAPRO) 20 MG tablet TAKE ONE TABLET BY MOUTH DAILY 8/10/22   Dominga Butt MD   amLODIPine (NORVASC) 10 MG tablet TAKE ONE TABLET BY MOUTH DAILY  Patient taking differently: 5 mg TAKE ONE TABLET BY MOUTH DAILY 8/10/22   Dominga Butt MD   ciclopirox (PENLAC) 8 % solution Apply to adjacent skin and affected nails daily.  Remove with alcohol every 7 days. 7/1/22   Denise Mcdonald MD   lidocaine (LIDODERM) 5 % Place 1 patch onto the skin daily as needed for Pain 12 hours on, 12 hours off. Historical Provider, MD   cyanocobalamin 1000 MCG tablet Take 1,000 mcg by mouth daily    Historical Provider, MD   Cholecalciferol (VITAMIN D3) 25 MCG (1000 UT) CAPS TAKE ONE CAPSULE BY MOUTH DAILY 11/12/21   AMEE Rangel CNP   Probiotic Product (PROBIOTIC ACIDOPHILUS BIOBEADS) CAPS Take 1 capsule by mouth daily 2/23/21   AMEE Fernando CNP   vitamin E 1000 UNITS capsule Take 1,000 Units by mouth daily.       Historical Provider, MD       Future Appointments   Date Time Provider Romina Noonani   1/31/2023 11:40 AM MD Roger Flannery P/CC [facility]   2/16/2023 11:40 AM Denise Mcdonald MD [facility] [address] [facility] [place] - [facility]    and   General Assessment

## 2023-01-12 NOTE — TELEPHONE ENCOUNTER
Please see encounter note entered by Darek Vazquez RN, Care Coordinator. She asks if patient can have sooner appt than 01/31/2023 for which she is scheduled.

## 2023-01-17 NOTE — TELEPHONE ENCOUNTER
Samira from Dr Manning's office called to say that Jacki called them with c/o SOB, they transferred her call to our office.   I talked to Jacki, she states that 02 has been dropping into the low 70's upon exertion but goes back up into the low 90/s as soon as she rest.  She is currently on 3L of 02.  She is wanting to know what she should do, she can be reached at [phone] or you can call her son at [phone]. Thanks.

## 2023-01-18 NOTE — CARE COORDINATION
ACM called to touch base again with patient to see if she had received a return call from pulmonology office. Patient did not answer. ACM left message for patient to return call. Per notes from pulmonologist patient symptoms are not new, but rather ongoing. Suggested a walk test however patient had been looking for more immediate option. Provider made aware of this and has not responded as of this note. Patient is scheduled with pulmonology on 1/31/2023. When patient returns call will again discuss provider response and advise patient to outreach the office is symptoms were to worsen. Follow up with patient at a later date if no return call received.

## 2023-01-23 ENCOUNTER — APPOINTMENT (OUTPATIENT)
Dept: GENERAL RADIOLOGY | Age: 83
End: 2023-01-23
Payer: MEDICARE

## 2023-01-23 ENCOUNTER — TELEPHONE (OUTPATIENT)
Dept: PULMONOLOGY | Age: 83
End: 2023-01-23

## 2023-01-23 ENCOUNTER — HOSPITAL ENCOUNTER (EMERGENCY)
Age: 83
Discharge: HOME | End: 2023-01-23
Attending: STUDENT IN AN ORGANIZED HEALTH CARE EDUCATION/TRAINING PROGRAM
Payer: MEDICARE

## 2023-01-23 ENCOUNTER — APPOINTMENT (OUTPATIENT)
Dept: CT IMAGING | Age: 83
End: 2023-01-23
Payer: MEDICARE

## 2023-01-23 VITALS
BODY MASS INDEX: 21.75 KG/M2 | HEART RATE: 85 BPM | SYSTOLIC BLOOD PRESSURE: 121 MMHG | OXYGEN SATURATION: 100 % | TEMPERATURE: 98.2 F | WEIGHT: 118.2 LBS | HEIGHT: 62 IN | DIASTOLIC BLOOD PRESSURE: 68 MMHG | RESPIRATION RATE: 16 BRPM

## 2023-01-23 DIAGNOSIS — R06.00 DYSPNEA, UNSPECIFIED TYPE: Primary | ICD-10-CM

## 2023-01-23 LAB
ANION GAP SERPL CALCULATED.3IONS-SCNC: 8 MMOL/L (ref 3–16)
BASE EXCESS VENOUS: -0.7 MMOL/L (ref -2–3)
BASOPHILS ABSOLUTE: 0 K/UL (ref 0–0.2)
BASOPHILS RELATIVE PERCENT: 0.5 %
BUN BLDV-MCNC: 22 MG/DL (ref 7–20)
CALCIUM SERPL-MCNC: 10 MG/DL (ref 8.3–10.6)
CARBOXYHEMOGLOBIN: 1.7 % (ref 0–1.5)
CHLORIDE BLD-SCNC: 101 MMOL/L (ref 99–110)
CO2: 24 MMOL/L (ref 21–32)
CREAT SERPL-MCNC: 0.9 MG/DL (ref 0.6–1.2)
EOSINOPHILS ABSOLUTE: 0.3 K/UL (ref 0–0.6)
EOSINOPHILS RELATIVE PERCENT: 2.7 %
GFR SERPL CREATININE-BSD FRML MDRD: >60 ML/MIN/{1.73_M2}
GLUCOSE BLD-MCNC: 166 MG/DL (ref 70–99)
HCO3 VENOUS: 24.2 MMOL/L (ref 24–28)
HCT VFR BLD CALC: 32.1 % (ref 36–48)
HEMOGLOBIN, VEN, REDUCED: 10.1 %
HEMOGLOBIN: 10.8 G/DL (ref 12–16)
LACTIC ACID: 0.9 MMOL/L (ref 0.4–2)
LYMPHOCYTES ABSOLUTE: 1 K/UL (ref 1–5.1)
LYMPHOCYTES RELATIVE PERCENT: 9.3 %
MCH RBC QN AUTO: 30.6 PG (ref 26–34)
MCHC RBC AUTO-ENTMCNC: 33.5 G/DL (ref 31–36)
MCV RBC AUTO: 91.2 FL (ref 80–100)
METHEMOGLOBIN VENOUS: 0.1 % (ref 0–1.5)
MONOCYTES ABSOLUTE: 0.8 K/UL (ref 0–1.3)
MONOCYTES RELATIVE PERCENT: 7.5 %
NEUTROPHILS ABSOLUTE: 8.6 K/UL (ref 1.7–7.7)
NEUTROPHILS RELATIVE PERCENT: 80 %
O2 SAT, VEN: 90 %
PCO2, VEN: 40 MMHG (ref 41–51)
PDW BLD-RTO: 13.2 % (ref 12.4–15.4)
PH VENOUS: 7.39 (ref 7.35–7.45)
PLATELET # BLD: 189 K/UL (ref 135–450)
PMV BLD AUTO: 7.7 FL (ref 5–10.5)
PO2, VEN: 58.6 MMHG (ref 25–40)
POTASSIUM REFLEX MAGNESIUM: 4.4 MMOL/L (ref 3.5–5.1)
PRO-BNP: 567 PG/ML (ref 0–449)
RAPID INFLUENZA  B AGN: NEGATIVE
RAPID INFLUENZA A AGN: NEGATIVE
RBC # BLD: 3.52 M/UL (ref 4–5.2)
SARS-COV-2, NAAT: NOT DETECTED
SODIUM BLD-SCNC: 133 MMOL/L (ref 136–145)
TCO2 CALC VENOUS: 26 MMOL/L
TROPONIN: <0.01 NG/ML
WBC # BLD: 10.8 K/UL (ref 4–11)

## 2023-01-23 PROCEDURE — 99222 1ST HOSP IP/OBS MODERATE 55: CPT | Performed by: INTERNAL MEDICINE

## 2023-01-23 PROCEDURE — 80048 BASIC METABOLIC PNL TOTAL CA: CPT

## 2023-01-23 PROCEDURE — 83880 ASSAY OF NATRIURETIC PEPTIDE: CPT

## 2023-01-23 PROCEDURE — 94680 O2 UPTK RST&XERS DIR SIMPLE: CPT

## 2023-01-23 PROCEDURE — 71260 CT THORAX DX C+: CPT | Performed by: STUDENT IN AN ORGANIZED HEALTH CARE EDUCATION/TRAINING PROGRAM

## 2023-01-23 PROCEDURE — 85025 COMPLETE CBC W/AUTO DIFF WBC: CPT

## 2023-01-23 PROCEDURE — 99285 EMERGENCY DEPT VISIT HI MDM: CPT

## 2023-01-23 PROCEDURE — 83605 ASSAY OF LACTIC ACID: CPT

## 2023-01-23 PROCEDURE — 84484 ASSAY OF TROPONIN QUANT: CPT

## 2023-01-23 PROCEDURE — 87804 INFLUENZA ASSAY W/OPTIC: CPT

## 2023-01-23 PROCEDURE — 87635 SARS-COV-2 COVID-19 AMP PRB: CPT

## 2023-01-23 PROCEDURE — 6360000004 HC RX CONTRAST MEDICATION: Performed by: STUDENT IN AN ORGANIZED HEALTH CARE EDUCATION/TRAINING PROGRAM

## 2023-01-23 PROCEDURE — 36415 COLL VENOUS BLD VENIPUNCTURE: CPT

## 2023-01-23 PROCEDURE — 71046 X-RAY EXAM CHEST 2 VIEWS: CPT

## 2023-01-23 PROCEDURE — 82803 BLOOD GASES ANY COMBINATION: CPT

## 2023-01-23 RX ADMIN — IOPAMIDOL 75 ML: 755 INJECTION, SOLUTION INTRAVENOUS at 12:45

## 2023-01-23 ASSESSMENT — ENCOUNTER SYMPTOMS
ALLERGIC/IMMUNOLOGIC NEGATIVE: 1
SHORTNESS OF BREATH: 1
EYES NEGATIVE: 1
GASTROINTESTINAL NEGATIVE: 1

## 2023-01-23 ASSESSMENT — PAIN - FUNCTIONAL ASSESSMENT: PAIN_FUNCTIONAL_ASSESSMENT: NONE - DENIES PAIN

## 2023-01-23 NOTE — CARE COORDINATION
A case management consult is noted to see Ms. Portillo Gege in the emergency department. Met with Ms. Cifuentes at the bedside. She is accompanied by her son. She is alert and oriented x 4, pleasant, and easy to engage in conversation. She stated she lives in assisted living at [facility]. She is primarily quite independent with self care and functional mobility in her apartment. The staff provides supervision and support with medications and ADL's as needed. She wears O2 3L/NC at baseline. It is supplied by [employer]. Pulmonology recommended she continue the 3L/NC at rest but increase the O2 to 5-6L with activity. She stated she has been more weak and has less endurance. She is open to physical therapy at her apartment. She would prefer to return home today. She is not interested in going to a skilled nursing facility. A call was placed to [facility] Phone: [phone]. Spoke with the nurse on duty. She stated the increased O2 need would be approved. She confirmed the facility prefers to work with [facility]. A call was placed to [employer] to notify of the order that reflects the change in O2 liter flow. Per Marialuisa Llanos, a new home O2 evaluation by RT must be documented. The pt's primary, RN, Paola Ashford, contacted RT for the eval to be completed. Marialuisa Llanos from [facility] will watch for the documentation. A call was placed to [facility] [phone]. Spoke with intake regarding the new PT order for Ms. Cifuentes at [facility]. She was accepted. CC will pull the orders and information from [employer]. Updated [facility] nurse of the O2 changes and new PT order through [address] [place] [address]. New orders were faxed to [phone] for the facility records.     Vernon Baez, MSN RN- [facility], [facility], [address] [place] [address]  Case Management  [phone]

## 2023-01-23 NOTE — ED PROVIDER NOTES
[address] [facility] RESIDENT NOTE       Date of evaluation: 1/23/2023    Chief Complaint     Shortness of Breath      History of Present Illness     Danika Wright is a [de-identified] y.o. female who presents with dyspnea. The patient has a history of interstitial lung disease and is on 3 L of oxygen at baseline. Recently over the last week they have noticed she has been D satting to 50 to 60% with her pulse ox with ambulation. She does fine with her oxygen saturations when she is at rest.  Other than the dyspnea, she overall endorses that she is deconditioned. She denies any chest pain, she has not had any fevers or chills. She denies any lower extremity swelling. She denies productive cough. Review of Systems     Review of Systems   Constitutional: Negative. HENT: Negative. Eyes: Negative. Respiratory:  Positive for shortness of breath. Cardiovascular: Negative. Gastrointestinal: Negative. Endocrine: Negative. Genitourinary: Negative. Musculoskeletal: Negative. Skin: Negative. Allergic/Immunologic: Negative. Neurological: Negative. Hematological: Negative. Psychiatric/Behavioral: Negative. Past Medical, Surgical, Family, and Social History     She has a past medical history of Diabetes mellitus ([address] Essential hypertension, benign, GERD (gastroesophageal reflux disease), Hyperlipidemia, Interstitial lung disease ([address] Osteoarthrosis, unspecified whether generalized or localized, unspecified site, Osteopenia, and Shingles. She has a past surgical history that includes Colonoscopy (10/11/2010); Upper gastrointestinal endoscopy (08/17/2011); shoulder surgery (Right); Colonoscopy (11/11/2002); ventral hernia repair (08/16/2012); Ankle surgery (Right, 04/01/2013); Upper gastrointestinal endoscopy (04/20/2015); Colonoscopy (04/20/2015); Colonoscopy (04/13/2016); Colonoscopy (11/28/2016); bronchoscopy (N/A, 08/01/2019);  Pain management procedure (Bilateral, 06/23/2020); Pain management procedure (Bilateral, 07/07/2020); Intracapsular cataract extraction (Right, 07/15/2020); Pain management procedure (Bilateral, 08/04/2020); Pain management procedure (Bilateral, 08/18/2020); Nerve Surgery (Bilateral, 09/01/2020); Cystoscopy (Left, 02/24/2022); and Breast biopsy. Her family history includes Heart Disease in her father and mother; Rheum Arthritis in her mother. She reports that she has never smoked. She has never used smokeless tobacco. She reports current alcohol use of about 1.0 standard drink per week. She reports that she does not use drugs. Medications     Discharge Medication List as of 1/23/2023  5:57 PM        CONTINUE these medications which have NOT CHANGED    Details   esomeprazole (NEXIUM) 40 MG delayed release capsule Take 1 capsule by mouth every morning (before breakfast), Disp-90 capsule, R-1Normal      Blood Pressure Monitor RYDER Disp-1 each, R-0, NormalUse as directed for blood pressure monitoring      glucose monitoring (FREESTYLE FREEDOM) kit DAILY Starting Thu 11/17/2022, Disp-1 kit, R-0, Normal      Lancets MISC DAILY Starting Thu 11/17/2022, Disp-100 each, R-3, Normal      blood glucose monitor strips Test 1 times a day & as needed for symptoms of irregular blood glucose. Dispense sufficient amount for indicated testing frequency plus additional to accommodate PRN testing needs. , Disp-100 strip, R-3, Normal      cinacalcet (SENSIPAR) 30 MG tablet Take 90 mg by mouth dailyHistorical Med      rosuvastatin (CRESTOR) 40 MG tablet TAKE ONE TABLET BY MOUTH DAILY, Disp-90 tablet, R-1Normal      escitalopram (LEXAPRO) 20 MG tablet TAKE ONE TABLET BY MOUTH DAILY, Disp-90 tablet, R-1Normal      amLODIPine (NORVASC) 10 MG tablet TAKE ONE TABLET BY MOUTH DAILY, Disp-90 tablet, R-1Normal      ciclopirox (PENLAC) 8 % solution Apply to adjacent skin and affected nails daily. Remove with alcohol every 7 days. , Disp-6 mL, R-0, Normal      lidocaine (LIDODERM) 5 % Place 1 patch onto the skin daily as needed for Pain 12 hours on, 12 hours off. Historical Med      cyanocobalamin 1000 MCG tablet Take 1,000 mcg by mouth dailyHistorical Med      Cholecalciferol (VITAMIN D3) 25 MCG (1000 UT) CAPS TAKE ONE CAPSULE BY MOUTH DAILY, Disp-90 capsule, R-1Normal      Probiotic Product (PROBIOTIC ACIDOPHILUS BIOBEADS) CAPS Take 1 capsule by mouth daily, Disp-90 capsule, R-0Normal      vitamin E 1000 UNITS capsule Take 1,000 Units by mouth daily. Allergies     She is allergic to oxycodone and pravastatin. Physical Exam     INITIAL VITALS: BP: 122/66, Temp: 98.2 °F (36.8 °C), Heart Rate: 74, Resp: 20, SpO2: 93 %   Physical Exam  Vitals and nursing note reviewed. Constitutional:       General: She is not in acute distress. Appearance: She is not toxic-appearing. HENT:      Head: Normocephalic and atraumatic. Right Ear: Tympanic membrane normal.      Left Ear: Tympanic membrane normal.      Nose: Nose normal. No congestion. Mouth/Throat:      Mouth: Mucous membranes are moist.      Pharynx: Oropharynx is clear. Eyes:      Extraocular Movements: Extraocular movements intact. Conjunctiva/sclera: Conjunctivae normal.      Pupils: Pupils are equal, round, and reactive to light. Cardiovascular:      Rate and Rhythm: Normal rate and regular rhythm. Pulses: Normal pulses. Pulmonary:      Effort: Pulmonary effort is normal.      Breath sounds: Rhonchi present. Abdominal:      General: There is no distension. Palpations: Abdomen is soft. Tenderness: There is no guarding. Musculoskeletal:         General: Normal range of motion. Cervical back: Neck supple. No rigidity or tenderness. Right lower leg: No edema. Left lower leg: No edema. Skin:     General: Skin is warm and dry. Capillary Refill: Capillary refill takes less than 2 seconds. Findings: No erythema or rash. Neurological:      General: No focal deficit present. Mental Status: She is oriented to person, place, and time. Mental status is at baseline. Psychiatric:         Mood and Affect: Mood normal.         Behavior: Behavior normal.       DiagnosticResults     RADIOLOGY:  CT CHEST PULMONARY EMBOLISM W CONTRAST   Final Result      1. No evidence of acute or chronic pulmonary embolus   2. Extensive chronic pulmonary interstitial lung disease with honeycombing changes and subpleural consolidation which dates back to 2021 with no pneumothorax or effusion   3. Bilateral hilar lymphadenopathy, stable      Enhancing lesion within the mucosa a hiatal hernia, gastric varix  vs pseudoaneurysm. Endoscopy is recommended      Critical incidental finding of the gastric varix, pseudoaneurysm, abnormal enhancement was discussed with emergency room resident working with Physician: Sophie Dixon  at 1:21 PM on 1/23/2023         INCIDENTAL FINDINGS:  INCIDENTAL FINDING OTHER         XR CHEST (2 VW)   Final Result      Severe chronic interstitial lung disease with increased opacity in the left mid and lower lung zone which could represent superimposed pneumonia or ILD exacerbation.           LABS:   Results for orders placed or performed during the hospital encounter of 01/23/23   COVID-19, Rapid    Specimen: Nasopharyngeal Swab   Result Value Ref Range    SARS-CoV-2, NAAT Not Detected Not Detected   Rapid Flu Swab    Specimen: Nasopharyngeal   Result Value Ref Range    Rapid Influenza A Ag Negative Negative    Rapid Influenza B Ag Negative Negative   BMP w/ Reflex to MG   Result Value Ref Range    Sodium 133 (L) 136 - 145 mmol/L    Potassium reflex Magnesium 4.4 3.5 - 5.1 mmol/L    Chloride 101 99 - 110 mmol/L    CO2 24 21 - 32 mmol/L    Anion Gap 8 3 - 16    Glucose 166 (H) 70 - 99 mg/dL    BUN 22 (H) 7 - 20 mg/dL    Creatinine 0.9 0.6 - 1.2 mg/dL    Est, Glom Filt Rate >60 >60    Calcium 10.0 8.3 - 10.6 mg/dL   CBC with Auto Differential   Result Value Ref Range    WBC 10.8 4.0 - 11.0 K/uL    RBC 3.52 (L) 4.00 - 5.20 M/uL    Hemoglobin 10.8 (L) 12.0 - 16.0 g/dL    Hematocrit 32.1 (L) 36.0 - 48.0 %    MCV 91.2 80.0 - 100.0 fL    MCH 30.6 26.0 - 34.0 pg    MCHC 33.5 31.0 - 36.0 g/dL    RDW 13.2 12.4 - 15.4 %    Platelets 189 135 - 450 K/uL    MPV 7.7 5.0 - 10.5 fL    Neutrophils % 80.0 %    Lymphocytes % 9.3 %    Monocytes % 7.5 %    Eosinophils % 2.7 %    Basophils % 0.5 %    Neutrophils Absolute 8.6 (H) 1.7 - 7.7 K/uL    Lymphocytes Absolute 1.0 1.0 - 5.1 K/uL    Monocytes Absolute 0.8 0.0 - 1.3 K/uL    Eosinophils Absolute 0.3 0.0 - 0.6 K/uL    Basophils Absolute 0.0 0.0 - 0.2 K/uL   Lactic Acid   Result Value Ref Range    Lactic Acid 0.9 0.4 - 2.0 mmol/L   Blood gas, venous (Lab)   Result Value Ref Range    pH, Diaz 7.391 7.350 - 7.450    pCO2, Diaz 40.0 (L) 41.0 - 51.0 mmHg    pO2, Diaz 58.6 (H) 25.0 - 40.0 mmHg    HCO3, Venous 24.2 24.0 - 28.0 mmol/L    Base Excess, Diaz -0.7 -2.0 - 3.0 mmol/L    O2 Sat, Diaz 90 Not established %    Carboxyhemoglobin 1.7 (H) 0.0 - 1.5 %    MetHgb, Diaz 0.1 0.0 - 1.5 %    TC02 (Calc), Diaz 26 mmol/L    Hemoglobin, Diaz, Reduced 10.10 %   Troponin   Result Value Ref Range    Troponin <0.01 <0.01 ng/mL   Brain Natriuretic Peptide   Result Value Ref Range    Pro- (H) 0 - 449 pg/mL       ED BEDSIDE ULTRASOUND:    None    RECENT VITALS:  BP: 121/68, Temp: 98.2 °F (36.8 °C), Heart Rate: 85,Resp: 16, SpO2: 100 %     Procedures     None    ED Course     Nursing Notes, Past Medical Hx, Past Surgical Hx, Social Hx, Allergies, and Family Hx were reviewed.    The patient was given the followingmedications:  Orders Placed This Encounter   Medications    iopamidol (ISOVUE-370) 76 % injection 75 mL       CONSULTS:  IP CONSULT TO PULMONOLOGY  IP CONSULT TO GI  IP CONSULT TO CASE MANAGEMENT  IP CONSULT TO GI  IP CONSULT TO HOME CARE NEEDS    MEDICAL DECISION MAKING / ASSESSMENT / PLAN     Jacki Cifuentes is a 82  y.o. female presenting to the emergency department with dyspnea and desaturations with ambulation.  On exam here, the patient was noted to be afebrile and hemodynamically stable, she did fine with her oxygen saturations at rest on her home 3 L.    On exam the patient appeared chronically ill but was overall nontoxic.  She was speaking in full sentences, did have some slight dyspnea when talking for a long time.  She had crackles on pulmonary exam.  She does not have any lower extremity swelling.    Laboratory evaluation notable for mild hyponatremia but otherwise stable electrolytes and renal function.  She had a negative troponin.  Her proBNP was slightly elevated at 567.  She is a stable anemia and no leukocytosis.  COVID flu testing obtained and negative.  Chest x-ray concerning for potential pneumonia?  I spoke in call with patient's pulmonologist who recommended a CTPA to rule out any nefarious cause of patient's worsening dyspnea with ambulation although he suspects is her chronic interstitial lung disease.  This was negative for any acute issue with the lung however patient was noted to have a hiatal hernia with a potential varices or pseudoaneurysm.    I called and spoke with GI regarding this finding, patient has previously seen [facility] so they were consulted.  They did not recommend any acute intervention.  They said patient could follow-up outpatient to discuss further management of this.    In regards to patient's worsening dyspnea and desaturations, pulmonology recommended increasing patient's oxygen when she ambulates.  They did not recommend any further therapy.  Patient and family as well as myself are initially concerned with this intervention.  We did have patient evaluated by physical therapy who was able to ambulate the patient on the recommended 6 L of oxygen and patient was able to maintain her saturations and also did fine from a therapy standpoint.  Case work was also involved.  They sent  referrals to increase patient's oxygen at home. They also will be starting home physical and pulmonary rehab therapy with her. At this time patient and son at bedside are amenable to going home and continuing to manage her dyspnea outpatient. Follow-up plan as well as return precautions were discussed and patient and family are amenable to plan. MDM     Amount and/or Complexity of Data Reviewed  Clinical lab tests: ordered and reviewed  Discussion of test results with the performing providers: yes  Obtain history from someone other than the patient: yes  Review and summarize past medical records: yes  Discuss the patient with other providers: yes    Risk of Complications, Morbidity, and/or Mortality  Presenting problems: high  Diagnostic procedures: high  Management options: high    Patient Progress  Patient progress: stable        This patient was also evaluated by the attending physician. All care plans werediscussed and agreed upon. Clinical Impression     1.  Dyspnea, unspecified type        Disposition     PATIENT REFERRED TO:  [place] [address]    Schedule an appointment as soon as possible for a visit       DISCHARGE MEDICATIONS:  Discharge Medication List as of 1/23/2023  5:57 PM          DISPOSITION Decision To Discharge 01/23/2023 04:58:28 PM        Gabbie Nash MD  Resident  01/23/23 0184

## 2023-01-23 NOTE — CONSULTS
Pulmonary Consult    Patient's PCP: Gertrudis Pickens MD  Referred by: Cristhian Zelaya MD for hypoxia     HISTORY OF PRESENT ILLNESS:    This is a  [de-identified] y.o. female with PMH of chronic hypoxic respiratory failure secondary to NSIP is complaining of increased O2 requirement over the past few weeks. There is no fever or chills. There is no increase in cough or sputum production. O2 saturation on 3 liters in upper 90s ,however with exertion it drops to lower 80s or upper 70s%.         Past Medical / Surgical History:    Past Medical History:   Diagnosis Date    Diabetes mellitus ([address]     Essential hypertension, benign     GERD (gastroesophageal reflux disease)     Hyperlipidemia     Interstitial lung disease (HCC)     Osteoarthrosis, unspecified whether generalized or localized, unspecified site     osteoarthritis lower leg    Osteopenia     Shingles      Past Surgical History:   Procedure Laterality Date    ANKLE SURGERY Right 04/01/2013    torn tendon    BREAST BIOPSY      unsure of which breast    BRONCHOSCOPY N/A 08/01/2019    BRONCHOSCOPY WITH BAL AND TRANSBRONCHIAL BIOPSIES performed by Richard Swenson MD at [place] [address]  10/11/2010    Dr. Saba Alexander , polyps and diverticulosis    COLONOSCOPY  11/11/2002    Dr. Yuliet Murray, Diverticulosis    COLONOSCOPY  04/20/2015    Dr. Saba Alexander- diverticulosis, sessile polyp, 5 years     COLONOSCOPY  04/13/2016    Dr Suzanna Whitman; Diverticulosis    COLONOSCOPY  11/28/2016    Dr Saba Alexander,     CYSTOSCOPY Left 02/24/2022    CYSTOSCOPY URETERAL STENT INSERTION performed by Ilana Mckenna MD at [address] Right 07/15/2020    PHACOEMULSIFICATION OF CATARACT RIGHT EYE WITH INTRAOCULAR LENS IMPLANT performed by Annmarie Ochoa MD at [address] [facility], [address] Bilateral 09/01/2020    BILATERAL LUMBAR THREE, LUMBAR FOUR, LUMBAR FIVE RADIOFREQUENCY ABLATION SITE CONFIRMED BY FLUOROSCOPY performed by Lindsey Londono MD at [facility]    PAIN MANAGEMENT PROCEDURE Bilateral 06/23/2020    BILATERAL LUMBAR FOUR TRANSFORAMINAL EPIDURAL STEROID INJECTION SITE CONFIRMED BY FLUOROSCOPY performed by Lauren Choi MD at [facility] [employer] [facility] Bilateral 07/07/2020    BILATERAL LUMBAR FOUR TRANSFORAMINAL EPIDURAL STEROID INJECTION SITE CONFIRMED BY FLUOROSCOPY performed by Lauren Choi MD at [facility] Bilateral 08/04/2020    BILATERAL LUMBAR THREE, LUMBAR FOUR, LUMBAR FIVE DORSAL RAMUS MEDIAL BRANCH BLOCK SITE CONFIRMED BY FLUOROSCOPY performed by Lauren Cohi MD at [facility] [employer] [facility] Bilateral 08/18/2020    BILATERAL LUMBAR THREE, LUMBAR FOUR, LUMBAR FIVE DORSAL RAMUS MEDIAL BRANCH BLOCK SITE CONFIR,ED BY FLUOROSCOPY performed by Lauren Choi MD at [place] [address] Right     UPPER GASTROINTESTINAL ENDOSCOPY  08/17/2011    Dr. Marina Perez - moderate gastritis , hiatal hernia     UPPER GASTROINTESTINAL ENDOSCOPY  04/20/2015    Dr. Marina Perez - polyps removed, diverticulosis, follow up in 5 years    [facility]  08/16/2012    DR. Ramos - with mesh     Prior to Admission:    No current facility-administered medications on file prior to encounter. Current Outpatient Medications on File Prior to Encounter   Medication Sig Dispense Refill    esomeprazole (NEXIUM) 40 MG delayed release capsule Take 1 capsule by mouth every morning (before breakfast) 90 capsule 1    Blood Pressure Monitor RYDER Use as directed for blood pressure monitoring 1 each 0    glucose monitoring (FREESTYLE FREEDOM) kit 1 kit by Does not apply route daily 1 kit 0    Lancets MISC 1 each by Does not apply route daily 100 each 3    blood glucose monitor strips Test 1 times a day & as needed for symptoms of irregular blood glucose. Dispense sufficient amount for indicated testing frequency plus additional to accommodate PRN testing needs.  100 strip 3 cinacalcet (SENSIPAR) 30 MG tablet Take 90 mg by mouth daily      rosuvastatin (CRESTOR) 40 MG tablet TAKE ONE TABLET BY MOUTH DAILY 90 tablet 1    escitalopram (LEXAPRO) 20 MG tablet TAKE ONE TABLET BY MOUTH DAILY 90 tablet 1    amLODIPine (NORVASC) 10 MG tablet TAKE ONE TABLET BY MOUTH DAILY (Patient taking differently: 5 mg TAKE ONE TABLET BY MOUTH DAILY) 90 tablet 1    ciclopirox (PENLAC) 8 % solution Apply to adjacent skin and affected nails daily. Remove with alcohol every 7 days. 6 mL 0    lidocaine (LIDODERM) 5 % Place 1 patch onto the skin daily as needed for Pain 12 hours on, 12 hours off.      cyanocobalamin 1000 MCG tablet Take 1,000 mcg by mouth daily      Cholecalciferol (VITAMIN D3) 25 MCG (1000 UT) CAPS TAKE ONE CAPSULE BY MOUTH DAILY 90 capsule 1    Probiotic Product (PROBIOTIC ACIDOPHILUS BIOBEADS) CAPS Take 1 capsule by mouth daily 90 capsule 0    vitamin E 1000 UNITS capsule Take 1,000 Units by mouth daily. Allergies:  Oxycodone and Pravastatin    Social History:   TOBACCO:   reports that she has never smoked. She has never used smokeless tobacco.     ETOH:   reports current alcohol use of about 1.0 standard drink per week. Family History:       Problem Relation Age of Onset    Heart Disease Mother     Rheum Arthritis Mother     Heart Disease Father          Review of Systems    PHYSICAL EXAM:  /66   Pulse 74   Temp 98.2 °F (36.8 °C) (Oral)   Resp 20   Ht 5' 2\" (1.575 m)   Wt 118 lb 3.2 oz (53.6 kg)   SpO2 93%   BMI 21.62 kg/m²     Physical Exam    LABS:  Recent Labs     01/23/23  1145   WBC 10.8   HGB 10.8*   HCT 32.1*                                                                     Recent Labs     01/23/23  1145   *   K 4.4      CO2 24   BUN 22*   CREATININE 0.9   GLUCOSE 166*     No results for input(s): AST, ALT, ALB, BILITOT, ALKPHOS in the last 72 hours.   Recent Labs     01/23/23  1145   TROPONINI <0.01     No results for input(s): BNP in the last 72 hours. Lab Results   Component Value Date/Time    PHART 7.390 06/04/2019 09:21 AM    SZL1LGY 34.0 06/04/2019 09:21 AM    PO2ART 84.0 06/04/2019 09:21 AM     No results for input(s): INR in the last 72 hours. No results for input(s): NITRITE, COLORU, PHUR, LABCAST, WBCUA, RBCUA, MUCUS, TRICHOMONAS, YEAST, BACTERIA, CLARITYU, SPECGRAV, LEUKOCYTESUR, UROBILINOGEN, BILIRUBINUR, BLOODU, GLUCOSEU, AMORPHOUS in the last 72 hours. Invalid input(s): KETONESU     DATA:  CT chest   1. No evidence of acute or chronic pulmonary embolus   2. Extensive chronic pulmonary interstitial lung disease with honeycombing changes and subpleural consolidation which dates back to 2021 with no pneumothorax or effusion   3. Bilateral hilar lymphadenopathy, stable         Assessment &Plan:    Patient Active Problem List:     DM (diabetes mellitus) ([address]     Essential hypertension     Pure hypercholesterolemia     Left flank pain     Anxiety     NSIP (nonspecific interstitial pneumonia) ([facility])     Left hip pain     Chronic midline low back pain without sciatica     Other age-related cataract     Left knee pain     Decreased appetite     Major depressive disorder in partial remission ([address]     Unintentional weight loss     Hypercalcemia     Anemia     Trigger finger, acquired     Kidney stone     Left ureteral calculus     Hyperparathyroidism ([address]      Chronic hypoxic respiratory failure   NSIP:    Stable hilar LAP    CT scan reviewed. There is no progression of ILD and no pulmonary embolism. NSIP is stable. Recommend for her to use O2 at 3 liters at rest and 6 liters with exertion. She is deconditioned and may benefit from maintenance pulmonary rehab. Discussed with ED physician. GI is consulted to evaluate possible gastric varix     The patient and / or the family were informed of the results of any tests, a time was given to answer questions, a plan was proposed and they agreed with plan.     Thank you Jessie Jackman MD for the opportunity to be involved in this patients care.  If you have any questions or concerns please feel free to contact me  Prior

## 2023-01-23 NOTE — ED NOTES
Pt states she's been feeling SOB for the last week, especially with any activity.  Generally on 3lpm NC, but moved it to 4LPM due to SOB over the last 2 days     Roula June 01/23/23 4522

## 2023-01-23 NOTE — PROGRESS NOTES
Physical Therapy/Occupational therapy  No treatment / discharge    Orders received, chart reviewed. Spoke with  who recently spoke with pt. Pt does not want to go to a SNF, plans to return home. Per CM, pt's limitations are more related to increasing O2 requirements, not strength/balance/self care limitations. CM states no need for therapy evals. MD notified.   Will sign off.    Dottie Craig, PT, DPT  [id number]  Jeremiah Laws, OTR/L, [id number]

## 2023-01-23 NOTE — ED PROVIDER NOTES
ED Attending Attestation Note     Date of evaluation: 1/23/2023    This patient was seen by the resident. I have seen and examined the patient, agree with the workup, evaluation, management and diagnosis. The care plan has been discussed. I have reviewed the ECG and concur with the resident's interpretation. My assessment reveals-year-old female with a history of pulmonary fibrosis with oxygen at baseline of 3 L coming in with worsening shortness of breath over the past week. Patient primarily with symptoms worse with exertion. She has a dry cough with no fevers or chills at home. Denies any chest pain. Denies any syncope. She is followed closely by pulmonology. Patient currently in independent living but has been having decompensation that they think is primarily secondary to her overall status as opposed to her lungs. On assessment she is noted to be on 4 L of oxygen here with saturations around 95% at rest.  She has coarse breath sounds in the bases bilaterally. We will be obtaining cardiac work-up and discussing with her pulmonologist at this time. They recommend CTPA at this time.      Janiya Dominguez MD  01/23/23 [id number]

## 2023-01-23 NOTE — TELEPHONE ENCOUNTER
Patient calls back stating that her O2 is dropping down to the 60's with exertion on 3lpm. She has now put her O2 on 4 lpm but is still feeling sob. I have advised pt that she needs to go to ED to be evaluated. She verbalized she would go the ED to be evaluated.

## 2023-01-23 NOTE — DISCHARGE INSTRUCTIONS
Please follow-up with [address] regarding the fining on your CT of a gastric varices versus pseudoaneurysm    You also need to follow-up with pulmonology    Continue to monitor your oxygen levels. You will be on 3 L of oxygen while at rest, before you ambulate increase your oxygen levels to 6 L.   If your oxygen levels drop below 85% please call your pulmonologist or return to the ED for evaluation

## 2023-01-24 ENCOUNTER — CARE COORDINATION (OUTPATIENT)
Dept: CARE COORDINATION | Age: 83
End: 2023-01-24

## 2023-01-24 ENCOUNTER — TELEPHONE (OUTPATIENT)
Dept: PULMONOLOGY | Age: 83
End: 2023-01-24

## 2023-01-24 NOTE — TELEPHONE ENCOUNTER
Patient so called to see why orders for new oxygen was not sent to [address] [place] [address],[address] she was told she needs 5-6L and tanks only go as high as 5L  Was told at the hospital that orders were sent   Do we need to send in new orders ?

## 2023-01-25 ENCOUNTER — TELEMEDICINE (OUTPATIENT)
Dept: INTERNAL MEDICINE CLINIC | Age: 83
End: 2023-01-25
Payer: MEDICARE

## 2023-01-25 ENCOUNTER — TELEPHONE (OUTPATIENT)
Dept: INTERNAL MEDICINE CLINIC | Age: 83
End: 2023-01-25

## 2023-01-25 DIAGNOSIS — R06.02 SHORTNESS OF BREATH: Primary | ICD-10-CM

## 2023-01-25 PROCEDURE — G2012 BRIEF CHECK IN BY MD/QHP: HCPCS | Performed by: INTERNAL MEDICINE

## 2023-01-25 RX ORDER — BENZONATATE 100 MG/1
100-200 CAPSULE ORAL 3 TIMES DAILY PRN
Qty: 60 CAPSULE | Refills: 2 | Status: SHIPPED | OUTPATIENT
Start: 2023-01-25

## 2023-01-25 RX ORDER — TIZANIDINE 2 MG/1
2 TABLET ORAL 3 TIMES DAILY PRN
Qty: 30 TABLET | Refills: 0 | Status: SHIPPED | OUTPATIENT
Start: 2023-01-25

## 2023-01-25 ASSESSMENT — PATIENT HEALTH QUESTIONNAIRE - PHQ9
SUM OF ALL RESPONSES TO PHQ QUESTIONS 1-9: 0
6. FEELING BAD ABOUT YOURSELF - OR THAT YOU ARE A FAILURE OR HAVE LET YOURSELF OR YOUR FAMILY DOWN: 0
SUM OF ALL RESPONSES TO PHQ9 QUESTIONS 1 & 2: 0
SUM OF ALL RESPONSES TO PHQ QUESTIONS 1-9: 0
7. TROUBLE CONCENTRATING ON THINGS, SUCH AS READING THE NEWSPAPER OR WATCHING TELEVISION: 0
9. THOUGHTS THAT YOU WOULD BE BETTER OFF DEAD, OR OF HURTING YOURSELF: 0
2. FEELING DOWN, DEPRESSED OR HOPELESS: 0
5. POOR APPETITE OR OVEREATING: 0
SUM OF ALL RESPONSES TO PHQ QUESTIONS 1-9: 0
4. FEELING TIRED OR HAVING LITTLE ENERGY: 0
10. IF YOU CHECKED OFF ANY PROBLEMS, HOW DIFFICULT HAVE THESE PROBLEMS MADE IT FOR YOU TO DO YOUR WORK, TAKE CARE OF THINGS AT HOME, OR GET ALONG WITH OTHER PEOPLE: 0
SUM OF ALL RESPONSES TO PHQ QUESTIONS 1-9: 0
8. MOVING OR SPEAKING SO SLOWLY THAT OTHER PEOPLE COULD HAVE NOTICED. OR THE OPPOSITE, BEING SO FIGETY OR RESTLESS THAT YOU HAVE BEEN MOVING AROUND A LOT MORE THAN USUAL: 0
3. TROUBLE FALLING OR STAYING ASLEEP: 0
1. LITTLE INTEREST OR PLEASURE IN DOING THINGS: 0

## 2023-01-25 NOTE — TELEPHONE ENCOUNTER
When pt woke up Tuesday she had a pain under her left breast.  Dr. Nany Rucker sent her a script for muscle relaxers. Pt just had a therapist come by this morning and she found a bottle of trimadol in the pt cabinet the pt is wondering if she can take this for the pain. Please advise.       Permission to call frankie Kimble- [phone]

## 2023-01-25 NOTE — PROGRESS NOTES
Good Glass is a [de-identified] y.o. female evaluated via telephone on 1/25/2023 for Follow-Up from [facility] to ER on Monday found out she needed more oxygen and when [facility] came home Monday night she started coughing and got bad chills. She thinks under left breast she pulled a muscle, she has tried the patches and ibuprofen and it is not helping thinks needs a muscle relaxant. )  . Documentation:  I communicated with the patient and/or health care decision maker about shortness of breath. Details of this discussion including any medical advice provided:     She went to the ED for increasing shortness of breath. Had evaluation there, was told to increase the O2 flow to 6L when walking, can use 3-4L when sitting. Woke up after returning home from the ED - coughing and chills, then pain started under left breast.,  She has been using a lidocaine patch and ibuprofen, they have helped a bit. She states muscle relaxant may be helpful. She has not had any increase in sputum production, she coughs up a small amount of gray appearing sputum. The chills seem to be better today. Shortness of breath is the same as it was in the ED. Will use tizanidine 2 mg 3 times daily as needed for muscle relaxant, Tessalon Perles for cough. Reviewed blood work and imaging. Call with any change in symptoms, follow-up scheduled in 3 weeks. Total Time: minutes: 5-10 minutes    Jacki Gonzalez was evaluated through a synchronous (real-time) audio encounter. Patient identification was verified at the start of the visit. She (or guardian if applicable) is aware that this is a billable service, which includes applicable co-pays. This visit was conducted with the patient's (and/or legal guardian's) verbal consent. She has not had a related appointment within my department in the past 7 days or scheduled within the next 24 hours. The patient was located at Home: [address] [place] [address].   The provider was located at Home ([place] [address]): [place].     Note: not billable if this call serves to triage the patient into an appointment for the relevant concern    Subha [place] MD

## 2023-01-26 ENCOUNTER — APPOINTMENT (OUTPATIENT)
Dept: GENERAL RADIOLOGY | Age: 83
End: 2023-01-26
Payer: MEDICARE

## 2023-01-26 ENCOUNTER — HOSPITAL ENCOUNTER (INPATIENT)
Age: 83
LOS: 3 days | Discharge: HOME | End: 2023-01-29
Attending: EMERGENCY MEDICINE
Payer: MEDICARE

## 2023-01-26 DIAGNOSIS — J96.21 ACUTE ON CHRONIC RESPIRATORY FAILURE WITH HYPOXIA (HCC): ICD-10-CM

## 2023-01-26 DIAGNOSIS — J96.11 CHRONIC RESPIRATORY FAILURE WITH HYPOXIA (HCC): ICD-10-CM

## 2023-01-26 DIAGNOSIS — J84.9 CHRONIC INTERSTITIAL LUNG DISEASE (HCC): Primary | ICD-10-CM

## 2023-01-26 PROBLEM — R06.02 SHORTNESS OF BREATH: Status: ACTIVE | Noted: 2023-01-26

## 2023-01-26 LAB
ANION GAP SERPL CALCULATED.3IONS-SCNC: 10 MMOL/L (ref 3–16)
BASE EXCESS VENOUS: -1 MMOL/L (ref -2–3)
BASOPHILS ABSOLUTE: 0 K/UL (ref 0–0.2)
BASOPHILS RELATIVE PERCENT: 0.4 %
BUN BLDV-MCNC: 27 MG/DL (ref 7–20)
CALCIUM SERPL-MCNC: 10.3 MG/DL (ref 8.3–10.6)
CARBOXYHEMOGLOBIN: 1.8 % (ref 0–1.5)
CHLORIDE BLD-SCNC: 100 MMOL/L (ref 99–110)
CO2: 24 MMOL/L (ref 21–32)
CREAT SERPL-MCNC: 1.2 MG/DL (ref 0.6–1.2)
EKG ATRIAL RATE: 69 BPM
EKG DIAGNOSIS: NORMAL
EKG P AXIS: 40 DEGREES
EKG P-R INTERVAL: 178 MS
EKG Q-T INTERVAL: 366 MS
EKG QRS DURATION: 84 MS
EKG QTC CALCULATION (BAZETT): 392 MS
EKG R AXIS: -15 DEGREES
EKG T AXIS: 24 DEGREES
EKG VENTRICULAR RATE: 69 BPM
EOSINOPHILS ABSOLUTE: 0.3 K/UL (ref 0–0.6)
EOSINOPHILS RELATIVE PERCENT: 2.1 %
GFR SERPL CREATININE-BSD FRML MDRD: 46 ML/MIN/{1.73_M2}
GLUCOSE BLD-MCNC: 216 MG/DL (ref 70–99)
GLUCOSE BLD-MCNC: 256 MG/DL (ref 70–99)
GLUCOSE BLD-MCNC: 297 MG/DL (ref 70–99)
GLUCOSE BLD-MCNC: 304 MG/DL (ref 70–99)
GLUCOSE BLD-MCNC: 426 MG/DL (ref 70–99)
HCO3 VENOUS: 24.6 MMOL/L (ref 24–28)
HCT VFR BLD CALC: 30.9 % (ref 36–48)
HEMOGLOBIN, VEN, REDUCED: 45 %
HEMOGLOBIN: 10.3 G/DL (ref 12–16)
LACTIC ACID: 0.9 MMOL/L (ref 0.4–2)
LYMPHOCYTES ABSOLUTE: 0.7 K/UL (ref 1–5.1)
LYMPHOCYTES RELATIVE PERCENT: 5.5 %
MCH RBC QN AUTO: 29.5 PG (ref 26–34)
MCHC RBC AUTO-ENTMCNC: 33.1 G/DL (ref 31–36)
MCV RBC AUTO: 89.1 FL (ref 80–100)
METHEMOGLOBIN VENOUS: 0.2 % (ref 0–1.5)
MONOCYTES ABSOLUTE: 0.8 K/UL (ref 0–1.3)
MONOCYTES RELATIVE PERCENT: 6.4 %
NEUTROPHILS ABSOLUTE: 11.4 K/UL (ref 1.7–7.7)
NEUTROPHILS RELATIVE PERCENT: 85.6 %
O2 SAT, VEN: 54 %
PCO2, VEN: 45.5 MMHG (ref 41–51)
PDW BLD-RTO: 13.8 % (ref 12.4–15.4)
PERFORMED ON: ABNORMAL
PH VENOUS: 7.34 (ref 7.35–7.45)
PLATELET # BLD: 220 K/UL (ref 135–450)
PMV BLD AUTO: 7.7 FL (ref 5–10.5)
PO2, VEN: 31.8 MMHG (ref 25–40)
POTASSIUM REFLEX MAGNESIUM: 4.4 MMOL/L (ref 3.5–5.1)
PRO-BNP: 486 PG/ML (ref 0–449)
PROCALCITONIN: 0.24 NG/ML (ref 0–0.15)
PROCALCITONIN: 0.27 NG/ML (ref 0–0.15)
RBC # BLD: 3.47 M/UL (ref 4–5.2)
REPORT: NORMAL
RESPIRATORY PANEL PCR: NORMAL
SODIUM BLD-SCNC: 134 MMOL/L (ref 136–145)
TCO2 CALC VENOUS: 26 MMOL/L
TROPONIN: <0.01 NG/ML
WBC # BLD: 13.3 K/UL (ref 4–11)

## 2023-01-26 PROCEDURE — 71045 X-RAY EXAM CHEST 1 VIEW: CPT

## 2023-01-26 PROCEDURE — 0202U NFCT DS 22 TRGT SARS-COV-2: CPT

## 2023-01-26 PROCEDURE — 80048 BASIC METABOLIC PNL TOTAL CA: CPT

## 2023-01-26 PROCEDURE — 93005 ELECTROCARDIOGRAM TRACING: CPT | Performed by: EMERGENCY MEDICINE

## 2023-01-26 PROCEDURE — 99233 SBSQ HOSP IP/OBS HIGH 50: CPT | Performed by: INTERNAL MEDICINE

## 2023-01-26 PROCEDURE — 6370000000 HC RX 637 (ALT 250 FOR IP)

## 2023-01-26 PROCEDURE — 6370000000 HC RX 637 (ALT 250 FOR IP): Performed by: STUDENT IN AN ORGANIZED HEALTH CARE EDUCATION/TRAINING PROGRAM

## 2023-01-26 PROCEDURE — 99285 EMERGENCY DEPT VISIT HI MDM: CPT

## 2023-01-26 PROCEDURE — 82803 BLOOD GASES ANY COMBINATION: CPT

## 2023-01-26 PROCEDURE — 2580000003 HC RX 258

## 2023-01-26 PROCEDURE — 84484 ASSAY OF TROPONIN QUANT: CPT

## 2023-01-26 PROCEDURE — 6360000002 HC RX W HCPCS

## 2023-01-26 PROCEDURE — 85025 COMPLETE CBC W/AUTO DIFF WBC: CPT

## 2023-01-26 PROCEDURE — 2060000000 HC ICU INTERMEDIATE R&B

## 2023-01-26 PROCEDURE — 99222 1ST HOSP IP/OBS MODERATE 55: CPT | Performed by: HOSPITALIST

## 2023-01-26 PROCEDURE — 83880 ASSAY OF NATRIURETIC PEPTIDE: CPT

## 2023-01-26 PROCEDURE — 83605 ASSAY OF LACTIC ACID: CPT

## 2023-01-26 PROCEDURE — 84145 PROCALCITONIN (PCT): CPT

## 2023-01-26 PROCEDURE — 36415 COLL VENOUS BLD VENIPUNCTURE: CPT

## 2023-01-26 RX ORDER — INSULIN LISPRO 100 [IU]/ML
0-4 INJECTION, SOLUTION INTRAVENOUS; SUBCUTANEOUS
Status: DISCONTINUED | OUTPATIENT
Start: 2023-01-26 | End: 2023-01-26

## 2023-01-26 RX ORDER — INSULIN LISPRO 100 [IU]/ML
0-8 INJECTION, SOLUTION INTRAVENOUS; SUBCUTANEOUS
Status: DISCONTINUED | OUTPATIENT
Start: 2023-01-26 | End: 2023-01-29 | Stop reason: HOSPADM

## 2023-01-26 RX ORDER — DEXTROSE MONOHYDRATE 100 MG/ML
INJECTION, SOLUTION INTRAVENOUS CONTINUOUS PRN
Status: DISCONTINUED | OUTPATIENT
Start: 2023-01-26 | End: 2023-01-29 | Stop reason: HOSPADM

## 2023-01-26 RX ORDER — BENZONATATE 100 MG/1
100 CAPSULE ORAL EVERY 6 HOURS PRN
Status: DISCONTINUED | OUTPATIENT
Start: 2023-01-26 | End: 2023-01-29 | Stop reason: HOSPADM

## 2023-01-26 RX ORDER — INSULIN LISPRO 100 [IU]/ML
0-4 INJECTION, SOLUTION INTRAVENOUS; SUBCUTANEOUS NIGHTLY
Status: DISCONTINUED | OUTPATIENT
Start: 2023-01-26 | End: 2023-01-29 | Stop reason: HOSPADM

## 2023-01-26 RX ORDER — POLYETHYLENE GLYCOL 3350 17 G/17G
17 POWDER, FOR SOLUTION ORAL DAILY PRN
Status: DISCONTINUED | OUTPATIENT
Start: 2023-01-26 | End: 2023-01-29 | Stop reason: HOSPADM

## 2023-01-26 RX ORDER — INSULIN LISPRO 100 [IU]/ML
0-4 INJECTION, SOLUTION INTRAVENOUS; SUBCUTANEOUS NIGHTLY
Status: DISCONTINUED | OUTPATIENT
Start: 2023-01-26 | End: 2023-01-26

## 2023-01-26 RX ORDER — SODIUM CHLORIDE 0.9 % (FLUSH) 0.9 %
5-40 SYRINGE (ML) INJECTION PRN
Status: DISCONTINUED | OUTPATIENT
Start: 2023-01-26 | End: 2023-01-29 | Stop reason: HOSPADM

## 2023-01-26 RX ORDER — SODIUM CHLORIDE 9 MG/ML
INJECTION, SOLUTION INTRAVENOUS PRN
Status: DISCONTINUED | OUTPATIENT
Start: 2023-01-26 | End: 2023-01-29 | Stop reason: HOSPADM

## 2023-01-26 RX ORDER — ONDANSETRON 4 MG/1
4 TABLET, ORALLY DISINTEGRATING ORAL EVERY 8 HOURS PRN
Status: DISCONTINUED | OUTPATIENT
Start: 2023-01-26 | End: 2023-01-29 | Stop reason: HOSPADM

## 2023-01-26 RX ORDER — ACETAMINOPHEN 325 MG/1
650 TABLET ORAL EVERY 6 HOURS PRN
Status: DISCONTINUED | OUTPATIENT
Start: 2023-01-26 | End: 2023-01-29 | Stop reason: HOSPADM

## 2023-01-26 RX ORDER — ENOXAPARIN SODIUM 100 MG/ML
30 INJECTION SUBCUTANEOUS DAILY
Status: DISCONTINUED | OUTPATIENT
Start: 2023-01-26 | End: 2023-01-27

## 2023-01-26 RX ORDER — GLUCAGON 1 MG/ML
1 KIT INJECTION PRN
Status: DISCONTINUED | OUTPATIENT
Start: 2023-01-26 | End: 2023-01-29 | Stop reason: HOSPADM

## 2023-01-26 RX ORDER — SODIUM CHLORIDE 0.9 % (FLUSH) 0.9 %
5-40 SYRINGE (ML) INJECTION EVERY 12 HOURS SCHEDULED
Status: DISCONTINUED | OUTPATIENT
Start: 2023-01-26 | End: 2023-01-29 | Stop reason: HOSPADM

## 2023-01-26 RX ORDER — ACETAMINOPHEN 650 MG/1
650 SUPPOSITORY RECTAL EVERY 6 HOURS PRN
Status: DISCONTINUED | OUTPATIENT
Start: 2023-01-26 | End: 2023-01-29 | Stop reason: HOSPADM

## 2023-01-26 RX ORDER — PREDNISONE 20 MG/1
60 TABLET ORAL ONCE
Status: COMPLETED | OUTPATIENT
Start: 2023-01-26 | End: 2023-01-26

## 2023-01-26 RX ORDER — ONDANSETRON 2 MG/ML
4 INJECTION INTRAMUSCULAR; INTRAVENOUS EVERY 6 HOURS PRN
Status: DISCONTINUED | OUTPATIENT
Start: 2023-01-26 | End: 2023-01-29 | Stop reason: HOSPADM

## 2023-01-26 RX ORDER — LIDOCAINE 4 G/G
1 PATCH TOPICAL DAILY
Status: DISCONTINUED | OUTPATIENT
Start: 2023-01-26 | End: 2023-01-29 | Stop reason: HOSPADM

## 2023-01-26 RX ADMIN — CEFTRIAXONE 1000 MG: 1 INJECTION, POWDER, FOR SOLUTION INTRAMUSCULAR; INTRAVENOUS at 06:00

## 2023-01-26 RX ADMIN — INSULIN LISPRO 4 UNITS: 100 INJECTION, SOLUTION INTRAVENOUS; SUBCUTANEOUS at 13:38

## 2023-01-26 RX ADMIN — BENZONATATE 100 MG: 100 CAPSULE ORAL at 22:07

## 2023-01-26 RX ADMIN — SODIUM CHLORIDE, PRESERVATIVE FREE 10 ML: 5 INJECTION INTRAVENOUS at 19:45

## 2023-01-26 RX ADMIN — INSULIN LISPRO 4 UNITS: 100 INJECTION, SOLUTION INTRAVENOUS; SUBCUTANEOUS at 18:14

## 2023-01-26 RX ADMIN — INSULIN LISPRO 3 UNITS: 100 INJECTION, SOLUTION INTRAVENOUS; SUBCUTANEOUS at 10:13

## 2023-01-26 RX ADMIN — INSULIN LISPRO 4 UNITS: 100 INJECTION, SOLUTION INTRAVENOUS; SUBCUTANEOUS at 13:04

## 2023-01-26 RX ADMIN — BENZONATATE 100 MG: 100 CAPSULE ORAL at 16:07

## 2023-01-26 RX ADMIN — ENOXAPARIN SODIUM 30 MG: 100 INJECTION SUBCUTANEOUS at 10:13

## 2023-01-26 RX ADMIN — PREDNISONE 60 MG: 20 TABLET ORAL at 03:47

## 2023-01-26 RX ADMIN — ACETAMINOPHEN 650 MG: 325 TABLET ORAL at 18:12

## 2023-01-26 RX ADMIN — SODIUM CHLORIDE, PRESERVATIVE FREE 10 ML: 5 INJECTION INTRAVENOUS at 10:12

## 2023-01-26 RX ADMIN — AZITHROMYCIN MONOHYDRATE 500 MG: 500 INJECTION, POWDER, LYOPHILIZED, FOR SOLUTION INTRAVENOUS at 12:13

## 2023-01-26 ASSESSMENT — PAIN - FUNCTIONAL ASSESSMENT
PAIN_FUNCTIONAL_ASSESSMENT: ACTIVITIES ARE NOT PREVENTED
PAIN_FUNCTIONAL_ASSESSMENT: PREVENTS OR INTERFERES SOME ACTIVE ACTIVITIES AND ADLS
PAIN_FUNCTIONAL_ASSESSMENT: PREVENTS OR INTERFERES SOME ACTIVE ACTIVITIES AND ADLS
PAIN_FUNCTIONAL_ASSESSMENT: 0-10
PAIN_FUNCTIONAL_ASSESSMENT: ACTIVITIES ARE NOT PREVENTED
PAIN_FUNCTIONAL_ASSESSMENT: ACTIVITIES ARE NOT PREVENTED
PAIN_FUNCTIONAL_ASSESSMENT: PREVENTS OR INTERFERES SOME ACTIVE ACTIVITIES AND ADLS
PAIN_FUNCTIONAL_ASSESSMENT: ACTIVITIES ARE NOT PREVENTED

## 2023-01-26 ASSESSMENT — PAIN DESCRIPTION - ORIENTATION
ORIENTATION: LEFT
ORIENTATION: LEFT
ORIENTATION: RIGHT
ORIENTATION: RIGHT
ORIENTATION: LEFT
ORIENTATION: RIGHT
ORIENTATION: RIGHT

## 2023-01-26 ASSESSMENT — PAIN DESCRIPTION - PAIN TYPE
TYPE: ACUTE PAIN

## 2023-01-26 ASSESSMENT — PAIN DESCRIPTION - LOCATION
LOCATION: ABDOMEN;BREAST;CHEST
LOCATION: ABDOMEN;BREAST;CHEST
LOCATION: CHEST
LOCATION: BREAST
LOCATION: CHEST

## 2023-01-26 ASSESSMENT — PAIN DESCRIPTION - DESCRIPTORS
DESCRIPTORS: ACHING;SORE
DESCRIPTORS: ACHING
DESCRIPTORS: ACHING;SORE
DESCRIPTORS: ACHING;SORE
DESCRIPTORS: ACHING

## 2023-01-26 ASSESSMENT — PAIN SCALES - GENERAL
PAINLEVEL_OUTOF10: 7
PAINLEVEL_OUTOF10: 4
PAINLEVEL_OUTOF10: 6
PAINLEVEL_OUTOF10: 10
PAINLEVEL_OUTOF10: 8
PAINLEVEL_OUTOF10: 6

## 2023-01-26 ASSESSMENT — PAIN DESCRIPTION - FREQUENCY
FREQUENCY: INTERMITTENT
FREQUENCY: CONTINUOUS

## 2023-01-26 ASSESSMENT — LIFESTYLE VARIABLES
HOW MANY STANDARD DRINKS CONTAINING ALCOHOL DO YOU HAVE ON A TYPICAL DAY: 1 OR 2
HOW OFTEN DO YOU HAVE A DRINK CONTAINING ALCOHOL: MONTHLY OR LESS

## 2023-01-26 ASSESSMENT — PAIN DESCRIPTION - ONSET
ONSET: ON-GOING

## 2023-01-26 NOTE — CONSULTS
Pulmonary Consult Note      Reason for Consult: ILD, worsening SOB  Requesting Physician: Faviola Yu MD    Subjective:   Patient reports left pain pain under her left breast and shoulder. Reports she fell back on he sit while her son drove her home from the ED on Tuesday. The pain got worse yesterday night and she represented to the ED. Pain is worse with cough. Denies any SOB. CHIEF COMPLAINT / HPI:      The patient is a [de-identified] y.o. 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 Patient initially presented to the ED 01/23/23 with dyspnea. Reported desaturation to the 70s on exertion while on her home oxygen. CT PA was done which demonstrated no evidence of PE, extensive chronic pulmonary interstitial lung disease with honeycombing changes and subpleural consolidation which dates back to 2021 with no pneumothorax or effusion. Bilateral hilar lymphadenopathy, stable. She was discharged home on increased 6L O2 at exertion. She was seen by pulmonary medicine and pulmonary rehab was recommended. Patient had a virtual visit with PCP 1/25/23 for cough, chills and pain underneath left breast and received tizanidine 2 mg tid and tessalon perles for cough. She represented today and reports worsening SOB at rest and L chest pain. In the ED pro sunny elevated to 0.27 > 0.24, WBC elevated to 13.3, CXR demonstrated Extensive chronic interstitial fibrosis. No gross acute abnormality on portable chest radiograph. She received 60 mg decadron.      Past Medical History:      Diagnosis Date    Diabetes mellitus ([address]     Essential hypertension, benign     GERD (gastroesophageal reflux disease)     Hyperlipidemia     Interstitial lung disease (HCC)     Osteoarthrosis, unspecified whether generalized or localized, unspecified site     osteoarthritis lower leg    Osteopenia Shingles       Past Surgical History:        Procedure Laterality Date    ANKLE SURGERY Right 04/01/2013    torn tendon    BREAST BIOPSY      unsure of which breast    BRONCHOSCOPY N/A 08/01/2019    BRONCHOSCOPY WITH BAL AND TRANSBRONCHIAL BIOPSIES performed by May Dumont MD at [place] [address]  10/11/2010    Dr. Miya Morris , polyps and diverticulosis    COLONOSCOPY  11/11/2002    Dr. Penelope Small, Diverticulosis    COLONOSCOPY  04/20/2015    Dr. Miya Morris- diverticulosis, sessile polyp, 5 years     COLONOSCOPY  04/13/2016    Dr Adam Pyle; Diverticulosis    COLONOSCOPY  11/28/2016    Dr Miya Morris,     CYSTOSCOPY Left 02/24/2022    CYSTOSCOPY URETERAL STENT INSERTION performed by Soraya Hare MD at [address] [place] [address] Drive Right 07/15/2020    PHACOEMULSIFICATION OF CATARACT RIGHT EYE WITH INTRAOCULAR LENS IMPLANT performed by Gracy Augustin MD at [address] [place] [address],  [address] Bilateral 09/01/2020    BILATERAL LUMBAR THREE, LUMBAR FOUR, LUMBAR FIVE RADIOFREQUENCY ABLATION SITE CONFIRMED BY FLUOROSCOPY performed by Crystal Fowler MD at [facility] Bilateral 06/23/2020    BILATERAL LUMBAR FOUR TRANSFORAMINAL EPIDURAL STEROID INJECTION SITE CONFIRMED BY FLUOROSCOPY performed by Crystal Fowler MD at [facility] [employer] [facility] Bilateral 07/07/2020    BILATERAL LUMBAR FOUR TRANSFORAMINAL EPIDURAL STEROID INJECTION SITE CONFIRMED BY FLUOROSCOPY performed by Crystal Fowler MD at [address] [place] [address] [facility] Bilateral 08/04/2020    BILATERAL LUMBAR THREE, LUMBAR FOUR, LUMBAR FIVE DORSAL RAMUS MEDIAL BRANCH BLOCK SITE CONFIRMED BY FLUOROSCOPY [facility] Bilateral 08/18/2020    BILATERAL LUMBAR THREE, LUMBAR FOUR, LUMBAR FIVE DORSAL RAMUS MEDIAL BRANCH BLOCK SITE CONFIR,ED BY FLUOROSCOPY performed by Crystal Fowler MD at [place] [address] SHOULDER SURGERY Right     UPPER GASTROINTESTINAL ENDOSCOPY  08/17/2011    Dr. Lillian Bird - moderate gastritis , hiatal hernia     UPPER GASTROINTESTINAL ENDOSCOPY  04/20/2015    Dr. Lillian Bird - polyps removed, diverticulosis, follow up in 5 years    [facility]  08/16/2012    DR. Ramos - with mesh     Current Medications:     sodium chloride flush  5-40 mL IntraVENous 2 times per day    enoxaparin  30 mg SubCUTAneous Daily    cefTRIAXone (ROCEPHIN) IV  1,000 mg IntraVENous Q24H    azithromycin  500 mg IntraVENous Q24H    insulin lispro  0-4 Units SubCUTAneous TID WC    insulin lispro  0-4 Units SubCUTAneous Nightly     Allergies: Allergies   Allergen Reactions    Oxycodone Dizziness or Vertigo    Pravastatin Other (See Comments)     Other reaction(s): increases blood pressure     Social History:    TOBACCO:   reports that she has never smoked. She has never used smokeless tobacco.  ETOH:   reports current alcohol use of about 7.0 standard drinks per week. Family History:       Problem Relation Age of Onset    Heart Disease Mother     Rheum Arthritis Mother     Heart Disease Father        REVIEW OF SYSTEMS:    CONSTITUTIONAL:  negative for fevers, chills, diaphoresis, activity change, appetite change, fatigue, night sweats and unexpected weight change.    EYES:  negative for blurred vision, eye discharge, visual disturbance and icterus  HEENT:  negative for hearing loss, tinnitus, ear drainage, sinus pressure, nasal congestion, epistaxis and snoring  RESPIRATORY:  See HPI  CARDIOVASCULAR:  negative for chest pain, palpitations, exertional chest pressure/discomfort, edema, syncope  GASTROINTESTINAL:  negative for nausea, vomiting, diarrhea, constipation, blood in stool and abdominal pain  GENITOURINARY:  negative for frequency, dysuria, urinary incontinence, decreased urine volume, and hematuria  HEMATOLOGIC/LYMPHATIC:  negative for easy bruising, bleeding and lymphadenopathy  ALLERGIC/IMMUNOLOGIC: negative for recurrent infections, angioedema, anaphylaxis and drug reactions  ENDOCRINE:  negative for weight changes and diabetic symptoms including polyuria, polydipsia and polyphagia  MUSCULOSKELETAL:  negative for  pain, joint swelling, decreased range of motion and muscle weakness  NEUROLOGICAL:  negative for headaches, slurred speech, unilateral weakness  PSYCHIATRIC/BEHAVIORAL: negative for hallucinations, behavioral problems, confusion and agitation. Objective:   PHYSICAL EXAM:      VITALS:  /66   Pulse 69   Temp 98 °F (36.7 °C) (Oral)   Resp 22   Ht 5' 2\" (1.575 m)   Wt 125 lb 3.5 oz (56.8 kg)   SpO2 92%   BMI 22.90 kg/m²   24HR INTAKE/OUTPUT:    Intake/Output Summary (Last 24 hours) at 2023 1011  Last data filed at 2023 0610  Gross per 24 hour   Intake 240 ml   Output --   Net 240 ml     CURRENT PULSE OXIMETRY:  SpO2: 92 %  24HR PULSE OXIMETRY RANGE:  SpO2  Av.1 %  Min: 92 %  Max: 100 % on 5L     CONSTITUTIONAL:  awake, alert, cooperative, in no distress, and appears stated age  NECK:  Supple, symmetrical, trachea midline, no adenopathy, thyroid symmetric, not enlarged and no tenderness, skin normal  LUNGS:  no increased work of breathing and clear to auscultation. no accessory muscle use, coarse crackles bilaterally   CARDIOVASCULAR:  normal S1 and S2, no edema and no JVD  ABDOMEN:  normal bowel sounds, non-distended and no masses palpated, and no tenderness to palpation. No hepatospleenomegaly  LYMPHADENOPATHY:  no axillary or supraclavicular adenopathy. No cervical adnenopathy  PSYCHIATRIC: Oriented to person place and time. No obvious depression or anxiety. MUSCULOSKELETAL: No obvious misalignment or effusion of the joints. No clubbing, cyanosis of the digits. SKIN:  normal skin color, texture, turgor and no redness, warmth, or swelling.  No palpable nodules    DATA:    Old records have been reviewed  CBC with Differential:    Lab Results   Component Value Date/Time WBC 13.3 01/26/2023 12:23 AM    RBC 3.47 01/26/2023 12:23 AM    HGB 10.3 01/26/2023 12:23 AM    HCT 30.9 01/26/2023 12:23 AM     01/26/2023 12:23 AM    MCV 89.1 01/26/2023 12:23 AM    MCH 29.5 01/26/2023 12:23 AM    MCHC 33.1 01/26/2023 12:23 AM    RDW 13.8 01/26/2023 12:23 AM    SEGSPCT 66.7 05/15/2012 09:46 AM    LYMPHOPCT 5.5 01/26/2023 12:23 AM    MONOPCT 6.4 01/26/2023 12:23 AM    EOSPCT 3.8 08/30/2011 11:20 AM    BASOPCT 0.4 01/26/2023 12:23 AM    MONOSABS 0.8 01/26/2023 12:23 AM    LYMPHSABS 0.7 01/26/2023 12:23 AM    EOSABS 0.3 01/26/2023 12:23 AM    BASOSABS 0.0 01/26/2023 12:23 AM    DIFFTYPE Auto-K 05/15/2012 09:46 AM     BMP:    Lab Results   Component Value Date/Time     01/26/2023 12:23 AM    K 4.4 01/26/2023 12:23 AM     01/26/2023 12:23 AM    CO2 24 01/26/2023 12:23 AM    BUN 27 01/26/2023 12:23 AM    CREATININE 1.2 01/26/2023 12:23 AM    CALCIUM 10.3 01/26/2023 12:23 AM    GFRAA >60 10/07/2022 09:46 AM    GFRAA >60 02/25/2013 08:12 AM    LABGLOM 46 01/26/2023 12:23 AM    GLUCOSE 216 01/26/2023 12:23 AM     Hepatic Function Panel:    Lab Results   Component Value Date/Time    ALKPHOS 105 09/20/2022 01:30 PM    ALT 8 09/20/2022 01:30 PM    AST 14 09/20/2022 01:30 PM    PROT 6.5 10/07/2022 09:46 AM    PROT 6.6 02/25/2013 08:12 AM    BILITOT 0.5 09/20/2022 01:30 PM     ABG:    Lab Results   Component Value Date/Time    CZL7GYU 20 06/04/2019 09:21 AM    BEART -3.7 06/04/2019 09:21 AM    F0VHUPMI 95 06/04/2019 09:21 AM    PHART 7.390 06/04/2019 09:21 AM    PIC7PNH 34.0 06/04/2019 09:21 AM    PO2ART 84.0 06/04/2019 09:21 AM    ZYE3FAU 21 06/04/2019 09:21 AM       Cultures:   Blood Culture:    Sputum Culture:        Radiology Review:  All pertinent images / reports were reviewed as a part of this visit. Imaging reveals the following:  XR CHEST PORTABLE   Final Result      Extensive chronic interstitial fibrosis. No gross acute abnormality on portable chest radiograph.  Consider chest CT for added sensitivity/specificity. Other diagnostic test:      PFTs:   (10/6/21:  TLC improved from 63% to 72%, however FVC decreased from 78% to 68%. RV improved from 42 to 84%. Overall restriction is stable)         Echo:Summary   Normal left ventricle size and systolic function with an estimated ejection   fraction of 60%-65%. Mild concentric left ventricular hypertrophy. No regional wall motion abnormalities are seen. Grade I diastolic dysfunction with elevated LVEDP. Doppler Studies:none     Assessment/Plan   [de-identified] y.o. female with chronic hypoxic respiratory failure due to NSIP presented with worsening SOB    Chronic hypoxic respiratory failure 2/2 Nonspecific interstitial pneumonia   -on 3L of O2 at rest and 6L on exertion at home , currently on 5L at rest, saturating 92%  -Procal elevated to 0.27 >0.24, WBC elevated to 13.3.   -CTPA 01/23 with no PE, no progression of NSIP  -s/p 60 mg prednisone in the ED   -on azithromycin/rocephin for likely CAP  -f/u strep pneumo antigen, respiratory culture and legionella antigen urine  -Will need pulmonary rehab     Plan and assessment discussed with attending, MD Shanna Webber MD, PGY-1    Patient was seen, examined and discussed with Dr. Mendoza Perez. I agree with the interval history. My physical exam confirms the findings listed below  Chart was reviewed including labs and medical records confirm the findings noted    Peripheral IV 01/26/23 Right Wrist (Active)   Number of days: 0     Chronic hypoxic respiratory failure   NSIP  Left side chest pain, with tender point likely musculoskeletal pain     Oxygen requirement is close to baseline   No need for systemic steroids at this time. Continue ceftriaxone while inpatient. Can change to augmentin on discharge. She will need maintenance pulmonary rehab. I asked case management to arrange for pulmonary rehab and transportation.     OK to d/c from my perspective after PT/OT eval

## 2023-01-26 NOTE — PLAN OF CARE
Problem: Discharge Planning  Goal: Discharge to home or other facility with appropriate resources  Outcome: Progressing  Flowsheets (Taken 1/26/2023 0610 by Margie Jasmine RN)  Discharge to home or other facility with appropriate resources:   Identify barriers to discharge with patient and caregiver   Arrange for needed discharge resources and transportation as appropriate   Identify discharge learning needs (meds, wound care, etc)   Refer to discharge planning if patient needs post-hospital services based on physician order or complex needs related to functional status, cognitive ability or social support system     Problem: Safety - Adult  Goal: Free from fall injury  Outcome: Progressing  Flowsheets (Taken 1/26/2023 1756)  Free From Fall Injury:   Based on caregiver fall risk screen, instruct family/caregiver to ask for assistance with transferring infant if caregiver noted to have fall risk factors   Instruct family/caregiver on patient safety     Problem: ABCDS Injury Assessment  Goal: Absence of physical injury  Outcome: Progressing     Problem: Chronic Conditions and Co-morbidities  Goal: Patient's chronic conditions and co-morbidity symptoms are monitored and maintained or improved  Outcome: Progressing     Problem: Pain  Goal: Verbalizes/displays adequate comfort level or baseline comfort level  Flowsheets  Taken 1/26/2023 1756 by Hayley Marquez RN  Verbalizes/displays adequate comfort level or baseline comfort level:   Encourage patient to monitor pain and request assistance   Assess pain using appropriate pain scale   Administer analgesics based on type and severity of pain and evaluate response  Taken 1/26/2023 0610 by Margie Jasmine RN  Verbalizes/displays adequate comfort level or baseline comfort level:   Encourage patient to monitor pain and request assistance   Assess pain using appropriate pain scale   Administer analgesics based on type and severity of pain and evaluate response   Implement non-pharmacological measures as appropriate and evaluate response   Consider cultural and social influences on pain and pain management   Notify Licensed Independent Practitioner if interventions unsuccessful or patient reports new pain  Note: Lidoderm patch applied right chest per order  patient reports pain reduction

## 2023-01-26 NOTE — PROGRESS NOTES
Internal Medicine Progress Note    Patient Name: Caprice Hinojosa   Patient : 1940   Date: 2023   Admit Date: 2023     CC: Shortness of Breath (Patient was seen Monday for shortness of breath. She normally wears 3L NC and was told to increase to 6L NC and follow up. Patient reports that this episode of shortness of breath started Tuesday night. SPO2 on NRB upon arrival 100%. Patient switched to 6L NC and SPO2 97-98%. Patient denies any falls. )         Interval History: Patient seen and examined at bedside, States really she came in for rib pain from her oxygen tank hitting her side getting out of the car. The other breathing issues she was seen 3 days ago for are not worse since then. Denies that pain feels like when she had shingles pain in the past. Denies any other new complaints. ROS:  As per interval history above. HPI: Kimberly Bae is a [de-identified] y.o. female w/PMH ILD (NSIP) on home supplemental O2 (3 L rest, 6 L movement), HTN, DM, Hyperparathyroidism, HLD, GERD, OA, who presented with cc worsening SOB. Patient reports having a baseline supplemental oxygenation requirement at 3 L at rest and 6 L at movement but reports having a worsening of her SOB having desaturations of her oxygenation to the 65s-70s% on 6 L at movement. Patient reports having to rest with improvements of her saturations to the 90s%. She reports that this is the first time that she is having this issue and reports that about 5-6 months ago having to wear her supplemental oxygenation NC even at night. She reports having associated increased fatigue and falling asleep frequently but for the past 6 months. Additionally, patient reports having headaches that have been acute, without dizziness, falls, CP. Patient reports pain at the site of her accessory muscles. Patient does endorse having body aches, chills, sweats, rhinorrhea, and a sore throat in addition to dry cough.  Patient was seen on 23 for SOB with desaturations and hypoxia on exertion with CTPA negative for PE and pulm consulted. Patient was d/c. She presented to the ED from her assisted living facility of [address] [facility] via EMS. EMS reported that the patient was with hypoxia satting at the 60s-70s%  and placed on 10 L for saturation improvement. At the ED, patient presented with VSSsaturating in the 97%. ECG was NSR and CXR was with extensive chronic interstitial fibrosis and no gross acute abnormality. Lab analysis revealed leukocytosis at 13.3, anemia w/Hb at 10.3, mild hyponatremia at 134, hyperglycemia at 216, at troponin negative. LA was at 0.9 and VBG was at pH 7.341/pCO2 45.5/HCO3 24.6. Patient received 60 mg prednisone and subsequently admitted for further workup. \"      Vital Signs:  Patient Vitals for the past 8 hrs:   BP Temp Temp src Pulse Resp SpO2 Weight   01/26/23 0830 110/66 98 °F (36.7 °C) Oral 69 22 92 % --   01/26/23 0610 (!) 128/57 98.4 °F (36.9 °C) Oral 74 24 93 % 125 lb 3.5 oz (56.8 kg)   01/26/23 0500 124/60 -- -- 64 20 99 % --   01/26/23 0400 (!) 117/59 -- -- 66 25 98 % --   01/26/23 0340 129/67 -- -- 69 24 98 % --   01/26/23 0320 126/63 -- -- 65 27 100 % --   01/26/23 0300 129/61 -- -- 67 22 98 % --   01/26/23 0240 122/67 -- -- 69 14 -- --   01/26/23 0220 116/62 -- -- 65 26 -- --       Physical Exam:  Physical Exam  HENT:      Head: Normocephalic and atraumatic. Mouth/Throat:      Mouth: Mucous membranes are dry. Eyes:      Extraocular Movements: Extraocular movements intact. Pupils: Pupils are equal, round, and reactive to light. Cardiovascular:      Rate and Rhythm: Normal rate and regular rhythm. Pulses: Normal pulses. Heart sounds: Normal heart sounds. Pulmonary:      Effort: Pulmonary effort is normal.      Breath sounds: Rales present. Abdominal:      General: Abdomen is flat. Palpations: Abdomen is soft. Musculoskeletal:         General: Tenderness present. Normal range of motion. Skin:     General: Skin is warm. Capillary Refill: Capillary refill takes less than 2 seconds. Neurological:      Mental Status: She is alert and oriented to person, place, and time. Intake/Output Summary (Last 24 hours) at 1/26/2023 1019  Last data filed at 1/26/2023 0610  Gross per 24 hour   Intake 240 ml   Output --   Net 240 ml        Medications:   sodium chloride flush  5-40 mL IntraVENous 2 times per day    enoxaparin  30 mg SubCUTAneous Daily    cefTRIAXone (ROCEPHIN) IV  1,000 mg IntraVENous Q24H    azithromycin  500 mg IntraVENous Q24H    insulin lispro  0-4 Units SubCUTAneous TID WC    insulin lispro  0-4 Units SubCUTAneous Nightly       sodium chloride      dextrose        sodium chloride flush, sodium chloride, ondansetron **OR** ondansetron, polyethylene glycol, acetaminophen **OR** acetaminophen, glucose, dextrose bolus **OR** dextrose bolus, glucagon (rDNA), dextrose     Labs:  CBC:   Recent Labs     01/23/23  1145 01/26/23  0023   WBC 10.8 13.3*   HGB 10.8* 10.3*   HCT 32.1* 30.9*    220   MCV 91.2 89.1       Renal:    Recent Labs     01/23/23  1145 01/26/23  0023   * 134*   K 4.4 4.4    100   CO2 24 24   BUN 22* 27*   CREATININE 0.9 1.2   GLUCOSE 166* 216*   CALCIUM 10.0 10.3   ANIONGAP 8 10       Hepatic: No results for input(s): AST, ALT, BILITOT, BILIDIR, PROT, LABALBU, ALKPHOS in the last 72 hours. Troponin: Invalid input(s): TROPONIN    Lactic acid: Invalid input(s): LACTICACID    BNP: No results for input(s): BNP in the last 72 hours. Pro-BNP:   Recent Labs     01/23/23  1145 01/26/23  0023   PROBNP 567* 486*       Lipids: No results for input(s): CHOL, TRIG, HDL, LDLCALC, VLDL in the last 72 hours. ABGs:  No results for input(s): PHART, ZZJ2IDC, PO2ART, COH2ZFU, BEART, THGBART, M2PKUFTG, VBG5VNK in the last 72 hours.     VBGs:   Recent Labs     01/23/23  1145 01/26/23  0023   PHVEN 7.391 7.341*   FBZ6LJY 40.0* 45.5   PO2VEN 58.6* 31.8       INR: No results for input(s): INR in the last 72 hours. aPTT: No results for input(s): APTT in the last 72 hours. Procalcitonin:   Recent Labs     01/26/23  0023 01/26/23  0848   PROCAL 0.27* 0.24*     CRP: No results for input(s): CRP in the last 72 hours. ESR: No results for input(s): ESR in the last 72 hours. Radiology:  XR CHEST PORTABLE   Final Result      Extensive chronic interstitial fibrosis. No gross acute abnormality on portable chest radiograph. Consider chest CT for added sensitivity/specificity. Assessment and Plan:    Known NSIP ILD w/ poss. CAP/URI   sees Dr. Warren Lab o/p for pulm  s/p steroids at presentation  Recently seen at ED for similar breating issues, seen by Pulm and sent home from ED  on home O2: 3 L at rest, 6 L upon exertion, however does not always increase oxygen when needed as she should  pulmonology consulted for continuity of care. CXR at presentation, no gross acute abnormality but consider CT (But had one 3 days prior)  Monitor for worsening of respiratory status  Negative: COVID/influenza  Pending: respi mini, legionella, strep, RSV  Initiate empirically with abx at this time: ceftriaxone + azithromycin  WBC 13.3   Procal: 0.27 > 0.24  EKG: normal axis, poor R-wave progression as seen in EKG a year ago     MSK Pain 2/2 Trauma   (Oxygen tank hitting side of body)  Is greatly relieved with Lidocaine patch  Does not feel like sensations of shingles which patient had in a different location 10 years ago   Reproducible on palpation  Trop Negative x 2      Chronic condition w/home meds resumed:  Hyperparathyroidism  HTN  DM2  -LDSS  -POCT  -hypoglycemia protocol  -monitor  HLD  GERD  OA        DVT PPx:Lovenox  Diet:  ADULT DIET;  Regular   Code status:  Full Code     ELOS: 1 night  Barriers to discharge: Pulm recs  Disposition [facility]      Will discuss with attending physician Dr. Gregor Ellis MD.     Sangeetha Flores MD  Internal Medicine, PGY-1    Addendum to Resident H& P/Progress note:  I have personally seen,examined and evaluated the patient. I have reviewed the current history, physical findings, labs and assessment and plan and agree with note as documented by resident MD Doreen Aceves)  + Chronic respiratory failure with hypoxia secondary to interstitial lung disease.     Luther Up MD, FACP

## 2023-01-26 NOTE — PROGRESS NOTES
Physician Progress Note      Alicia Bass  CSN #:                  [id number]  :                       1940  ADMIT DATE:       2023 12:01 AM  DISCH DATE:  RESPONDING  PROVIDER #:        Abhishek Patterson MD          QUERY TEXT:    Pt admitted with SOB and has respiratory failure documented in ED PN. If   possible, please document in progress notes and discharge summary further   specificity regarding the type and acuity of respiratory failure: The medical record reflects the following:  Risk Factors: [de-identified] y/o female with Chronic resp failure baseline 02 sats 3l  Clinical Indicators: Per ED PN: \"Shortness of Breath (Patient was seen Monday   for shortness of breath. She normally wears 3L NC and was told to increase to   6L NC and follow up. Patient reports that this episode of shortness of breath   started Tuesday night. SPO2 on NRB upon arrival 100%. Patient switched to 6L   NC and SPO2 97-98%. \"  Treatment: NRB, 6L 02  Options provided:  -- Acute on chronic respiratory failure with hypoxia, now resolving  -- Chronic respiratory failure with hypoxia  -- Other - I will add my own diagnosis  -- Disagree - Not applicable / Not valid  -- Disagree - Clinically unable to determine / Unknown  -- Refer to Clinical Documentation Reviewer    PROVIDER RESPONSE TEXT:    This patient has chronic respiratory failure with hypoxia.     Query created by: Jose Alberto Headley on 2023 9:26 AM      Electronically signed by:  Abhishek Patterson MD 2023 2:02 PM

## 2023-01-26 NOTE — CARE COORDINATION
Case Management Assessment  Initial Evaluation    Date/Time of Evaluation: 1/26/2023 12:21 PM  Assessment Completed by: Jose L Dos Santos RN    If patient is discharged prior to next notation, then this note serves as note for discharge by case management. Patient Name: Irineo Charles                   YOB: 1940  Diagnosis: Shortness of breath [R06.02]  Chronic interstitial lung disease ([facility] [address] [J84.9]  Acute on chronic respiratory failure with hypoxia ([facility] [address] [J96.21]                   Date / Time: 1/26/2023 12:01 AM    Patient Admission Status: Inpatient   Readmission Risk (Low < 19, Mod (19-27), High > 27): Readmission Risk Score: 12.7    Current PCP: Yessi Naylor MD  PCP verified by CM? Yes    Chart Reviewed: Yes      History Provided by: Patient  Patient Orientation: Alert and Oriented    Patient Cognition: Alert    Hospitalization in the last 30 days (Readmission):  No    If yes, Readmission Assessment in  Navigator will be completed. Advance Directives:      Code Status: Full Code   Patient's Primary Decision Maker is: Named in [address] [place] [address] (Tana Osler [phone])    Primary Decision MakerVmichele Benji Child - [phone]    Secondary Decision Maker: Karl Cárdenas Child - [phone]    Discharge Planning:    Patient lives with: Alone Type of Home: Assisted living [facility] [place])  Primary Care Giver: Self  Patient Support Systems include: Children, Family Members   Current Financial resources:    Current community resources:    Current services prior to admission: Oxygen Therapy            Current DME:              Type of Home Care services:  OT, PT, Attendant    ADLS  Prior functional level: Assistance with the following:, Mobility, Cooking, Housework, Shopping  Current functional level: Assistance with the following:, Dressing, Toileting, Bathing, Cooking, Housework, Shopping, Mobility    PT AM-PAC:   /24  OT AM-PAC:   /24    Family can provide assistance at DC:  No (Assisted living staff, may need [facility] in addition to [address] [place] [address])  Would you like Case Management to discuss the discharge plan with any other family members/significant others, and if so, who? Yes  Plans to Return to Present Housing: Yes ([place] at [address] [place] [address])  Other Identified Issues/Barriers to RETURNING to current housing: none  Potential Assistance needed at discharge: [address], Outpatient PT/OT            Potential DME:    Patient expects to discharge to: Assisted living  Plan for transportation at discharge:      Financial    Payor: Shelbi Lezama / Plan: Dulce Bernard PPO / Product Type: Medicare /     Does insurance require precert for SNF: Yes    Potential assistance Purchasing Medications: No  [id number] [address] Rigoberto Garcia [phone]  [place]  [address] [id number]  Phone: [phone] Fax: [phone]      Notes:    Factors facilitating achievement of predicted outcomes: Family support, Caregiver support, Cooperative, Pleasant, Good insight into deficits, Has needed Durable Medical Equipment at home, and Home is wheelchair accessible    Barriers to discharge: Medication managment    Additional Case Management Notes: CM spoke with patient via phone due to COVID R/O isolation. Patient is from [address] [place] [address] at [address] [place] [address], she has home O2 prior to admission that she wears continuously, 3L with rest and 6L with activity. Patient reports that she has a walker and wheelchair, the facility takes her to the dining room via wheelchair but in her apartment she uses a walker to get around. Patient plans for return to [place] at DC, agreeable to increased services and home PT/OT if needed at DC. Patient will need PT/OT evals to eval for safe DC plan back to [place] vs need for SNF. CM will continue to follow.     The Plan for Transition of Care is related to the following treatment goals of Shortness of breath [R06.02]  Chronic interstitial lung disease ([facility] [address] [J84.9]  Acute on chronic respiratory failure with hypoxia (HCC) [C04.20]    IF APPLICABLE: The Patient and/or patient representative Jacki and her family were provided with a choice of provider and agrees with the discharge plan. Freedom of choice list with basic dialogue that supports the patient's individualized plan of care/goals and shares the quality data associated with the providers was provided to: Patient   Patient Representative Name:       The Patient and/or Patient Representative Agree with the Discharge Plan?  Yes    Michelet Putnam RN  Case Management Department  Ph: [phone] Fax: [phone]

## 2023-01-26 NOTE — PROGRESS NOTES
4 Eyes Admission Assessment     I agree as the admission nurse that 2 RN's have performed a thorough Head to Toe Skin Assessment on the patient. ALL assessment sites listed below have been assessed on admission. Areas assessed by both nurses:   [x]   Head, Face, and Ears   [x]   Shoulders, Back, and Chest  [x]   Arms, Elbows, and Hands   [x]   Coccyx, Sacrum, and Ischium  [x]   Legs, Feet, and Heels        Does the Patient have Skin Breakdown?   No         Lonnie Prevention initiated:  No   Wound Care Orders initiated:  No      [facility] nurse consulted for Pressure Injury (Stage 3,4, Unstageable, DTI, NWPT, and Complex wounds) or Lonnie score 18 or lower:  No      Nurse 1 eSignature: Electronically signed by Iris Funez RN on 1/26/23 at 7:45 AM EST    **SHARE this note so that the co-signing nurse is able to place an eSignature**    Nurse 2 eSignature: Electronically signed by Celia Keith RN on 1/26/23 at 7:58 AM EST

## 2023-01-26 NOTE — ED PROVIDER NOTES
ED Attending Attestation Note     Date of evaluation: 1/26/2023    This patient was seen by the resident. I have seen and examined the patient, agree with the workup, evaluation, management and diagnosis. The care plan has been discussed. I have reviewed the ECG and concur with the resident's interpretation. My assessment reveals complaints of shortness of breath. Patient has a history of interstitial lung disease and been having increasing shortness of breath for the last several days. She was seen in the return several days ago and increased from 3 L to 6 L, but patient states she still feels short of breath and now has developed left-sided chest pain. Patient has faint crackles present bilaterally consistent with her ILD. She does have reproducible chest wall tenderness on exam.  We will check laboratory studies, imaging.      Joy Small MD  01/26/23 4062

## 2023-01-26 NOTE — H&P
Internal Medicine  PGY 1  History & Physical      CC : SOB    History Obtained From:  patient    HISTORY OF PRESENT ILLNESS:    Kevin Zarate is a [de-identified] y.o. female w/PMH ILD (NSIP) on home supplemental O2 (3 L rest, 6 L movement), HTN, DM, Hyperparathyroidism, HLD, GERD, OA, who presented with cc worsening SOB. Patient reports having a baseline supplemental oxygenation requirement at 3 L at rest and 6 L at movement but reports having a worsening of her SOB having desaturations of her oxygenation to the 65s-70s% on 6 L at movement. Patient reports having to rest with improvements of her saturations to the 90s%. She reports that this is the first time that she is having this issue and reports that about 5-6 months ago having to wear her supplemental oxygenation NC even at night. She reports having associated increased fatigue and falling asleep frequently but for the past 6 months. Additionally, patient reports having headaches that have been acute, without dizziness, falls, CP. Patient reports pain at the site of her accessory muscles. Patient does endorse having body aches, chills, sweats, rhinorrhea, and a sore throat in addition to dry cough. Patient was seen on 1/23/23 for SOB with desaturations and hypoxia on exertion with CTPA negative for PE and pulm consulted. Patient was d/c. She presented to the ED from her assisted living facility of [address] [place] [address] via EMS. EMS reported that the patient was with hypoxia satting at the 60s-70s%  and placed on 10 L for saturation improvement. At the ED, patient presented with VSSsaturating in the 97%. ECG was NSR and CXR was with extensive chronic interstitial fibrosis and no gross acute abnormality. Lab analysis revealed leukocytosis at 13.3, anemia w/Hb at 10.3, mild hyponatremia at 134, hyperglycemia at 216, at troponin negative. LA was at 0.9 and VBG was at pH 7.341/pCO2 45.5/HCO3 24.6.  Patient received 60 mg prednisone and subsequently admitted for further workup.   Past Medical History:        Diagnosis Date    Diabetes mellitus ([address]     Essential hypertension, benign     GERD (gastroesophageal reflux disease)     Hyperlipidemia     Interstitial lung disease (HCC)     Osteoarthrosis, unspecified whether generalized or localized, unspecified site     osteoarthritis lower leg    Osteopenia     Shingles        Past Surgical History:        Procedure Laterality Date    ANKLE SURGERY Right 04/01/2013    torn tendon    BREAST BIOPSY      unsure of which breast    BRONCHOSCOPY N/A 08/01/2019    BRONCHOSCOPY WITH BAL AND TRANSBRONCHIAL BIOPSIES performed by Africa Corral MD at [place] [address]  10/11/2010    Dr. Kamille Miguel , polyps and diverticulosis    COLONOSCOPY  11/11/2002    Dr. Vladislav Jacinto, Diverticulosis    COLONOSCOPY  04/20/2015    Dr. Kamille Miguel- diverticulosis, sessile polyp, 5 years     COLONOSCOPY  04/13/2016    Dr Nanci Salas; Diverticulosis    COLONOSCOPY  11/28/2016    Dr Kamille Miguel,     CYSTOSCOPY Left 02/24/2022    CYSTOSCOPY URETERAL STENT INSERTION performed by Danny Hernandez MD at [address] [place] [address] Drive Right 07/15/2020    PHACOEMULSIFICATION OF CATARACT RIGHT EYE WITH INTRAOCULAR LENS IMPLANT performed by Florence Liz MD at [address] [place] [address],  [address] Bilateral 09/01/2020    BILATERAL LUMBAR THREE, LUMBAR FOUR, LUMBAR FIVE RADIOFREQUENCY ABLATION SITE CONFIRMED BY FLUOROSCOPY performed by Christopher Babcock MD at [address] [place] [address] Bilateral 06/23/2020    BILATERAL LUMBAR FOUR TRANSFORAMINAL EPIDURAL STEROID INJECTION SITE CONFIRMED BY FLUOROSCOPY performed by Christopher Babcock MD at [address] [place] [address] Bilateral 07/07/2020    BILATERAL LUMBAR FOUR TRANSFORAMINAL EPIDURAL STEROID INJECTION SITE CONFIRMED BY FLUOROSCOPY performed by Christopher Babcock MD at [address] [place] [address] Bilateral 08/04/2020    BILATERAL LUMBAR THREE, LUMBAR FOUR, LUMBAR FIVE DORSAL RAMUS MEDIAL BRANCH BLOCK SITE CONFIRMED BY FLUOROSCOPY performed by Leonard Jimenes MD at [address] Bilateral 08/18/2020    BILATERAL LUMBAR THREE, LUMBAR FOUR, LUMBAR FIVE DORSAL RAMUS MEDIAL BRANCH BLOCK SITE CONFIR,ED BY FLUOROSCOPY performed by Leonard Jimenes MD at [facility] 7 Right     UPPER GASTROINTESTINAL ENDOSCOPY  08/17/2011    Dr. Tj Villanueva - moderate gastritis , hiatal hernia     UPPER GASTROINTESTINAL ENDOSCOPY  04/20/2015    Dr. Tj Villanueva - polyps removed, diverticulosis, follow up in 5 years    [facility]  08/16/2012    DR. Ramos - with mesh     No current facility-administered medications on file prior to encounter. Current Outpatient Medications on File Prior to Encounter   Medication Sig Dispense Refill    tiZANidine (ZANAFLEX) 2 MG tablet Take 1 tablet by mouth 3 times daily as needed (muscle pain) 30 tablet 0    benzonatate (TESSALON) 100 MG capsule Take 1-2 capsules by mouth 3 times daily as needed for Cough 60 capsule 2    esomeprazole (NEXIUM) 40 MG delayed release capsule Take 1 capsule by mouth every morning (before breakfast) 90 capsule 1    Blood Pressure Monitor RYDRE Use as directed for blood pressure monitoring 1 each 0    glucose monitoring (FREESTYLE FREEDOM) kit 1 kit by Does not apply route daily 1 kit 0    Lancets MISC 1 each by Does not apply route daily 100 each 3    blood glucose monitor strips Test 1 times a day & as needed for symptoms of irregular blood glucose. Dispense sufficient amount for indicated testing frequency plus additional to accommodate PRN testing needs.  100 strip 3    cinacalcet (SENSIPAR) 30 MG tablet Take 90 mg by mouth daily      rosuvastatin (CRESTOR) 40 MG tablet TAKE ONE TABLET BY MOUTH DAILY 90 tablet 1    escitalopram (LEXAPRO) 20 MG tablet TAKE ONE TABLET BY MOUTH DAILY 90 tablet 1    amLODIPine (NORVASC) 10 MG tablet TAKE ONE TABLET BY MOUTH DAILY (Patient taking differently: 5 mg TAKE ONE TABLET BY MOUTH DAILY) 90 tablet 1    ciclopirox (PENLAC) 8 % solution Apply to adjacent skin and affected nails daily. Remove with alcohol every 7 days. 6 mL 0    lidocaine (LIDODERM) 5 % Place 1 patch onto the skin daily as needed for Pain 12 hours on, 12 hours off.      cyanocobalamin 1000 MCG tablet Take 1,000 mcg by mouth daily      Cholecalciferol (VITAMIN D3) 25 MCG (1000 UT) CAPS TAKE ONE CAPSULE BY MOUTH DAILY 90 capsule 1    Probiotic Product (PROBIOTIC ACIDOPHILUS BIOBEADS) CAPS Take 1 capsule by mouth daily 90 capsule 0    vitamin E 1000 UNITS capsule Take 1,000 Units by mouth daily. Allergies:  Oxycodone and Pravastatin    Social History:   TOBACCO:   reports that she has never smoked. She has never used smokeless tobacco.  ETOH:   reports current alcohol use of about 7.0 standard drinks per week. DRUGS :   Patient currently lives in an assisted living environment    Family History:       Problem Relation Age of Onset    Heart Disease Mother     Rheum Arthritis Mother     Heart Disease Father        Review of Systems    ROS: A 10 point review of systems was conducted, significant findings as noted in HPI. All other systems were reviewed and were negative    Physical Exam  Constitutional:       Appearance: Normal appearance. HENT:      Mouth/Throat:      Mouth: Mucous membranes are moist.      Pharynx: Oropharynx is clear. Eyes:      General:         Right eye: No discharge. Left eye: No discharge. Extraocular Movements: Extraocular movements intact. Conjunctiva/sclera: Conjunctivae normal.      Pupils: Pupils are equal, round, and reactive to light. Neck:      Vascular: No carotid bruit. Cardiovascular:      Rate and Rhythm: Normal rate and regular rhythm. Pulmonary:      Breath sounds: Rales present. No wheezing or rhonchi.       Comments: End inspiratory crackles, some increased respiratory effort  Chest:      Chest wall: Tenderness (left sided; under breast) present. Abdominal:      General: There is no distension. Palpations: Abdomen is soft. Tenderness: There is no abdominal tenderness. There is no guarding. Musculoskeletal:      Cervical back: Normal range of motion. Right lower leg: No edema. Left lower leg: No edema. Lymphadenopathy:      Cervical: No cervical adenopathy. Skin:     General: Skin is warm and dry. Neurological:      General: No focal deficit present. Mental Status: She is alert and oriented to person, place, and time. Psychiatric:         Mood and Affect: Mood normal.     Physical exam:       Vitals:    01/26/23 0200   BP: (!) 119/59   Pulse: 67   Resp: 28   SpO2: 99%       DATA:    Labs:  CBC:   Recent Labs     01/23/23  1145 01/26/23  0023   WBC 10.8 13.3*   HGB 10.8* 10.3*   HCT 32.1* 30.9*    220       BMP:   Recent Labs     01/23/23  1145 01/26/23  0023   * 134*   K 4.4 4.4    100   CO2 24 24   BUN 22* 27*   CREATININE 0.9 1.2   GLUCOSE 166* 216*     LFT's: No results for input(s): AST, ALT, ALB, BILITOT, ALKPHOS in the last 72 hours. Troponin:   Recent Labs     01/23/23  1145 01/26/23  0023   TROPONINI <0.01 <0.01         XR CHEST PORTABLE   Final Result      Extensive chronic interstitial fibrosis. No gross acute abnormality on portable chest radiograph. Consider chest CT for added sensitivity/specificity.                     ASSESSMENT AND PLAN:  Sherley Stanford is a [de-identified] y.o. with pertinent PMH ILD with recent rhinorrhea/sore throat/muscle aches/chills/sweats/ + SOB whose signs and symptoms are consistent with:    #Known NSIP ILD   -sees Dr. Teena Alcantara o/p for pulm  -s/p steroids at presentation  -on home O2: 3 L at rest, 6 L upon exertion  -with increased oxygen requirement at presentation likely 2/2 superimposing infectious etiology given symptoms and presentation  -pulmonology consulted, appreciate recs    #with Superimposed likely Viral URI Infection  #Possible CAP  CXR at presentation  W/respiratory fatigue  Monitor for worsening of respiratory status  Negative: COVID/influenza  Pending: respi mini, legionella, strep, RSV  Initiate empirically with abx at this time: ceftriaxone + azithromycin    Chronic condition w/home meds resumed:  #Hyperparathyroidism  #HTN  #DM2  -LDSS  -POCT  -hypoglycemia protocol  -monitor  #HLD  #GERD  #OA         Will discuss with attending physician Dr. Lg Crouch MD    Code Status:Full code  FEN:   PPX:   DISPO: Cara Escoto MD  1/26/2023,  3:00 AM    Addendum to Resident H& P/Progress note:  I have personally seen,examined and evaluated the patient.  I have reviewed the current history, physical findings, labs and assessment and plan and agree with note as documented by resident MD ( Jimmy Vides)      Meghan Chamorro MD, [address]

## 2023-01-26 NOTE — ED PROVIDER NOTES
[address] [facility] RESIDENT NOTE       Date of evaluation: 1/26/2023    Chief Complaint     Shortness of Breath (Patient was seen Monday for shortness of breath. She normally wears 3L NC and was told to increase to 6L NC and follow up. Patient reports that this episode of shortness of breath started Tuesday night. SPO2 on NRB upon arrival 100%. Patient switched to 6L NC and SPO2 97-98%. Patient denies any falls. )      History of Present Illness     Jacki Rollins is a [de-identified] y.o. female with a complex past medical history including interstitial lung disease previously on 3 L at rest who presents for shortness of breath. Patient was seen in the emergency department on 1/23/2023 for desaturation and hypoxia with exertion down to the 60s to 70s. After a full work-up including CTPA that was negative for PE and consultation with pulmonology, she was discharged to remain on her baseline 3 L at rest and increased to 6 L with exertion. She was discharged to home and reports that she has had a continued functional decline since that time. No acute worsening of symptoms but she has had continued shortness of breath worsening at rest.  No lower extremity swelling, no fevers, chills, she has had a mild dry hacking cough but no productivity and no hemoptysis. No significant chest pain. No nausea, vomiting or abdominal pain. Has not yet been seen by pulmonology outpatient. She has had some pain under the lower left breast for which she is using Lidoderm patches and ibuprofen, seen by her internal medicine doctor yesterday. No recent falls. EMS reports that the patient was found to be 60 to 70% on 6 L and required 10 L to restore saturations to the appropriate level. Other than stated above, no additional aggravating or alleviating factors are noted. Review of Systems     Positive for shortness of breath, cough.   Negative for fevers, chills, nausea, vomiting, abdominal pain, numbness, weakness, fall, headache. All other systems reviewed and are negative except as mentioned in HPI. Past Medical, Surgical, Family, and Social History     She has a past medical history of Diabetes mellitus ([facility] [address] Essential hypertension, benign, GERD (gastroesophageal reflux disease), Hyperlipidemia, Interstitial lung disease ([facility] [address] Osteoarthrosis, unspecified whether generalized or localized, unspecified site, Osteopenia, and Shingles. She has a past surgical history that includes Colonoscopy (10/11/2010); Upper gastrointestinal endoscopy (08/17/2011); shoulder surgery (Right); Colonoscopy (11/11/2002); ventral hernia repair (08/16/2012); Ankle surgery (Right, 04/01/2013); Upper gastrointestinal endoscopy (04/20/2015); Colonoscopy (04/20/2015); Colonoscopy (04/13/2016); Colonoscopy (11/28/2016); bronchoscopy (N/A, 08/01/2019); Pain management procedure (Bilateral, 06/23/2020); Pain management procedure (Bilateral, 07/07/2020); Intracapsular cataract extraction (Right, 07/15/2020); Pain management procedure (Bilateral, 08/04/2020); Pain management procedure (Bilateral, 08/18/2020); Nerve Surgery (Bilateral, 09/01/2020); Cystoscopy (Left, 02/24/2022); and Breast biopsy. Her family history includes Heart Disease in her father and mother; Rheum Arthritis in her mother. She reports that she has never smoked. She has never used smokeless tobacco. She reports current alcohol use of about 1.0 standard drink per week. She reports that she does not use drugs. Medications     Previous Medications    AMLODIPINE (NORVASC) 10 MG TABLET    TAKE ONE TABLET BY MOUTH DAILY    BENZONATATE (TESSALON) 100 MG CAPSULE    Take 1-2 capsules by mouth 3 times daily as needed for Cough    BLOOD GLUCOSE MONITOR STRIPS    Test 1 times a day & as needed for symptoms of irregular blood glucose. Dispense sufficient amount for indicated testing frequency plus additional to accommodate PRN testing needs.     BLOOD PRESSURE MONITOR RYDER    Use as directed for blood pressure monitoring    CHOLECALCIFEROL (VITAMIN D3) 25 MCG (1000 UT) CAPS    TAKE ONE CAPSULE BY MOUTH DAILY    CICLOPIROX (PENLAC) 8 % SOLUTION    Apply to adjacent skin and affected nails daily. Remove with alcohol every 7 days. CINACALCET (SENSIPAR) 30 MG TABLET    Take 90 mg by mouth daily    CYANOCOBALAMIN 1000 MCG TABLET    Take 1,000 mcg by mouth daily    ESCITALOPRAM (LEXAPRO) 20 MG TABLET    TAKE ONE TABLET BY MOUTH DAILY    ESOMEPRAZOLE (NEXIUM) 40 MG DELAYED RELEASE CAPSULE    Take 1 capsule by mouth every morning (before breakfast)    GLUCOSE MONITORING (FREESTYLE FREEDOM) KIT    1 kit by Does not apply route daily    LANCETS MISC    1 each by Does not apply route daily    LIDOCAINE (LIDODERM) 5 %    Place 1 patch onto the skin daily as needed for Pain 12 hours on, 12 hours off. PROBIOTIC PRODUCT (PROBIOTIC ACIDOPHILUS BIOBEADS) CAPS    Take 1 capsule by mouth daily    ROSUVASTATIN (CRESTOR) 40 MG TABLET    TAKE ONE TABLET BY MOUTH DAILY    TIZANIDINE (ZANAFLEX) 2 MG TABLET    Take 1 tablet by mouth 3 times daily as needed (muscle pain)    VITAMIN E 1000 UNITS CAPSULE    Take 1,000 Units by mouth daily. Allergies     She is allergic to oxycodone and pravastatin. Physical Exam     INITIAL VITALS: BP: 132/68,  , Heart Rate: 70, Resp: 20, SpO2: 97 %   General:  Well appearing. No acute distress  Eyes:  PERRL. No discharge from eyes   ENT:  No discharge from nose. OP clear  Neck:  Supple, trachea midline  Pulmonary: Tachypneic, mildly shallow breathing, crackles present bilaterally  Cardiac:  Regular rate and rhythm. No murmurs  Abdomen:  Soft. Non-distended. Non-tender. Musculoskeletal:  No long bone deformity. Vascular:  Extremities warm and perfused. Normal pulses in all 4 extremities  Skin:  Dry, no rashes  Extremities:  No peripheral edema  Neuro: Alert. Moves all four extremities to command. Sensation grossly intact to light touch.  Speech and mentation normal. No focal deficit.     Diagnostic Results     EKG   Interpreted in conjunction with emergency department physician Evin Patricio MD  Rhythm: normal sinus   Rate: normal  Axis: normal  Ectopy: none  Conduction: normal  ST Segments: no acute change  T Waves:no acute change  Q Waves: none  Clinical Impression: no acute changes unchanged from 2/23/22    RADIOLOGY:  XR CHEST PORTABLE   Final Result      Extensive chronic interstitial fibrosis. No gross acute abnormality on portable chest radiograph. Consider chest CT for added sensitivity/specificity.                LABS:   Results for orders placed or performed during the hospital encounter of 01/26/23   CBC with Auto Differential   Result Value Ref Range    WBC 13.3 (H) 4.0 - 11.0 K/uL    RBC 3.47 (L) 4.00 - 5.20 M/uL    Hemoglobin 10.3 (L) 12.0 - 16.0 g/dL    Hematocrit 30.9 (L) 36.0 - 48.0 %    MCV 89.1 80.0 - 100.0 fL    MCH 29.5 26.0 - 34.0 pg    MCHC 33.1 31.0 - 36.0 g/dL    RDW 13.8 12.4 - 15.4 %    Platelets 220 135 - 450 K/uL    MPV 7.7 5.0 - 10.5 fL    Neutrophils % 85.6 %    Lymphocytes % 5.5 %    Monocytes % 6.4 %    Eosinophils % 2.1 %    Basophils % 0.4 %    Neutrophils Absolute 11.4 (H) 1.7 - 7.7 K/uL    Lymphocytes Absolute 0.7 (L) 1.0 - 5.1 K/uL    Monocytes Absolute 0.8 0.0 - 1.3 K/uL    Eosinophils Absolute 0.3 0.0 - 0.6 K/uL    Basophils Absolute 0.0 0.0 - 0.2 K/uL   BMP w/ Reflex to MG   Result Value Ref Range    Sodium 134 (L) 136 - 145 mmol/L    Potassium reflex Magnesium 4.4 3.5 - 5.1 mmol/L    Chloride 100 99 - 110 mmol/L    CO2 24 21 - 32 mmol/L    Anion Gap 10 3 - 16    Glucose 216 (H) 70 - 99 mg/dL    BUN 27 (H) 7 - 20 mg/dL    Creatinine 1.2 0.6 - 1.2 mg/dL    Est, Glom Filt Rate 46 (A) >60    Calcium 10.3 8.3 - 10.6 mg/dL   Troponin   Result Value Ref Range    Troponin <0.01 <0.01 ng/mL   Brain Natriuretic Peptide   Result Value Ref Range    Pro- (H) 0 - 449 pg/mL   Lactic Acid   Result Value Ref Range     Lactic Acid 0.9 0.4 - 2.0 mmol/L   Blood gas, venous (Lab)   Result Value Ref Range    pH, Diaz 7.341 (L) 7.350 - 7.450    pCO2, Diaz 45.5 41.0 - 51.0 mmHg    pO2, Diaz 31.8 25.0 - 40.0 mmHg    HCO3, Venous 24.6 24.0 - 28.0 mmol/L    Base Excess, Diaz -1.0 -2.0 - 3.0 mmol/L    O2 Sat, Diaz 54 Not established %    Carboxyhemoglobin 1.8 (H) 0.0 - 1.5 %    MetHgb, Diaz 0.2 0.0 - 1.5 %    TC02 (Calc), Diaz 26 mmol/L    Hemoglobin, Diaz, Reduced 45.00 %       RECENT VITALS:  BP: 132/68,  , Heart Rate: 70,Resp: 20, SpO2: 97 %     Procedures     ED Course     Nursing Notes, Past Medical Hx, Past Surgical Hx, Social Hx, Allergies, and Family Hx were reviewed. The patient was given the followingmedications:  Orders Placed This Encounter   Medications    predniSONE (DELTASONE) tablet 60 mg       CONSULTS:  Jenny 28 / ASSESSMENT / Paul Tillman is a [de-identified] y.o. female with a history and presentation as described above in HPI. The patient was evaluated by myself and the ED Attending Physician, Dr. Molly Prado. All management and disposition plans were discussed and agreed upon. Upon presentation, the patient was well appearing and had a normal blood pressure, heart rate, it was afebrile and was able to be weaned to 6 L of oxygen maintaining appropriate saturations. Work-up was reviewed from last time. CTPA was negative for PE. Pulmonary was consulted and in their note recommended concern for deconditioning of the patient a potential need for pulmonary rehab. Lab work-up in general was noncontributory, VBG was without significant hypercarbia, BMP unremarkable. Troponin was less than 0.01, lactate was negative and BNP was 486 from 567 3 days ago. She does have a mild leukocytosis compared to prior at 13.3 with a stable hemoglobin. No significant left shift. Chest x-ray appears stable from prior with no new infiltrate.     Given patient is now requiring 6 L at rest with no ability to increase or rescue at home, will admit for ILD exacerbation and further management. In discussion with the admitting attending, they recommend initiating 60 mg of prednisone which is ordered to start now. Medications received during this ED visit:    Medications   predniSONE (DELTASONE) tablet 60 mg (has no administration in time range)       Clinical Impression     1. Chronic interstitial lung disease ([facility] [address]    2. Acute on chronic respiratory failure with hypoxia (HCC)        Disposition     PATIENT REFERRED TO:  No follow-up provider specified. DISCHARGE MEDICATIONS:  New Prescriptions    No medications on file       DISPOSITION Decision To Admit 01/26/2023 01:13:35 AM  Admit: At this time the patient has been admitted to medicine for further evaluation and management of ILD. The patient will continue to be monitored here in the emergency department until which time she is moved to her new treatment location.  Discussed with admitting team.       Mary Jane Nagel MD  Resident  01/26/23 5426

## 2023-01-26 NOTE — PROGRESS NOTES
01/26/23 1407   Encounter Summary   Encounter Overview/Reason  Initial Encounter;Rituals, Rites and Sacraments   Service Provided For: Patient and family together   Referral/Consult From: [address] [facility] [address] Children;Family members   Last Encounter  01/26/23  (hc, sos fr cm)   Complexity of Encounter Moderate   Begin Time 1340   End Time  1355   Total Time Calculated 15 min   Encounter    Type Initial Screen/Assessment   Spiritual/Emotional needs   Type Spiritual Support   Rituals, Rites and Sacraments   Type Sacrament of Sick; Anointing;[place] Communion   Grief, Loss, and Adjustments   Type Adjustment to illness   Assessment/Intervention/Outcome   Assessment Calm;Coping; Hopeful   Intervention Active listening;Guided Imagery, Healing touch, etc.   Outcome Coping;Expressed Gratitude

## 2023-01-27 ENCOUNTER — APPOINTMENT (OUTPATIENT)
Dept: GENERAL RADIOLOGY | Age: 83
End: 2023-01-27
Payer: MEDICARE

## 2023-01-27 PROBLEM — R07.9 LEFT-SIDED CHEST PAIN: Status: ACTIVE | Noted: 2023-01-27

## 2023-01-27 LAB
ANION GAP SERPL CALCULATED.3IONS-SCNC: 8 MMOL/L (ref 3–16)
BASOPHILS ABSOLUTE: 0 K/UL (ref 0–0.2)
BASOPHILS RELATIVE PERCENT: 0.2 %
BUN BLDV-MCNC: 29 MG/DL (ref 7–20)
C-REACTIVE PROTEIN: 192 MG/L (ref 0–5.1)
CALCIUM SERPL-MCNC: 11.1 MG/DL (ref 8.3–10.6)
CHLORIDE BLD-SCNC: 104 MMOL/L (ref 99–110)
CO2: 25 MMOL/L (ref 21–32)
CREAT SERPL-MCNC: 1.1 MG/DL (ref 0.6–1.2)
EOSINOPHILS ABSOLUTE: 0 K/UL (ref 0–0.6)
EOSINOPHILS RELATIVE PERCENT: 0.3 %
GFR SERPL CREATININE-BSD FRML MDRD: 51 ML/MIN/{1.73_M2}
GLUCOSE BLD-MCNC: 144 MG/DL (ref 70–99)
GLUCOSE BLD-MCNC: 154 MG/DL (ref 70–99)
GLUCOSE BLD-MCNC: 175 MG/DL (ref 70–99)
GLUCOSE BLD-MCNC: 200 MG/DL (ref 70–99)
GLUCOSE BLD-MCNC: 230 MG/DL (ref 70–99)
GLUCOSE BLD-MCNC: 233 MG/DL (ref 70–99)
HCT VFR BLD CALC: 30 % (ref 36–48)
HEMOGLOBIN: 10.1 G/DL (ref 12–16)
LYMPHOCYTES ABSOLUTE: 1 K/UL (ref 1–5.1)
LYMPHOCYTES RELATIVE PERCENT: 11.4 %
MAGNESIUM: 1.7 MG/DL (ref 1.8–2.4)
MCH RBC QN AUTO: 30.3 PG (ref 26–34)
MCHC RBC AUTO-ENTMCNC: 33.7 G/DL (ref 31–36)
MCV RBC AUTO: 89.7 FL (ref 80–100)
MONOCYTES ABSOLUTE: 0.5 K/UL (ref 0–1.3)
MONOCYTES RELATIVE PERCENT: 6 %
NEUTROPHILS ABSOLUTE: 7.5 K/UL (ref 1.7–7.7)
NEUTROPHILS RELATIVE PERCENT: 82.1 %
PDW BLD-RTO: 13 % (ref 12.4–15.4)
PERFORMED ON: ABNORMAL
PLATELET # BLD: 240 K/UL (ref 135–450)
PMV BLD AUTO: 7.6 FL (ref 5–10.5)
POTASSIUM SERPL-SCNC: 4.8 MMOL/L (ref 3.5–5.1)
RBC # BLD: 3.34 M/UL (ref 4–5.2)
SEDIMENTATION RATE, ERYTHROCYTE: 45 MM/HR (ref 0–30)
SODIUM BLD-SCNC: 137 MMOL/L (ref 136–145)
WBC # BLD: 9.2 K/UL (ref 4–11)

## 2023-01-27 PROCEDURE — 86140 C-REACTIVE PROTEIN: CPT

## 2023-01-27 PROCEDURE — 85652 RBC SED RATE AUTOMATED: CPT

## 2023-01-27 PROCEDURE — 97161 PT EVAL LOW COMPLEX 20 MIN: CPT

## 2023-01-27 PROCEDURE — 94640 AIRWAY INHALATION TREATMENT: CPT

## 2023-01-27 PROCEDURE — 94150 VITAL CAPACITY TEST: CPT

## 2023-01-27 PROCEDURE — 99232 SBSQ HOSP IP/OBS MODERATE 35: CPT | Performed by: INTERNAL MEDICINE

## 2023-01-27 PROCEDURE — 97535 SELF CARE MNGMENT TRAINING: CPT

## 2023-01-27 PROCEDURE — 6370000000 HC RX 637 (ALT 250 FOR IP): Performed by: PHYSICIAN ASSISTANT

## 2023-01-27 PROCEDURE — 97116 GAIT TRAINING THERAPY: CPT

## 2023-01-27 PROCEDURE — 85025 COMPLETE CBC W/AUTO DIFF WBC: CPT

## 2023-01-27 PROCEDURE — 97530 THERAPEUTIC ACTIVITIES: CPT

## 2023-01-27 PROCEDURE — 6370000000 HC RX 637 (ALT 250 FOR IP)

## 2023-01-27 PROCEDURE — 2580000003 HC RX 258

## 2023-01-27 PROCEDURE — 6360000002 HC RX W HCPCS

## 2023-01-27 PROCEDURE — 99232 SBSQ HOSP IP/OBS MODERATE 35: CPT | Performed by: HOSPITALIST

## 2023-01-27 PROCEDURE — 80048 BASIC METABOLIC PNL TOTAL CA: CPT

## 2023-01-27 PROCEDURE — 36415 COLL VENOUS BLD VENIPUNCTURE: CPT

## 2023-01-27 PROCEDURE — 97166 OT EVAL MOD COMPLEX 45 MIN: CPT

## 2023-01-27 PROCEDURE — 71100 X-RAY EXAM RIBS UNI 2 VIEWS: CPT

## 2023-01-27 PROCEDURE — 83735 ASSAY OF MAGNESIUM: CPT

## 2023-01-27 PROCEDURE — 94761 N-INVAS EAR/PLS OXIMETRY MLT: CPT

## 2023-01-27 PROCEDURE — 6360000002 HC RX W HCPCS: Performed by: PHYSICIAN ASSISTANT

## 2023-01-27 PROCEDURE — 2060000000 HC ICU INTERMEDIATE R&B

## 2023-01-27 RX ORDER — AZITHROMYCIN 250 MG/1
500 TABLET, FILM COATED ORAL DAILY
Status: COMPLETED | OUTPATIENT
Start: 2023-01-28 | End: 2023-01-28

## 2023-01-27 RX ORDER — ENOXAPARIN SODIUM 100 MG/ML
40 INJECTION SUBCUTANEOUS DAILY
Status: DISCONTINUED | OUTPATIENT
Start: 2023-01-27 | End: 2023-01-29 | Stop reason: HOSPADM

## 2023-01-27 RX ORDER — KETOROLAC TROMETHAMINE 15 MG/ML
15 INJECTION, SOLUTION INTRAMUSCULAR; INTRAVENOUS ONCE
Status: COMPLETED | OUTPATIENT
Start: 2023-01-27 | End: 2023-01-27

## 2023-01-27 RX ORDER — AMLODIPINE BESYLATE 10 MG/1
10 TABLET ORAL DAILY
Status: DISCONTINUED | OUTPATIENT
Start: 2023-01-27 | End: 2023-01-29 | Stop reason: HOSPADM

## 2023-01-27 RX ADMIN — ACETAMINOPHEN 650 MG: 325 TABLET ORAL at 23:02

## 2023-01-27 RX ADMIN — CEFTRIAXONE 1000 MG: 1 INJECTION, POWDER, FOR SOLUTION INTRAMUSCULAR; INTRAVENOUS at 05:19

## 2023-01-27 RX ADMIN — KETOROLAC TROMETHAMINE 15 MG: 15 INJECTION, SOLUTION INTRAMUSCULAR; INTRAVENOUS at 09:48

## 2023-01-27 RX ADMIN — AZITHROMYCIN MONOHYDRATE 500 MG: 500 INJECTION, POWDER, LYOPHILIZED, FOR SOLUTION INTRAVENOUS at 13:04

## 2023-01-27 RX ADMIN — SODIUM CHLORIDE, PRESERVATIVE FREE 10 ML: 5 INJECTION INTRAVENOUS at 19:55

## 2023-01-27 RX ADMIN — SODIUM CHLORIDE: 9 INJECTION, SOLUTION INTRAVENOUS at 13:03

## 2023-01-27 RX ADMIN — ENOXAPARIN SODIUM 40 MG: 100 INJECTION SUBCUTANEOUS at 09:48

## 2023-01-27 RX ADMIN — ACETAMINOPHEN 650 MG: 325 TABLET ORAL at 01:12

## 2023-01-27 RX ADMIN — BENZONATATE 100 MG: 100 CAPSULE ORAL at 22:11

## 2023-01-27 RX ADMIN — SODIUM CHLORIDE 25 ML: 9 INJECTION, SOLUTION INTRAVENOUS at 05:19

## 2023-01-27 RX ADMIN — SODIUM CHLORIDE, PRESERVATIVE FREE 10 ML: 5 INJECTION INTRAVENOUS at 09:46

## 2023-01-27 RX ADMIN — INSULIN LISPRO 2 UNITS: 100 INJECTION, SOLUTION INTRAVENOUS; SUBCUTANEOUS at 13:04

## 2023-01-27 RX ADMIN — BENZONATATE 100 MG: 100 CAPSULE ORAL at 05:14

## 2023-01-27 RX ADMIN — AMLODIPINE BESYLATE 10 MG: 10 TABLET ORAL at 11:15

## 2023-01-27 ASSESSMENT — PAIN DESCRIPTION - ORIENTATION
ORIENTATION: LEFT
ORIENTATION: LEFT

## 2023-01-27 ASSESSMENT — PAIN DESCRIPTION - DESCRIPTORS
DESCRIPTORS: ACHING
DESCRIPTORS: ACHING

## 2023-01-27 ASSESSMENT — PAIN SCALES - GENERAL
PAINLEVEL_OUTOF10: 5
PAINLEVEL_OUTOF10: 8

## 2023-01-27 ASSESSMENT — PAIN DESCRIPTION - ONSET: ONSET: ON-GOING

## 2023-01-27 ASSESSMENT — PAIN DESCRIPTION - LOCATION
LOCATION: ABDOMEN;CHEST;BREAST
LOCATION: ABDOMEN;CHEST;BREAST

## 2023-01-27 ASSESSMENT — PAIN DESCRIPTION - PAIN TYPE: TYPE: ACUTE PAIN

## 2023-01-27 ASSESSMENT — PAIN - FUNCTIONAL ASSESSMENT
PAIN_FUNCTIONAL_ASSESSMENT: ACTIVITIES ARE NOT PREVENTED
PAIN_FUNCTIONAL_ASSESSMENT: ACTIVITIES ARE NOT PREVENTED

## 2023-01-27 ASSESSMENT — PAIN DESCRIPTION - FREQUENCY: FREQUENCY: CONTINUOUS

## 2023-01-27 NOTE — PROGRESS NOTES
Pharmacist Review and Automatic Dose Adjustment of Prophylactic Enoxaparin    The reviewing pharmacist has made an adjustment to the ordered enoxaparin dose or converted to UFH per the approved [facility] protocol and table as defined below. Plan / Rationale: Based upon the patient's weight and renal function, the ordered dose of 30mg SC daily has been converted to 40 mg SC daily. Thank you,  Gunner Reilly  1/27/2023, 9:45 AM      Melanie Christian is a [de-identified] y.o. female. Recent Labs     01/26/23  0023 01/27/23  0452   CREATININE 1.2 1.1       Estimated Creatinine Clearance: 32 mL/min (based on SCr of 1.1 mg/dL). Recent Labs     01/26/23 0023 01/27/23  0453   HGB 10.3* 10.1*   HCT 30.9* 30.0*    240     No results for input(s): INR in the last 72 hours.     Height:   Ht Readings from Last 1 Encounters:   01/26/23 5' 2\" (1.575 m)     Weight:  Wt Readings from Last 1 Encounters:   01/27/23 125 lb 7.1 oz (56.9 kg)

## 2023-01-27 NOTE — CARE COORDINATION
CM continues to follow for DC planning and needs. Spoke with patient and son at bedside this AM. Patient having increased rib pain and plan for better pain control prior to DC. Son reports he is out of town this weekend, but his wife is available to transport patient at [place], they have a portable tank to use to take patient back to [place] at [facility]. Family and patient agreeable to [address] at [place], no preference, CM gave referral to [facility] to follow for new [facility] needs at [place]. Patient will also need referral to OP Pulmonary Rehab for maintenance therapy at [place]. Per MD rounds, possible weekend DC.     Thank you,  Yamileth Martines RN,   4th Floor Progressive Care Unit  [phone]

## 2023-01-27 NOTE — PLAN OF CARE
Problem: Pain  Goal: Verbalizes/displays adequate comfort level or baseline comfort level  1/27/2023 1013 by Amee Chávez RN  Outcome: Not Progressing  Flowsheets (Taken 1/26/2023 2335 by Medardo Williamson RN)  Verbalizes/displays adequate comfort level or baseline comfort level:   Encourage patient to monitor pain and request assistance   Assess pain using appropriate pain scale   Administer analgesics based on type and severity of pain and evaluate response   Implement non-pharmacological measures as appropriate and evaluate response   Notify Licensed Independent Practitioner if interventions unsuccessful or patient reports new pain  Note: Patient had Tylenol ordered for pain. Addressed this with the residents who are on patients case.  Toradol - one time ordered for 10/10 pain  1/26/2023 2335 by Medardo Williamson RN  Outcome: Progressing  Flowsheets (Taken 1/26/2023 2335)  Verbalizes/displays adequate comfort level or baseline comfort level:   Encourage patient to monitor pain and request assistance   Assess pain using appropriate pain scale   Administer analgesics based on type and severity of pain and evaluate response   Implement non-pharmacological measures as appropriate and evaluate response   Notify Licensed Independent Practitioner if interventions unsuccessful or patient reports new pain     Problem: Discharge Planning  Goal: Discharge to home or other facility with appropriate resources  1/27/2023 1013 by Amee Chávez RN  Outcome: Progressing  1/26/2023 2335 by Medardo Williamson RN  Outcome: Progressing  Flowsheets (Taken 1/26/2023 2335)  Discharge to home or other facility with appropriate resources:   Identify barriers to discharge with patient and caregiver   Arrange for needed discharge resources and transportation as appropriate   Identify discharge learning needs (meds, wound care, etc)   Refer to discharge planning if patient needs post-hospital services based on physician order or complex needs related to functional status, cognitive ability or social support system     Problem: Safety - Adult  Goal: Free from fall injury  1/27/2023 1013 by Lynsey Enriquez RN  Outcome: Progressing  1/26/2023 2335 by Sanjana Siddiqui RN  Outcome: Progressing  Flowsheets (Taken 1/26/2023 2335)  Free From Fall Injury: Instruct family/caregiver on patient safety  Note: Educate patient to use call light for assistance. Problem: ABCDS Injury Assessment  Goal: Absence of physical injury  1/27/2023 1013 by Lynsey Enriquez RN  Outcome: Progressing  1/26/2023 2335 by Sanjana Siddiqui RN  Outcome: Progressing  Flowsheets (Taken 1/26/2023 2335)  Absence of Physical Injury: Implement safety measures based on patient assessment  Note: Bed in lowest position, wheels locked, bed alarm & gripper socks on, call light & patient belongings within reach.      Problem: Chronic Conditions and Co-morbidities  Goal: Patient's chronic conditions and co-morbidity symptoms are monitored and maintained or improved  1/27/2023 1013 by Lynsey Enriquez RN  Outcome: Progressing  1/26/2023 2335 by Sanjana Siddiqui RN  Outcome: Progressing  Flowsheets (Taken 1/26/2023 2335)  Care Plan - Patient's Chronic Conditions and Co-Morbidity Symptoms are Monitored and Maintained or Improved:   Monitor and assess patient's chronic conditions and comorbid symptoms for stability, deterioration, or improvement   Collaborate with multidisciplinary team to address chronic and comorbid conditions and prevent exacerbation or deterioration   Update acute care plan with appropriate goals if chronic or comorbid symptoms are exacerbated and prevent overall improvement and discharge     Problem: Pain  Goal: Verbalizes/displays adequate comfort level or baseline comfort level  1/27/2023 1013 by Lynsey Enriquez RN  Outcome: Not Progressing  Flowsheets (Taken 1/26/2023 2335 by Sanjana Siddiqui RN)  Verbalizes/displays adequate comfort level or baseline comfort level: Encourage patient to monitor pain and request assistance   Assess pain using appropriate pain scale   Administer analgesics based on type and severity of pain and evaluate response   Implement non-pharmacological measures as appropriate and evaluate response   Notify Licensed Independent Practitioner if interventions unsuccessful or patient reports new pain  Note: Patient had Tylenol ordered for pain. Addressed this with the residents who are on patients case.  Toradol - one time ordered for 10/10 pain  1/26/2023 2335 by Tata Velazco RN  Outcome: Progressing  Flowsheets (Taken 1/26/2023 2335)  Verbalizes/displays adequate comfort level or baseline comfort level:   Encourage patient to monitor pain and request assistance   Assess pain using appropriate pain scale   Administer analgesics based on type and severity of pain and evaluate response   Implement non-pharmacological measures as appropriate and evaluate response   Notify Licensed Independent Practitioner if interventions unsuccessful or patient reports new pain

## 2023-01-27 NOTE — PROGRESS NOTES
Physical Therapy  Facility/Department: [facility] [address] [facility]  Physical Therapy Initial Assessment / Treatment    Name: Breann Alejandro  : 1940  MRN: [id number]  Date of Service: 2023    Discharge Recommendations: Breann Alejandro scored a 20/24 on the AM-PAC short mobility form. Current research shows that an AM-PAC score of 18 or greater is typically associated with a discharge to the patient's home setting. Based on the patient's AM-PAC score and their current functional mobility deficits, it is recommended that the patient have 2-3 sessions per week of Physical Therapy at d/c to increase the patient's independence. At this time, this patient demonstrates the endurance and safety to discharge home with home services and a follow up treatment frequency of 2-3x/wk. Please see assessment section for further patient specific details. If patient discharges prior to next session this note will serve as a discharge summary. Please see below for the latest assessment towards goals. PT Equipment Recommendations  Equipment Needed: No      Patient Diagnosis(es): The primary encounter diagnosis was Chronic interstitial lung disease ([address] A diagnosis of Acute on chronic respiratory failure with hypoxia (HCC) was also pertinent to this visit. Past Medical History:  has a past medical history of Diabetes mellitus ([address] Essential hypertension, benign, GERD (gastroesophageal reflux disease), Hyperlipidemia, Interstitial lung disease ([address] Osteoarthrosis, unspecified whether generalized or localized, unspecified site, Osteopenia, and Shingles. Past Surgical History:  has a past surgical history that includes Colonoscopy (10/11/2010); Upper gastrointestinal endoscopy (2011); shoulder surgery (Right); Colonoscopy (2002); ventral hernia repair (2012); Ankle surgery (Right, 2013); Upper gastrointestinal endoscopy (2015); Colonoscopy (2015); Colonoscopy (2016);  Colonoscopy (11/28/2016); bronchoscopy (N/A, 08/01/2019); Pain management procedure (Bilateral, 06/23/2020); Pain management procedure (Bilateral, 07/07/2020); Intracapsular cataract extraction (Right, 07/15/2020); Pain management procedure (Bilateral, 08/04/2020); Pain management procedure (Bilateral, 08/18/2020); Nerve Surgery (Bilateral, 09/01/2020); Cystoscopy (Left, 02/24/2022); and Breast biopsy. Assessment   Body Structures, Functions, Activity Limitations Requiring Skilled Therapeutic Intervention: Decreased functional mobility   Assessment: Pt is [de-identified] y.o. female admit with acute on chronic respiratory failure. Pt admit from AL apt and is typically very indep with transfers and amb although activity tolerance is limited due to chronic resp issues. Today, pt requires SBA and use of rolling walker for safety with amb. Fatigues quickly. O2 sats decreased to 82% briefly after amb. Improved to low 90s with seated rest break and min cues for pursed lip breathing. Pt plans to return to AL apt at d/c. Rec pt use rolling walker (pt owns) and home PT. Discussed with . Will follow to maximize endurance. Treatment Diagnosis: impaired functional mobility, decreased endurance  Therapy Prognosis: Good  Decision Making: Low Complexity  Requires PT Follow-Up: Yes     Plan   Physcial Therapy Plan  General Plan:  (2-5x/week)  Current Treatment Recommendations: Functional mobility training, Endurance training, Patient/Caregiver education & training, Therapeutic activities, Gait training  Safety Devices  Type of Devices: Call light within reach, Chair alarm in place, Nurse notified, Gait belt, Left in chair     Restrictions  Position Activity Restriction  Other position/activity restrictions: up with assist     Subjective   Pain: 6/10 L flank pain - nurse informed. General  Chart Reviewed: Yes  Additional Pertinent Hx: Admit 1/26 with SOB; CXR: (+) Extensive chronic interstitial fibrosis;  PMHx: DM, HLD, interstitial lung disease, OA  Referring Practitioner: MD Abdias  Diagnosis: acute on chronic respiratory failure  Subjective  Subjective: Pt found supine in bed. Agrees to PT. Reports 6/10 pain under L breast/L UQ. RN aware - pt received tylenol earlier. Social/Functional History  Social/Functional History  [facility] of [facility]  Home Layout: One level  Home Access: Level entry, Elevator  Bathroom Shower/Tub: Walk-in shower  Bathroom Toilet: Handicap height  Bathroom Equipment: Shower chair, Grab bars in shower, Grab bars around toilet  Bathroom Accessibility: [facility]: Walker, [place], Aisha Marin, 4 wheeled, Oxygen, [place] [address], [address] (reports rollator too big for use in apartment; wearing 3L at home)  ADL Assistance: [address] [place] [address] Avenue:  (does own laundry; does make some meals in her room - \"depending on how I feel\"; dependent - on someone to help w/ grocery shopping (reports \"son doesn't have the patience\" (to help with this)). )  Ambulation Assistance: Independent (no AD in apartment; w/c to dining room)  Transfer Assistance: Independent  Active : No  Patient's  Info: son provides transportation to MD appointments - reports son is wanting to transition to the [facility] to provide the transportation  Additional Comments: to dining room via wheelchair - staff pushes. 3L O2 at home (baseline) - increased to 6L w/ activity (pt/visitor report pt desaturates w/ activity).   Vision/Hearing  Vision  Vision: Within Functional Limits  Hearing  Hearing: Exceptions to [facility]  Hearing Exceptions: Bilateral hearing aid    Cognition   Orientation  Overall Orientation Status: Within Functional Limits  Cognition  Overall Cognitive Status: WFL     Objective               AROM RLE (degrees)  RLE AROM: WFL  AROM LLE (degrees)  LLE AROM : WFL  Strength RLE  Strength RLE: WFL  Strength LLE  Strength LLE: WFL        Bed mobility  Supine to Sit: Modified independent  Scooting: Modified independent  Transfers  Sit to Stand: Stand by assistance  Stand to Sit: Stand by assistance  Ambulation  Device: Rolling Walker  Other Apparatus: O2 (5L)  Assistance: Stand by assistance  Quality of Gait: slow and steady gait, min cues for O2 tubing management, min OLIVER  Gait Deviations: Decreased step length;Decreased step height  Distance: 8 ft; 10 ft; 30 ft     Balance  Sitting - Static: Good  Sitting - Dynamic: Good  Standing - Static: Good  Standing - Dynamic: Fair (SBA with RW)           OutComes Score                                                  AM-PAC Score  AM-PAC Inpatient Mobility Raw Score : 20 (01/27/23 1100)  AM-PAC Inpatient T-Scale Score : 47.67 (01/27/23 1100)  Mobility Inpatient CMS 0-100% Score: 35.83 (01/27/23 1100)  Mobility Inpatient CMS G-Code Modifier : CJ (01/27/23 1100)          T[place]eti Score       Goals  Short Term Goals  Time Frame for Short Term Goals: discharge  Short Term Goal 1: Pt will transfer sit <--> stand with supervision  Short Term Goal 2: Pt will amb 50 ft with walker and supervision  Patient Goals   Patient Goals : return home to AL apt       Education  Patient Education  Education Given To: Patient  Education Provided: Role of Therapy  Education Method: Demonstration  Barriers to Learning: None  Education Outcome: Verbalized understanding      Therapy Time   Individual Concurrent Group Co-treatment   Time In 0810         Time Out 0910         Minutes 60                 Timed Code Treatment Minutes:  45    Total Treatment Minutes:  60    If patient is discharged prior to next treatment, this note will serve as the discharge summary.   Prisca Haines, PT, DPT  [id number]

## 2023-01-27 NOTE — PROGRESS NOTES
Occupational Therapy  Facility/Department: [facility]  Occupational Therapy Initial Assessment and Treatment    Name: Daniel Garcia  : 1940  MRN: [id number]  Date of Service: 2023    Discharge Recommendations:  Daniel Garcia scored a 20/24 on the AM-PAC ADL Inpatient form. Current research shows that an AM-PAC score of 18 or greater is typically associated with a discharge to the patient's home setting. Based on the patient's AM-PAC score, and their current ADL deficits, it is recommended that the patient have 2-3 sessions per week of Occupational Therapy at d/c to increase the patient's independence. At this time, this patient demonstrates the endurance and safety to discharge home with (home services) and a follow up treatment frequency of 2-3x/wk. Please see assessment section for further patient specific details. HOME HEALTH CARE: LEVEL 1 STANDARD    - Initial home health evaluation to occur within 24-48 hours, in patient home   - Therapy to evaluate with goal of regaining prior level of functioning   - Therapy to evaluate if patient has [address] needs for personal care      If patient discharges prior to next session this note will serve as a discharge summary. Please see below for the latest assessment towards goals. OT Equipment Recommendations  Equipment Needed: Yes  Mobility Devices: ADL Assistive Devices  ADL Assistive Devices: Sock-Aid Soft;Long-handled Shoe Horn;Long-handled Sponge       Patient Diagnosis(es): The primary encounter diagnosis was Chronic interstitial lung disease ([address] A diagnosis of Acute on chronic respiratory failure with hypoxia (HCC) was also pertinent to this visit.   Past Medical History:  has a past medical history of Diabetes mellitus ([address] Essential hypertension, benign, GERD (gastroesophageal reflux disease), Hyperlipidemia, Interstitial lung disease ([address] Osteoarthrosis, unspecified whether generalized or localized, unspecified site, Osteopenia, and Shingles. Past Surgical History:  has a past surgical history that includes Colonoscopy (10/11/2010); Upper gastrointestinal endoscopy (08/17/2011); shoulder surgery (Right); Colonoscopy (11/11/2002); ventral hernia repair (08/16/2012); Ankle surgery (Right, 04/01/2013); Upper gastrointestinal endoscopy (04/20/2015); Colonoscopy (04/20/2015); Colonoscopy (04/13/2016); Colonoscopy (11/28/2016); bronchoscopy (N/A, 08/01/2019); Pain management procedure (Bilateral, 06/23/2020); Pain management procedure (Bilateral, 07/07/2020); Intracapsular cataract extraction (Right, 07/15/2020); Pain management procedure (Bilateral, 08/04/2020); Pain management procedure (Bilateral, 08/18/2020); Nerve Surgery (Bilateral, 09/01/2020); Cystoscopy (Left, 02/24/2022); and Breast biopsy. Treatment Diagnosis: impaired ADLs/transfers and decreased functional activity tolerance      Assessment   Performance deficits / Impairments: Decreased functional mobility ; Decreased ADL status; Decreased endurance  Assessment: Presenting from AL apartment w/ c/o SOB. Pt does desaturate w/ activity and tolerating functional home distances, but requires increased time for recovery. Able to perform ADLs but does desaturate w/ LB dressing. Educated pt regarding PLB and modified ADL techniques (ie figure four method and AE for LB ADLs). Pt is functioning at SBA level for transfers/ADLs and mobility using RW. Feel pt will benefit from [address] [place] [address] to work on improving functional activity tolerance and activity modification assessment/treatment. No DME needs (has shower chair); would benefit from sock aide, LH shoehorn/sponge. Continue per POC.   Treatment Diagnosis: impaired ADLs/transfers and decreased functional activity tolerance  Decision Making: Medium Complexity  REQUIRES OT FOLLOW-UP: Yes  Activity Tolerance  Activity Tolerance: Treatment limited secondary to medical complications (free text)  Activity Tolerance Comments: on 5L O2; O2 sats during mobility 90% - desaturates (as low as 82%) once seated and requiring seated rest break ~1-1.5 minutes of seated rest break to recover        Plan   Occupational Therapy Plan  Times Per Week: 2-5  Times Per Day: Once a day  Current Treatment Recommendations: Strengthening, Balance training     Restrictions  Position Activity Restriction  Other position/activity restrictions: up with assist    Subjective   General  Chart Reviewed: Yes  Additional Pertinent Hx: [de-identified] y.o. F to ED w/ SOB (recently seen in ED 1/23 and d/c home). Hospital Course:     PMH: ILD (NSIP) on home supplemental O2 (3 L rest, 6 L movement), HTN, DM, Hyperparathyroidism, HLD, GERD, OA. Family / Caregiver Present: Yes (2 visitors)  Referring Practitioner: Itz Hair MD;   Willy Murillo MD  Diagnosis: Chronic interstitial lung disease    Subjective  Subjective: In bed on arrival. \"My son tells me I don't breathe right. \"    6/10 L flank pain - nurse informed. Social/Functional History  Social/Functional History  Type of Home: Assisted living ([facility])  Home Layout: One level  Home Access: Level entry, Elevator  Bathroom Shower/Tub: Walk-in shower  Bathroom Toilet: Handicap height  Bathroom Equipment: Shower chair, Grab bars in shower, Grab bars around toilet  Bathroom Accessibility: [facility]: [place], [place], 4 wheeled, Oxygen, [address], [address] (reports rollator too big for use in apartment; wearing 3L at home)  ADL Assistance: [facility]:  (does own laundry; does make some meals in her room - \"depending on how I feel\"; dependent - on someone to help w/ grocery shopping (reports \"son doesn't have the patience\" (to help with this)). )  Ambulation Assistance: Independent (no AD in apartment; w/c to dining room)  Transfer Assistance: Independent  Active : No  Patient's  Info: son provides transportation to MD appointments - reports son is wanting to transition to the [facility] to provide the transportation  Additional Comments: to dining room via wheelchair - staff pushes. 3L O2 at home (baseline) - increased to 6L w/ activity (pt/visitor report pt desaturates w/ activity). Objective               Observation/Palpation  Observation: on 5L O2       Balance  Sitting: Intact (increased SOB when bending forward)  Standing:  (SBA)    Gait  Overall Level of Assistance: Stand-by assistance (to/from bathroom (10'x2); 30')  Assistive Device: Walker, rolling    Toilet Transfers  Toilet - Technique: Ambulating (using RW)  Equipment Used: Standard toilet (w/ bar to R)  Toilet Transfer: Stand by assistance  Strength: Generally decreased, functional    ADL  Grooming: Stand by assistance  Grooming Skilled Clinical Factors: washing hands at sink level, standing  LE Dressing: Stand by assistance (socks, management of depends)  Toileting: Stand by assistance ((+) void)          Bed mobility  Supine to Sit: Modified independent  Scooting: Modified independent    Transfers  Sit to stand: Stand by assistance  Stand to sit: Stand by assistance    Vision  Vision: Within Functional Limits  Hearing  Hearing: Exceptions to [facility]  Hearing Exceptions: Bilateral hearing aid    Cognition  Overall Cognitive Status: [facility]  Cognition Comment: reports h/o taking pulmonary rehab - but demo decreased carryover of PLB technique despite education and demonstration. Orientation  Overall Orientation Status: Within Functional Limits                                         Pt seen by OT for eval and treat.  Treatment included: bed mobility, functional transfer/mobility, ADL, pt education                                                        AM-PAC Score        AM-PAC Inpatient Daily Activity Raw Score: 20 (01/27/23 1019)  AM-PAC Inpatient ADL T-Scale Score : 42.03 (01/27/23 1019)  ADL Inpatient CMS 0-100% Score: 38.32 (01/27/23 1019)  ADL Inpatient CMS G-Code Modifier : Tin Arias (01/27/23 1019)           Goals  Short Term Goals  Time Frame for Short Term Goals: [place]  Short Term Goal 1: toilet transfer w/ Supervision  Short Term Goal 2: LB dressing using AE prn, Supervision  Short Term Goal 3: verbalize 3 energy conservation techniques to utilize at home  Short Term Goal 4: demonstrating PLB technique during functional tasks w/o cues  Patient Goals   Patient goals : to be independent       Therapy Time   Individual Concurrent Group Co-treatment   Time In  0810         Time Out  0909         Minutes  59          Timed Code Treatment Minutes:   39    Total Treatment Minutes:  [id number] [facility]  OTR/L #[id number]

## 2023-01-27 NOTE — DISCHARGE INSTRUCTIONS
Please take your medications as directed. For your antibiotics, take the full 5 day course, even if you feel better. Please call your pulmonologist to schedule pulmonary rehab following your hospitalization. Please see your Primary Care Physician (PCP) for your post-hospital follow up within 1 week of discharge. Please see your Pulmonologist within 2 weeks of your discharge. If you have any questions, do not hesitate to contact us or your PCP   If you feel seriously ill, please go directly to the Emergency Department, or call 911.

## 2023-01-27 NOTE — DISCHARGE SUMMARY
INTERNAL MEDICINE DEPARTMENT AT [address] [place] [address]  DISCHARGE SUMMARY    Patient ID: Elisha Malhotra                                             Discharge Date: 1/29/2023   Patient's PCP: Adithya Guadarrama MD                                          Discharge Physician: Abran Ann MD  Admit Date: 1/26/2023   Admitting Physician: No admitting provider for patient encounter. DISCHARGE DIAGNOSES:  Present on Admission:   Diabetes mellitus type 2 in nonobese (HCC)   Acute on chronic respiratory failure with hypoxia (HCC)   Essential hypertension   Normocytic anemia   Chronic interstitial lung disease (HCC)   Mixed anxiety depressive disorder   GERD (gastroesophageal reflux disease)   SOB (shortness of breath)   Chronic respiratory failure with hypoxia (HCC)   Left-sided chest pain          Outpatient To Do List:  Follow-up appointments  Primary care provider in 1 week          Hospital Course:    Elisha Malhotra is a [de-identified] y.o. female w/PMH ILD (NSIP) on home supplemental O2 (3 L rest, 6 L movement), HTN, DM, Hyperparathyroidism, HLD, GERD, OA, who presented with cc worsening SOB    \"EMS reported that the patient was with hypoxia satting at the 60s-70s%  and placed on 10 L for saturation improvement. At the ED, patient presented with VSSsaturating in the 97%. ECG was NSR and CXR was with extensive chronic interstitial fibrosis and no gross acute abnormality. Lab analysis revealed leukocytosis at 13.3, anemia w/Hb at 10.3, mild hyponatremia at 134, hyperglycemia at 216, at troponin negative. LA was at 0.9 and VBG was at pH 7.341/pCO2 45.5/HCO3 24.6. Patient received 60 mg prednisone and subsequently admitted for further workup\"    For her Nonspecific interstitial pneumonia we contacted her pulmonologist, Dr. Toshia Mcmillan. Notably, he saw her in the ED threee days prior to admission, where she was not admitted. There was concern for infection, but COVID and Flu were negative.  Ceftriaxone and Azithromycin were used for community acquired PNA. Procal was slightly elevated at 0.27 but trended downward. She needed an increased oxygen amount compared to her baseline. She desaturates when walking even 20 feet in the hospital, and she was placed on 6L of oxygen for her new baseline. She was complaining of L rib pain from trauma of hitting her side with an oxygen tank before admission. The area had no bruising, rubor, calor, but was tender to touch. We attributed this to Musculoskeletal pain from trauma. It was relieved with lidocaine patches, needing Toradol one day, but then tylenol. We included shingles in our differential, as she has Hx of shingles but not in this area. Xrays showed no osseus injuries or issues. Additional findings or notes for primary provider:    New higher oxygen baseline, recommend 6L of oxygen. Pulmonary Rehab    Physical Exam:  BP (!) 166/74   Pulse 52   Temp 97.8 °F (36.6 °C) (Oral)   Resp 16   Ht 5' 2\" (1.575 m)   Wt 121 lb 0.5 oz (54.9 kg)   SpO2 99%   BMI 22.14 kg/m²     HENT:      Head: Normocephalic and atraumatic. Mouth/Throat:      Mouth: Mucous membranes are dry. Eyes:      Extraocular Movements: Extraocular movements intact. Pupils: Pupils are equal, round, and reactive to light. Cardiovascular:      Rate and Rhythm: Normal rate and regular rhythm. Pulses: Normal pulses. Heart sounds: Normal heart sounds. Pulmonary:      Effort: Pulmonary effort is normal.      Breath sounds: Rales present. Abdominal:      General: Abdomen is flat. Palpations: Abdomen is soft. Musculoskeletal:         General: Tenderness present. Normal range of motion. Skin:     General: Skin is warm. Capillary Refill: Capillary refill takes less than 2 seconds. Neurological:      Mental Status: She is alert and oriented to person, place, and time.      Consults: pulmonary/intensive care    Significant Diagnostic Studies:  L rib xray    Treatments:  Tylenol, Lidocaine patches, Oxygen, Abx    Disposition:  Assisted living     Discharged Condition:  Stable      DISCHARGE MEDICATION:     Medication List        START taking these medications      amoxicillin-clavulanate 875-125 MG per tablet  Commonly known as: AUGMENTIN  Take 1 tablet by mouth 2 times daily for 5 days  Start taking on: January 30, 2023            CHANGE how you take these medications      amLODIPine 10 MG tablet  Commonly known as: NORVASC  TAKE ONE TABLET BY MOUTH DAILY  What changed: See the new instructions. CONTINUE taking these medications      benzonatate 100 MG capsule  Commonly known as: TESSALON  Take 1-2 capsules by mouth 3 times daily as needed for Cough     blood glucose test strips  Test 1 times a day & as needed for symptoms of irregular blood glucose. Dispense sufficient amount for indicated testing frequency plus additional to accommodate PRN testing needs. Blood Pressure Monitor Nini  Use as directed for blood pressure monitoring     ciclopirox 8 % solution  Commonly known as: PENLAC  Apply to adjacent skin and affected nails daily. Remove with alcohol every 7 days.      cinacalcet 30 MG tablet  Commonly known as: SENSIPAR     cyanocobalamin 1000 MCG tablet     escitalopram 20 MG tablet  Commonly known as: LEXAPRO  TAKE ONE TABLET BY MOUTH DAILY     esomeprazole 40 MG delayed release capsule  Commonly known as: NEXIUM  Take 1 capsule by mouth every morning (before breakfast)     glucose monitoring kit  1 kit by Does not apply route daily     Lancets Misc  1 each by Does not apply route daily     lidocaine 5 %  Commonly known as: LIDODERM     Probiotic Acidophilus BioBeads Caps  Take 1 capsule by mouth daily     rosuvastatin 40 MG tablet  Commonly known as: CRESTOR  TAKE ONE TABLET BY MOUTH DAILY     tiZANidine 2 MG tablet  Commonly known as: ZANAFLEX  Take 1 tablet by mouth 3 times daily as needed (muscle pain)     Vitamin D3 25 MCG (1000 UT) Caps  TAKE ONE CAPSULE BY MOUTH DAILY vitamin E 1000 units capsule               Where to Get Your Medications        You can get these medications from any pharmacy    Bring a paper prescription for each of these medications  amoxicillin-clavulanate 875-125 MG per tablet       Activity: activity as tolerated  Diet: regular diet  Wound Care: as directed      Time spent on discharge is more than 30 minutes. Signed:  Michelle Chisholm MD  Internal Medicine, PGY-1  1/29/2023     Addendum to Resident H& P/Progress note:  I have personally seen,examined and evaluated the patient.  I have reviewed the current history, physical findings, labs and assessment and plan and agree with note as documented by resident MD ( Jo Terrell)      Elmer Herrera MD, [address]

## 2023-01-27 NOTE — PROGRESS NOTES
Internal Medicine Progress Note    Patient Name: Apurva Padilla   Patient : 1940   Date: 2023   Admit Date: 2023     CC: Shortness of Breath (Patient was seen Monday for shortness of breath. She normally wears 3L NC and was told to increase to 6L NC and follow up. Patient reports that this episode of shortness of breath started Tuesday night. SPO2 on NRB upon arrival 100%. Patient switched to 6L NC and SPO2 97-98%. Patient denies any falls. )         Interval History: Patient seen and examined at bedside. Is complaining of more pain on her L rib area. Is sharp, worse than before, reproducible on palpation, no blisters or rash seen, no rubor, calor. Will treat with Toradol, as lidocaine patches were working but less so now. Otherwise, denies new complaints. ROS:  As per interval history above. HPI: Maynor Ferrera is a [de-identified] y.o. female w/PMH ILD (NSIP) on home supplemental O2 (3 L rest, 6 L movement), HTN, DM, Hyperparathyroidism, HLD, GERD, OA, who presented with cc worsening SOB. Patient reports having a baseline supplemental oxygenation requirement at 3 L at rest and 6 L at movement but reports having a worsening of her SOB having desaturations of her oxygenation to the 65s-70s% on 6 L at movement. Patient reports having to rest with improvements of her saturations to the 90s%. She reports that this is the first time that she is having this issue and reports that about 5-6 months ago having to wear her supplemental oxygenation NC even at night. She reports having associated increased fatigue and falling asleep frequently but for the past 6 months. Additionally, patient reports having headaches that have been acute, without dizziness, falls, CP. Patient reports pain at the site of her accessory muscles. Patient does endorse having body aches, chills, sweats, rhinorrhea, and a sore throat in addition to dry cough.  Patient was seen on 23 for SOB with desaturations and hypoxia on exertion with CTPA negative for PE and pulm consulted. Patient was d/c. She presented to the ED from her assisted living facility of [address] [facility] via EMS. EMS reported that the patient was with hypoxia satting at the 60s-70s%  and placed on 10 L for saturation improvement. At the ED, patient presented with VSSsaturating in the 97%. ECG was NSR and CXR was with extensive chronic interstitial fibrosis and no gross acute abnormality. Lab analysis revealed leukocytosis at 13.3, anemia w/Hb at 10.3, mild hyponatremia at 134, hyperglycemia at 216, at troponin negative. LA was at 0.9 and VBG was at pH 7.341/pCO2 45.5/HCO3 24.6. Patient received 60 mg prednisone and subsequently admitted for further workup. \"    \" States really she came in for rib pain from her oxygen tank hitting her side getting out of the car. The other breathing issues she was seen 3 days ago for are not worse since then. \"      Vital Signs:  Patient Vitals for the past 8 hrs:   BP Temp Temp src Pulse Resp SpO2 Weight   01/27/23 0812 (!) 169/79 97.9 °F (36.6 °C) Oral 60 18 96 % --   01/27/23 0315 (!) 148/76 98.1 °F (36.7 °C) Oral 84 18 100 % 125 lb 7.1 oz (56.9 kg)       Physical Exam:  Physical Exam  HENT:      Head: Normocephalic and atraumatic. Mouth/Throat:      Mouth: Mucous membranes are dry. Eyes:      Extraocular Movements: Extraocular movements intact. Pupils: Pupils are equal, round, and reactive to light. Cardiovascular:      Rate and Rhythm: Normal rate and regular rhythm. Pulses: Normal pulses. Heart sounds: Normal heart sounds. Pulmonary:      Effort: Pulmonary effort is normal.      Breath sounds: Rales present. Abdominal:      General: Abdomen is flat. Palpations: Abdomen is soft. Musculoskeletal:         General: Tenderness present. Normal range of motion. Skin:     General: Skin is warm. Capillary Refill: Capillary refill takes less than 2 seconds.    Neurological:      Mental Status: She is alert and oriented to person, place, and time. Intake/Output Summary (Last 24 hours) at 1/27/2023 1019  Last data filed at 1/27/2023 1009  Gross per 24 hour   Intake 840 ml   Output 0 ml   Net 840 ml        Medications:   enoxaparin  40 mg SubCUTAneous Daily    amLODIPine  1 tablet Oral Daily    sodium chloride flush  5-40 mL IntraVENous 2 times per day    cefTRIAXone (ROCEPHIN) IV  1,000 mg IntraVENous Q24H    azithromycin  500 mg IntraVENous Q24H    insulin lispro  0-8 Units SubCUTAneous TID WC    insulin lispro  0-4 Units SubCUTAneous Nightly    lidocaine  1 patch TransDERmal Daily       sodium chloride 25 mL (01/27/23 0519)    dextrose        sodium chloride flush, sodium chloride, ondansetron **OR** ondansetron, polyethylene glycol, acetaminophen **OR** acetaminophen, glucose, dextrose bolus **OR** dextrose bolus, glucagon (rDNA), dextrose, benzonatate     Labs:  CBC:   Recent Labs     01/26/23 0023 01/27/23 0453   WBC 13.3* 9.2   HGB 10.3* 10.1*   HCT 30.9* 30.0*    240   MCV 89.1 89.7       Renal:    Recent Labs     01/26/23 0023 01/27/23 0452   * 137   K 4.4 4.8    104   CO2 24 25   BUN 27* 29*   CREATININE 1.2 1.1   GLUCOSE 216* 200*   CALCIUM 10.3 11.1*   MG  --  1.70*   ANIONGAP 10 8       Hepatic: No results for input(s): AST, ALT, BILITOT, BILIDIR, PROT, LABALBU, ALKPHOS in the last 72 hours. Troponin: Invalid input(s): TROPONIN    Lactic acid: Invalid input(s): LACTICACID    BNP: No results for input(s): BNP in the last 72 hours. Pro-BNP:   Recent Labs     01/26/23 0023   PROBNP 486*       Lipids: No results for input(s): CHOL, TRIG, HDL, LDLCALC, VLDL in the last 72 hours. ABGs:  No results for input(s): PHART, XGE8BGP, PO2ART, VJR6DJP, BEART, THGBART, F8CAMMFT, ZWF4DRY in the last 72 hours. VBGs:   Recent Labs     01/26/23 0023   PHVEN 7.341*   BHT3OMD 45.5   PO2VEN 31.8       INR: No results for input(s): INR in the last 72 hours.   aPTT: No results for input(s): APTT in the last 72 hours. Procalcitonin:   Recent Labs     01/26/23  0023 01/26/23  0848   PROCAL 0.27* 0.24*     CRP:   Recent Labs     01/27/23  0453   .0*     ESR: No results for input(s): ESR in the last 72 hours. Radiology:  XR CHEST PORTABLE   Final Result      Extensive chronic interstitial fibrosis. No gross acute abnormality on portable chest radiograph. Consider chest CT for added sensitivity/specificity. 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 CAP/URI   sees Dr. Chuck Montilla o/p for pulm  s/p steroids at presentation  Recently seen at ED for similar breating issues, seen by Pulm and sent home from ED  on home O2: 3 L at rest, 6 L upon exertion, however does not always increase oxygen when needed as she should  pulmonology consulted for continuity of care. CXR at presentation, no gross acute abnormality but consider CT (But had one 3 days prior)  Monitor for worsening of respiratory status  Negative: COVID/influenza  Pending: respi mini, legionella, strep, RSV  Initiate empirically with abx at this time: ceftriaxone + azithromycin  WBC 13.3   Procal: 0.27 > 0.24  EKG: normal axis, poor R-wave progression as seen in EKG a year ago  -Pulm consult: no need for steroids systemically, continue ABX and switch to augmentin on discharge. MSK Pain 2/2 Trauma   (Oxygen tank hitting side of body)  Is greatly relieved with Lidocaine patch  Does not feel like sensations of shingles which patient had in a different location 10 years ago   Reproducible on palpation  Trop Negative x 2   -trying toradol in addition, pain is worse, however no rash, lesions, bruising seen. Sharp, reproducible.  Was being relieved with lidocaine       Chronic condition w/home meds resumed:  Hyperparathyroidism  HTN  DM2  -LDSS  -POCT  -hypoglycemia protocol  -monitor  HLD  GERD  OA DVT PPx:Lovenox  Diet:  ADULT DIET; Regular   Code status:  Full Code     ELOS: 1 night  Barriers to discharge: Pain  Disposition [facility]      Will discuss with attending physician Dr. Ino Fontanez MD.     Erin Clifton MD  Internal Medicine, PGY-1    Addendum to Resident H& P/Progress note:  I have personally seen,examined and evaluated the patient.  I have reviewed the current history, physical findings, labs and assessment and plan and agree with note as documented by resident MD ( Sonia Lizarraga)      Ino Fontanez MD, [address]

## 2023-01-27 NOTE — PROGRESS NOTES
Notification of IV to PO Conversion     IV To Po Conversion:   Will convert azithromycin 500mg IV daily to 500mg PO daily based on [facility] IV to PO policy (see below). Please call with questions.     Rob Morales  PharmD Candidate [id number]      Criteria for conversion from IV to PO therapy per [facility] IV to PO Protocol:   Patients should meet all of the following inclusion criteria and none of the exclusion criteria    Criteria to initiate medication route switch (Inclusion Criteria)  IV therapy for > 24 hours (antibiotics only)  Tolerating diet more advanced than clear liquids  Tolerating oral (PO) medications  Does not require vasopressor therapy for blood pressure support  No seizures for 72hrs (antiepileptic medications only)      Criteria indicating that the patient is NOT a candidate for IV to PO conversion (Exclusion Criteria)  Infections requiring IV therapy (ie: meningitis, endocarditis, osteomyelitis, pancreatitis)  Nausea and/or vomiting or severe diarrhea within past 24 hours  Has gastrectomy, ileus, gastric outlet or bowel obstruction, or malabsorption syndromes  Has significant, painful oral ulceration  TPN with an NPO order  Active GI bleed  Unable to swallow  Nothing by Mouth (NPO) status  Febrile in the last 24 hours- (antibiotics only)  Clinical deteriorating or unstable - (antibiotics only)  Pediatric patients and patients who are not euthyroid (not on oral levothyroxine/not stabilized on oral levothyroxine) - (Levothyroxine only)  Patients being treated for myxedema coma or during the organ donation process - (Levothyroxine only)    Other notes-   Patients may be given suppositories when available for the product being ordered if no contraindications exist (ie: rectal surgery or infection)   Oral solutions/suspensions may be considered for patients with a functioning enteral tube not on continuous suction (if medication is available in this formulation)

## 2023-01-27 NOTE — PLAN OF CARE
Problem: Discharge Planning  Goal: Discharge to home or other facility with appropriate resources  1/26/2023 2335 by Samantha Moe RN  Outcome: Progressing  Flowsheets (Taken 1/26/2023 2335)  Discharge to home or other facility with appropriate resources:   Identify barriers to discharge with patient and caregiver   Arrange for needed discharge resources and transportation as appropriate   Identify discharge learning needs (meds, wound care, etc)   Refer to discharge planning if patient needs post-hospital services based on physician order or complex needs related to functional status, cognitive ability or social support system  1/26/2023 1756 by Des Coleman RN  Outcome: Progressing  Flowsheets (Taken 1/26/2023 0610 by Wayne Lancaster RN)  Discharge to home or other facility with appropriate resources:   Identify barriers to discharge with patient and caregiver   Arrange for needed discharge resources and transportation as appropriate   Identify discharge learning needs (meds, wound care, etc)   Refer to discharge planning if patient needs post-hospital services based on physician order or complex needs related to functional status, cognitive ability or social support system     Problem: Pain  Goal: Verbalizes/displays adequate comfort level or baseline comfort level  1/26/2023 2335 by Samantha Moe RN  Outcome: Progressing  Flowsheets (Taken 1/26/2023 2335)  Verbalizes/displays adequate comfort level or baseline comfort level:   Encourage patient to monitor pain and request assistance   Assess pain using appropriate pain scale   Administer analgesics based on type and severity of pain and evaluate response   Implement non-pharmacological measures as appropriate and evaluate response   Notify Licensed Independent Practitioner if interventions unsuccessful or patient reports new pain  1/26/2023 1756 by Des Coleman RN  Flowsheets  Taken 1/26/2023 1756 by Des Coleman RN  Verbalizes/displays adequate comfort level or baseline comfort level:   Encourage patient to monitor pain and request assistance   Assess pain using appropriate pain scale   Administer analgesics based on type and severity of pain and evaluate response  Taken 1/26/2023 0610 by Tad Allen RN  Verbalizes/displays adequate comfort level or baseline comfort level:   Encourage patient to monitor pain and request assistance   Assess pain using appropriate pain scale   Administer analgesics based on type and severity of pain and evaluate response   Implement non-pharmacological measures as appropriate and evaluate response   Consider cultural and social influences on pain and pain management   Notify Licensed Independent Practitioner if interventions unsuccessful or patient reports new pain  Note: Lidoderm patch applied right chest per order  patient reports pain reduction     Problem: Safety - Adult  Goal: Free from fall injury  1/26/2023 2335 by More Atwood RN  Outcome: Progressing  Flowsheets (Taken 1/26/2023 2335)  Free From Fall Injury: Instruct family/caregiver on patient safety  Note: Educate patient to use call light for assistance. 1/26/2023 1756 by Penelope Mejia RN  Outcome: Progressing  Flowsheets (Taken 1/26/2023 1756)  Free From Fall Injury:   Based on caregiver fall risk screen, instruct family/caregiver to ask for assistance with transferring infant if caregiver noted to have fall risk factors   Instruct family/caregiver on patient safety     Problem: ABCDS Injury Assessment  Goal: Absence of physical injury  1/26/2023 2335 by More Atwood RN  Outcome: Progressing  Flowsheets (Taken 1/26/2023 2335)  Absence of Physical Injury: Implement safety measures based on patient assessment  Note: Bed in lowest position, wheels locked, bed alarm & gripper socks on, call light & patient belongings within reach.   1/26/2023 1756 by Penelope Mejia RN  Outcome: Progressing     Problem: Chronic Conditions and Co-morbidities  Goal: Patient's chronic conditions and co-morbidity symptoms are monitored and maintained or improved  1/26/2023 2335 by Eboni Thrasher RN  Outcome: Progressing  Flowsheets (Taken 1/26/2023 2335)  Care Plan - Patient's Chronic Conditions and Co-Morbidity Symptoms are Monitored and Maintained or Improved:   Monitor and assess patient's chronic conditions and comorbid symptoms for stability, deterioration, or improvement   Collaborate with multidisciplinary team to address chronic and comorbid conditions and prevent exacerbation or deterioration   Update acute care plan with appropriate goals if chronic or comorbid symptoms are exacerbated and prevent overall improvement and discharge  1/26/2023 1756 by Yana Chong RN  Outcome: Progressing

## 2023-01-27 NOTE — PROGRESS NOTES
Pulmonary Followup Note    Indication for visit:  NSIP, worsening SOB     Subjective:  Patient seen and examined at bedside. No acute events overnight. Patient reports worsening pain under the left breast. Pain is sharp. Pain also felt at the lateral back. There are no rashes on the site. Son at bedside reports that on Tuesday she stood up and felt back to her wheel chair and had likely hit that side on the wheel chair. CXR done yesterday did not show any rib fractures. Will do a Left rib xray. Currently on 6L O2  Hemodynamically stable, afebrile.      enoxaparin  40 mg SubCUTAneous Daily    amLODIPine  10 mg Oral Daily    sodium chloride flush  5-40 mL IntraVENous 2 times per day    cefTRIAXone (ROCEPHIN) IV  1,000 mg IntraVENous Q24H    azithromycin  500 mg IntraVENous Q24H    insulin lispro  0-8 Units SubCUTAneous TID WC    insulin lispro  0-4 Units SubCUTAneous Nightly    lidocaine  1 patch TransDERmal Daily       Continuous Infusions:   sodium chloride 25 mL (01/27/23 0519)    dextrose         ROS: pertinent history noted in HPI     PHYSICAL EXAMINATION:  BP (!) 146/69   Pulse 56   Temp 97.8 °F (36.6 °C) (Oral)   Resp 20   Ht 5' 2\" (1.575 m)   Wt 125 lb 7.1 oz (56.9 kg)   SpO2 98%   BMI 22.94 kg/m²     Gen: Well developed, well nourished, in acute painful distress, in no acute distress. Speaking in full sentences with out using accessory resp muscles  Eyes: PERRL, EOMI, normal conjunctivae  Ears, Nose, Mouth and Throat: Hearing is normal. Oropharynx is normal  Neck: No lymphadenopathy  Respiratory: bilateral coarse breath sounds, no wheezing  CV: RRR without M/R/R  Abd: +BS, soft, NT/ND  Musculoskeletal: No cyanosis, clubbing, or edema.   Skin: No rashes, ulcers, or subcutaneous nodules  Psychiatric: Alert and oriented to time place and person    DATA  CBC:   Recent Labs     01/26/23  0023 01/27/23  0453   WBC 13.3* 9.2   HGB 10.3* 10.1*   HCT 30.9* 30.0*   MCV 89.1 89.7    240     BMP:   Recent Labs     01/26/23  0023 01/27/23  0452   * 137   K 4.4 4.8    104   CO2 24 25   BUN 27* 29*   CREATININE 1.2 1.1     No results for input(s): PHART, UIH5OVV, PO2ART in the last 72 hours. LIVER PROFILE: No results for input(s): AST, ALT, LIPASE, BILIDIR, BILITOT, ALKPHOS in the last 72 hours. Invalid input(s): AMYLASE,  ALB    Radiology Review:  Pertinent images / reports were reviewed as a part of this visit. Imaging reveals the following:    XR CHEST PORTABLE   Final Result      Extensive chronic interstitial fibrosis. No gross acute abnormality on portable chest radiograph. Consider chest CT for added sensitivity/specificity. XR RIBS LEFT (2 VIEWS)    (Results Pending)         ASSESSMENT/PLAN:    [de-identified] y.o. female with chronic hypoxic respiratory failure due to NSIP presented with worsening SOB    Chronic hypoxic respiratory failure 2/2 Nonspecific interstitial pneumonia   -on 3L of O2 at rest and 6L on exertion at home , currently on 6L at rest, saturating 98%  -Procal elevated to 0.27 >0.24, leukocytosis resolved   -CTPA 01/23 with no PE, no progression of NSIP  -s/p 60 mg prednisone in the ED   -Continue azithromycin/rocephin for likely CAP. Ok to discharge on Augmentin   -strep pneumo antigen, respiratory culture and legionella antigen urine pending   -Will need pulmonary rehab on discharge  -Musculoskeletal pain management per primary team   -f/u rib xray     Patient has been staffed and plan discussed with Bala Suggs MD.    Annabelle Finney MD, PGY-1     Patient was seen, examined and discussed with Dr. Wyatt Lopez. I agree with the interval history.  My physical exam confirms the findings listed below  Chart was reviewed including labs and medical records confirm the findings noted    Peripheral IV 01/26/23 Right Wrist (Active)   Number of days: 1        Chronic hypoxic respiratory failure   NSIP  Left side chest pain, with tender point likely musculoskeletal pain, worse today after working with PT. Rib X ray obtained. Report is pending but I don't see a fracture. Oxygen requirement is close to baseline   No need for systemic steroids at this time. Continue ceftriaxone while inpatient. Can change to augmentin on discharge for total ABX course of 5 days. She will need maintenance pulmonary rehab.on d/c   Lidocaine patch  Pain control.

## 2023-01-28 LAB
ANION GAP SERPL CALCULATED.3IONS-SCNC: 9 MMOL/L (ref 3–16)
BASOPHILS ABSOLUTE: 0.1 K/UL (ref 0–0.2)
BASOPHILS RELATIVE PERCENT: 0.7 %
BUN BLDV-MCNC: 28 MG/DL (ref 7–20)
CALCIUM SERPL-MCNC: 10.4 MG/DL (ref 8.3–10.6)
CHLORIDE BLD-SCNC: 108 MMOL/L (ref 99–110)
CO2: 24 MMOL/L (ref 21–32)
CREAT SERPL-MCNC: 0.9 MG/DL (ref 0.6–1.2)
EOSINOPHILS ABSOLUTE: 0.4 K/UL (ref 0–0.6)
EOSINOPHILS RELATIVE PERCENT: 4.9 %
GFR SERPL CREATININE-BSD FRML MDRD: >60 ML/MIN/{1.73_M2}
GLUCOSE BLD-MCNC: 120 MG/DL (ref 70–99)
GLUCOSE BLD-MCNC: 127 MG/DL (ref 70–99)
GLUCOSE BLD-MCNC: 132 MG/DL (ref 70–99)
GLUCOSE BLD-MCNC: 139 MG/DL (ref 70–99)
GLUCOSE BLD-MCNC: 185 MG/DL (ref 70–99)
GLUCOSE BLD-MCNC: 234 MG/DL (ref 70–99)
HCT VFR BLD CALC: 27.5 % (ref 36–48)
HEMOGLOBIN: 9.1 G/DL (ref 12–16)
LYMPHOCYTES ABSOLUTE: 1.3 K/UL (ref 1–5.1)
LYMPHOCYTES RELATIVE PERCENT: 17.8 %
MAGNESIUM: 1.6 MG/DL (ref 1.8–2.4)
MCH RBC QN AUTO: 30.3 PG (ref 26–34)
MCHC RBC AUTO-ENTMCNC: 33 G/DL (ref 31–36)
MCV RBC AUTO: 91.7 FL (ref 80–100)
MONOCYTES ABSOLUTE: 0.5 K/UL (ref 0–1.3)
MONOCYTES RELATIVE PERCENT: 6.6 %
NEUTROPHILS ABSOLUTE: 5.2 K/UL (ref 1.7–7.7)
NEUTROPHILS RELATIVE PERCENT: 70 %
PDW BLD-RTO: 13.1 % (ref 12.4–15.4)
PERFORMED ON: ABNORMAL
PLATELET # BLD: 222 K/UL (ref 135–450)
PMV BLD AUTO: 7.2 FL (ref 5–10.5)
POTASSIUM SERPL-SCNC: 4.4 MMOL/L (ref 3.5–5.1)
RBC # BLD: 2.99 M/UL (ref 4–5.2)
SODIUM BLD-SCNC: 141 MMOL/L (ref 136–145)
WBC # BLD: 7.5 K/UL (ref 4–11)

## 2023-01-28 PROCEDURE — 6370000000 HC RX 637 (ALT 250 FOR IP)

## 2023-01-28 PROCEDURE — 83735 ASSAY OF MAGNESIUM: CPT

## 2023-01-28 PROCEDURE — 85025 COMPLETE CBC W/AUTO DIFF WBC: CPT

## 2023-01-28 PROCEDURE — 80048 BASIC METABOLIC PNL TOTAL CA: CPT

## 2023-01-28 PROCEDURE — 2060000000 HC ICU INTERMEDIATE R&B

## 2023-01-28 PROCEDURE — 6360000002 HC RX W HCPCS

## 2023-01-28 PROCEDURE — 99232 SBSQ HOSP IP/OBS MODERATE 35: CPT | Performed by: HOSPITALIST

## 2023-01-28 PROCEDURE — 6370000000 HC RX 637 (ALT 250 FOR IP): Performed by: PHYSICIAN ASSISTANT

## 2023-01-28 PROCEDURE — 2580000003 HC RX 258

## 2023-01-28 PROCEDURE — 6360000002 HC RX W HCPCS: Performed by: PHYSICIAN ASSISTANT

## 2023-01-28 PROCEDURE — 36415 COLL VENOUS BLD VENIPUNCTURE: CPT

## 2023-01-28 PROCEDURE — 94150 VITAL CAPACITY TEST: CPT

## 2023-01-28 RX ORDER — MAGNESIUM SULFATE IN WATER 40 MG/ML
2000 INJECTION, SOLUTION INTRAVENOUS ONCE
Status: COMPLETED | OUTPATIENT
Start: 2023-01-28 | End: 2023-01-28

## 2023-01-28 RX ADMIN — BENZONATATE 100 MG: 100 CAPSULE ORAL at 10:09

## 2023-01-28 RX ADMIN — CEFTRIAXONE 1000 MG: 1 INJECTION, POWDER, FOR SOLUTION INTRAMUSCULAR; INTRAVENOUS at 05:01

## 2023-01-28 RX ADMIN — INSULIN LISPRO 2 UNITS: 100 INJECTION, SOLUTION INTRAVENOUS; SUBCUTANEOUS at 12:53

## 2023-01-28 RX ADMIN — MAGNESIUM SULFATE HEPTAHYDRATE 2000 MG: 40 INJECTION, SOLUTION INTRAVENOUS at 09:58

## 2023-01-28 RX ADMIN — ACETAMINOPHEN 650 MG: 325 TABLET ORAL at 19:20

## 2023-01-28 RX ADMIN — BENZONATATE 100 MG: 100 CAPSULE ORAL at 20:26

## 2023-01-28 RX ADMIN — AZITHROMYCIN MONOHYDRATE 500 MG: 250 TABLET ORAL at 08:29

## 2023-01-28 RX ADMIN — ACETAMINOPHEN 650 MG: 325 TABLET ORAL at 10:09

## 2023-01-28 RX ADMIN — SODIUM CHLORIDE, PRESERVATIVE FREE 5 ML: 5 INJECTION INTRAVENOUS at 20:27

## 2023-01-28 RX ADMIN — AMLODIPINE BESYLATE 10 MG: 10 TABLET ORAL at 08:29

## 2023-01-28 RX ADMIN — SODIUM CHLORIDE 25 ML: 9 INJECTION, SOLUTION INTRAVENOUS at 05:00

## 2023-01-28 RX ADMIN — ENOXAPARIN SODIUM 40 MG: 100 INJECTION SUBCUTANEOUS at 08:29

## 2023-01-28 RX ADMIN — SODIUM CHLORIDE, PRESERVATIVE FREE 10 ML: 5 INJECTION INTRAVENOUS at 08:29

## 2023-01-28 RX ADMIN — SODIUM CHLORIDE: 9 INJECTION, SOLUTION INTRAVENOUS at 09:57

## 2023-01-28 ASSESSMENT — PAIN SCALES - GENERAL
PAINLEVEL_OUTOF10: 0
PAINLEVEL_OUTOF10: 3
PAINLEVEL_OUTOF10: 5
PAINLEVEL_OUTOF10: 0

## 2023-01-28 NOTE — PROGRESS NOTES
Internal Medicine Progress Note    Patient Name: Elisha Malhotra   Patient : 1940   Date: 2023   Admit Date: 2023     CC: Shortness of Breath (Patient was seen Monday for shortness of breath. She normally wears 3L NC and was told to increase to 6L NC and follow up. Patient reports that this episode of shortness of breath started Tuesday night. SPO2 on NRB upon arrival 100%. Patient switched to 6L NC and SPO2 97-98%. Patient denies any falls. )     Interval History: Patient seen and examined at bedside. Still has some discomfort in her left chest however this is improved. Improving with Toradol and lidocaine patch. Imaging negative for fracture. Otherwise, denies new complaints. Called and spoke with patient's son. Logistically it will be ahrd for anyone to stay with his mother today if she is discharged due to prior commitments. His mother is in an assisted living facility but not with full time dedicated RN or aide    ROS:  As per interval history above. HPI: Ramirez Clifton is a [de-identified] y.o. female w/PMH ILD (NSIP) on home supplemental O2 (3 L rest, 6 L movement), HTN, DM, Hyperparathyroidism, HLD, GERD, OA, who presented with cc worsening SOB. Patient reports having a baseline supplemental oxygenation requirement at 3 L at rest and 6 L at movement but reports having a worsening of her SOB having desaturations of her oxygenation to the 65s-70s% on 6 L at movement. Patient reports having to rest with improvements of her saturations to the 90s%. She reports that this is the first time that she is having this issue and reports that about 5-6 months ago having to wear her supplemental oxygenation NC even at night. She reports having associated increased fatigue and falling asleep frequently but for the past 6 months. Additionally, patient reports having headaches that have been acute, without dizziness, falls, CP. Patient reports pain at the site of her accessory muscles.  Patient does endorse having body aches, chills, sweats, rhinorrhea, and a sore throat in addition to dry cough. Patient was seen on 1/23/23 for SOB with desaturations and hypoxia on exertion with CTPA negative for PE and pulm consulted. Patient was d/c. She presented to the ED from her assisted living facility of [address] [place] [address] via EMS. EMS reported that the patient was with hypoxia satting at the 60s-70s%  and placed on 10 L for saturation improvement. At the ED, patient presented with VSSsaturating in the 97%. ECG was NSR and CXR was with extensive chronic interstitial fibrosis and no gross acute abnormality. Lab analysis revealed leukocytosis at 13.3, anemia w/Hb at 10.3, mild hyponatremia at 134, hyperglycemia at 216, at troponin negative. LA was at 0.9 and VBG was at pH 7.341/pCO2 45.5/HCO3 24.6. Patient received 60 mg prednisone and subsequently admitted for further workup. \"    \" States really she came in for rib pain from her oxygen tank hitting her side getting out of the car. The other breathing issues she was seen 3 days ago for are not worse since then. \"      Vital Signs:  Patient Vitals for the past 8 hrs:   BP Temp Temp src Pulse Resp SpO2 Weight   01/28/23 0826 (!) 148/71 98 °F (36.7 °C) Oral 54 20 100 % --   01/28/23 0304 137/60 97.6 °F (36.4 °C) Oral 51 18 98 % 124 lb 1.9 oz (56.3 kg)         Physical Exam:  Physical Exam  HENT:      Head: Normocephalic and atraumatic. Mouth/Throat:      Mouth: Mucous membranes are dry. Eyes:      Extraocular Movements: Extraocular movements intact. Pupils: Pupils are equal, round, and reactive to light. Cardiovascular:      Rate and Rhythm: Normal rate and regular rhythm. Pulses: Normal pulses. Heart sounds: Normal heart sounds. Pulmonary:      Effort: Pulmonary effort is normal.      Breath sounds: Rales present. Abdominal:      General: Abdomen is flat. Palpations: Abdomen is soft. Musculoskeletal:         General: Tenderness present. Normal range of motion. Skin:     General: Skin is warm. Capillary Refill: Capillary refill takes less than 2 seconds. Neurological:      Mental Status: She is alert and oriented to person, place, and time. Intake/Output Summary (Last 24 hours) at 1/28/2023 0901  Last data filed at 1/28/2023 1622  Gross per 24 hour   Intake 120 ml   Output 0 ml   Net 120 ml          Medications:   magnesium sulfate  2,000 mg IntraVENous Once    enoxaparin  40 mg SubCUTAneous Daily    amLODIPine  10 mg Oral Daily    sodium chloride flush  5-40 mL IntraVENous 2 times per day    cefTRIAXone (ROCEPHIN) IV  1,000 mg IntraVENous Q24H    insulin lispro  0-8 Units SubCUTAneous TID WC    insulin lispro  0-4 Units SubCUTAneous Nightly    lidocaine  1 patch TransDERmal Daily       sodium chloride 25 mL (01/28/23 0500)    dextrose        sodium chloride flush, sodium chloride, ondansetron **OR** ondansetron, polyethylene glycol, acetaminophen **OR** acetaminophen, glucose, dextrose bolus **OR** dextrose bolus, glucagon (rDNA), dextrose, benzonatate     Labs:  CBC:   Recent Labs     01/26/23 0023 01/27/23 0453 01/28/23  0531   WBC 13.3* 9.2 7.5   HGB 10.3* 10.1* 9.1*   HCT 30.9* 30.0* 27.5*    240 222   MCV 89.1 89.7 91.7         Renal:    Recent Labs     01/26/23 0023 01/27/23 0452 01/28/23  0531   * 137 141   K 4.4 4.8 4.4    104 108   CO2 24 25 24   BUN 27* 29* 28*   CREATININE 1.2 1.1 0.9   GLUCOSE 216* 200* 127*   CALCIUM 10.3 11.1* 10.4   MG  --  1.70* 1.60*   ANIONGAP 10 8 9           Pro-BNP:   Recent Labs     01/26/23 0023   PROBNP 486*         VBGs:   Recent Labs     01/26/23 0023   PHVEN 7.341*   [id number] 45.5   PO2VEN 31.8         INR: No results for input(s): INR in the last 72 hours. aPTT: No results for input(s): APTT in the last 72 hours.     Procalcitonin:   Recent Labs     01/26/23 0023 01/26/23  0848   PROCAL 0.27* 0.24*       CRP:   Recent Labs     01/27/23  0453   .0*       ESR: No results for input(s): ESR in the last 72 hours. Radiology:  XR RIBS LEFT (2 VIEWS)   Final Result   1. No acute osseous abnormality. XR CHEST PORTABLE   Final Result      Extensive chronic interstitial fibrosis. No gross acute abnormality on portable chest radiograph. Consider chest CT for added sensitivity/specificity. 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 CAP/URI   - follows with Dr. Zhen Holbrook o/p for pulm  - s/p steroids at presentation  - Negative for COVID/influenza  - D3 of Ceftriaxone and Azithromycin for possible CAP  - Procal: 0.27 > 0.24  - transition to Augmentin at discharge (likely tomorrow for logistical issues with family support)    MSK Pain 2/2 Trauma   (Oxygen tank hitting side of body)  - reproducible on palpation. I suspect this is MSK I nature. Continue Lidocaine patch. Will hold off on additional Toradol and add on PRN Voltaren gel  - troponin negative x 2.   - imaging negative for fracture    Chronic condition w/home meds resumed:  Hyperparathyroidism  HTN  DM2  -LDSS  -POCT  -hypoglycemia protocol  -monitor  HLD  GERD  OA       DVT PPx:Lovenox  Diet:  ADULT DIET; Regular   Code status:  Full Code     ELOS: 1 night  Barriers to discharge: Pain  Disposition [facility]    Will discuss with attending physician Dr. Librado Bajwa MD.     Rollo Bumpers , MD  Internal Medicine, PGY-3    Addendum to Resident H& P/Progress note:  I have personally seen,examined and evaluated the patient. I have reviewed the current history, physical findings, labs and assessment and plan and agree with note as documented by resident MD ( Jose Juan Krause)  Continue current management. Hopefully, will d/c home tomorrow.     Librado Bajwa MD, FACP

## 2023-01-28 NOTE — PLAN OF CARE
Problem: Discharge Planning  Goal: Discharge to home or other facility with appropriate resources  Outcome: Progressing  Flowsheets (Taken 1/28/2023 0249)  Discharge to home or other facility with appropriate resources:   Identify barriers to discharge with patient and caregiver   Arrange for needed discharge resources and transportation as appropriate   Identify discharge learning needs (meds, wound care, etc)   Refer to discharge planning if patient needs post-hospital services based on physician order or complex needs related to functional status, cognitive ability or social support system     Problem: Pain  Goal: Verbalizes/displays adequate comfort level or baseline comfort level  Outcome: Progressing  Flowsheets (Taken 1/26/2023 2335)  Verbalizes/displays adequate comfort level or baseline comfort level:   Encourage patient to monitor pain and request assistance   Assess pain using appropriate pain scale   Administer analgesics based on type and severity of pain and evaluate response   Implement non-pharmacological measures as appropriate and evaluate response   Notify Licensed Independent Practitioner if interventions unsuccessful or patient reports new pain     Problem: Safety - Adult  Goal: Free from fall injury  Outcome: Progressing  Flowsheets (Taken 1/26/2023 2335)  Free From Fall Injury: Instruct family/caregiver on patient safety  Note: Educate to use call light. Problem: ABCDS Injury Assessment  Goal: Absence of physical injury  Outcome: Progressing  Flowsheets (Taken 1/26/2023 2335)  Absence of Physical Injury: Implement safety measures based on patient assessment  Note: Standard safety measures.      Problem: Chronic Conditions and Co-morbidities  Goal: Patient's chronic conditions and co-morbidity symptoms are monitored and maintained or improved  Outcome: Progressing  Flowsheets (Taken 1/26/2023 2335)  Care Plan - Patient's Chronic Conditions and Co-Morbidity Symptoms are Monitored and Maintained or Improved:   Monitor and assess patient's chronic conditions and comorbid symptoms for stability, deterioration, or improvement   Collaborate with multidisciplinary team to address chronic and comorbid conditions and prevent exacerbation or deterioration   Update acute care plan with appropriate goals if chronic or comorbid symptoms are exacerbated and prevent overall improvement and discharge

## 2023-01-29 VITALS
HEART RATE: 52 BPM | TEMPERATURE: 97.8 F | BODY MASS INDEX: 22.27 KG/M2 | RESPIRATION RATE: 16 BRPM | OXYGEN SATURATION: 99 % | HEIGHT: 62 IN | DIASTOLIC BLOOD PRESSURE: 74 MMHG | SYSTOLIC BLOOD PRESSURE: 166 MMHG | WEIGHT: 121.03 LBS

## 2023-01-29 LAB
ANION GAP SERPL CALCULATED.3IONS-SCNC: 10 MMOL/L (ref 3–16)
BASOPHILS ABSOLUTE: 0.1 K/UL (ref 0–0.2)
BASOPHILS RELATIVE PERCENT: 0.9 %
BUN BLDV-MCNC: 20 MG/DL (ref 7–20)
CALCIUM SERPL-MCNC: 10.7 MG/DL (ref 8.3–10.6)
CHLORIDE BLD-SCNC: 103 MMOL/L (ref 99–110)
CO2: 27 MMOL/L (ref 21–32)
CREAT SERPL-MCNC: 1 MG/DL (ref 0.6–1.2)
EOSINOPHILS ABSOLUTE: 0.5 K/UL (ref 0–0.6)
EOSINOPHILS RELATIVE PERCENT: 7.7 %
GFR SERPL CREATININE-BSD FRML MDRD: 57 ML/MIN/{1.73_M2}
GLUCOSE BLD-MCNC: 149 MG/DL (ref 70–99)
GLUCOSE BLD-MCNC: 164 MG/DL (ref 70–99)
HCT VFR BLD CALC: 29 % (ref 36–48)
HEMOGLOBIN: 9.7 G/DL (ref 12–16)
LYMPHOCYTES ABSOLUTE: 1.3 K/UL (ref 1–5.1)
LYMPHOCYTES RELATIVE PERCENT: 19.3 %
MAGNESIUM: 1.9 MG/DL (ref 1.8–2.4)
MCH RBC QN AUTO: 30.2 PG (ref 26–34)
MCHC RBC AUTO-ENTMCNC: 33.6 G/DL (ref 31–36)
MCV RBC AUTO: 90.1 FL (ref 80–100)
MONOCYTES ABSOLUTE: 0.5 K/UL (ref 0–1.3)
MONOCYTES RELATIVE PERCENT: 7 %
NEUTROPHILS ABSOLUTE: 4.4 K/UL (ref 1.7–7.7)
NEUTROPHILS RELATIVE PERCENT: 65.1 %
PDW BLD-RTO: 13.1 % (ref 12.4–15.4)
PERFORMED ON: ABNORMAL
PLATELET # BLD: 242 K/UL (ref 135–450)
PMV BLD AUTO: 7.1 FL (ref 5–10.5)
POTASSIUM SERPL-SCNC: 4.3 MMOL/L (ref 3.5–5.1)
RBC # BLD: 3.22 M/UL (ref 4–5.2)
SODIUM BLD-SCNC: 140 MMOL/L (ref 136–145)
WBC # BLD: 6.7 K/UL (ref 4–11)

## 2023-01-29 PROCEDURE — 6360000002 HC RX W HCPCS

## 2023-01-29 PROCEDURE — 36415 COLL VENOUS BLD VENIPUNCTURE: CPT

## 2023-01-29 PROCEDURE — 99239 HOSP IP/OBS DSCHRG MGMT >30: CPT | Performed by: HOSPITALIST

## 2023-01-29 PROCEDURE — 6370000000 HC RX 637 (ALT 250 FOR IP)

## 2023-01-29 PROCEDURE — 83735 ASSAY OF MAGNESIUM: CPT

## 2023-01-29 PROCEDURE — 80048 BASIC METABOLIC PNL TOTAL CA: CPT

## 2023-01-29 PROCEDURE — 85025 COMPLETE CBC W/AUTO DIFF WBC: CPT

## 2023-01-29 PROCEDURE — 6370000000 HC RX 637 (ALT 250 FOR IP): Performed by: PHYSICIAN ASSISTANT

## 2023-01-29 PROCEDURE — 2580000003 HC RX 258

## 2023-01-29 RX ORDER — AMOXICILLIN AND CLAVULANATE POTASSIUM 875; 125 MG/1; MG/1
1 TABLET, FILM COATED ORAL 2 TIMES DAILY
Qty: 10 TABLET | Refills: 0 | Status: SHIPPED | OUTPATIENT
Start: 2023-01-29 | End: 2023-01-29 | Stop reason: SDUPTHER

## 2023-01-29 RX ORDER — AMOXICILLIN AND CLAVULANATE POTASSIUM 875; 125 MG/1; MG/1
1 TABLET, FILM COATED ORAL 2 TIMES DAILY
Qty: 2 TABLET | Refills: 0 | Status: SHIPPED | OUTPATIENT
Start: 2023-01-29 | End: 2023-01-29 | Stop reason: SDUPTHER

## 2023-01-29 RX ORDER — AMOXICILLIN AND CLAVULANATE POTASSIUM 875; 125 MG/1; MG/1
1 TABLET, FILM COATED ORAL 2 TIMES DAILY
Qty: 10 TABLET | Refills: 0 | Status: SHIPPED | OUTPATIENT
Start: 2023-01-30 | End: 2023-02-04

## 2023-01-29 RX ADMIN — AMLODIPINE BESYLATE 10 MG: 10 TABLET ORAL at 08:03

## 2023-01-29 RX ADMIN — CEFTRIAXONE 1000 MG: 1 INJECTION, POWDER, FOR SOLUTION INTRAMUSCULAR; INTRAVENOUS at 04:40

## 2023-01-29 RX ADMIN — ENOXAPARIN SODIUM 40 MG: 100 INJECTION SUBCUTANEOUS at 08:03

## 2023-01-29 RX ADMIN — SODIUM CHLORIDE 100 ML: 9 INJECTION, SOLUTION INTRAVENOUS at 04:39

## 2023-01-29 RX ADMIN — ACETAMINOPHEN 650 MG: 325 TABLET ORAL at 11:10

## 2023-01-29 RX ADMIN — ACETAMINOPHEN 650 MG: 325 TABLET ORAL at 04:04

## 2023-01-29 RX ADMIN — SODIUM CHLORIDE, PRESERVATIVE FREE 10 ML: 5 INJECTION INTRAVENOUS at 08:03

## 2023-01-29 ASSESSMENT — PAIN SCALES - GENERAL
PAINLEVEL_OUTOF10: 0
PAINLEVEL_OUTOF10: 3
PAINLEVEL_OUTOF10: 6

## 2023-01-29 ASSESSMENT — PAIN DESCRIPTION - FREQUENCY: FREQUENCY: CONTINUOUS

## 2023-01-29 ASSESSMENT — PAIN DESCRIPTION - LOCATION: LOCATION: ABDOMEN;CHEST

## 2023-01-29 ASSESSMENT — PAIN DESCRIPTION - ORIENTATION: ORIENTATION: LEFT

## 2023-01-29 ASSESSMENT — PAIN - FUNCTIONAL ASSESSMENT: PAIN_FUNCTIONAL_ASSESSMENT: ACTIVITIES ARE NOT PREVENTED

## 2023-01-29 ASSESSMENT — PAIN DESCRIPTION - DESCRIPTORS: DESCRIPTORS: ACHING

## 2023-01-29 ASSESSMENT — PAIN DESCRIPTION - PAIN TYPE: TYPE: ACUTE PAIN

## 2023-01-29 ASSESSMENT — PAIN DESCRIPTION - ONSET: ONSET: ON-GOING

## 2023-01-29 NOTE — PLAN OF CARE
Problem: Discharge Planning  Goal: Discharge to home or other facility with appropriate resources  1/29/2023 0900 by Maria Fernanda Espinoza RN  Outcome: Progressing  1/28/2023 2348 by Leo Peck RN  Outcome: Progressing  Flowsheets  Taken 1/28/2023 2348  Discharge to home or other facility with appropriate resources:   Identify barriers to discharge with patient and caregiver   Arrange for needed discharge resources and transportation as appropriate   Identify discharge learning needs (meds, wound care, etc)  Taken 1/28/2023 2015  Discharge to home or other facility with appropriate resources: Identify barriers to discharge with patient and caregiver     Problem: Pain  Goal: Verbalizes/displays adequate comfort level or baseline comfort level  1/29/2023 0900 by Maria Fernanda Espinoza RN  Outcome: Progressing  1/28/2023 2348 by Leo Peck RN  Outcome: Progressing  Flowsheets  Taken 1/28/2023 2348  Verbalizes/displays adequate comfort level or baseline comfort level:   Encourage patient to monitor pain and request assistance   Assess pain using appropriate pain scale   Administer analgesics based on type and severity of pain and evaluate response  Taken 1/28/2023 2015  Verbalizes/displays adequate comfort level or baseline comfort level: Encourage patient to monitor pain and request assistance     Problem: Safety - Adult  Goal: Free from fall injury  1/29/2023 0900 by Maria Fernanda Espinoza RN  Outcome: Progressing  Flowsheets  Taken 1/29/2023 0855 by Maria Fernanda Espinoza RN  Free From Fall Injury:   Instruct family/caregiver on patient safety   Based on caregiver fall risk screen, instruct family/caregiver to ask for assistance with transferring infant if caregiver noted to have fall risk factors  Taken 1/29/2023 0052 by Leo Peck RN  Free From Fall Injury: Instruct family/caregiver on patient safety  1/28/2023 2348 by Leo Peck RN  Outcome: Progressing  Flowsheets (Taken 1/28/2023 1601 by Maria Fernanda Espinoza RN)  Free From Fall Injury: Instruct family/caregiver on patient safety     Problem: ABCDS Injury Assessment  Goal: Absence of physical injury  1/29/2023 0900 by Bryson Bolaños RN  Outcome: Progressing  Flowsheets  Taken 1/29/2023 0855 by Bryson Bolaños RN  Absence of Physical Injury: Implement safety measures based on patient assessment  Taken 1/29/2023 0052 by Gus Paige RN  Absence of Physical Injury: Implement safety measures based on patient assessment  1/28/2023 2348 by Gus Paige RN  Outcome: Progressing  Flowsheets (Taken 1/28/2023 1601 by Bryson Bolaños RN)  Absence of Physical Injury: Implement safety measures based on patient assessment     Problem: Chronic Conditions and Co-morbidities  Goal: Patient's chronic conditions and co-morbidity symptoms are monitored and maintained or improved  1/29/2023 0900 by Bryson Bolaños RN  Outcome: Progressing  1/28/2023 2348 by Gus Paige RN  Outcome: Progressing  Flowsheets  Taken 1/28/2023 2348  Care Plan - Patient's Chronic Conditions and Co-Morbidity Symptoms are Monitored and Maintained or Improved:   Monitor and assess patient's chronic conditions and comorbid symptoms for stability, deterioration, or improvement   Collaborate with multidisciplinary team to address chronic and comorbid conditions and prevent exacerbation or deterioration  Taken 1/28/2023 2015  Care Plan - Patient's Chronic Conditions and Co-Morbidity Symptoms are Monitored and Maintained or Improved: Monitor and assess patient's chronic conditions and comorbid symptoms for stability, deterioration, or improvement 81.6

## 2023-01-29 NOTE — PROGRESS NOTES
Internal Medicine Progress Note    Patient Name: Amie Miranda   Patient : 1940   Date: 2023   Admit Date: 2023     CC: Shortness of Breath (Patient was seen Monday for shortness of breath. She normally wears 3L NC and was told to increase to 6L NC and follow up. Patient reports that this episode of shortness of breath started Tuesday night. SPO2 on NRB upon arrival 100%. Patient switched to 6L NC and SPO2 97-98%. Patient denies any falls. )     Interval History: Patient seen and examined at bedside. States pain is more tolerable today. Daughter in-law is able to pick her up when she is ready for discharge. No new complaints. Called and spoke with patient's son. Logistically it will be ahrd for anyone to stay with his mother today if she is discharged due to prior commitments. His mother is in an assisted living facility but not with full time dedicated RN or aide    ROS:  As per interval history above. HPI: Bernie Foss is a [de-identified] y.o. female w/PMH ILD (NSIP) on home supplemental O2 (3 L rest, 6 L movement), HTN, DM, Hyperparathyroidism, HLD, GERD, OA, who presented with cc worsening SOB. Patient reports having a baseline supplemental oxygenation requirement at 3 L at rest and 6 L at movement but reports having a worsening of her SOB having desaturations of her oxygenation to the 65s-70s% on 6 L at movement. Patient reports having to rest with improvements of her saturations to the 90s%. She reports that this is the first time that she is having this issue and reports that about 5-6 months ago having to wear her supplemental oxygenation NC even at night. She reports having associated increased fatigue and falling asleep frequently but for the past 6 months. Additionally, patient reports having headaches that have been acute, without dizziness, falls, CP. Patient reports pain at the site of her accessory muscles.  Patient does endorse having body aches, chills, sweats, rhinorrhea, and a sore throat in addition to dry cough. Patient was seen on 1/23/23 for SOB with desaturations and hypoxia on exertion with CTPA negative for PE and pulm consulted. Patient was d/c. She presented to the ED from her assisted living facility of [address] [place] [address] via EMS. EMS reported that the patient was with hypoxia satting at the 60s-70s%  and placed on 10 L for saturation improvement. At the ED, patient presented with VSSsaturating in the 97%. ECG was NSR and CXR was with extensive chronic interstitial fibrosis and no gross acute abnormality. Lab analysis revealed leukocytosis at 13.3, anemia w/Hb at 10.3, mild hyponatremia at 134, hyperglycemia at 216, at troponin negative. LA was at 0.9 and VBG was at pH 7.341/pCO2 45.5/HCO3 24.6. Patient received 60 mg prednisone and subsequently admitted for further workup. \"    \" States really she came in for rib pain from her oxygen tank hitting her side getting out of the car. The other breathing issues she was seen 3 days ago for are not worse since then. \"      Vital Signs:  Patient Vitals for the past 8 hrs:   BP Temp Temp src Pulse Resp SpO2 Weight   01/29/23 0725 (!) 166/74 97.8 °F (36.6 °C) Oral 52 16 99 % --   01/29/23 0348 (!) 156/77 97.5 °F (36.4 °C) Oral 50 16 92 % --   01/29/23 0346 -- -- -- -- -- -- 121 lb 0.5 oz (54.9 kg)       Physical Exam:  Physical Exam  HENT:      Head: Normocephalic and atraumatic. Mouth/Throat:      Mouth: Mucous membranes are dry. Eyes:      Extraocular Movements: Extraocular movements intact. Pupils: Pupils are equal, round, and reactive to light. Cardiovascular:      Rate and Rhythm: Normal rate and regular rhythm. Pulses: Normal pulses. Heart sounds: Normal heart sounds. Pulmonary:      Effort: Pulmonary effort is normal.      Breath sounds: Rales present. Abdominal:      General: Abdomen is flat. Palpations: Abdomen is soft. Musculoskeletal:         General: Tenderness present.  Normal range of motion. Skin:     General: Skin is warm. Capillary Refill: Capillary refill takes less than 2 seconds. Neurological:      Mental Status: She is alert and oriented to person, place, and time. Intake/Output Summary (Last 24 hours) at 1/29/2023 0949  Last data filed at 1/29/2023 0210  Gross per 24 hour   Intake 0 ml   Output --   Net 0 ml        Medications:   enoxaparin  40 mg SubCUTAneous Daily    amLODIPine  10 mg Oral Daily    sodium chloride flush  5-40 mL IntraVENous 2 times per day    cefTRIAXone (ROCEPHIN) IV  1,000 mg IntraVENous Q24H    insulin lispro  0-8 Units SubCUTAneous TID WC    insulin lispro  0-4 Units SubCUTAneous Nightly    lidocaine  1 patch TransDERmal Daily       sodium chloride 100 mL (01/29/23 0439)    dextrose        diclofenac sodium, sodium chloride flush, sodium chloride, ondansetron **OR** ondansetron, polyethylene glycol, acetaminophen **OR** acetaminophen, glucose, dextrose bolus **OR** dextrose bolus, glucagon (rDNA), dextrose, benzonatate     Labs:  CBC:   Recent Labs     01/27/23 0453 01/28/23  0531 01/29/23  0425   WBC 9.2 7.5 6.7   HGB 10.1* 9.1* 9.7*   HCT 30.0* 27.5* 29.0*    222 242   MCV 89.7 91.7 90.1       Renal:    Recent Labs     01/27/23 0452 01/28/23  0531 01/29/23  0425    141 140   K 4.8 4.4 4.3    108 103   CO2 25 24 27   BUN 29* 28* 20   CREATININE 1.1 0.9 1.0   GLUCOSE 200* 127* 149*   CALCIUM 11.1* 10.4 10.7*   MG 1.70* 1.60* 1.90   ANIONGAP 8 9 10         No results for input(s): PROCAL in the last 72 hours. CRP:   Recent Labs     01/27/23 0453   .0*     ESR: No results for input(s): ESR in the last 72 hours. Radiology:  XR RIBS LEFT (2 VIEWS)   Final Result   1. No acute osseous abnormality. XR CHEST PORTABLE   Final Result      Extensive chronic interstitial fibrosis. No gross acute abnormality on portable chest radiograph. Consider chest CT for added sensitivity/specificity.                   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 CAP/URI   - follows with Dr. Daniel Barrientos o/p for pulm  - s/p steroids at presentation  - Negative for COVID/influenza  - D3 of Ceftriaxone and Azithromycin for possible CAP  - Procal: 0.27 > 0.24  - transition to Augmentin at discharge     MSK Pain 2/2 Trauma   (Oxygen tank hitting side of body)  - reproducible on palpation. I suspect this is MSK I nature. Continue Lidocaine patch. Holding off on additional Toradol and add on PRN Voltaren gel  - troponin negative x 2.   - imaging negative for fracture    Chronic condition w/home meds resumed:  Hyperparathyroidism  HTN  DM2  -LDSS  -POCT  -hypoglycemia protocol  -monitor  HLD  GERD  OA       DVT PPx:Lovenox  Diet:  ADULT DIET; Regular   Code status:  Full Code     ELOS: 1 night  Barriers to discharge: Pain  Disposition [facility]    Will discuss with attending physician Dr. Héctor Henderson MD.     Kaykay Ivan MD  Internal Medicine, PGY-1    Addendum to Resident H& P/Progress note:  I have personally seen,examined and evaluated the patient. I have reviewed the current history, physical findings, labs and assessment and plan and agree with note as documented by resident MD ( Liya Berger)  Condition is stable; will d/c her home.     Héctor Henderson MD, FACP

## 2023-01-29 NOTE — CARE COORDINATION
Discharge order noted. Pt to return to [facility], Daughter in law to transport and will bring portable O2 tank. CM spoke with staff at [facility] and faxed AVS. Referral sent to [facility]. No further CM needs. IMM completed wit patient at bedside.    Report# 300 [facility] RN, BSN, [address]  Case Management Department  [phone]

## 2023-01-29 NOTE — PROGRESS NOTES
Physician Progress Note      Aide Montoya  CSN #:                  [id number]  :                       1940  ADMIT DATE:       2023 12:01 AM  [address] [place] DATE:  RESPONDING  PROVIDER #:        Alma Mccann MD          QUERY TEXT:    Pt admitted with PNA. Pt noted to have WBC: 13.3 RR 25 . If possible, please   document in the progress notes and discharge summary if you are evaluating and   /or treating any of the following: The medical record reflects the following:  Risk Factors: [de-identified] y/o female with Interstitial PNA  Clinical Indicators: PNA WBC: 13.3 RR 25 Per Pulmonology consult: \"Chronic   hypoxic respiratory failure 2/2 Nonspecific interstitial pneumonia. \"  Treatment: Resp cultures, Pulmonology consult: azithromycin/rocephin for   likely CAP. Ok to discharge on Augmentin, Prednisone 60 mg in ED  Options provided:  -- Sepsis, present on admission, due to PNA  -- Interstitial PNA without Sepsis  -- Other - I will add my own diagnosis  -- Disagree - Not applicable / Not valid  -- Disagree - Clinically unable to determine / Unknown  -- Refer to Clinical Documentation Reviewer    PROVIDER RESPONSE TEXT:    This patient has interstitial PNA without Sepsis.     Query created by: Lisa Steel on 2023 12:20 PM      Electronically signed by:  Alma Mccann MD 2023 11:46 AM

## 2023-01-29 NOTE — PLAN OF CARE
Problem: Discharge Planning  Goal: Discharge to home or other facility with appropriate resources  1/28/2023 2348 by Asim Parish RN  Outcome: Progressing  Flowsheets  Taken 1/28/2023 2348  Discharge to home or other facility with appropriate resources:   Identify barriers to discharge with patient and caregiver   Arrange for needed discharge resources and transportation as appropriate   Identify discharge learning needs (meds, wound care, etc)    Problem: Pain  Goal: Verbalizes/displays adequate comfort level or baseline comfort level  1/28/2023 2348 by Asim Parish RN  Outcome: Progressing  Flowsheets  Taken 1/28/2023 2348  Verbalizes/displays adequate comfort level or baseline comfort level:   Encourage patient to monitor pain and request assistance   Assess pain using appropriate pain scale   Administer analgesics based on type and severity of pain and evaluate response    Problem: Safety - Adult  Goal: Free from fall injury  1/28/2023 2348 by Asim Parish RN  Outcome: Progressing  Flowsheets (Taken 1/28/2023 1601 by Mallory Pederson, RN)  Free From Fall Injury: Instruct family/caregiver on patient safety     Problem: ABCDS Injury Assessment  Goal: Absence of physical injury  1/28/2023 2348 by Asim Parish RN  Outcome: Progressing  [address] (Taken 1/28/2023 1601 by Mallory Pederson, RN)  Absence of Physical Injury: Implement safety measures based on patient assessment     Problem: Chronic Conditions and Co-morbidities  Goal: Patient's chronic conditions and co-morbidity symptoms are monitored and maintained or improved  1/28/2023 2348 by Asim Parish RN  Outcome: Progressing  Flowsheets  Taken 1/28/2023 [facility] Patient's Chronic Conditions and Co-Morbidity Symptoms are Monitored and Maintained or Improved:   Monitor and assess patient's chronic conditions and comorbid symptoms for stability, deterioration, or improvement   Collaborate with multidisciplinary team to address chronic and comorbid conditions and prevent exacerbation or deterioration

## 2023-01-29 NOTE — DISCHARGE INSTR - COC
Continuity of Care Form    Patient Name: Amie Miranda   :  1940  MRN:  [id number]    Admit date:  2023  Discharge date:  2023    Code Status Order: Full Code   Advance Directives:     Admitting Physician:  No admitting provider for patient encounter.   PCP: Rafita Haque MD    Discharging Nurse: Lizett [facility] Unit/Room#: 6141/8626-47  Discharging Unit Phone Number: [phone]    Emergency Contact:   Extended Emergency Contact Information  Primary Emergency Contact: [address] [facility] Phone: [phone]  Mobile Phone: [phone]  Relation: Child  Secondary Emergency Contact: [address] [facility], [facility] Phone: [phone]  Mobile Phone: [phone]  Relation: Child    Past Surgical History:  Past Surgical History:   Procedure Laterality Date    ANKLE SURGERY Right 2013    torn tendon    BREAST BIOPSY      unsure of which breast    BRONCHOSCOPY N/A 2019    BRONCHOSCOPY WITH BAL AND TRANSBRONCHIAL BIOPSIES performed by Lizeth England MD at [place] [address]  10/11/2010    Dr. Aliya Tillman , polyps and diverticulosis    COLONOSCOPY  2002    Dr. Sintia Renteria, Diverticulosis    COLONOSCOPY  2015    Dr. Aliya Tillman- diverticulosis, sessile polyp, 5 years     COLONOSCOPY  2016    Dr Reagan Billy; Diverticulosis    COLONOSCOPY  2016    Dr Aliya Tillman,     CYSTOSCOPY Left 2022    CYSTOSCOPY URETERAL STENT INSERTION performed by Christine Patterson MD at [address] [place] [address] Drive Right 07/15/2020    PHACOEMULSIFICATION OF CATARACT RIGHT EYE WITH INTRAOCULAR LENS IMPLANT performed by Colby Holman MD at [address] [facility], [address] Bilateral 2020    BILATERAL LUMBAR THREE, LUMBAR FOUR, LUMBAR FIVE RADIOFREQUENCY ABLATION SITE CONFIRMED BY FLUOROSCOPY performed by Leila Maciel MD at [address] [facility] Bilateral 2020    BILATERAL LUMBAR FOUR TRANSFORAMINAL EPIDURAL STEROID INJECTION SITE CONFIRMED BY FLUOROSCOPY performed by Leonard Jimenes MD at [address] [employer] [facility] Bilateral 07/07/2020    BILATERAL LUMBAR FOUR TRANSFORAMINAL EPIDURAL STEROID INJECTION SITE CONFIRMED BY FLUOROSCOPY performed by Leonard Jimenes MD at [address] [employer] [facility] Bilateral 08/04/2020    BILATERAL LUMBAR THREE, LUMBAR FOUR, LUMBAR FIVE DORSAL RAMUS MEDIAL BRANCH BLOCK SITE CONFIRMED BY FLUOROSCOPY performed by Leonard Jimenes MD at [facility] [employer] [facility] Bilateral 08/18/2020    BILATERAL LUMBAR THREE, LUMBAR FOUR, LUMBAR FIVE DORSAL RAMUS MEDIAL BRANCH BLOCK SITE CONFIR,ED BY FLUOROSCOPY performed by Leonard Jimenes MD at [place] [address] Right     UPPER GASTROINTESTINAL ENDOSCOPY  08/17/2011    Dr. Tj Villanueva - moderate gastritis , hiatal hernia     UPPER GASTROINTESTINAL ENDOSCOPY  04/20/2015    Dr. Tj Villanueva - polyps removed, diverticulosis, follow up in 5 years    [facility]  08/16/2012    DR. Ramos - with mesh       Immunization History:   Immunization History   Administered Date(s) Administered    COVID-19, PFIZER Bivalent BOOSTER, DO NOT Dilute, (age 12y+), IM, 30 mcg/0.3 mL 10/03/2022    COVID-19, PFIZER GRAY top, DO NOT Dilute, (age 15 y+), IM, 30 mcg/0.3 mL 04/28/2022    COVID-19, PFIZER PURPLE top, DILUTE for use, (age 15 y+), 30mcg/0.3mL 01/26/2021, 01/26/2021, 02/22/2021, 02/22/2021, 10/01/2021    Influenza 09/28/2010, 11/03/2011    Influenza A ([id number]) Vaccine PF IM 12/21/2009    Influenza Vaccine, unspecified formulation 10/15/2016, 11/21/2017, 10/02/2018    Influenza Virus Vaccine 10/02/2009, 10/05/2012, 10/04/2013, 09/30/2014, 10/05/2015, 10/15/2016, 12/09/2019    Influenza Whole 10/04/2013, 09/30/2014, 10/05/2015    Influenza, FLUZONE (age 72 y+), High Dose, 0.7mL 08/26/2020, 08/26/2020    Influenza, High Dose (Fluzone 65 yrs and older) 09/01/2011, 12/09/2019, 09/14/2021    Meningococcal B, OMV (Bexsero) 11/22/2017    Pneumococcal Conjugate 13-valent (Tamcgnm95) 05/20/2015    Pneumococcal Polysaccharide (Ktpbwcvyv62) 01/01/2002, 02/01/2009, 02/28/2012, 11/05/2019, 07/14/2021    Td, unspecified formulation 11/10/2009    Tdap (Boostrix, Adacel) 12/31/2016, 05/03/2017    Zoster Recombinant (Shingrix) 03/04/2020, 03/04/2020, 06/22/2020       Active Problems:  Patient Active Problem List   Diagnosis Code    Diabetes mellitus type 2 in nonobese ([address] E11.9    Essential hypertension I10    Pure hypercholesterolemia E78.00    GERD (gastroesophageal reflux disease) K21.9    Left flank pain R10.9    Mixed anxiety depressive disorder F41.8    Anxiety F41.9    Chronic interstitial lung disease (HCC) J84.9    Acute on chronic respiratory failure with hypoxia ([facility]) J96.21    NSIP (nonspecific interstitial pneumonia) ([facility]) J84.89    Left hip pain M25.552    Chronic midline low back pain without sciatica M54.50, G89.29    Other age-related cataract H25.89    Left knee pain M25.562    Decreased appetite R63.0    Major depressive disorder in partial remission ([address] F32.4    Unintentional weight loss R63.4    Hypercalcemia E83.52    Normocytic anemia D64.9    Trigger finger, acquired M65.30    Kidney stone N20.0    Left ureteral calculus N20.1    Hyperparathyroidism ([address] E21.3    SOB (shortness of breath) R06.02    Chronic respiratory failure with hypoxia ([facility] J96.11    Left-sided chest pain R07.9       Isolation/Infection:   Isolation            No Isolation          Patient Infection Status       Infection Onset Added Last Indicated Last Indicated By Review Planned Expiration Resolved Resolved By    None active    Resolved    COVID-19 (Rule Out) 01/26/23 01/26/23 01/26/23 Respiratory Panel, Molecular, with COVID-19 (Restricted: peds pts or suitable admitted adults) (Ordered)   01/26/23 Rule-Out Test Resulted    COVID-19 (Rule Out) 01/23/23 01/23/23 01/23/23 COVID-19, Rapid (Ordered)   01/23/23 Rule-Out Test Resulted ESBL (Extended Spectrum Beta Lactamase) 07/11/20 07/13/20 07/11/20 Culture, Urine   02/23/22 Vaishali Curiel RN    >12 mo, no longer required    MDRO (multi-drug resistant organism) 07/11/20 07/13/20 07/11/20 Culture, Urine   02/23/22 Vaishali Curiel RN    >12 mo, no longer required            Nurse Assessment:  Last Vital Signs: BP (!) 166/74   Pulse 52   Temp 97.8 °F (36.6 °C) (Oral)   Resp 16   Ht 5' 2\" (1.575 m)   Wt 121 lb 0.5 oz (54.9 kg)   SpO2 99%   BMI 22.14 kg/m²     Last documented pain score (0-10 scale): Pain Level: 0  Last Weight:   Wt Readings from Last 1 Encounters:   01/29/23 121 lb 0.5 oz (54.9 kg)     Mental Status:  oriented and alert    IV Access:  - None    Nursing Mobility/ADLs:  Walking   Assisted  Transfer  Assisted  Bathing  Assisted  Dressing  Assisted  Toileting  Assisted  Feeding  Independent  Med Admin  Independent  Med Delivery   whole    Wound Care Documentation and Therapy:  Incision 08/16/12 Abdomen Mid (Active)   Number of days: 7560       Incision 04/01/13 Foot Right (Active)   Number of days: 5761       Incision 02/24/22 (Active)   Number of days: 338        Elimination:  Continence: Bowel: Yes  Bladder: Yes  Urinary Catheter: None   Colostomy/Ileostomy/Ileal Conduit: No       Date of Last BM: 1/28/2023    Intake/Output Summary (Last 24 hours) at 1/29/2023 1041  Last data filed at 1/29/2023 0210  Gross per 24 hour   Intake 0 ml   Output --   Net 0 ml     I/O last 3 completed shifts: In: 120 [P.O.:120]  Out: 0     Safety Concerns:      At Risk for Falls    Impairments/Disabilities:      Vision and Hearing    Nutrition Therapy:  Current Nutrition Therapy:   - Oral Diet:  General    Routes of Feeding: Oral  Liquids: No Restrictions  Daily Fluid Restriction: no  Last Modified Barium Swallow with Video (Video Swallowing Test): not done    Treatments at the Time of Hospital Discharge:   Respiratory Treatments: None  Oxygen Therapy:  is on oxygen at 6 L/min per nasal cannula. Ventilator:    - No ventilator support    Rehab Therapies: Physical Therapy and Occupational Therapy  Weight Bearing Status/Restrictions: No weight bearing restrictions  Other Medical Equipment (for information only, NOT a DME order):  walker  Other Treatments: ***    Patient's personal belongings (please select all that are sent with patient):  Hearing Aides bilateral    RN SIGNATURE:  Electronically signed by Gable Closs, RN on 1/29/23 at 10:43 AM EST    CASE MANAGEMENT/SOCIAL WORK SECTION    Inpatient Status Date: ***    Readmission Risk Assessment Score:  Readmission Risk              Risk of Unplanned Readmission:  15           Discharging to Facility/ Agency   Name:   Address:  Phone:  Fax:    Dialysis Facility (if applicable)   Name:  Address:  Dialysis Schedule:  Phone:  Fax:    / signature: {Esignature:[id number]}    PHYSICIAN SECTION    Prognosis: {Prognosis:4467919436}    Condition at Discharge: Jese Maynard Patient Condition:807842623}    Rehab Potential (if transferring to Rehab): {Prognosis:8313424109}    Recommended Labs or Other Treatments After Discharge: ***    Physician Certification: I certify the above information and transfer of Apurva Padilla  is necessary for the continuing treatment of the diagnosis listed and that she requires {Admit to Appropriate Level of Care:33540} for {GREATER/LESS:542451003} 30 days.      Update Admission H&P: [facility]}    PHYSICIAN SIGNATURE:  {Esignature:[id number]}

## 2023-01-30 ENCOUNTER — CARE COORDINATION (OUTPATIENT)
Dept: CARE COORDINATION | Age: 83
End: 2023-01-30

## 2023-01-30 NOTE — CARE COORDINATION
Ambulatory Care Coordination Note  1/30/2023    ACC: Jad Summers, OVIDIO  ACM called to touch base with patient after recent visit to the ED. Patient was D/C home with increase oxygen needs for her lung disease. She notes she had gone to the hospital for rib pain, but was kept due to her oxygen requirements. Rib pain has much improved with using topical creams. She notes she is just very weak. Oxygen requirements remain at 6L. She notes she is doing well back at home and had just arrived downstairs for breakfast.    Patient notes HHC though Mariah Ang was going to start coming for visits and she hopes to regain strength to start pulmonary rehab at [facility]. Patient notes taking all medication as prescribed and has no questions at this time. Patient has a follow up with PCP scheduled for 2/16/2023 and does not wish to move this up at this time. Patient notes she will call if she feels it needs to be sooner. Patient denies any needs at his times. I back at her [facility]. Patient declines the need for home monitoring at this time. Feels it will cause more anxiety. She is able to monitor oxygen levels with her own equipment and will have [facility] visits. Plan   Follow up two weeks  Assess for further needs  HHC Update  Pulm update  Re enforce education and goals      Offered patient enrollment in the Remote Patient Monitoring (RPM) program for in-home monitoring: Patient declined. Lab Results       None            Care Coordination Interventions    Referral from Primary Care Provider: No  Suggested Interventions and Community Resources  Diabetes Education: In Process  Home Health Services: In Process (Comment: [facility])  Pulmonary Rehab: In Process (Comment: [facility]  Zone Management Tools: In Process (Comment: DM 11.29.22)          Goals Addressed                   This Visit's Progress     Conditions and Symptoms   On track     I will schedule office visits, as directed by my provider.   I will keep my appointment or reschedule if I have to cancel. I will notify my provider of any barriers to my plan of care. I will follow my Zone Management tool to seek urgent or emergent care. I will notify my provider of any symptoms that indicate a worsening of my condition. Barriers: lack of support and lack of education  Plan for overcoming my barriers: AMBAR  Confidence: 9/10  Anticipated Goal Completion Date: 3.20.23         Reduce Falls    On track     I will reduce my risk of falls by the following: Use walking aids like cane or walker  Follow through on orders for PT    Barriers: financial and stress  Plan for overcoming my barriers: Work with the RN, AMBAR  Confidence: 8/10  Anticipated Goal Completion Date: 7/7/22              Prior to Admission medications    Medication Sig Start Date End Date Taking? Authorizing Provider   amoxicillin-clavulanate (AUGMENTIN) 875-125 MG per tablet Take 1 tablet by mouth 2 times daily for 5 days 1/30/23 2/4/23  Shelby Walker DO   tiZANidine (ZANAFLEX) 2 MG tablet Take 1 tablet by mouth 3 times daily as needed (muscle pain) 1/25/23   Gunjan Akins MD   benzonatate (TESSALON) 100 MG capsule Take 1-2 capsules by mouth 3 times daily as needed for Cough 1/25/23   Gunjan Akins MD   esomeprazole (NEXIUM) 40 MG delayed release capsule Take 1 capsule by mouth every morning (before breakfast) 1/4/23   Gunjan Akins MD   Blood Pressure Monitor RYDER Use as directed for blood pressure monitoring 11/17/22   Gunjan Akins MD   glucose monitoring (FREESTYLE FREEDOM) kit 1 kit by Does not apply route daily 11/17/22   Gunjan Akins MD   Lancets MISC 1 each by Does not apply route daily 11/17/22   Gunjan Akins MD   blood glucose monitor strips Test 1 times a day & as needed for symptoms of irregular blood glucose. Dispense sufficient amount for indicated testing frequency plus additional to accommodate PRN testing needs.  11/17/22   Gunjan Akins MD   cinacalcet (SENSIPAR) 30 MG tablet Take 90 mg by mouth daily 11/2/22 1/31/23  Historical Provider, MD   rosuvastatin (CRESTOR) 40 MG tablet TAKE ONE TABLET BY MOUTH DAILY 8/10/22   Tiffanie Dominguez MD   escitalopram (LEXAPRO) 20 MG tablet TAKE ONE TABLET BY MOUTH DAILY 8/10/22   Tiffanie Dominguez MD   amLODIPine (NORVASC) 10 MG tablet TAKE ONE TABLET BY MOUTH DAILY  Patient taking differently: 5 mg TAKE ONE TABLET BY MOUTH DAILY 8/10/22   Tiffanie Dominguez MD   ciclopirox (PENLAC) 8 % solution Apply to adjacent skin and affected nails daily. Remove with alcohol every 7 days. 7/1/22   Tiffanie Dominguez MD   lidocaine (LIDODERM) 5 % Place 1 patch onto the skin daily as needed for Pain 12 hours on, 12 hours off. Historical Provider, MD   cyanocobalamin 1000 MCG tablet Take 1,000 mcg by mouth daily    Historical Provider, MD   Cholecalciferol (VITAMIN D3) 25 MCG (1000 UT) CAPS TAKE ONE CAPSULE BY MOUTH DAILY 11/12/21   AMEE Kulkarni CNP   Probiotic Product (PROBIOTIC ACIDOPHILUS BIOBEADS) CAPS Take 1 capsule by mouth daily 2/23/21   AMEE Jimenez CNP   vitamin E 1000 UNITS capsule Take 1,000 Units by mouth daily.       Historical Provider, MD       Future Appointments   Date Time Provider Romina Shipman   2/16/2023 11:40 AM Tiffanie Dominguez MD [facility] [place] - [facility]    and   General Assessment    Do you have any symptoms that are causing concern?: Yes  Progression since Onset: Intermittent - Waxing/Waning  Reported Symptoms: Weakness

## 2023-02-06 RX ORDER — ESCITALOPRAM OXALATE 20 MG/1
TABLET ORAL
Qty: 90 TABLET | Refills: 1 | Status: SHIPPED | OUTPATIENT
Start: 2023-02-06

## 2023-02-06 RX ORDER — ROSUVASTATIN CALCIUM 40 MG/1
TABLET, COATED ORAL
Qty: 90 TABLET | Refills: 1 | Status: SHIPPED | OUTPATIENT
Start: 2023-02-06

## 2023-02-08 ENCOUNTER — OFFICE VISIT (OUTPATIENT)
Dept: PULMONOLOGY | Age: 83
End: 2023-02-08
Payer: MEDICARE

## 2023-02-08 VITALS
SYSTOLIC BLOOD PRESSURE: 128 MMHG | WEIGHT: 120 LBS | DIASTOLIC BLOOD PRESSURE: 63 MMHG | BODY MASS INDEX: 22.08 KG/M2 | HEIGHT: 62 IN | OXYGEN SATURATION: 82 % | HEART RATE: 71 BPM

## 2023-02-08 DIAGNOSIS — J84.89 NSIP (NONSPECIFIC INTERSTITIAL PNEUMONIA) (HCC): Primary | ICD-10-CM

## 2023-02-08 PROCEDURE — 3074F SYST BP LT 130 MM HG: CPT | Performed by: INTERNAL MEDICINE

## 2023-02-08 PROCEDURE — 99213 OFFICE O/P EST LOW 20 MIN: CPT | Performed by: INTERNAL MEDICINE

## 2023-02-08 PROCEDURE — 1123F ACP DISCUSS/DSCN MKR DOCD: CPT | Performed by: INTERNAL MEDICINE

## 2023-02-08 PROCEDURE — 3078F DIAST BP <80 MM HG: CPT | Performed by: INTERNAL MEDICINE

## 2023-02-08 RX ORDER — LIDOCAINE 50 MG/G
1 PATCH TOPICAL DAILY
Qty: 30 PATCH | Refills: 0 | Status: SHIPPED | OUTPATIENT
Start: 2023-02-08 | End: 2023-03-10

## 2023-02-08 ASSESSMENT — ENCOUNTER SYMPTOMS
SHORTNESS OF BREATH: 1
WHEEZING: 0
CHEST TIGHTNESS: 0
SINUS PAIN: 0
EYE REDNESS: 0
COUGH: 0
FACIAL SWELLING: 0

## 2023-02-08 NOTE — PROGRESS NOTES
[facility] Pulmonary and Critical Care    Outpatient Note    Subjective:   CHIEF COMPLAINT:   Chief Complaint   Patient presents with    Follow-Up from Hospital     Interval history on 2/8/23  Patient was admitted to the hospital on 1/26/23. She had left side chest wall pain. There ws no rib fractures. Pain is getting better. She is still taking lidocaine patches. Interval history on 11/1/22  Overall same general condition. She has new O2 tank. She is happy with the new system except it is hard for her to change the regulator on the tank and has to have someone changing it for her daily. She was seen by nephrology and will have a 2nd appointment tomorrow to start treatment for hypercalcemia     Interval history on 9/20/22  She is complaining of SOB and dyspnea  with minimal exertion. She looks weaker and lost significant wt. She has poor appetite and drinking water all the time. There is no nausea or vomiting. She is also complaining of left neck pain under the jaw and intermittent headache    Interval history on 7/25/22  Main complain is OLIVER. She is selling her house and moving to assisted living. Interval history on 4/4/22  She was admitted to the hospital on 2/23/22 and discharged on 2/25. She was treated for left sided hydronephrosis and E.coli UTI secondary to impacted ureteral stone. She had JJ stent but was then removed. Her main complain at this time is the increase in O2 requirement. She is on it all the time now. There is no increase in cough or sputum production. No fever or chills. Interval history on 10/19/21  She is complaining of cough with clear sputum. Gets SOB when doing things in hurry. She use O2 on PRN basis   There is no fever or chills. Interval history on 4/5/21  Overall she feels fine. She started driving. Mask is making her SOB. Walking is limited due to back pain.   She still has O2 but came to the office without it     There is no cough, however she does have sputum production in the morning. He had EGD 2 weeks ago with esophageal dilatation. She also has hiatal hernia for which she is on PPI. Dose was increased to 40mg recently. Interval history on 12/17/20  Overall she feels her breathing is better. She gets SOB with exertion. sats goes down to 79% with exertion. Appetite is poor since  passed away. Interval history on 6/25/20  Overall she feels fine. She is on 2 liters of O2.  sats at rest are 99%. She is following social distancing   She stopped using prednisone 3 weeks ago. However she is getting epidural steroids due to weakness. It was tapered off. Her main complain at this time is she is onO2 all the time. O2 level when sitting even without O2 it is 98-99%. When she walk it goes down to 90% per pt report. She is currently on 2 liters. Interval history on 3/5/20  Lost her  last month. She came accompanied by a family member who is staying with her these days. There is no SOB at rest.  She is using O2 at home with exertion. Overall she feels her breathing is better. She is still on prednisone 15mg daily. Interval history on 2/3/20  No major events since I saw her last.  She doesn't use O2 unless she feels she needs it. She feels better. Main complain today is left hip pain. Interval history on 1/7/20  Patient is here today for regular f/u. Overall she feels she is getting stronger,. She is not using O2 supplement when getting outside as the portable concentrator bothers her due to its weight. She remains on prednisone 15mg daily     Interval history on 12/10/19  She feels better and denies have SOB at rest however she does have SOB on exertion for which she has to stop. She doesn't like using O2.   Pulse ox at rest is 97%, however it goes down within few minutes of exertion    Interval history 11/5/2019  Patient is here today for post hospital follow-up. She was admitted to the hospital on 10/13/2019 and discharged on 10/16/2019. She was admitted with acute hypoxic respiratory failure and pneumonia. She was treated with IV antibiotics and steroids and was discharged on tapering dose prednisone. She was discharged on oxygen. At the time of evaluation she was on room air and she was feeling better. She denied increasing cough or sputum production. There is no fever or chills    Interval history on 9/10/19  Overall she feels better. Coughing is less, and she is able to walk longer distances. Pulse ox is in the mid 90s. There is no fever or chills    Interval history on 7/26/19  Continue to have cough, though she was given taper dose steroids and felt better on it. There is no fever or chills. Sputum is clear. She was seen by rheumatology, there is no evidence of active rheumatological illness. Anti SSA, SSB, CK was ordered. HPI:  On 7/1/19   The patient is [de-identified] y.o. female who presents today for a new patient visit for SOB. This is not entirely new, but apparently it is slowly getting worse. In the mornings feels out of breath while doing her usual ADL but this gets better as she sit to have breakfast.    She gets SOB on brisk walking and going up hills. She was diagnosed with walking pneumonia in May. At that time she was tired and out of breath. She has chronic cough with occasional sputum production, which is white in color. There is some wt loss ~ 10 lbs after getting pneumonia but gained back two lbs     No nasal congestion but has throat congestion. No post nasal drip. Has GERD for about two years. On omeprazole once daily   She has dry eyes, no dryness in the mouth  No hx of joint pain, warmth or stiffness. No hx of rash.       Past Medical History:    Past Medical History:   Diagnosis Date    Diabetes mellitus ([facility] [address]     Essential hypertension, benign     GERD (gastroesophageal reflux disease)     Hyperlipidemia     Interstitial lung disease (HCC)     Osteoarthrosis, unspecified whether generalized or localized, unspecified site     osteoarthritis lower leg    Osteopenia     Shingles        Social History:    Social History     Tobacco Use   Smoking Status Never   Smokeless Tobacco Never       Family History:  Family History   Problem Relation Age of Onset    Heart Disease Mother     Rheum Arthritis Mother     Heart Disease Father        Current Medications:  Current Outpatient Medications on File Prior to Visit   Medication Sig Dispense Refill    escitalopram (LEXAPRO) 20 MG tablet TAKE ONE TABLET BY MOUTH DAILY 90 tablet 1    rosuvastatin (CRESTOR) 40 MG tablet TAKE ONE TABLET BY MOUTH DAILY 90 tablet 1    tiZANidine (ZANAFLEX) 2 MG tablet Take 1 tablet by mouth 3 times daily as needed (muscle pain) 30 tablet 0    benzonatate (TESSALON) 100 MG capsule Take 1-2 capsules by mouth 3 times daily as needed for Cough 60 capsule 2    esomeprazole (NEXIUM) 40 MG delayed release capsule Take 1 capsule by mouth every morning (before breakfast) 90 capsule 1    Blood Pressure Monitor RYDER Use as directed for blood pressure monitoring 1 each 0    glucose monitoring (FREESTYLE FREEDOM) kit 1 kit by Does not apply route daily 1 kit 0    Lancets MISC 1 each by Does not apply route daily 100 each 3    blood glucose monitor strips Test 1 times a day & as needed for symptoms of irregular blood glucose. Dispense sufficient amount for indicated testing frequency plus additional to accommodate PRN testing needs. 100 strip 3    amLODIPine (NORVASC) 10 MG tablet TAKE ONE TABLET BY MOUTH DAILY (Patient taking differently: 5 mg TAKE ONE TABLET BY MOUTH DAILY) 90 tablet 1    ciclopirox (PENLAC) 8 % solution Apply to adjacent skin and affected nails daily. Remove with alcohol every 7 days.  6 mL 0    lidocaine (LIDODERM) 5 % Place 1 patch onto the skin daily as needed for Pain 12 hours on, 12 hours off.      cyanocobalamin 1000 MCG tablet Take 1,000 mcg by mouth daily      Cholecalciferol (VITAMIN D3) 25 MCG (1000 UT) CAPS TAKE ONE CAPSULE BY MOUTH DAILY 90 capsule 1    Probiotic Product (PROBIOTIC ACIDOPHILUS BIOBEADS) CAPS Take 1 capsule by mouth daily 90 capsule 0    vitamin E 1000 UNITS capsule Take 1,000 Units by mouth daily. cinacalcet (SENSIPAR) 30 MG tablet Take 90 mg by mouth daily       No current facility-administered medications on file prior to visit. REVIEW OF SYSTEMS:    Review of Systems   Constitutional:  Positive for unexpected weight change. Negative for chills and fever. HENT:  Negative for congestion, facial swelling, postnasal drip and sinus pain. Eyes:  Negative for redness. Dry eyes   Respiratory:  Positive for shortness of breath (on exertion). Negative for cough, chest tightness and wheezing. Cardiovascular:  Negative for chest pain, palpitations and leg swelling. Gastrointestinal:         GERD   Skin:  Negative for rash. Neurological:  Negative for weakness. Psychiatric/Behavioral:  The patient is not nervous/anxious. All other systems reviewed and are negative. Objective:   PHYSICAL EXAM:        VITALS:  /63 (Site: Right Upper Arm, Position: Sitting, Cuff Size: Large Adult)   Pulse 71   Ht 5' 2\" (1.575 m)   Wt 120 lb (54.4 kg)   SpO2 (!) 82% Comment: 3L  BMI 21.95 kg/m²     Physical Exam  Vitals reviewed. Constitutional:       Appearance: She is well-developed. HENT:      Head: Normocephalic and atraumatic. Eyes:      Pupils: Pupils are equal, round, and reactive to light. Neck:      Vascular: No JVD. Cardiovascular:      Rate and Rhythm: Normal rate and regular rhythm. Heart sounds: No murmur heard. Pulmonary:      Effort: Pulmonary effort is normal. No respiratory distress. Breath sounds: No stridor. Rales present. No wheezing. Abdominal:      General: Bowel sounds are normal.      Palpations: Abdomen is soft.    Musculoskeletal: General: No deformity. Cervical back: Neck supple. Skin:     General: Skin is warm and dry. Neurological:      Mental Status: She is alert and oriented to person, place, and time. Psychiatric:         Behavior: Behavior normal.       DATA:    CT chest with contrast       HISTORY: Pulmonary nodules. COMPARISON: February 7, 2019. Individualized dose optimization technique was used in order to meet ALARA standards for radiation dose reduction. In addition to vendor specific dose reduction algorithms, the dose reduction techniques vary based on the specific scanner utilized but    frequently include automated exposure control, adjustment of the mA and/or kV according to patient size, and use of iterative reconstruction technique. FINDINGS:       Basilar predominant groundglass opacity with areas of traction bronchiectasis are similar to the prior examination. There is no definite honeycombing identified. 7 mm nodule identified in the right upper lobe (image 38, series 200) is unchanged since prior exam.       5 mm nodule in the left upper lobe (image 27, series 2) unchanged. No new or enlarging pulmonary nodules. A small pericardial effusion is identified. Mildly enlarged mediastinal lymph nodes are stable since the prior examination. Hiatal hernia is present. There is no destructive bone lesion. Impression   1. Stable pulmonary nodules. Continued follow-up is indicated. 2. Stable appearance of interstitial lung disease as described above. There is no definite honeycombing. The favored differential diagnosis is nonspecific interstitial pneumonia. This would be an atypical appearance for usual interstitial pneumonia. 3. Mediastinal adenopathy, stable. 4. Hiatal hernia. Echo on 6/11/19  Normal left ventricle size. There is mild concentric left ventricular   hypertrophy.  Overall left ventricular systolic function appears normal with   an ejection fraction visually estimated at 55%. No regional wall motion   abnormalities are noted. Diastolic filling parameters suggest grade II   diastolic dysfunction. Trace mitral regurgitation is present. Trivial tricuspid regurgitation. Estimated pulmonary artery systolic   pressure is elevated at 44 mmHg assuming a right atrial pressure of 3 mmHg. PFT  DATE OF PROCEDURE:  06/11/2019     INDICATION:  Shortness of breath. BMI:  31.6. TOBACCO HISTORY:  Never smoked. Spirometry data is acceptable and reproducible. Lung volumes performed via plethysmography. Room air oxygen saturation is 92%. SPIROMETRY:  FEV1 to FVC ratio is 80%. Prebronchodilator FEV1 is 1.12,  which is 58% of predicted. Postbronchodilator FEV1 is 1.36 which is 71%  of predicted for a 21% increase. Prebronchodilator FVC is 1.4, which is  54% of predicted. Postbronchodilator FVC is 1.84 which is 71% of  predicted for 31% increase. Lung volumes showed total lung capacity of 3.04 which is 62% of  predicted. Residual volume is 1.46 which is 64% of predicted. Diffusion capacity is 9.61, which is 45% of predicted. This is not  adjusted for hemoglobin. IMPRESSION:  1. No obstructive defect. 2.  There is a significant response to bronchodilators in small and  large airways. 3.  Moderate restrictive defect is present. 4.  Moderate decrease in diffusion capacity    CT scan on 10/8/19  1. Stable findings compared prior study. Nonspecific interstitial pneumonia is favored in the absence of lower lobe predominance and lack of honeycomb changes. 2. Stable right upper lobe pulmonary nodule. 3. Annual surveillance with high-resolution CT is recommended for. Large hiatal hernia. 5. Coronary artery calcification     01/20/2020   PULMONARY FUNCTION TEST     INDICATIONS:  Interstitial lung disease. BMI:  31.5.     TOBACCO HISTORY:  Never smoked. Spirometry data is acceptable and reproducible.      Lung volumes performed via plethysmography. Room air oxygen saturation is 97%. SPIROMETRY:  FEV1 to FVC ratio is 86%. Prebronchodilator FEV1 is 1.57,  which is 91% of predicted. Prebronchodilator FVC is 1.83, which is 78%  of predicted. Lung volumes show total lung capacity of 2.88, which is 63% of  predicted. Residual volume is 0.92, which is 42% of predicted. Diffusion capacity is 16.02, which is 80% of predicted. IMPRESSION:  1. No obstructive defect. 2.  There is a mild restrictive defect. 3.  Diffusion capacity is normal.  4.  When compared to previous pulmonary function tests dated 10/08/2019,  there is a mild-to-moderate drop in FVC, FEV1, and total lung capacity. Diffusion capacity is substantially higher, almost double making me  wonder his diffusion capacity in 10/2019 was real.     Six-minute walk was initially performed on room air. Of note, the  patient is on home oxygen 2 liters. At rest on room air, the patient's  oxygen saturation was 98%. Within 2 minutes, it dropped to 85%  necessitating pause and walk to place 2 liters of oxygen on. With 2  liters of oxygen, her oxygen saturation maintained 93 to 97%. The  patient walked a total of 660 feet. The patient has significant oxygen desaturation with submaximal  exercise. She does qualify for home oxygen with exertion and oxygen  flow rate is deemed to be 2 liters. pulmonary function tests and 6MWT on 6/2/20  6/3/2020 10:20 AM  Pulmonary Function Test:     Indication: NSIP    Test comment:     Spirometry data is acceptable and reproducible. Maximum effort given during the test.    Pulse ox is 95% on room air    Estimated body mass index is 26.15 kg/m² as calculated from the   following:    Height as of 5/5/20: 5' 2.01\" (1.575 m). Weight as of 5/26/20: 143 lb (64.9 kg).     Spirometry data:    FEV1/FVC: 87. Predicted ratio 74    FEV1 1.59L, which is 87% predicted    FVC is 1.82L, which is 74% predicted    Lung Volumes:    TLC (by Plethysmography) is 2.63L, which is 55% predicted    RV is 0.67L which is 29% predicted    Diffusion Capacity:    DLCO is 5.43 which is 26% predicted    Impression:    1. There is no obstruction present    2. There is restriction. Based on FEV1 it is considered mild   however based on TLC it is considered severe    3. There is severe reduction in diffusion capacity    Comment:   PFT was compared to the one on 1/20/20 - FEV1 and FVC are stable. SIX-MINUTE WALK:    A six-minute walk was started on room air. Patient was placed on   2 liters of O2 at minute 4   The patientwalked a total of 510 feet. She did stop or pause   during the walk to place O2 and due to back pain. Baseline  oxygen saturation 95%. The lowest oxygen saturation during the   walk was 82% on minute 3 for which she was placed on 2 liters. O2 recovered to 95-98% during the remaining time of testing. 6MWT was compared to the one we have on 10/8/19:  Sheron Fuel walked decreased from 660ft to 510ft  At that time O2 sats dropped to 86% on minute 4 and placed on O2   on minute 5    Comment: 6MWT is slightly worse than how it was last year on   10/8/19     High res CT on 1/20/21  Stable findings of the chest when compared with the prior study. Findings again favor an NSIP pattern of interstitial lung disease. Stable pulmonary nodularity. CT abdomen on 2/23/22  EXAM: CT ABDOMEN AND PELVIS WITH CONTRAST ON 2/23/2022       INDICATION: Left lower quadrant pain       COMPARISON: CT abdomen/pelvis 7/28/2020       TECHNIQUE: Multidetector CT imaging was obtained from lung bases through pelvis. Axial images and multiplanar reformatted images are provided for review.  Dose modulation, iterative reconstruction and/or weight based adjustments of the mA/kV were utilized    to reduce radiation dose to as low as reasonably achievable       IV Contrast: 80 cc Isovue 370   Oral Contrast: None       LIMITATION: None       FINDINGS: : No additional abnormality       LUNG BASES: Honeycombing is noted in the lung bases. There is a small pericardial effusion. The heart is enlarged. LIVER: The liver is homogeneous in its enhancement. SPLEEN: The spleen is normal in size and attenuation. GALLBLADDER AND BILIARY TREE: No calcified stones are seen. The common bile duct is dilated measuring up to 9 to 10 mm. There is mild intrahepatic ductal dilatation noted as well. PANCREAS: Pancreas appears homogeneous in its enhancement without evidence of enlargement. ADRENAL GLANDS: Normal.       KIDNEYS AND URETERS: The kidneys concentrate contrast bilaterally. There is a 5 mm nonobstructing calculus in the inferior pole of the left kidney. There is moderate to severe left-sided hydronephrosis and hydroureter to the level of the ureterovesicular junction. This is caused by a 5 mm calculus at the left UVJ. There is no right-sided hydronephrosis or hydroureter. No right-sided nephrolithiasis    is visualized. BOWEL: No oral contrast was given. There is a large fetal hernia. The stomach is unremarkable. The duodenum is normal in location. Small bowel is normal in caliber. The colon shows scattered diverticuli. No definite wall thickening or inflammatory    change. Colon is filled with stool. The appendix is normal. The terminal ileum is unremarkable. PERITONEUM/RETROPERITONEUM: No ascites or free air is visualized. LYMPH NODES: No retroperitoneal or pelvic lymphadenopathy is visualized. VESSELS: Aorta is normal in caliber with mild calcification. Celiac and SMA are normal.  Portal and hepatic veins are patent. The splenic vein is patent. The SMV is unremarkable. REPRODUCTIVE ORGANS: The uterus and ovaries are unremarkable. URINARY BLADDER: Normal.       ABDOMINAL WALL: Normal.       BONES: Degenerative changes are seen throughout the lumbar spine.  There is a grade 1 anterolisthesis of L4 and L5.       OTHER FINDINGS: None. Impression       Moderate to severe left-sided hydronephrosis and hydroureter caused by a 5 mm obstructing calculus at the left UVJ. Mild intra and extrahepatic ductal dilatation, indeterminate. MRCP may be helpful for evaluation. Left-sided nephrolithiasis. Large hiatal hernia. Pulmonary fibrosis noted in the lung bases. Small to moderate pericardial effusion     Echo on 6/13/22   Summary   Normal left ventricle size and systolic function with an estimated ejection   fraction of 60%-65%. Mild concentric left ventricular hypertrophy. No regional wall motion abnormalities are seen. Grade I diastolic dysfunction with elevated LVEDP. CT chest high res on 9/1/22  Impression:        Stable interstitial lung disease manifested as extensive subpleural reticulation without definite honeycombing or air trapping. Stable mild bronchiectasis. Stable small pericardial effusion. Assessment:      Diagnosis Orders   1. NSIP (nonspecific interstitial pneumonia) ([facility] [address]          Plan:   [de-identified]years old female with chronic hypoxic respiratory failure due to NSIP. Diagnosis is based on clinical presentation, CT findings and response to steroids. She also has large hiatal hernia      PFT and 6MWT on 6/2/20 are essentially stable. Had bronch on 8/1/19  Started on prednisone @ 20mg on 8/5/19 decrease to 15 mg in Dec. 2019  Tapered off around mid May 2020 due to weakness and back issues. CT scan on 1/20/21 is stable over two years. CT scan on 9/1/22 - stable. CT scan on 1/23/23 is also stable       (PFT on 10/6/21:  TLC improved from 63% to 72%, however FVC decreased from 78% to 68%. RV improved from 42 to 84%.   Overall restriction is stable)      (There is no specific exposure that she or her family can remember  She is not on chronic medication that is known to cause ILD  She does not have pets  Hypersensitivity panel is negative   Rheumatoid factor, GIUSEPPE and sed rate are negative. SSA and SSB are negative  She was seen by rheumatology. There is no evidence of active rheumatic disease. BAL is negative for infectious etiology)     Echo on 6/11/19 with grade II diastolic dysfunction. Clinically euvolemic     Hypercalcemia is a factor contributing to her fatigue,thirst, constipation and high BUN/Cr. She is not a candidate for parathyroid surgery. Follows with nephrology     She is on 3 liters O2 at rest and 6 L with exertion. On 6L it dropped down to 83% on min 3 on 6MWT today in the office. With the setup she has increasing O2 is not practical as she will run out of O2 quickly. Continue O2 3 liters at rest and 6 with exertion.     Lidocaine patch for left side chest pain

## 2023-02-16 ENCOUNTER — HOSPITAL ENCOUNTER (OUTPATIENT)
Dept: GENERAL RADIOLOGY | Age: 83
Discharge: HOME | End: 2023-02-16
Payer: MEDICARE

## 2023-02-16 ENCOUNTER — OFFICE VISIT (OUTPATIENT)
Dept: INTERNAL MEDICINE CLINIC | Age: 83
End: 2023-02-16

## 2023-02-16 VITALS
HEART RATE: 64 BPM | TEMPERATURE: 97.8 F | OXYGEN SATURATION: 92 % | BODY MASS INDEX: 22.28 KG/M2 | WEIGHT: 121.8 LBS | SYSTOLIC BLOOD PRESSURE: 126 MMHG | DIASTOLIC BLOOD PRESSURE: 74 MMHG

## 2023-02-16 DIAGNOSIS — J96.11 CHRONIC RESPIRATORY FAILURE WITH HYPOXIA (HCC): ICD-10-CM

## 2023-02-16 DIAGNOSIS — F32.4 MAJOR DEPRESSIVE DISORDER IN PARTIAL REMISSION, UNSPECIFIED WHETHER RECURRENT (HCC): ICD-10-CM

## 2023-02-16 DIAGNOSIS — J84.89 NSIP (NONSPECIFIC INTERSTITIAL PNEUMONIA) (HCC): ICD-10-CM

## 2023-02-16 DIAGNOSIS — E08.21 DIABETES MELLITUS DUE TO UNDERLYING CONDITION WITH DIABETIC NEPHROPATHY, WITHOUT LONG-TERM CURRENT USE OF INSULIN (HCC): ICD-10-CM

## 2023-02-16 DIAGNOSIS — E21.3 HYPERPARATHYROIDISM (HCC): ICD-10-CM

## 2023-02-16 DIAGNOSIS — M25.562 ACUTE PAIN OF LEFT KNEE: Primary | ICD-10-CM

## 2023-02-16 DIAGNOSIS — M25.562 ACUTE PAIN OF LEFT KNEE: ICD-10-CM

## 2023-02-16 PROCEDURE — 73562 X-RAY EXAM OF KNEE 3: CPT

## 2023-02-16 RX ORDER — AMLODIPINE BESYLATE 10 MG/1
10 TABLET ORAL DAILY
COMMUNITY

## 2023-02-16 NOTE — PROGRESS NOTES
Denise Ferrara (:  1940) is a [de-identified] y.o. female,Established patient, here for evaluation of the following chief complaint(s):  Diabetes, Hypertension, Hyperlipidemia, Discuss Labs, and Knee Pain (Left knee pain x 2 weeks no swelling per pt )         ASSESSMENT/PLAN:  1. Acute pain of left knee  -Check x-ray, most likely musculoskeletal.  Tenderness is very lateral.  -     XR KNEE LEFT (3 VIEWS); Future  2. NSIP (nonspecific interstitial pneumonia) ([address]   -Findings on imaging-stable, has a higher oxygen requirement over the last year. Working on increasing endurance  3. Chronic respiratory failure with hypoxia (HCC)   -Continue supplemental oxygen  4. Hyperparathyroidism ([address]   -Now on Cinacalcet, seeing nephrology  5. Major depressive disorder in partial remission, unspecified whether recurrent (HCC)   -Stable, continue escitalopram 20 mg daily  6. Diabetes mellitus due to underlying condition with diabetic nephropathy, without long-term current use of insulin (HCC)   -A1c is controlled off of medication    Return in about 2 months (around 2023) for chronic lung disease. Subjective   SUBJECTIVE/OBJECTIVE:  HPI    Chest pain seems to be resolving. She was admitted, now on a higher rate of oxygen. She supposed be on 6 L when walking, when she is at home she is a 6 L at rest since she is not easily able to adjust the rate of flow. She has home PT, goal is to increase some of her strength. She does have assistance to get to meals at the dining room in the current facility. She uses a wheelchair outside of her apartment. She is experiencing some left knee pain, this was not present when she was in the hospital.  She did not hit her knee when she fell and has had no trauma to the knee since that fall. It hurts on the outside of the knee, goes up into the thigh and into the calf a bit. It is all on the outside, none in the back. She is on the medication for hyperparathyroidism.   Thinking seems to be a little bit clear, not sure if it is due to the oxygen or the calcium treatment. She is taking the escitalopram for depression, feels like it is working well enough. No significant side effects. Is off of all diabetes medications. Does not eat high sugar foods. Review of Systems       Objective   Physical Exam  Vitals reviewed. Constitutional:       General: She is not in acute distress. Appearance: She is well-developed. Comments: Chronically ill-appearing   HENT:      Head: Normocephalic and atraumatic. Cardiovascular:      Rate and Rhythm: Normal rate and regular rhythm. Heart sounds: Normal heart sounds. Pulmonary:      Effort: No respiratory distress. Comments: Increased work of breathing, at baseline  Crackles more towards the bases, at baseline  Musculoskeletal:      Comments: No swelling of the left knee. Tenderness along the lateral left knee, extending into the quadricep and left lateral upper calf. Normal ROM   Skin:     General: Skin is warm and dry. Neurological:      Mental Status: She is alert. Psychiatric:         Behavior: Behavior normal.         Thought Content: Thought content normal.         Judgment: Judgment normal.                An electronic signature was used to authenticate this note.     --Bari Augustin MD

## 2023-02-20 ENCOUNTER — CARE COORDINATION (OUTPATIENT)
Dept: CARE COORDINATION | Age: 83
End: 2023-02-20

## 2023-02-20 ENCOUNTER — TELEPHONE (OUTPATIENT)
Dept: INTERNAL MEDICINE CLINIC | Age: 83
End: 2023-02-20

## 2023-02-20 NOTE — TELEPHONE ENCOUNTER
Pt states she needs the name of the physician she ws referred to for her trigger finger.  She states it is happening again (could nit find physicians name)       Pt also needs 2 handicap placards for both of her cars she states hers have        Please advise

## 2023-02-20 NOTE — CARE COORDINATION
Ambulatory Care Coordination Note  2023    Patient Current Location:  Home: [address] [address]  [facility] [address] [address]     ACM contacted the patient by telephone. Verified name and  with patient as identifiers. Provided introduction to self. Challenges to be reviewed by the provider   Additional needs identified to be addressed with provider: No  none               Method of communication with provider: none. ACM: Jose Andres RN    ACM received call from patient to touch base but also discuss her re-occurrence of trigger finger. Patient notes that she continues to be SOB when ambulating even with the increase of oxygen to 6L. This is a result of her lung disease and has been addressed by pulmonology. Patient notes that she has nurses check on her though the week to monitor her oxygen levels and an aid takes her to all meals in a wheeled chair to limit her SOB. Patient notes that recently she has had a re-occurrence of trigger finger that was treated by Dr. Carlo Johnson in the past. Patient was provided the phone number to call and schedule an appointment. Patient also noted she needed her handicap stickers renewed. This is being handled by the office at this time. Patient continued to monitor her sugar intake and control her DM with diet only. Patient notes she has no symptoms of hyper/hypo glycemia. ACM reviewed the s/s with patient to verbalized understanding. Patient continues PT 2x weekly at her assisted living facility. Denies any needs or concerns that were not listed above. ACM explained transition to new ACM and provided patient with her name and contact information. ACM will do hand off in the next week and follow up with be with new ACM for practice. Patient verbalized understanding. Plan   Follow up two weeks  Assess for ongoing needs  Appointment scheduled with Dr. Maria Dolores Krishna?   Complete goals, updated AD  Possible graduation       Offered patient enrollment in the Remote Patient Monitoring (RPM) program for in-home monitoring: Patient declined. Lab Results       None            Care Coordination Interventions    Referral from Primary Care Provider: No  Suggested Interventions and Community Resources  Diabetes Education: Completed  Home Health Services: Completed (Comment: [facility])  Pulmonary Rehab: In Process (Comment: [facility]  Zone Management Tools: Completed (Comment: DM 11.29.22)          Goals Addressed                   This Visit's Progress     Conditions and Symptoms   On track     I will schedule office visits, as directed by my provider. I will keep my appointment or reschedule if I have to cancel. I will notify my provider of any barriers to my plan of care. I will follow my Zone Management tool to seek urgent or emergent care. I will notify my provider of any symptoms that indicate a worsening of my condition. Barriers: lack of support and lack of education  Plan for overcoming my barriers: ACM  Confidence: 9/10  Anticipated Goal Completion Date: 3.20.23         Control Diabetes   On track     Patient Self-Management Goal for Chronic Condition  Goal: I will exercise for 30 minutes, 3-5 days per week. Barriers to success: lack of motivation  Plan for overcoming my barriers: being more motivated  Confidence: 7  Date goal set: 08/12/2014  Date goal attained:   Patient given educational materials on Exercise    I have instructed Jacki to complete a self tracking handout on Daily Exercise and to bring it with them to her next appointment.         Reduce Falls    On track     I will reduce my risk of falls by the following: Use walking aids like cane or walker  Follow through on orders for PT    Barriers: financial and stress  Plan for overcoming my barriers: Work with the [facility]  Confidence: 8/10  Anticipated Goal Completion Date: 7/7/22              Future Appointments   Date Time Provider Romina Shipman   4/20/2023 11:40 AM MD Farrukh Hernandez [facility] [place] - [facility]   8/9/2023 10:00 AM MD Roger Miller P/CC [facility]   ,   Diabetes Assessment      Meal Planning: Avoidance of concentrated sweets   How often do you test your blood sugar?: Other (Comment: Patient has begin testing when she feels off)   Do you have barriers with adherence to non-pharmacologic self-management interventions?  (Nutrition/Exercise/Self-Monitoring): No   Have you ever had to go to the ED for symptoms of low blood sugar?: No       No patient-reported symptoms        , and   General Assessment    Do you have any symptoms that are causing concern?: Yes  Progression since Onset: Intermittent - Waxing/Waning  Reported Symptoms: Pain (Comment: Trigger Finger)

## 2023-02-20 NOTE — LETTER
Roger  [address]  [address], [address] [place] [id number]  Phone: [phone]  Fax: [phone]    Tiffanie Dominguez MD         February 20, 2023     Patient: Kathern Severe   YOB: 1940   Date of Visit: 2/20/2023       To Whom It May Concern: It is my medical opinion that Margaux Belcher requires a disability parking placard for the following reasons:  She cannot walk 200 feet without stopping to rest.  Duration of need: 5 years    If you have any questions or concerns, please don't hesitate to call.     Sincerely,        Tiffanie Dominguez MD

## 2023-02-23 ENCOUNTER — CARE COORDINATION (OUTPATIENT)
Dept: CARE COORDINATION | Age: 83
End: 2023-02-23

## 2023-02-23 NOTE — CARE COORDINATION
ACM made outreach today with prior ACM for soft hand off and introduction. Patient verbalized understanding. Provided patient with ACM contact information. No concerns at this time, will follow up in a couple of weeks.

## 2023-02-27 ENCOUNTER — TELEPHONE (OUTPATIENT)
Dept: INTERNAL MEDICINE CLINIC | Age: 83
End: 2023-02-27

## 2023-02-27 RX ORDER — ROSUVASTATIN CALCIUM 40 MG/1
TABLET, COATED ORAL
Qty: 90 TABLET | Refills: 1 | OUTPATIENT
Start: 2023-02-27

## 2023-02-27 NOTE — TELEPHONE ENCOUNTER
Would like to get the pt a power wheel chair  Under diagnoses code- M96.082  Fax script to  [phone]  Courtesy of faina Gallegos

## 2023-02-28 RX ORDER — ROSUVASTATIN CALCIUM 40 MG/1
TABLET, COATED ORAL
Qty: 90 TABLET | Refills: 1 | Status: SHIPPED | OUTPATIENT
Start: 2023-02-28

## 2023-02-28 RX ORDER — ESCITALOPRAM OXALATE 20 MG/1
TABLET ORAL
Qty: 90 TABLET | Refills: 1 | Status: SHIPPED | OUTPATIENT
Start: 2023-02-28

## 2023-02-28 NOTE — TELEPHONE ENCOUNTER
Pt needs refills of    rosuvastatin (CRESTOR) 40 MG tablet    escitalopram (LEXAPRO) 20 MG tablet        [facility] [id number] - [facility], [address] [place] [address] C/ Damián Kwan

## 2023-03-01 ENCOUNTER — OFFICE VISIT (OUTPATIENT)
Dept: ORTHOPEDIC SURGERY | Age: 83
End: 2023-03-01

## 2023-03-01 DIAGNOSIS — M65.30 TRIGGER FINGER, ACQUIRED: Primary | ICD-10-CM

## 2023-03-01 RX ORDER — LIDOCAINE HYDROCHLORIDE 10 MG/ML
0.5 INJECTION, SOLUTION INFILTRATION; PERINEURAL ONCE
Status: COMPLETED | OUTPATIENT
Start: 2023-03-01 | End: 2023-03-01

## 2023-03-01 RX ORDER — TRIAMCINOLONE ACETONIDE 40 MG/ML
20 INJECTION, SUSPENSION INTRA-ARTICULAR; INTRAMUSCULAR ONCE
Status: COMPLETED | OUTPATIENT
Start: 2023-03-01 | End: 2023-03-01

## 2023-03-01 RX ADMIN — TRIAMCINOLONE ACETONIDE 20 MG: 40 INJECTION, SUSPENSION INTRA-ARTICULAR; INTRAMUSCULAR at 13:18

## 2023-03-01 RX ADMIN — LIDOCAINE HYDROCHLORIDE 0.5 ML: 10 INJECTION, SOLUTION INFILTRATION; PERINEURAL at 13:19

## 2023-03-01 NOTE — PROGRESS NOTES
I last examined this patient 1 year ago at which time I injected the right middle finger for treatment of trigger finger. She obtained prompt and complete relief of all symptoms. Unfortunately, the condition has recurred and the patient returns to the office requesting additional treatment. She complains of pain, swelling, catching and stiffness of the digit for the past several weeks. Symptoms have become worse recently. The patient's social history, past medical history, family history, medications, allergies and review of systems have been reviewed, dated 3/1/23 and are recorded in the chart. I have personally examined and reviewed all previous imaging studies, laboratory tests and medical encounters pertinent to this patient's visit today. On examination there is mild soft tissue swelling of the digit. There is no deformity. There is tenderness, thickening and nodularity at the base of the flexor tendon sheath. Range of motion is slightly limited in flexion and extension. The digit sticks in flexion and pops into extension, accompanied by some pain. Skin is intact without lesions. Distal circulation and sensation are intact. Muscle strength and coordination are normal.  Reflexes and present bilaterally. Joints are stable. There are no subcutaneous nodules or enlarged epitrochlear lymph nodes. Impression: Recurrent right middle finger trigger digit. The nature of this medical problem is fully discussed with the patient and family member, including all treatment options. All questions are answered. The nature of this medical problem is fully discussed with the patient, including all treatment options, as well as the pertinent risks, complications, prognosis and post treatment care. All questions are answered.  The right  hand is prepared with Betadine and alcohol and the flexor tendon sheath of the right middle finger is injected with 1/2 milliliter of 1% lidocaine and 20 mg.of triamcinalone, with good filling. The patient is advised regarding the expected response and possible reactions from the injection. Acetaminophen or ibuprofen are prescribed for any significant post injection pain. The patient is asked to call me if full, painless function has not returned within 4 weeks. The possibility of recurrence of the problem is discussed. The patient and family member understand that this is the 2rd and last steroid injection for this digit. If the problem recurs in this digit, they are asked to call me to discus surgical correction.

## 2023-03-02 ENCOUNTER — TELEPHONE (OUTPATIENT)
Dept: INTERNAL MEDICINE CLINIC | Age: 83
End: 2023-03-02

## 2023-03-02 NOTE — TELEPHONE ENCOUNTER
Willam Caputo from [employer] called to say that pt's insurance will not cover the motorized wheelchair unless there are notes stating that pt and pcp had a conversation about this. He said that if Dr. Leo Allan was willing to add an addendum to the last visit's notes and then sent that over that would work. He also asked for the last 3 visits notes as well.     Please fax [phone]

## 2023-03-06 ENCOUNTER — CARE COORDINATION (OUTPATIENT)
Dept: CARE COORDINATION | Age: 83
End: 2023-03-06

## 2023-03-06 NOTE — CARE COORDINATION
ACM attempted outreach to patient. No answer, voice mail left with contact information and requesting a return call.

## 2023-03-15 ENCOUNTER — CARE COORDINATION (OUTPATIENT)
Dept: CARE COORDINATION | Age: 83
End: 2023-03-15

## 2023-03-15 SDOH — HEALTH STABILITY: PHYSICAL HEALTH: ON AVERAGE, HOW MANY MINUTES DO YOU ENGAGE IN EXERCISE AT THIS LEVEL?: 40 MIN

## 2023-03-15 SDOH — HEALTH STABILITY: PHYSICAL HEALTH: ON AVERAGE, HOW MANY DAYS PER WEEK DO YOU ENGAGE IN MODERATE TO STRENUOUS EXERCISE (LIKE A BRISK WALK)?: 1 DAY

## 2023-03-15 NOTE — CARE COORDINATION
Ambulatory Care Coordination Note  3/15/2023    Patient Current Location:  [facility]  [facility] [address] [address]     ACM contacted the patient by telephone. Verified name and  with patient as identifiers. Provided introduction to self, and explanation of the ACM role. Challenges to be reviewed by the provider   Additional needs identified to be addressed with provider: No  none               Method of communication with provider: none. ACM: Victoria Paul RN    ACM made outreach to patient. Discussed medications and patient reports no barriers and takes as prescribed. Patient has no reports of Chest Pain. Patient is at baseline Oxygen 6L with ambulation and 4L at rest. Patient resides at [place] [address], and enjoys all the help. Active with [facility] [employer]. Pt once a week. Patient has no complaints of leg swelling. Just saw the ortho doctor and got  a steroid shot in her trigger finger for the third time. Reviewed all follow up appointments with patient. Discussed graduation from Care Coordination and patient is in agreement. All questions answered and there are no other concerns at this time. ACM will graduate patient today from care coordination and no longer follow. Offered patient enrollment in the Remote Patient Monitoring (RPM) program for in-home monitoring: NA. Lab Results       None                 Goals Addressed                   This Visit's Progress     Conditions and Symptoms   On track     I will schedule office visits, as directed by my provider. I will keep my appointment or reschedule if I have to cancel. I will notify my provider of any barriers to my plan of care. I will follow my Zone Management tool to seek urgent or emergent care. I will notify my provider of any symptoms that indicate a worsening of my condition.     Barriers: lack of support and lack of education  Plan for overcoming my barriers: ACM  Confidence: 9/10  Anticipated Goal Completion Date: 3.20.23         Control Diabetes   On track     Patient Self-Management Goal for Chronic Condition  Goal: I will exercise for 30 minutes, 3-5 days per week. Barriers to success: lack of motivation  Plan for overcoming my barriers: being more motivated  Confidence: 7  Date goal set: 08/12/2014  Date goal attained:   Patient given educational materials on Exercise    I have instructed Jacki to complete a self tracking handout on Daily Exercise and to bring it with them to her next appointment.         Reduce Falls    On track     I will reduce my risk of falls by the following: Use walking aids like cane or walker  Follow through on orders for PT    Barriers: financial and stress  Plan for overcoming my barriers: Work with the RN, AMBAR  Confidence: 8/10  Anticipated Goal Completion Date: 7/7/22              Future Appointments   Date Time Provider Romina Shipman   4/20/2023 11:40 AM Tiburcio Scott MD [facility] [place] - [facility]   8/9/2023 10:00 AM MD Roger Parham P/CC [facility]   ,   General Assessment         , and No linked episodes

## 2023-03-17 ENCOUNTER — TELEPHONE (OUTPATIENT)
Dept: INTERNAL MEDICINE CLINIC | Age: 83
End: 2023-03-17

## 2023-03-17 NOTE — TELEPHONE ENCOUNTER
Spoke with lucy @ [facility]. HE WILL RE-FAX FORM TODAY. NOTE: RECEIVED A NEW FORM FOR   POWERED WHEEL CHAIR GIVE TO DR. VALDEZ TODAY    FORM FILLED OUT AND FAXED BACK.

## 2023-03-17 NOTE — TELEPHONE ENCOUNTER
Following up on paper work for a powered wheel chair that was dropped off on within the past week. The paperwork needs to be signed by Dr. Conti Prior so they can submit the order. Please call and advise the status of this paperwork.

## 2023-04-04 ENCOUNTER — TELEPHONE (OUTPATIENT)
Dept: INTERNAL MEDICINE CLINIC | Age: 83
End: 2023-04-04

## 2023-04-04 NOTE — TELEPHONE ENCOUNTER
Spoke to pt stated it only goes down when walking. When laying down she is ok. Flow rate is currently 94   She is on 6L  Has not have any other symptoms.

## 2023-04-04 NOTE — TELEPHONE ENCOUNTER
Patients oxygen levels go down to 79 to the point where she has to sit up    The aide thinks she is congested        Please advise and call

## 2023-04-04 NOTE — TELEPHONE ENCOUNTER
Ok this sounds like how she was doing when I last saw her  As long as she recovers when sitting and feels ok, would not change anything

## 2023-04-20 ENCOUNTER — OFFICE VISIT (OUTPATIENT)
Dept: INTERNAL MEDICINE CLINIC | Age: 83
End: 2023-04-20

## 2023-04-20 VITALS — SYSTOLIC BLOOD PRESSURE: 122 MMHG | DIASTOLIC BLOOD PRESSURE: 64 MMHG

## 2023-04-20 DIAGNOSIS — M54.50 ACUTE RIGHT-SIDED LOW BACK PAIN WITHOUT SCIATICA: ICD-10-CM

## 2023-04-20 DIAGNOSIS — J96.11 CHRONIC RESPIRATORY FAILURE WITH HYPOXIA (HCC): Primary | ICD-10-CM

## 2023-04-20 RX ORDER — PSEUDOEPHEDRINE HCL 30 MG
100 TABLET ORAL 2 TIMES DAILY
COMMUNITY
Start: 2023-04-19

## 2023-04-20 RX ORDER — FUROSEMIDE 20 MG/1
20 TABLET ORAL DAILY PRN
Qty: 30 TABLET | Refills: 5 | Status: SHIPPED | OUTPATIENT
Start: 2023-04-20

## 2023-04-20 RX ORDER — OXYCODONE HYDROCHLORIDE 5 MG/1
TABLET ORAL
COMMUNITY
Start: 2023-04-19 | End: 2023-04-20 | Stop reason: SINTOL

## 2023-04-20 RX ORDER — BUSPIRONE HYDROCHLORIDE 5 MG/1
5 TABLET ORAL 2 TIMES DAILY
Qty: 60 TABLET | Refills: 1 | Status: SHIPPED | OUTPATIENT
Start: 2023-04-20 | End: 2023-05-20

## 2023-04-20 RX ORDER — HYDROCODONE BITARTRATE AND ACETAMINOPHEN 5; 325 MG/1; MG/1
1 TABLET ORAL EVERY 6 HOURS PRN
Qty: 20 TABLET | Refills: 0 | Status: SHIPPED | OUTPATIENT
Start: 2023-04-20 | End: 2023-04-25

## 2023-04-20 RX ORDER — ONDANSETRON 4 MG/1
4 TABLET, ORALLY DISINTEGRATING ORAL EVERY 8 HOURS PRN
COMMUNITY
Start: 2023-04-19

## 2023-04-20 SDOH — ECONOMIC STABILITY: FOOD INSECURITY: WITHIN THE PAST 12 MONTHS, THE FOOD YOU BOUGHT JUST DIDN'T LAST AND YOU DIDN'T HAVE MONEY TO GET MORE.: NEVER TRUE

## 2023-04-20 SDOH — ECONOMIC STABILITY: FOOD INSECURITY: WITHIN THE PAST 12 MONTHS, YOU WORRIED THAT YOUR FOOD WOULD RUN OUT BEFORE YOU GOT MONEY TO BUY MORE.: NEVER TRUE

## 2023-04-20 SDOH — ECONOMIC STABILITY: INCOME INSECURITY: HOW HARD IS IT FOR YOU TO PAY FOR THE VERY BASICS LIKE FOOD, HOUSING, MEDICAL CARE, AND HEATING?: NOT HARD AT ALL

## 2023-04-20 NOTE — PROGRESS NOTES
Negra Marinelli (:  1940) is a [de-identified] y.o. female,Established patient, here for evaluation of the following chief complaint(s):  Hypertension and Diabetes         ASSESSMENT/PLAN:  1. Chronic respiratory failure with hypoxia (HCC)   -Reviewing the CT scan, increased opacities possibly CHF. She has known diastolic dysfunction, and since she has so little respiratory reserve small mount of fluid could have noticeable impact on her symptoms. We will try furosemide 20 mg daily for 3 days and see if this makes a difference. -Benefit from PT and OT to help with teaching  2. Acute right-sided low back pain without sciatica  -OTC analgesia like a medicine-helpful, can try hydrocodone and see if she tolerates this. PT would also be helpful. This seems more like a low back issue rather than hip issue.  -     HYDROcodone-acetaminophen (NORCO) 5-325 MG per tablet; Take 1 tablet by mouth every 6 hours as needed for Pain for up to 5 days. Intended supply: 7 days. Take lowest dose possible to manage pain Max Daily Amount: 4 tablets, Disp-20 tablet, R-0Normal      Return in about 1 month (around 2023) for anxiety, hip/back pain, hypoxia. Subjective   SUBJECTIVE/OBJECTIVE:  HPI    She was seen in the ED for acute onset of right low back/hip pain which occurred she was walking. It is more posterior, but not to the side. It occurred all of a sudden and she difficulty in the chair. It is worse with sitting. No falls or other trauma prior to onset of pain. She went to the ED where she had a CT of the hip which did not show any fracture. She took thing over-the-counter, may be Tylenol, which does not help much. She has tried lidocaine patches on the back which has not seemed to help. She was given oxycodone at the ED but she has not taken it, it makes her feel loopy. She did get the electric chair but needs teaching to use it. She was hypoxic in the ED so they did a CTA to rule out PE.   Her son has

## 2023-04-21 ENCOUNTER — TELEPHONE (OUTPATIENT)
Dept: INTERNAL MEDICINE CLINIC | Age: 83
End: 2023-04-21

## 2023-04-21 DIAGNOSIS — M25.562 LEFT KNEE PAIN, UNSPECIFIED CHRONICITY: ICD-10-CM

## 2023-04-21 DIAGNOSIS — M25.552 LEFT HIP PAIN: Primary | ICD-10-CM

## 2023-04-24 ENCOUNTER — TELEPHONE (OUTPATIENT)
Dept: INTERNAL MEDICINE CLINIC | Age: 83
End: 2023-04-24

## 2023-04-28 ENCOUNTER — CARE COORDINATION (OUTPATIENT)
Dept: CARE COORDINATION | Age: 83
End: 2023-04-28

## 2023-05-01 ENCOUNTER — CARE COORDINATION (OUTPATIENT)
Dept: CARE COORDINATION | Age: 83
End: 2023-05-01

## 2023-05-01 NOTE — TELEPHONE ENCOUNTER
Per Dr. Viviana Thayer for pt to do lasix daily if it helped but would like her to get a BMP in 1 week to check kidney function (lab ordered) also if the hip pain is the same or better just to continue as is and she will see her on 5/25. If patient worsens please call and we can get her in sooner.

## 2023-05-01 NOTE — CARE COORDINATION
ACM made outreach to [address] today with clarification on Furosemide, R-Hip pain, next appointment and BMP ordered. Dicussed in detail all recommendations from PCP through Dina Haddad with patient. Jacki repeated back to ACM understanding furosemide order and to get a BMP in one week. Discussed S/S with Jacki  on when to call back and see PCP sooner or to call 911. All questions answered, no other concerns at this time. Viral NoeN, RN  Ambulatory Care Manager  [email or web address]  [phone]

## 2023-05-02 ENCOUNTER — TELEPHONE (OUTPATIENT)
Dept: INTERNAL MEDICINE CLINIC | Age: 83
End: 2023-05-02

## 2023-05-02 NOTE — TELEPHONE ENCOUNTER
Pt called stated her son is leaving on 5/26 to go to [place] and they are wanting an appt sooner. Stated it has to be after the 8th and before 2 in the afternoon because the son picks up his kids at school.

## 2023-05-11 ENCOUNTER — TELEPHONE (OUTPATIENT)
Dept: INTERNAL MEDICINE CLINIC | Age: 83
End: 2023-05-11

## 2023-05-11 DIAGNOSIS — E11.9 DIABETES MELLITUS TYPE 2 IN NONOBESE (HCC): Primary | ICD-10-CM

## 2023-05-12 DIAGNOSIS — E11.9 DIABETES MELLITUS TYPE 2 IN NONOBESE (HCC): ICD-10-CM

## 2023-05-12 LAB
ALBUMIN SERPL-MCNC: 4.6 G/DL (ref 3.4–5)
ALBUMIN/GLOB SERPL: 1.7 {RATIO} (ref 1.1–2.2)
ALP SERPL-CCNC: 137 U/L (ref 40–129)
ALT SERPL-CCNC: 10 U/L (ref 10–40)
ANION GAP SERPL CALCULATED.3IONS-SCNC: 13 MMOL/L (ref 3–16)
AST SERPL-CCNC: 18 U/L (ref 15–37)
BASOPHILS # BLD: 0.1 K/UL (ref 0–0.2)
BASOPHILS NFR BLD: 0.7 %
BILIRUB SERPL-MCNC: 0.6 MG/DL (ref 0–1)
BUN SERPL-MCNC: 23 MG/DL (ref 7–20)
CALCIUM SERPL-MCNC: 9.4 MG/DL (ref 8.3–10.6)
CHLORIDE SERPL-SCNC: 104 MMOL/L (ref 99–110)
CO2 SERPL-SCNC: 23 MMOL/L (ref 21–32)
CREAT SERPL-MCNC: 1 MG/DL (ref 0.6–1.2)
DEPRECATED RDW RBC AUTO: 13.9 % (ref 12.4–15.4)
EOSINOPHIL # BLD: 0.5 K/UL (ref 0–0.6)
EOSINOPHIL NFR BLD: 6.3 %
GFR SERPLBLD CREATININE-BSD FMLA CKD-EPI: 57 ML/MIN/{1.73_M2}
GLUCOSE SERPL-MCNC: 209 MG/DL (ref 70–99)
HCT VFR BLD AUTO: 36.2 % (ref 36–48)
HGB BLD-MCNC: 12 G/DL (ref 12–16)
LYMPHOCYTES # BLD: 1.4 K/UL (ref 1–5.1)
LYMPHOCYTES NFR BLD: 18 %
MCH RBC QN AUTO: 31.2 PG (ref 26–34)
MCHC RBC AUTO-ENTMCNC: 33.1 G/DL (ref 31–36)
MCV RBC AUTO: 94.4 FL (ref 80–100)
MONOCYTES # BLD: 0.4 K/UL (ref 0–1.3)
MONOCYTES NFR BLD: 5.1 %
NEUTROPHILS # BLD: 5.6 K/UL (ref 1.7–7.7)
NEUTROPHILS NFR BLD: 69.9 %
PLATELET # BLD AUTO: 205 K/UL (ref 135–450)
PMV BLD AUTO: 8.1 FL (ref 5–10.5)
POTASSIUM SERPL-SCNC: 4.2 MMOL/L (ref 3.5–5.1)
PROT SERPL-MCNC: 7.3 G/DL (ref 6.4–8.2)
RBC # BLD AUTO: 3.84 M/UL (ref 4–5.2)
SODIUM SERPL-SCNC: 140 MMOL/L (ref 136–145)
WBC # BLD AUTO: 8 K/UL (ref 4–11)

## 2023-05-13 LAB
EST. AVERAGE GLUCOSE BLD GHB EST-MCNC: 111.2 MG/DL
HBA1C MFR BLD: 5.5 %

## 2023-05-17 ENCOUNTER — OFFICE VISIT (OUTPATIENT)
Dept: INTERNAL MEDICINE CLINIC | Age: 83
End: 2023-05-17

## 2023-05-17 VITALS — WEIGHT: 125 LBS | BODY MASS INDEX: 22.86 KG/M2 | DIASTOLIC BLOOD PRESSURE: 64 MMHG | SYSTOLIC BLOOD PRESSURE: 110 MMHG

## 2023-05-17 DIAGNOSIS — I51.89 DIASTOLIC DYSFUNCTION: ICD-10-CM

## 2023-05-17 DIAGNOSIS — J84.9 CHRONIC INTERSTITIAL LUNG DISEASE (HCC): Primary | ICD-10-CM

## 2023-05-17 RX ORDER — FAMOTIDINE 20 MG/1
20 TABLET, FILM COATED ORAL DAILY
Qty: 90 TABLET | Refills: 0 | Status: SHIPPED | OUTPATIENT
Start: 2023-05-17

## 2023-05-17 NOTE — PROGRESS NOTES
Kathern Severe (:  1940) is a [de-identified] y.o. female,Established patient, here for evaluation of the following chief complaint(s): Anxiety         ASSESSMENT/PLAN:  1. Chronic interstitial lung disease ([address]   -Unclear why her oxygen requirement continues to increased, for now continue on 6L at rest, she thinks the oxygen tank at home can go to 8 liters when she is ambulating so we will try that increase. We will check echo to see if anything has changed in the last year that could account for worsening oxygen requirement. No significant changes on blood work. She has a follow up scheduled with the pulmonologist in August.  2. Diastolic dysfunction  -     ECHO 2D WO Color Doppler Complete; Future      Return in about 3 months (around 2023) for lung disease. Subjective   SUBJECTIVE/OBJECTIVE:  HPI    Oxygen requirement has continued to increase. Currently on 6L at rest, but when she ambulates oxygen saturations drop into the 70s. She chronically has shortness of breath. She is having more shortness of breath with eating. Drinking or coughing while eating. She had a trial of furosemide for short period of time which she thinks helped a little bit. No leg swelling right now, sometimes her feet will swell a bit. Review of Systems       Objective   Physical Exam  Vitals reviewed. Constitutional:       General: She is not in acute distress. Appearance: Normal appearance. She is well-developed. HENT:      Head: Normocephalic and atraumatic. Cardiovascular:      Rate and Rhythm: Normal rate and regular rhythm. Heart sounds: Normal heart sounds. Pulmonary:      Effort: Pulmonary effort is normal. No respiratory distress. Breath sounds: Normal breath sounds. Skin:     General: Skin is warm and dry. Neurological:      Mental Status: She is alert. Psychiatric:         Behavior: Behavior normal.         Thought Content:  Thought content normal.         Judgment: Judgment

## 2023-06-18 ENCOUNTER — APPOINTMENT (OUTPATIENT)
Dept: GENERAL RADIOLOGY | Age: 83
DRG: 291 | End: 2023-06-18
Payer: MEDICARE

## 2023-06-18 ENCOUNTER — HOSPITAL ENCOUNTER (INPATIENT)
Age: 83
LOS: 3 days | Discharge: HOME | DRG: 291 | End: 2023-06-21
Attending: EMERGENCY MEDICINE | Admitting: HOSPITALIST
Payer: MEDICARE

## 2023-06-18 DIAGNOSIS — I50.9 CONGESTIVE HEART FAILURE, UNSPECIFIED HF CHRONICITY, UNSPECIFIED HEART FAILURE TYPE (HCC): Primary | ICD-10-CM

## 2023-06-18 LAB
ALBUMIN SERPL-MCNC: 4 G/DL (ref 3.4–5)
ALBUMIN/GLOB SERPL: 1.3 {RATIO} (ref 1.1–2.2)
ALP SERPL-CCNC: 109 U/L (ref 40–129)
ALT SERPL-CCNC: 7 U/L (ref 10–40)
ANION GAP SERPL CALCULATED.3IONS-SCNC: 12 MMOL/L (ref 3–16)
AST SERPL-CCNC: 15 U/L (ref 15–37)
BASE EXCESS BLDV CALC-SCNC: -2.1 MMOL/L (ref -2–3)
BASOPHILS # BLD: 0.1 K/UL (ref 0–0.2)
BASOPHILS NFR BLD: 0.9 %
BILIRUB SERPL-MCNC: 0.9 MG/DL (ref 0–1)
BUN SERPL-MCNC: 21 MG/DL (ref 7–20)
CALCIUM SERPL-MCNC: 9.7 MG/DL (ref 8.3–10.6)
CHLORIDE SERPL-SCNC: 105 MMOL/L (ref 99–110)
CK SERPL-CCNC: 44 U/L (ref 26–192)
CO2 BLDV-SCNC: 24 MMOL/L
CO2 SERPL-SCNC: 20 MMOL/L (ref 21–32)
COHGB MFR BLDV: 1.7 % (ref 0–1.5)
CREAT SERPL-MCNC: 1 MG/DL (ref 0.6–1.2)
DEPRECATED RDW RBC AUTO: 13.6 % (ref 12.4–15.4)
DO-HGB MFR BLDV: 28.1 %
EOSINOPHIL # BLD: 0.3 K/UL (ref 0–0.6)
EOSINOPHIL NFR BLD: 4.7 %
FLUAV RNA RESP QL NAA+PROBE: NOT DETECTED
FLUBV RNA RESP QL NAA+PROBE: NOT DETECTED
GFR SERPLBLD CREATININE-BSD FMLA CKD-EPI: 57 ML/MIN/{1.73_M2}
GLUCOSE SERPL-MCNC: 200 MG/DL (ref 70–99)
HCO3 BLDV-SCNC: 23.1 MMOL/L (ref 24–28)
HCT VFR BLD AUTO: 34.1 % (ref 36–48)
HGB BLD-MCNC: 11.5 G/DL (ref 12–16)
LYMPHOCYTES # BLD: 1 K/UL (ref 1–5.1)
LYMPHOCYTES NFR BLD: 14.2 %
MCH RBC QN AUTO: 30.6 PG (ref 26–34)
MCHC RBC AUTO-ENTMCNC: 33.8 G/DL (ref 31–36)
MCV RBC AUTO: 90.6 FL (ref 80–100)
METHGB MFR BLDV: 0.2 % (ref 0–1.5)
MONOCYTES # BLD: 0.5 K/UL (ref 0–1.3)
MONOCYTES NFR BLD: 7.1 %
NEUTROPHILS # BLD: 5.2 K/UL (ref 1.7–7.7)
NEUTROPHILS NFR BLD: 73.1 %
NT-PROBNP SERPL-MCNC: 5269 PG/ML (ref 0–449)
PCO2 BLDV: 40.1 MMHG (ref 41–51)
PH BLDV: 7.37 [PH] (ref 7.35–7.45)
PLATELET # BLD AUTO: 195 K/UL (ref 135–450)
PMV BLD AUTO: 7.4 FL (ref 5–10.5)
PO2 BLDV: 42.3 MMHG (ref 25–40)
POTASSIUM SERPL-SCNC: 4.3 MMOL/L (ref 3.5–5.1)
PROT SERPL-MCNC: 7 G/DL (ref 6.4–8.2)
RBC # BLD AUTO: 3.76 M/UL (ref 4–5.2)
SAO2 % BLDV: 71 %
SARS-COV-2 RNA RESP QL NAA+PROBE: NOT DETECTED
SODIUM SERPL-SCNC: 137 MMOL/L (ref 136–145)
TROPONIN, HIGH SENSITIVITY: 23 NG/L (ref 0–14)
TROPONIN, HIGH SENSITIVITY: 23 NG/L (ref 0–14)
WBC # BLD AUTO: 7.2 K/UL (ref 4–11)

## 2023-06-18 PROCEDURE — 84484 ASSAY OF TROPONIN QUANT: CPT

## 2023-06-18 PROCEDURE — 2700000000 HC OXYGEN THERAPY PER DAY

## 2023-06-18 PROCEDURE — 2580000003 HC RX 258

## 2023-06-18 PROCEDURE — 83880 ASSAY OF NATRIURETIC PEPTIDE: CPT

## 2023-06-18 PROCEDURE — 94761 N-INVAS EAR/PLS OXIMETRY MLT: CPT

## 2023-06-18 PROCEDURE — 85025 COMPLETE CBC W/AUTO DIFF WBC: CPT

## 2023-06-18 PROCEDURE — 6370000000 HC RX 637 (ALT 250 FOR IP)

## 2023-06-18 PROCEDURE — 96374 THER/PROPH/DIAG INJ IV PUSH: CPT

## 2023-06-18 PROCEDURE — 87636 SARSCOV2 & INF A&B AMP PRB: CPT

## 2023-06-18 PROCEDURE — 36415 COLL VENOUS BLD VENIPUNCTURE: CPT

## 2023-06-18 PROCEDURE — 6360000002 HC RX W HCPCS

## 2023-06-18 PROCEDURE — 99285 EMERGENCY DEPT VISIT HI MDM: CPT

## 2023-06-18 PROCEDURE — 82803 BLOOD GASES ANY COMBINATION: CPT

## 2023-06-18 PROCEDURE — 6360000002 HC RX W HCPCS: Performed by: EMERGENCY MEDICINE

## 2023-06-18 PROCEDURE — 1200000000 HC SEMI PRIVATE

## 2023-06-18 PROCEDURE — 71046 X-RAY EXAM CHEST 2 VIEWS: CPT

## 2023-06-18 PROCEDURE — 93005 ELECTROCARDIOGRAM TRACING: CPT | Performed by: EMERGENCY MEDICINE

## 2023-06-18 PROCEDURE — 80053 COMPREHEN METABOLIC PANEL: CPT

## 2023-06-18 PROCEDURE — 82550 ASSAY OF CK (CPK): CPT

## 2023-06-18 RX ORDER — POLYETHYLENE GLYCOL 3350 17 G/17G
17 POWDER, FOR SOLUTION ORAL DAILY PRN
Status: DISCONTINUED | OUTPATIENT
Start: 2023-06-18 | End: 2023-06-21 | Stop reason: HOSPADM

## 2023-06-18 RX ORDER — FUROSEMIDE 20 MG/1
20 TABLET ORAL DAILY
COMMUNITY

## 2023-06-18 RX ORDER — ONDANSETRON 4 MG/1
4 TABLET, ORALLY DISINTEGRATING ORAL EVERY 8 HOURS PRN
Status: DISCONTINUED | OUTPATIENT
Start: 2023-06-18 | End: 2023-06-21 | Stop reason: HOSPADM

## 2023-06-18 RX ORDER — ROSUVASTATIN CALCIUM 20 MG/1
40 TABLET, COATED ORAL NIGHTLY
Status: DISCONTINUED | OUTPATIENT
Start: 2023-06-18 | End: 2023-06-21 | Stop reason: HOSPADM

## 2023-06-18 RX ORDER — ESCITALOPRAM OXALATE 20 MG/1
20 TABLET ORAL DAILY
Status: DISCONTINUED | OUTPATIENT
Start: 2023-06-18 | End: 2023-06-21 | Stop reason: HOSPADM

## 2023-06-18 RX ORDER — FUROSEMIDE 10 MG/ML
40 INJECTION INTRAMUSCULAR; INTRAVENOUS 2 TIMES DAILY
Status: DISCONTINUED | OUTPATIENT
Start: 2023-06-18 | End: 2023-06-19

## 2023-06-18 RX ORDER — PANTOPRAZOLE SODIUM 40 MG/1
40 TABLET, DELAYED RELEASE ORAL
Status: DISCONTINUED | OUTPATIENT
Start: 2023-06-19 | End: 2023-06-21 | Stop reason: HOSPADM

## 2023-06-18 RX ORDER — SODIUM CHLORIDE 0.9 % (FLUSH) 0.9 %
5-40 SYRINGE (ML) INJECTION PRN
Status: DISCONTINUED | OUTPATIENT
Start: 2023-06-18 | End: 2023-06-21 | Stop reason: HOSPADM

## 2023-06-18 RX ORDER — AMLODIPINE BESYLATE 10 MG/1
10 TABLET ORAL DAILY
Status: DISCONTINUED | OUTPATIENT
Start: 2023-06-19 | End: 2023-06-21 | Stop reason: HOSPADM

## 2023-06-18 RX ORDER — SODIUM CHLORIDE 0.9 % (FLUSH) 0.9 %
5-40 SYRINGE (ML) INJECTION EVERY 12 HOURS SCHEDULED
Status: DISCONTINUED | OUTPATIENT
Start: 2023-06-18 | End: 2023-06-21 | Stop reason: HOSPADM

## 2023-06-18 RX ORDER — ONDANSETRON 2 MG/ML
4 INJECTION INTRAMUSCULAR; INTRAVENOUS EVERY 6 HOURS PRN
Status: DISCONTINUED | OUTPATIENT
Start: 2023-06-18 | End: 2023-06-21 | Stop reason: HOSPADM

## 2023-06-18 RX ORDER — ACETAMINOPHEN 650 MG/1
650 SUPPOSITORY RECTAL EVERY 6 HOURS PRN
Status: DISCONTINUED | OUTPATIENT
Start: 2023-06-18 | End: 2023-06-21 | Stop reason: HOSPADM

## 2023-06-18 RX ORDER — CINACALCET 30 MG/1
90 TABLET, FILM COATED ORAL DAILY
COMMUNITY

## 2023-06-18 RX ORDER — SODIUM CHLORIDE 9 MG/ML
INJECTION, SOLUTION INTRAVENOUS PRN
Status: DISCONTINUED | OUTPATIENT
Start: 2023-06-18 | End: 2023-06-21 | Stop reason: HOSPADM

## 2023-06-18 RX ORDER — ENOXAPARIN SODIUM 100 MG/ML
40 INJECTION SUBCUTANEOUS NIGHTLY
Status: DISCONTINUED | OUTPATIENT
Start: 2023-06-18 | End: 2023-06-20

## 2023-06-18 RX ORDER — FEXOFENADINE HCL 180 MG/1
180 TABLET ORAL DAILY
COMMUNITY

## 2023-06-18 RX ORDER — ACETAMINOPHEN 325 MG/1
650 TABLET ORAL EVERY 6 HOURS PRN
Status: DISCONTINUED | OUTPATIENT
Start: 2023-06-18 | End: 2023-06-21 | Stop reason: HOSPADM

## 2023-06-18 RX ORDER — FUROSEMIDE 10 MG/ML
40 INJECTION INTRAMUSCULAR; INTRAVENOUS ONCE
Status: COMPLETED | OUTPATIENT
Start: 2023-06-18 | End: 2023-06-18

## 2023-06-18 RX ADMIN — FUROSEMIDE 40 MG: 10 INJECTION, SOLUTION INTRAMUSCULAR; INTRAVENOUS at 22:23

## 2023-06-18 RX ADMIN — SODIUM CHLORIDE, PRESERVATIVE FREE 10 ML: 5 INJECTION INTRAVENOUS at 22:21

## 2023-06-18 RX ADMIN — ENOXAPARIN SODIUM 40 MG: 100 INJECTION SUBCUTANEOUS at 22:20

## 2023-06-18 RX ADMIN — ESCITALOPRAM OXALATE 20 MG: 20 TABLET, FILM COATED ORAL at 22:23

## 2023-06-18 RX ADMIN — FUROSEMIDE 40 MG: 10 INJECTION, SOLUTION INTRAMUSCULAR; INTRAVENOUS at 13:10

## 2023-06-18 RX ADMIN — ROSUVASTATIN CALCIUM 40 MG: 20 TABLET, COATED ORAL at 22:21

## 2023-06-18 ASSESSMENT — ENCOUNTER SYMPTOMS
ABDOMINAL PAIN: 0
CHEST TIGHTNESS: 0
VOMITING: 0
CONSTIPATION: 0
COUGH: 0
NAUSEA: 0
SHORTNESS OF BREATH: 1
DIARRHEA: 0

## 2023-06-18 ASSESSMENT — PAIN SCALES - GENERAL: PAINLEVEL_OUTOF10: 5

## 2023-06-18 ASSESSMENT — PAIN DESCRIPTION - LOCATION: LOCATION: LEG

## 2023-06-19 ENCOUNTER — APPOINTMENT (OUTPATIENT)
Dept: CT IMAGING | Age: 83
DRG: 291 | End: 2023-06-19
Payer: MEDICARE

## 2023-06-19 LAB
ANION GAP SERPL CALCULATED.3IONS-SCNC: 14 MMOL/L (ref 3–16)
BASOPHILS # BLD: 0.1 K/UL (ref 0–0.2)
BASOPHILS NFR BLD: 0.9 %
BUN SERPL-MCNC: 23 MG/DL (ref 7–20)
CALCIUM SERPL-MCNC: 9.7 MG/DL (ref 8.3–10.6)
CHLORIDE SERPL-SCNC: 104 MMOL/L (ref 99–110)
CO2 SERPL-SCNC: 22 MMOL/L (ref 21–32)
CREAT SERPL-MCNC: 1.3 MG/DL (ref 0.6–1.2)
DEPRECATED RDW RBC AUTO: 13.9 % (ref 12.4–15.4)
EKG ATRIAL RATE: 59 BPM
EKG DIAGNOSIS: NORMAL
EKG P AXIS: 48 DEGREES
EKG P-R INTERVAL: 146 MS
EKG Q-T INTERVAL: 456 MS
EKG QRS DURATION: 90 MS
EKG QTC CALCULATION (BAZETT): 451 MS
EKG R AXIS: -50 DEGREES
EKG T AXIS: -41 DEGREES
EKG VENTRICULAR RATE: 59 BPM
EOSINOPHIL # BLD: 0.5 K/UL (ref 0–0.6)
EOSINOPHIL NFR BLD: 7.5 %
GFR SERPLBLD CREATININE-BSD FMLA CKD-EPI: 41 ML/MIN/{1.73_M2}
GLUCOSE SERPL-MCNC: 138 MG/DL (ref 70–99)
HCT VFR BLD AUTO: 32.4 % (ref 36–48)
HGB BLD-MCNC: 10.8 G/DL (ref 12–16)
LYMPHOCYTES # BLD: 1.2 K/UL (ref 1–5.1)
LYMPHOCYTES NFR BLD: 19.3 %
MAGNESIUM SERPL-MCNC: 1.3 MG/DL (ref 1.8–2.4)
MCH RBC QN AUTO: 31.4 PG (ref 26–34)
MCHC RBC AUTO-ENTMCNC: 33.4 G/DL (ref 31–36)
MCV RBC AUTO: 93.9 FL (ref 80–100)
MONOCYTES # BLD: 0.6 K/UL (ref 0–1.3)
MONOCYTES NFR BLD: 9.8 %
NEUTROPHILS # BLD: 4 K/UL (ref 1.7–7.7)
NEUTROPHILS NFR BLD: 62.5 %
PLATELET # BLD AUTO: 170 K/UL (ref 135–450)
PMV BLD AUTO: 7.4 FL (ref 5–10.5)
POTASSIUM SERPL-SCNC: 3.9 MMOL/L (ref 3.5–5.1)
RBC # BLD AUTO: 3.44 M/UL (ref 4–5.2)
SODIUM SERPL-SCNC: 140 MMOL/L (ref 136–145)
WBC # BLD AUTO: 6.5 K/UL (ref 4–11)

## 2023-06-19 PROCEDURE — 6370000000 HC RX 637 (ALT 250 FOR IP)

## 2023-06-19 PROCEDURE — 83735 ASSAY OF MAGNESIUM: CPT

## 2023-06-19 PROCEDURE — 99222 1ST HOSP IP/OBS MODERATE 55: CPT | Performed by: HOSPITALIST

## 2023-06-19 PROCEDURE — 71250 CT THORAX DX C-: CPT

## 2023-06-19 PROCEDURE — 36415 COLL VENOUS BLD VENIPUNCTURE: CPT

## 2023-06-19 PROCEDURE — 6360000002 HC RX W HCPCS

## 2023-06-19 PROCEDURE — 6360000002 HC RX W HCPCS: Performed by: STUDENT IN AN ORGANIZED HEALTH CARE EDUCATION/TRAINING PROGRAM

## 2023-06-19 PROCEDURE — 2700000000 HC OXYGEN THERAPY PER DAY

## 2023-06-19 PROCEDURE — 2580000003 HC RX 258

## 2023-06-19 PROCEDURE — 1200000000 HC SEMI PRIVATE

## 2023-06-19 PROCEDURE — 94761 N-INVAS EAR/PLS OXIMETRY MLT: CPT

## 2023-06-19 PROCEDURE — 80048 BASIC METABOLIC PNL TOTAL CA: CPT

## 2023-06-19 PROCEDURE — 85025 COMPLETE CBC W/AUTO DIFF WBC: CPT

## 2023-06-19 RX ORDER — MAGNESIUM SULFATE IN WATER 40 MG/ML
4000 INJECTION, SOLUTION INTRAVENOUS ONCE
Status: COMPLETED | OUTPATIENT
Start: 2023-06-19 | End: 2023-06-19

## 2023-06-19 RX ORDER — FUROSEMIDE 20 MG/1
20 TABLET ORAL DAILY
Status: DISCONTINUED | OUTPATIENT
Start: 2023-06-19 | End: 2023-06-21 | Stop reason: HOSPADM

## 2023-06-19 RX ADMIN — PANTOPRAZOLE SODIUM 40 MG: 40 TABLET, DELAYED RELEASE ORAL at 06:00

## 2023-06-19 RX ADMIN — SODIUM CHLORIDE, PRESERVATIVE FREE 10 ML: 5 INJECTION INTRAVENOUS at 20:32

## 2023-06-19 RX ADMIN — FUROSEMIDE 40 MG: 10 INJECTION, SOLUTION INTRAMUSCULAR; INTRAVENOUS at 09:04

## 2023-06-19 RX ADMIN — ROSUVASTATIN CALCIUM 40 MG: 20 TABLET, COATED ORAL at 20:28

## 2023-06-19 RX ADMIN — ENOXAPARIN SODIUM 40 MG: 100 INJECTION SUBCUTANEOUS at 20:31

## 2023-06-19 RX ADMIN — AMLODIPINE BESYLATE 10 MG: 10 TABLET ORAL at 09:04

## 2023-06-19 RX ADMIN — MAGNESIUM SULFATE HEPTAHYDRATE 4000 MG: 40 INJECTION, SOLUTION INTRAVENOUS at 10:15

## 2023-06-19 RX ADMIN — ACETAMINOPHEN 650 MG: 325 TABLET ORAL at 09:04

## 2023-06-19 RX ADMIN — ACETAMINOPHEN 650 MG: 325 TABLET ORAL at 01:37

## 2023-06-19 RX ADMIN — ESCITALOPRAM OXALATE 20 MG: 20 TABLET, FILM COATED ORAL at 09:04

## 2023-06-19 RX ADMIN — ACETAMINOPHEN 650 MG: 325 TABLET ORAL at 17:50

## 2023-06-19 ASSESSMENT — PAIN - FUNCTIONAL ASSESSMENT
PAIN_FUNCTIONAL_ASSESSMENT: ACTIVITIES ARE NOT PREVENTED

## 2023-06-19 ASSESSMENT — PAIN SCALES - GENERAL
PAINLEVEL_OUTOF10: 2
PAINLEVEL_OUTOF10: 2
PAINLEVEL_OUTOF10: 7
PAINLEVEL_OUTOF10: 7
PAINLEVEL_OUTOF10: 3
PAINLEVEL_OUTOF10: 4
PAINLEVEL_OUTOF10: 5

## 2023-06-19 ASSESSMENT — PAIN DESCRIPTION - LOCATION
LOCATION: LEG
LOCATION: GENERALIZED
LOCATION: ANKLE
LOCATION: LEG
LOCATION: HEAD

## 2023-06-19 ASSESSMENT — PAIN DESCRIPTION - DESCRIPTORS
DESCRIPTORS: ACHING
DESCRIPTORS: ACHING
DESCRIPTORS: DISCOMFORT

## 2023-06-19 ASSESSMENT — PAIN DESCRIPTION - PAIN TYPE: TYPE: CHRONIC PAIN

## 2023-06-19 ASSESSMENT — PAIN DESCRIPTION - ONSET: ONSET: ON-GOING

## 2023-06-19 ASSESSMENT — PAIN DESCRIPTION - ORIENTATION
ORIENTATION: RIGHT
ORIENTATION: RIGHT

## 2023-06-19 ASSESSMENT — PAIN DESCRIPTION - FREQUENCY: FREQUENCY: CONTINUOUS

## 2023-06-19 NOTE — PROGRESS NOTES
06/19/23 1438   Encounter Summary   Encounter Overview/Reason  Volunteer Encounter   Service Provided For: Patient   Last Encounter  06/19/23  Davis Oneil   Rituals, Rites and [address]

## 2023-06-19 NOTE — DISCHARGE INSTRUCTIONS
Extra Heart Failure sites:     https://Videoplaza.com/   --- this is American Heart Association interactive Healthier Living with Heart Failure guidebook. Please click hyperlink or copy / paste link into search bar. Use your mouse to scroll through the pages. Lots of information about weight monitoring, diet tips, activity, meds, etc    HF [place] inderjit  -- this is a free smart phone inderjit available for iPhone and Android download. Use your phone to track sodium / fluid intake, zone tool symptom tracking, weights, medications, etc. Click on this hyperlink  HF [place] In[employer]it   for QR code for easy download. DASH (Dietary Approach to Stop Hypertension) diet --  [email or web address].no -- this diet is a flexible eating plan that promotes heart healthy eating style. Click on hyperlink or copy / paste link into search bar. Lots of low sodium recipes and tips.     [email or web address]  -- more free recipes

## 2023-06-19 NOTE — PROGRESS NOTES
Internal Medicine Progress Note    Admit Date: 6/18/2023  Day: 1   Diet: ADULT DIET; Regular; Low Fat/Low Chol/High Fiber/2 gm Na    CC: shortness of breath    Interval history:  Patient seen the bedside this morning. No new complaints overnight. Patient states her breathing is actually back to her baseline. However, patient dates that her bilateral thighs have low bit of pain in them. Otherwise, no new complaints. Laboratory studies appreciated this morning and noted to be largely stable. Magnesium slightly low but we are bleeding. Otherwise, vitals been largely stable. HPI:  Patrizia Chamorro is a 82 y.o. female with medical history of interstitial lung disease on 6 L home O2, hypertension, hyperlipidemia, and type 2 diabetes, and HFpEF ~60-65% w/ Grade 1 diastolic dysfunction on 0/38/75, who presents with shortness of breath. Patient was at home and called for emergency medical services due to bilateral leg pain. When EMS arrived to the patient, they noticed that she was tachypneic as well as hypoxic. At that time they noted that she was saturating 86% on room air. Patient is on 6 L of at home oxygen at baseline secondary to that interstitial lung disease. Patient was put on a nonrebreather mask at this time which improved her oxygen saturation up to the high 90s. On review of systems in the emergency department, patient denied any cough or sputum production. Denies any fevers or chills. States that she takes 20 mg of Lasix at home for her grade 1 diastolic dysfunction heart failure. States that she did not take her Lasix today. Otherwise, review of systems largely negative. ED course: Upon presentation to the emergency department, patient noted to be hemodynamically stable. Blood pressure noted to be 120/66. Heart rate noted to be 68. Respirations are to be 20. Pulse oxygenation of 97% on nonrebreather mask with around 9 L oxygen. Patient noted to be afebrile at 98.3.      Laboratory

## 2023-06-19 NOTE — PROGRESS NOTES
Patient requested to walk in room. This nurse kept the patient on baseline oxygen of 6 liters. Patient walked around the bed twice. Upon return to bed, vital signs obtained. Patient was noted to have an O2 saturation of 72%. Patient stated shortness of breath during this time. After two minutes, patient's O2 saturation returned to 94%. MD notifed.

## 2023-06-19 NOTE — PLAN OF CARE
Problem: Discharge Planning  Goal: Discharge to home or other facility with appropriate resources  Outcome: Progressing     Problem: Safety - Adult  Goal: Free from fall injury  Outcome: Progressing     Problem: Pain  Goal: Verbalizes/displays adequate comfort level or baseline comfort level  Outcome: Progressing     Problem: Chronic Conditions and Co-morbidities  Goal: Patient's chronic conditions and co-morbidity symptoms are monitored and maintained or improved  Outcome: Progressing     Problem: Respiratory - Adult  Goal: Achieves optimal ventilation and oxygenation  Outcome: Progressing     Problem: Cardiovascular - Adult  Goal: Maintains optimal cardiac output and hemodynamic stability  Outcome: Progressing     Problem: Metabolic/Fluid and Electrolytes - Adult  Goal: Electrolytes maintained within normal limits  Outcome: Progressing  Goal: Hemodynamic stability and optimal renal function maintained  Outcome: Progressing

## 2023-06-20 PROBLEM — N17.9 AKI (ACUTE KIDNEY INJURY) (HCC): Status: ACTIVE | Noted: 2023-06-20

## 2023-06-20 LAB
ANION GAP SERPL CALCULATED.3IONS-SCNC: 11 MMOL/L (ref 3–16)
BASOPHILS # BLD: 0 K/UL (ref 0–0.2)
BASOPHILS NFR BLD: 0.8 %
BUN SERPL-MCNC: 30 MG/DL (ref 7–20)
CALCIUM SERPL-MCNC: 9.9 MG/DL (ref 8.3–10.6)
CHLORIDE SERPL-SCNC: 100 MMOL/L (ref 99–110)
CO2 SERPL-SCNC: 26 MMOL/L (ref 21–32)
CREAT SERPL-MCNC: 1.8 MG/DL (ref 0.6–1.2)
DEPRECATED RDW RBC AUTO: 13.4 % (ref 12.4–15.4)
EOSINOPHIL # BLD: 0.5 K/UL (ref 0–0.6)
EOSINOPHIL NFR BLD: 9.9 %
GFR SERPLBLD CREATININE-BSD FMLA CKD-EPI: 28 ML/MIN/{1.73_M2}
GLUCOSE SERPL-MCNC: 150 MG/DL (ref 70–99)
HCT VFR BLD AUTO: 31.3 % (ref 36–48)
HGB BLD-MCNC: 10.6 G/DL (ref 12–16)
LYMPHOCYTES # BLD: 1 K/UL (ref 1–5.1)
LYMPHOCYTES NFR BLD: 20.9 %
MAGNESIUM SERPL-MCNC: 2.3 MG/DL (ref 1.8–2.4)
MCH RBC QN AUTO: 30.7 PG (ref 26–34)
MCHC RBC AUTO-ENTMCNC: 33.9 G/DL (ref 31–36)
MCV RBC AUTO: 90.8 FL (ref 80–100)
MONOCYTES # BLD: 0.4 K/UL (ref 0–1.3)
MONOCYTES NFR BLD: 9.2 %
NEUTROPHILS # BLD: 2.8 K/UL (ref 1.7–7.7)
NEUTROPHILS NFR BLD: 59.2 %
PLATELET # BLD AUTO: 186 K/UL (ref 135–450)
PMV BLD AUTO: 7.1 FL (ref 5–10.5)
POTASSIUM SERPL-SCNC: 4.4 MMOL/L (ref 3.5–5.1)
RBC # BLD AUTO: 3.44 M/UL (ref 4–5.2)
SODIUM SERPL-SCNC: 137 MMOL/L (ref 136–145)
WBC # BLD AUTO: 4.8 K/UL (ref 4–11)

## 2023-06-20 PROCEDURE — 80048 BASIC METABOLIC PNL TOTAL CA: CPT

## 2023-06-20 PROCEDURE — 83735 ASSAY OF MAGNESIUM: CPT

## 2023-06-20 PROCEDURE — 1200000000 HC SEMI PRIVATE

## 2023-06-20 PROCEDURE — 6370000000 HC RX 637 (ALT 250 FOR IP)

## 2023-06-20 PROCEDURE — 6360000002 HC RX W HCPCS

## 2023-06-20 PROCEDURE — 97530 THERAPEUTIC ACTIVITIES: CPT

## 2023-06-20 PROCEDURE — 97535 SELF CARE MNGMENT TRAINING: CPT

## 2023-06-20 PROCEDURE — 97162 PT EVAL MOD COMPLEX 30 MIN: CPT

## 2023-06-20 PROCEDURE — 99232 SBSQ HOSP IP/OBS MODERATE 35: CPT | Performed by: HOSPITALIST

## 2023-06-20 PROCEDURE — 2580000003 HC RX 258: Performed by: STUDENT IN AN ORGANIZED HEALTH CARE EDUCATION/TRAINING PROGRAM

## 2023-06-20 PROCEDURE — 97116 GAIT TRAINING THERAPY: CPT

## 2023-06-20 PROCEDURE — 97165 OT EVAL LOW COMPLEX 30 MIN: CPT

## 2023-06-20 PROCEDURE — 85025 COMPLETE CBC W/AUTO DIFF WBC: CPT

## 2023-06-20 PROCEDURE — 36415 COLL VENOUS BLD VENIPUNCTURE: CPT

## 2023-06-20 RX ORDER — ENOXAPARIN SODIUM 100 MG/ML
30 INJECTION SUBCUTANEOUS NIGHTLY
Status: DISCONTINUED | OUTPATIENT
Start: 2023-06-20 | End: 2023-06-21 | Stop reason: HOSPADM

## 2023-06-20 RX ORDER — SODIUM CHLORIDE 9 MG/ML
INJECTION, SOLUTION INTRAVENOUS CONTINUOUS
Status: ACTIVE | OUTPATIENT
Start: 2023-06-20 | End: 2023-06-21

## 2023-06-20 RX ORDER — PANTOPRAZOLE SODIUM 40 MG/1
40 TABLET, DELAYED RELEASE ORAL
Qty: 30 TABLET | Refills: 1 | Status: SHIPPED | OUTPATIENT
Start: 2023-06-21

## 2023-06-20 RX ADMIN — SODIUM CHLORIDE: 9 INJECTION, SOLUTION INTRAVENOUS at 16:48

## 2023-06-20 RX ADMIN — ESCITALOPRAM OXALATE 20 MG: 20 TABLET, FILM COATED ORAL at 08:23

## 2023-06-20 RX ADMIN — ACETAMINOPHEN 650 MG: 325 TABLET ORAL at 08:27

## 2023-06-20 RX ADMIN — ROSUVASTATIN CALCIUM 40 MG: 20 TABLET, COATED ORAL at 21:03

## 2023-06-20 RX ADMIN — ENOXAPARIN SODIUM 30 MG: 100 INJECTION SUBCUTANEOUS at 21:05

## 2023-06-20 RX ADMIN — FUROSEMIDE 20 MG: 20 TABLET ORAL at 08:23

## 2023-06-20 RX ADMIN — PANTOPRAZOLE SODIUM 40 MG: 40 TABLET, DELAYED RELEASE ORAL at 06:08

## 2023-06-20 RX ADMIN — AMLODIPINE BESYLATE 10 MG: 10 TABLET ORAL at 08:23

## 2023-06-20 ASSESSMENT — PAIN DESCRIPTION - LOCATION
LOCATION: LEG
LOCATION: BACK

## 2023-06-20 ASSESSMENT — PAIN DESCRIPTION - FREQUENCY
FREQUENCY: CONTINUOUS
FREQUENCY: CONTINUOUS

## 2023-06-20 ASSESSMENT — PAIN DESCRIPTION - ONSET
ONSET: ON-GOING
ONSET: ON-GOING

## 2023-06-20 ASSESSMENT — PAIN SCALES - GENERAL
PAINLEVEL_OUTOF10: 0
PAINLEVEL_OUTOF10: 2
PAINLEVEL_OUTOF10: 0
PAINLEVEL_OUTOF10: 5
PAINLEVEL_OUTOF10: 5

## 2023-06-20 ASSESSMENT — PAIN DESCRIPTION - ORIENTATION
ORIENTATION: RIGHT;LEFT
ORIENTATION: RIGHT;LEFT

## 2023-06-20 ASSESSMENT — PAIN - FUNCTIONAL ASSESSMENT: PAIN_FUNCTIONAL_ASSESSMENT: ACTIVITIES ARE NOT PREVENTED

## 2023-06-20 ASSESSMENT — PAIN DESCRIPTION - PAIN TYPE
TYPE: CHRONIC PAIN
TYPE: CHRONIC PAIN

## 2023-06-20 ASSESSMENT — PAIN DESCRIPTION - DESCRIPTORS
DESCRIPTORS: ACHING
DESCRIPTORS: ACHING

## 2023-06-20 NOTE — PROGRESS NOTES
Pharmacy Note - Renal Dosing    Enoxaparin 40 mg daily currently ordered for DVT ppx. Renal function is worsening. Will change to 30 mg daily per renal dosing  protocol. .      Estimated Creatinine Clearance: 19 mL/min (A) (based on SCr of 1.8 mg/dL (H)). Pharmacy will continue to monitor renal function and adjust dose as necessary. Please call with any questions    Chano Garza  [phone] (0 [facility])

## 2023-06-20 NOTE — PROGRESS NOTES
Physical Therapy  Facility/Department: [place] [address] [facility]  Physical Therapy Initial Assessment/discharge note      Name: Remi Nash  : 1940  MRN: [id number]  Date of Service: 2023    Discharge Recommendations:Jacki De La Rosa scored a  on the AM-PAC short mobility form. Current research shows that an AM-PAC score of 18 or greater is typically associated with a discharge to the patient's home setting. Based on the patient's AM-PAC score and their current functional mobility deficits, it is recommended that the patient have 2-3 sessions per week of Physical Therapy at d/c to increase the patient's independence. At this time, this patient demonstrates the endurance and safety to discharge home with (home services) and a follow up treatment frequency of 2-3x/wk. Please see assessment section for further patient specific details. PT Equipment Recommendations  Equipment Needed: No      Patient Diagnosis(es): The encounter diagnosis was Congestive heart failure, unspecified HF chronicity, unspecified heart failure type ([address] Past Medical History:  has a past medical history of Diabetes mellitus ([address] Essential hypertension, benign, GERD (gastroesophageal reflux disease), Hyperlipidemia, Interstitial lung disease ([address] Osteoarthrosis, unspecified whether generalized or localized, unspecified site, Osteopenia, and Shingles. Past Surgical History:  has a past surgical history that includes Colonoscopy (10/11/2010); Upper gastrointestinal endoscopy (2011); shoulder surgery (Right); Colonoscopy (2002); ventral hernia repair (2012); Ankle surgery (Right, 2013); Upper gastrointestinal endoscopy (2015); Colonoscopy (2015); Colonoscopy (2016); Colonoscopy (2016); bronchoscopy (N/A, 2019); Pain management procedure (Bilateral, 2020); Pain management procedure (Bilateral, 2020); Intracapsular cataract extraction (Right, 07/15/2020);  Pain

## 2023-06-20 NOTE — DISCHARGE INSTR - MEDS
Please take 1 tablet of 40mg pantoprazole(Protonix) daily in the morning. This will help to prevent an ulcer or reflux caused by your sliding hiatal hernia.

## 2023-06-20 NOTE — PROGRESS NOTES
Occupational Therapy  Facility/Department: [place] [address] [facility]  Occupational Therapy Initial Assessment    Name: Quinten Mcneil  : 1940  MRN: [id number]  Date of Service: 2023    Discharge Recommendations:  Quinten Mcneil scored a 23/24 on the AM-PAC ADL Inpatient form. Current research shows that an AM-PAC score of 18 or greater is typically associated with a discharge to the patient's home setting. Based on the patient's AM-PAC score, and their current ADL deficits, it is recommended that the patient have 2-3 sessions per week of Occupational Therapy at d/c to increase the patient's independence. At this time, this patient demonstrates the endurance and safety to discharge home with home services and a follow up treatment frequency of 2-3x/wk. Please see assessment section for further patient specific details. If patient discharges prior to next session this note will serve as a discharge summary. Please see below for the latest assessment towards goals. OT Equipment Recommendations  Equipment Needed: No  Other: pt reports having needed DME at home       Patient Diagnosis(es): The encounter diagnosis was Congestive heart failure, unspecified HF chronicity, unspecified heart failure type ([address] Past Medical History:  has a past medical history of Diabetes mellitus ([address] Essential hypertension, benign, GERD (gastroesophageal reflux disease), Hyperlipidemia, Interstitial lung disease ([address] Osteoarthrosis, unspecified whether generalized or localized, unspecified site, Osteopenia, and Shingles. Past Surgical History:  has a past surgical history that includes Colonoscopy (10/11/2010); Upper gastrointestinal endoscopy (2011); shoulder surgery (Right); Colonoscopy (2002); ventral hernia repair (2012); Ankle surgery (Right, 2013); Upper gastrointestinal endoscopy (2015); Colonoscopy (2015); Colonoscopy (2016);  Colonoscopy (2016); bronchoscopy (N/A,

## 2023-06-20 NOTE — PROGRESS NOTES
Patient's chart and labs reviewed due to concern regarding increase in creatinine. Patient originally scheduled for discharge. Creatinine is noted to be elevated from baseline. Her oxygen requirement did improve with diuresis to baseline requirement however may precipitated SADA. Discharge cancelled, will plan to hydrate with gentle fluid and reassess labs tomorrow. Plan of care discussed with attending. Attempted to reach family to update, unable to reach at this time. [address] PGY2    Addendum: contacted Valdemar Swan and discussed plans for disposition. All questions answered at this time.  Will continue to update family tomorrow with further management plans

## 2023-06-20 NOTE — DISCHARGE SUMMARY
INTERNAL MEDICINE DEPARTMENT AT [address] [place] [address]  DISCHARGE SUMMARY    Patient ID: Tony Miller                                             Discharge Date: 6/21/2023   Patient's PCP: Mercedse Castro MD                                          Discharge Physician: Lorin Watson MD  Admit Date: 6/18/2023   Admitting Physician: Theda Kehr, MD    PROBLEMS DURING HOSPITALIZATION:  Present on Admission:   Heart failure ([address]   SADA (acute kidney injury) ([address]    DISCHARGE DIAGNOSES: Fluid Overload secondary to chronic diastolic heart failure. HPI:  Tony Miller is a 82 y.o. female with medical history of interstitial lung disease on 6 L home O2, hypertension, hyperlipidemia, and type 2 diabetes, and HFpEF ~60-65% w/ Grade 1 diastolic dysfunction on 6/18/05, who presents with shortness of breath. Patient was at home and called for emergency medical services due to bilateral leg pain. When EMS arrived to the patient, they noticed that she was tachypneic as well as hypoxic. At that time they noted that she was saturating 86% on room air. Patient is on 6 L of at home oxygen at baseline secondary to that interstitial lung disease. Patient was put on a nonrebreather mask at this time which improved her oxygen saturation up to the high 90s. On review of systems in the emergency department, patient denied any cough or sputum production. Denies any fevers or chills. States that she takes 20 mg of Lasix at home for her grade 1 diastolic dysfunction heart failure. States that she did not take her Lasix today. Otherwise, review of systems largely negative. ED course: Upon presentation to the emergency department, patient noted to be hemodynamically stable. Blood pressure noted to be 120/66. Heart rate noted to be 68. Respirations are to be 20. Pulse oxygenation of 97% on nonrebreather mask with around 9 L oxygen. Patient noted to be afebrile at 98.3.      Laboratory studies obtained in the emergency

## 2023-06-20 NOTE — PLAN OF CARE
Patient to be discharged via wheelchair and private car. Report called to [facility]. Family member given physical prescription. No acute distress noted. Denies needs. After visit summary reviewed with patient. Signs/symptoms for reevaluation reviewed. No questions asked. 16:00- Discharge cancelled. Patient continues to have telemetry monitor.

## 2023-06-20 NOTE — PLAN OF CARE
Problem: Discharge Planning  Goal: Discharge to home or other facility with appropriate resources  Outcome: Progressing     Problem: Safety - Adult  Goal: Free from fall injury  Outcome: Progressing     Problem: Pain  Goal: Verbalizes/displays adequate comfort level or baseline comfort level  Outcome: Progressing     Problem: Chronic Conditions and Co-morbidities  Goal: Patient's chronic conditions and co-morbidity symptoms are monitored and maintained or improved  Outcome: Progressing     Problem: Respiratory - Adult  Goal: Achieves optimal ventilation and oxygenation  Outcome: Progressing

## 2023-06-20 NOTE — DISCHARGE INSTR - COC
Continuity of Care Form    Patient Name: Gee Triana   :  1940  MRN:  [id number]    Admit date:  2023  Discharge date:  23  Code Status Order: Full Code   Advance Directives:     Admitting Physician:  Monica Perez MD  PCP: Karrie Shah MD    Discharging Nurse: Winifred Angulo Dr Unit/Room#: 5446/6752-59  Discharging Unit Phone Number: [phone]    Emergency Contact:   Extended Emergency Contact Information  Primary Emergency Contact: [address] [facility] Phone: [phone]  Mobile Phone: [phone]  Relation: Child  Secondary Emergency Contact: [address] Phone: [phone]  Mobile Phone: [phone]  Relation: Child    Past Surgical History:  Past Surgical History:   Procedure Laterality Date    ANKLE SURGERY Right 2013    torn tendon    BREAST BIOPSY      unsure of which breast    BRONCHOSCOPY N/A 2019    BRONCHOSCOPY WITH BAL AND TRANSBRONCHIAL BIOPSIES performed by Mirella Yoder MD at [place] [address]  10/11/2010    Dr. Michael Lewis , polyps and diverticulosis    COLONOSCOPY  2002    Dr. Seven Andrews, Diverticulosis    COLONOSCOPY  2015    Dr. Michael Lewis- diverticulosis, sessile polyp, 5 years     COLONOSCOPY  2016    Dr London Hackett; Diverticulosis    COLONOSCOPY  2016    Dr Michael Lewis,     CYSTOSCOPY Left 2022    CYSTOSCOPY URETERAL STENT INSERTION performed by Jesus Tomlinson MD at [address] [place] [address] Drive Right 07/15/2020    PHACOEMULSIFICATION OF CATARACT RIGHT EYE WITH INTRAOCULAR LENS IMPLANT performed by Earle Harris MD at [facility]  [address] Bilateral 2020    BILATERAL LUMBAR THREE, LUMBAR FOUR, LUMBAR FIVE RADIOFREQUENCY ABLATION SITE CONFIRMED BY FLUOROSCOPY performed by Luis Antonio Orourke MD at [facility] Bilateral 2020    BILATERAL LUMBAR FOUR TRANSFORAMINAL EPIDURAL STEROID INJECTION SITE CONFIRMED BY FLUOROSCOPY

## 2023-06-20 NOTE — PROGRESS NOTES
Attempted to call patient's son to discuss discharge and hospital course, but there was no answer.     Luciana Orosco MD  PGY1, Internal Medicine  06/20/23  1:21 PM

## 2023-06-21 VITALS
SYSTOLIC BLOOD PRESSURE: 108 MMHG | DIASTOLIC BLOOD PRESSURE: 57 MMHG | BODY MASS INDEX: 22.66 KG/M2 | RESPIRATION RATE: 18 BRPM | HEART RATE: 61 BPM | OXYGEN SATURATION: 97 % | WEIGHT: 123.9 LBS | TEMPERATURE: 98 F

## 2023-06-21 LAB
ANION GAP SERPL CALCULATED.3IONS-SCNC: 7 MMOL/L (ref 3–16)
BASOPHILS # BLD: 0 K/UL (ref 0–0.2)
BASOPHILS NFR BLD: 0.8 %
BUN SERPL-MCNC: 31 MG/DL (ref 7–20)
CALCIUM SERPL-MCNC: 10 MG/DL (ref 8.3–10.6)
CHLORIDE SERPL-SCNC: 103 MMOL/L (ref 99–110)
CO2 SERPL-SCNC: 27 MMOL/L (ref 21–32)
CREAT SERPL-MCNC: 1.2 MG/DL (ref 0.6–1.2)
DEPRECATED RDW RBC AUTO: 13.5 % (ref 12.4–15.4)
EOSINOPHIL # BLD: 0.4 K/UL (ref 0–0.6)
EOSINOPHIL NFR BLD: 8.1 %
GFR SERPLBLD CREATININE-BSD FMLA CKD-EPI: 45 ML/MIN/{1.73_M2}
GLUCOSE SERPL-MCNC: 135 MG/DL (ref 70–99)
HCT VFR BLD AUTO: 30 % (ref 36–48)
HGB BLD-MCNC: 10.1 G/DL (ref 12–16)
LYMPHOCYTES # BLD: 0.9 K/UL (ref 1–5.1)
LYMPHOCYTES NFR BLD: 19.3 %
MAGNESIUM SERPL-MCNC: 1.9 MG/DL (ref 1.8–2.4)
MCH RBC QN AUTO: 30.6 PG (ref 26–34)
MCHC RBC AUTO-ENTMCNC: 33.6 G/DL (ref 31–36)
MCV RBC AUTO: 91.2 FL (ref 80–100)
MONOCYTES # BLD: 0.5 K/UL (ref 0–1.3)
MONOCYTES NFR BLD: 9.5 %
NEUTROPHILS # BLD: 3 K/UL (ref 1.7–7.7)
NEUTROPHILS NFR BLD: 62.3 %
NT-PROBNP SERPL-MCNC: 1219 PG/ML (ref 0–449)
PLATELET # BLD AUTO: 186 K/UL (ref 135–450)
PMV BLD AUTO: 7.6 FL (ref 5–10.5)
POTASSIUM SERPL-SCNC: 4.6 MMOL/L (ref 3.5–5.1)
RBC # BLD AUTO: 3.29 M/UL (ref 4–5.2)
SODIUM SERPL-SCNC: 137 MMOL/L (ref 136–145)
WBC # BLD AUTO: 4.8 K/UL (ref 4–11)

## 2023-06-21 PROCEDURE — 80048 BASIC METABOLIC PNL TOTAL CA: CPT

## 2023-06-21 PROCEDURE — 83880 ASSAY OF NATRIURETIC PEPTIDE: CPT

## 2023-06-21 PROCEDURE — 83735 ASSAY OF MAGNESIUM: CPT

## 2023-06-21 PROCEDURE — 36415 COLL VENOUS BLD VENIPUNCTURE: CPT

## 2023-06-21 PROCEDURE — 85025 COMPLETE CBC W/AUTO DIFF WBC: CPT

## 2023-06-21 PROCEDURE — 2580000003 HC RX 258

## 2023-06-21 PROCEDURE — 6370000000 HC RX 637 (ALT 250 FOR IP)

## 2023-06-21 PROCEDURE — 99239 HOSP IP/OBS DSCHRG MGMT >30: CPT | Performed by: HOSPITALIST

## 2023-06-21 RX ADMIN — ESCITALOPRAM OXALATE 20 MG: 20 TABLET, FILM COATED ORAL at 09:42

## 2023-06-21 RX ADMIN — SODIUM CHLORIDE, PRESERVATIVE FREE 10 ML: 5 INJECTION INTRAVENOUS at 09:42

## 2023-06-21 RX ADMIN — PANTOPRAZOLE SODIUM 40 MG: 40 TABLET, DELAYED RELEASE ORAL at 05:36

## 2023-06-21 RX ADMIN — ACETAMINOPHEN 650 MG: 325 TABLET ORAL at 09:43

## 2023-06-21 RX ADMIN — AMLODIPINE BESYLATE 10 MG: 10 TABLET ORAL at 09:42

## 2023-06-21 ASSESSMENT — PAIN - FUNCTIONAL ASSESSMENT: PAIN_FUNCTIONAL_ASSESSMENT: ACTIVITIES ARE NOT PREVENTED

## 2023-06-21 ASSESSMENT — PAIN SCALES - GENERAL
PAINLEVEL_OUTOF10: 0
PAINLEVEL_OUTOF10: 3
PAINLEVEL_OUTOF10: 0

## 2023-06-21 ASSESSMENT — PAIN DESCRIPTION - DESCRIPTORS: DESCRIPTORS: ACHING

## 2023-06-21 ASSESSMENT — PAIN DESCRIPTION - LOCATION: LOCATION: HEAD

## 2023-06-21 ASSESSMENT — PAIN DESCRIPTION - ORIENTATION: ORIENTATION: ANTERIOR

## 2023-06-21 NOTE — PLAN OF CARE
Problem: Pain  Goal: Verbalizes/displays adequate comfort level or baseline comfort level  Recent Flowsheet Documentation  Taken 6/21/2023 0159 by Maximino Garcia RN  Verbalizes/displays adequate comfort level or baseline comfort level:   Encourage patient to monitor pain and request assistance   Assess pain using appropriate pain scale   Administer analgesics based on type and severity of pain and evaluate response    Problem: Chronic Conditions and Co-morbidities  Goal: Patient's chronic conditions and co-morbidity symptoms are monitored and maintained or improved  Recent Flowsheet Documentation  Taken 6/21/2023 0159 by Hardeep Ricks 34 - Patient's Chronic Conditions and Co-Morbidity Symptoms are Monitored and Maintained or Improved:   Monitor and assess patient's chronic conditions and comorbid symptoms for stability, deterioration, or improvement   Collaborate with multidisciplinary team to address chronic and comorbid conditions and prevent exacerbation or deterioration   Update acute care plan with appropriate goals if chronic or comorbid symptoms are exacerbated and prevent overall improvement and discharge    Problem: Respiratory - Adult  Goal: Achieves optimal ventilation and oxygenation  Recent Flowsheet Documentation  Taken 6/21/2023 0159 by Maximino Garcia RN  Achieves optimal ventilation and oxygenation:   Assess for changes in respiratory status   Assess for changes in mentation and behavior   Position to facilitate oxygenation and minimize respiratory effort    Problem: Safety - Adult  Goal: Free from fall injury  Outcome: Progressing  Flowsheets (Taken 6/21/2023 0159)  Free From Fall Injury: Instruct family/caregiver on patient safety  Note: Fall precautions are in place. Bed alarm is on and in lowest position. Pt is using call light appropriately. Will continue to monitor.

## 2023-06-21 NOTE — DISCHARGE INSTR - DIET
Good nutrition is important when healing from an illness, injury, or surgery. Follow any nutrition recommendations given to you during your hospital stay. If you were given an oral nutrition supplement while in the hospital, continue to take this supplement at home. You can take it with meals, in-between meals, and/or before bedtime. These supplements can be purchased at most local grocery stores, pharmacies, and chain [employer]-stores. If you have any questions about your diet or nutrition, call the hospital and ask for the dietitian.   Regular, low fat, low salt, low cholesterol, high fiber diet

## 2023-06-21 NOTE — CARE COORDINATION
Case Management Assessment  Initial Evaluation    Date/Time of Evaluation: 6/19/2023 3:16 PM  Assessment Completed by: Lobo Albarran RN    If patient is discharged prior to next notation, then this note serves as note for discharge by case management. Patient Name: Remi Nash                   YOB: 1940  Diagnosis: Heart failure ([address] [I50.9]  Congestive heart failure, unspecified HF chronicity, unspecified heart failure type ([address] [I50.9]                   Date / Time: 6/18/2023 11:40 AM    Patient Admission Status: Inpatient   Readmission Risk (Low < 19, Mod (19-27), High > 27): Readmission Risk Score: 11.7    Current PCP: Denise Hooker MD  PCP verified by CM? Yes Denise Hooker MD)    Chart Reviewed: Yes      History Provided by: Patient  Patient Orientation: Alert and Oriented    Patient Cognition: Alert [facility])    Hospitalization in the last 30 days (Readmission):  No    If yes, Readmission Assessment in CM Navigator will be completed. Advance Directives:      Code Status: Full Code   Patient's Primary Decision Maker is: Named in [address] [place] [address]  ([phone]))    Primary Decision Maker: Cisco Kaufman Child - [phone]    Secondary Decision Maker: Dieudonne Davidson Child - [phone]    Copy present: Yes     Discharge Planning:    Patient lives with: Alone  Type of Home: Apartment ([place] at [facility] [address] [facility])  Primary Care Giver: Self  Patient Support Systems include: Children     Current Financial resources: Medicare    Current community resources: None    Currently ACTIVE with [place] on Aging: No    Current services prior to admission: Oxygen Therapy ([facility] (6L baseline))            Current DME:              Type of Home Care services:  Aide Services (offered by the [facility] if needed)    Home Care Information  Currently ACTIVE with Home Health Care: No    ADLS  Prior functional level: Independent in ADLs/IADLs  Current functional level:  Independent in
Pt to discharge back to [address] AL nurse called to let know patient would be returning this afternoon. IMM presented and signed. Pt's sister to transport home and bring portable O2. Pt on 6 L at baseline.    # for report 2 [address], [address] [place] [address], [address] [place] [address] Ryan  Case Management Department  [phone]
at time of pick-up or delivery - cash, check, or card accepted)     Able to afford home medications/ co-pay costs: Yes    ADLS:  Current PT AM-PAC Score: 21 /24  Current OT AM-PAC Score: 23 /24      DISCHARGE Disposition: [address]: Nini Wright Phone: [phone] Fax: [phone]    LOC at discharge: Not Applicable  LEA Completed: Yes    Notification completed in HENS/PAS?:  Not Applicable    IMM Completed:   No    IMM Letter given to Patient/Family/Significant other/Guardian/POA/by[de-identified] pt access upon admission  IMM Letter date given[de-identified] 06/18/23  IMM Letter time given[de-identified] 1232    Transportation:  Transportation PLAN for discharge: family   Mode of Transport: Private Car  Reason for medical transport: Not Applicable  Name of [address] [place] [address],[address]: Not Applicable    Home Oxygen and Respiratory Equipment:  Oxygen needed at discharge?: Yes  [address]: [place] Phone: [phone]   Portable tank available for discharge?: Yes    Additional CM Notes: Pt to discharge to The [address] [place] [address] Drive but report can be called to AL number above. CM spoke with nurse to let her know pt would be returning. COVID Result:    Lab Results   Component Value Date/Time    COVID19 NOT DETECTED 06/18/2023 12:30 PM    COVID19 Not Detected 07/23/2020 12:58 PM       The Plan for Transition of Care is related to the following treatment goals of Heart failure ([address] [I50.9]  Congestive heart failure, unspecified HF chronicity, unspecified heart failure type ([address] [I50.9]    The Patient and/or patient representative Jacki and her family were provided with a choice of provider and agrees with the discharge plan Yes    Freedom of choice list was provided with basic dialogue that supports the patient's individualized plan of care/goals and shares the quality data associated with the providers.  Yes    Care Transitions patient: Yes    Elizabeth Conklin RN  [facility]  Case Management Department  Ph: [phone]  Fax: [phone]

## 2023-06-21 NOTE — PROGRESS NOTES
Physician Progress Note      Rebecca Acosta  CSN #:                  [id number]  :                       1940  ADMIT DATE:       2023 11:40 AM  DISCH DATE:  Emely Lee  PROVIDER #:        Dorman Dakins MD          QUERY TEXT:    Patient admitted  with SOB due to Diastolic CHF exacerbation. Per  DC   Summary: Noted documentation of SOB likely 2/2 acute on chronic CHF   exacerbation patient has preserved EF of 60-65% Also in DC Summary listed   under discharge dxs: Fluid overload 2/2 chronic diastolic heart failure. If   possible, please document in progress notes and discharge summary if you are   evaluating and /or treating any of the following: The medical record reflects the following:  Risk Factors: 82 y.o. female with ILD  Clinical Indicators: SOB on presentation, Pro-BNP: 5,269, fluid overload 2/2   not taking home lasix prior to presentation. Treatment: Spot dose IV Lasix in ED, PO lasix,  Options provided:  -- Acute on chronic diastolic CHF exacerbation confirmed POA  -- Chronic diastolic CHF confirmed, and Diastolic CHF exacerbation ruled out  -- Other - I will add my own diagnosis  -- Disagree - Not applicable / Not valid  -- Disagree - Clinically unable to determine / Unknown  -- Refer to Clinical Documentation Reviewer    PROVIDER RESPONSE TEXT:    After study, acute on chronic diastolic CHF exacerbation confirmed POA.     Query created by: Monique Barajas on 2023 6:43 PM      Electronically signed by:  Dorman Dakins MD 2023 9:44 AM

## 2023-06-21 NOTE — PROGRESS NOTES
Patient discharged to nursing facility per her sister's vehicle with home oxygen tank set at 6 liters.

## 2023-06-22 ENCOUNTER — OFFICE VISIT (OUTPATIENT)
Dept: INTERNAL MEDICINE CLINIC | Age: 83
End: 2023-06-22

## 2023-06-22 ENCOUNTER — CARE COORDINATION (OUTPATIENT)
Dept: CASE MANAGEMENT | Age: 83
End: 2023-06-22

## 2023-06-22 VITALS
OXYGEN SATURATION: 94 % | TEMPERATURE: 97.8 F | WEIGHT: 124 LBS | BODY MASS INDEX: 22.82 KG/M2 | HEIGHT: 62 IN | DIASTOLIC BLOOD PRESSURE: 82 MMHG | HEART RATE: 72 BPM | SYSTOLIC BLOOD PRESSURE: 112 MMHG

## 2023-06-22 DIAGNOSIS — Z09 HOSPITAL DISCHARGE FOLLOW-UP: ICD-10-CM

## 2023-06-22 DIAGNOSIS — I50.32 CHRONIC DIASTOLIC HEART FAILURE (HCC): ICD-10-CM

## 2023-06-22 DIAGNOSIS — N18.31 STAGE 3A CHRONIC KIDNEY DISEASE (HCC): ICD-10-CM

## 2023-06-22 DIAGNOSIS — R53.83 OTHER FATIGUE: ICD-10-CM

## 2023-06-22 DIAGNOSIS — J96.11 CHRONIC RESPIRATORY FAILURE WITH HYPOXIA (HCC): ICD-10-CM

## 2023-06-22 DIAGNOSIS — D64.9 NORMOCYTIC ANEMIA: Primary | ICD-10-CM

## 2023-06-22 PROBLEM — N18.30 CHRONIC RENAL DISEASE, STAGE III (HCC): Status: ACTIVE | Noted: 2023-06-22

## 2023-06-22 NOTE — ASSESSMENT & PLAN NOTE
Was admitted in the hospital recently with worsening oxygen requirement, BNP significantly elevated. She also complain of chronic fatigue  -Given her clinical picture, will obtain echocardiogram  -Continued Lasix 20 mg daily, patient was advised to call if more than 5 pounds of weight loss or weight gains over  24 hours. She was advised to hold Lasix if significant signs of dehydration or weight loss. -Low-sodium diet, fluid restriction (1.5 L)  -Last echo was in 5/2022  Summary   Normal left ventricle size and systolic function with an estimated ejection   fraction of 60%-65%. Mild concentric left ventricular hypertrophy. No regional wall motion abnormalities are seen. Grade I diastolic dysfunction with elevated LVEDP.

## 2023-06-22 NOTE — PROGRESS NOTES
Post-Discharge Transitional Care  Follow Up      Eric Lloyd   YOB: 1940    Date of Office Visit:  6/22/2023  Date of Hospital Admission: 6/18/23  Date of Hospital Discharge: 6/21/23  Risk of hospital readmission (high >=14%. Medium >=10%) :Readmission Risk Score: 13.3      Care management risk score Rising risk (score 2-5) and Complex Care (Scores >=6): No Risk Score On File     Non face to face  following discharge, date last encounter closed (first attempt may have been earlier): *No documented post hospital discharge outreach found in the last 14 days    Call initiated 2 business days of discharge: *No response recorded in the last 14 days    ASSESSMENT/PLAN:   Normocytic anemia  -     Ferritin; Future  -     Iron and TIBC; Future  -     TSH with Reflex; Future  Other fatigue  -     Ferritin; Future  -     Iron and TIBC; Future  -     TSH with Reflex; Future  Stage 3a chronic kidney disease ([facility] [address]  Assessment & Plan:  Would need continue to monitor given patient on Lasix daily. Hospital discharge follow-up  -     CA DISCHARGE MEDS RECONCILED W/ CURRENT OUTPATIENT MED LIST  Chronic respiratory failure with hypoxia (HCC)  Assessment & Plan:  Secondary to chronic ILD  Sees Dr. Renetta Bryant   On 6 L of oxygen at baseline, requiring 7 to 8 L with exertion      Chronic diastolic heart failure [facility])  Assessment & Plan:  Was admitted in the hospital recently with worsening oxygen requirement, BNP significantly elevated. She also complain of chronic fatigue  -Given her clinical picture, will obtain echocardiogram  -Continued Lasix 20 mg daily, patient was advised to call if more than 5 pounds of weight loss or weight gains over  24 hours. She was advised to hold Lasix if significant signs of dehydration or weight loss. -Low-sodium diet, fluid restriction (1.5 L)  -Last echo was in 5/2022  Summary   Normal left ventricle size and systolic function with an estimated ejection   fraction of 60%-65%.

## 2023-06-22 NOTE — ASSESSMENT & PLAN NOTE
Secondary to chronic ILD  Sees Dr. Yannick Arias   On 6 L of oxygen at baseline, requiring 7 to 8 L with exertion

## 2023-06-22 NOTE — CARE COORDINATION
[facility] Care Transitions Initial Follow Up Call    Call within 2 business days of discharge:  yes       Patient Current Location:  Home: [address]  [address] [facility]    Care Transition Nurse contacted the patient by telephone to perform post hospital discharge assessment. Verified name with patient as identifier. Provided introduction to self, and explanation of the Care Transition Nurse role. Patient: Mary Jane Bucio  Patient :  1940  MRN: [id number]   Reason for Admission  HF / SADA    Discharge Date:     RARS: 15      Last Discharge  [address]       Date Complaint Diagnosis Description Type Department Provider    23 Shortness of Breath Congestive heart failure, unspecified HF chronicity, unspecified heart failure type [facility]) ED to Hosp-Admission (Discharged) (ADMITTED) [facility] 4 U Adan Bacon MD; Kimber Kimble MD              Challenges to be reviewed by the provider   Additional needs identified to be addressed with provider:      no                                                     Method of communication with provider :   none      SUMMARY  CTN spoke to the pt who reports the following:      -C/O feeling \"tired and weak\". Using walker since d/c home and will continue to use to prevent fall / injury. Denies falling since d/c home.   -Hx of HF:  weight his morning 122.4 lbs and 123.14 at dc. Denies LE edema, SOB / DB, and chest pain.  -Pt very [place]. -Denies fever, chills, nausea, vomiting, cough, congestion, feeling lightheaded / dizziness, and heart palpitations / flutters. -Denies issues with fluid intake (advised to follow fluid restriction recommended by physician d/t hx of HF), appetite, urination, and LBM today. -Declined to review meds. Will review with doctor later today. Pt will take all meds as prescribed and schedule / keep doctor's appt. CTC provided education on s/s that require medical attention and when to seek medical attention.   CTC advised Pt of

## 2023-06-23 ENCOUNTER — TELEPHONE (OUTPATIENT)
Dept: PULMONOLOGY | Age: 83
End: 2023-06-23

## 2023-06-23 DIAGNOSIS — R53.83 OTHER FATIGUE: ICD-10-CM

## 2023-06-23 DIAGNOSIS — N18.31 STAGE 3A CHRONIC KIDNEY DISEASE (HCC): ICD-10-CM

## 2023-06-23 DIAGNOSIS — I50.32 CHRONIC DIASTOLIC HEART FAILURE (HCC): ICD-10-CM

## 2023-06-23 DIAGNOSIS — D64.9 NORMOCYTIC ANEMIA: ICD-10-CM

## 2023-06-23 LAB
FERRITIN SERPL IA-MCNC: 78.4 NG/ML (ref 15–150)
TSH SERPL DL<=0.005 MIU/L-ACNC: 1.24 UIU/ML (ref 0.27–4.2)

## 2023-06-23 NOTE — TELEPHONE ENCOUNTER
Son, Radha Daniel (on HIPAA) calls to move appt scheduled for 08/16/2023 sooner due to [facility] recently d/c from [facility]. Does she need to be seen sooner? When?

## 2023-06-24 LAB
ALBUMIN SERPL-MCNC: 4.5 G/DL (ref 3.4–5)
ANION GAP SERPL CALCULATED.3IONS-SCNC: 16 MMOL/L (ref 3–16)
BUN SERPL-MCNC: 37 MG/DL (ref 7–20)
CALCIUM SERPL-MCNC: 9.8 MG/DL (ref 8.3–10.6)
CHLORIDE SERPL-SCNC: 101 MMOL/L (ref 99–110)
CO2 SERPL-SCNC: 22 MMOL/L (ref 21–32)
CREAT SERPL-MCNC: 1.3 MG/DL (ref 0.6–1.2)
GFR SERPLBLD CREATININE-BSD FMLA CKD-EPI: 41 ML/MIN/{1.73_M2}
GLUCOSE SERPL-MCNC: 109 MG/DL (ref 70–99)
IRON SATN MFR SERPL: 20 % (ref 15–50)
IRON SERPL-MCNC: 62 UG/DL (ref 37–145)
MAGNESIUM SERPL-MCNC: 1.5 MG/DL (ref 1.8–2.4)
PHOSPHATE SERPL-MCNC: 3 MG/DL (ref 2.5–4.9)
POTASSIUM SERPL-SCNC: 4.4 MMOL/L (ref 3.5–5.1)
SODIUM SERPL-SCNC: 139 MMOL/L (ref 136–145)
TIBC SERPL-MCNC: 306 UG/DL (ref 260–445)

## 2023-06-27 ENCOUNTER — HOSPITAL ENCOUNTER (OUTPATIENT)
Dept: NON INVASIVE DIAGNOSTICS | Age: 83
Discharge: HOME | End: 2023-06-27
Payer: MEDICARE

## 2023-06-27 DIAGNOSIS — I51.89 DIASTOLIC DYSFUNCTION: ICD-10-CM

## 2023-06-27 DIAGNOSIS — I50.32 CHRONIC DIASTOLIC HEART FAILURE (HCC): Primary | ICD-10-CM

## 2023-06-27 DIAGNOSIS — N18.31 STAGE 3A CHRONIC KIDNEY DISEASE (HCC): Primary | ICD-10-CM

## 2023-06-27 LAB
LV EF: 63 %
LVEF MODALITY: NORMAL

## 2023-06-27 PROCEDURE — 93306 TTE W/DOPPLER COMPLETE: CPT

## 2023-06-27 RX ORDER — MAGNESIUM GLYCINATE 100 MG
200 CAPSULE ORAL DAILY
Qty: 60 CAPSULE | Refills: 0 | Status: SHIPPED | OUTPATIENT
Start: 2023-06-27 | End: 2023-07-27

## 2023-07-05 DIAGNOSIS — N18.31 STAGE 3A CHRONIC KIDNEY DISEASE (HCC): ICD-10-CM

## 2023-07-05 LAB
ALBUMIN SERPL-MCNC: 4.2 G/DL (ref 3.4–5)
ANION GAP SERPL CALCULATED.3IONS-SCNC: 12 MMOL/L (ref 3–16)
BUN SERPL-MCNC: 27 MG/DL (ref 7–20)
CALCIUM SERPL-MCNC: 9.7 MG/DL (ref 8.3–10.6)
CHLORIDE SERPL-SCNC: 101 MMOL/L (ref 99–110)
CO2 SERPL-SCNC: 24 MMOL/L (ref 21–32)
CREAT SERPL-MCNC: 1.2 MG/DL (ref 0.6–1.2)
GFR SERPLBLD CREATININE-BSD FMLA CKD-EPI: 45 ML/MIN/{1.73_M2}
GLUCOSE SERPL-MCNC: 333 MG/DL (ref 70–99)
MAGNESIUM SERPL-MCNC: 1.5 MG/DL (ref 1.8–2.4)
PHOSPHATE SERPL-MCNC: 3.2 MG/DL (ref 2.5–4.9)
POTASSIUM SERPL-SCNC: 4.1 MMOL/L (ref 3.5–5.1)
SODIUM SERPL-SCNC: 137 MMOL/L (ref 136–145)

## 2023-07-06 ENCOUNTER — OFFICE VISIT (OUTPATIENT)
Dept: INTERNAL MEDICINE CLINIC | Age: 83
End: 2023-07-06

## 2023-07-06 VITALS
DIASTOLIC BLOOD PRESSURE: 64 MMHG | WEIGHT: 124 LBS | SYSTOLIC BLOOD PRESSURE: 122 MMHG | HEIGHT: 62 IN | BODY MASS INDEX: 22.82 KG/M2

## 2023-07-06 DIAGNOSIS — N18.32 STAGE 3B CHRONIC KIDNEY DISEASE (CKD) (HCC): ICD-10-CM

## 2023-07-06 DIAGNOSIS — E08.21 DIABETES MELLITUS DUE TO UNDERLYING CONDITION WITH DIABETIC NEPHROPATHY, WITHOUT LONG-TERM CURRENT USE OF INSULIN (HCC): ICD-10-CM

## 2023-07-06 DIAGNOSIS — I27.23 PULMONARY HYPERTENSION DUE TO LUNG DISEASE (HCC): Primary | ICD-10-CM

## 2023-07-06 RX ORDER — DOXYCYCLINE HYCLATE 100 MG
100 TABLET ORAL 2 TIMES DAILY
Qty: 10 TABLET | Refills: 0 | Status: SHIPPED | OUTPATIENT
Start: 2023-07-06 | End: 2023-07-11

## 2023-07-06 NOTE — PROGRESS NOTES
Rachel Sarmiento (:  1940) is a 82 y.o. female,Established patient, here for evaluation of the following chief complaint(s):  Results (Echo, has not seen cardio yet )         ASSESSMENT/PLAN:  1. Pulmonary hypertension due to lung disease ([address])   - echo shows pulmonary hypertension, and on recent CT there was signs of right heart strain as well. These findings were not there previously, in comparison to last year's echo this has worsened significantly. I do think that this explains her worsening symptoms over the last year, since the lung disease has not appeared to have progressed on CT imaging.    - she has an appointment scheduled with cardiology, will need further evaluation. Given the evidence of right heart strain I would not be surprised if the PA pressure is higher than estimated on the echo. 2. Diabetes mellitus due to underlying condition with diabetic nephropathy, without long-term current use of insulin ([facility])  - A1C has been under control but random glucose on recent blood work was 300, and no recent steroid use. Her GFR is back at baseline which is over 30, so since her A1C has not been very high would resume the metformin for now not at max dose. Will need to monitor renal function very closely. If worsening may need alternative agent such as SGLT-2 or DPP-4, though the glucose lowering effect is diminished at the lower GFR.  -     Hemoglobin A1C; Future  -     Basic Metabolic Panel; Future  -     CBC with Auto Differential; Future  3. Stage 3b chronic kidney disease (CKD) ([facility])  - stable, monitor  -     Hemoglobin A1C; Future  -     Basic Metabolic Panel; Future  -     CBC with Auto Differential; Future      Follow up as scheduled next month         Subjective   SUBJECTIVE/OBJECTIVE:  HPI    Following up after echocardiogram. She is taking the water pill every other day right now. Not noticing any swelling in the legs.  She still has significant desaturations     Review of Systems

## 2023-07-07 ENCOUNTER — TELEPHONE (OUTPATIENT)
Dept: INTERNAL MEDICINE CLINIC | Age: 83
End: 2023-07-07

## 2023-07-07 DIAGNOSIS — E11.9 DIABETES MELLITUS TYPE 2 IN NONOBESE (HCC): Primary | ICD-10-CM

## 2023-07-07 RX ORDER — LANCETS 30 GAUGE
EACH MISCELLANEOUS
Qty: 100 EACH | Refills: 2 | Status: SHIPPED | OUTPATIENT
Start: 2023-07-07 | End: 2023-07-12

## 2023-07-07 RX ORDER — GLUCOSAMINE HCL/CHONDROITIN SU 500-400 MG
CAPSULE ORAL
Qty: 100 STRIP | Refills: 2 | Status: SHIPPED | OUTPATIENT
Start: 2023-07-07 | End: 2023-07-12

## 2023-07-07 NOTE — TELEPHONE ENCOUNTER
Ms Butch Piedra called requesting that Dr. Angelica Painting call the [facility] where she's a resident. She wants her blood sugar tested, yesterday it was 300 and today she is feeling shaky. I called the nurses station ([phone]) and spoke with [facility]. Ms. Butch Piedra would need a different level of care of this was done. Also, they don't supply the equipment, the patient's supply their own equipment. If she does get the this [facility] can show Ms. Butch Piedra how to use this. [facility] will also contact Mr. Cifuentes's son about this if there needs to be an increased level of care. Please send over the blood glucose testing equipment. Please advise. The patient is a 86y Male complaining of

## 2023-07-10 ENCOUNTER — TELEPHONE (OUTPATIENT)
Dept: INTERNAL MEDICINE CLINIC | Age: 83
End: 2023-07-10

## 2023-07-10 NOTE — TELEPHONE ENCOUNTER
Pt is currently on an antibiotic (doxycyline 100 mg). Says she can't tolerate it. Pt is fatigue/dizziness, having diarrhea, and has lost 4 lbs. Would like to speak to [facility] or Dr. Hayes Jerome.      Please advise

## 2023-07-11 NOTE — PROGRESS NOTES
8/28/18  IMPRESSION:   1. NORMAL SPECT STRESS AND REST MYOCARDIAL PERFUSION SCAN WITH NO EVIDENCE OF REVERSIBLE MYOCARDIAL ISCHEMIA. 2.  NORMAL-APPEARING LEFT VENTRICULAR WALL MOTION AND EJECTION FRACTION AT REST. Last Cath (if available):    Last TTE/RUBÉN(if available): 6/27/23   Summary   Left ventricular cavity size is . There is moderately increased left   ventricular wall thickness. Ejection fraction is visually estimated to be 60-65%. There is systolic and diastolic septal flattening consistent with right   ventricular pressure and volume overload. Grade II diastolic dysfunction with elevated LV filling pressures. Mildly thickened mitral valve leaflets. Moderate tricuspid regurgitation. Mild pulmonic regurgitation present. Estimated pulmonary artery systolic pressure is elevated at 67 mmHg assuming   a right atrial pressure of 3 mmHg. Trial-small pericardial effusion. Last CMR  (if available):    Last Coronary Artery Calcium Score: Ankle-brachial index:    Carotid ultrasound screening:    Abdominal aortic aneurysm screening:    Assessment / Plan:     1. Chronic diastolic congestive heart failure ([address])    2. Pulmonary HTN ([address])    3. Hypertension, unspecified type    4. Hyperlipidemia, unspecified hyperlipidemia type    5. Stage 3 chronic kidney disease, unspecified whether stage 3a or 3b CKD ([address])    6. Interstitial lung disease ([address])    7. Diabetes mellitus due to underlying condition with diabetic nephropathy, without long-term current use of insulin (HCC)      Ideally, would like to do ischemic evaluation due to T wave abnormality seen on ECG today (not seen on prior ECGs). Not a good candidate for [facility] due to ILD. She would be high risk for LHC. This was discussed with her and her sons. Will continue conservative treatment for now. Add asa 81 mg po daily. Not on betabloker due to advanced lung dz. Continue statin. She feels better laying flat.  She is euvolemic on exam

## 2023-07-12 ENCOUNTER — OFFICE VISIT (OUTPATIENT)
Dept: CARDIOLOGY CLINIC | Age: 83
End: 2023-07-12

## 2023-07-12 VITALS
SYSTOLIC BLOOD PRESSURE: 106 MMHG | HEART RATE: 56 BPM | HEIGHT: 62 IN | WEIGHT: 119.2 LBS | BODY MASS INDEX: 21.94 KG/M2 | DIASTOLIC BLOOD PRESSURE: 68 MMHG

## 2023-07-12 DIAGNOSIS — E78.5 HYPERLIPIDEMIA, UNSPECIFIED HYPERLIPIDEMIA TYPE: ICD-10-CM

## 2023-07-12 DIAGNOSIS — N18.30 STAGE 3 CHRONIC KIDNEY DISEASE, UNSPECIFIED WHETHER STAGE 3A OR 3B CKD (HCC): ICD-10-CM

## 2023-07-12 DIAGNOSIS — I50.32 CHRONIC DIASTOLIC CONGESTIVE HEART FAILURE (HCC): Primary | ICD-10-CM

## 2023-07-12 DIAGNOSIS — E08.21 DIABETES MELLITUS DUE TO UNDERLYING CONDITION WITH DIABETIC NEPHROPATHY, WITHOUT LONG-TERM CURRENT USE OF INSULIN (HCC): ICD-10-CM

## 2023-07-12 DIAGNOSIS — R06.02 SOB (SHORTNESS OF BREATH): ICD-10-CM

## 2023-07-12 DIAGNOSIS — J84.9 INTERSTITIAL LUNG DISEASE (HCC): ICD-10-CM

## 2023-07-12 DIAGNOSIS — I27.20 PULMONARY HTN (HCC): ICD-10-CM

## 2023-07-12 DIAGNOSIS — I10 HYPERTENSION, UNSPECIFIED TYPE: ICD-10-CM

## 2023-07-12 RX ORDER — CINACALCET 30 MG/1
TABLET, FILM COATED ORAL
COMMUNITY
Start: 2023-01-25 | End: 2023-07-12

## 2023-07-12 RX ORDER — ASPIRIN 81 MG/1
81 TABLET ORAL DAILY
Qty: 90 TABLET | Refills: 3 | Status: SHIPPED | OUTPATIENT
Start: 2023-07-12

## 2023-07-12 ASSESSMENT — ENCOUNTER SYMPTOMS
ABDOMINAL PAIN: 0
VOMITING: 0

## 2023-07-17 ENCOUNTER — OFFICE VISIT (OUTPATIENT)
Dept: PULMONOLOGY | Age: 83
End: 2023-07-17
Payer: MEDICARE

## 2023-07-17 VITALS
HEART RATE: 67 BPM | OXYGEN SATURATION: 92 % | SYSTOLIC BLOOD PRESSURE: 112 MMHG | HEIGHT: 62 IN | WEIGHT: 120 LBS | DIASTOLIC BLOOD PRESSURE: 56 MMHG | BODY MASS INDEX: 22.08 KG/M2

## 2023-07-17 DIAGNOSIS — J84.89 NSIP (NONSPECIFIC INTERSTITIAL PNEUMONIA) (HCC): Primary | ICD-10-CM

## 2023-07-17 DIAGNOSIS — I27.20 PULMONARY HYPERTENSION (HCC): ICD-10-CM

## 2023-07-17 PROCEDURE — 3078F DIAST BP <80 MM HG: CPT | Performed by: INTERNAL MEDICINE

## 2023-07-17 PROCEDURE — 3074F SYST BP LT 130 MM HG: CPT | Performed by: INTERNAL MEDICINE

## 2023-07-17 PROCEDURE — 1123F ACP DISCUSS/DSCN MKR DOCD: CPT | Performed by: INTERNAL MEDICINE

## 2023-07-17 PROCEDURE — 99214 OFFICE O/P EST MOD 30 MIN: CPT | Performed by: INTERNAL MEDICINE

## 2023-07-17 ASSESSMENT — ENCOUNTER SYMPTOMS
SINUS PAIN: 0
WHEEZING: 0
SHORTNESS OF BREATH: 1
FACIAL SWELLING: 0
CHEST TIGHTNESS: 0
COUGH: 0
EYE REDNESS: 0

## 2023-07-17 NOTE — PROGRESS NOTES
PFT was compared to the one on 1/20/20 - FEV1 and FVC are stable. SIX-MINUTE WALK:    A six-minute walk was started on room air. Patient was placed on   2 liters of O2 at minute 4   The patientwalked a total of 510 feet. She did stop or pause   during the walk to place O2 and due to back pain. Baseline  oxygen saturation 95%. The lowest oxygen saturation during the   walk was 82% on minute 3 for which she was placed on 2 liters. O2 recovered to 95-98% during the remaining time of testing. 6MWT was compared to the one we have on 10/8/19:  Дмитрий Caldwell walked decreased from 660ft to 510ft  At that time O2 sats dropped to 86% on minute 4 and placed on O2   on minute 5    Comment: 6MWT is slightly worse than how it was last year on   10/8/19     High res CT on 1/20/21  Stable findings of the chest when compared with the prior study. Findings again favor an NSIP pattern of interstitial lung disease. Stable pulmonary nodularity. CT abdomen on 2/23/22  EXAM: CT ABDOMEN AND PELVIS WITH CONTRAST ON 2/23/2022       INDICATION: Left lower quadrant pain       COMPARISON: CT abdomen/pelvis 7/28/2020       TECHNIQUE: Multidetector CT imaging was obtained from lung bases through pelvis. Axial images and multiplanar reformatted images are provided for review. Dose modulation, iterative reconstruction and/or weight based adjustments of the mA/kV were utilized    to reduce radiation dose to as low as reasonably achievable       IV Contrast: 80 cc Isovue 370   Oral Contrast: None       LIMITATION: None       FINDINGS:       : No additional abnormality       LUNG BASES: Honeycombing is noted in the lung bases. There is a small pericardial effusion. The heart is enlarged. LIVER: The liver is homogeneous in its enhancement. SPLEEN: The spleen is normal in size and attenuation. GALLBLADDER AND BILIARY TREE: No calcified stones are seen.  The common bile duct is dilated measuring up to 9 to 10

## 2023-07-24 ENCOUNTER — HOSPITAL ENCOUNTER (OUTPATIENT)
Dept: NUCLEAR MEDICINE | Age: 83
Discharge: HOME | End: 2023-07-24
Attending: INTERNAL MEDICINE
Payer: MEDICARE

## 2023-07-24 ENCOUNTER — HOSPITAL ENCOUNTER (OUTPATIENT)
Dept: GENERAL RADIOLOGY | Age: 83
Discharge: HOME | End: 2023-07-24
Attending: INTERNAL MEDICINE
Payer: MEDICARE

## 2023-07-24 DIAGNOSIS — Z92.89 HISTORY OF V/Q SCAN: ICD-10-CM

## 2023-07-24 DIAGNOSIS — R06.02 SOB (SHORTNESS OF BREATH): ICD-10-CM

## 2023-07-24 PROCEDURE — 78580 LUNG PERFUSION IMAGING: CPT

## 2023-07-24 PROCEDURE — 71046 X-RAY EXAM CHEST 2 VIEWS: CPT

## 2023-07-24 PROCEDURE — A9540 TC99M MAA: HCPCS | Performed by: INTERNAL MEDICINE

## 2023-07-24 PROCEDURE — 3430000000 HC RX DIAGNOSTIC RADIOPHARMACEUTICAL: Performed by: INTERNAL MEDICINE

## 2023-07-24 RX ADMIN — Medication 6 MILLICURIE: at 14:06

## 2023-07-25 RX ORDER — AMLODIPINE BESYLATE 10 MG/1
10 TABLET ORAL DAILY
Qty: 90 TABLET | Refills: 1 | Status: SHIPPED | OUTPATIENT
Start: 2023-07-25

## 2023-07-25 NOTE — TELEPHONE ENCOUNTER
REFILL:    amLODIPine (NORVASC) 10 MG tablet    [facility] [id number] - [address] [place] [address]

## 2023-07-26 ENCOUNTER — PATIENT MESSAGE (OUTPATIENT)
Dept: INTERNAL MEDICINE CLINIC | Age: 83
End: 2023-07-26

## 2023-07-26 DIAGNOSIS — J96.11 CHRONIC RESPIRATORY FAILURE WITH HYPOXIA (HCC): ICD-10-CM

## 2023-07-26 DIAGNOSIS — R63.4 UNINTENTIONAL WEIGHT LOSS: ICD-10-CM

## 2023-07-26 DIAGNOSIS — N18.31 STAGE 3A CHRONIC KIDNEY DISEASE (HCC): Primary | ICD-10-CM

## 2023-07-26 DIAGNOSIS — I27.23 PULMONARY HYPERTENSION DUE TO LUNG DISEASE (HCC): ICD-10-CM

## 2023-08-02 ENCOUNTER — TELEPHONE (OUTPATIENT)
Dept: INTERNAL MEDICINE CLINIC | Age: 83
End: 2023-08-02

## 2023-08-02 NOTE — TELEPHONE ENCOUNTER
Pt's son would like for Dr. Jamie Jay to give him a call to discuss his mothers health. He was informed Dr. Jamie Jay is not back in the office until 8/10/23 and is ok with waiting for a call.

## 2023-08-08 ENCOUNTER — TELEPHONE (OUTPATIENT)
Dept: INTERNAL MEDICINE CLINIC | Age: 83
End: 2023-08-08

## 2023-08-10 ENCOUNTER — TELEPHONE (OUTPATIENT)
Dept: INTERNAL MEDICINE CLINIC | Age: 83
End: 2023-08-10

## 2023-08-10 DIAGNOSIS — Z51.5 PALLIATIVE CARE ENCOUNTER: Primary | ICD-10-CM

## 2023-08-10 DIAGNOSIS — J84.89 NSIP (NONSPECIFIC INTERSTITIAL PNEUMONIA) (HCC): ICD-10-CM

## 2023-08-10 NOTE — TELEPHONE ENCOUNTER
Would like to know if Dr. Ginette Alfonso will write prescription for comfort medications and send them to [address]     [address], [address], [address] [phone]    Please call and advise

## 2023-08-11 RX ORDER — ROSUVASTATIN CALCIUM 40 MG/1
TABLET, COATED ORAL
Qty: 90 TABLET | Refills: 1 | Status: SHIPPED | OUTPATIENT
Start: 2023-08-11

## 2023-08-11 RX ORDER — HYOSCYAMINE SULFATE 0.12 MG/1
0.12 TABLET SUBLINGUAL EVERY 4 HOURS PRN
Qty: 84 EACH | Refills: 0 | Status: SHIPPED | OUTPATIENT
Start: 2023-08-11 | End: 2023-08-25

## 2023-08-11 RX ORDER — LORAZEPAM 0.5 MG/1
0.5 TABLET ORAL EVERY 4 HOURS PRN
Qty: 84 TABLET | Refills: 0 | Status: SHIPPED | OUTPATIENT
Start: 2023-08-11 | End: 2023-08-25

## 2023-08-11 RX ORDER — ESCITALOPRAM OXALATE 20 MG/1
TABLET ORAL
Qty: 90 TABLET | Refills: 1 | Status: SHIPPED | OUTPATIENT
Start: 2023-08-11

## 2023-08-11 RX ORDER — MORPHINE SULFATE 100 MG/5ML
5 SOLUTION ORAL EVERY 4 HOURS PRN
Qty: 21 ML | Refills: 0 | Status: SHIPPED | OUTPATIENT
Start: 2023-08-11 | End: 2023-08-25

## 2023-08-11 NOTE — TELEPHONE ENCOUNTER
Do they prefer max quantity for 14 day supply (420 ml morphine, 84 ativan, 84 levsin) or other quantity?   I can send today

## 2023-08-11 NOTE — TELEPHONE ENCOUNTER
Spoke to Eliazar she said they usually prescribe a comfort pack its 3 medications. Morphine 20mg/ml 5mg every 4 hours PRN for 14 days  Ativan 0.5mg 1 tablet every 4 hours PRN for 14 days  Levsin 0.125mg sub ling tabs 1 tab every 4 hours PRS for 14 days        She stated we can send them to [employer] and they will deliver them right to the pt     She stated if any questions or anything we can call her back and she is on call all weekend. ,

## 2023-08-14 ENCOUNTER — TELEPHONE (OUTPATIENT)
Dept: INTERNAL MEDICINE CLINIC | Age: 83
End: 2023-08-14

## 2023-08-14 RX ORDER — CEFPODOXIME PROXETIL 200 MG/1
200 TABLET, FILM COATED ORAL 2 TIMES DAILY
Qty: 14 TABLET | Refills: 0 | Status: SHIPPED | OUTPATIENT
Start: 2023-08-14 | End: 2023-08-21

## 2023-08-14 RX ORDER — FAMOTIDINE 20 MG/1
20 TABLET, FILM COATED ORAL DAILY
Qty: 90 TABLET | Refills: 0 | OUTPATIENT
Start: 2023-08-14

## 2023-08-14 RX ORDER — DOXYCYCLINE HYCLATE 100 MG
100 TABLET ORAL 2 TIMES DAILY
Qty: 14 TABLET | Refills: 0 | Status: SHIPPED | OUTPATIENT
Start: 2023-08-14 | End: 2023-08-21

## 2023-08-14 NOTE — TELEPHONE ENCOUNTER
Geovany Cornejo called back stated oxygen increase over weekend was on 8 now is on 10 liters high flow, was having a fever 99.7 with chills,  On 8/13/23 said she had chest pain and trouble breathing and coughing up things. The family is worried she is having pneumonia. This morning more chest pain on right side took dose of morphine and that helped. States the family helps calling them non stop worried. That's why requesting antibiotic. If she wants to prescribe we can send on local pharmacy. Pt did tell nurse she feels better today then yesterday but the worry is the chills.  She is talking extra strength tylenol one in am and pm.

## 2023-08-14 NOTE — TELEPHONE ENCOUNTER
Spoke to [facility] gave MD advise she updated pt record and gave me Sissy Wall number [phone].      Spoke to jj to let her know it was sent to local pharmacy to pt could get started today she stated she will go get them

## 2023-08-14 NOTE — TELEPHONE ENCOUNTER
Leroy Chapa from [place] is calling to report pt's temperature of 97.7  and is requesting an antibiotic to treat.      Please advise

## 2023-08-14 NOTE — TELEPHONE ENCOUNTER
Jesus called just to let Dr. Zuhair Walker know that he was going to start pt on a laxative. Pt will be starting morphine which can cause constipation and he would like to stay ahead of that.

## 2023-08-18 ENCOUNTER — TELEMEDICINE (OUTPATIENT)
Dept: INTERNAL MEDICINE CLINIC | Age: 83
End: 2023-08-18

## 2023-08-18 DIAGNOSIS — J84.9 CHRONIC INTERSTITIAL LUNG DISEASE (HCC): Primary | ICD-10-CM

## 2023-08-18 DIAGNOSIS — F41.9 ANXIETY: ICD-10-CM

## 2023-08-18 NOTE — PROGRESS NOTES
Chele Aguilera (:  1940) is a Established patient, presenting virtually for evaluation of the following:    Assessment & Plan   Below is the assessment and plan developed based on review of pertinent history, physical exam, labs, studies, and medications. 1. Chronic interstitial lung disease ([address])  - now on hospice. Morphine for shortness of breath seems to be helping. Discussed appetite stimulant but since she is not losing weight I would wait. 2. Anxiety  - episodes could be panic attacks - also could be fluctuations in blood pressure or an arrhythmia. If not drinking alcohol can try the lorazepam, if it did help these episodes then that would suggest panic attacks. Continue escitalopram    Return in about 2 months (around 10/18/2023). Subjective   HPI    She has transitioned to hospice, going well currently. She is on 10L of oxygen now at rest. Using morphine as needed, taking senna to prevent constipation. She has had episodes where she feels like she is lightheaded, no nausea, feels like she needs to lie down. Does not need to be standing during those episodes, can happen when sitting with no warning. She is not using the lorazepam frequently since she drinks a glass of wine at night. Still taking the escitalopram. Appetite is poor but has not lost weight. Review of Systems       Objective   Patient-Reported Vitals  No data recorded     Physical Exam  Vitals reviewed. Constitutional:       Appearance: Normal appearance. HENT:      Head: Normocephalic and atraumatic. Pulmonary:      Effort: Pulmonary effort is normal. No respiratory distress. Neurological:      General: No focal deficit present. Mental Status: She is alert. Psychiatric:         Mood and Affect: Mood normal.         Behavior: Behavior normal.         Thought Content:  Thought content normal.         Judgment: Judgment normal.                Jacki Cifuentes, was evaluated through a synchronous (real-time) audio-video

## 2023-09-01 ENCOUNTER — TELEPHONE (OUTPATIENT)
Dept: INTERNAL MEDICINE CLINIC | Age: 83
End: 2023-09-01

## 2023-09-01 NOTE — TELEPHONE ENCOUNTER
Tuan Sheets is Requesting medication orders due to patient being discharged from hospice on Ul[place] 9/3 an returning to the [facility] living. States patient is as needed for Morphine and ativan and needs orders changed to routinely. Please call [employer] for verbal ok to do so at [phone].

## 2023-09-01 NOTE — TELEPHONE ENCOUNTER
Patient is going to have 24/7 care at bedside, family has requested this. Hospice [facility] will not be included in patient care now.

## 2024-10-02 NOTE — PROGRESS NOTES
Ramila Reyna (:  1940) is a 68 y.o. female,Established patient, here for evaluation of the following chief complaint(s):  3 Month Follow-Up      ASSESSMENT/PLAN:  1. Chronic bilateral low back pain without sciatica  Assessment & Plan:  She had an epidural which only provided her with small amount of pain relief and for a very short period of time. She is not having any relief on meloxicam.  She is taking Voltaren gel over-the-counter so we will try systemic Voltaren to see if this helps with the pain more widespread. She will call for any worsening symptoms. 2. Anxiety  Assessment & Plan:  Anxiety and depression seem to be well controlled on current treatment plan there will be no changes at this time. 3. Diabetes mellitus due to underlying condition with diabetic nephropathy, without long-term current use of insulin ([facility] [address]  Assessment & Plan: With weight loss from grief, and continued controlled A1c's at this time we will stop all diabetes medication and follow-up in April to see how her A1c is doing. Encouraged her to eat well and continue to monitor her carb and sugar intake. Advised that she may benefit from 5 smaller meals throughout the day rather than 3 bigger meals. Also suggest drinking a protein shake daily in addition to her meals not to be used as a meal replacement. No follow-ups on file. SUBJECTIVE/OBJECTIVE:  BRUNO BRUSH is in the office today to follow-up on her chronic medical conditions. She states that overall she has been doing very well. The biggest issue is that she continues to lose weight as she has not had appetite and is not eating well since her  passed away about a year ago. She states that she tries to eat her biggest meal in the breakfast.  She states that in the morning her breakfast consists of 1 egg, a cup of fruit, and half a bagel with some butter.   She states that her lunch and dinner smaller meals l throughout the day she has Meals on [employer] but does not always like what they have. She states that her biggest concern today is her lower back. She has been trying meloxicam without any improvement. She does use over-the-counter topical cream for her knees which helps that she is wondering if she can get something similar for her back. She also uses Lidoderm patches. With her weight loss certain her blood sugars continuing to trend down her A1c has been very well controlled for several months at this time. Current Outpatient Medications   Medication Sig Dispense Refill    omeprazole (PRILOSEC) 20 MG delayed release capsule Take 2 capsules by mouth daily 30 capsule 0    Probiotic Product (PROBIOTIC ACIDOPHILUS BIOBEADS) CAPS Take 1 capsule by mouth daily 90 capsule 0    diclofenac (VOLTAREN) 50 MG EC tablet Take 1 tablet by mouth 3 times daily (with meals) 60 tablet 3    rosuvastatin (CRESTOR) 40 MG tablet TAKE ONE TABLET BY MOUTH DAILY 90 tablet 0    escitalopram (LEXAPRO) 20 MG tablet TAKE ONE TABLET BY MOUTH DAILY 90 tablet 0    traZODone (DESYREL) 100 MG tablet Take 1 tablet by mouth nightly as needed for Sleep 90 tablet 1    Diclofenac Sodium POWD 2 g by Does not apply route 4 times daily Formula #8E Baclo 2% Diclo 3% DMSO 5% Evans 6% Lido 2% Prilo 2% 240 g 11    ondansetron (ZOFRAN) 4 MG tablet Take 1 tablet by mouth every 8 hours as needed for Nausea or Vomiting 10 tablet 0    blood glucose test strips (ASCENSIA AUTODISC VI;ONE TOUCH ULTRA TEST VI) strip One Touch Ultra test Strips testing daily per E11.9 100 each 3    lidocaine (LIDODERM) 5 % Place 1 patch onto the skin every 12 hours 12 hours on, 12 hours off. 30 patch 0    amLODIPine (NORVASC) 10 MG tablet Take 1 tablet by mouth daily 90 tablet 1    blood glucose test strips (FREESTYLE LITE) strip 1 each by In Vitro route daily Testing 1-2 times daily per e11.9 100 each 3    blood glucose monitor strips Test 2 times a day & as needed for symptoms of irregular blood glucose.  One Touch Verio strips 100 strip 2    Cyanocobalamin (B-12 PO) Take by mouth daily       VITAMIN D, CHOLECALCIFEROL, PO Take 1 tablet by mouth daily      vitamin E 1000 UNITS capsule Take 1,000 Units by mouth daily. No current facility-administered medications for this visit. Review of Systems   Constitutional: Negative for chills, fatigue and fever. HENT: Negative for congestion, ear pain, postnasal drip, rhinorrhea, sinus pressure, sneezing and sore throat. Eyes: Negative for redness and itching. Respiratory: Negative for cough, chest tightness, shortness of breath and wheezing. Cardiovascular: Negative for chest pain and palpitations. Gastrointestinal: Negative for abdominal pain, blood in stool, constipation, diarrhea, nausea and vomiting. Endocrine: Negative for cold intolerance and heat intolerance. Genitourinary: Negative for difficulty urinating, dysuria, flank pain, frequency, hematuria and urgency. Musculoskeletal: Negative for arthralgias, back pain, joint swelling and myalgias. Skin: Negative for color change, pallor, rash and wound. Allergic/Immunologic: Negative for environmental allergies and food allergies. Neurological: Negative for dizziness, seizures, syncope, weakness, light-headedness, numbness and headaches. Hematological: Negative for adenopathy. Does not bruise/bleed easily. Psychiatric/Behavioral: Negative for confusion, sleep disturbance and suicidal ideas. The patient is not nervous/anxious and is not hyperactive. Vitals:    02/23/21 0959   BP: 138/68   Site: Left Upper Arm   Position: Sitting   Cuff Size: Medium Adult   Pulse: 76   Temp: 97.3 °F (36.3 °C)   TempSrc: Temporal   Weight: 123 lb (55.8 kg)   Height: 5' 4\" (1.626 m)       Physical Exam  Constitutional:       Appearance: Normal appearance. She is well-developed. HENT:      Head: Normocephalic and atraumatic.       Right Ear: Hearing normal.      Left Ear: Hearing normal. Nose: No mucosal edema. Right Sinus: No maxillary sinus tenderness or frontal sinus tenderness. Left Sinus: No maxillary sinus tenderness or frontal sinus tenderness. Mouth/Throat: Tonsils: No tonsillar abscesses. Eyes:      Extraocular Movements: Extraocular movements intact. Pupils: Pupils are equal, round, and reactive to light. Cardiovascular:      Rate and Rhythm: Normal rate and regular rhythm. Pulses: Normal pulses. Heart sounds: Normal heart sounds. Pulmonary:      Effort: Pulmonary effort is normal.      Breath sounds: Normal breath sounds. Lymphadenopathy:      Head:      Right side of head: No submental, submandibular, tonsillar, preauricular, posterior auricular or occipital adenopathy. Left side of head: No submental, submandibular, tonsillar, preauricular, posterior auricular or occipital adenopathy. Skin:     General: Skin is warm and dry. Neurological:      Mental Status: She is alert. Psychiatric:         Mood and Affect: Mood normal.         Behavior: Behavior normal.           On this date 02/23/21 I have spent 30 minutes reviewing previous notes, test results and face to face with the patient discussing the diagnosis and importance of compliance with the treatment plan as well as documenting on the day of the visit. An electronic signature was used to authenticate this note.     --AMEE Bobby - CNP

## 2025-04-28 NOTE — TELEPHONE ENCOUNTER
Auth: # [id number]    Date: 08/04/2020  Type of SX:  OP  Location: [facility]  CPT: 54879.42   38767.04     DX Code: O43.319   M54.16   M48.062  SX area: Bilateral L3  L4   L5  MBB  Insurance: [employer]
Voluntary discussion occurred addressing advanced care planning

## (undated) DEVICE — UNIVERSAL BLOCK TRAY: Brand: MEDLINE INDUSTRIES, INC.

## (undated) DEVICE — ALCOHOL RUBBING 16OZ 70% ISO

## (undated) DEVICE — CHLORAPREP 26ML ORANGE

## (undated) DEVICE — GLOVE SURG SZ 65 THK91MIL LTX FREE SYN POLYISOPRENE

## (undated) DEVICE — SOLUTION IV IRRIG WATER 1000ML POUR BRL 2F7114

## (undated) DEVICE — Z DISCONTINUED USE 2749457 TUBING SAMP AD W12.5XH8.4IN D9.1IN NSL ORAL SMRT CAPNOLINE

## (undated) DEVICE — Device

## (undated) DEVICE — CATHETER IV 20GA L1.25IN PNK FEP SFTY STR HUB RADPQ DISP

## (undated) DEVICE — FORCEPS BX L240CM DIA2.4MM L NDL RAD JAW 4 133340

## (undated) DEVICE — OPEN-END FLEXI-TIP URETERAL CATHETER: Brand: FLEXI-TIP

## (undated) DEVICE — NEEDLE HYPO 25GA L1.5IN BLU POLYPR HUB S STL REG BVL STR

## (undated) DEVICE — BAG DRNGE 2000ML UROLOGY ANTI REFLX CHMBR SAMP PRT

## (undated) DEVICE — GLOVE ORANGE PI 8   MSG9080

## (undated) DEVICE — SET ADMIN PRIMING 7ML L30IN 7.35LB 20 GTT 2ND RLER CLMP

## (undated) DEVICE — TOWEL,OR,DSP,ST,BLUE,STD,4/PK,20PK/CS: Brand: MEDLINE

## (undated) DEVICE — GAUZE,SPONGE,4"X4",16PLY,STRL,LF,10/TRAY: Brand: MEDLINE

## (undated) DEVICE — GLOVE SURG BEAD CUF 7 STD PF WHT STRL TRIUMPH LT LTX

## (undated) DEVICE — NEEDLE FLTR 19GA L1.5IN WALL THK5UM BRN POLYPR HUB S STL

## (undated) DEVICE — GUIDEWIRE ENDOSCP L150CM DIA0.035IN TIP 3CM PTFE NIT

## (undated) DEVICE — AIRLIFE™ NASAL OXYGEN CANNULA CURVED, FLARED TIP, WITH 7 FEET (2.1 M) CRUSH RESISTANT TUBING, OVER-THE-EAR STYLE: Brand: AIRLIFE™

## (undated) DEVICE — NEEDLE HYPO 18GA L1.5IN PNK POLYPR HUB S STL THN WALL FILL

## (undated) DEVICE — SOLUTION IRRIG 3000ML STRL H2O USP UROMATIC PLAS CONT

## (undated) DEVICE — AVANOS* UNIVERSAL BLOCK TRAYS: Brand: AVANOS

## (undated) DEVICE — TRAP SPEC COLL 40CC MUCOUS CALIB UNIV CONN FOR OPN SUCT

## (undated) DEVICE — SOLUTION IV 1000ML LAC RINGERS PH 6.5 INJ USP VIAFLX PLAS

## (undated) DEVICE — 3M™ TEGADERM™ TRANSPARENT FILM DRESSING FRAME STYLE, 1624W, 2-3/8 IN X 2-3/4 IN (6 CM X 7 CM), 100/CT 4CT/CASE: Brand: 3M™ TEGADERM™

## (undated) DEVICE — SET GRAV VENT NVENT CK VLV 3 NDL FREE PRT 10 GTT

## (undated) DEVICE — STERILE POLYISOPRENE POWDER-FREE SURGICAL GLOVES: Brand: PROTEXIS

## (undated) DEVICE — SURGICAL PROCEDURE PACK EYE ANDRSN

## (undated) DEVICE — TOWEL OR BLUEE 16X26IN ST 8 PACK ORB08 16X26ORTWL

## (undated) DEVICE — CYSTO: Brand: MEDLINE INDUSTRIES, INC.

## (undated) DEVICE — SOLUTION IRRIG 250ML STRL H2O PLAS POUR BTL USP

## (undated) DEVICE — ELECTRODE ECG MONITR FOAM TEAR DROP ADLT RED

## (undated) DEVICE — SILICONE I/A TIP STRAIGHT: Brand: ALCON

## (undated) DEVICE — 3M(TM) TRANSPORE SURGICAL TAPE 1527-1: Brand: 3M™ TRANSPORE™

## (undated) DEVICE — TOWEL,STOP FLAG GOLD N-W: Brand: MEDLINE